# Patient Record
Sex: FEMALE | Race: WHITE | Employment: FULL TIME | URBAN - METROPOLITAN AREA
[De-identification: names, ages, dates, MRNs, and addresses within clinical notes are randomized per-mention and may not be internally consistent; named-entity substitution may affect disease eponyms.]

---

## 2017-01-02 ENCOUNTER — APPOINTMENT (OUTPATIENT)
Dept: CARDIAC REHAB | Age: 52
End: 2017-01-02
Attending: INTERNAL MEDICINE

## 2017-01-04 ENCOUNTER — OFFICE VISIT (OUTPATIENT)
Dept: CARDIOLOGY CLINIC | Age: 52
End: 2017-01-04

## 2017-01-04 VITALS
RESPIRATION RATE: 18 BRPM | OXYGEN SATURATION: 99 % | SYSTOLIC BLOOD PRESSURE: 106 MMHG | HEART RATE: 116 BPM | BODY MASS INDEX: 35.48 KG/M2 | DIASTOLIC BLOOD PRESSURE: 70 MMHG | HEIGHT: 61 IN | WEIGHT: 187.9 LBS

## 2017-01-04 DIAGNOSIS — I87.1 SVC OBSTRUCTION: ICD-10-CM

## 2017-01-04 DIAGNOSIS — R00.0 SINUS TACHYCARDIA: ICD-10-CM

## 2017-01-04 DIAGNOSIS — I26.99 OTHER PULMONARY EMBOLISM WITHOUT ACUTE COR PULMONALE, UNSPECIFIED CHRONICITY (HCC): ICD-10-CM

## 2017-01-04 DIAGNOSIS — R06.02 SHORTNESS OF BREATH: Primary | ICD-10-CM

## 2017-01-04 DIAGNOSIS — I67.9 CEREBROVASCULAR DISEASE, UNSPECIFIED: ICD-10-CM

## 2017-01-04 RX ORDER — METOPROLOL TARTRATE 50 MG/1
75 TABLET ORAL 2 TIMES DAILY
Qty: 90 TAB | Refills: 1 | Status: SHIPPED | OUTPATIENT
Start: 2017-01-04 | End: 2017-03-05

## 2017-01-04 NOTE — PROGRESS NOTES
NAME:  Andrea Dahl   :   1965   MRN:   [id number]   PCP:  Dannie Goodman MD           Subjective: The patient is a 46y.o. year old female  who returns for a routine follow-up. She is S/p exploration of sternotomy wound for intolerable wound pain with findings of three protruding sternal wire twists. All wire was removed in Dec.   Since the last visit, patient reports not feeling much improved since sternal wire removal . She is short of breath most of the time and tachycardic unless she is resting in a recliner. Her resting HR is in the 110s. pulonary felt she has scaring in right lung from PE which may be contributing to her dyspnea. 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 Ms Sarmad Sosa notes she was short of breath prior to her surgery but this is worse. C/O edema in her left leg. Wears compression hose on weekends and tries to keep her legs elevated throughout the work day. Is back to work as a  lifting boxes/binders frequently and does not feel she can do this 3 weeks after her surgery. Past Medical History   Diagnosis Date    Arrhythmia      Afib    Chronic pain      Lt and Rt back:  Dr Mir Ny;  Pain mgmt Karen JAEGER    Cough      evaluated 14 by Dr Nancy Rosario ([phone]) \". . possible drug related lung is due to Methitrexate or atypical or fungal infection\" per office note dated 14    Crohn's disease [facility])      Dr Ame Muller Ill-defined condition      migraines    Lupus [facility])      Dr Keren Rangel [phone]    Pain from implanted hardware 2016     Exploration of sternal incision with removal of protruding sternal wires    Stroke ([address] 2016     TIA's     SVC obstruction        Social History   Substance Use Topics    Smoking status: Former Smoker     Packs/day: 0.50     Years: 2.50     Quit date: 3/29/2005    Smokeless tobacco: Never Used      Comment: social    Alcohol use No      Family History   Problem Relation Age of Onset   [facility] Father      stomach    Other Mother      Auto accident        Review of Systems  Constitutional: Negative for fever, chills, and diaphoresis. Respiratory: Negative for cough, hemoptysis, sputum production, reports shortness of breath  Cardiovascular: Negative for chest pain, palpitations, orthopnea, claudication, reports leg swelling   Gastrointestinal: Negative for heartburn, nausea, vomiting, blood in stool and melena. Genitourinary: Negative for dysuria and flank pain. Musculoskeletal: Negative for joint pain and back pain. Skin: Negative for rash. Neurological: Negative for focal weakness, seizures, loss of consciousness, weakness and headaches. Endo/Heme/Allergies: Does not bruise/bleed easily. Psychiatric/Behavioral: Negative for memory loss. The patient does not have insomnia. Objective:       Vitals:    01/04/17 1017 01/04/17 1026   BP: 104/70 106/70   Pulse: (!) 116    Resp: 18    SpO2: 99%    Weight: 187 lb 14.4 oz (85.2 kg)    Height: 5' 0.5\" (1.537 m)     Body mass index is 36.09 kg/(m^2). General PE    Gen: NAD     Mental Status - Alert. General Appearance - Not in acute distress. Neck - no JVD     Chest and Lung Exam     Inspection: Accessory muscles - No use of accessory muscles in breathing. Auscultation:   Breath sounds: - Normal.     Cardiovascular   Inspection: Jugular vein - Bilateral - Inspection Normal.   Palpation/Percussion:   Apical Impulse: - Normal.   Auscultation: Rhythm - tachycardic. Heart Sounds - S1 WNL and S2 WNL. No S3 or S4. Murmurs & Other Heart Sounds: Auscultation of the heart reveals - No Murmurs. Peripheral Vascular   Upper Extremity: Inspection - Bilateral - No Cyanotic nailbeds or Digital clubbing. Lower Extremity:   Palpation: Edema - Bilateral - + edema, L>R.        Neuro: A&O times 3, CN and motor grossly WNL      Data Review:     EKG -  Sinus tachycardia      Allergies reviewed  Allergies   Allergen Reactions    Codeine Hives and Nausea and Vomiting     Severe nausea & vomiting    Hydrocodone Hives and Nausea and Vomiting     Severe nausea & vomiting    Oxycontin [Oxycodone] Hives and Nausea and Vomiting     Severe nausea & vomiting, also has the same reaction from Percocet    Percocet [Oxycodone-Acetaminophen] Hives and Nausea and Vomiting    Dilaudid [Hydromorphone (Bulk)] Swelling    Prednisone Other (comments)     HX OF CROHN'S; not allergic, prefer not to use        Medications reviewed  Current Outpatient Prescriptions   Medication Sig    metoprolol tartrate (LOPRESSOR) 50 mg tablet Take 1.5 Tabs by mouth two (2) times a day for 60 days.  diazepam (VALIUM) 5 mg tablet Take 1 Tab by mouth every twelve (12) hours as needed for Anxiety. Max Daily Amount: 10 mg. Refill at 1 month intervals    aspirin (ASPIRIN) 325 mg tablet Take 325 mg by mouth daily.  apixaban (ELIQUIS) 5 mg tablet Take 10mg a day for the first 7 days, then 5mg twice a day    gabapentin (NEURONTIN) 300 mg capsule Take 1 Cap by mouth three (3) times daily.  butalbital-acetaminophen-caffeine (FIORICET, ESGIC) -40 mg per tablet Take 2 Tabs by mouth every eight (8) hours as needed for Pain or Headache. Max Daily Amount: 6 Tabs. Indications: MIGRAINE, TENSION-TYPE HEADACHE    hydroxychloroquine (PLAQUENIL) 200 mg tablet Take 400 mg by mouth daily (after dinner). For lupus    tiZANidine (ZANAFLEX) 2 mg tablet Take 6 mg by mouth nightly.  ondansetron hcl (ZOFRAN) 4 mg tablet Take 4 mg by mouth every eight (8) hours as needed for Nausea.  meperidine (DEMEROL) 50 mg tablet Take 50 mg by mouth three (3) times daily as needed for Pain.  INFLIXIMAB (REMICADE IV) 800 mg by IntraVENous route. Every 8 weeks;last infusion 10/07/2016    drospirenone-ethinyl estradiol (MARIELOS 28) 3-20 mg-mcg per tablet Take 1 Tab by mouth nightly.      No current facility-administered medications for this visit. Assessment:       ICD-10-CM ICD-9-CM    1. Shortness of breath R06.02 786.05 AMB POC EKG ROUTINE W/ 12 LEADS, INTER & REP   2. Other pulmonary embolism without acute cor pulmonale, unspecified chronicity ([facility]) I26.99 415.19    3. SVC obstruction I87.1 459.2    4. Cerebrovascular disease, unspecified I67.9 437.9    5. Sinus tachycardia R00.0 427.89 metoprolol tartrate (LOPRESSOR) 50 mg tablet        Orders Placed This Encounter    AMB POC EKG ROUTINE W/ 12 LEADS, INTER & REP     Order Specific Question:   Reason for Exam:     Answer:   routine    metoprolol tartrate (LOPRESSOR) 50 mg tablet     Sig: Take 1.5 Tabs by mouth two (2) times a day for 60 days. Dispense:  90 Tab     Refill:  1       Patient Active Problem List   Diagnosis Code    Crohn's disease of colon ([facility] [address] K50.10    Crohn's disease of ileum ([facility] [address] K50.00    Acute GI bleeding K92.2    Sinus tachycardia R00.0    Bronchitis, acute J20.9    Abdominal pain, acute, right upper quadrant R10.11    SLE (systemic lupus erythematosus) ([facility]) M32.9    Headache(784.0)     Migraine with aura and without status migrainosus, not intractable G43. 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.21    Cervical radiculopathy M54.12    Cervico-occipital neuralgia of right side M54.81    Stenosis of both carotid arteries without infarction I65.23    Acute ischemic stroke ([facility]) I63.9    Lupus ([facility]) M32.9    Pulmonary embolism ([facility]) I26.99    Transient ischemic attack involving right internal carotid artery G45.1    Stenosis of both carotid arteries without cerebral infarction I65.23    SVC obstruction I87.1    Shortness of breath R06.02    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 Sternal incision approximated, no concerning separation. Lungs with air movement in all fields, right posterior mildly diminished. ECG ST, unchanged from previous. BP is normotensive. Will increase her beta blocker to 75mg BID. Note to limit her lifting at work. Follow up in 4 weeks. Jennie Hoover, [facility] [address] [facility]    1/4/2017         Agree with note as outlined by  NP. I confirm findings in history and physical exam. No additional findings noted. Agree with plan as outlined above.      Jeremi Cuevas MD

## 2017-01-04 NOTE — LETTER
NOTIFICATION RETURN TO WORK / SCHOOL 
 
1/4/2017 11:17 AM 
 
Ms. Carlos Rivera [address] [phone] To Whom It May Concern: 
 
Carlos Rivera is currently under the care of 90Melchor Shah. She is limited to 5 lbs weight restriction. No lifting or pulling for 4 weeks. If there are questions or concerns please have the patient contact our office. Sincerely, Madison Cannon NP

## 2017-01-04 NOTE — LETTER
NOTIFICATION RETURN TO WORK / SCHOOL 
 
1/4/2017 11:13 AM 
 
Ms. Tomi Monae [address] [phone] To Whom It May Concern: 
 
Toim Monae is currently under the care of Jordan Shah. She is limited to 5 lbs weight restriction. No lifting above shoulder level for 4 weeks. If there are questions or concerns please have the patient contact our office. Sincerely, Dawson Klein MD

## 2017-01-04 NOTE — PROGRESS NOTES
Chief Complaint   Patient presents with    Shortness of Breath     4 mo appt. C/O SOB, fast HR constantly.

## 2017-01-16 ENCOUNTER — TELEPHONE (OUTPATIENT)
Dept: CARDIOLOGY CLINIC | Age: 52
End: 2017-01-16

## 2017-01-16 NOTE — TELEPHONE ENCOUNTER
Verified patient with two identifiers. Spoke with pt, c/o SOB increasing for a week, when she takes a deep breath she gets a sharp pain in chest with tightness. Advised pt to go to ER to be evaluated. Pt verbalized understanding and said she would.

## 2017-02-01 ENCOUNTER — HOSPITAL ENCOUNTER (OUTPATIENT)
Dept: MAMMOGRAPHY | Age: 52
Discharge: HOME | End: 2017-02-01
Attending: OBSTETRICS & GYNECOLOGY
Payer: COMMERCIAL

## 2017-02-01 DIAGNOSIS — Z12.31 VISIT FOR SCREENING MAMMOGRAM: ICD-10-CM

## 2017-02-01 PROCEDURE — 77067 SCR MAMMO BI INCL CAD: CPT

## 2017-02-03 ENCOUNTER — APPOINTMENT (OUTPATIENT)
Dept: INFUSION THERAPY | Age: 52
End: 2017-02-03
Payer: COMMERCIAL

## 2017-02-08 ENCOUNTER — OFFICE VISIT (OUTPATIENT)
Dept: CARDIOLOGY CLINIC | Age: 52
End: 2017-02-08

## 2017-02-08 VITALS
SYSTOLIC BLOOD PRESSURE: 102 MMHG | BODY MASS INDEX: 35.55 KG/M2 | DIASTOLIC BLOOD PRESSURE: 72 MMHG | HEART RATE: 103 BPM | RESPIRATION RATE: 18 BRPM | OXYGEN SATURATION: 99 % | WEIGHT: 188.3 LBS | HEIGHT: 61 IN

## 2017-02-08 DIAGNOSIS — R06.02 SOB (SHORTNESS OF BREATH): Primary | ICD-10-CM

## 2017-02-08 DIAGNOSIS — R07.89 CHEST WALL DISCOMFORT: ICD-10-CM

## 2017-02-08 DIAGNOSIS — R60.0 LOCALIZED EDEMA: ICD-10-CM

## 2017-02-08 DIAGNOSIS — R00.0 SINUS TACHYCARDIA: ICD-10-CM

## 2017-02-08 RX ORDER — TIZANIDINE HYDROCHLORIDE 2 MG/1
2 CAPSULE, GELATIN COATED ORAL 2 TIMES DAILY
COMMUNITY
Start: 2016-11-29 | End: 2017-03-23 | Stop reason: DRUGHIGH

## 2017-02-08 RX ORDER — ONDANSETRON 4 MG/1
TABLET, ORALLY DISINTEGRATING ORAL
COMMUNITY
Start: 2016-11-21 | End: 2017-02-08 | Stop reason: SDUPTHER

## 2017-02-08 RX ORDER — FUROSEMIDE 20 MG/1
TABLET ORAL
Qty: 30 TAB | Refills: 1 | Status: SHIPPED | OUTPATIENT
Start: 2017-02-08 | End: 2018-02-02

## 2017-02-08 RX ORDER — FLUCONAZOLE 100 MG/1
TABLET ORAL
COMMUNITY
Start: 2016-12-16 | End: 2017-09-27 | Stop reason: ALTCHOICE

## 2017-02-08 NOTE — PROGRESS NOTES
Subjective/HPI:     Radha Evans is a 46 y.o. female is here for f/u appt after increasing Metoprolol for tachycardia. She continues to have an elevated HR. She has been trending around 100. She is still sob, feels like she cant take a deep breath. She has had chest tightness, off and on worsening in the past 2 weeks. She feels like her sternum bone is  more then before. She hasnt been able to follow lifting precautions at work. She is still having swelling particularly in her left leg more then the right. She is wearing her compression stockings, but when she takes them off it will blow up more. The patient denies orthopnea, PND, palpitations, syncope, presyncope or fatigue. PCP Provider  Bryanna Nguyen MD  Past Medical History   Diagnosis Date    Arrhythmia      Afib    Chronic pain      Lt and Rt back:  Dr Claudy Deluca;  Pain mgmt Karen Mallory PA    Cough      evaluated 14 by Dr Peggy Gant ([phone]) \". . possible drug related lung is due to Methitrexate or atypical or fungal infection\" per office note dated 14    Crohn's disease [facility])      Dr Rubi Stanford Ill-defined condition      migraines    Lupus [facility])      Dr Nicol Vitalveland [phone]    Pain from implanted hardware 2016     Exploration of sternal incision with removal of protruding sternal wires    Stroke ([facility] [address] 2016     TIA's     SVC obstruction       Past Surgical History   Procedure Laterality Date    Pr colonoscopy w/biopsy single/multiple  2013          Hx appendectomy      Hx left salpingo-oophorectomy  2005     ovary and fallopian tube removed    Hx mohs procedure Right approx      with bone spur repair    Hx hysterectomy       partial    Hx  section       x1    Hx heart catheterization       no stents, open heart surgery    Hx vascular access  6/11/10     portacath insertion and removal; Gets Remicaid q5 weeks    Hx gi  2005     bowel adhesions removed    Hx colectomy 7/24/10     colon resection-18\" removed; Dr Shayy Waters resection    Hx other surgical       femerol vein removed    Hx tonsillectomy      Hx heent       sinus surgery for deviated septum     Allergies   Allergen Reactions    Codeine Hives and Nausea and Vomiting     Severe nausea & vomiting    Hydrocodone Hives and Nausea and Vomiting     Severe nausea & vomiting    Oxycontin [Oxycodone] Hives and Nausea and Vomiting     Severe nausea & vomiting, also has the same reaction from Percocet    Percocet [Oxycodone-Acetaminophen] Hives and Nausea and Vomiting    Dilaudid [Hydromorphone (Bulk)] Swelling    Prednisone Other (comments)     HX OF CROHN'S; not allergic, prefer not to use       Family History   Problem Relation Age of Onset    Cancer Father      stomach    Other Mother      Auto accident      Current Outpatient Prescriptions   Medication Sig    FLUVIRIN [id number], PF, syrg injection     fluconazole (DIFLUCAN) 100 mg tablet every thirty (30) days.  tiZANidine (ZANAFLEX) 2 mg capsule Take 2 mg by mouth two (2) times a day.  NORETHINDRONE ACETATE PO Take  by mouth daily. norethindron 0.35 mg tabs    furosemide (LASIX) 20 mg tablet Take one tab daily as needed for worsening swelling    metoprolol tartrate (LOPRESSOR) 50 mg tablet Take 1.5 Tabs by mouth two (2) times a day for 60 days.  diazepam (VALIUM) 5 mg tablet Take 1 Tab by mouth every twelve (12) hours as needed for Anxiety. Max Daily Amount: 10 mg. Refill at 1 month intervals    aspirin (ASPIRIN) 325 mg tablet Take 325 mg by mouth daily.  apixaban (ELIQUIS) 5 mg tablet Take 10mg a day for the first 7 days, then 5mg twice a day (Patient taking differently: Take 5 mg by mouth two (2) times a day. Take 10mg a day for the first 7 days, then 5mg twice a day)    gabapentin (NEURONTIN) 300 mg capsule Take 1 Cap by mouth three (3) times daily.     butalbital-acetaminophen-caffeine (FIORICET, ESGIC) -40 mg per tablet Take 2 Tabs by mouth every eight (8) hours as needed for Pain or Headache. Max Daily Amount: 6 Tabs. Indications: MIGRAINE, TENSION-TYPE HEADACHE    hydroxychloroquine (PLAQUENIL) 200 mg tablet Take 400 mg by mouth daily (after dinner). For lupus    ondansetron hcl (ZOFRAN) 4 mg tablet Take 4 mg by mouth every eight (8) hours as needed for Nausea.  meperidine (DEMEROL) 50 mg tablet Take 50 mg by mouth three (3) times daily as needed for Pain.  INFLIXIMAB (REMICADE IV) 800 mg by IntraVENous route. Every 8 weeks;last infusion 10/07/2016    tiZANidine (ZANAFLEX) 2 mg tablet Take 6 mg by mouth nightly.  drospirenone-ethinyl estradiol (MARIELOS 28) 3-20 mg-mcg per tablet Take 1 Tab by mouth nightly. No current facility-administered medications for this visit. Vitals:    02/08/17 0840 02/08/17 0853   BP: 104/80 102/72   Pulse: (!) 103    Resp: 18    SpO2: 99%    Weight: 188 lb 4.8 oz (85.4 kg)    Height: 5' 0.5\" (1.537 m)      Social History     Social History    Marital status:      Spouse name: N/A    Number of children: N/A    Years of education: N/A     Occupational History    Not on file. Social History Main Topics    Smoking status: Former Smoker     Packs/day: 0.50     Years: 2.50     Quit date: 3/29/2005    Smokeless tobacco: Never Used      Comment: social    Alcohol use No    Drug use: No    Sexual activity: Not on file     Other Topics Concern    Not on file     Social History Narrative       I have reviewed the nurses notes, vitals, problem list, allergy list, medical history, family, social history and medications. Review of Symptoms:    General: Pt denies excessive weight gain or loss. Pt is able to conduct ADL's  HEENT: Denies blurred vision, headaches, epistaxis and difficulty swallowing. Respiratory: + shortness of breath, denies HENDRIX, wheezing or stridor.   Cardiovascular: + precordial pain, denies palpitations, + edema, denies PND  Gastrointestinal: Denies poor appetite, indigestion, abdominal pain or blood in stool  Urinary: Denies dysuria, pyuria  Musculoskeletal: +sternal bone changes, Denies pain or swelling from muscles or joints  Neurologic: Denies tremor, paresthesias, or sensory motor disturbance  Skin: Denies rash, itching or texture change. Psych: Denies depression        Physical Exam:      General: Well developed, in no acute distress, cooperative and alert  HEENT: No carotid bruits, no JVD, trach is midline. Neck Supple, PEERL, EOM intact. Heart:  Normal S1/S2 negative S3 or S4. Regular, no murmur, gallop or rub.   Respiratory: Clear bilaterally x 4, no wheezing or rales  Abdomen:   Soft, non-tender, no masses, bowel sounds are active.   Extremities:  No edema, normal cap refill, no cyanosis, atraumatic. Neuro: A&Ox3, speech clear, gait stable. Skin: Skin color is normal. No rashes or lesions.  Non diaphoretic  Vascular: 2+ pulses symmetric in all extremities  Sternum: incision intact, no significant changes in sternum    Cardiographics    ECG: SR, rate 100    Results for orders placed or performed during the hospital encounter of 12/07/16   EKG, 12 LEAD, INITIAL   Result Value Ref Range    Ventricular Rate 110 BPM    Atrial Rate 110 BPM    P-R Interval 134 ms    QRS Duration 70 ms    Q-T Interval 334 ms    QTC Calculation (Bezet) 452 ms    Calculated P Axis 5 degrees    Calculated R Axis 15 degrees    Calculated T Axis 37 degrees    Diagnosis       Sinus tachycardia  Low voltage QRS  When compared with ECG of 05-JUL-2016 14:05,  High HR  QT has shortened  Confirmed by Jenna Arroyo MD, Alexander Titus ([id number]) on 12/8/2016 11:00:49 AM           Cardiology Labs:  Lab Results   Component Value Date/Time    Cholesterol, total 168 04/23/2016 03:57 AM    HDL Cholesterol 81 04/23/2016 03:57 AM    LDL, calculated 43.2 04/23/2016 03:57 AM    Triglyceride 219 04/23/2016 03:57 AM    CHOL/HDL Ratio 2.1 04/23/2016 03:57 AM       Lab Results   Component Value Date/Time Sodium 144 12/15/2016 04:24 AM    Potassium 4.0 12/15/2016 04:24 AM    Chloride 110 12/15/2016 04:24 AM    CO2 25 12/15/2016 04:24 AM    Anion gap 9 12/15/2016 04:24 AM    Glucose 109 12/15/2016 04:24 AM    BUN 10 12/15/2016 04:24 AM    Creatinine 0.93 12/15/2016 04:24 AM    BUN/Creatinine ratio 11 12/15/2016 04:24 AM    GFR est AA >60 12/15/2016 04:24 AM    GFR est non-AA >60 12/15/2016 04:24 AM    Calcium 7.5 12/15/2016 04:24 AM    AST (SGOT) 13 12/15/2016 04:24 AM    Alk. phosphatase 63 12/15/2016 04:24 AM    Protein, total 5.6 12/15/2016 04:24 AM    Albumin 2.2 12/15/2016 04:24 AM    Globulin 3.4 12/15/2016 04:24 AM    A-G Ratio 0.6 12/15/2016 04:24 AM    ALT (SGPT) 14 12/15/2016 04:24 AM           Assessment:     Assessment:     Marge Ji was seen today for shortness of breath and other. Diagnoses and all orders for this visit:    SOB (shortness of breath)  -     AMB POC EKG ROUTINE W/ 12 LEADS, INTER & REP    Sinus tachycardia    Chest wall discomfort    Localized edema  -     furosemide (LASIX) 20 mg tablet; Take one tab daily as needed for worsening swelling        ICD-10-CM ICD-9-CM    1. SOB (shortness of breath) R06.02 786.05 AMB POC EKG ROUTINE W/ 12 LEADS, INTER & REP   2. Sinus tachycardia R00.0 427.89    3. Chest wall discomfort R07.89 786.52    4. Localized edema R60.0 782.3 furosemide (LASIX) 20 mg tablet     Orders Placed This Encounter    AMB POC EKG ROUTINE W/ 12 LEADS, INTER & REP     Order Specific Question:   Reason for Exam:     Answer:   Routine    FLUVIRIN [id number], PF, syrg injection    fluconazole (DIFLUCAN) 100 mg tablet     Sig: every thirty (30) days.  DISCONTD: ondansetron (ZOFRAN ODT) 4 mg disintegrating tablet    tiZANidine (ZANAFLEX) 2 mg capsule     Sig: Take 2 mg by mouth two (2) times a day.  NORETHINDRONE ACETATE PO     Sig: Take  by mouth daily.  norethindron 0.35 mg tabs    furosemide (LASIX) 20 mg tablet     Sig: Take one tab daily as needed for worsening swelling     Dispense:  30 Tab     Refill:  1        Plan:     Patient presents today for f/u after increasing Metoprolol. Pt reports that this has not improved her symptoms, still trending HR around 100. She is having sob and chest wall tightness, feels like there is a change in her sternum bone, no obvious changes. She is having swelling in her legs, particularly the left. I will evaluate with an echo. I will send a script for Lasix 20mg po every day prn swelling and recommend she f/u with CT surgery to discuss next steps for her symptoms. f/u in 3 months. Lavelle Ruiz NP      [facility]    2/8/2017         Agree with note as outlined by  NP. I confirm findings in history and physical exam. No additional findings noted. Agree with plan as outlined above. Echo to r/o effusion.     [address] Laguerre MD

## 2017-02-08 NOTE — MR AVS SNAPSHOT
Visit Information Date & Time Provider Department Dept. Phone Encounter #  
 2/8/2017  9:00 AM Jeremi Cuevas MD [facility] [phone] [id number] Your Appointments 2/15/2017  3:30 PM  
ECHO CARDIOGRAMS 2D with [address] [facility]) Appt Note: Per Dr MALDONADO $0CP REM 2D ECHO COMPLETE ADULT (TTE) W OR WO CONTR [30093 CPT(R)]  
 [address] [facility] [address]  
[phone] [address] [facility] [address] Upcoming Health Maintenance Date Due Hepatitis C Screening 1965 DTaP/Tdap/Td series (1 - Tdap) 7/11/1986 PAP AKA CERVICAL CYTOLOGY 7/11/1986 FOBT Q 1 YEAR AGE 50-75 7/11/2015 BREAST CANCER SCRN MAMMOGRAM 2/1/2019 Allergies as of 2/8/2017  Review Complete On: 2/8/2017 By: Kilo Dunne NP Severity Noted Reaction Type Reactions Codeine High 04/22/2010   Systemic Hives, Nausea and Vomiting Severe nausea & vomiting Hydrocodone High 04/22/2010   Systemic Hives, Nausea and Vomiting Severe nausea & vomiting Oxycontin [Oxycodone] High 04/22/2010   Systemic Hives, Nausea and Vomiting Severe nausea & vomiting, also has the same reaction from Percocet Percocet [Oxycodone-acetaminophen] High 07/19/2010   Side Effect Hives, Nausea and Vomiting Dilaudid [Hydromorphone (Bulk)]  08/24/2012    Swelling Prednisone  01/20/2014    Other (comments) HX OF CROHN'S; not allergic, prefer not to use Current Immunizations  Reviewed on 10/14/2016 Name Date H1N1 FLU VACCINE 1/1/2010 Influenza Vaccine 11/27/2016, 10/6/2014 Influenza Vaccine Whole 10/15/2011 PPD 1/1/2010 Not reviewed this visit You Were Diagnosed With   
  
 Codes Comments SOB (shortness of breath)    -  Primary ICD-10-CM: R06.02 
ICD-9-CM: 786.05 Sinus tachycardia     ICD-10-CM: R00.0 ICD-9-CM: 427.89   
 Chest wall discomfort     ICD-10-CM: R07.89 ICD-9-CM: 786.52 Localized edema     ICD-10-CM: R60.0 ICD-9-CM: 711. 3 Vitals BP Pulse Resp Height(growth percentile) Weight(growth percentile) SpO2  
 102/72 (BP 1 Location: Right arm, BP Patient Position: Sitting) (!) 103 18 5' 0.5\" (1.537 m) 188 lb 4.8 oz (85.4 kg) 99% BMI OB Status Smoking Status 36.17 kg/m2 Postmenopausal Former Smoker Vitals History BMI and BSA Data Body Mass Index Body Surface Area  
 36.17 kg/m 2 1.91 m 2 Preferred Pharmacy Pharmacy Name Phone [facility] [address], [address] [place] [address] Sonu Brown [phone] Your Updated Medication List  
  
   
This list is accurate as of: 2/8/17 10:08 AM.  Always use your most recent med list.  
  
  
  
  
 apixaban 5 mg tablet Commonly known as:  Princella Scott Take 10mg a day for the first 7 days, then 5mg twice a day  
  
 aspirin 325 mg tablet Commonly known as:  ASPIRIN Take 325 mg by mouth daily. butalbital-acetaminophen-caffeine -40 mg per tablet Commonly known as:  Gennett Madura Take 2 Tabs by mouth every eight (8) hours as needed for Pain or Headache. Max Daily Amount: 6 Tabs. Indications: MIGRAINE, TENSION-TYPE HEADACHE  
  
 DEMEROL 50 mg tablet Generic drug:  meperidine Take 50 mg by mouth three (3) times daily as needed for Pain.  
  
 diazePAM 5 mg tablet Commonly known as:  VALIUM Take 1 Tab by mouth every twelve (12) hours as needed for Anxiety. Max Daily Amount: 10 mg. Refill at 1 month intervals  
  
 fluconazole 100 mg tablet Commonly known as:  DIFLUCAN  
every thirty (30) days. FLUVIRIN [id number] (PF) Syrg injection Generic drug:  influenza vaccine 2016-17 (4 yr+)(PF)  
  
 furosemide 20 mg tablet Commonly known as:  LASIX Take one tab daily as needed for worsening swelling  
  
 gabapentin 300 mg capsule Commonly known as:  NEURONTIN  
 Take 1 Cap by mouth three (3) times daily. hydroxychloroquine 200 mg tablet Commonly known as:  PLAQUENIL Take 400 mg by mouth daily (after dinner). For lupus  
  
 metoprolol tartrate 50 mg tablet Commonly known as:  LOPRESSOR Take 1.5 Tabs by mouth two (2) times a day for 60 days. NORETHINDRONE ACETATE PO Take  by mouth daily. norethindron 0.35 mg tabs REMICADE IV  
800 mg by IntraVENous route. Every 8 weeks;last infusion 10/07/2016 MARIELOS (28) 3-0.02 mg Tab Generic drug:  drospirenone-ethinyl estradiol Take 1 Tab by mouth nightly. * ZANAFLEX 2 mg tablet Generic drug:  tiZANidine Take 6 mg by mouth nightly. * tiZANidine 2 mg capsule Commonly known as:  Demetra Perfect Take 2 mg by mouth two (2) times a day. ZOFRAN (AS HYDROCHLORIDE) 4 mg tablet Generic drug:  ondansetron hcl Take 4 mg by mouth every eight (8) hours as needed for Nausea. * Notice: This list has 2 medication(s) that are the same as other medications prescribed for you. Read the directions carefully, and ask your doctor or other care provider to review them with you. Prescriptions Sent to Pharmacy Refills  
 furosemide (LASIX) 20 mg tablet 1 Sig: Take one tab daily as needed for worsening swelling Class: Normal  
 Pharmacy: [facility] [address] [place] [address]  [address]  #: [phone] We Performed the Following AMB POC EKG ROUTINE W/ 12 LEADS, INTER & REP [15178 CPT(R)] To-Do List   
 02/08/2017 ECHO:  2D ECHO COMPLETE ADULT (TTE) W OR WO CONTR   
  
 02/24/2017 1:00 PM  
  Appointment with [address] [place] [address] ([phone]) Patient Instructions [address] February 8, 2017 RE: Juan Miguel Martin To Whom It May Concern: 
 
Juan Miguel Martin is currently under the care of Anh Aaron Shah. She continues to be on a limited weight restriction of 5lbs. No lifting or pulling for additional 4 weeks. If there are questions or concerns please have the patient contact our office. Sincerely, Janie Hoffmann NP Introducing [facility] & [facility] SERVICES! Dear Chito Mauricio: 
Thank you for requesting a [facility] account. Our records indicate that you already have an active [facility] [employer] [facility] account. You can access your account anytime at [email or web address] Did you know that you can access your hospital and ER discharge instructions at any time in [employer] [facility]? You can also review all of your test results from your hospital stay or ER visit. Additional Information If you have questions, please visit the Frequently Asked Questions section of the [employer] [facility] website at [email or web address] Remember, [facility] [employer] [facility] is NOT to be used for urgent needs. For medical emergencies, dial 911. Now available from your iPhone and Android! Please provide this summary of care documentation to your next provider. Your primary care clinician is listed as Katie Dooley. If you have any questions after today's visit, please call [phone].

## 2017-02-08 NOTE — PROGRESS NOTES
Chief Complaint   Patient presents with    Shortness of Breath     1 mo f/u    Other     pt c/o constant chest tightness x 2 wks

## 2017-02-15 ENCOUNTER — CLINICAL SUPPORT (OUTPATIENT)
Dept: CARDIOLOGY CLINIC | Age: 52
End: 2017-02-15

## 2017-02-15 DIAGNOSIS — R06.02 SOB (SHORTNESS OF BREATH): ICD-10-CM

## 2017-02-15 DIAGNOSIS — R60.0 LOCALIZED EDEMA: ICD-10-CM

## 2017-02-15 DIAGNOSIS — R07.89 CHEST WALL DISCOMFORT: ICD-10-CM

## 2017-02-15 DIAGNOSIS — R00.0 SINUS TACHYCARDIA: ICD-10-CM

## 2017-02-16 ENCOUNTER — TELEPHONE (OUTPATIENT)
Dept: CARDIOLOGY CLINIC | Age: 52
End: 2017-02-16

## 2017-02-16 NOTE — TELEPHONE ENCOUNTER
Left message for patient to return my call. Spoke with patient regarding ECHO test results. Erin Wolff, pt needs a follow up appt with Dr. Silvia Reeves in 3 months. Thanks,. German Ríos, pt is still experiencing SOB and rapid heart beat, please advise.

## 2017-02-16 NOTE — TELEPHONE ENCOUNTER
----- Message from Silver Delgado NP sent at 2/16/2017  8:40 AM EST -----  Please let Ms Pedro Lyle know her echo is normal, heart strength, valves, chambers are all normal. Continue with plans for f/u with cardiac surgery

## 2017-02-16 NOTE — TELEPHONE ENCOUNTER
Verified patient with two identifiers. Spoke with pt advising her to call her surgeon if she is still having rapid heart beat. Pt verbalized understanding. ANALY Mccoy LPN        Caller: Unspecified (Today, 10:57 AM)                     She was supposed to see cardiac surgery to discuss her issues.

## 2017-02-21 RX ORDER — ACETAMINOPHEN 325 MG/1
975 TABLET ORAL ONCE
Status: COMPLETED | OUTPATIENT
Start: 2017-02-24 | End: 2017-02-24

## 2017-02-21 RX ORDER — DIPHENHYDRAMINE HYDROCHLORIDE 50 MG/ML
25 INJECTION, SOLUTION INTRAMUSCULAR; INTRAVENOUS ONCE
Status: ACTIVE | OUTPATIENT
Start: 2017-02-21 | End: 2017-02-22

## 2017-02-24 ENCOUNTER — HOSPITAL ENCOUNTER (OUTPATIENT)
Dept: INFUSION THERAPY | Age: 52
Discharge: HOME | End: 2017-02-24
Payer: COMMERCIAL

## 2017-02-24 VITALS
TEMPERATURE: 98 F | SYSTOLIC BLOOD PRESSURE: 122 MMHG | WEIGHT: 193.5 LBS | RESPIRATION RATE: 18 BRPM | DIASTOLIC BLOOD PRESSURE: 84 MMHG | BODY MASS INDEX: 37.17 KG/M2 | HEART RATE: 100 BPM

## 2017-02-24 PROCEDURE — 74011250636 HC RX REV CODE- 250/636: Performed by: INTERNAL MEDICINE

## 2017-02-24 PROCEDURE — 96413 CHEMO IV INFUSION 1 HR: CPT

## 2017-02-24 PROCEDURE — 74011250637 HC RX REV CODE- 250/637: Performed by: INTERNAL MEDICINE

## 2017-02-24 PROCEDURE — 96375 TX/PRO/DX INJ NEW DRUG ADDON: CPT

## 2017-02-24 PROCEDURE — 96415 CHEMO IV INFUSION ADDL HR: CPT

## 2017-02-24 RX ORDER — DIPHENHYDRAMINE HYDROCHLORIDE 50 MG/ML
50 INJECTION, SOLUTION INTRAMUSCULAR; INTRAVENOUS ONCE
Status: COMPLETED | OUTPATIENT
Start: 2017-02-24 | End: 2017-02-24

## 2017-02-24 RX ORDER — SODIUM CHLORIDE 0.9 % (FLUSH) 0.9 %
10-40 SYRINGE (ML) INJECTION AS NEEDED
Status: ACTIVE | OUTPATIENT
Start: 2017-02-24 | End: 2017-02-25

## 2017-02-24 RX ORDER — SODIUM CHLORIDE 9 MG/ML
25 INJECTION, SOLUTION INTRAVENOUS CONTINUOUS
Status: DISPENSED | OUTPATIENT
Start: 2017-02-24 | End: 2017-02-25

## 2017-02-24 RX ADMIN — ACETAMINOPHEN 975 MG: 325 TABLET ORAL at 13:30

## 2017-02-24 RX ADMIN — INFLIXIMAB 500 MG: 100 INJECTION, POWDER, LYOPHILIZED, FOR SOLUTION INTRAVENOUS at 14:14

## 2017-02-24 RX ADMIN — Medication 10 ML: at 13:25

## 2017-02-24 RX ADMIN — SODIUM CHLORIDE 25 ML/HR: 900 INJECTION, SOLUTION INTRAVENOUS at 13:45

## 2017-02-24 RX ADMIN — DIPHENHYDRAMINE HYDROCHLORIDE 50 MG: 50 INJECTION INTRAMUSCULAR; INTRAVENOUS at 13:45

## 2017-02-24 NOTE — PROGRESS NOTES
Pt arrived to [facility] ambulatory for Remicade in no acute distress at 1310.  Assessment completed. #24G PIV established in left forearm site without issue and positive blood return noted. Visit Vitals    /89 (BP 1 Location: Left arm, BP Patient Position: Sitting)    Pulse (!) 106    Temp 98 °F (36.7 °C)    Resp 18    Wt 87.8 kg (193 lb 8 oz)    BMI 37.17 kg/m2       The following medications administered:  Tylenol 975mg PO  NS @ KVO  Benadryl 50mg IVP  Remicade 500mg IV over 2 hours    Visit Vitals    /84 (BP 1 Location: Right arm, BP Patient Position: Supine)    Pulse 100    Temp 98 °F (36.7 °C)    Resp 18    Wt 87.8 kg (193 lb 8 oz)    Breastfeeding No    BMI 37.17 kg/m2        Pt tolerated treatment well.  No adverse reaction noted. IV flushed per policy and removed, 2x2 and coban placed.  Pt discharged ambulatory in no acute distress at 1700, accompanied by spouse. Next appointment 4/21/17 @ 1300.

## 2017-03-16 DIAGNOSIS — I67.9 CEREBROVASCULAR DISEASE, UNSPECIFIED: ICD-10-CM

## 2017-03-16 DIAGNOSIS — G56.21 ULNAR NEUROPATHY AT ELBOW OF RIGHT UPPER EXTREMITY: ICD-10-CM

## 2017-03-16 DIAGNOSIS — M54.12 CERVICAL RADICULOPATHY: ICD-10-CM

## 2017-03-16 DIAGNOSIS — M54.81 CERVICO-OCCIPITAL NEURALGIA OF RIGHT SIDE: ICD-10-CM

## 2017-03-16 DIAGNOSIS — I65.23 STENOSIS OF BOTH CAROTID ARTERIES WITHOUT INFARCTION: ICD-10-CM

## 2017-03-16 RX ORDER — GABAPENTIN 300 MG/1
CAPSULE ORAL
Qty: 100 CAP | Refills: 10 | Status: SHIPPED | OUTPATIENT
Start: 2017-03-16 | End: 2017-03-17 | Stop reason: SDUPTHER

## 2017-03-17 DIAGNOSIS — G56.21 ULNAR NEUROPATHY AT ELBOW OF RIGHT UPPER EXTREMITY: ICD-10-CM

## 2017-03-17 DIAGNOSIS — I65.23 STENOSIS OF BOTH CAROTID ARTERIES WITHOUT INFARCTION: ICD-10-CM

## 2017-03-17 DIAGNOSIS — I67.9 CEREBROVASCULAR DISEASE, UNSPECIFIED: ICD-10-CM

## 2017-03-17 DIAGNOSIS — M54.12 CERVICAL RADICULOPATHY: ICD-10-CM

## 2017-03-17 DIAGNOSIS — M54.81 CERVICO-OCCIPITAL NEURALGIA OF RIGHT SIDE: ICD-10-CM

## 2017-03-17 RX ORDER — GABAPENTIN 300 MG/1
CAPSULE ORAL
Qty: 100 CAP | Refills: 0 | Status: SHIPPED | OUTPATIENT
Start: 2017-03-17 | End: 2018-04-04 | Stop reason: SDUPTHER

## 2017-03-17 NOTE — TELEPHONE ENCOUNTER
Future Appointments  Date Time Provider Mirtha Fowler   4/21/2017 1:00 PM CHAIR 3 [address] REG   6/13/2017 9:30 AM MD PEYTON HustonMB LUCY HEREDIA   8/17/2017 4:00 PM [address], MD [address] [facility]                         Last Appointment My Department:  2/15/2016    Please advise of refill below. Requested Prescriptions     Pending Prescriptions Disp Refills    gabapentin (NEURONTIN) 300 mg capsule 100 Cap 0     Sig: TAKE ONE CAPSULE BY MOUTH THREE TIMES A DAY.  PATIENT MUST MAKE APPOINTMENT FOR FURTHER REFILLS

## 2017-03-17 NOTE — TELEPHONE ENCOUNTER
----- Message from Nba Patton sent at 3/17/2017 12:21 PM EDT -----  Regarding: Dr. Daniela Rodriguez   Notes: Pt requesting refill  for \"Gabapentin\" Rx. Best call back [phone].

## 2017-03-23 ENCOUNTER — TELEPHONE (OUTPATIENT)
Dept: CARDIOLOGY CLINIC | Age: 52
End: 2017-03-23

## 2017-03-23 ENCOUNTER — HOSPITAL ENCOUNTER (EMERGENCY)
Age: 52
Discharge: HOME | End: 2017-03-23
Attending: EMERGENCY MEDICINE | Admitting: EMERGENCY MEDICINE
Payer: COMMERCIAL

## 2017-03-23 ENCOUNTER — APPOINTMENT (OUTPATIENT)
Dept: GENERAL RADIOLOGY | Age: 52
End: 2017-03-23
Attending: EMERGENCY MEDICINE
Payer: COMMERCIAL

## 2017-03-23 ENCOUNTER — APPOINTMENT (OUTPATIENT)
Dept: CT IMAGING | Age: 52
End: 2017-03-23
Attending: EMERGENCY MEDICINE
Payer: COMMERCIAL

## 2017-03-23 ENCOUNTER — APPOINTMENT (OUTPATIENT)
Dept: ULTRASOUND IMAGING | Age: 52
End: 2017-03-23
Attending: EMERGENCY MEDICINE
Payer: COMMERCIAL

## 2017-03-23 VITALS
HEART RATE: 104 BPM | DIASTOLIC BLOOD PRESSURE: 64 MMHG | HEIGHT: 61 IN | BODY MASS INDEX: 36.42 KG/M2 | OXYGEN SATURATION: 99 % | TEMPERATURE: 97.9 F | WEIGHT: 192.9 LBS | SYSTOLIC BLOOD PRESSURE: 115 MMHG | RESPIRATION RATE: 17 BRPM

## 2017-03-23 DIAGNOSIS — R07.89 ATYPICAL CHEST PAIN: Primary | ICD-10-CM

## 2017-03-23 DIAGNOSIS — R06.02 SHORTNESS OF BREATH: ICD-10-CM

## 2017-03-23 LAB
ALBUMIN SERPL BCP-MCNC: 3.6 G/DL (ref 3.5–5)
ALBUMIN/GLOB SERPL: 0.8 {RATIO} (ref 1.1–2.2)
ALP SERPL-CCNC: 84 U/L (ref 45–117)
ALT SERPL-CCNC: 20 U/L (ref 12–78)
ANION GAP BLD CALC-SCNC: 14 MMOL/L (ref 5–15)
AST SERPL W P-5'-P-CCNC: 20 U/L (ref 15–37)
BASOPHILS # BLD AUTO: 0 K/UL (ref 0–0.1)
BASOPHILS # BLD: 0 % (ref 0–1)
BILIRUB SERPL-MCNC: 0.4 MG/DL (ref 0.2–1)
BUN SERPL-MCNC: 17 MG/DL (ref 6–20)
BUN/CREAT SERPL: 17 (ref 12–20)
CALCIUM SERPL-MCNC: 9 MG/DL (ref 8.5–10.1)
CHLORIDE SERPL-SCNC: 103 MMOL/L (ref 97–108)
CK MB CFR SERPL CALC: NORMAL % (ref 0–2.5)
CK MB SERPL-MCNC: <1 NG/ML (ref 5–25)
CK SERPL-CCNC: 74 U/L (ref 26–192)
CO2 SERPL-SCNC: 23 MMOL/L (ref 21–32)
CREAT SERPL-MCNC: 1.03 MG/DL (ref 0.55–1.02)
EOSINOPHIL # BLD: 0.2 K/UL (ref 0–0.4)
EOSINOPHIL NFR BLD: 2 % (ref 0–7)
ERYTHROCYTE [DISTWIDTH] IN BLOOD BY AUTOMATED COUNT: 12.7 % (ref 11.5–14.5)
GLOBULIN SER CALC-MCNC: 4.3 G/DL (ref 2–4)
GLUCOSE SERPL-MCNC: 73 MG/DL (ref 65–100)
HCT VFR BLD AUTO: 36.7 % (ref 35–47)
HGB BLD-MCNC: 12.7 G/DL (ref 11.5–16)
LYMPHOCYTES # BLD AUTO: 35 % (ref 12–49)
LYMPHOCYTES # BLD: 3.6 K/UL (ref 0.8–3.5)
MCH RBC QN AUTO: 30.3 PG (ref 26–34)
MCHC RBC AUTO-ENTMCNC: 34.6 G/DL (ref 30–36.5)
MCV RBC AUTO: 87.6 FL (ref 80–99)
MONOCYTES # BLD: 0.8 K/UL (ref 0–1)
MONOCYTES NFR BLD AUTO: 8 % (ref 5–13)
NEUTS SEG # BLD: 5.7 K/UL (ref 1.8–8)
NEUTS SEG NFR BLD AUTO: 55 % (ref 32–75)
PLATELET # BLD AUTO: 224 K/UL (ref 150–400)
POTASSIUM SERPL-SCNC: 3.9 MMOL/L (ref 3.5–5.1)
PROT SERPL-MCNC: 7.9 G/DL (ref 6.4–8.2)
RBC # BLD AUTO: 4.19 M/UL (ref 3.8–5.2)
SODIUM SERPL-SCNC: 140 MMOL/L (ref 136–145)
TROPONIN I SERPL-MCNC: <0.04 NG/ML
WBC # BLD AUTO: 10.3 K/UL (ref 3.6–11)

## 2017-03-23 PROCEDURE — 99285 EMERGENCY DEPT VISIT HI MDM: CPT

## 2017-03-23 PROCEDURE — 85025 COMPLETE CBC W/AUTO DIFF WBC: CPT | Performed by: EMERGENCY MEDICINE

## 2017-03-23 PROCEDURE — 36415 COLL VENOUS BLD VENIPUNCTURE: CPT | Performed by: EMERGENCY MEDICINE

## 2017-03-23 PROCEDURE — 71275 CT ANGIOGRAPHY CHEST: CPT

## 2017-03-23 PROCEDURE — 74011250636 HC RX REV CODE- 250/636: Performed by: EMERGENCY MEDICINE

## 2017-03-23 PROCEDURE — 93005 ELECTROCARDIOGRAM TRACING: CPT

## 2017-03-23 PROCEDURE — 82550 ASSAY OF CK (CPK): CPT | Performed by: EMERGENCY MEDICINE

## 2017-03-23 PROCEDURE — 96361 HYDRATE IV INFUSION ADD-ON: CPT

## 2017-03-23 PROCEDURE — 84484 ASSAY OF TROPONIN QUANT: CPT | Performed by: EMERGENCY MEDICINE

## 2017-03-23 PROCEDURE — 96360 HYDRATION IV INFUSION INIT: CPT

## 2017-03-23 PROCEDURE — 74011636320 HC RX REV CODE- 636/320: Performed by: EMERGENCY MEDICINE

## 2017-03-23 PROCEDURE — 71020 XR CHEST PA LAT: CPT

## 2017-03-23 PROCEDURE — 80053 COMPREHEN METABOLIC PANEL: CPT | Performed by: EMERGENCY MEDICINE

## 2017-03-23 PROCEDURE — 93971 EXTREMITY STUDY: CPT

## 2017-03-23 RX ORDER — SODIUM CHLORIDE 0.9 % (FLUSH) 0.9 %
10 SYRINGE (ML) INJECTION
Status: COMPLETED | OUTPATIENT
Start: 2017-03-23 | End: 2017-03-23

## 2017-03-23 RX ORDER — METOPROLOL TARTRATE 100 MG/1
150 TABLET ORAL 2 TIMES DAILY
COMMUNITY
End: 2017-06-22 | Stop reason: SDUPTHER

## 2017-03-23 RX ORDER — SODIUM CHLORIDE 9 MG/ML
50 INJECTION, SOLUTION INTRAVENOUS
Status: COMPLETED | OUTPATIENT
Start: 2017-03-23 | End: 2017-03-23

## 2017-03-23 RX ADMIN — Medication 10 ML: at 21:44

## 2017-03-23 RX ADMIN — SODIUM CHLORIDE 500 ML: 900 INJECTION, SOLUTION INTRAVENOUS at 21:12

## 2017-03-23 RX ADMIN — IOPAMIDOL 100 ML: 755 INJECTION, SOLUTION INTRAVENOUS at 21:44

## 2017-03-23 RX ADMIN — SODIUM CHLORIDE 50 ML/HR: 900 INJECTION, SOLUTION INTRAVENOUS at 21:44

## 2017-03-23 NOTE — LETTER
[address] 
[facility] EMERGENCY DEPT 
[address] [place] [address] [address] [place], [place] [id number] [phone] Work/School Note Date: 3/23/2017 To Whom It May concern: 
 
Mahamed Jara was seen and treated today in the emergency room by the following provider(s): 
Attending Provider: Enrique Mckeon MD. Mahamed Jara may return to work on 3/27/17. Sincerely, Yuko Osuna RN

## 2017-03-23 NOTE — TELEPHONE ENCOUNTER
Verified patient with two identifiers. Spoke with pt, c/o SOB last night so severe she was having a difficult time walking up 2 steps. She has no pain in chest, but an extreme headache. This morning, she is still having some SOB, not as severe, headache is still there, not as severe,  has a huge bruise on left arm from wrist to elbow, did not hit arm to her knowledge. Is at work now, Pulse now is 110, has no was to take BP. Please advise.

## 2017-03-23 NOTE — ED NOTES
IV inserted by DR Farhad Chávez on second attempt. Pt tolerated well. Labs drawn and sent. Pt to xray via wheelchair per Dr Farhad Chávez.

## 2017-03-23 NOTE — TELEPHONE ENCOUNTER
She had a normal echo in February and at her last appt her exam was normal. She should f/u with PCP should this continue

## 2017-03-23 NOTE — TELEPHONE ENCOUNTER
Verified patient with two identifiers. Advised pt, her appt showed normal heart, echo was normal, if symptoms continue, see PCP. Pt stated she has not been to PCP in quite a few years, will just wait it out to see if symptoms improve. Advised it symptoms worsen, with any pain she will need to go to ER. Pt verbalized understanding.

## 2017-03-23 NOTE — ED PROVIDER NOTES
HPI Comments: Twila Farfan is a 46 y.o. female with a history of Crohn's disease, SVC thrombus, lupus, afib, TIA, appendectomy, left salpingo-oophorectomy, hysterectomy, Mohs procedure, cardiac cath, and colectomy who presents ambulatory to [facility] ED with a chief complaint of progressively worsening shortness of breath with associated constant chest tightness and palpitations since last night. Patient also complains of a headache for the past three days. Patient reports she had surgery in July 2016 to repair an SVC occlusion secondary to a power port. Patient reports increased calf swelling and tenderness secondary to the procedure, but states her left leg is more swollen than usual today. Patient notes a history of similar symptoms, reporting her currently symptoms are the same but more severe than those she experienced at the time of her PE. Patient reports she is on Eliquis following a PE in April 2016, and notes she has been taking it as prescribed. Patient denies any history of MI, stents, or CHF. Patient denies any recent trips or travel. Patient denies any vomiting, abdominal pain, changes in her bowel movements, fevers, chills, new calf pain, or any other symptoms at this time. PCP: Raquel Velez MD  Cardiology: Dr. Christian Callaway    There are no other complaints, changes, or physical findings at this time. The history is provided by the patient. Past Medical History:   Diagnosis Date    Arrhythmia     Afib    Chronic pain     Lt and Rt back:  Dr Corrina Brunner;  Pain mgmt Karen Mallory PA    Cough     evaluated 8/25/14 by Dr Ismael Valencia ([phone]) \". . possible drug related lung is due to Methitrexate or atypical or fungal infection\" per office note dated 8/25/14    Crohn's disease [facility])     Dr Hamm Flow    Ill-defined condition     migraines    Lupus [facility])     Dr Patricia Bowers [phone]    Pain from implanted hardware 12/14/2016    Exploration of sternal incision with removal of protruding sternal wires    Stroke ([facility] [address] 2016    TIA's     SVC obstruction        Past Surgical History:   Procedure Laterality Date    HX APPENDECTOMY      HX  SECTION      x1    HX COLECTOMY  7/24/10    colon resection-18\" removed; Dr Julee Rivera      bowel adhesions removed    HX GI      Bowel resection    HX HEART CATHETERIZATION      no stents, open heart surgery    HX HEENT      sinus surgery for deviated septum    HX HYSTERECTOMY      partial    HX LEFT SALPINGO-OOPHORECTOMY      ovary and fallopian tube removed    HX MOHS PROCEDURES Right approx     with bone spur repair    HX OTHER SURGICAL      femerol vein removed    HX TONSILLECTOMY      HX VASCULAR ACCESS  6/11/10    portacath insertion and removal; Gets Remicaid q5 weeks    UT COLONOSCOPY W/BIOPSY SINGLE/MULTIPLE  2013              Family History:   Problem Relation Age of Onset    Cancer Father      stomach    Other Mother      Auto accident       Social History     Social History    Marital status:      Spouse name: N/A    Number of children: N/A    Years of education: N/A     Occupational History    Not on file. Social History Main Topics    Smoking status: Former Smoker     Packs/day: 0.50     Years: 2.50     Quit date: 3/29/2005    Smokeless tobacco: Never Used      Comment: social    Alcohol use No    Drug use: No    Sexual activity: Not on file     Other Topics Concern    Not on file     Social History Narrative     ALLERGIES: Codeine; Hydrocodone; Oxycontin [oxycodone]; Percocet [oxycodone-acetaminophen]; Dilaudid [hydromorphone (bulk)]; and Prednisone    Review of Systems   Constitutional: Negative. Negative for chills, fatigue and fever. HENT: Negative for congestion, rhinorrhea and sore throat. Eyes: Negative. Negative for pain, discharge and visual disturbance. Respiratory: Positive for chest tightness and shortness of breath. Negative for cough and wheezing.     Cardiovascular: Positive for palpitations and leg swelling (see HPI). Negative for chest pain. Gastrointestinal: Negative. Negative for abdominal pain, constipation, diarrhea, nausea and vomiting. Endocrine: Negative. Genitourinary: Negative. Negative for dysuria, frequency and hematuria. Musculoskeletal: Positive for myalgias (bilateral calves). Negative for arthralgias and back pain. Skin: Negative. Negative for rash. Allergic/Immunologic: Negative. Neurological: Positive for headaches. Negative for dizziness, weakness and light-headedness. Hematological: Negative. Psychiatric/Behavioral: Negative. Patient Vitals for the past 12 hrs:   Temp Pulse Resp BP SpO2   03/23/17 2245 - (!) 103 21 119/63 98 %   03/23/17 2200 - (!) 103 20 - 100 %   03/23/17 2119 - (!) 108 20 (!) 110/95 100 %   03/23/17 2100 - (!) 106 21 - 99 %   03/23/17 2000 - (!) 112 20 118/67 99 %   03/23/17 1951 - (!) 116 23 - 96 %   03/23/17 1949 - - - 131/88 -   03/23/17 1915 - (!) 113 22 157/53 99 %   03/23/17 1900 - (!) 113 19 148/74 99 %   03/23/17 1845 - (!) 113 18 147/85 100 %   03/23/17 1830 - (!) 111 26 (!) 137/94 99 %   03/23/17 1815 - (!) 110 21 125/73 100 %   03/23/17 1747 97.9 °F (36.6 °C) (!) 110 16 159/88 100 %      Physical Exam   Constitutional: She is oriented to person, place, and time. She appears well-developed and well-nourished. No distress. HENT:   Head: Normocephalic and atraumatic. Eyes: EOM are normal. Right eye exhibits no discharge. Left eye exhibits no discharge. No scleral icterus. Neck: Normal range of motion. Neck supple. No tracheal deviation present. Cardiovascular: Regular rhythm, normal heart sounds and intact distal pulses. Tachycardia present. Exam reveals no gallop and no friction rub. No murmur heard. Pulmonary/Chest: Effort normal and breath sounds normal. No respiratory distress. She has no wheezes. She has no rales. Abdominal: Soft. She exhibits no distension.  There is no tenderness. Musculoskeletal: Normal range of motion. Left lower extremity slightly larger than right with left calf tenderness   Lymphadenopathy:     She has no cervical adenopathy. Neurological: She is alert and oriented to person, place, and time. No focal neuro deficits   Skin: Skin is warm and dry. No rash noted. Psychiatric: She has a normal mood and affect. Nursing note and vitals reviewed. MDM  Number of Diagnoses or Management Options  Atypical chest pain:   Shortness of breath:   Diagnosis management comments:     Patient presents to ED with dyspnea, chest pain and palpitations. She is tachycardic. She reports symptoms are the same as when she was diagnosed with PE in April 2016. She is on Eliquis. Given symptoms and tachycardia, feel patient is moderate to high risk for PE. Will obtain CTA to evaluate for PE and duplex to rule out LLE DVT. Differential also includes atypical chest pain, stable angina, unstable angina, MI, pleurisy, costochondritis, pneumonia, bronchitis, MSK pain. Do not suspect dissection. Headache likely primary - do not suspect acute intracranial process. - CBC, CMP, Suraj  - CTA  - Duplex LLE         Amount and/or Complexity of Data Reviewed  Clinical lab tests: reviewed and ordered  Tests in the radiology section of CPT®: ordered and reviewed  Tests in the medicine section of CPT®: ordered and reviewed  Review and summarize past medical records: yes  Independent visualization of images, tracings, or specimens: yes    Patient Progress  Patient progress: stable      Procedures    EKG interpretation: (Preliminary) 1759  Rhythm: sinus tachycardia; and regular. Rate (approx.): 114; Axis: normal; DE interval: normal; QRS interval: normal; ST/T wave: normal.    Procedure Note- Peripheral IV Access  7:21 PM  Performed by: MD Robin Flores MD gained IV access using  20 gauge needle because the patient had no vascular access.   After cleaning the site with alcohol prep, the Right AC vein was localized with ultrasound guidance in an anterior approach. Line confirmation was obtained by direct visualization and good blood return. No anaesthetic was used. The line was successfully flushed with normal saline and was secured with transparent tape. Estimated blood loss: < 5 mL  The procedure took 1-15 minutes, and patient tolerated well. PROGRESS NOTE:  11:20 PM  Labs and imaging unremarkable. Low suspicion for ACS as pain has been constant since yesterday. Will discharge with close cardiology follow up. Discussed diagnosis, follow up, return instructions, test results, x-rays and medications with the patient and/or family. The patient and/or family have been given the opportunity to ask questions. The patient and/or family express understanding of the plan of care, follow-up appointments and return instructions. The patient and/or family agree to follow up with cardiology as directed and to return immediately if the chest pain worsens. The patient and family express understanding that although cardiac testing at this time is negative, a cardiac problem could still be present and that a follow-up appointment with a cardiologist for further evaluation and risk factor modification is necessary to complete the evaluation of this complaint. Provided customary return to ED instructions.   Fredo Juan MD    LABORATORY TESTS:  Recent Results (from the past 12 hour(s))   EKG, 12 LEAD, INITIAL    Collection Time: 03/23/17  5:59 PM   Result Value Ref Range    Ventricular Rate 114 BPM    Atrial Rate 114 BPM    P-R Interval 128 ms    QRS Duration 80 ms    Q-T Interval 332 ms    QTC Calculation (Bezet) 457 ms    Calculated P Axis 31 degrees    Calculated R Axis 2 degrees    Calculated T Axis 66 degrees    Diagnosis       Sinus tachycardia  Otherwise normal ECG  When compared with ECG of 07-DEC-2016 16:18,  No significant change was found     CBC WITH AUTOMATED DIFF    Collection Time: 03/23/17  7:36 PM   Result Value Ref Range    WBC 10.3 3.6 - 11.0 K/uL    RBC 4.19 3.80 - 5.20 M/uL    HGB 12.7 11.5 - 16.0 g/dL    HCT 36.7 35.0 - 47.0 %    MCV 87.6 80.0 - 99.0 FL    MCH 30.3 26.0 - 34.0 PG    MCHC 34.6 30.0 - 36.5 g/dL    RDW 12.7 11.5 - 14.5 %    PLATELET 467 604 - 760 K/uL    NEUTROPHILS 55 32 - 75 %    LYMPHOCYTES 35 12 - 49 %    MONOCYTES 8 5 - 13 %    EOSINOPHILS 2 0 - 7 %    BASOPHILS 0 0 - 1 %    ABS. NEUTROPHILS 5.7 1.8 - 8.0 K/UL    ABS. LYMPHOCYTES 3.6 (H) 0.8 - 3.5 K/UL    ABS. MONOCYTES 0.8 0.0 - 1.0 K/UL    ABS. EOSINOPHILS 0.2 0.0 - 0.4 K/UL    ABS. BASOPHILS 0.0 0.0 - 0.1 K/UL   METABOLIC PANEL, COMPREHENSIVE    Collection Time: 03/23/17  7:36 PM   Result Value Ref Range    Sodium 140 136 - 145 mmol/L    Potassium 3.9 3.5 - 5.1 mmol/L    Chloride 103 97 - 108 mmol/L    CO2 23 21 - 32 mmol/L    Anion gap 14 5 - 15 mmol/L    Glucose 73 65 - 100 mg/dL    BUN 17 6 - 20 MG/DL    Creatinine 1.03 (H) 0.55 - 1.02 MG/DL    BUN/Creatinine ratio 17 12 - 20      GFR est AA >60 >60 ml/min/1.73m2    GFR est non-AA 56 (L) >60 ml/min/1.73m2    Calcium 9.0 8.5 - 10.1 MG/DL    Bilirubin, total 0.4 0.2 - 1.0 MG/DL    ALT (SGPT) 20 12 - 78 U/L    AST (SGOT) 20 15 - 37 U/L    Alk.  phosphatase 84 45 - 117 U/L    Protein, total 7.9 6.4 - 8.2 g/dL    Albumin 3.6 3.5 - 5.0 g/dL    Globulin 4.3 (H) 2.0 - 4.0 g/dL    A-G Ratio 0.8 (L) 1.1 - 2.2     CK W/ CKMB & INDEX    Collection Time: 03/23/17  7:36 PM   Result Value Ref Range    CK 74 26 - 192 U/L    CK - MB <1.0 <3.6 NG/ML    CK-MB Index Cannot be calulated 0 - 2.5     TROPONIN I    Collection Time: 03/23/17  7:36 PM   Result Value Ref Range    Troponin-I, Qt. <0.04 <0.05 ng/mL       IMAGING RESULTS:  CTA CHEST W OR W WO CONT   Final Result     INDICATION: chest tightness, dyspnea, hx of PE      EXAM: CT Angio Chest:     TECHNIQUE: Unenhanced localizing CT imaging of the pulmonary arteries is  followed by bolus injection of 100 mL Isovue 370 contrast, with thin section  axial Chest CT obtained and 3D image post processing performed including coronal  MIPS. CT dose reduction was achieved through use of a standardized protocol  tailored for this examination and automatic exposure control for dose  modulation.     FINDINGS: There is no pulmonary embolism. There is no apparent aortic dissection  or aneurysm. Lungs are show chronic elevation of the right hemidiaphragm with  adjacent chronic right lower lobe atelectasis. There is no pneumothorax. There  is no pleural or significant pericardial effusion. There is no significant  adenopathy. No change since 12/15/2016.     IMPRESSION  IMPRESSION:   1. No Pulmonary Embolus. 2. No Acute Findings. DUPLEX LOWER EXT VENOUS LEFT   Final Result     Indication: left leg swelling      Duplex Doppler sonography of the left lower extremity was performed from the  groin to the calf. The femoral, femoral and popliteal veins are compressible  with normal color-flow and wave forms and response to physiologic maneuvers  including Valsalva and augmentation.     IMPRESSION  IMPRESSION: No evidence of DVT. XR CHEST PA LAT   Final Result     Exam: 2 view chest     Indication: Shortness of breath     Comparison to 12/14/2016.     PA and lateral views demonstrate normal heart size. There is no acute process in  the lung fields. The osseous structures are unremarkable.     IMPRESSION  Impression: No acute abnormality. MEDICATIONS GIVEN:  Medications   sodium chloride 0.9 % bolus infusion 500 mL (500 mL IntraVENous New Bag 3/23/17 2112)   sodium chloride (NS) flush 10 mL (10 mL IntraVENous Given 3/23/17 2144)   iopamidol (ISOVUE-370) 76 % injection 100 mL (100 mL IntraVENous Given 3/23/17 2144)   0.9% sodium chloride infusion (50 mL/hr IntraVENous New Bag 3/23/17 2144)       IMPRESSION:  1. Atypical chest pain    2. Shortness of breath        PLAN:  1.    No current facility-administered medications for this encounter. Current Outpatient Prescriptions   Medication Sig    metoprolol tartrate (LOPRESSOR) 100 mg IR tablet Take 100 mg by mouth two (2) times a day.  gabapentin (NEURONTIN) 300 mg capsule TAKE ONE CAPSULE BY MOUTH THREE TIMES A DAY. PATIENT MUST MAKE APPOINTMENT FOR FURTHER REFILLS    fluconazole (DIFLUCAN) 100 mg tablet every thirty (30) days.  furosemide (LASIX) 20 mg tablet Take one tab daily as needed for worsening swelling    diazepam (VALIUM) 5 mg tablet Take 1 Tab by mouth every twelve (12) hours as needed for Anxiety. Max Daily Amount: 10 mg. Refill at 1 month intervals    aspirin (ASPIRIN) 325 mg tablet Take 325 mg by mouth daily.  apixaban (ELIQUIS) 5 mg tablet Take 10mg a day for the first 7 days, then 5mg twice a day (Patient taking differently: Take 5 mg by mouth two (2) times a day. Take 10mg a day for the first 7 days, then 5mg twice a day)    butalbital-acetaminophen-caffeine (FIORICET, ESGIC) -40 mg per tablet Take 2 Tabs by mouth every eight (8) hours as needed for Pain or Headache. Max Daily Amount: 6 Tabs. Indications: MIGRAINE, TENSION-TYPE HEADACHE    hydroxychloroquine (PLAQUENIL) 200 mg tablet Take 400 mg by mouth daily (after dinner). For lupus    tiZANidine (ZANAFLEX) 2 mg tablet Take 6 mg by mouth nightly.  ondansetron hcl (ZOFRAN) 4 mg tablet Take 4 mg by mouth every eight (8) hours as needed for Nausea.  meperidine (DEMEROL) 50 mg tablet Take 50 mg by mouth three (3) times daily as needed for Pain.  INFLIXIMAB (REMICADE IV) 800 mg by IntraVENous route. Every 8 weeks;last infusion 10/07/2016    drospirenone-ethinyl estradiol (MARIELOS 28) 3-20 mg-mcg per tablet Take 1 Tab by mouth nightly.      2.  Follow-up Information     Follow up With Details Comments Contact Info    Leanne Orourke MD In 2 days  [address] [address] [facility]  [phone]          Return to ED if worse     Discharge Note:  11:29 PM  The patient is ready for discharge. The patient's signs, symptoms, diagnosis, and discharge instructions have been discussed and the patient has conveyed their understanding. The patient is to follow up as recommended or return to the ER should their symptoms worsen. Plan has been discussed and the patient is in agreement. This note is prepared by Dasia Ag, acting as Scribe for MD John Serna MD.: The scribe's documentation has been prepared under my direction and personally reviewed by me in its entirety. I confirm that the note above accurately reflects all work, treatment, procedures, and medical decision making performed by me.

## 2017-03-23 NOTE — ED NOTES
Bedside shift change report given to [address] [facility],Zane 100 (oncoming nurse) by Mich Shahid (offgoing nurse). Report included the following information ED Summary.

## 2017-03-23 NOTE — LETTER
[address] 
[facility] EMERGENCY DEPT 
[address] [place] [address] [address] [id number] [phone] Work/School Note Date: 3/23/2017 To Whom It May concern: 
 
Arlis Olszewski was seen and treated today in the emergency room by the following provider(s): 
Attending Provider: Rocael Yee MD. Arlis Olszewski may return to work on 3/25/17. Sincerely, Rylee Fischer RN

## 2017-03-23 NOTE — ED NOTES
IV attempt unsuccessful x2 at Left [facility] [place] and Left forearm.   Will notify DR Yelitza Strong

## 2017-03-24 ENCOUNTER — CLINICAL SUPPORT (OUTPATIENT)
Dept: CARDIOLOGY CLINIC | Age: 52
End: 2017-03-24

## 2017-03-24 ENCOUNTER — OFFICE VISIT (OUTPATIENT)
Dept: CARDIOLOGY CLINIC | Age: 52
End: 2017-03-24

## 2017-03-24 VITALS
OXYGEN SATURATION: 98 % | SYSTOLIC BLOOD PRESSURE: 110 MMHG | DIASTOLIC BLOOD PRESSURE: 70 MMHG | HEART RATE: 108 BPM | BODY MASS INDEX: 36.15 KG/M2 | RESPIRATION RATE: 18 BRPM | HEIGHT: 61 IN | WEIGHT: 191.5 LBS

## 2017-03-24 DIAGNOSIS — R00.0 SINUS TACHYCARDIA: ICD-10-CM

## 2017-03-24 DIAGNOSIS — R07.89 OTHER CHEST PAIN: ICD-10-CM

## 2017-03-24 DIAGNOSIS — I65.23 STENOSIS OF BOTH CAROTID ARTERIES WITHOUT CEREBRAL INFARCTION: ICD-10-CM

## 2017-03-24 DIAGNOSIS — G56.80 PHRENIC NERVE PALSY: Chronic | ICD-10-CM

## 2017-03-24 DIAGNOSIS — R00.0 SINUS TACHYCARDIA: Primary | ICD-10-CM

## 2017-03-24 LAB
ATRIAL RATE: 114 BPM
CALCULATED P AXIS, ECG09: 31 DEGREES
CALCULATED R AXIS, ECG10: 2 DEGREES
CALCULATED T AXIS, ECG11: 66 DEGREES
DIAGNOSIS, 93000: NORMAL
P-R INTERVAL, ECG05: 128 MS
Q-T INTERVAL, ECG07: 332 MS
QRS DURATION, ECG06: 80 MS
QTC CALCULATION (BEZET), ECG08: 457 MS
VENTRICULAR RATE, ECG03: 114 BPM

## 2017-03-24 NOTE — ED NOTES
Assumed care of pt from VIKY Christianson at this time. Pt updated on plan of care.  Call light in reach

## 2017-03-24 NOTE — PROGRESS NOTES
NAME:  Autumn Burns   :   1965   MRN:   [id number]   PCP:  Tiffani Helton MD           Subjective: The patient is a 46y.o. year old female  who returns for a routine follow-up. Since the last visit, patient reports no change in exercise tolerance, edema, medication intolerance, shortness of breath, PND/orthopnea wheezing, sputum, syncope, dizziness or light headedness. C/o palpitations with HR to low 100 at rest and mid 100 with minimal activity. Intermittent chest pain. Was seen in ER yesterday. Past Medical History:   Diagnosis Date    Arrhythmia     Afib    Chronic pain     Lt and Rt back:  Dr Fito Ring;  Pain mgmt Karen Shawnee PA    Cough     evaluated 14 by Dr Radu Simpson ([phone]) \". . possible drug related lung is due to Methitrexate or atypical or fungal infection\" per office note dated 14    Crohn's disease [facility])     Dr Stephan Barney    Ill-defined condition     migraines    Lupus [facility])     Dr Jonathan Metcalf [phone]    Pain from implanted hardware 2016    Exploration of sternal incision with removal of protruding sternal wires    Stroke ([facility] [address] 2016    TIA's     SVC obstruction         ICD-10-CM ICD-9-CM    1. Sinus tachycardia R00.0 427.89 AMB POC EKG ROUTINE W/ 12 LEADS, INTER & REP      CARDIAC HOLTER MONITOR, 24 HOURS   2. Other chest pain R07.89 786.59 STRESS TEST LEXISCAN/CARDIOLITE   3. Stenosis of both carotid arteries without cerebral infarction I65.23 433.10      433.30    4. 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  Pt is able to conduct ADL's  HEENT: Denies blurred vision, headaches, epistaxis and difficulty swallowing. Respiratory: Denies shortness of breath, HENDRIX, wheezing or stridor. Cardiovascular: Denies precordial pain, palpitations, edema or PND  Gastrointestinal: Denies poor appetite, indigestion, abdominal pain or blood in stool  Musculoskeletal: Denies pain or swelling from muscles or joints  Neurologic: Denies tremor, paresthesias, or sensory motor disturbance  Skin: Denies rash, itching or texture change. Objective:       Vitals:    03/24/17 0930 03/24/17 0943   BP: 102/74 110/70   Pulse: (!) 108    Resp: 18    SpO2: 98%    Weight: 191 lb 8 oz (86.9 kg)    Height: 5' 1\" (1.549 m)     Body mass index is 36.18 kg/(m^2). General PE  Mental Status - Alert. General Appearance - Not in acute distress. Chest and Lung Exam   Inspection: Accessory muscles - No use of accessory muscles in breathing. Auscultation:   Breath sounds: - Normal.    Cardiovascular   Inspection: Jugular vein - Bilateral - Inspection Normal.  Palpation/Percussion:   Apical Impulse: - Normal.  Auscultation: Rhythm - Regular. Heart Sounds - S1 WNL and S2 WNL. No S3 or S4. Murmurs & Other Heart Sounds: Auscultation of the heart reveals - No Murmurs. Peripheral Vascular   Upper Extremity: Inspection - Bilateral - No Cyanotic nailbeds or Digital clubbing. Lower Extremity:   Palpation: Edema - Bilateral - No edema. Data Review:     EKG -EKG: normal EKG, normal sinus rhythm, unchanged from previous tracings, sinus tachycardia. Medications reviewed  Current Outpatient Prescriptions   Medication Sig    metoprolol tartrate (LOPRESSOR) 100 mg IR tablet Take 100 mg by mouth two (2) times a day.  gabapentin (NEURONTIN) 300 mg capsule TAKE ONE CAPSULE BY MOUTH THREE TIMES A DAY. PATIENT MUST MAKE APPOINTMENT FOR FURTHER REFILLS    fluconazole (DIFLUCAN) 100 mg tablet every thirty (30) days.     furosemide (LASIX) 20 mg tablet Take one tab daily as needed for worsening swelling    diazepam (VALIUM) 5 mg tablet Take 1 Tab by mouth every twelve (12) hours as needed for Anxiety. Max Daily Amount: 10 mg. Refill at 1 month intervals    aspirin (ASPIRIN) 325 mg tablet Take 325 mg by mouth daily.  apixaban (ELIQUIS) 5 mg tablet Take 10mg a day for the first 7 days, then 5mg twice a day (Patient taking differently: Take 5 mg by mouth two (2) times a day. Take 10mg a day for the first 7 days, then 5mg twice a day)    butalbital-acetaminophen-caffeine (FIORICET, ESGIC) -40 mg per tablet Take 2 Tabs by mouth every eight (8) hours as needed for Pain or Headache. Max Daily Amount: 6 Tabs. Indications: MIGRAINE, TENSION-TYPE HEADACHE    hydroxychloroquine (PLAQUENIL) 200 mg tablet Take 400 mg by mouth daily (after dinner). For lupus    tiZANidine (ZANAFLEX) 2 mg tablet Take 6 mg by mouth nightly.  ondansetron hcl (ZOFRAN) 4 mg tablet Take 4 mg by mouth every eight (8) hours as needed for Nausea.  meperidine (DEMEROL) 50 mg tablet Take 50 mg by mouth three (3) times daily as needed for Pain.  INFLIXIMAB (REMICADE IV) 800 mg by IntraVENous route. Every 8 weeks;last infusion 10/07/2016    drospirenone-ethinyl estradiol (MARIELOS 28) 3-20 mg-mcg per tablet Take 1 Tab by mouth nightly. No current facility-administered medications for this visit. Assessment:       ICD-10-CM ICD-9-CM    1. Sinus tachycardia R00.0 427.89 AMB POC EKG ROUTINE W/ 12 LEADS, INTER & REP      CARDIAC HOLTER MONITOR, 24 HOURS   2. Other chest pain R07.89 786.59 STRESS TEST LEXISCAN/CARDIOLITE   3. Stenosis of both carotid arteries without cerebral infarction I65.23 433.10      433.30    4. 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 Seen in ER yesterday. Recent echo normal. Schedule stress test to evaluate for ischemia. Will order holter monitor, probably has inappropriate sinus tachycardia.  May need to increase betablocker further. Continue current care and follow up after all tests. Consider referral to EP.     Maddie Godoy MD

## 2017-03-24 NOTE — DISCHARGE INSTRUCTIONS
Chest Pain: Care Instructions  Your Care Instructions  There are many things that can cause chest pain. Some are not serious and will get better on their own in a few days. But some kinds of chest pain need more testing and treatment. Your doctor may have recommended a follow-up visit in the next 8 to 12 hours. If you are not getting better, you may need more tests or treatment. Even though your doctor has released you, you still need to watch for any problems. The doctor carefully checked you, but sometimes problems can develop later. If you have new symptoms or if your symptoms do not get better, get medical care right away. If you have worse or different chest pain or pressure that lasts more than 5 minutes or you passed out (lost consciousness), call 911 or seek other emergency help right away. A medical visit is only one step in your treatment. Even if you feel better, you still need to do what your doctor recommends, such as going to all suggested follow-up appointments and taking medicines exactly as directed. This will help you recover and help prevent future problems. How can you care for yourself at home? · Rest until you feel better. · Take your medicine exactly as prescribed. Call your doctor if you think you are having a problem with your medicine. · Do not drive after taking a prescription pain medicine. When should you call for help? Call 911 if:  · You passed out (lost consciousness). · You have severe difficulty breathing. · You have symptoms of a heart attack. These may include:  ¨ Chest pain or pressure, or a strange feeling in your chest.  ¨ Sweating. ¨ Shortness of breath. ¨ Nausea or vomiting. ¨ Pain, pressure, or a strange feeling in your back, neck, jaw, or upper belly or in one or both shoulders or arms. ¨ Lightheadedness or sudden weakness. ¨ A fast or irregular heartbeat.   After you call 911, the  may tell you to chew 1 adult-strength or 2 to 4 low-dose aspirin. Wait for an ambulance. Do not try to drive yourself. Call your doctor today if:  · You have any trouble breathing. · Your chest pain gets worse. · You are dizzy or lightheaded, or you feel like you may faint. · You are not getting better as expected. · You are having new or different chest pain. Where can you learn more? Go to http://maria esther-sophia.info/. Enter A120 in the search box to learn more about \"Chest Pain: Care Instructions. \"  Current as of: May 27, 2016  Content Version: 11.1  © 5763-3395 [employer]. Care instructions adapted under license by [employer] (which disclaims liability or warranty for this information). If you have questions about a medical condition or this instruction, always ask your healthcare professional. [address] any warranty or liability for your use of this information. Shortness of Breath: Care Instructions  Your Care Instructions  Shortness of breath has many causes. Sometimes conditions such as anxiety can lead to shortness of breath. Some people get mild shortness of breath when they exercise. Trouble breathing also can be a symptom of a serious problem, such as asthma, lung disease, emphysema, heart problems, and pneumonia. If your shortness of breath continues, you may need tests and treatment. Watch for any changes in your breathing and other symptoms. Follow-up care is a key part of your treatment and safety. Be sure to make and go to all appointments, and call your doctor if you are having problems. Its also a good idea to know your test results and keep a list of the medicines you take. How can you care for yourself at home? · Do not smoke or allow others to smoke around you. If you need help quitting, talk to your doctor about stop-smoking programs and medicines. These can increase your chances of quitting for good. · Get plenty of rest and sleep.   · Take your medicines exactly as prescribed. Call your doctor if you think you are having a problem with your medicine. · Find healthy ways to deal with stress. ¨ Exercise daily. ¨ Get plenty of sleep. ¨ Eat regularly and well. When should you call for help? Call 911 anytime you think you may need emergency care. For example, call if:  · You have severe shortness of breath. · You have symptoms of a heart attack. These may include:  ¨ Chest pain or pressure, or a strange feeling in the chest.  ¨ Sweating. ¨ Shortness of breath. ¨ Nausea or vomiting. ¨ Pain, pressure, or a strange feeling in the back, neck, jaw, or upper belly or in one or both shoulders or arms. ¨ Lightheadedness or sudden weakness. ¨ A fast or irregular heartbeat. After you call 911, the  may tell you to chew 1 adult-strength or 2 to 4 low-dose aspirin. Wait for an ambulance. Do not try to drive yourself. Call your doctor now or seek immediate medical care if:  · Your shortness of breath gets worse or you start to wheeze. Wheezing is a high-pitched sound when you breathe. · You wake up at night out of breath or have to prop your head up on several pillows to breathe. · You are short of breath after only light activity or while at rest.  Watch closely for changes in your health, and be sure to contact your doctor if:  · You do not get better over the next 1 to 2 days. Where can you learn more? Go to http://maria esther-sophia.info/. Enter S780 in the search box to learn more about \"Shortness of Breath: Care Instructions. \"  Current as of: May 23, 2016  Content Version: 11.1  © 5827-6199 [employer]. Care instructions adapted under license by [employer] (which disclaims liability or warranty for this information).  If you have questions about a medical condition or this instruction, always ask your healthcare professional. [address] any warranty or liability for your use of this information.

## 2017-03-24 NOTE — ED NOTES
Provider at bedside for dispo and follow up. Discharge plan reviewed and paperwork signed, work note given, pain level within manageable comfortable limits, IV removed, ambulatory to exit, gait steady, safety maintained.

## 2017-03-24 NOTE — MR AVS SNAPSHOT
Visit Information Date & Time Provider Department Dept. Phone Encounter #  
 3/24/2017  9:45 AM Timbo Rodgers MD [facility] [phone] [id number] Your Appointments 6/13/2017  9:30 AM  
3 MONTH with MD [facility] [address] [place] [address]) Appt Note: 3 month per Dr. Koffi London,  
 [address] [facility] [address]  
[phone] [address] [facility] [address] [address]  
  
    
 8/17/2017  4:00 PM  
ESTABLISHED PATIENT with MD [facility]) Appt Note: Per Dr Koffi London $0CP REM  
 [address]  
[phone] [address] [facility] [address] Upcoming Health Maintenance Date Due Hepatitis C Screening 1965 DTaP/Tdap/Td series (1 - Tdap) 7/11/1986 PAP AKA CERVICAL CYTOLOGY 7/11/1986 FOBT Q 1 YEAR AGE 50-75 7/11/2015 BREAST CANCER SCRN MAMMOGRAM 2/1/2019 Allergies as of 3/24/2017  Review Complete On: 3/24/2017 By: Leidy Lai Severity Noted Reaction Type Reactions Codeine High 04/22/2010   Systemic Hives, Nausea and Vomiting Severe nausea & vomiting Hydrocodone High 04/22/2010   Systemic Hives, Nausea and Vomiting Severe nausea & vomiting Oxycontin [Oxycodone] High 04/22/2010   Systemic Hives, Nausea and Vomiting Severe nausea & vomiting, also has the same reaction from Percocet Percocet [Oxycodone-acetaminophen] High 07/19/2010   Side Effect Hives, Nausea and Vomiting Dilaudid [Hydromorphone (Bulk)]  08/24/2012    Swelling Prednisone  01/20/2014    Other (comments) HX OF CROHN'S; not allergic, prefer not to use Current Immunizations  Reviewed on 2/24/2017 Name Date H1N1 FLU VACCINE 1/1/2010 Influenza Vaccine 11/27/2016, 10/6/2014 Influenza Vaccine Whole 10/15/2011 PPD 1/1/2010 Not reviewed this visit You Were Diagnosed With   
  
 Codes Comments Sinus tachycardia    -  Primary ICD-10-CM: R00.0 ICD-9-CM: 427.89 Other chest pain     ICD-10-CM: R07.89 ICD-9-CM: 786.59 Vitals BP Pulse Resp Height(growth percentile) Weight(growth percentile) SpO2  
 110/70 (BP 1 Location: Right arm, BP Patient Position: Sitting) (!) 108 18 5' 1\" (1.549 m) 191 lb 8 oz (86.9 kg) 98% BMI OB Status Smoking Status 36.18 kg/m2 Postmenopausal Former Smoker Vitals History BMI and BSA Data Body Mass Index Body Surface Area  
 36.18 kg/m 2 1.93 m 2 Preferred Pharmacy Pharmacy Name Phone [facility] [address], [address] [place] [address] [phone] Your Updated Medication List  
  
   
This list is accurate as of: 3/24/17 10:46 AM.  Always use your most recent med list.  
  
  
  
  
 apixaban 5 mg tablet Commonly known as:  Clint Croparul Take 10mg a day for the first 7 days, then 5mg twice a day  
  
 aspirin 325 mg tablet Commonly known as:  ASPIRIN Take 325 mg by mouth daily. butalbital-acetaminophen-caffeine -40 mg per tablet Commonly known as:  Ollie [place] Take 2 Tabs by mouth every eight (8) hours as needed for Pain or Headache. Max Daily Amount: 6 Tabs. Indications: MIGRAINE, TENSION-TYPE HEADACHE  
  
 DEMEROL 50 mg tablet Generic drug:  meperidine Take 50 mg by mouth three (3) times daily as needed for Pain.  
  
 diazePAM 5 mg tablet Commonly known as:  VALIUM Take 1 Tab by mouth every twelve (12) hours as needed for Anxiety. Max Daily Amount: 10 mg. Refill at 1 month intervals  
  
 fluconazole 100 mg tablet Commonly known as:  DIFLUCAN  
every thirty (30) days. furosemide 20 mg tablet Commonly known as:  LASIX Take one tab daily as needed for worsening swelling  
  
 gabapentin 300 mg capsule Commonly known as:  NEURONTIN  
 TAKE ONE CAPSULE BY MOUTH THREE TIMES A DAY. PATIENT MUST MAKE APPOINTMENT FOR FURTHER REFILLS  
  
 hydroxychloroquine 200 mg tablet Commonly known as:  PLAQUENIL Take 400 mg by mouth daily (after dinner). For lupus  
  
 metoprolol tartrate 100 mg IR tablet Commonly known as:  LOPRESSOR Take 100 mg by mouth two (2) times a day. REMICADE IV  
800 mg by IntraVENous route. Every 8 weeks;last infusion 10/07/2016 MARIELOS (28) 3-0.02 mg Tab Generic drug:  drospirenone-ethinyl estradiol Take 1 Tab by mouth nightly. ZANAFLEX 2 mg tablet Generic drug:  tiZANidine Take 6 mg by mouth nightly. ZOFRAN (AS HYDROCHLORIDE) 4 mg tablet Generic drug:  ondansetron hcl Take 4 mg by mouth every eight (8) hours as needed for Nausea. We Performed the Following AMB POC EKG ROUTINE W/ 12 LEADS, INTER & REP [04335 CPT(R)] To-Do List   
 03/24/2017 ECG:  CARDIAC HOLTER MONITOR, 24 HOURS Around 03/27/2017 ECG:  STRESS TEST LEXISCAN/CARDIOLITE   
  
 04/21/2017 1:00 PM  
  Appointment with [address] [place] [address] ([phone]) [facility] [place] [facility]! Dear Giana Marie: 
Thank you for requesting a [facility] account. Our records indicate that you already have an active [facility] account. You can access your account anytime at [email or web address] Did you know that you can access your hospital and ER discharge instructions at any time in [facility]? You can also review all of your test results from your hospital stay or ER visit. Additional Information If you have questions, please visit the Frequently Asked Questions section of the [facility] website at [email or web address] Remember, [facility] is NOT to be used for urgent needs. For medical emergencies, dial 911. Now available from your iPhone and Android! Please provide this summary of care documentation to your next provider. Your primary care clinician is listed as Rj Duarte. If you have any questions after today's visit, please call [phone].

## 2017-03-24 NOTE — PROGRESS NOTES
Chief Complaint   Patient presents with    Shortness of Breath     ED f/u    Rapid Heart Rate     \"    Chest Pain     chest tightness

## 2017-03-27 ENCOUNTER — CLINICAL SUPPORT (OUTPATIENT)
Dept: CARDIOLOGY CLINIC | Age: 52
End: 2017-03-27

## 2017-03-27 DIAGNOSIS — R07.89 OTHER CHEST PAIN: ICD-10-CM

## 2017-03-27 NOTE — PROGRESS NOTES
The patient was identified by name and date of birth. The risks and side effects were explained to the patient and questions were answered prior to testing.  Myoview stress test was completed.  '

## 2017-03-28 ENCOUNTER — TELEPHONE (OUTPATIENT)
Dept: CARDIOLOGY CLINIC | Age: 52
End: 2017-03-28

## 2017-03-28 NOTE — TELEPHONE ENCOUNTER
----- Message from Jennifer Marroquin MD sent at 3/27/2017  7:44 PM EDT -----  Stress test normal, thx.

## 2017-03-29 ENCOUNTER — TELEPHONE (OUTPATIENT)
Dept: CARDIOLOGY CLINIC | Age: 52
End: 2017-03-29

## 2017-03-29 NOTE — TELEPHONE ENCOUNTER
Verified patient with two identifiers. Spoke with pt advising her to take 150 mg of Metoprolol in the am and 100 in the pm.  Monitor BP daily, let us know if BP drops too much. Pt verbalized understanding.

## 2017-03-30 ENCOUNTER — TELEPHONE (OUTPATIENT)
Dept: CARDIOLOGY CLINIC | Age: 52
End: 2017-03-30

## 2017-03-30 NOTE — TELEPHONE ENCOUNTER
----- Message from Patel Crespo MD sent at 3/30/2017  1:09 PM EDT -----  holter shows normal heart racing, PVC's. Continue current treatment.

## 2017-04-12 ENCOUNTER — HOSPITAL ENCOUNTER (OUTPATIENT)
Dept: GENERAL RADIOLOGY | Age: 52
Discharge: HOME | End: 2017-04-12
Attending: INTERNAL MEDICINE
Payer: COMMERCIAL

## 2017-04-12 DIAGNOSIS — J98.6 ELEVATED DIAPHRAGM: ICD-10-CM

## 2017-04-12 PROCEDURE — 76000 FLUOROSCOPY <1 HR PHYS/QHP: CPT

## 2017-04-17 RX ORDER — ACETAMINOPHEN 325 MG/1
975 TABLET ORAL ONCE
Status: COMPLETED | OUTPATIENT
Start: 2017-04-21 | End: 2017-04-21

## 2017-04-17 RX ORDER — DIPHENHYDRAMINE HYDROCHLORIDE 50 MG/ML
25 INJECTION, SOLUTION INTRAMUSCULAR; INTRAVENOUS ONCE
Status: COMPLETED | OUTPATIENT
Start: 2017-04-21 | End: 2017-04-21

## 2017-04-21 ENCOUNTER — HOSPITAL ENCOUNTER (OUTPATIENT)
Dept: INFUSION THERAPY | Age: 52
Discharge: HOME | End: 2017-04-21
Payer: COMMERCIAL

## 2017-04-21 VITALS
SYSTOLIC BLOOD PRESSURE: 125 MMHG | DIASTOLIC BLOOD PRESSURE: 85 MMHG | TEMPERATURE: 98.1 F | RESPIRATION RATE: 18 BRPM | HEART RATE: 98 BPM

## 2017-04-21 PROCEDURE — 96415 CHEMO IV INFUSION ADDL HR: CPT

## 2017-04-21 PROCEDURE — 96375 TX/PRO/DX INJ NEW DRUG ADDON: CPT

## 2017-04-21 PROCEDURE — 96413 CHEMO IV INFUSION 1 HR: CPT

## 2017-04-21 PROCEDURE — 74011250637 HC RX REV CODE- 250/637: Performed by: INTERNAL MEDICINE

## 2017-04-21 PROCEDURE — 74011250636 HC RX REV CODE- 250/636: Performed by: INTERNAL MEDICINE

## 2017-04-21 RX ORDER — SODIUM CHLORIDE 9 MG/ML
25 INJECTION, SOLUTION INTRAVENOUS AS NEEDED
Status: DISPENSED | OUTPATIENT
Start: 2017-04-21 | End: 2017-04-22

## 2017-04-21 RX ADMIN — DIPHENHYDRAMINE HYDROCHLORIDE 50 MG: 50 INJECTION INTRAMUSCULAR; INTRAVENOUS at 13:14

## 2017-04-21 RX ADMIN — ACETAMINOPHEN 975 MG: 325 TABLET ORAL at 13:14

## 2017-04-21 RX ADMIN — INFLIXIMAB 500 MG: 100 INJECTION, POWDER, LYOPHILIZED, FOR SOLUTION INTRAVENOUS at 13:30

## 2017-04-21 RX ADMIN — SODIUM CHLORIDE 25 ML/HR: 900 INJECTION, SOLUTION INTRAVENOUS at 13:20

## 2017-04-21 NOTE — PROGRESS NOTES
Problem: Chemotherapy Treatment  Goal: *Tolerates nutrition therapy  Outcome: Progressing Towards Goal  Pre meds given and chemotherapy iniatied

## 2017-04-21 NOTE — PROGRESS NOTES
1300 Pt arrived to [facility] ambulatory. Assessment completed. Pt denies any distress or discomfort at this time. PIV started in left hand per pt request. Blood return noted. Flushed without difficulty. 1314 tylenol 975 mg po given  1315 benadryl 50 mg IV given   1330 Remicade 500 mg Iv started to run over 2 hours. 1530 Remicade completed. Pt tolerated well. No reaction noted. Visit Vitals    /85    Pulse 98    Temp 98.1 °F (36.7 °C)    Resp 18       1630 PIV flushed with normal Saline and d/c'd. 2x2 placed and wrapped. Pt denies any distress or discomfort at this time.  Pt discharged home ambulatory with next apt.

## 2017-05-02 ENCOUNTER — HOSPITAL ENCOUNTER (EMERGENCY)
Age: 52
Discharge: HOME | End: 2017-05-02
Attending: EMERGENCY MEDICINE
Payer: COMMERCIAL

## 2017-05-02 ENCOUNTER — APPOINTMENT (OUTPATIENT)
Dept: CT IMAGING | Age: 52
End: 2017-05-02
Attending: EMERGENCY MEDICINE
Payer: COMMERCIAL

## 2017-05-02 VITALS
RESPIRATION RATE: 18 BRPM | HEART RATE: 102 BPM | WEIGHT: 191.8 LBS | TEMPERATURE: 98 F | OXYGEN SATURATION: 100 % | BODY MASS INDEX: 36.21 KG/M2 | DIASTOLIC BLOOD PRESSURE: 65 MMHG | SYSTOLIC BLOOD PRESSURE: 142 MMHG | HEIGHT: 61 IN

## 2017-05-02 DIAGNOSIS — R22.0 SUPERFICIAL SWELLING OF SCALP: Primary | ICD-10-CM

## 2017-05-02 DIAGNOSIS — R59.9 REACTIVE LYMPHADENOPATHY: ICD-10-CM

## 2017-05-02 LAB
ALBUMIN SERPL BCP-MCNC: 3.4 G/DL (ref 3.5–5)
ALBUMIN/GLOB SERPL: 0.7 {RATIO} (ref 1.1–2.2)
ALP SERPL-CCNC: 80 U/L (ref 45–117)
ALT SERPL-CCNC: 17 U/L (ref 12–78)
ANION GAP BLD CALC-SCNC: 5 MMOL/L (ref 5–15)
AST SERPL W P-5'-P-CCNC: 20 U/L (ref 15–37)
BASOPHILS # BLD AUTO: 0 K/UL (ref 0–0.1)
BASOPHILS # BLD: 0 % (ref 0–1)
BILIRUB SERPL-MCNC: 0.4 MG/DL (ref 0.2–1)
BUN SERPL-MCNC: 11 MG/DL (ref 6–20)
BUN/CREAT SERPL: 13 (ref 12–20)
CALCIUM SERPL-MCNC: 9 MG/DL (ref 8.5–10.1)
CHLORIDE SERPL-SCNC: 103 MMOL/L (ref 97–108)
CO2 SERPL-SCNC: 29 MMOL/L (ref 21–32)
CREAT SERPL-MCNC: 0.82 MG/DL (ref 0.55–1.02)
EOSINOPHIL # BLD: 0.4 K/UL (ref 0–0.4)
EOSINOPHIL NFR BLD: 4 % (ref 0–7)
ERYTHROCYTE [DISTWIDTH] IN BLOOD BY AUTOMATED COUNT: 12.3 % (ref 11.5–14.5)
GLOBULIN SER CALC-MCNC: 4.7 G/DL (ref 2–4)
GLUCOSE SERPL-MCNC: 75 MG/DL (ref 65–100)
HCT VFR BLD AUTO: 37.9 % (ref 35–47)
HGB BLD-MCNC: 13.2 G/DL (ref 11.5–16)
LYMPHOCYTES # BLD AUTO: 27 % (ref 12–49)
LYMPHOCYTES # BLD: 2.5 K/UL (ref 0.8–3.5)
MCH RBC QN AUTO: 30.3 PG (ref 26–34)
MCHC RBC AUTO-ENTMCNC: 34.8 G/DL (ref 30–36.5)
MCV RBC AUTO: 86.9 FL (ref 80–99)
MONOCYTES # BLD: 0.7 K/UL (ref 0–1)
MONOCYTES NFR BLD AUTO: 8 % (ref 5–13)
NEUTS SEG # BLD: 5.8 K/UL (ref 1.8–8)
NEUTS SEG NFR BLD AUTO: 61 % (ref 32–75)
PLATELET # BLD AUTO: 246 K/UL (ref 150–400)
POTASSIUM SERPL-SCNC: 4 MMOL/L (ref 3.5–5.1)
PROT SERPL-MCNC: 8.1 G/DL (ref 6.4–8.2)
RBC # BLD AUTO: 4.36 M/UL (ref 3.8–5.2)
SODIUM SERPL-SCNC: 137 MMOL/L (ref 136–145)
WBC # BLD AUTO: 9.4 K/UL (ref 3.6–11)

## 2017-05-02 PROCEDURE — 99284 EMERGENCY DEPT VISIT MOD MDM: CPT

## 2017-05-02 PROCEDURE — 80053 COMPREHEN METABOLIC PANEL: CPT | Performed by: EMERGENCY MEDICINE

## 2017-05-02 PROCEDURE — 96374 THER/PROPH/DIAG INJ IV PUSH: CPT

## 2017-05-02 PROCEDURE — 96375 TX/PRO/DX INJ NEW DRUG ADDON: CPT

## 2017-05-02 PROCEDURE — 70491 CT SOFT TISSUE NECK W/DYE: CPT

## 2017-05-02 PROCEDURE — 85025 COMPLETE CBC W/AUTO DIFF WBC: CPT | Performed by: EMERGENCY MEDICINE

## 2017-05-02 PROCEDURE — 74011636320 HC RX REV CODE- 636/320: Performed by: EMERGENCY MEDICINE

## 2017-05-02 PROCEDURE — 36415 COLL VENOUS BLD VENIPUNCTURE: CPT | Performed by: EMERGENCY MEDICINE

## 2017-05-02 PROCEDURE — 96361 HYDRATE IV INFUSION ADD-ON: CPT

## 2017-05-02 PROCEDURE — 74011250636 HC RX REV CODE- 250/636: Performed by: EMERGENCY MEDICINE

## 2017-05-02 RX ORDER — SODIUM CHLORIDE 9 MG/ML
50 INJECTION, SOLUTION INTRAVENOUS
Status: COMPLETED | OUTPATIENT
Start: 2017-05-02 | End: 2017-05-02

## 2017-05-02 RX ORDER — ONDANSETRON 2 MG/ML
4 INJECTION INTRAMUSCULAR; INTRAVENOUS
Status: COMPLETED | OUTPATIENT
Start: 2017-05-02 | End: 2017-05-02

## 2017-05-02 RX ORDER — SODIUM CHLORIDE 0.9 % (FLUSH) 0.9 %
10 SYRINGE (ML) INJECTION
Status: COMPLETED | OUTPATIENT
Start: 2017-05-02 | End: 2017-05-02

## 2017-05-02 RX ADMIN — IOPAMIDOL 100 ML: 612 INJECTION, SOLUTION INTRAVENOUS at 12:16

## 2017-05-02 RX ADMIN — SODIUM CHLORIDE 1000 ML: 900 INJECTION, SOLUTION INTRAVENOUS at 11:45

## 2017-05-02 RX ADMIN — MEPERIDINE HYDROCHLORIDE 50 MG: 25 INJECTION, SOLUTION INTRAMUSCULAR; INTRAVENOUS; SUBCUTANEOUS at 11:49

## 2017-05-02 RX ADMIN — SODIUM CHLORIDE 50 ML/HR: 900 INJECTION, SOLUTION INTRAVENOUS at 12:16

## 2017-05-02 RX ADMIN — Medication 10 ML: at 12:16

## 2017-05-02 RX ADMIN — ONDANSETRON HYDROCHLORIDE 4 MG: 2 INJECTION, SOLUTION INTRAMUSCULAR; INTRAVENOUS at 11:49

## 2017-05-02 NOTE — LETTER
[address] 
[facility] EMERGENCY DEPT 
[address] [place] [address] [address] [phone] 
[phone] Work/School Note Date: 5/2/2017 To Whom It May concern: 
 
Guero Vincent was seen and treated today in the emergency room by the following provider(s): 
Attending Provider: Armen Howell MD. Guero Vincent may return to work on Friday, May 5, 2017. Sincerely, 
 
 
 
 
Ace Potts

## 2017-05-02 NOTE — ED PROVIDER NOTES
HPI Comments: Isidro Mcintyre is a 46 y.o. Female with hx of Lupus, Crohn's disease, migraines, and TIA who presents ambulatory to [facility] ED with CC of pain and swelling over an area of her posterior scalp, located just behind her right ear. Pt also c/o HA, photophobia, and fatigue since onset. She denies trauma, injury, or fall to which her symptoms might be attributed. Pt states she is currently taking Eliquis. She denies hx of ear infection. Pt specifically denies fever and N/V/D.    PCP: Clifford Basurto MD  Cardiology: BARBARA Soria MD  Neurology: Ivelisse Moore MD  GI: Dee Dee Barrientos MD  Pulmonology: Braeden Carroll MD    There are no other complaints, changes, or physical findings at this time. The history is provided by the patient. Past Medical History:   Diagnosis Date    Arrhythmia     Afib    Chronic pain     Lt and Rt back:  Dr Love Rivera;  Pain mgmt Karen Mallory PA    Cough     evaluated 14 by Dr Braeden Carroll ([phone]) \". . possible drug related lung is due to Methitrexate or atypical or fungal infection\" per office note dated 14    Crohn's disease [facility])     Dr Boom Wise Ill-defined condition     migraines    Lupus [facility])     Dr Aron Jennings [phone]    Pain from implanted hardware 2016    Exploration of sternal incision with removal of protruding sternal wires    Stroke ([facility] [address] 2016    TIA's     SVC obstruction        Past Surgical History:   Procedure Laterality Date    HX APPENDECTOMY      HX  SECTION      x1    HX COLECTOMY  7/24/10    colon resection-18\" removed; Dr Shefali Macdonald GI      bowel adhesions removed    HX GI      Bowel resection    HX HEART CATHETERIZATION      no stents, open heart surgery    HX HEENT      sinus surgery for deviated septum    HX HYSTERECTOMY      partial    HX LEFT SALPINGO-OOPHORECTOMY  2005    ovary and fallopian tube removed    HX MOHS PROCEDURES Right approx     with bone spur repair    HX OTHER SURGICAL femerol vein removed    HX TONSILLECTOMY      HX VASCULAR ACCESS  6/11/10    portacath insertion and removal; Gets Remicaid q5 weeks    [place] COLONOSCOPY W/BIOPSY SINGLE/MULTIPLE  8/19/2013              Family History:   Problem Relation Age of Onset    Cancer Father      stomach    Other Mother      Auto accident       Social History     Social History    Marital status:      Spouse name: N/A    Number of children: N/A    Years of education: N/A     Occupational History    Not on file. Social History Main Topics    Smoking status: Former Smoker     Packs/day: 0.50     Years: 2.50     Quit date: 3/29/2005    Smokeless tobacco: Never Used      Comment: social    Alcohol use No    Drug use: No    Sexual activity: Not on file     Other Topics Concern    Not on file     Social History Narrative         ALLERGIES: Codeine; Hydrocodone; Oxycontin [oxycodone]; Percocet [oxycodone-acetaminophen]; Dilaudid [hydromorphone (bulk)]; and Prednisone    Review of Systems   Constitutional: Negative for fever. Eyes: Positive for photophobia. Gastrointestinal: Negative for diarrhea, nausea and vomiting. Skin:        +pain right posterior scalp; +swelling right posterior scalp   Neurological: Positive for headaches. All other systems reviewed and are negative. Patient Vitals for the past 12 hrs:   Temp Pulse Resp BP SpO2   05/02/17 1057 - (!) 102 18 148/89 99 %   05/02/17 0943 98 °F (36.7 °C) (!) 108 20 (!) 172/94 100 %            Physical Exam     Vital signs and nursing notes reviewed    CONSTITUTIONAL: Alert, in no apparent distress; well-developed; well-nourished. HEAD:  Normocephalic, atraumatic; swelling to scalp, extends behind right ear and over right mastoid, and posteriorly over right inferior mastoid; tender, non-fluctuant  EYES: PERRL; EOM's intact. ENTM: Nose: no rhinorrhea; Throat: no erythema or exudate, mucous membranes moist  Neck:  Supple.  trachea is midline; TM's clear B/L; palpable tender lymph nodes to right posterior cervical chain  RESP: Chest clear, equal breath sounds. - W/R/R  CV: S1 and S2 WNL; No murmurs, gallops or rubs. 2+ radial and DP pulses bilaterally. GI: non-distended, normal bowel sounds, abdomen soft and non-tender. No masses or organomegaly. : No costo-vertebral angle tenderness. BACK:  Non-tender, normal appearance  UPPER EXT:  Normal inspection. no joint or soft tissue swelling  LOWER EXT: No edema, no calf tenderness. NEURO: Alert and oriented x3, 5/5 strength and light touch sensation intact in bilateral upper and lower extremities. SKIN: No rashes; Warm and dry  PSYCH: Normal mood, normal affect    MDM  Number of Diagnoses or Management Options  Reactive lymphadenopathy:   Superficial swelling of scalp:   Diagnosis management comments: 46 y.o. Female with multiple chronic medical issues, including Crohn's and Lupus, on immunosuppressant therapy, with soft tissue swelling along posterior right side of her skull, without overlying warmth, induration, or redness to suggest cellulitis. May be referred edema from reactive lymphadenopathy, or lymph nodes may be reacting to swelling. Reassuring CT showing no fluid collection or deep muscle or other soft tissue involvement. Supportive care, discussed with PMD, follow up in 2 days. Amount and/or Complexity of Data Reviewed  Clinical lab tests: ordered and reviewed  Tests in the radiology section of CPT®: ordered and reviewed  Review and summarize past medical records: yes  Discuss the patient with other providers: yes (PCP)  Independent visualization of images, tracings, or specimens: yes      ED Course       Procedures    Consult Note:  1:21 PM  Crys Zazueta MD spoke with Corina Becerril MD  Specialty: PCP  Discussed pts hx, disposition, and available diagnostic and imaging results. Reviewed care plans. Consultant agrees with plans as outlined.   Pt can be discharged for follow up in office. LABORATORY TESTS:  Recent Results (from the past 12 hour(s))   CBC WITH AUTOMATED DIFF    Collection Time: 05/02/17 11:25 AM   Result Value Ref Range    WBC 9.4 3.6 - 11.0 K/uL    RBC 4.36 3.80 - 5.20 M/uL    HGB 13.2 11.5 - 16.0 g/dL    HCT 37.9 35.0 - 47.0 %    MCV 86.9 80.0 - 99.0 FL    MCH 30.3 26.0 - 34.0 PG    MCHC 34.8 30.0 - 36.5 g/dL    RDW 12.3 11.5 - 14.5 %    PLATELET 431 025 - 189 K/uL    NEUTROPHILS 61 32 - 75 %    LYMPHOCYTES 27 12 - 49 %    MONOCYTES 8 5 - 13 %    EOSINOPHILS 4 0 - 7 %    BASOPHILS 0 0 - 1 %    ABS. NEUTROPHILS 5.8 1.8 - 8.0 K/UL    ABS. LYMPHOCYTES 2.5 0.8 - 3.5 K/UL    ABS. MONOCYTES 0.7 0.0 - 1.0 K/UL    ABS. EOSINOPHILS 0.4 0.0 - 0.4 K/UL    ABS. BASOPHILS 0.0 0.0 - 0.1 K/UL   METABOLIC PANEL, COMPREHENSIVE    Collection Time: 05/02/17 11:25 AM   Result Value Ref Range    Sodium 137 136 - 145 mmol/L    Potassium 4.0 3.5 - 5.1 mmol/L    Chloride 103 97 - 108 mmol/L    CO2 29 21 - 32 mmol/L    Anion gap 5 5 - 15 mmol/L    Glucose 75 65 - 100 mg/dL    BUN 11 6 - 20 MG/DL    Creatinine 0.82 0.55 - 1.02 MG/DL    BUN/Creatinine ratio 13 12 - 20      GFR est AA >60 >60 ml/min/1.73m2    GFR est non-AA >60 >60 ml/min/1.73m2    Calcium 9.0 8.5 - 10.1 MG/DL    Bilirubin, total 0.4 0.2 - 1.0 MG/DL    ALT (SGPT) 17 12 - 78 U/L    AST (SGOT) 20 15 - 37 U/L    Alk. phosphatase 80 45 - 117 U/L    Protein, total 8.1 6.4 - 8.2 g/dL    Albumin 3.4 (L) 3.5 - 5.0 g/dL    Globulin 4.7 (H) 2.0 - 4.0 g/dL    A-G Ratio 0.7 (L) 1.1 - 2.2         IMAGING RESULTS:  CT Results  (Last 48 hours)               05/02/17 1216  CT NECK SOFT TISSUE W CONT Final result    Impression:  IMPRESSION:    Soft tissue swelling in the right posterior neck. No fluid collection, mass, or   involvement of the deep soft tissues.            Narrative:  EXAM:  CT NECK SOFT TISSUE W CONT       INDICATION:  acute swelling behind right ear and scalp and extending down right   side of neck; eval for myositis vs hematoma COMPARISON:  None       TECHNIQUE:    Axial neck CT was performed after the uneventful administration of 100 CC Isovue   300. Coronal and sagittal reformatted images were generated. CT dose reduction   was achieved through the use of a standardized protocol tailored for this   examination and automatic exposure control for dose modulation. FINDINGS:       A small metallic skin marker was utilized to indicate the region of swelling in   the right posterior neck. In this location, there is haziness of the fat and   there are a few subcentimeter lymph nodes which are likely reactive. No fluid   collections are demonstrated. There is no swelling or abnormal density involving   the regional musculature. The deep soft tissues in this region appear   unremarkable. The mastoid air cells are clear. The nasopharynx, oropharynx, hypopharynx and larynx demonstrate no significant   abnormalities. There is limitation in evaluating the oral cavity, although   grossly the oral tongue, buccal spaces and floor of mouth appear unremarkable. The parotid and submandibular glands appear normal.  There are scattered   nonenlarged lymph nodes in the cervical soft tissues. MEDICATIONS GIVEN:  Medications   ondansetron (ZOFRAN) injection 4 mg (4 mg IntraVENous Given 5/2/17 1149)   sodium chloride 0.9 % bolus infusion 1,000 mL (1,000 mL IntraVENous New Bag 5/2/17 1145)   meperidine (DEMEROL) injection 50 mg (50 mg IntraVENous Given 5/2/17 1149)   0.9% sodium chloride infusion (50 mL/hr IntraVENous New Bag 5/2/17 1216)   sodium chloride (NS) flush 10 mL (10 mL IntraVENous Given 5/2/17 1216)   iopamidol (ISOVUE 300) 61 % contrast injection 100 mL (100 mL IntraVENous Given 5/2/17 1216)       IMPRESSION:  1. Superficial swelling of scalp    2. Reactive lymphadenopathy        PLAN:  1.  Discharge for follow up with PCP    Follow-up Information     Follow up With Details Comments Saurabh MD In 2 days Dr. Ginny Arevalo is aware of today's visit and will see you in the office no Thursday. Please call the office for an appointment. [address] [facility]  [phone] [phone]          Return to ED if worse     DISCHARGE NOTE:  1:28 PM  The patient is ready for discharge. The patients signs, symptoms, diagnosis, and instructions for discharge have been discussed and the pt has conveyed their understanding. The patient is to follow up as recommended with PCP or return to the ER should their symptoms worsen. Plan has been discussed and patient has conveyed their agreement. This note is prepared by Cathy Claire, acting as Scribe for Ba Beverly MD.    Ba Beverly MD: The scribe's documentation has been prepared under my direction and personally reviewed by me in its entirety. I confirm that the note above accurately reflects all work, treatment, procedures, and medical decision making performed by me.

## 2017-05-02 NOTE — ED TRIAGE NOTES
Pt ambulatory to triage at this time. No acute distress noted at this time. Pt states that she has a lump behind her R ear that started yesterday. Pt states that she also has a lump below that one in her neck. Pt states that she does also have a rash to that area as well that she has had \"for a while\". No other concerns noted by pt at this time.

## 2017-05-02 NOTE — DISCHARGE INSTRUCTIONS
You were seen here in the emergency department for swelling of the right scalp. Please apply ice to the tender area to help reduce swelling. Tylenol can also provide some anti-inflammation relief. Please see Dr. Meche Hooper in follow-up for re-assessment and return to the emergency department for uncontrolled pain, spreading swelling or other new concerning symptoms.

## 2017-05-04 ENCOUNTER — OFFICE VISIT (OUTPATIENT)
Dept: NEUROLOGY | Age: 52
End: 2017-05-04

## 2017-05-04 VITALS
SYSTOLIC BLOOD PRESSURE: 140 MMHG | BODY MASS INDEX: 37.5 KG/M2 | HEIGHT: 60 IN | WEIGHT: 191 LBS | RESPIRATION RATE: 16 BRPM | HEART RATE: 108 BPM | OXYGEN SATURATION: 96 % | DIASTOLIC BLOOD PRESSURE: 86 MMHG

## 2017-05-04 DIAGNOSIS — M54.12 CERVICAL RADICULOPATHY: ICD-10-CM

## 2017-05-04 DIAGNOSIS — I65.23 STENOSIS OF BOTH CAROTID ARTERIES WITHOUT CEREBRAL INFARCTION: ICD-10-CM

## 2017-05-04 DIAGNOSIS — G56.21 ULNAR NEUROPATHY AT ELBOW OF RIGHT UPPER EXTREMITY: ICD-10-CM

## 2017-05-04 DIAGNOSIS — G45.1 TRANSIENT ISCHEMIC ATTACK INVOLVING RIGHT INTERNAL CAROTID ARTERY: Primary | ICD-10-CM

## 2017-05-04 DIAGNOSIS — G43.109 MIGRAINE WITH AURA AND WITHOUT STATUS MIGRAINOSUS, NOT INTRACTABLE: ICD-10-CM

## 2017-05-04 DIAGNOSIS — I65.23 STENOSIS OF BOTH CAROTID ARTERIES WITHOUT INFARCTION: ICD-10-CM

## 2017-05-04 DIAGNOSIS — M54.16 LUMBAR RADICULOPATHY: ICD-10-CM

## 2017-05-04 RX ORDER — DOXYCYCLINE 100 MG/1
100 TABLET ORAL 2 TIMES DAILY
COMMUNITY
End: 2017-09-27 | Stop reason: ALTCHOICE

## 2017-05-04 NOTE — LETTER
5/4/2017 9:27 PM 
 
Patient:  Guero Vincent YOB: 1965 Date of Visit: 5/4/2017 Dear No Recipients: Thank you for referring Ms. Guero Vincent to me for evaluation/treatment. Below are the relevant portions of my assessment and plan of care. Consult Subjective:  
 
Guero Vincent is a 46 y.o. right-handed  female seen today in urgent work in basis because of severe pain in her neck, and swelling and sudden rash all over her body and is seen at the request of Dr. Caro Robledo. She went to the emergency room CT of the head, and a CT of the neck was normal except for slight soft tissue swelling in the posterior neck region behind mastoid. She saw Dr. Caro Robledo and he thinks she has MRSA because of the rash all over her body and in that region. She has had no real fever, no white count elevation, no meningismus or meningitis, but is concerned because of the swelling in that area. I recommended she continue taking the doxycycline that she is taking, and consider seeing her dermatologist possibly for skin biopsy in case the antibiotics do not get her better. We need a skin biopsy. Neurologically she is doing well and had no real change in her condition. We looked at her scans on the computer today, and reviewed them personally on the PACS system with the patient, and I assured her that the and spine and all the paraspinal muscles look normal there is no involvement deeper than to just a skin and nothing is enhancing. She has had no increase in headaches. 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 Carotid Dopplers were normal. Patient seen last year for progressive headaches for the last 6 months, becoming more frequent and more severe and more incapacitating.  She does have lupus, and she also has Crohn's disease and this a chronic problem. She has a family history of migraines. She has a lot of muscle pain mainly in her legs, and significant back pain and is under pain management at [facility] with Dr. Kyle Martin. Her recent MRI scan of the lumbar spine was reviewed on the computer personally, and does show spondylolisthesis at L4-5 and mild degenerative disease without spinal stenosis or disc herniation. She feels as though her legs will give way at times, but her bowel and bladder function remain stable. She works daily as a . She's never had any testing done for her headaches. She has tried Neurontin,, which does not work as a prophylactic at 300 mg at night. She's had no unusual fever, meningismus, denies bruxism, denies any increased stress or tension, or other causes for her headaches. Past Medical History:  
Diagnosis Date  Arrhythmia Afib  Chronic pain Lt and Rt back:  Dr Evelina Fitch;  Pain mgmt Karen Mallory PA  
 Cough   
 evaluated 14 by Dr Felix Brewer ([phone]) \". . possible drug related lung is due to Methitrexate or atypical or fungal infection\" per office note dated 14  Crohn's disease ([facility] [address] Dr Roya Leonard  Ill-defined condition   
 migraines  Lupus ([facility] [address] Dr Mehta Moder [phone]  Pain from implanted hardware 2016 Exploration of sternal incision with removal of protruding sternal wires  Stroke [facility]) 2016 TIA's  SVC obstruction Past Surgical History:  
Procedure Laterality Date [address]  HX  SECTION    
 x1  
 HX COLECTOMY  7/24/10  
 colon resection-18\" removed; Dr Roz Farooq  HX GI    
 bowel adhesions removed [facility] Bowel resection  HX HEART CATHETERIZATION    
 no stents, open heart surgery  HX HEENT    
 sinus surgery for deviated septum  HX HYSTERECTOMY partial  
 HX LEFT SALPINGO-OOPHORECTOMY    
 ovary and fallopian tube removed  HX MOHS PROCEDURES Right approx 2013  
 with bone spur repair  HX OTHER SURGICAL    
 femerol vein removed  HX TONSILLECTOMY  HX VASCULAR ACCESS  6/11/10  
 portacath insertion and removal; Gets Remicaid q5 weeks  [place] COLONOSCOPY W/BIOPSY SINGLE/MULTIPLE  8/19/2013 Family History Problem Relation Age of Onset  Cancer Father   
  [facility] Other Mother Auto accident Social History Substance Use Topics  Smoking status: Former Smoker Packs/day: 0.50 Years: 2.50 Quit date: 3/29/2005  Smokeless tobacco: Never Used Comment: social  
 Alcohol use No  
   
 
Current Outpatient Prescriptions:  
  doxycycline (ADOXA) 100 mg tablet, Take 100 mg by mouth two (2) times a day., Disp: , Rfl:  
  metoprolol tartrate (LOPRESSOR) 100 mg IR tablet, Take 150 mg by mouth two (2) times a day., Disp: , Rfl:  
  gabapentin (NEURONTIN) 300 mg capsule, TAKE ONE CAPSULE BY MOUTH THREE TIMES A DAY. PATIENT MUST MAKE APPOINTMENT FOR FURTHER REFILLS, Disp: 100 Cap, Rfl: 0 
  fluconazole (DIFLUCAN) 100 mg tablet, every thirty (30) days. , Disp: , Rfl:  
  furosemide (LASIX) 20 mg tablet, Take one tab daily as needed for worsening swelling, Disp: 30 Tab, Rfl: 1 
  diazepam (VALIUM) 5 mg tablet, Take 1 Tab by mouth every twelve (12) hours as needed for Anxiety. Max Daily Amount: 10 mg. Refill at 1 month intervals, Disp: 30 Tab, Rfl: 2 
  aspirin (ASPIRIN) 325 mg tablet, Take 325 mg by mouth daily. , Disp: , Rfl:  
  apixaban (ELIQUIS) 5 mg tablet, Take 10mg a day for the first 7 days, then 5mg twice a day (Patient taking differently: Take 5 mg by mouth two (2) times a day. Take 10mg a day for the first 7 days, then 5mg twice a day), Disp: 60 Tab, Rfl: 1 
  butalbital-acetaminophen-caffeine (FIORICET, ESGIC) -40 mg per tablet, Take 2 Tabs by mouth every eight (8) hours as needed for Pain or Headache. Max Daily Amount: 6 Tabs.  Indications: MIGRAINE, TENSION-TYPE HEADACHE, Disp: 50 Tab, Rfl: 11 
  hydroxychloroquine (PLAQUENIL) 200 mg tablet, Take 400 mg by mouth daily (after dinner). For lupus, Disp: , Rfl:  
  tiZANidine (ZANAFLEX) 2 mg tablet, Take 6 mg by mouth nightly., Disp: , Rfl:  
  ondansetron hcl (ZOFRAN) 4 mg tablet, Take 4 mg by mouth every eight (8) hours as needed for Nausea., Disp: , Rfl:  
  meperidine (DEMEROL) 50 mg tablet, Take 50 mg by mouth three (3) times daily as needed for Pain., Disp: , Rfl:  
  INFLIXIMAB (REMICADE IV), 800 mg by IntraVENous route. Every 8 weeks;last infusion 10/07/2016, Disp: , Rfl:  
  drospirenone-ethinyl estradiol (MARIELOS 28) 3-20 mg-mcg per tablet, Take 1 Tab by mouth nightly., Disp: , Rfl:  
 
 
 
Allergies Allergen Reactions  Codeine Hives and Nausea and Vomiting Severe nausea & vomiting  Hydrocodone Hives and Nausea and Vomiting Severe nausea & vomiting  Oxycontin [Oxycodone] Hives and Nausea and Vomiting Severe nausea & vomiting, also has the same reaction from Percocet  Percocet [Oxycodone-Acetaminophen] Hives and Nausea and Vomiting  Dilaudid [Hydromorphone (Bulk)] Swelling  Prednisone Other (comments) HX OF CROHN'S; not allergic, prefer not to use Review of Systems: A comprehensive review of systems was negative except for: Constitutional: positive for fatigue and malaise Eyes: positive for visual disturbance Gastrointestinal: positive for dyspepsia, reflux symptoms, change in bowel habits, diarrhea and abdominal pain Musculoskeletal: positive for myalgias, arthralgias, stiff joints, back pain and muscle weakness Neurological: positive for headaches, gait problems and weakness Vitals:  
 05/04/17 1113 BP: 140/86 Pulse: (!) 108 Resp: 16 SpO2: 96% Weight: 191 lb (86.6 kg) Height: 5' (1.524 m) Objective: I 
 
 
NEUROLOGICAL EXAM: 
 
Appearance:   The patient is well developed, well nourished, provides a coherent history and is in acute distress because of headache. Mental Status: Oriented to time, place and person, and the president, cognitive function is normal, speech is fluent. Mood and affect appropriate. Cranial Nerves:   Intact visual fields. Fundi are benign. PATTI, EOM's full, no nystagmus, no ptosis. Facial sensation is normal. Corneal reflexes are not tested. Facial movement is symmetric. Hearing is normal bilaterally. Palate is midline with normal sternocleidomastoid and trapezius muscles are normal. Tongue is midline. Neck without meningismus or bruits There is 1 patch of swelling and loss of hair near the mastoid in the neck and a red rash that has been there for some time, and rashes across her abdomen and chest for which she will see the dermatologist 
Temporal arteries not tender or enlarged TMJ areas are not tender Patient very tender on palpation over the right craniocervical junction Motor:  4/5 strength in upper and lower proximal and distal muscles. Normal bulk and tone. No fasciculations. Patient has prominent tenderness in her lumbar spine Straight leg raising test negative in the sitting position bilateral  
Reflexes:   Deep tendon reflexes 1+/4 and symmetrical. 
No babinski or clonus present Sensory:   Normal to touch, pinprick and vibration and DSS. Gait:  Abnormal gait as she walks slowly because of her back pain. Tremor:   No tremor noted. Cerebellar:  No cerebellar signs present. Neurovascular:  Normal heart sounds and regular rhythm, peripheral pulses decreased, and no carotid bruits. Assessment: ICD-10-CM ICD-9-CM 1. Transient ischemic attack involving right internal carotid artery G45.1 435.8 2. Stenosis of both carotid arteries without cerebral infarction I65.23 433.10   
  433.30   
3. Stenosis of both carotid arteries without infarction I65.23 433.10   
  433.30   
4. Ulnar neuropathy at elbow of right upper extremity G56.21 354.2 5. Cervical radiculopathy M54.12 723.4 6. Migraine with aura and without status migrainosus, not intractable G43.109 346.00   
7. Lumbar radiculopathy M54.16 724.4 Plan:  
 
Neck swelling and rash, agree MRSA is a possibility and she should continue her antibiotics and see her dermatologist and follow-up with Dr. Aracely Church I reviewed her films personally and reassured her that there is no paraspinal muscle involvement or spinal involvement or brain involvement on the basis of her CAT scans Progressive headaches She will continue her Neurontin to 3 times a day MRI scan and MRA showed no clear structural lesions in the past 
Cervical spine x-rays and CT scans of head and neck reviewed personally on the PACS system Patient to continue pain management for occipital nerve block, and may need physical therapy We will continue her on Fioricet for the headaches She will call for results of her tests,, and further treatment and evaluation will depend on the results of her tests and her clinical course Difficult case Signed By: Nicholas Leary MD   
 May 4, 2017 This note will not be viewable in [address] If you have questions, please do not hesitate to call me. I look forward to following Ms. Mays along with you. Sincerely, Nicholas Leary MD

## 2017-05-04 NOTE — MR AVS SNAPSHOT
Visit Information Date & Time Provider Department Dept. Phone Encounter #  
 5/4/2017 11:00 AM Zenaida Sharp MD [facility] [phone] [id number] Follow-up Instructions Return in about 6 months (around 11/4/2017). Your Appointments 6/13/2017  9:30 AM  
3 MONTH with Alejandro Casey MD  
[address]) Appt Note: 3 month per Dr. Anatoly Ernst,  
 [id number] [facility]  
[phone] [id number] [facility]  
  
    
 8/17/2017  4:00 PM  
ESTABLISHED PATIENT with Alejandro Casey MD  
[address]) Appt Note: Per Dr Anatoly Ernst $0CP REM  
 [id number] [facility]  
[phone] [id number] [facility] Upcoming Health Maintenance Date Due Hepatitis C Screening 1965 DTaP/Tdap/Td series (1 - Tdap) 7/11/1986 PAP AKA CERVICAL CYTOLOGY 7/11/1986 FOBT Q 1 YEAR AGE 50-75 7/11/2015 INFLUENZA AGE 9 TO ADULT 8/1/2017 BREAST CANCER SCRN MAMMOGRAM 2/1/2019 Allergies as of 5/4/2017  Review Complete On: 5/4/2017 By: Zenaida Sharp MD  
  
 Severity Noted Reaction Type Reactions Codeine High 04/22/2010   Systemic Hives, Nausea and Vomiting Severe nausea & vomiting Hydrocodone High 04/22/2010   Systemic Hives, Nausea and Vomiting Severe nausea & vomiting Oxycontin [Oxycodone] High 04/22/2010   Systemic Hives, Nausea and Vomiting Severe nausea & vomiting, also has the same reaction from Percocet Percocet [Oxycodone-acetaminophen] High 07/19/2010   Side Effect Hives, Nausea and Vomiting Dilaudid [Hydromorphone (Bulk)]  08/24/2012    Swelling Prednisone  01/20/2014    Other (comments) HX OF CROHN'S; not allergic, prefer not to use Current Immunizations  Reviewed on 2/24/2017 Name Date H1N1 FLU VACCINE 1/1/2010 Influenza Vaccine 11/27/2016, 10/6/2014 Influenza Vaccine Whole 10/15/2011 PPD 1/1/2010 Not reviewed this visit You Were Diagnosed With   
  
 Codes Comments Transient ischemic attack involving right internal carotid artery    -  Primary ICD-10-CM: G45.1 ICD-9-CM: 435.8 Stenosis of both carotid arteries without cerebral infarction     ICD-10-CM: I65.23 ICD-9-CM: 433.10, 433.30 Stenosis of both carotid arteries without infarction     ICD-10-CM: I65.23 ICD-9-CM: 433.10, 433.30 Ulnar neuropathy at elbow of right upper extremity     ICD-10-CM: G56.21 ICD-9-CM: 354.2 Cervical radiculopathy     ICD-10-CM: M54.12 
ICD-9-CM: 723.4 Migraine with aura and without status migrainosus, not intractable     ICD-10-CM: G43.109 ICD-9-CM: 346.00 Lumbar radiculopathy     ICD-10-CM: M54.16 
ICD-9-CM: 724.4 Vitals BP Pulse Resp Height(growth percentile) Weight(growth percentile) SpO2  
 140/86 (!) 108 16 5' (1.524 m) 191 lb (86.6 kg) 96% BMI OB Status Smoking Status 37.3 kg/m2 Postmenopausal Former Smoker Vitals History BMI and BSA Data Body Mass Index Body Surface Area  
 37.3 kg/m 2 1.91 m 2 Preferred Pharmacy Pharmacy Name Phone [facility] [address] [place] [phone] Your Updated Medication List  
  
   
This list is accurate as of: 5/4/17 11:37 AM.  Always use your most recent med list.  
  
  
  
  
 apixaban 5 mg tablet Commonly known as:  Ruth Lint Take 10mg a day for the first 7 days, then 5mg twice a day  
  
 aspirin 325 mg tablet Commonly known as:  ASPIRIN Take 325 mg by mouth daily. butalbital-acetaminophen-caffeine -40 mg per tablet Commonly known as:  Kb Marts Take 2 Tabs by mouth every eight (8) hours as needed for Pain or Headache. Max Daily Amount: 6 Tabs. Indications: MIGRAINE, TENSION-TYPE HEADACHE DEMEROL 50 mg tablet Generic drug:  meperidine Take 50 mg by mouth three (3) times daily as needed for Pain.  
  
 diazePAM 5 mg tablet Commonly known as:  VALIUM Take 1 Tab by mouth every twelve (12) hours as needed for Anxiety. Max Daily Amount: 10 mg. Refill at 1 month intervals  
  
 doxycycline 100 mg tablet Commonly known as:  ADOXA Take 100 mg by mouth two (2) times a day. fluconazole 100 mg tablet Commonly known as:  DIFLUCAN  
every thirty (30) days. furosemide 20 mg tablet Commonly known as:  LASIX Take one tab daily as needed for worsening swelling  
  
 gabapentin 300 mg capsule Commonly known as:  NEURONTIN  
TAKE ONE CAPSULE BY MOUTH THREE TIMES A DAY. PATIENT MUST MAKE APPOINTMENT FOR FURTHER REFILLS  
  
 hydroxychloroquine 200 mg tablet Commonly known as:  PLAQUENIL Take 400 mg by mouth daily (after dinner). For lupus  
  
 metoprolol tartrate 100 mg IR tablet Commonly known as:  LOPRESSOR Take 150 mg by mouth two (2) times a day. REMICADE IV  
800 mg by IntraVENous route. Every 8 weeks;last infusion 10/07/2016 MARIELOS (28) 3-0.02 mg Tab Generic drug:  drospirenone-ethinyl estradiol Take 1 Tab by mouth nightly. ZANAFLEX 2 mg tablet Generic drug:  tiZANidine Take 6 mg by mouth nightly. ZOFRAN (AS HYDROCHLORIDE) 4 mg tablet Generic drug:  ondansetron hcl Take 4 mg by mouth every eight (8) hours as needed for Nausea. Follow-up Instructions Return in about 6 months (around 11/4/2017). To-Do List   
 06/16/2017 1:00 PM  
  Appointment with CHAIR 3 HEIDI [facility] ([phone])  
  
 08/11/2017 1:00 PM  
  Appointment with Charlee Cummins [facility] ([phone]) Patient Instructions A Healthy Lifestyle: Care Instructions Your Care Instructions A healthy lifestyle can help you feel good, stay at a healthy weight, and have plenty of energy for both work and play. A healthy lifestyle is something you can share with your whole family. A healthy lifestyle also can lower your risk for serious health problems, such as high blood pressure, heart disease, and diabetes. You can follow a few steps listed below to improve your health and the health of your family. Follow-up care is a key part of your treatment and safety. Be sure to make and go to all appointments, and call your doctor if you are having problems. Its also a good idea to know your test results and keep a list of the medicines you take. How can you care for yourself at home? · Do not eat too much sugar, fat, or fast foods. You can still have dessert and treats now and then. The goal is moderation. · Start small to improve your eating habits. Pay attention to portion sizes, drink less juice and soda pop, and eat more fruits and vegetables. ¨ Eat a healthy amount of food. A 3-ounce serving of meat, for example, is about the size of a deck of cards. Fill the rest of your plate with vegetables and whole grains. ¨ Limit the amount of soda and sports drinks you have every day. Drink more water when you are thirsty. ¨ Eat at least 5 servings of fruits and vegetables every day. It may seem like a lot, but it is not hard to reach this goal. A serving or helping is 1 piece of fruit, 1 cup of vegetables, or 2 cups of leafy, raw vegetables. Have an apple or some carrot sticks as an afternoon snack instead of a candy bar. Try to have fruits and/or vegetables at every meal. 
· Make exercise part of your daily routine. You may want to start with simple activities, such as walking, bicycling, or slow swimming. Try to be active 30 to 60 minutes every day. You do not need to do all 30 to 60 minutes all at once. For example, you can exercise 3 times a day for 10 or 20 minutes.  Moderate exercise is safe for most people, but it is always a good idea to talk to your doctor before starting an exercise program. 
· Keep moving. [place] the lawn, work in the garden, or aihuishou. Take the stairs instead of the elevator at work. · If you smoke, quit. People who smoke have an increased risk for heart attack, stroke, cancer, and other lung illnesses. Quitting is hard, but there are ways to boost your chance of quitting tobacco for good. ¨ Use nicotine gum, patches, or lozenges. ¨ Ask your doctor about stop-smoking programs and medicines. ¨ Keep trying. In addition to reducing your risk of diseases in the future, you will notice some benefits soon after you stop using tobacco. If you have shortness of breath or asthma symptoms, they will likely get better within a few weeks after you quit. · Limit how much alcohol you drink. Moderate amounts of alcohol (up to 2 drinks a day for men, 1 drink a day for women) are okay. But drinking too much can lead to liver problems, high blood pressure, and other health problems. Family health If you have a family, there are many things you can do together to improve your health. · Eat meals together as a family as often as possible. · Eat healthy foods. This includes fruits, vegetables, lean meats and dairy, and whole grains. · Include your family in your fitness plan. Most people think of activities such as jogging or tennis as the way to fitness, but there are many ways you and your family can be more active. Anything that makes you breathe hard and gets your heart pumping is exercise. Here are some tips: 
¨ Walk to do errands or to take your child to school or the bus. ¨ Go for a family bike ride after dinner instead of watching TV. Where can you learn more? Go to http://maria esther-sophia.info/. Enter O280 in the search box to learn more about \"A Healthy Lifestyle: Care Instructions. \" Current as of: July 26, 2016 Content Version: 11.2 © 5388-2896 Healthwise, Incorporated. Care instructions adapted under license by [employer] (which disclaims liability or warranty for this information). If you have questions about a medical condition or this instruction, always ask your healthcare professional. [address] any warranty or liability for your use of this information. Introducing [facility]! Dear Caterina Reyez: 
Thank you for requesting a Riiid account. Our records indicate that you already have an active Riiid account. You can access your account anytime at [email or web address] Did you know that you can access your hospital and ER discharge instructions at any time in Riiid? You can also review all of your test results from your hospital stay or ER visit. Additional Information If you have questions, please visit the Frequently Asked Questions section of the Riiid website at [email or web address] Remember, Riiid is NOT to be used for urgent needs. For medical emergencies, dial 911. Now available from your iPhone and Android! Please provide this summary of care documentation to your next provider. Your primary care clinician is listed as Rubén Morton. If you have any questions after today's visit, please call [phone].

## 2017-05-04 NOTE — PATIENT INSTRUCTIONS

## 2017-05-05 NOTE — PROGRESS NOTES
Consult    Subjective:     Adryan Estrada is a 46 y.o. right-handed  female seen today in urgent work in basis because of severe pain in her neck, and swelling and sudden rash all over her body and is seen at the request of Dr. Alicia Lerma. She went to the emergency room CT of the head, and a CT of the neck was normal except for slight soft tissue swelling in the posterior neck region behind mastoid. She saw Dr. Alicia Lerma and he thinks she has MRSA because of the rash all over her body and in that region. She has had no real fever, no white count elevation, no meningismus or meningitis, but is concerned because of the swelling in that area. I recommended she continue taking the doxycycline that she is taking, and consider seeing her dermatologist possibly for skin biopsy in case the antibiotics do not get her better. We need a skin biopsy. Neurologically she is doing well and had no real change in her condition. We looked at her scans on the computer today, and reviewed them personally on the PACS system with the patient, and I assured her that the and spine and all the paraspinal muscles look normal there is no involvement deeper than to just a skin and nothing is enhancing. She has had no increase in headaches. 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 Carotid Dopplers were normal. Patient seen last year for progressive headaches for the last 6 months, becoming more frequent and more severe and more incapacitating. She does have lupus, and she also has Crohn's disease and this a chronic problem. She has a family history of migraines. She has a lot of muscle pain mainly in her legs, and significant back pain and is under pain management at [facility] with Dr. Emperatriz Herndon.  Her recent MRI scan of the lumbar spine was reviewed on the computer personally, and does show spondylolisthesis at L4-5 and mild degenerative disease without spinal stenosis or disc herniation. She feels as though her legs will give way at times, but her bowel and bladder function remain stable. She works daily as a . She's never had any testing done for her headaches. She has tried Neurontin,, which does not work as a prophylactic at 300 mg at night. She's had no unusual fever, meningismus, denies bruxism, denies any increased stress or tension, or other causes for her headaches. Past Medical History:   Diagnosis Date    Arrhythmia     Afib    Chronic pain     Lt and Rt back:  Dr Burgess Cummins;  Pain mgmt Karen JAEGER    Cough     evaluated 14 by Dr Jesus Farfan ([phone]) \". . possible drug related lung is due to Methitrexate or atypical or fungal infection\" per office note dated 14    Crohn's disease [facility])     Dr Herrera Sample    Ill-defined condition     migraines    Lupus [facility])     Dr Dunlap Beverage [phone]    Pain from implanted hardware 2016    Exploration of sternal incision with removal of protruding sternal wires    Stroke ([facility] [address] 2016    TIA's     SVC obstruction       Past Surgical History:   Procedure Laterality Date    HX APPENDECTOMY      HX  SECTION      x1    HX COLECTOMY  7/24/10    colon resection-18\" removed; Dr Michael Calvin GI      bowel adhesions removed    HX GI      Bowel resection    HX HEART CATHETERIZATION      no stents, open heart surgery    HX HEENT      sinus surgery for deviated septum    HX HYSTERECTOMY      partial    HX LEFT SALPINGO-OOPHORECTOMY      ovary and fallopian tube removed    HX MOHS PROCEDURES Right approx     with bone spur repair    HX OTHER SURGICAL      femerol vein removed    HX TONSILLECTOMY      HX VASCULAR ACCESS  6/11/10    portacath insertion and removal; Gets Remicaid q5 weeks    [place] COLONOSCOPY W/BIOPSY SINGLE/MULTIPLE  2013          Family History   Problem Relation Age of Onset    Cancer Father stomach    Other Mother      Auto accident      Social History   Substance Use Topics    Smoking status: Former Smoker     Packs/day: 0.50     Years: 2.50     Quit date: 3/29/2005    Smokeless tobacco: Never Used      Comment: social    Alcohol use No         Current Outpatient Prescriptions:     doxycycline (ADOXA) 100 mg tablet, Take 100 mg by mouth two (2) times a day., Disp: , Rfl:     metoprolol tartrate (LOPRESSOR) 100 mg IR tablet, Take 150 mg by mouth two (2) times a day., Disp: , Rfl:     gabapentin (NEURONTIN) 300 mg capsule, TAKE ONE CAPSULE BY MOUTH THREE TIMES A DAY. PATIENT MUST MAKE APPOINTMENT FOR FURTHER REFILLS, Disp: 100 Cap, Rfl: 0    fluconazole (DIFLUCAN) 100 mg tablet, every thirty (30) days. , Disp: , Rfl:     furosemide (LASIX) 20 mg tablet, Take one tab daily as needed for worsening swelling, Disp: 30 Tab, Rfl: 1    diazepam (VALIUM) 5 mg tablet, Take 1 Tab by mouth every twelve (12) hours as needed for Anxiety. Max Daily Amount: 10 mg. Refill at 1 month intervals, Disp: 30 Tab, Rfl: 2    aspirin (ASPIRIN) 325 mg tablet, Take 325 mg by mouth daily. , Disp: , Rfl:     apixaban (ELIQUIS) 5 mg tablet, Take 10mg a day for the first 7 days, then 5mg twice a day (Patient taking differently: Take 5 mg by mouth two (2) times a day. Take 10mg a day for the first 7 days, then 5mg twice a day), Disp: 60 Tab, Rfl: 1    butalbital-acetaminophen-caffeine (FIORICET, ESGIC) -40 mg per tablet, Take 2 Tabs by mouth every eight (8) hours as needed for Pain or Headache. Max Daily Amount: 6 Tabs. Indications: MIGRAINE, TENSION-TYPE HEADACHE, Disp: 50 Tab, Rfl: 11    hydroxychloroquine (PLAQUENIL) 200 mg tablet, Take 400 mg by mouth daily (after dinner).  For lupus, Disp: , Rfl:     tiZANidine (ZANAFLEX) 2 mg tablet, Take 6 mg by mouth nightly., Disp: , Rfl:     ondansetron hcl (ZOFRAN) 4 mg tablet, Take 4 mg by mouth every eight (8) hours as needed for Nausea., Disp: , Rfl:    meperidine (DEMEROL) 50 mg tablet, Take 50 mg by mouth three (3) times daily as needed for Pain., Disp: , Rfl:     INFLIXIMAB (REMICADE IV), 800 mg by IntraVENous route. Every 8 weeks;last infusion 10/07/2016, Disp: , Rfl:     drospirenone-ethinyl estradiol (MARIELOS 28) 3-20 mg-mcg per tablet, Take 1 Tab by mouth nightly., Disp: , Rfl:         Allergies   Allergen Reactions    Codeine Hives and Nausea and Vomiting     Severe nausea & vomiting    Hydrocodone Hives and Nausea and Vomiting     Severe nausea & vomiting    Oxycontin [Oxycodone] Hives and Nausea and Vomiting     Severe nausea & vomiting, also has the same reaction from Percocet    Percocet [Oxycodone-Acetaminophen] Hives and Nausea and Vomiting    Dilaudid [Hydromorphone (Bulk)] Swelling    Prednisone Other (comments)     HX OF CROHN'S; not allergic, prefer not to use         Review of Systems:  A comprehensive review of systems was negative except for: Constitutional: positive for fatigue and malaise  Eyes: positive for visual disturbance  Gastrointestinal: positive for dyspepsia, reflux symptoms, change in bowel habits, diarrhea and abdominal pain  Musculoskeletal: positive for myalgias, arthralgias, stiff joints, back pain and muscle weakness  Neurological: positive for headaches, gait problems and weakness   Vitals:    05/04/17 1113   BP: 140/86   Pulse: (!) 108   Resp: 16   SpO2: 96%   Weight: 191 lb (86.6 kg)   Height: 5' (1.524 m)     Objective:     I      NEUROLOGICAL EXAM:    Appearance: The patient is well developed, well nourished, provides a coherent history and is in acute distress because of headache. Mental Status: Oriented to time, place and person, and the president, cognitive function is normal, speech is fluent. Mood and affect appropriate. Cranial Nerves:   Intact visual fields. Fundi are benign. PATTI, EOM's full, no nystagmus, no ptosis. Facial sensation is normal. Corneal reflexes are not tested.  Facial movement is symmetric. Hearing is normal bilaterally. Palate is midline with normal sternocleidomastoid and trapezius muscles are normal. Tongue is midline. Neck without meningismus or bruits  There is 1 patch of swelling and loss of hair near the mastoid in the neck and a red rash that has been there for some time, and rashes across her abdomen and chest for which she will see the dermatologist  Temporal arteries not tender or enlarged  TMJ areas are not tender  Patient very tender on palpation over the right craniocervical junction   Motor:  4/5 strength in upper and lower proximal and distal muscles. Normal bulk and tone. No fasciculations. Patient has prominent tenderness in her lumbar spine  Straight leg raising test negative in the sitting position bilateral   Reflexes:   Deep tendon reflexes 1+/4 and symmetrical.  No babinski or clonus present   Sensory:   Normal to touch, pinprick and vibration and DSS. Gait:  Abnormal gait as she walks slowly because of her back pain. Tremor:   No tremor noted. Cerebellar:  No cerebellar signs present. Neurovascular:  Normal heart sounds and regular rhythm, peripheral pulses decreased, and no carotid bruits. Assessment:       ICD-10-CM ICD-9-CM    1. Transient ischemic attack involving right internal carotid artery G45.1 435.8    2. Stenosis of both carotid arteries without cerebral infarction I65.23 433.10      433.30    3. Stenosis of both carotid arteries without infarction I65.23 433.10      433.30    4. Ulnar neuropathy at elbow of right upper extremity G56.21 354.2    5. Cervical radiculopathy M54.12 723.4    6. Migraine with aura and without status migrainosus, not intractable G43.109 346.00    7.  Lumbar radiculopathy M54.16 724.4          Plan:     Neck swelling and rash, agree MRSA is a possibility and she should continue her antibiotics and see her dermatologist and follow-up with Dr. Mira Downey  I reviewed her films personally and reassured her that there is no paraspinal muscle involvement or spinal involvement or brain involvement on the basis of her CAT scans  Progressive headaches   She will continue her Neurontin to 3 times a day  MRI scan and MRA showed no clear structural lesions in the past  Cervical spine x-rays and CT scans of head and neck reviewed personally on the PACS system  Patient to continue pain management for occipital nerve block, and may need physical therapy  We will continue her on Fioricet for the headaches  She will call for results of her tests,, and further treatment and evaluation will depend on the results of her tests and her clinical course  Difficult case    Signed By: Dalton Reese MD     May 4, 2017         This note will not be viewable in [address]

## 2017-06-13 RX ORDER — DIPHENHYDRAMINE HYDROCHLORIDE 50 MG/ML
25 INJECTION, SOLUTION INTRAMUSCULAR; INTRAVENOUS ONCE
Status: COMPLETED | OUTPATIENT
Start: 2017-06-16 | End: 2017-06-16

## 2017-06-13 RX ORDER — ACETAMINOPHEN 325 MG/1
975 TABLET ORAL ONCE
Status: COMPLETED | OUTPATIENT
Start: 2017-06-16 | End: 2017-06-16

## 2017-06-16 ENCOUNTER — HOSPITAL ENCOUNTER (OUTPATIENT)
Dept: INFUSION THERAPY | Age: 52
Discharge: HOME | End: 2017-06-16
Payer: COMMERCIAL

## 2017-06-16 VITALS
SYSTOLIC BLOOD PRESSURE: 123 MMHG | RESPIRATION RATE: 18 BRPM | HEART RATE: 112 BPM | DIASTOLIC BLOOD PRESSURE: 86 MMHG | TEMPERATURE: 97.3 F

## 2017-06-16 PROCEDURE — 74011250636 HC RX REV CODE- 250/636: Performed by: INTERNAL MEDICINE

## 2017-06-16 PROCEDURE — 74011250637 HC RX REV CODE- 250/637: Performed by: INTERNAL MEDICINE

## 2017-06-16 PROCEDURE — 96375 TX/PRO/DX INJ NEW DRUG ADDON: CPT

## 2017-06-16 PROCEDURE — 96413 CHEMO IV INFUSION 1 HR: CPT

## 2017-06-16 PROCEDURE — 96415 CHEMO IV INFUSION ADDL HR: CPT

## 2017-06-16 RX ADMIN — INFLIXIMAB 500 MG: 100 INJECTION, POWDER, LYOPHILIZED, FOR SOLUTION INTRAVENOUS at 14:18

## 2017-06-16 RX ADMIN — ACETAMINOPHEN 975 MG: 325 TABLET ORAL at 13:43

## 2017-06-16 RX ADMIN — DIPHENHYDRAMINE HYDROCHLORIDE 50 MG: 50 INJECTION INTRAMUSCULAR; INTRAVENOUS at 13:45

## 2017-06-16 NOTE — PROGRESS NOTES
Outpatient Infusion [facility] - Chemotherapy Progress Note    9103 Pt admit to [facility] for Remicade ambulatory in stable condition. Assessment completed. No new concerns voiced. #24G PIV established in left wrist, with positive blood return. Medications:  Normal Saline KVO  Tylenol 975 MG PO  Benadryl 50 MG IVP  Remicade 500 MG - infused over 2 hours    1630 Pt tolerated treatment well. PIV maintained positive blood return throughout treatment, flushed with positive blood return at conclusion and removed, site covered with 2x2 guaze and coban. D/c home ambulatory in no distress. Pt aware of next [facility] appointment scheduled for 08/11/17.   Patient Vitals for the past 12 hrs:   Temp Pulse Resp BP   06/16/17 1627 - (!) 112 18 123/86   06/16/17 1324 97.3 °F (36.3 °C) 91 18 124/81

## 2017-07-06 ENCOUNTER — OFFICE VISIT (OUTPATIENT)
Dept: CARDIOLOGY CLINIC | Age: 52
End: 2017-07-06

## 2017-07-06 DIAGNOSIS — R07.89 OTHER CHEST PAIN: ICD-10-CM

## 2017-07-06 DIAGNOSIS — K50.119 CROHN'S DISEASE OF COLON, UNSPECIFIED COMPLICATION (HCC): ICD-10-CM

## 2017-07-06 DIAGNOSIS — I26.99 OTHER PULMONARY EMBOLISM WITHOUT ACUTE COR PULMONALE, UNSPECIFIED CHRONICITY (HCC): ICD-10-CM

## 2017-07-06 DIAGNOSIS — R00.0 SINUS TACHYCARDIA: Primary | Chronic | ICD-10-CM

## 2017-07-06 RX ORDER — DILTIAZEM HYDROCHLORIDE 120 MG/1
120 CAPSULE, COATED, EXTENDED RELEASE ORAL DAILY
Qty: 30 CAP | Refills: 1 | Status: SHIPPED | OUTPATIENT
Start: 2017-07-06 | End: 2017-08-10 | Stop reason: ALTCHOICE

## 2017-07-06 NOTE — MR AVS SNAPSHOT
Visit Information Date & Time Provider Department Dept. Phone Encounter #  
 7/6/2017  3:45 PM Hernan Migdalia Laguerre, [facility] [phone] [id number] Your Appointments 8/10/2017  3:45 PM  
1 MONTH with [facility]) Appt Note: Per Dr. Mee Mccabe [facility]  
[phone] [address] [facility]  
  
    
 8/17/2017  4:00 PM  
ESTABLISHED PATIENT with MD [facility]) Appt Note: Per Dr Paulino Alvarado $0CP REM  
 [address] [facility]  
[phone] [address] [facility]  
  
    
 11/17/2017  2:20 PM  
Follow Up with Eloy Martinez MD  
Neurology [facility] at [facility]) Appt Note: follow up TIA $ 0 CP jll 5/4/17  
 [facility], 
[address] [place] [address], [address] [address]  
[address] [facility], [address] [place] [address], [address] [place] [address] [address] [phone] Upcoming Health Maintenance Date Due Hepatitis C Screening 1965 DTaP/Tdap/Td series (1 - Tdap) 7/11/1986 PAP AKA CERVICAL CYTOLOGY 7/11/1986 FOBT Q 1 YEAR AGE 50-75 7/11/2015 INFLUENZA AGE 9 TO ADULT 8/1/2017 BREAST CANCER SCRN MAMMOGRAM 2/1/2019 Allergies as of 7/6/2017  Review Complete On: 7/6/2017 By: Sherine Levin NP Severity Noted Reaction Type Reactions Codeine High 04/22/2010   Systemic Hives, Nausea and Vomiting Severe nausea & vomiting Hydrocodone High 04/22/2010   Systemic Hives, Nausea and Vomiting Severe nausea & vomiting Oxycontin [Oxycodone] High 04/22/2010   Systemic Hives, Nausea and Vomiting Severe nausea & vomiting, also has the same reaction from Percocet  Percocet [Oxycodone-acetaminophen] High 07/19/2010   Side Effect Hives, Nausea and Vomiting Dilaudid [Hydromorphone (Bulk)]  08/24/2012    Swelling Prednisone  01/20/2014    Other (comments) HX OF CROHN'S; not allergic, prefer not to use Current Immunizations  Reviewed on 6/16/2017 Name Date H1N1 FLU VACCINE 1/1/2010 Influenza Vaccine 11/27/2016, 10/6/2014 Influenza Vaccine Whole 10/15/2011 PPD 1/1/2010 Not reviewed this visit You Were Diagnosed With   
  
 Codes Comments Sinus tachycardia    -  Primary ICD-10-CM: R00.0 ICD-9-CM: 427.89 Other chest pain     ICD-10-CM: R07.89 ICD-9-CM: 786.59 Other pulmonary embolism without acute cor pulmonale, unspecified chronicity (HCC)     ICD-10-CM: I26.99 
ICD-9-CM: 415.19 Crohn's disease of colon, unspecified complication     BNV-88-VY: K50.119 ICD-9-CM: 555.1 Vitals BP Pulse Resp Height(growth percentile) Weight(growth percentile) SpO2  
 (!) 130/0 (BP 1 Location: Right arm, BP Patient Position: Sitting) (!) 106 16 5' (1.524 m) 194 lb 3.2 oz (88.1 kg) 99% BMI OB Status Smoking Status 37.93 kg/m2 Postmenopausal Former Smoker Vitals History BMI and BSA Data Body Mass Index Body Surface Area  
 37.93 kg/m 2 1.93 m 2 Preferred Pharmacy Pharmacy Name Phone Katalina Jama [address] Kristin Sharp [phone] Your Updated Medication List  
  
   
This list is accurate as of: 7/6/17  5:11 PM.  Always use your most recent med list.  
  
  
  
  
 apixaban 5 mg tablet Commonly known as:  Verguerrero Chapman Take 10mg a day for the first 7 days, then 5mg twice a day  
  
 aspirin 325 mg tablet Commonly known as:  ASPIRIN Take 325 mg by mouth daily. butalbital-acetaminophen-caffeine -40 mg per tablet Commonly known as:  Karma Pisano Take 2 Tabs by mouth every eight (8) hours as needed for Pain or Headache. Max Daily Amount: 6 Tabs. Indications: MIGRAINE, TENSION-TYPE HEADACHE DEMEROL 50 mg tablet Generic drug:  meperidine Take 50 mg by mouth three (3) times daily as needed for Pain.  
  
 diazePAM 5 mg tablet Commonly known as:  VALIUM Take 1 Tab by mouth every twelve (12) hours as needed for Anxiety. Max Daily Amount: 10 mg. Refill at 1 month intervals  
  
 dilTIAZem  mg ER capsule Commonly known as:  CARDIZEM CD Take 1 Cap by mouth daily. doxycycline 100 mg tablet Commonly known as:  ADOXA Take 100 mg by mouth two (2) times a day. fluconazole 100 mg tablet Commonly known as:  DIFLUCAN  
every thirty (30) days. furosemide 20 mg tablet Commonly known as:  LASIX Take one tab daily as needed for worsening swelling  
  
 gabapentin 300 mg capsule Commonly known as:  NEURONTIN  
TAKE ONE CAPSULE BY MOUTH THREE TIMES A DAY. PATIENT MUST MAKE APPOINTMENT FOR FURTHER REFILLS  
  
 hydroxychloroquine 200 mg tablet Commonly known as:  PLAQUENIL Take 400 mg by mouth daily (after dinner). For lupus  
  
 metoprolol tartrate 100 mg IR tablet Commonly known as:  LOPRESSOR Take 1.5 Tabs by mouth two (2) times a day. REMICADE IV  
800 mg by IntraVENous route. Every 8 weeks;last infusion 10/07/2016 MARIELOS (28) 3-0.02 mg Tab Generic drug:  drospirenone-ethinyl estradiol Take 1 Tab by mouth nightly. ZANAFLEX 2 mg tablet Generic drug:  tiZANidine Take 6 mg by mouth nightly. ZOFRAN (AS HYDROCHLORIDE) 4 mg tablet Generic drug:  ondansetron hcl Take 4 mg by mouth every eight (8) hours as needed for Nausea. Prescriptions Sent to Pharmacy Refills  
 dilTIAZem CD (CARDIZEM CD) 120 mg ER capsule 1 Sig: Take 1 Cap by mouth daily. Class: Normal  
 Pharmacy: [address] [facility] Ph #: [phone] Route: Oral  
  
We Performed the Following AMB POC EKG ROUTINE W/ 12 LEADS, INTER & REP [47319 CPT(R)] To-Do List   
 08/11/2017 1:00 PM  
 Appointment with CHAIR 3 [facility] CYNTHIA at [facility] ([phone]) Introducing [facility] SERVICES! Dear Mindy Winston: 
Thank you for requesting a PeptiVir account. Our records indicate that you already have an active PeptiVir account. You can access your account anytime at [email or web address] Did you know that you can access your hospital and ER discharge instructions at any time in PeptiVir? You can also review all of your test results from your hospital stay or ER visit. Additional Information If you have questions, please visit the Frequently Asked Questions section of the PeptiVir website at [email or web address] Remember, PeptiVir is NOT to be used for urgent needs. For medical emergencies, dial 911. Now available from your iPhone and Android! Please provide this summary of care documentation to your next provider. Your primary care clinician is listed as Monserrat Mosley. If you have any questions after today's visit, please call [phone].

## 2017-07-06 NOTE — PROGRESS NOTES
Marquise Cira DNP, ANP-BC  Subjective/HPI:     Sebastian Magana is a 46 y.o. female is here for routine f/u. Continues to experience sinus tachycardia resting heart rate  and with minimal activities 130 bpm +. 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 Is compliant in taking 150 mg of metoprolol twice a day, does experience fatigue. PCP Provider  Malka Nunez MD  Past Medical History:   Diagnosis Date    Arrhythmia     Afib    Chronic pain     Lt and Rt back:  Dr Margie Ausitn;  Pain mgmt Karen Mallory PA    Cough     evaluated 14 by Dr Bibi Davis ([phone]) \". . possible drug related lung is due to Methitrexate or atypical or fungal infection\" per office note dated 14    Crohn's disease [facility])     Dr Tiera Bui Ill-defined condition     migraines    Lupus [facility])     Dr Yuko Arevalo [phone]    Pain from implanted hardware 2016    Exploration of sternal incision with removal of protruding sternal wires    Stroke ([facility] [address] 2016    TIA's     SVC obstruction       Past Surgical History:   Procedure Laterality Date    HX APPENDECTOMY      HX  SECTION      x1    HX COLECTOMY  7/24/10    colon resection-18\" removed; Dr Swetha Nielsen GI      bowel adhesions removed    HX GI      Bowel resection    HX HEART CATHETERIZATION      no stents, open heart surgery    HX HEENT      sinus surgery for deviated septum    HX HYSTERECTOMY      partial    HX LEFT SALPINGO-OOPHORECTOMY      ovary and fallopian tube removed    HX MOHS PROCEDURES Right approx     with bone spur repair    HX OTHER SURGICAL      femerol vein removed    HX TONSILLECTOMY      HX VASCULAR ACCESS  6/11/10    portacath insertion and removal; Gets Remicaid q5 weeks    TX COLONOSCOPY W/BIOPSY SINGLE/MULTIPLE  2013          Allergies   Allergen Reactions    Codeine Hives and Nausea and Vomiting     Severe nausea & vomiting    Hydrocodone Hives and Nausea and Vomiting     Severe nausea & vomiting    Oxycontin [Oxycodone] Hives and Nausea and Vomiting     Severe nausea & vomiting, also has the same reaction from Percocet    Percocet [Oxycodone-Acetaminophen] Hives and Nausea and Vomiting    Dilaudid [Hydromorphone (Bulk)] Swelling    Prednisone Other (comments)     HX OF CROHN'S; not allergic, prefer not to use       Family History   Problem Relation Age of Onset    Cancer Father      stomach    Other Mother      Auto accident      Current Outpatient Prescriptions   Medication Sig    dilTIAZem CD (CARDIZEM CD) 120 mg ER capsule Take 1 Cap by mouth daily.  metoprolol tartrate (LOPRESSOR) 100 mg IR tablet Take 1.5 Tabs by mouth two (2) times a day.  gabapentin (NEURONTIN) 300 mg capsule TAKE ONE CAPSULE BY MOUTH THREE TIMES A DAY. PATIENT MUST MAKE APPOINTMENT FOR FURTHER REFILLS    fluconazole (DIFLUCAN) 100 mg tablet every thirty (30) days.  furosemide (LASIX) 20 mg tablet Take one tab daily as needed for worsening swelling    diazepam (VALIUM) 5 mg tablet Take 1 Tab by mouth every twelve (12) hours as needed for Anxiety. Max Daily Amount: 10 mg. Refill at 1 month intervals    aspirin (ASPIRIN) 325 mg tablet Take 325 mg by mouth daily.  apixaban (ELIQUIS) 5 mg tablet Take 10mg a day for the first 7 days, then 5mg twice a day (Patient taking differently: Take 5 mg by mouth two (2) times a day. Take 10mg a day for the first 7 days, then 5mg twice a day)    butalbital-acetaminophen-caffeine (FIORICET, ESGIC) -40 mg per tablet Take 2 Tabs by mouth every eight (8) hours as needed for Pain or Headache. Max Daily Amount: 6 Tabs. Indications: MIGRAINE, TENSION-TYPE HEADACHE    hydroxychloroquine (PLAQUENIL) 200 mg tablet Take 400 mg by mouth daily (after dinner). For lupus    tiZANidine (ZANAFLEX) 2 mg tablet Take 6 mg by mouth nightly.     ondansetron hcl (ZOFRAN) 4 mg tablet Take 4 mg by mouth every eight (8) hours as needed for Nausea.  meperidine (DEMEROL) 50 mg tablet Take 50 mg by mouth three (3) times daily as needed for Pain.  INFLIXIMAB (REMICADE IV) 800 mg by IntraVENous route. Every 8 weeks;last infusion 10/07/2016    drospirenone-ethinyl estradiol (MARIELOS 28) 3-20 mg-mcg per tablet Take 1 Tab by mouth nightly.  doxycycline (ADOXA) 100 mg tablet Take 100 mg by mouth two (2) times a day. No current facility-administered medications for this visit. Vitals:    07/06/17 1605   BP: (!) 130/0   Pulse: (!) 106   Resp: 16   SpO2: 99%   Weight: 194 lb 3.2 oz (88.1 kg)   Height: 5' (1.524 m)     Social History     Social History    Marital status:      Spouse name: N/A    Number of children: N/A    Years of education: N/A     Occupational History    Not on file. Social History Main Topics    Smoking status: Former Smoker     Packs/day: 0.50     Years: 2.50     Quit date: 3/29/2005    Smokeless tobacco: Never Used      Comment: social    Alcohol use No    Drug use: No    Sexual activity: Not on file     Other Topics Concern    Not on file     Social History Narrative       I have reviewed the nurses notes, vitals, problem list, allergy list, medical history, family, social history and medications. Review of Symptoms:    General: Pt denies excessive weight gain or loss. Pt is able to conduct ADL's  HEENT: Denies blurred vision, headaches, epistaxis and difficulty swallowing. Respiratory: Denies shortness of breath, + HENDRIX, wheezing or stridor. Cardiovascular: Denies precordial pain, + palpitations, + edema or PND  Gastrointestinal: Denies poor appetite, indigestion, abdominal pain or blood in stool  Musculoskeletal: Denies pain or swelling from muscles or joints  Neurologic: Denies tremor, paresthesias, or sensory motor disturbance  Skin: Denies rash, itching or texture change.       Physical Exam:      General: Well developed, in no acute distress, cooperative and alert  HEENT: No carotid bruits, no JVD, trach is midline. Neck Supple, PEERL, EOM intact. Heart:  Normal S1/S2 negative S3 or S4. Regular, no murmur, gallop or rub.   Respiratory: Clear bilaterally x 4, no wheezing or rales  Abdomen:   Soft, non-tender, no masses, bowel sounds are active.   Extremities: Trace left ankle dependent edema, normal cap refill, no cyanosis, atraumatic. Neuro: A&Ox3, speech clear, gait stable. Skin: Skin color is normal. No rashes or lesions. Non diaphoretic  Vascular: 2+ pulses symmetric in all extremities. Left lower extremity is noted to have an area of swelling near the distal incision of the harvest of the femoral vein. Cardiographics    ECG: Sinus tachycardia  Results for orders placed or performed in visit on 03/24/17   CARDIAC HOLTER MONITOR, 24 HOURS    Narrative    ECG Monitor/24 hours, Complete    Reason for Holter Monitor   TACHYCARDIA    Heartbeat    Slowest 84  Average 103  Fastest  147      Results:   Underlying Rhythm: Sinus tachycardia      Atrial Arrhythmias: none            AV Conduction: normal    Ventricular Arrhythmias: premature ventricular contractions; occasional     ST Segment Analysis:normal     Symptom Correlation:  yes    Comment:   Sinus tachycardia, PVC's that appear to correlate with patient symptoms.      Alden Liriano MD           Results for orders placed or performed during the hospital encounter of 03/23/17   EKG, 12 LEAD, INITIAL   Result Value Ref Range    Ventricular Rate 114 BPM    Atrial Rate 114 BPM    P-R Interval 128 ms    QRS Duration 80 ms    Q-T Interval 332 ms    QTC Calculation (Bezet) 457 ms    Calculated P Axis 31 degrees    Calculated R Axis 2 degrees    Calculated T Axis 66 degrees    Diagnosis       Sinus tachycardia  Otherwise normal ECG  When compared with ECG of 07-DEC-2016 16:18,  No significant change was found  Confirmed by Gaby Cruz ([id number]) on 3/24/2017 9:12:12 AM           Cardiology Labs:  Lab Results   Component Value Date/Time    Cholesterol, total 168 04/23/2016 03:57 AM    HDL Cholesterol 81 04/23/2016 03:57 AM    LDL, calculated 43.2 04/23/2016 03:57 AM    Triglyceride 219 04/23/2016 03:57 AM    CHOL/HDL Ratio 2.1 04/23/2016 03:57 AM       Lab Results   Component Value Date/Time    Sodium 137 05/02/2017 11:25 AM    Potassium 4.0 05/02/2017 11:25 AM    Chloride 103 05/02/2017 11:25 AM    CO2 29 05/02/2017 11:25 AM    Anion gap 5 05/02/2017 11:25 AM    Glucose 75 05/02/2017 11:25 AM    BUN 11 05/02/2017 11:25 AM    Creatinine 0.82 05/02/2017 11:25 AM    BUN/Creatinine ratio 13 05/02/2017 11:25 AM    GFR est AA >60 05/02/2017 11:25 AM    GFR est non-AA >60 05/02/2017 11:25 AM    Calcium 9.0 05/02/2017 11:25 AM    Bilirubin, total 0.4 05/02/2017 11:25 AM    AST (SGOT) 20 05/02/2017 11:25 AM    Alk. phosphatase 80 05/02/2017 11:25 AM    Protein, total 8.1 05/02/2017 11:25 AM    Albumin 3.4 05/02/2017 11:25 AM    Globulin 4.7 05/02/2017 11:25 AM    A-G Ratio 0.7 05/02/2017 11:25 AM    ALT (SGPT) 17 05/02/2017 11:25 AM           Assessment:     Assessment:     Wilner Rico was seen today for chest pain, shortness of breath, rapid heart rate and leg swelling. Diagnoses and all orders for this visit:    Sinus tachycardia    Other chest pain  -     AMB POC EKG ROUTINE W/ 12 LEADS, INTER & REP    Other pulmonary embolism without acute cor pulmonale, unspecified chronicity (HCC)    Crohn's disease of colon, unspecified complication    Other orders  -     dilTIAZem CD (CARDIZEM CD) 120 mg ER capsule; Take 1 Cap by mouth daily. ICD-10-CM ICD-9-CM    1. Sinus tachycardia R00.0 427.89    2. Other chest pain R07.89 786.59 AMB POC EKG ROUTINE W/ 12 LEADS, INTER & REP   3. Other pulmonary embolism without acute cor pulmonale, unspecified chronicity (HCC) I26.99 415.19    4.  Crohn's disease of colon, unspecified complication C37.073 358.0      Orders Placed This Encounter    AMB POC EKG ROUTINE W/ 12 LEADS, INTER & REP     Order Specific Question:   Reason for Exam:     Answer:   Routine    dilTIAZem CD (CARDIZEM CD) 120 mg ER capsule     Sig: Take 1 Cap by mouth daily. Dispense:  30 Cap     Refill:  1        Plan:     1. Inappropriate sinus tachycardia: Currently taking 150 mg Lopressor twice daily, recent titration did not seem to have an effect on her underlying heart rate. Will trial calcium channel blocker diltiazem 120 mg daily follow-up in 1 month. Ultimately we are trying to avoid the need for AV node ablation and pacemaker. 2.  Dyspnea on exertion: Echocardiogram normal, felt this may be secondary to sinus tachycardia. 3.  Leg edema: Patient has venous swelling from the harvest site of the distal femoral vein with numbness and tingling around the surgical incision. Discussed with patient this is most likely will be a permanent condition secondary to her surgery, recommend wearing the compression stockings. Follow-up in 1 month. Silver Rice NP      [facility]    7/7/2017         Agree with note as outlined by  NP. I confirm findings in history and physical exam. No additional findings noted. Agree with plan as outlined above.      Khushbu Duffy MD

## 2017-07-06 NOTE — PROGRESS NOTES
Chief Complaint   Patient presents with    Chest Pain     3 mo f/u    Shortness of Breath     on exertion    Rapid Heart Rate     pt reports rapid heart rate, highest reading in the 140s, but consistently in 100-110s    Leg Swelling     L leg

## 2017-07-07 VITALS
HEART RATE: 106 BPM | HEIGHT: 60 IN | SYSTOLIC BLOOD PRESSURE: 130 MMHG | WEIGHT: 194.2 LBS | BODY MASS INDEX: 38.13 KG/M2 | DIASTOLIC BLOOD PRESSURE: 78 MMHG | RESPIRATION RATE: 16 BRPM | OXYGEN SATURATION: 99 %

## 2017-08-10 ENCOUNTER — OFFICE VISIT (OUTPATIENT)
Dept: CARDIOLOGY CLINIC | Age: 52
End: 2017-08-10

## 2017-08-10 VITALS
OXYGEN SATURATION: 100 % | SYSTOLIC BLOOD PRESSURE: 110 MMHG | DIASTOLIC BLOOD PRESSURE: 82 MMHG | WEIGHT: 196.1 LBS | BODY MASS INDEX: 38.5 KG/M2 | HEIGHT: 60 IN | HEART RATE: 110 BPM

## 2017-08-10 DIAGNOSIS — R00.0 SINUS TACHYCARDIA: Primary | ICD-10-CM

## 2017-08-10 DIAGNOSIS — I65.23 STENOSIS OF BOTH CAROTID ARTERIES WITHOUT INFARCTION: ICD-10-CM

## 2017-08-10 RX ORDER — ACETAMINOPHEN 325 MG/1
975 TABLET ORAL ONCE
Status: COMPLETED | OUTPATIENT
Start: 2017-08-11 | End: 2017-08-11

## 2017-08-10 RX ORDER — PROMETHAZINE HYDROCHLORIDE 25 MG/1
25 TABLET ORAL
COMMUNITY
End: 2021-10-20 | Stop reason: ALTCHOICE

## 2017-08-10 RX ORDER — DIPHENHYDRAMINE HYDROCHLORIDE 50 MG/ML
25 INJECTION, SOLUTION INTRAMUSCULAR; INTRAVENOUS ONCE
Status: DISCONTINUED | OUTPATIENT
Start: 2017-08-11 | End: 2017-08-11

## 2017-08-10 NOTE — MR AVS SNAPSHOT
Visit Information Date & Time Provider Department Dept. Phone Encounter #  
 8/10/2017  3:45 PM Sylvia Peng, 1024 [facility] [phone] [id number] Your Appointments 9/5/2017 10:00 AM  
3 WEEK with Sylvia Peng MD  
[facility]) Appt Note: $50CP 8/10/17ksr [address] [facility]  
[phone] [address] [facility]  
  
    
 11/17/2017  2:20 PM  
Follow Up with Christ Macdonald MD  
Neurology Clinic at [facility]) Appt Note: follow up TIA $ 0 CP jll 5/4/17  
 [address] [place] [address], 
[address] [place] [address], [address] [phone]  
[address] [facility], [address] [place] [address], [address] [phone] Upcoming Health Maintenance Date Due Hepatitis C Screening 1965 DTaP/Tdap/Td series (1 - Tdap) 7/11/1986 PAP AKA CERVICAL CYTOLOGY 7/11/1986 FOBT Q 1 YEAR AGE 50-75 7/11/2015 INFLUENZA AGE 9 TO ADULT 8/1/2017 BREAST CANCER SCRN MAMMOGRAM 2/1/2019 Allergies as of 8/10/2017  Review Complete On: 8/10/2017 By: Terrance Emery NP Severity Noted Reaction Type Reactions Codeine High 04/22/2010   Systemic Hives, Nausea and Vomiting Severe nausea & vomiting Hydrocodone High 04/22/2010   Systemic Hives, Nausea and Vomiting Severe nausea & vomiting Oxycontin [Oxycodone] High 04/22/2010   Systemic Hives, Nausea and Vomiting Severe nausea & vomiting, also has the same reaction from Percocet Percocet [Oxycodone-acetaminophen] High 07/19/2010   Side Effect Hives, Nausea and Vomiting Dilaudid [Hydromorphone (Bulk)]  08/24/2012    Swelling Prednisone  01/20/2014    Other (comments) HX OF CROHN'S; not allergic, prefer not to use Current Immunizations  Reviewed on 6/16/2017 Name Date H1N1 FLU VACCINE 1/1/2010 Influenza Vaccine 11/27/2016, 10/6/2014 Influenza Vaccine Whole 10/15/2011 PPD 1/1/2010 Not reviewed this visit You Were Diagnosed With   
  
 Codes Comments Sinus tachycardia    -  Primary ICD-10-CM: R00.0 ICD-9-CM: 427.89 Stenosis of both carotid arteries without infarction     ICD-10-CM: I65.23 ICD-9-CM: 433.10, 433.30 Vitals BP Pulse Height(growth percentile) Weight(growth percentile) SpO2 BMI  
 110/82 (BP 1 Location: Left arm, BP Patient Position: Sitting) (!) 110 5' (1.524 m) 196 lb 1.6 oz (89 kg) 100% 38.3 kg/m2 OB Status Smoking Status Postmenopausal Former Smoker Vitals History BMI and BSA Data Body Mass Index Body Surface Area  
 38.3 kg/m 2 1.94 m 2 Preferred Pharmacy Pharmacy Name Phone Mer Esp[facility] [address] [phone] Your Updated Medication List  
  
   
This list is accurate as of: 8/10/17  4:17 PM.  Always use your most recent med list.  
  
  
  
  
 apixaban 5 mg tablet Commonly known as:  Sridevi Pillow Take 10mg a day for the first 7 days, then 5mg twice a day  
  
 aspirin 325 mg tablet Commonly known as:  ASPIRIN Take 325 mg by mouth daily. butalbital-acetaminophen-caffeine -40 mg per tablet Commonly known as:  Vernette Agent Take 2 Tabs by mouth every eight (8) hours as needed for Pain or Headache. Max Daily Amount: 6 Tabs. Indications: MIGRAINE, TENSION-TYPE HEADACHE  
  
 DEMEROL 50 mg tablet Generic drug:  meperidine Take 50 mg by mouth three (3) times daily as needed for Pain.  
  
 diazePAM 5 mg tablet Commonly known as:  VALIUM Take 1 Tab by mouth every twelve (12) hours as needed for Anxiety. Max Daily Amount: 10 mg. Refill at 1 month intervals  
  
 doxycycline 100 mg tablet Commonly known as:  ADOXA Take 100 mg by mouth two (2) times a day. fluconazole 100 mg tablet Commonly known as:  DIFLUCAN  
 every thirty (30) days. furosemide 20 mg tablet Commonly known as:  LASIX Take one tab daily as needed for worsening swelling  
  
 gabapentin 300 mg capsule Commonly known as:  NEURONTIN  
TAKE ONE CAPSULE BY MOUTH THREE TIMES A DAY. PATIENT MUST MAKE APPOINTMENT FOR FURTHER REFILLS  
  
 hydroxychloroquine 200 mg tablet Commonly known as:  PLAQUENIL Take 400 mg by mouth daily (after dinner). For lupus  
  
 ivabradine 5 mg tablet Commonly known as:  [employer] Take 1 Tab by mouth two (2) times daily (with meals). metoprolol tartrate 100 mg IR tablet Commonly known as:  LOPRESSOR Take 1.5 Tabs by mouth two (2) times a day. promethazine 25 mg tablet Commonly known as:  PHENERGAN Take 25 mg by mouth every six (6) hours as needed for Nausea. REMICADE IV  
800 mg by IntraVENous route. Every 8 weeks;last infusion 10/07/2016 MARIELSO (28) 3-0.02 mg Tab Generic drug:  drospirenone-ethinyl estradiol Take 1 Tab by mouth nightly. ZANAFLEX 2 mg tablet Generic drug:  tiZANidine Take 6 mg by mouth nightly. ZOFRAN (AS HYDROCHLORIDE) 4 mg tablet Generic drug:  ondansetron hcl Take 4 mg by mouth every eight (8) hours as needed for Nausea. We Performed the Following AMB POC EKG ROUTINE W/ 12 LEADS, INTER & REP [63565 CPT(R)] To-Do List   
 08/11/2017  1:00 PM  
  Appointment with CHAIR 3 [facility] ([phone]) 10/06/2017 1:00 PM  
  Appointment with [address] [place] [address] ([phone]) [facility] [place] [facility]! Dear Jacinto Jarvis: 
Thank you for requesting a Golimi account. Our records indicate that you already have an active Golimi account. You can access your account anytime at [email or web address] Did you know that you can access your hospital and ER discharge instructions at any time in Golimi?   You can also review all of your test results from your hospital stay or ER visit. Additional Information If you have questions, please visit the Frequently Asked Questions section of the AdInnovation website at [email or web address] [email or web address] Remember, AdInnovation is NOT to be used for urgent needs. For medical emergencies, dial 911. Now available from your iPhone and Android! Please provide this summary of care documentation to your next provider. Your primary care clinician is listed as Monserrat Mosley. If you have any questions after today's visit, please call [phone].

## 2017-08-10 NOTE — PROGRESS NOTES
Merlyn Goltz DNP, ANP-BC  Subjective/HPI:     Travis Avila is a 46 y.o. female is here for 1 month follow-up for treatment of inappropriate sinus tachycardia. At last visit had a calcium channel blocker diltiazem 120 mg daily with 100 mg of metoprolol twice a day. Continues to feel symptomatic tachycardia. PCP Provider  Kia Torres MD  Past Medical History:   Diagnosis Date    Arrhythmia     Afib    Chronic pain     Lt and Rt back:  Dr Juan Herrera;  Pain mgmt Karen Mallory PA    Cough     evaluated 14 by Dr Stephany Valdovinos ([phone]) \". . possible drug related lung is due to Methitrexate or atypical or fungal infection\" per office note dated 14    Crohn's disease [facility])     Dr Caroline Anguiano Ill-defined condition     migraines    Lupus [facility])     Dr Bonita Prieto [phone]    Pain from implanted hardware 2016    Exploration of sternal incision with removal of protruding sternal wires    Stroke ([address] 2016    TIA's     SVC obstruction       Past Surgical History:   Procedure Laterality Date    HX APPENDECTOMY      HX  SECTION      x1    HX COLECTOMY  7/24/10    colon resection-18\" removed; Dr Yomi Rose GI      bowel adhesions removed    HX GI      Bowel resection    HX HEART CATHETERIZATION      no stents, open heart surgery    HX HEENT      sinus surgery for deviated septum    HX HYSTERECTOMY      partial    HX LEFT SALPINGO-OOPHORECTOMY      ovary and fallopian tube removed    HX MOHS PROCEDURES Right approx     with bone spur repair    HX OTHER SURGICAL      femerol vein removed    HX TONSILLECTOMY      HX VASCULAR ACCESS  6/11/10    portacath insertion and removal; Gets Remicaid q5 weeks    AL COLONOSCOPY W/BIOPSY SINGLE/MULTIPLE  2013          Allergies   Allergen Reactions    Codeine Hives and Nausea and Vomiting     Severe nausea & vomiting    Hydrocodone Hives and Nausea and Vomiting     Severe nausea & vomiting    Oxycontin [Oxycodone] Hives and Nausea and Vomiting     Severe nausea & vomiting, also has the same reaction from Percocet    Percocet [Oxycodone-Acetaminophen] Hives and Nausea and Vomiting    Dilaudid [Hydromorphone (Bulk)] Swelling    Prednisone Other (comments)     HX OF CROHN'S; not allergic, prefer not to use       Family History   Problem Relation Age of Onset    Cancer Father      stomach    Other Mother      Auto accident      Current Outpatient Prescriptions   Medication Sig    promethazine (PHENERGAN) 25 mg tablet Take 25 mg by mouth every six (6) hours as needed for Nausea.  ivabradine (CORLANOR) 5 mg tablet Take 1 Tab by mouth two (2) times daily (with meals).  metoprolol tartrate (LOPRESSOR) 100 mg IR tablet Take 1.5 Tabs by mouth two (2) times a day.  gabapentin (NEURONTIN) 300 mg capsule TAKE ONE CAPSULE BY MOUTH THREE TIMES A DAY. PATIENT MUST MAKE APPOINTMENT FOR FURTHER REFILLS    fluconazole (DIFLUCAN) 100 mg tablet every thirty (30) days.  furosemide (LASIX) 20 mg tablet Take one tab daily as needed for worsening swelling    diazepam (VALIUM) 5 mg tablet Take 1 Tab by mouth every twelve (12) hours as needed for Anxiety. Max Daily Amount: 10 mg. Refill at 1 month intervals    aspirin (ASPIRIN) 325 mg tablet Take 325 mg by mouth daily.  apixaban (ELIQUIS) 5 mg tablet Take 10mg a day for the first 7 days, then 5mg twice a day (Patient taking differently: Take 5 mg by mouth two (2) times a day. Take 10mg a day for the first 7 days, then 5mg twice a day)    butalbital-acetaminophen-caffeine (FIORICET, ESGIC) -40 mg per tablet Take 2 Tabs by mouth every eight (8) hours as needed for Pain or Headache. Max Daily Amount: 6 Tabs. Indications: MIGRAINE, TENSION-TYPE HEADACHE    hydroxychloroquine (PLAQUENIL) 200 mg tablet Take 400 mg by mouth daily (after dinner). For lupus    tiZANidine (ZANAFLEX) 2 mg tablet Take 6 mg by mouth nightly.     ondansetron hcl (ZOFRAN) 4 mg tablet Take 4 mg by mouth every eight (8) hours as needed for Nausea.  meperidine (DEMEROL) 50 mg tablet Take 50 mg by mouth three (3) times daily as needed for Pain.  INFLIXIMAB (REMICADE IV) 800 mg by IntraVENous route. Every 8 weeks;last infusion 10/07/2016    drospirenone-ethinyl estradiol (MARIELOS 28) 3-20 mg-mcg per tablet Take 1 Tab by mouth nightly.  doxycycline (ADOXA) 100 mg tablet Take 100 mg by mouth two (2) times a day. No current facility-administered medications for this visit. Facility-Administered Medications Ordered in Other Visits   Medication Dose Route Frequency    [START ON 8/11/2017] inFLIXimab (REMICADE) 500 mg in 0.9% sodium chloride 250 mL infusion  500 mg IntraVENous ONCE    [START ON 8/11/2017] acetaminophen (TYLENOL) tablet 975 mg  975 mg Oral ONCE    [START ON 8/11/2017] diphenhydrAMINE (BENADRYL) injection 25 mg  25 mg IntraVENous ONCE      Vitals:    08/10/17 1541   BP: 110/82   Pulse: (!) 110   SpO2: 100%   Weight: 196 lb 1.6 oz (89 kg)   Height: 5' (1.524 m)     Social History     Social History    Marital status:      Spouse name: N/A    Number of children: N/A    Years of education: N/A     Occupational History    Not on file. Social History Main Topics    Smoking status: Former Smoker     Packs/day: 0.50     Years: 2.50     Quit date: 3/29/2005    Smokeless tobacco: Never Used      Comment: social    Alcohol use No    Drug use: No    Sexual activity: Not on file     Other Topics Concern    Not on file     Social History Narrative       I have reviewed the nurses notes, vitals, problem list, allergy list, medical history, family, social history and medications. Review of Symptoms:    General: Pt denies excessive weight gain or loss. Pt is able to conduct ADL's  HEENT: Denies blurred vision, headaches, epistaxis and difficulty swallowing. Respiratory: Denies shortness of breath, HENDRIX, wheezing or stridor.   Cardiovascular: Denies precordial pain, palpitations, edema or PND  Gastrointestinal: Denies poor appetite, indigestion, abdominal pain or blood in stool  Musculoskeletal: Denies pain or swelling from muscles or joints  Neurologic: Denies tremor, paresthesias, or sensory motor disturbance  Skin: Denies rash, itching or texture change. Physical Exam:      General: Well developed, in no acute distress, cooperative and alert  HEENT: No carotid bruits, no JVD, trach is midline. Neck Supple, PEERL, EOM intact. Heart:  Normal S1/S2 negative S3 or S4. Regular, no murmur, gallop or rub.   Respiratory: Clear bilaterally x 4, no wheezing or rales  Abdomen:   Soft, non-tender, no masses, bowel sounds are active.   Extremities:  No edema, normal cap refill, no cyanosis, atraumatic. Neuro: A&Ox3, speech clear, gait stable. Skin: Skin color is normal. No rashes or lesions. Non diaphoretic  Vascular: 2+ pulses symmetric in all extremities    Cardiographics    ECG: sinus tachycardia   Results for orders placed or performed in visit on 03/24/17   CARDIAC HOLTER MONITOR, 24 HOURS    Narrative    ECG Monitor/24 hours, Complete    Reason for Holter Monitor   TACHYCARDIA    Heartbeat    Slowest 84  Average 103  Fastest  147      Results:   Underlying Rhythm: Sinus tachycardia      Atrial Arrhythmias: none            AV Conduction: normal    Ventricular Arrhythmias: premature ventricular contractions; occasional     ST Segment Analysis:normal     Symptom Correlation:  yes    Comment:   Sinus tachycardia, PVC's that appear to correlate with patient symptoms.      Gaurang Galeas MD           Results for orders placed or performed during the hospital encounter of 03/23/17   EKG, 12 LEAD, INITIAL   Result Value Ref Range    Ventricular Rate 114 BPM    Atrial Rate 114 BPM    P-R Interval 128 ms    QRS Duration 80 ms    Q-T Interval 332 ms    QTC Calculation (Bezet) 457 ms    Calculated P Axis 31 degrees    Calculated R Axis 2 degrees    Calculated T Axis 66 degrees    Diagnosis       Sinus tachycardia  Otherwise normal ECG  When compared with ECG of 07-DEC-2016 16:18,  No significant change was found  Confirmed by Erica Wu ([id number]) on 3/24/2017 9:12:12 AM           Cardiology Labs:  Lab Results   Component Value Date/Time    Cholesterol, total 168 04/23/2016 03:57 AM    HDL Cholesterol 81 04/23/2016 03:57 AM    LDL, calculated 43.2 04/23/2016 03:57 AM    Triglyceride 219 04/23/2016 03:57 AM    CHOL/HDL Ratio 2.1 04/23/2016 03:57 AM       Lab Results   Component Value Date/Time    Sodium 137 05/02/2017 11:25 AM    Potassium 4.0 05/02/2017 11:25 AM    Chloride 103 05/02/2017 11:25 AM    CO2 29 05/02/2017 11:25 AM    Anion gap 5 05/02/2017 11:25 AM    Glucose 75 05/02/2017 11:25 AM    BUN 11 05/02/2017 11:25 AM    Creatinine 0.82 05/02/2017 11:25 AM    BUN/Creatinine ratio 13 05/02/2017 11:25 AM    GFR est AA >60 05/02/2017 11:25 AM    GFR est non-AA >60 05/02/2017 11:25 AM    Calcium 9.0 05/02/2017 11:25 AM    Bilirubin, total 0.4 05/02/2017 11:25 AM    AST (SGOT) 20 05/02/2017 11:25 AM    Alk. phosphatase 80 05/02/2017 11:25 AM    Protein, total 8.1 05/02/2017 11:25 AM    Albumin 3.4 05/02/2017 11:25 AM    Globulin 4.7 05/02/2017 11:25 AM    A-G Ratio 0.7 05/02/2017 11:25 AM    ALT (SGPT) 17 05/02/2017 11:25 AM           Assessment:     Assessment:     Diagnoses and all orders for this visit:    1. Sinus tachycardia  -     AMB POC EKG ROUTINE W/ 12 LEADS, INTER & REP    2. Stenosis of both carotid arteries without infarction    Other orders  -     ivabradine (CORLANOR) 5 mg tablet; Take 1 Tab by mouth two (2) times daily (with meals). ICD-10-CM ICD-9-CM    1.  Sinus tachycardia R00.0 427.89 AMB POC EKG ROUTINE W/ 12 LEADS, INTER & REP   2. Stenosis of both carotid arteries without infarction I65.23 433.10      433.30      Orders Placed This Encounter    AMB POC EKG ROUTINE W/ 12 LEADS, INTER & REP     Order Specific Question:   Reason for Exam:     Answer: routine    promethazine (PHENERGAN) 25 mg tablet     Sig: Take 25 mg by mouth every six (6) hours as needed for Nausea.  ivabradine (CORLANOR) 5 mg tablet     Sig: Take 1 Tab by mouth two (2) times daily (with meals). Dispense:  60 Tab     Refill:  0        Plan:     Patient presents with continued inappropriate sinus tachycardia despite the addition of Cardizem 120 mg daily with standing dose of 100 mg of Lopressor twice a day. Will trial Corlanor 5mg BID, D/C cardizem, continue lopressor. Follow up 2 weeks. Heena Motta NP      [facility]    8/12/2017         Agree with note as outlined by  NP. I confirm findings in history and physical exam. No additional findings noted. Agree with plan as outlined above. Will try ivabridine for her inappropriate sinus tachycardia. Continue lopressor. May need to reduce the dose.     Marylee Franklin, MD

## 2017-08-11 ENCOUNTER — HOSPITAL ENCOUNTER (OUTPATIENT)
Dept: INFUSION THERAPY | Age: 52
Discharge: HOME | End: 2017-08-11
Payer: COMMERCIAL

## 2017-08-11 VITALS
BODY MASS INDEX: 38.51 KG/M2 | TEMPERATURE: 97 F | OXYGEN SATURATION: 100 % | HEART RATE: 97 BPM | DIASTOLIC BLOOD PRESSURE: 77 MMHG | RESPIRATION RATE: 18 BRPM | SYSTOLIC BLOOD PRESSURE: 124 MMHG | WEIGHT: 197.2 LBS

## 2017-08-11 PROCEDURE — 74011250636 HC RX REV CODE- 250/636: Performed by: INTERNAL MEDICINE

## 2017-08-11 PROCEDURE — 96415 CHEMO IV INFUSION ADDL HR: CPT

## 2017-08-11 PROCEDURE — 96413 CHEMO IV INFUSION 1 HR: CPT

## 2017-08-11 PROCEDURE — 74011250637 HC RX REV CODE- 250/637: Performed by: INTERNAL MEDICINE

## 2017-08-11 PROCEDURE — 96375 TX/PRO/DX INJ NEW DRUG ADDON: CPT

## 2017-08-11 RX ORDER — SODIUM CHLORIDE 9 MG/ML
50 INJECTION, SOLUTION INTRAVENOUS ONCE
Status: COMPLETED | OUTPATIENT
Start: 2017-08-11 | End: 2017-08-11

## 2017-08-11 RX ORDER — DIPHENHYDRAMINE HYDROCHLORIDE 50 MG/ML
50 INJECTION, SOLUTION INTRAMUSCULAR; INTRAVENOUS ONCE
Status: COMPLETED | OUTPATIENT
Start: 2017-08-11 | End: 2017-08-11

## 2017-08-11 RX ORDER — HEPARIN 100 UNIT/ML
500 SYRINGE INTRAVENOUS AS NEEDED
Status: ACTIVE | OUTPATIENT
Start: 2017-08-11 | End: 2017-08-12

## 2017-08-11 RX ORDER — SODIUM CHLORIDE 0.9 % (FLUSH) 0.9 %
10-40 SYRINGE (ML) INJECTION AS NEEDED
Status: DISCONTINUED | OUTPATIENT
Start: 2017-08-11 | End: 2017-08-15 | Stop reason: HOSPADM

## 2017-08-11 RX ORDER — SODIUM CHLORIDE 9 MG/ML
10 INJECTION INTRAMUSCULAR; INTRAVENOUS; SUBCUTANEOUS AS NEEDED
Status: ACTIVE | OUTPATIENT
Start: 2017-08-11 | End: 2017-08-12

## 2017-08-11 RX ADMIN — ACETAMINOPHEN 975 MG: 325 TABLET ORAL at 13:37

## 2017-08-11 RX ADMIN — DIPHENHYDRAMINE HYDROCHLORIDE 50 MG: 50 INJECTION INTRAMUSCULAR; INTRAVENOUS at 13:44

## 2017-08-11 RX ADMIN — SODIUM CHLORIDE 50 ML/HR: 900 INJECTION, SOLUTION INTRAVENOUS at 13:36

## 2017-08-11 RX ADMIN — Medication 10 ML: at 13:36

## 2017-08-11 RX ADMIN — INFLIXIMAB 500 MG: 100 INJECTION, POWDER, LYOPHILIZED, FOR SOLUTION INTRAVENOUS at 13:43

## 2017-08-11 NOTE — PROGRESS NOTES
1315 Pt arrived at [facility] ambulatory and in no distress for remicade. Assessment completed, no new complaints voiced. Pt states she is seeing cardiologist for rapid heart rate, will be having pacemaker placed in the fall. IV started right forearm #24 gauge. Pt states she is supposed to get benadryl 50 mg IV pre-remicade. Office called by Lucia Pina Pharmacist to get order corrected    Medications received:  NS @ KVO  Benadryl 50 mg IV  Tylenol 975 mg PO  Remicade 500 mg IV over 2 hours- prison through pt states IV is \"burning\", brisk blood return, no swelling. IV re-taped and secured, pt states it feels better    Right IV has brisk blood return, no swelling, flushed and removed. Patient Vitals for the past 8 hrs:   Temp Pulse Resp BP SpO2   08/11/17 1615 - 97 18 124/77 -   08/11/17 1322 97 °F (36.1 °C) 97 20 134/89 100 %       1615 Tolerated treatment well, no adverse reaction noted. D/Cd from [facility] ambulatory and in no distress accompanied by .   Next appt 10/13/17

## 2017-09-11 ENCOUNTER — OFFICE VISIT (OUTPATIENT)
Dept: CARDIOLOGY CLINIC | Age: 52
End: 2017-09-11

## 2017-09-11 VITALS
HEART RATE: 100 BPM | HEIGHT: 60 IN | RESPIRATION RATE: 18 BRPM | BODY MASS INDEX: 38.87 KG/M2 | SYSTOLIC BLOOD PRESSURE: 136 MMHG | OXYGEN SATURATION: 99 % | WEIGHT: 198 LBS | DIASTOLIC BLOOD PRESSURE: 100 MMHG

## 2017-09-11 DIAGNOSIS — R00.0 INAPPROPRIATE SINUS TACHYCARDIA: Primary | ICD-10-CM

## 2017-09-11 DIAGNOSIS — G56.80 PHRENIC NERVE PALSY: Chronic | ICD-10-CM

## 2017-09-11 NOTE — MR AVS SNAPSHOT
Visit Information Date & Time Provider Department Dept. Phone Encounter #  
 9/11/2017  2:15 PM Simon Stanford, [facility] [phone] [id number] Your Appointments 11/17/2017  2:20 PM  
Follow Up with Jerry Dominique MD  
[facility] at [facility] [address]) Appt Note: follow up TIA $ 0 CP jll 5/4/17  
 [facility], 
[address] [place] [address], [address] [phone]  
[address], [address] [place] [address], [address] [phone] Upcoming Health Maintenance Date Due Hepatitis C Screening 1965 DTaP/Tdap/Td series (1 - Tdap) 7/11/1986 PAP AKA CERVICAL CYTOLOGY 7/11/1986 FOBT Q 1 YEAR AGE 50-75 7/11/2015 INFLUENZA AGE 9 TO ADULT 8/1/2017 BREAST CANCER SCRN MAMMOGRAM 2/1/2019 Allergies as of 9/11/2017  Review Complete On: 9/11/2017 By: Quique Schmitt LPN Severity Noted Reaction Type Reactions Codeine High 04/22/2010   Systemic Hives, Nausea and Vomiting Severe nausea & vomiting Hydrocodone High 04/22/2010   Systemic Hives, Nausea and Vomiting Severe nausea & vomiting Oxycontin [Oxycodone] High 04/22/2010   Systemic Hives, Nausea and Vomiting Severe nausea & vomiting, also has the same reaction from Percocet Percocet [Oxycodone-acetaminophen] High 07/19/2010   Side Effect Hives, Nausea and Vomiting Dilaudid [Hydromorphone (Bulk)]  08/24/2012    Swelling Prednisone  01/20/2014    Other (comments) HX OF CROHN'S; not allergic, prefer not to use Current Immunizations  Reviewed on 8/11/2017 Name Date H1N1 FLU VACCINE 1/1/2010 Influenza Vaccine 11/27/2016, 10/6/2014 Influenza Vaccine Whole 10/15/2011 PPD 1/1/2010 Not reviewed this visit You Were Diagnosed With   
  
 Codes Comments Sinus tachycardia    -  Primary ICD-10-CM: R00.0 ICD-9-CM: 427.89 Inappropriate sinus tachycardia     ICD-10-CM: R00.0 ICD-9-CM: 427.89 Vitals BP Pulse Resp Height(growth percentile) Weight(growth percentile) SpO2  
 (!) 136/100 (BP 1 Location: Left arm, BP Patient Position: Sitting) 100 18 5' (1.524 m) 198 lb (89.8 kg) 99% BMI OB Status Smoking Status 38.67 kg/m2 Postmenopausal Former Smoker Vitals History BMI and BSA Data Body Mass Index Body Surface Area  
 38.67 kg/m 2 1.95 m 2 Preferred Pharmacy Pharmacy Name Phone [facility] [address] [phone] Your Updated Medication List  
  
   
This list is accurate as of: 9/11/17  2:48 PM.  Always use your most recent med list.  
  
  
  
  
 aspirin 325 mg tablet Commonly known as:  ASPIRIN Take 325 mg by mouth daily. butalbital-acetaminophen-caffeine -40 mg per tablet Commonly known as:  Vernette Agent Take 2 Tabs by mouth every eight (8) hours as needed for Pain or Headache. Max Daily Amount: 6 Tabs. Indications: MIGRAINE, TENSION-TYPE HEADACHE  
  
 DEMEROL 50 mg tablet Generic drug:  meperidine Take 50 mg by mouth three (3) times daily as needed for Pain.  
  
 diazePAM 5 mg tablet Commonly known as:  VALIUM Take 1 Tab by mouth every twelve (12) hours as needed for Anxiety. Max Daily Amount: 10 mg. Refill at 1 month intervals  
  
 doxycycline 100 mg tablet Commonly known as:  ADOXA Take 100 mg by mouth two (2) times a day. fluconazole 100 mg tablet Commonly known as:  DIFLUCAN  
every thirty (30) days. furosemide 20 mg tablet Commonly known as:  LASIX Take one tab daily as needed for worsening swelling  
  
 gabapentin 300 mg capsule Commonly known as:  NEURONTIN  
TAKE ONE CAPSULE BY MOUTH THREE TIMES A DAY. PATIENT MUST MAKE APPOINTMENT FOR FURTHER REFILLS  
  
 hydroxychloroquine 200 mg tablet Commonly known as:  PLAQUENIL Take 400 mg by mouth daily (after dinner). For lupus ivabradine 5 mg tablet Commonly known as:  [employer] Take 1 Tab by mouth two (2) times daily (with meals). metoprolol tartrate 100 mg IR tablet Commonly known as:  LOPRESSOR Take 1.5 Tabs by mouth two (2) times a day. promethazine 25 mg tablet Commonly known as:  PHENERGAN Take 25 mg by mouth every six (6) hours as needed for Nausea. REMICADE IV  
800 mg by IntraVENous route. Every 8 weeks;last infusion 10/07/2016 MARIELOS (28) 3-0.02 mg Tab Generic drug:  drospirenone-ethinyl estradiol Take 1 Tab by mouth nightly. ZANAFLEX 2 mg tablet Generic drug:  tiZANidine Take 6 mg by mouth nightly. ZOFRAN (AS HYDROCHLORIDE) 4 mg tablet Generic drug:  ondansetron hcl Take 4 mg by mouth every eight (8) hours as needed for Nausea. We Performed the Following AMB POC EKG ROUTINE W/ 12 LEADS, INTER & REP [94172 CPT(R)] To-Do List   
 10/13/2017 1:00 PM  
  Appointment with CHAIR 3 [facility] ([phone]) Introducing [facility] SERVICES! Dear Mindy Winston: 
Thank you for requesting a [facility] account. Our records indicate that you already have an active [facility] account. You can access your account anytime at [email or web address] Did you know that you can access your hospital and ER discharge instructions at any time in [facility]? You can also review all of your test results from your hospital stay or ER visit. Additional Information If you have questions, please visit the Frequently Asked Questions section of the [facility] website at [email or web address] Remember, [facility] is NOT to be used for urgent needs. For medical emergencies, dial 911. Now available from your iPhone and Android! Please provide this summary of care documentation to your next provider. Your primary care clinician is listed as Monserrat Mosley.  If you have any questions after today's visit, please call [phone].

## 2017-09-11 NOTE — PROGRESS NOTES
NAME:  Citlali Rinaldi   :   1965   MRN:   [id number]   PCP:  Tanvi Carcamo MD           Subjective: The patient is a 46y.o. year old female  who returns for a routine follow-up. Since the last visit, patient reports no change in exercise tolerance, chest pain, edema, medication intolerance, palpitations, shortness of breath, PND/orthopnea wheezing, sputum, syncope, dizziness or light headedness. Doing well. Past Medical History:   Diagnosis Date    Arrhythmia     Afib    Chronic pain     Lt and Rt back:  Dr Kevon Diallo;  Pain mgmt Karen Mallory PA    Cough     evaluated 14 by Dr Noemy Andrea ([phone]) \". . possible drug related lung is due to Methitrexate or atypical or fungal infection\" per office note dated 14    Crohn's disease [facility])     Dr Hang Handley    Ill-defined condition     migraines    Lupus [facility])     Dr Keily Chambers [phone]    Pain from implanted hardware 2016    Exploration of sternal incision with removal of protruding sternal wires    Stenosis of both carotid arteries without cerebral infarction     Stroke [facility]) 2016    TIA's     SVC obstruction         ICD-10-CM ICD-9-CM    1. Inappropriate sinus tachycardia R00.0 427.89 AMB POC EKG ROUTINE W/ 12 LEADS, INTER & REP   2. 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 Pt is able to conduct ADL's  HEENT: Denies blurred vision, headaches, epistaxis and difficulty swallowing. Respiratory: Denies shortness of breath, HENDRIX, wheezing or stridor.   Cardiovascular: Denies precordial pain, palpitations, edema or PND  Gastrointestinal: Denies poor appetite, indigestion, abdominal pain or blood in stool  Musculoskeletal: Denies pain or swelling from muscles or joints  Neurologic: Denies tremor, paresthesias, or sensory motor disturbance  Skin: Denies rash, itching or texture change. Objective:       Vitals:    09/11/17 1406   BP: (!) 136/100   Pulse: 100   Resp: 18   SpO2: 99%   Weight: 198 lb (89.8 kg)   Height: 5' (1.524 m)    Body mass index is 38.67 kg/(m^2). General PE  Mental Status - Alert. General Appearance - Not in acute distress. Chest and Lung Exam   Inspection: Accessory muscles - No use of accessory muscles in breathing. Auscultation:   Breath sounds: - Normal.    Cardiovascular   Inspection: Jugular vein - Bilateral - Inspection Normal.  Palpation/Percussion:   Apical Impulse: - Normal.  Auscultation: Rhythm - Regular. Heart Sounds - S1 WNL and S2 WNL. No S3 or S4. Murmurs & Other Heart Sounds: Auscultation of the heart reveals - No Murmurs. Peripheral Vascular   Upper Extremity: Inspection - Bilateral - No Cyanotic nailbeds or Digital clubbing. Lower Extremity:   Palpation: Edema - Bilateral - No edema. Data Review:     EKG -EKG, unchanged from previous tracings, sinus tachycardia. Medications reviewed  Current Outpatient Prescriptions   Medication Sig    ivabradine (CORLANOR) 7.5 mg tablet Take 1 Tab by mouth two (2) times daily (with meals).  promethazine (PHENERGAN) 25 mg tablet Take 25 mg by mouth every six (6) hours as needed for Nausea.  gabapentin (NEURONTIN) 300 mg capsule TAKE ONE CAPSULE BY MOUTH THREE TIMES A DAY. PATIENT MUST MAKE APPOINTMENT FOR FURTHER REFILLS    fluconazole (DIFLUCAN) 100 mg tablet every thirty (30) days.  furosemide (LASIX) 20 mg tablet Take one tab daily as needed for worsening swelling    aspirin (ASPIRIN) 325 mg tablet Take 325 mg by mouth daily.     butalbital-acetaminophen-caffeine (Vicci Derwent) -40 mg per tablet Take 2 Tabs by mouth every eight (8) hours as needed for Pain or Headache. Max Daily Amount: 6 Tabs. Indications: MIGRAINE, TENSION-TYPE HEADACHE    hydroxychloroquine (PLAQUENIL) 200 mg tablet Take 400 mg by mouth daily (after dinner). For lupus    tiZANidine (ZANAFLEX) 2 mg tablet Take 6 mg by mouth nightly.  ondansetron hcl (ZOFRAN) 4 mg tablet Take 4 mg by mouth every eight (8) hours as needed for Nausea.  meperidine (DEMEROL) 50 mg tablet Take 50 mg by mouth three (3) times daily as needed for Pain.  INFLIXIMAB (REMICADE IV) 800 mg by IntraVENous route. Every 8 weeks;last infusion 10/07/2016    drospirenone-ethinyl estradiol (MARIELOS 28) 3-20 mg-mcg per tablet Take 1 Tab by mouth nightly.  metoprolol tartrate (LOPRESSOR) 100 mg IR tablet Take 1.5 Tabs by mouth two (2) times a day.  doxycycline (ADOXA) 100 mg tablet Take 100 mg by mouth two (2) times a day.  diazepam (VALIUM) 5 mg tablet Take 1 Tab by mouth every twelve (12) hours as needed for Anxiety. Max Daily Amount: 10 mg. Refill at 1 month intervals     No current facility-administered medications for this visit. Assessment:       ICD-10-CM ICD-9-CM    1. Inappropriate sinus tachycardia R00.0 427.89 AMB POC EKG ROUTINE W/ 12 LEADS, INTER & REP   2. 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 Continues to be tachycardic and symptomatic. Addition of ivabridine has not helped. Doubt will improve. Dyspnea  probably related to Phrenic nerve palsy. Will increase corlanor dose. Have her see EP for possible AVN ablation/pacemaker.     Mendel Grigsby MD

## 2017-09-12 ENCOUNTER — DOCUMENTATION ONLY (OUTPATIENT)
Dept: CARDIOLOGY CLINIC | Age: 52
End: 2017-09-12

## 2017-09-13 DIAGNOSIS — I65.23 STENOSIS OF BOTH CAROTID ARTERIES WITHOUT INFARCTION: ICD-10-CM

## 2017-09-13 DIAGNOSIS — M54.81 CERVICO-OCCIPITAL NEURALGIA OF RIGHT SIDE: ICD-10-CM

## 2017-09-13 DIAGNOSIS — I67.9 CEREBROVASCULAR DISEASE, UNSPECIFIED: ICD-10-CM

## 2017-09-13 DIAGNOSIS — M54.12 CERVICAL RADICULOPATHY: ICD-10-CM

## 2017-09-13 DIAGNOSIS — G56.21 ULNAR NEUROPATHY AT ELBOW OF RIGHT UPPER EXTREMITY: ICD-10-CM

## 2017-09-13 RX ORDER — BUTALBITAL, ACETAMINOPHEN AND CAFFEINE 50; 325; 40 MG/1; MG/1; MG/1
2 TABLET ORAL
Qty: 50 TAB | Refills: 2 | Status: SHIPPED | OUTPATIENT
Start: 2017-09-13 | End: 2020-12-11 | Stop reason: SDUPTHER

## 2017-09-13 NOTE — TELEPHONE ENCOUNTER
----- Message from Annamaria Gil sent at 9/13/2017 10:35 AM EDT -----  Regarding: Dr. Hunter Seat  Pt requested a refill on Rx(Fioricet) called to [address] [place] [address] [phone]. Best contact number  [phone].

## 2017-09-13 NOTE — TELEPHONE ENCOUNTER
Future Appointments  Date Time Provider Mirtha Domingoi   10/13/2017 1:00 PM CHAIR 3 [address] REG   10/17/2017 3:30 PM 91 Marysol Whitaker MD [address]   11/17/2017 2:20 PM Solomon Chin MD [address]                         Last Appointment My Department:  5/4/2017    Please advise of refill below. Requested Prescriptions     Pending Prescriptions Disp Refills    butalbital-acetaminophen-caffeine (FIORICET, ESGIC) -40 mg per tablet 50 Tab 2     Sig: Take 2 Tabs by mouth every eight (8) hours as needed for Pain or Headache. Max Daily Amount: 6 Tabs. MUST LAST 30 DAYS.   Indications: MIGRAINE, TENSION-TYPE HEADACHE

## 2017-09-21 ENCOUNTER — DOCUMENTATION ONLY (OUTPATIENT)
Dept: CARDIOLOGY CLINIC | Age: 52
End: 2017-09-21

## 2017-09-21 NOTE — PROGRESS NOTES
Faxed appeal note to [address] @ [phone] for increase in Corlanor from 5 mg BID to 7.5 mg BID.

## 2017-09-27 ENCOUNTER — OFFICE VISIT (OUTPATIENT)
Dept: ONCOLOGY | Age: 52
End: 2017-09-27

## 2017-09-27 VITALS
HEART RATE: 103 BPM | WEIGHT: 197.2 LBS | BODY MASS INDEX: 38.72 KG/M2 | DIASTOLIC BLOOD PRESSURE: 88 MMHG | RESPIRATION RATE: 16 BRPM | OXYGEN SATURATION: 98 % | HEIGHT: 60 IN | SYSTOLIC BLOOD PRESSURE: 125 MMHG | TEMPERATURE: 98.2 F

## 2017-09-27 DIAGNOSIS — I26.99 OTHER PULMONARY EMBOLISM WITHOUT ACUTE COR PULMONALE, UNSPECIFIED CHRONICITY (HCC): Primary | ICD-10-CM

## 2017-09-27 RX ORDER — PREDNISOLONE ACETATE 10 MG/ML
1 SUSPENSION/ DROPS OPHTHALMIC 4 TIMES DAILY
COMMUNITY
End: 2017-10-03 | Stop reason: ALTCHOICE

## 2017-09-27 RX ORDER — TRIAMCINOLONE ACETONIDE 1 MG/G
OINTMENT TOPICAL 2 TIMES DAILY
Status: ON HOLD | COMMUNITY
End: 2018-02-01

## 2017-09-27 RX ORDER — VALACYCLOVIR HYDROCHLORIDE 1 G/1
TABLET, FILM COATED ORAL 3 TIMES DAILY
Status: ON HOLD | COMMUNITY
End: 2018-02-01

## 2017-09-27 NOTE — PROGRESS NOTES
Ashutosh Craven is a 46 y.o. female new patient her today to see provider for unexplained bruising. Patient has a hx of PE right lower lung; currently taking Eliquis, hx of Crohn's diesease, lupus, TIA. Patient pulse elevated (103); patient stated she had open heat surgery in 2016 and her pulse has been elevated; pt also states she needs to see the doctor for her elevated pulse. Patient has chronic abdominal pain which radiated to her back d/t Crohn's disease.

## 2017-09-27 NOTE — PROGRESS NOTES
Hematology Consultation        Patient: Leo Roberson MRN: [id number]  SSN: [id number]    YOB: 1965  Age: 46 y.o. Sex: female      Subjective:      Leo Roberson is a 46 y.o. female who I am seeing in consultation for a recommendation regarding systemic anticoagulation. She suffered a provoked PE from SVC occlusion/thrombus which was itself related to a chronic indwelling port-a-cath. She was diagnosed with PE in 04/2016. She has been on Apixaban since. She has not had any further episodes of VTE. She has noticed slightly increased bruising in the last few months. No bleeding from nose, gums, vagina or rectum. 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 She underwent a resection and bypass graft of the SVC. The SVC stenosis was felt to be related to a chronic indwelling port. Review of Systems:    Constitutional: negative  Eyes: negative  Ears, Nose, Mouth, Throat, and Face: negative  Respiratory: negative  Cardiovascular: negative  Gastrointestinal: negative  Genitourinary:negative  Integument/Breast: negative  Hematologic/Lymphatic: negative  Musculoskeletal:negative  Neurological: negative          Past Medical History:   Diagnosis Date    Arrhythmia     Afib    Chronic pain     Lt and Rt back:  Dr Grigsby Ou;  Pain mgmt Karen Mallory PA    Cough     evaluated 8/25/14 by Dr Jermaine Green ([phone]) \". . possible drug related lung is due to Methitrexate or atypical or fungal infection\" per office note dated 8/25/14    Crohn's disease [facility])     Dr Citlali Ayala Ill-defined condition     migraines    Lupus [facility])     Dr Enoch Nissen [phone]    Pain from implanted hardware 12/14/2016    Exploration of sternal incision with removal of protruding sternal wires    Stenosis of both carotid arteries without cerebral infarction     Stroke [facility]) 06/2016    TIA's     SVC obstruction      Past Surgical History: Procedure Laterality Date    HX APPENDECTOMY      HX  SECTION      x1    HX COLECTOMY  7/24/10    colon resection-18\" removed; Dr Julianna Lew      bowel adhesions removed    HX GI      Bowel resection    HX HEART CATHETERIZATION      no stents, open heart surgery    HX HEENT      sinus surgery for deviated septum    HX HYSTERECTOMY      partial    HX LEFT SALPINGO-OOPHORECTOMY      ovary and fallopian tube removed    HX MOHS PROCEDURES Right approx     with bone spur repair    HX OTHER SURGICAL      femerol vein removed    HX TONSILLECTOMY      HX VASCULAR ACCESS  6/11/10    portacath insertion and removal; Gets Remicaid q5 weeks    ID COLONOSCOPY W/BIOPSY SINGLE/MULTIPLE  2013           Family History   Problem Relation Age of Onset    Cancer Father      stomach    Other Mother      Auto accident     Social History   Substance Use Topics    Smoking status: Former Smoker     Packs/day: 0.50     Years: 2.50     Quit date: 3/29/2005    Smokeless tobacco: Never Used      Comment: social    Alcohol use Yes      Comment: Rare wine      Prior to Admission medications    Medication Sig Start Date End Date Taking? Authorizing Provider   triamcinolone acetonide (KENALOG) 0.1 % ointment Apply  to affected area two (2) times a day. use thin layer   Yes Historical Provider   prednisoLONE acetate (PRED FORTE) 1 % ophthalmic suspension Administer 1 Drop to left eye four (4) times daily. Yes Historical Provider   valACYclovir (VALTREX) 1 gram tablet Take  by mouth three (3) times daily. Yes Historical Provider   apixaban (ELIQUIS) 2.5 mg tablet Take 2.5 mg by mouth two (2) times a day. Indications: PE   Yes Historical Provider   butalbital-acetaminophen-caffeine (FIORICET, ESGIC) -40 mg per tablet Take 2 Tabs by mouth every eight (8) hours as needed for Pain or Headache. Max Daily Amount: 6 Tabs. MUST LAST 30 DAYS.   Indications: MIGRAINE, TENSION-TYPE HEADACHE 9/13/17  Yes Solomon Chin MD   promethazine (PHENERGAN) 25 mg tablet Take 25 mg by mouth every six (6) hours as needed for Nausea. Yes Historical Provider   gabapentin (NEURONTIN) 300 mg capsule TAKE ONE CAPSULE BY MOUTH THREE TIMES A DAY. PATIENT MUST MAKE APPOINTMENT FOR FURTHER REFILLS 3/17/17  Yes Solomon Chin MD   aspirin (ASPIRIN) 325 mg tablet Take 325 mg by mouth daily. Yes Historical Provider   hydroxychloroquine (PLAQUENIL) 200 mg tablet Take 400 mg by mouth daily (after dinner). For lupus   Yes Historical Provider   tiZANidine (ZANAFLEX) 2 mg tablet Take 6 mg by mouth nightly. Yes Historical Provider   ondansetron hcl (ZOFRAN) 4 mg tablet Take 4 mg by mouth every eight (8) hours as needed for Nausea. Yes Melisa Atwood MD   meperidine (DEMEROL) 50 mg tablet Take 50 mg by mouth three (3) times daily as needed for Pain. Yes Historical Provider   INFLIXIMAB (REMICADE IV) 800 mg by IntraVENous route. Every 8 weeks;last infusion 10/07/2016 7/19/10  Yes Historical Provider   drospirenone-ethinyl estradiol (MARIELOS 28) 3-20 mg-mcg per tablet Take 1 Tab by mouth nightly. Yes Historical Provider   ivabradine (CORLANOR) 7.5 mg tablet Take 1 Tab by mouth two (2) times daily (with meals). 9/11/17   Marbin Morocho MD   metoprolol tartrate (LOPRESSOR) 100 mg IR tablet Take 1.5 Tabs by mouth two (2) times a day. 6/22/17   JEANINE Conn   furosemide (LASIX) 20 mg tablet Take one tab daily as needed for worsening swelling 2/8/17   Allison Craven NP   diazepam (VALIUM) 5 mg tablet Take 1 Tab by mouth every twelve (12) hours as needed for Anxiety. Max Daily Amount: 10 mg.  Refill at 1 month intervals 8/31/16   Solomon Chin MD              Allergies   Allergen Reactions    Codeine Hives and Nausea and Vomiting     Severe nausea & vomiting    Hydrocodone Hives and Nausea and Vomiting     Severe nausea & vomiting    Oxycontin [Oxycodone] Hives and Nausea and Vomiting     Severe nausea & vomiting, also has the same reaction from Percocet    Percocet [Oxycodone-Acetaminophen] Hives and Nausea and Vomiting    Dilaudid [Hydromorphone (Bulk)] Swelling    Prednisone Other (comments)     HX OF CROHN'S; not allergic, prefer not to use            Objective:     Vitals:    09/27/17 0829   BP: 125/88   Pulse: (!) 103   Resp: 16   Temp: 98.2 °F (36.8 °C)   SpO2: 98%   Weight: 197 lb 3.2 oz (89.4 kg)   Height: 5' (1.524 m)            Physical Exam:    GENERAL: alert, cooperative, no distress, appears stated age  EYE: negative  LYMPHATIC: Cervical, supraclavicular, and axillary nodes normal.   THROAT & NECK: normal and no erythema or exudates noted. LUNG: clear to auscultation bilaterally  HEART: regular rate and rhythm  ABDOMEN: soft, non-tender  EXTREMITIES: no cyanosis or edema  SKIN: Normal.  NEUROLOGIC: negative        Lab Results   Component Value Date/Time    WBC 9.4 05/02/2017 11:25 AM    HGB 13.2 05/02/2017 11:25 AM    HCT 37.9 05/02/2017 11:25 AM    PLATELET 677 57/70/7969 11:25 AM    MCV 86.9 05/02/2017 11:25 AM                Assessment:     1. Pulmonary embolism:    > 1st episode in 04/2016- provoked by indwelling catheter [facility]) and SVC stenosis and thrombus    Completed 1.5 yrs of systemic anticoagulation. TIA was also related to SVC obstruction  No other reason to continue systemic anticoagulation at this time. The decision about the duration of anticoagulation is dependent on provoked/unprovoked nature of the VTE. The risk of second event in the next 1 years is around 10% and in the next 10 years is 20%. ACCP guidelines for management of patients with VTE allows individualization of therapy choices. I had an extended risk benefit discussions with the patient. He vocalized understanding. After weighing the pros and cons, she prefers to be off anticoagulation. She is comfortable with this decision. Plan:       1. Ok to stop Apixaban  2. Continue ASA 81 daily  3.  Follow up in 1 yr        Signed by: Danish Patel MD                     September 27, 2017         CC.  Saeid Fregoso MD

## 2017-09-29 ENCOUNTER — TELEPHONE (OUTPATIENT)
Dept: CARDIOLOGY CLINIC | Age: 52
End: 2017-09-29

## 2017-10-02 ENCOUNTER — HOSPITAL ENCOUNTER (OUTPATIENT)
Dept: GENERAL RADIOLOGY | Age: 52
Discharge: HOME | End: 2017-10-02
Payer: COMMERCIAL

## 2017-10-02 DIAGNOSIS — S29.9XXA CHEST INJURY, INITIAL ENCOUNTER: Primary | ICD-10-CM

## 2017-10-02 DIAGNOSIS — S29.9XXA CHEST INJURY, INITIAL ENCOUNTER: ICD-10-CM

## 2017-10-02 PROCEDURE — 71120 X-RAY EXAM BREASTBONE 2/>VWS: CPT

## 2017-10-03 ENCOUNTER — OFFICE VISIT (OUTPATIENT)
Dept: CARDIOLOGY CLINIC | Age: 52
End: 2017-10-03

## 2017-10-03 VITALS
HEIGHT: 60 IN | SYSTOLIC BLOOD PRESSURE: 150 MMHG | DIASTOLIC BLOOD PRESSURE: 100 MMHG | BODY MASS INDEX: 38.97 KG/M2 | RESPIRATION RATE: 16 BRPM | OXYGEN SATURATION: 99 % | HEART RATE: 104 BPM | WEIGHT: 198.5 LBS

## 2017-10-03 DIAGNOSIS — R00.0 INAPPROPRIATE SINUS TACHYCARDIA: Primary | ICD-10-CM

## 2017-10-03 DIAGNOSIS — R00.0 SINUS TACHYCARDIA: ICD-10-CM

## 2017-10-03 DIAGNOSIS — S27.9XXA: ICD-10-CM

## 2017-10-03 DIAGNOSIS — G56.80 PHRENIC NERVE PALSY: Chronic | ICD-10-CM

## 2017-10-03 NOTE — PROGRESS NOTES
Colette Hernandez DNP, ANP-BC  Subjective/HPI:     Mirna Montaño is a 46 y.o. female is here for urgent visit secondary to chest discomfort from a trip and fall and is concerned with injury to her sternotomy region. She denies clicking or moving sensation of the sternal wall. In medication and chart review patient has not been taking corlanor and metoprolol since last visit as she took it to understand she was stopping these medications since they were not working. Reviewed the last note which did not indicate this. Continues to feel elevated heart rate      PCP Provider  Rodney Hart MD  Past Medical History:   Diagnosis Date    Arrhythmia     Afib    Chronic pain     Lt and Rt back:  Dr Michael Moody;  Pain mgmt Karen Mallory PA    Cough     evaluated 14 by Dr Stacie Galeana ([phone]) \". . possible drug related lung is due to Methitrexate or atypical or fungal infection\" per office note dated 14    Crohn's disease [facility])     Dr Ernst Rodrigues    Ill-defined condition     migraines    Lupus     Dr Lakeisha Baum [phone]    Pain from implanted hardware 2016    Exploration of sternal incision with removal of protruding sternal wires    Stenosis of both carotid arteries without cerebral infarction     Stroke ([facility] [address] 2016    TIA's     SVC obstruction       Past Surgical History:   Procedure Laterality Date    HX APPENDECTOMY      HX  SECTION      x1    HX COLECTOMY  7/24/10    colon resection-18\" removed; Dr [facility]      bowel adhesions removed    HX GI      Bowel resection    HX HEART CATHETERIZATION      no stents, open heart surgery    HX HEENT      sinus surgery for deviated septum    HX HYSTERECTOMY      partial    HX LEFT SALPINGO-OOPHORECTOMY      ovary and fallopian tube removed    HX MOHS PROCEDURES Right approx     with bone spur repair    HX OTHER SURGICAL      femerol vein removed    HX TONSILLECTOMY      HX VASCULAR ACCESS  6/11/10 portacath insertion and removal; Gets Remicaid q5 weeks    OK COLONOSCOPY W/BIOPSY SINGLE/MULTIPLE  8/19/2013          Allergies   Allergen Reactions    Codeine Hives and Nausea and Vomiting     Severe nausea & vomiting    Hydrocodone Hives and Nausea and Vomiting     Severe nausea & vomiting    Oxycontin [Oxycodone] Hives and Nausea and Vomiting     Severe nausea & vomiting, also has the same reaction from Percocet    Percocet [Oxycodone-Acetaminophen] Hives and Nausea and Vomiting    Dilaudid [Hydromorphone (Bulk)] Swelling    Prednisone Other (comments)     HX OF CROHN'S; not allergic, prefer not to use       Family History   Problem Relation Age of Onset    Cancer Father      stomach    Other Mother      Auto accident      Current Outpatient Prescriptions   Medication Sig    triamcinolone acetonide (KENALOG) 0.1 % ointment Apply  to affected area two (2) times a day. use thin layer    valACYclovir (VALTREX) 1 gram tablet Take  by mouth three (3) times daily.  apixaban (ELIQUIS) 2.5 mg tablet Take 2.5 mg by mouth two (2) times a day. Indications: PE    butalbital-acetaminophen-caffeine (FIORICET, ESGIC) -40 mg per tablet Take 2 Tabs by mouth every eight (8) hours as needed for Pain or Headache. Max Daily Amount: 6 Tabs. MUST LAST 30 DAYS. Indications: MIGRAINE, TENSION-TYPE HEADACHE    promethazine (PHENERGAN) 25 mg tablet Take 25 mg by mouth every six (6) hours as needed for Nausea.  gabapentin (NEURONTIN) 300 mg capsule TAKE ONE CAPSULE BY MOUTH THREE TIMES A DAY. PATIENT MUST MAKE APPOINTMENT FOR FURTHER REFILLS    furosemide (LASIX) 20 mg tablet Take one tab daily as needed for worsening swelling    aspirin (ASPIRIN) 325 mg tablet Take 325 mg by mouth daily.  hydroxychloroquine (PLAQUENIL) 200 mg tablet Take 400 mg by mouth daily (after dinner). For lupus    tiZANidine (ZANAFLEX) 2 mg tablet Take 6 mg by mouth nightly.     ondansetron hcl (ZOFRAN) 4 mg tablet Take 4 mg by mouth every eight (8) hours as needed for Nausea.  meperidine (DEMEROL) 50 mg tablet Take 50 mg by mouth three (3) times daily as needed for Pain.  INFLIXIMAB (REMICADE IV) 800 mg by IntraVENous route. Every 8 weeks;last infusion 10/07/2016    drospirenone-ethinyl estradiol (MARIELOS 28) 3-20 mg-mcg per tablet Take 1 Tab by mouth nightly.  ivabradine (CORLANOR) 7.5 mg tablet Take 1 Tab by mouth two (2) times daily (with meals).  metoprolol tartrate (LOPRESSOR) 100 mg IR tablet Take 1.5 Tabs by mouth two (2) times a day.  diazepam (VALIUM) 5 mg tablet Take 1 Tab by mouth every twelve (12) hours as needed for Anxiety. Max Daily Amount: 10 mg. Refill at 1 month intervals     No current facility-administered medications for this visit. Vitals:    10/03/17 1411 10/03/17 1412   BP: (!) 140/100 (!) 150/100   Pulse: (!) 104    Resp: 16    SpO2: 99%    Weight: 198 lb 8 oz (90 kg)    Height: 5' (1.524 m)      Social History     Social History    Marital status:      Spouse name: N/A    Number of children: N/A    Years of education: N/A     Occupational History    Not on file. Social History Main Topics    Smoking status: Former Smoker     Packs/day: 0.50     Years: 2.50     Quit date: 3/29/2005    Smokeless tobacco: Never Used      Comment: social    Alcohol use Yes      Comment: Rare wine    Drug use: No    Sexual activity: Not on file     Other Topics Concern    Not on file     Social History Narrative       I have reviewed the nurses notes, vitals, problem list, allergy list, medical history, family, social history and medications. Review of Symptoms:    General: Pt denies excessive weight gain or loss. Pt is able to conduct ADL's  HEENT: Denies blurred vision, headaches, epistaxis and difficulty swallowing. Respiratory: Denies shortness of breath, HENDRIX, wheezing or stridor. Cardiovascular: Denies precordial pain, palpitations, edema or PND.   Continues to feel elevated heart rate  Gastrointestinal: Denies poor appetite, indigestion, abdominal pain or blood in stool  Musculoskeletal: Denies pain or swelling from muscles or joints  Neurologic: Denies tremor, paresthesias, or sensory motor disturbance  Skin: Denies rash, itching or texture change. Physical Exam:      General: Well developed, in no acute distress, cooperative and alert  HEENT: No carotid bruits, no JVD, trach is midline. Neck Supple, PEERL, EOM intact. Heart:  Normal S1/S2 negative S3 or S4. Regular, no murmur, gallop or rub. Tachycardic  Respiratory: Clear bilaterally x 4, no wheezing or rales  Abdomen:   Soft, non-tender, no masses, bowel sounds are active.   Extremities:  No edema, normal cap refill, no cyanosis, atraumatic. Neuro: A&Ox3, speech clear, gait stable. Skin: Skin color is normal. No rashes or lesions. Non diaphoretic  Vascular: 2+ pulses symmetric in all extremities    Cardiographics    ECG: Sinus tachycardia  Results for orders placed or performed in visit on 03/24/17   CARDIAC HOLTER MONITOR, 24 HOURS    Narrative    ECG Monitor/24 hours, Complete    Reason for Holter Monitor   TACHYCARDIA    Heartbeat    Slowest 84  Average 103  Fastest  147      Results:   Underlying Rhythm: Sinus tachycardia      Atrial Arrhythmias: none            AV Conduction: normal    Ventricular Arrhythmias: premature ventricular contractions; occasional     ST Segment Analysis:normal     Symptom Correlation:  yes    Comment:   Sinus tachycardia, PVC's that appear to correlate with patient symptoms.      Dulce Aj MD           Results for orders placed or performed during the hospital encounter of 03/23/17   EKG, 12 LEAD, INITIAL   Result Value Ref Range    Ventricular Rate 114 BPM    Atrial Rate 114 BPM    P-R Interval 128 ms    QRS Duration 80 ms    Q-T Interval 332 ms    QTC Calculation (Bezet) 457 ms    Calculated P Axis 31 degrees    Calculated R Axis 2 degrees    Calculated T Axis 66 degrees Diagnosis       Sinus tachycardia  Otherwise normal ECG  When compared with ECG of 07-DEC-2016 16:18,  No significant change was found  Confirmed by Tasha Mcfarlane ([id number]) on 3/24/2017 9:12:12 AM           Cardiology Labs:  Lab Results   Component Value Date/Time    Cholesterol, total 168 04/23/2016 03:57 AM    HDL Cholesterol 81 04/23/2016 03:57 AM    LDL, calculated 43.2 04/23/2016 03:57 AM    Triglyceride 219 04/23/2016 03:57 AM    CHOL/HDL Ratio 2.1 04/23/2016 03:57 AM       Lab Results   Component Value Date/Time    Sodium 137 05/02/2017 11:25 AM    Potassium 4.0 05/02/2017 11:25 AM    Chloride 103 05/02/2017 11:25 AM    CO2 29 05/02/2017 11:25 AM    Anion gap 5 05/02/2017 11:25 AM    Glucose 75 05/02/2017 11:25 AM    BUN 11 05/02/2017 11:25 AM    Creatinine 0.82 05/02/2017 11:25 AM    BUN/Creatinine ratio 13 05/02/2017 11:25 AM    GFR est AA >60 05/02/2017 11:25 AM    GFR est non-AA >60 05/02/2017 11:25 AM    Calcium 9.0 05/02/2017 11:25 AM    Bilirubin, total 0.4 05/02/2017 11:25 AM    AST (SGOT) 20 05/02/2017 11:25 AM    Alk. phosphatase 80 05/02/2017 11:25 AM    Protein, total 8.1 05/02/2017 11:25 AM    Albumin 3.4 05/02/2017 11:25 AM    Globulin 4.7 05/02/2017 11:25 AM    A-G Ratio 0.7 05/02/2017 11:25 AM    ALT (SGPT) 17 05/02/2017 11:25 AM           Assessment:     Assessment:     Diagnoses and all orders for this visit:    1. Inappropriate sinus tachycardia    2. Sinus tachycardia  -     AMB POC EKG ROUTINE W/ 12 LEADS, INTER & REP    3. Phrenic nerve palsy    4. Closed intrathoracic injury, initial encounter        ICD-10-CM ICD-9-CM    1. Inappropriate sinus tachycardia R00.0 427.89    2. Sinus tachycardia R00.0 427.89 AMB POC EKG ROUTINE W/ 12 LEADS, INTER & REP   3. Phrenic nerve palsy G58.8 354.8    4. Closed intrathoracic injury, initial encounter S27. 9XXA 862.8      Orders Placed This Encounter    AMB POC EKG ROUTINE W/ 12 LEADS, INTER & REP     Order Specific Question:   Reason for Exam: Answer:   routine        Plan:     Patient is a 68-year-old female who had a ground-level fall with concern of chest injury. Sternal films negative for fracture or not approximation. Regarding indications for inappropriate  sinus tachycardia discussed with Dr. Rachel House, no medications were held in fact the corlanor was supposed to be increased to 7.5 mg twice a day. It is noted the patient's heart rate has not changed despite being off all rate controlling medications. She has an appointment pending with Dr. Yamileth Noyola for treatment options of inappropriate sinus tachycardia, her blood pressure is elevated and will resume metoprolol, will stay off corlanor until EP consult.      Larence Heimlich, NP

## 2017-10-06 ENCOUNTER — APPOINTMENT (OUTPATIENT)
Dept: INFUSION THERAPY | Age: 52
End: 2017-10-06
Payer: COMMERCIAL

## 2017-10-13 ENCOUNTER — HOSPITAL ENCOUNTER (OUTPATIENT)
Dept: INFUSION THERAPY | Age: 52
Discharge: HOME | End: 2017-10-13
Payer: COMMERCIAL

## 2017-10-13 ENCOUNTER — HOSPITAL ENCOUNTER (OUTPATIENT)
Dept: INFUSION THERAPY | Age: 52
End: 2017-10-13
Payer: COMMERCIAL

## 2017-10-13 VITALS
WEIGHT: 198.4 LBS | HEART RATE: 97 BPM | TEMPERATURE: 98.1 F | BODY MASS INDEX: 38.95 KG/M2 | OXYGEN SATURATION: 100 % | DIASTOLIC BLOOD PRESSURE: 82 MMHG | RESPIRATION RATE: 18 BRPM | SYSTOLIC BLOOD PRESSURE: 130 MMHG | HEIGHT: 60 IN

## 2017-10-13 PROCEDURE — 96415 CHEMO IV INFUSION ADDL HR: CPT

## 2017-10-13 PROCEDURE — 74011250637 HC RX REV CODE- 250/637: Performed by: INTERNAL MEDICINE

## 2017-10-13 PROCEDURE — 74011250636 HC RX REV CODE- 250/636: Performed by: INTERNAL MEDICINE

## 2017-10-13 PROCEDURE — 96375 TX/PRO/DX INJ NEW DRUG ADDON: CPT

## 2017-10-13 PROCEDURE — 96413 CHEMO IV INFUSION 1 HR: CPT

## 2017-10-13 RX ORDER — ACETAMINOPHEN 325 MG/1
975 TABLET ORAL ONCE
Status: COMPLETED | OUTPATIENT
Start: 2017-10-13 | End: 2017-10-13

## 2017-10-13 RX ORDER — SODIUM CHLORIDE 9 MG/ML
25 INJECTION, SOLUTION INTRAVENOUS CONTINUOUS
Status: DISPENSED | OUTPATIENT
Start: 2017-10-13 | End: 2017-10-14

## 2017-10-13 RX ORDER — DIPHENHYDRAMINE HYDROCHLORIDE 50 MG/ML
50 INJECTION, SOLUTION INTRAMUSCULAR; INTRAVENOUS ONCE
Status: COMPLETED | OUTPATIENT
Start: 2017-10-13 | End: 2017-10-13

## 2017-10-13 RX ORDER — SODIUM CHLORIDE 0.9 % (FLUSH) 0.9 %
10-40 SYRINGE (ML) INJECTION AS NEEDED
Status: ACTIVE | OUTPATIENT
Start: 2017-10-13 | End: 2017-10-14

## 2017-10-13 RX ADMIN — SODIUM CHLORIDE 25 ML/HR: 900 INJECTION, SOLUTION INTRAVENOUS at 13:54

## 2017-10-13 RX ADMIN — ACETAMINOPHEN 975 MG: 325 TABLET ORAL at 13:27

## 2017-10-13 RX ADMIN — DIPHENHYDRAMINE HYDROCHLORIDE 50 MG: 50 INJECTION INTRAMUSCULAR; INTRAVENOUS at 13:34

## 2017-10-13 RX ADMIN — Medication 10 ML: at 13:20

## 2017-10-13 RX ADMIN — INFLIXIMAB 500 MG: 100 INJECTION, POWDER, LYOPHILIZED, FOR SOLUTION INTRAVENOUS at 13:55

## 2017-10-13 NOTE — PROGRESS NOTES
Pt arrived to [facility] ambulatory for Remicade in no acute distress at 1310.  Assessment completed- no new concerns voiced. #24G PIV established in left forearm site without issue and positive blood return noted. Visit Vitals    /84 (BP 1 Location: Left arm, BP Patient Position: Sitting)    Pulse 97    Temp 98.1 °F (36.7 °C)    Resp 18    Ht 5' (1.524 m)    Wt 90 kg (198 lb 6.4 oz)    SpO2 100%    Breastfeeding No    BMI 38.75 kg/m2       The following medications administered:  Tylenol 975mg PO  NS @ KVO  Benadryl 50mg IVP  Remicade 500mg IV over 2 hours    Visit Vitals    /82 (BP 1 Location: Right arm, BP Patient Position: Supine)    Pulse 97    Temp 98.1 °F (36.7 °C)    Resp 18    Ht 5' (1.524 m)    Wt 90 kg (198 lb 6.4 oz)    SpO2 100%    Breastfeeding No    BMI 38.75 kg/m2       Pt tolerated treatment well.  No adverse reaction noted. IV flushed per policy and removed, 2x2 and coban placed.  Pt discharged ambulatory in no acute distress at 1555, accompanied by spouse. Next appointment 12/8/17 @ 1300.

## 2017-10-17 ENCOUNTER — OFFICE VISIT (OUTPATIENT)
Dept: CARDIOLOGY CLINIC | Age: 52
End: 2017-10-17

## 2017-10-17 VITALS
RESPIRATION RATE: 16 BRPM | WEIGHT: 196.6 LBS | DIASTOLIC BLOOD PRESSURE: 86 MMHG | HEIGHT: 60 IN | SYSTOLIC BLOOD PRESSURE: 136 MMHG | BODY MASS INDEX: 38.6 KG/M2 | OXYGEN SATURATION: 99 % | HEART RATE: 113 BPM

## 2017-10-17 DIAGNOSIS — R06.02 SHORTNESS OF BREATH: Primary | ICD-10-CM

## 2017-10-17 DIAGNOSIS — R00.0 INAPPROPRIATE SINUS TACHYCARDIA: ICD-10-CM

## 2017-10-17 DIAGNOSIS — G56.80 PHRENIC NERVE PALSY: Chronic | ICD-10-CM

## 2017-10-17 DIAGNOSIS — I63.9 ACUTE ISCHEMIC STROKE (HCC): ICD-10-CM

## 2017-10-17 DIAGNOSIS — L93.0 LUPUS ERYTHEMATOSUS, UNSPECIFIED FORM: ICD-10-CM

## 2017-10-17 RX ORDER — ACEBUTOLOL HYDROCHLORIDE 200 MG/1
200 CAPSULE ORAL 2 TIMES DAILY
Qty: 60 CAP | Refills: 6 | Status: ON HOLD | OUTPATIENT
Start: 2017-10-17 | End: 2018-02-01

## 2017-10-17 NOTE — MR AVS SNAPSHOT
Visit Information Date & Time Provider Department Dept. Phone Encounter #  
 10/17/2017  3:30 PM Ralf Leon, [facility] [phone] [id number] Follow-up Instructions Return in about 4 weeks (around 11/14/2017). Follow-up and Disposition History Your Appointments 11/17/2017  2:20 PM  
Follow Up with Slim Marvin MD  
[facility] at [facility]) Appt Note: follow up TIA $ 0 CP jll 5/4/17  
 200 [facility], 
300 [facility], [address] [address]  
[address] , [address] [place] [address], [address] [address]  
  
    
 11/28/2017  2:15 PM  
1 MONTH with Ralf Leon MD  
[facility]) Appt Note: Dr. Tony Trujillo, patient stated she needed to see him not to see [facility] [address].  
[phone] [address] [facility] [address]  
  
    
 9/28/2018  1:00 PM  
ESTABLISHED PATIENT with Samuel Lorenzo MD  
[address] [facility]) Appt Note: 1 yr heme f/u  
 200 [facility] [address] [address]  
327 [facility] [address] [facility] [address] Upcoming Health Maintenance Date Due Hepatitis C Screening 1965 DTaP/Tdap/Td series (1 - Tdap) 7/11/1986 PAP AKA CERVICAL CYTOLOGY 7/11/1986 FOBT Q 1 YEAR AGE 50-75 7/11/2015 INFLUENZA AGE 9 TO ADULT 8/1/2017 BREAST CANCER SCRN MAMMOGRAM 2/1/2019 Allergies as of 10/17/2017  Review Complete On: 10/17/2017 By: Ralf Leon MD  
  
 Severity Noted Reaction Type Reactions Codeine High 04/22/2010   Systemic Hives, Nausea and Vomiting Severe nausea & vomiting Hydrocodone High 04/22/2010   Systemic Hives, Nausea and Vomiting Severe nausea & vomiting Oxycontin [Oxycodone] High 04/22/2010   Systemic Hives, Nausea and Vomiting Severe nausea & vomiting, also has the same reaction from Percocet Percocet [Oxycodone-acetaminophen] High 07/19/2010   Side Effect Hives, Nausea and Vomiting Dilaudid [Hydromorphone (Bulk)]  08/24/2012    Swelling Prednisone  01/20/2014    Other (comments) HX OF CROHN'S; not allergic, prefer not to use Current Immunizations  Reviewed on 10/13/2017 Name Date H1N1 FLU VACCINE 1/1/2010 Influenza Vaccine 11/27/2016, 10/6/2014 Influenza Vaccine Whole 10/15/2011 PPD 1/1/2010 Not reviewed this visit You Were Diagnosed With   
  
 Codes Comments Shortness of breath    -  Primary ICD-10-CM: R06.02 
ICD-9-CM: 786.05 Inappropriate sinus tachycardia     ICD-10-CM: R00.0 ICD-9-CM: 427.89 Acute ischemic stroke [facility])     ICD-10-CM: I63.9 ICD-9-CM: 434.91 Phrenic nerve palsy     ICD-10-CM: G58.8 ICD-9-CM: 354.8 Lupus erythematosus, unspecified form     ICD-10-CM: L93.0 ICD-9-CM: 695.4 Vitals BP Pulse Resp Height(growth percentile) Weight(growth percentile) SpO2  
 136/86 (BP 1 Location: Right arm, BP Patient Position: Sitting) (!) 113 16 5' (1.524 m) 196 lb 9.6 oz (89.2 kg) 99% BMI OB Status Smoking Status 38.4 kg/m2 Postmenopausal Former Smoker Vitals History BMI and BSA Data Body Mass Index Body Surface Area  
 38.4 kg/m 2 1.94 m 2 Preferred Pharmacy Pharmacy Name Phone [facility], [address] [facility] Clari Duque [phone] Your Updated Medication List  
  
   
This list is accurate as of: 10/17/17  4:22 PM.  Always use your most recent med list.  
  
  
  
  
 acebutolol 200 mg capsule Commonly known as:  SECTRAL Take 1 Cap by mouth two (2) times a day. aspirin 325 mg tablet Commonly known as:  ASPIRIN Take 325 mg by mouth daily. butalbital-acetaminophen-caffeine -40 mg per tablet Commonly known as:  Sheria Loser Take 2 Tabs by mouth every eight (8) hours as needed for Pain or Headache. Max Daily Amount: 6 Tabs. MUST LAST 30 DAYS. Indications: MIGRAINE, TENSION-TYPE HEADACHE  
  
 DEMEROL 50 mg tablet Generic drug:  meperidine Take 50 mg by mouth three (3) times daily as needed for Pain.  
  
 diazePAM 5 mg tablet Commonly known as:  VALIUM Take 1 Tab by mouth every twelve (12) hours as needed for Anxiety. Max Daily Amount: 10 mg. Refill at 1 month intervals ELIQUIS 2.5 mg tablet Generic drug:  apixaban Take 2.5 mg by mouth two (2) times a day. Indications: PE  
  
 furosemide 20 mg tablet Commonly known as:  LASIX Take one tab daily as needed for worsening swelling  
  
 gabapentin 300 mg capsule Commonly known as:  NEURONTIN  
TAKE ONE CAPSULE BY MOUTH THREE TIMES A DAY. PATIENT MUST MAKE APPOINTMENT FOR FURTHER REFILLS  
  
 hydroxychloroquine 200 mg tablet Commonly known as:  PLAQUENIL Take 400 mg by mouth daily (after dinner). For lupus  
  
 ivabradine 7.5 mg tablet Commonly known as:  [employer] Take 1 Tab by mouth two (2) times daily (with meals). promethazine 25 mg tablet Commonly known as:  PHENERGAN Take 25 mg by mouth every six (6) hours as needed for Nausea. REMICADE IV  
800 mg by IntraVENous route. Every 8 weeks;last infusion 10/07/2016  
  
 triamcinolone acetonide 0.1 % ointment Commonly known as:  KENALOG Apply  to affected area two (2) times a day. use thin layer  
  
 valACYclovir 1 gram tablet Commonly known as:  VALTREX Take  by mouth three (3) times daily. MARIELOS (28) 3-0.02 mg Tab Generic drug:  drospirenone-ethinyl estradiol Take 1 Tab by mouth nightly. ZANAFLEX 2 mg tablet Generic drug:  tiZANidine Take 6 mg by mouth nightly. ZOFRAN (AS HYDROCHLORIDE) 4 mg tablet Generic drug:  ondansetron hcl Take 4 mg by mouth every eight (8) hours as needed for Nausea. Prescriptions Sent to Pharmacy Refills  
 acebutolol (SECTRAL) 200 mg capsule 6 Sig: Take 1 Cap by mouth two (2) times a day. Class: Normal  
 Pharmacy: [facility] [address] [place], [address] [place] [address]  [address] Ph #: [phone] Route: Oral  
  
We Performed the Following AMB POC EKG ROUTINE W/ 12 LEADS, INTER & REP [67009 CPT(R)] Follow-up Instructions Return in about 4 weeks (around 11/14/2017). To-Do List   
 12/08/2017 1:00 PM  
  Appointment with CHAIR [address] at [facility] ([phone]) Introducing [facility] SERVICES! Dear Darwin Ochoa: 
Thank you for requesting a [email or web address] account. Our records indicate that you already have an active [email or web address] account. You can access your account anytime at [email or web address] Did you know that you can access your hospital and ER discharge instructions at any time in Germin[employer]? You can also review all of your test results from your hospital stay or ER visit. Additional Information If you have questions, please visit the Frequently Asked Questions section of the [email or web address] website at [email or web address] Remember, [email or web address] is NOT to be used for urgent needs. For medical emergencies, dial 911. Now available from your iPhone and Android! Please provide this summary of care documentation to your next provider. Your primary care clinician is listed as Mary Jane Rodrigues. If you have any questions after today's visit, please call [phone].

## 2017-10-17 NOTE — PROGRESS NOTES
Identified pt with two pt identifiers(name and ). Reviewed record in preparation for visit and have obtained necessary documentation. Chief Complaint   Patient presents with    Shortness of Breath     ongoing problem     Chest Pain     discomfort; heavy feeling that's been there within the last 6 months            Coordination of Care Questionnaire:  :   1) Have you been to an emergency room, urgent care clinic since your last visit? no   Hospitalized since your last visit? no             2. Have seen or consulted any other health care provider since your last visit? NO  If yes, where when, and reason for visit? Patient is accompanied by self I have received verbal consent from Eusebio Mohamud to discuss any/all medical information while they are present in the room.

## 2017-10-17 NOTE — PROGRESS NOTES
Subjective:      Juan Miguel Martin is a 46 y.o. female is here for EP consult for IST. She reports feeling her heart racing all of the time, despite activity level. The patient denies chest pain/ shortness of breath, orthopnea, PND, LE edema,, syncope, presyncope or fatigue. Patient Active Problem List    Diagnosis Date Noted    Inappropriate sinus tachycardia 09/11/2017    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    Shortness of breath 07/19/2016    SVC obstruction 07/05/2016    Transient ischemic attack involving right internal carotid artery 04/25/2016    Stenosis of both carotid arteries without cerebral infarction 04/25/2016    Acute ischemic stroke ([facility] [address] 04/22/2016    Lupus 04/22/2016    Pulmonary embolism (HCC) 04/22/2016    Cervico-occipital neuralgia of right side 02/05/2016    Stenosis of both carotid arteries without infarction 02/05/2016    Ulnar neuropathy at elbow of right upper extremity 05/21/2015    Cervical radiculopathy 05/21/2015    Cerebrovascular disease, unspecified 04/07/2015    SLE (systemic lupus erythematosus) ([facility] [address] 03/26/2015    Headache(784.0) 03/26/2015    Migraine with aura and without status migrainosus, not intractable 03/26/2015    Myopathy 03/26/2015    Vitamin D deficiency 03/26/2015    Back pain 03/26/2015    Lumbar radiculopathy 03/26/2015    Spondylolisthesis, grade 1, L4-L5 03/26/2015    Weakness 03/26/2015    Sinus tachycardia 08/01/2014    Bronchitis, acute 08/01/2014    Abdominal pain, acute, right upper quadrant 08/01/2014    Acute GI bleeding 08/15/2013    Crohn's disease of colon ([facility] [address] 07/26/2010    Crohn's disease of ileum ([facility] [address] [facility] 07/26/2010      Miguel Murray MD  Past Medical History:   Diagnosis Date    Arrhythmia     Afib    Chronic pain     Lt and Rt back:  Dr Sera Forman;  Pain mgmt Karen JAEGER    Cough     evaluated 8/25/14 by Dr Geovanna Hernandez ([phone]) \". . possible drug related lung is due to Methitrexate or atypical or fungal infection\" per office note dated 14    Crohn's disease [facility])     Dr Ellen Arora    Ill-defined condition     migraines    Lupus     Dr Nohemy Owusu [phone]    Pain from implanted hardware 2016    Exploration of sternal incision with removal of protruding sternal wires    Stenosis of both carotid arteries without cerebral infarction     Stroke ([facility] [address] 2016    TIA's     SVC obstruction       Past Surgical History:   Procedure Laterality Date    HX APPENDECTOMY      HX  SECTION      x1    HX COLECTOMY  7/24/10    colon resection-18\" removed; Dr Rupert Chen GI      bowel adhesions removed    HX GI      Bowel resection    HX HEART CATHETERIZATION      no stents, open heart surgery    HX HEENT      sinus surgery for deviated septum    HX HYSTERECTOMY      partial    HX LEFT SALPINGO-OOPHORECTOMY      ovary and fallopian tube removed    HX MOHS PROCEDURES Right approx     with bone spur repair    HX OTHER SURGICAL      femerol vein removed    HX TONSILLECTOMY      HX VASCULAR ACCESS  6/11/10    portacath insertion and removal; Gets Remicaid q5 weeks    MS COLONOSCOPY W/BIOPSY SINGLE/MULTIPLE  2013          Allergies   Allergen Reactions    Codeine Hives and Nausea and Vomiting     Severe nausea & vomiting    Hydrocodone Hives and Nausea and Vomiting     Severe nausea & vomiting    Oxycontin [Oxycodone] Hives and Nausea and Vomiting     Severe nausea & vomiting, also has the same reaction from Percocet    Percocet [Oxycodone-Acetaminophen] Hives and Nausea and Vomiting    Dilaudid [Hydromorphone (Bulk)] Swelling    Prednisone Other (comments)     HX OF CROHN'S; not allergic, prefer not to use       Family History   Problem Relation Age of Onset    Cancer Father      stomach    Other Mother      Auto accident    negative for cardiac disease  Social History     Social History    Marital status:      Spouse name: N/A    Number of children: N/A  Years of education: N/A     Social History Main Topics    Smoking status: Former Smoker     Packs/day: 0.50     Years: 2.50     Quit date: 3/29/2005    Smokeless tobacco: Never Used      Comment: social    Alcohol use Yes      Comment: Rare wine    Drug use: No    Sexual activity: Not Asked     Other Topics Concern    None     Social History Narrative     Current Outpatient Prescriptions   Medication Sig    valACYclovir (VALTREX) 1 gram tablet Take  by mouth three (3) times daily.  apixaban (ELIQUIS) 2.5 mg tablet Take 2.5 mg by mouth two (2) times a day. Indications: PE    butalbital-acetaminophen-caffeine (FIORICET, ESGIC) -40 mg per tablet Take 2 Tabs by mouth every eight (8) hours as needed for Pain or Headache. Max Daily Amount: 6 Tabs. MUST LAST 30 DAYS. Indications: MIGRAINE, TENSION-TYPE HEADACHE    ivabradine (CORLANOR) 7.5 mg tablet Take 1 Tab by mouth two (2) times daily (with meals).  promethazine (PHENERGAN) 25 mg tablet Take 25 mg by mouth every six (6) hours as needed for Nausea.  metoprolol tartrate (LOPRESSOR) 100 mg IR tablet Take 1.5 Tabs by mouth two (2) times a day.  gabapentin (NEURONTIN) 300 mg capsule TAKE ONE CAPSULE BY MOUTH THREE TIMES A DAY. PATIENT MUST MAKE APPOINTMENT FOR FURTHER REFILLS    furosemide (LASIX) 20 mg tablet Take one tab daily as needed for worsening swelling    aspirin (ASPIRIN) 325 mg tablet Take 325 mg by mouth daily.  hydroxychloroquine (PLAQUENIL) 200 mg tablet Take 400 mg by mouth daily (after dinner). For lupus    tiZANidine (ZANAFLEX) 2 mg tablet Take 6 mg by mouth nightly.  ondansetron hcl (ZOFRAN) 4 mg tablet Take 4 mg by mouth every eight (8) hours as needed for Nausea.  meperidine (DEMEROL) 50 mg tablet Take 50 mg by mouth three (3) times daily as needed for Pain.  INFLIXIMAB (REMICADE IV) 800 mg by IntraVENous route.  Every 8 weeks;last infusion 10/07/2016    drospirenone-ethinyl estradiol (MARIELOS 28) 3-20 mg-mcg per tablet Take 1 Tab by mouth nightly.  triamcinolone acetonide (KENALOG) 0.1 % ointment Apply  to affected area two (2) times a day. use thin layer    diazepam (VALIUM) 5 mg tablet Take 1 Tab by mouth every twelve (12) hours as needed for Anxiety. Max Daily Amount: 10 mg. Refill at 1 month intervals     No current facility-administered medications for this visit. Vitals:    10/17/17 1520   BP: 136/86   Pulse: (!) 113   Resp: 16   SpO2: 99%   Weight: 196 lb 9.6 oz (89.2 kg)   Height: 5' (1.524 m)       I have reviewed the nurses notes, vitals, problem list, allergy list, medical history, family, social history and medications. Review of Symptoms:    General: Pt denies excessive weight gain or loss. Pt is able to conduct ADL's  HEENT: Denies blurred vision, headaches, epistaxis and difficulty swallowing. Respiratory: Denies shortness of breath, HENDRIX, wheezing or stridor. Cardiovascular: +palpitations, Denies precordial pain,  edema or PND  Gastrointestinal: Denies poor appetite, indigestion, abdominal pain or blood in stool  Urinary: Denies dysuria, pyuria  Musculoskeletal: Denies pain or swelling from muscles or joints  Neurologic: Denies tremor, paresthesias, or sensory motor disturbance  Skin: Denies rash, itching or texture change. Psych: Denies depression      Physical Exam:      General: Well developed, in no acute distress. HEENT: Eyes - PERRL, no jvd  Heart:  Normal S1/S2 negative S3 or S4. Regular, no murmur, gallop or rub.   Respiratory: Clear bilaterally x 4, no wheezing or rales  Abdomen:   Soft, non-tender, bowel sounds are active.   Extremities:  No edema, normal cap refill, no cyanosis. Musculoskeletal: No clubbing  Neuro: A&Ox3, speech clear, gait stable. Skin: Skin color is normal. No rashes or lesions.  Non diaphoretic  Vascular: 2+ pulses symmetric in all extremities    Cardiographics    Ekg: sinus tachcyardia      Results for orders placed or performed in visit on 03/24/17 CARDIAC HOLTER MONITOR, 24 HOURS    Narrative    ECG Monitor/24 hours, Complete    Reason for Holter Monitor   TACHYCARDIA    Heartbeat    Slowest 84  Average 103  Fastest  147      Results:   Underlying Rhythm: Sinus tachycardia      Atrial Arrhythmias: none            AV Conduction: normal    Ventricular Arrhythmias: premature ventricular contractions; occasional     ST Segment Analysis:normal     Symptom Correlation:  yes    Comment:   Sinus tachycardia, PVC's that appear to correlate with patient symptoms. Maddie Godoy MD           Results for orders placed or performed during the hospital encounter of 03/23/17   EKG, 12 LEAD, INITIAL   Result Value Ref Range    Ventricular Rate 114 BPM    Atrial Rate 114 BPM    P-R Interval 128 ms    QRS Duration 80 ms    Q-T Interval 332 ms    QTC Calculation (Bezet) 457 ms    Calculated P Axis 31 degrees    Calculated R Axis 2 degrees    Calculated T Axis 66 degrees    Diagnosis       Sinus tachycardia  Otherwise normal ECG  When compared with ECG of 07-DEC-2016 16:18,  No significant change was found  Confirmed by Nahomy Hernandez ([id number]) on 3/24/2017 9:12:12 AM           Lab Results   Component Value Date/Time    WBC 9.4 05/02/2017 11:25 AM    HGB 13.2 05/02/2017 11:25 AM    HCT 37.9 05/02/2017 11:25 AM    PLATELET 691 89/20/3138 11:25 AM    MCV 86.9 05/02/2017 11:25 AM      Lab Results   Component Value Date/Time    Sodium 137 05/02/2017 11:25 AM    Potassium 4.0 05/02/2017 11:25 AM    Chloride 103 05/02/2017 11:25 AM    CO2 29 05/02/2017 11:25 AM    Anion gap 5 05/02/2017 11:25 AM    Glucose 75 05/02/2017 11:25 AM    BUN 11 05/02/2017 11:25 AM    Creatinine 0.82 05/02/2017 11:25 AM    BUN/Creatinine ratio 13 05/02/2017 11:25 AM    GFR est AA >60 05/02/2017 11:25 AM    GFR est non-AA >60 05/02/2017 11:25 AM    Calcium 9.0 05/02/2017 11:25 AM    Bilirubin, total 0.4 05/02/2017 11:25 AM    AST (SGOT) 20 05/02/2017 11:25 AM    Alk.  phosphatase 80 05/02/2017 11:25 AM    Protein, total 8.1 05/02/2017 11:25 AM    Albumin 3.4 05/02/2017 11:25 AM    Globulin 4.7 05/02/2017 11:25 AM    A-G Ratio 0.7 05/02/2017 11:25 AM    ALT (SGPT) 17 05/02/2017 11:25 AM         Assessment:     Assessment:        ICD-10-CM ICD-9-CM    1. Shortness of breath R06.02 786.05 AMB POC EKG ROUTINE W/ 12 LEADS, INTER & REP   2. Inappropriate sinus tachycardia R00.0 427.89    3. Acute ischemic stroke (HCC) I63.9 434.91    4. Phrenic nerve palsy G58.8 354.8    5. Lupus erythematosus, unspecified form L93.0 695.4      Orders Placed This Encounter    AMB POC EKG ROUTINE W/ 12 LEADS, INTER & REP     Order Specific Question:   Reason for Exam:     Answer:   routine        Plan:   Ms. Kervin Cotton is here for EP consult for inappropriate sinus tachycardia. EKG is sinus tachycardia. She has been on Corlanor and metoprolol in the past without improvement in symptoms. She reports palpitations all of the time despite activity level. The tachycardia is secondary to her phrenic nerve palsy and is compensatory. A pacemaker/av node ablation would not be helpful. We will do a trial of acebutalol 200 mg bid and she will return in 4 weeks to reassess her symptoms. Continue medical management for lupus, phrenic nerve palsy. Thank you for allowing me to participate in Danny Ortega 's care.      ANALY Castillo MD, Иван Foster

## 2017-10-19 ENCOUNTER — APPOINTMENT (OUTPATIENT)
Dept: INFUSION THERAPY | Age: 52
End: 2017-10-19
Payer: COMMERCIAL

## 2017-10-20 ENCOUNTER — HOSPITAL ENCOUNTER (OUTPATIENT)
Dept: INFUSION THERAPY | Age: 52
End: 2017-10-20
Payer: COMMERCIAL

## 2017-12-05 RX ORDER — ACETAMINOPHEN 325 MG/1
975 TABLET ORAL ONCE
Status: COMPLETED | OUTPATIENT
Start: 2017-12-08 | End: 2017-12-08

## 2017-12-05 RX ORDER — DIPHENHYDRAMINE HYDROCHLORIDE 50 MG/ML
50 INJECTION, SOLUTION INTRAMUSCULAR; INTRAVENOUS ONCE
Status: COMPLETED | OUTPATIENT
Start: 2017-12-08 | End: 2017-12-08

## 2017-12-08 ENCOUNTER — HOSPITAL ENCOUNTER (OUTPATIENT)
Dept: INFUSION THERAPY | Age: 52
Discharge: HOME | End: 2017-12-08
Payer: COMMERCIAL

## 2017-12-08 VITALS
RESPIRATION RATE: 18 BRPM | OXYGEN SATURATION: 100 % | TEMPERATURE: 97.9 F | BODY MASS INDEX: 39.39 KG/M2 | SYSTOLIC BLOOD PRESSURE: 147 MMHG | WEIGHT: 201.7 LBS | DIASTOLIC BLOOD PRESSURE: 91 MMHG | HEART RATE: 106 BPM

## 2017-12-08 PROCEDURE — 74011250636 HC RX REV CODE- 250/636: Performed by: INTERNAL MEDICINE

## 2017-12-08 PROCEDURE — 96415 CHEMO IV INFUSION ADDL HR: CPT

## 2017-12-08 PROCEDURE — 96413 CHEMO IV INFUSION 1 HR: CPT

## 2017-12-08 PROCEDURE — 74011250637 HC RX REV CODE- 250/637: Performed by: INTERNAL MEDICINE

## 2017-12-08 PROCEDURE — 96375 TX/PRO/DX INJ NEW DRUG ADDON: CPT

## 2017-12-08 RX ORDER — SODIUM CHLORIDE 9 MG/ML
50 INJECTION, SOLUTION INTRAVENOUS ONCE
Status: COMPLETED | OUTPATIENT
Start: 2017-12-08 | End: 2017-12-08

## 2017-12-08 RX ORDER — SODIUM CHLORIDE 0.9 % (FLUSH) 0.9 %
10-40 SYRINGE (ML) INJECTION AS NEEDED
Status: DISCONTINUED | OUTPATIENT
Start: 2017-12-08 | End: 2017-12-12 | Stop reason: HOSPADM

## 2017-12-08 RX ADMIN — INFLIXIMAB 500 MG: 100 INJECTION, POWDER, LYOPHILIZED, FOR SOLUTION INTRAVENOUS at 13:36

## 2017-12-08 RX ADMIN — Medication 10 ML: at 13:06

## 2017-12-08 RX ADMIN — ACETAMINOPHEN 975 MG: 325 TABLET ORAL at 13:08

## 2017-12-08 RX ADMIN — SODIUM CHLORIDE 50 ML/HR: 900 INJECTION, SOLUTION INTRAVENOUS at 13:06

## 2017-12-08 RX ADMIN — DIPHENHYDRAMINE HYDROCHLORIDE 50 MG: 50 INJECTION INTRAMUSCULAR; INTRAVENOUS at 13:11

## 2017-12-08 NOTE — PROGRESS NOTES
1300 Pt arrived at [facility] ambulatory and in no distress for remicade. Assessment completed, no new complaints voiced. IV started left forearm with 24 gauge. Visit Vitals    BP (!) 159/92 (BP 1 Location: Left arm, BP Patient Position: Sitting)    Pulse (!) 105    Temp 98 °F (36.7 °C)    Resp 18    Wt 91.5 kg (201 lb 11.2 oz)    SpO2 100%    BMI 39.39 kg/m2       Medications received:  NS @ KVO  Benadryl 50 mg IV  Tylenol 975 mg PO  Remicade 500 mg IV over 2 hours    Left IV flushed and removed. Visit Vitals    BP (!) 147/91    Pulse (!) 106    Temp 97.9 °F (36.6 °C)    Resp 18    Wt 91.5 kg (201 lb 11.2 oz)    SpO2 100%    BMI 39.39 kg/m2       1600 Tolerated treatment well, no adverse reaction noted. D/Cd from [facility] ambulatory and in no distress.   Next appt 2/2/18

## 2018-02-01 ENCOUNTER — HOSPITAL ENCOUNTER (OUTPATIENT)
Dept: NON INVASIVE DIAGNOSTICS | Age: 53
Discharge: HOME | End: 2018-02-02
Attending: INTERNAL MEDICINE | Admitting: INTERNAL MEDICINE
Payer: COMMERCIAL

## 2018-02-01 ENCOUNTER — ANESTHESIA EVENT (OUTPATIENT)
Dept: NON INVASIVE DIAGNOSTICS | Age: 53
End: 2018-02-01
Payer: COMMERCIAL

## 2018-02-01 ENCOUNTER — ANESTHESIA (OUTPATIENT)
Dept: NON INVASIVE DIAGNOSTICS | Age: 53
End: 2018-02-01
Payer: COMMERCIAL

## 2018-02-01 PROBLEM — I47.1 PAROXYSMAL SVT (SUPRAVENTRICULAR TACHYCARDIA) (HCC): Status: ACTIVE | Noted: 2018-02-01

## 2018-02-01 PROCEDURE — 74011250637 HC RX REV CODE- 250/637: Performed by: INTERNAL MEDICINE

## 2018-02-01 PROCEDURE — 76060000036 HC ANESTHESIA 2.5 TO 3 HR

## 2018-02-01 PROCEDURE — 77030011640 HC PAD GRND REM COVD -A

## 2018-02-01 PROCEDURE — C1731 CATH, EP, 20 OR MORE ELEC: HCPCS

## 2018-02-01 PROCEDURE — 77030030806 HC PTCH ENSIT NAVX STJU -G

## 2018-02-01 PROCEDURE — 74011250636 HC RX REV CODE- 250/636

## 2018-02-01 PROCEDURE — 77030010880 HC CBL EP SUPRME STJU -C

## 2018-02-01 PROCEDURE — 77030016894 HC CBL EP DX CATH3 STJU -B

## 2018-02-01 PROCEDURE — 77030018729 HC ELECTRD DEFIB PAD CARD -B

## 2018-02-01 PROCEDURE — 74011000250 HC RX REV CODE- 250: Performed by: INTERNAL MEDICINE

## 2018-02-01 PROCEDURE — 74011250636 HC RX REV CODE- 250/636: Performed by: INTERNAL MEDICINE

## 2018-02-01 PROCEDURE — 74011000250 HC RX REV CODE- 250

## 2018-02-01 PROCEDURE — C1894 INTRO/SHEATH, NON-LASER: HCPCS

## 2018-02-01 PROCEDURE — C1733 CATH, EP, OTHR THAN COOL-TIP: HCPCS

## 2018-02-01 PROCEDURE — C1730 CATH, EP, 19 OR FEW ELECT: HCPCS

## 2018-02-01 PROCEDURE — 77030015398 HC CBL EP EXT STJU -C

## 2018-02-01 PROCEDURE — 93653 COMPRE EP EVAL TX SVT: CPT

## 2018-02-01 PROCEDURE — 77030029065 HC DRSG HEMO QCLOT ZMED -B

## 2018-02-01 RX ORDER — GABAPENTIN 300 MG/1
300 CAPSULE ORAL 3 TIMES DAILY
Status: CANCELLED | OUTPATIENT
Start: 2018-02-01

## 2018-02-01 RX ORDER — SODIUM CHLORIDE 9 MG/ML
INJECTION, SOLUTION INTRAVENOUS
Status: DISCONTINUED | OUTPATIENT
Start: 2018-02-01 | End: 2018-02-01 | Stop reason: HOSPADM

## 2018-02-01 RX ORDER — ONDANSETRON 2 MG/ML
4 INJECTION INTRAMUSCULAR; INTRAVENOUS
Status: DISCONTINUED | OUTPATIENT
Start: 2018-02-01 | End: 2018-02-02 | Stop reason: HOSPADM

## 2018-02-01 RX ORDER — PROPOFOL 10 MG/ML
INJECTION, EMULSION INTRAVENOUS
Status: DISCONTINUED | OUTPATIENT
Start: 2018-02-01 | End: 2018-02-01 | Stop reason: HOSPADM

## 2018-02-01 RX ORDER — DIAZEPAM 5 MG/1
5 TABLET ORAL
Status: DISCONTINUED | OUTPATIENT
Start: 2018-02-01 | End: 2018-02-02 | Stop reason: HOSPADM

## 2018-02-01 RX ORDER — GABAPENTIN 300 MG/1
300 CAPSULE ORAL 3 TIMES DAILY
Status: DISCONTINUED | OUTPATIENT
Start: 2018-02-01 | End: 2018-02-02 | Stop reason: HOSPADM

## 2018-02-01 RX ORDER — FUROSEMIDE 20 MG/1
20 TABLET ORAL DAILY
Status: DISCONTINUED | OUTPATIENT
Start: 2018-02-02 | End: 2018-02-02

## 2018-02-01 RX ORDER — ASPIRIN 325 MG
325 TABLET ORAL DAILY
Status: DISCONTINUED | OUTPATIENT
Start: 2018-02-02 | End: 2018-02-02 | Stop reason: HOSPADM

## 2018-02-01 RX ORDER — HYDROXYCHLOROQUINE SULFATE 200 MG/1
400 TABLET, FILM COATED ORAL
Status: CANCELLED | OUTPATIENT
Start: 2018-02-01

## 2018-02-01 RX ORDER — FENTANYL CITRATE 50 UG/ML
12.5-5 INJECTION, SOLUTION INTRAMUSCULAR; INTRAVENOUS
Status: DISCONTINUED | OUTPATIENT
Start: 2018-02-01 | End: 2018-02-01

## 2018-02-01 RX ORDER — METOPROLOL SUCCINATE 50 MG/1
50 TABLET, EXTENDED RELEASE ORAL ONCE
Status: COMPLETED | OUTPATIENT
Start: 2018-02-01 | End: 2018-02-01

## 2018-02-01 RX ORDER — ONDANSETRON 2 MG/ML
INJECTION INTRAMUSCULAR; INTRAVENOUS AS NEEDED
Status: DISCONTINUED | OUTPATIENT
Start: 2018-02-01 | End: 2018-02-01 | Stop reason: HOSPADM

## 2018-02-01 RX ORDER — LIDOCAINE HYDROCHLORIDE 20 MG/ML
INJECTION, SOLUTION EPIDURAL; INFILTRATION; INTRACAUDAL; PERINEURAL AS NEEDED
Status: DISCONTINUED | OUTPATIENT
Start: 2018-02-01 | End: 2018-02-01 | Stop reason: HOSPADM

## 2018-02-01 RX ORDER — FENTANYL CITRATE 50 UG/ML
INJECTION, SOLUTION INTRAMUSCULAR; INTRAVENOUS AS NEEDED
Status: DISCONTINUED | OUTPATIENT
Start: 2018-02-01 | End: 2018-02-01 | Stop reason: HOSPADM

## 2018-02-01 RX ORDER — MIDAZOLAM HYDROCHLORIDE 1 MG/ML
1-5 INJECTION, SOLUTION INTRAMUSCULAR; INTRAVENOUS
Status: DISCONTINUED | OUTPATIENT
Start: 2018-02-01 | End: 2018-02-01

## 2018-02-01 RX ORDER — BUTALBITAL, ACETAMINOPHEN AND CAFFEINE 50; 325; 40 MG/1; MG/1; MG/1
2 TABLET ORAL
Status: DISCONTINUED | OUTPATIENT
Start: 2018-02-01 | End: 2018-02-02 | Stop reason: HOSPADM

## 2018-02-01 RX ORDER — ASPIRIN 325 MG
325 TABLET ORAL DAILY
Status: CANCELLED | OUTPATIENT
Start: 2018-02-02

## 2018-02-01 RX ORDER — HYDROXYCHLOROQUINE SULFATE 200 MG/1
400 TABLET, FILM COATED ORAL
Status: DISCONTINUED | OUTPATIENT
Start: 2018-02-01 | End: 2018-02-02 | Stop reason: HOSPADM

## 2018-02-01 RX ORDER — ACETAMINOPHEN 325 MG/1
650 TABLET ORAL
Status: DISCONTINUED | OUTPATIENT
Start: 2018-02-01 | End: 2018-02-02 | Stop reason: HOSPADM

## 2018-02-01 RX ORDER — METOPROLOL SUCCINATE 100 MG/1
300 TABLET, EXTENDED RELEASE ORAL DAILY
Status: ON HOLD | COMMUNITY
End: 2018-02-02

## 2018-02-01 RX ORDER — HEPARIN SODIUM 200 [USP'U]/100ML
500 INJECTION, SOLUTION INTRAVENOUS ONCE
Status: COMPLETED | OUTPATIENT
Start: 2018-02-01 | End: 2018-02-01

## 2018-02-01 RX ORDER — SODIUM CHLORIDE 0.9 % (FLUSH) 0.9 %
5-10 SYRINGE (ML) INJECTION AS NEEDED
Status: DISCONTINUED | OUTPATIENT
Start: 2018-02-01 | End: 2018-02-02 | Stop reason: HOSPADM

## 2018-02-01 RX ORDER — DIAZEPAM 5 MG/1
5 TABLET ORAL
Status: CANCELLED | OUTPATIENT
Start: 2018-02-01

## 2018-02-01 RX ORDER — FENTANYL CITRATE 50 UG/ML
50 INJECTION, SOLUTION INTRAMUSCULAR; INTRAVENOUS ONCE
Status: COMPLETED | OUTPATIENT
Start: 2018-02-01 | End: 2018-02-01

## 2018-02-01 RX ORDER — FUROSEMIDE 20 MG/1
20 TABLET ORAL DAILY
Status: CANCELLED | OUTPATIENT
Start: 2018-02-02

## 2018-02-01 RX ORDER — SODIUM CHLORIDE 0.9 % (FLUSH) 0.9 %
5-10 SYRINGE (ML) INJECTION EVERY 8 HOURS
Status: DISCONTINUED | OUTPATIENT
Start: 2018-02-01 | End: 2018-02-02 | Stop reason: HOSPADM

## 2018-02-01 RX ORDER — FENTANYL CITRATE 50 UG/ML
INJECTION, SOLUTION INTRAMUSCULAR; INTRAVENOUS
Status: COMPLETED
Start: 2018-02-01 | End: 2018-02-01

## 2018-02-01 RX ORDER — MEPERIDINE HYDROCHLORIDE 50 MG/1
50 TABLET ORAL
Status: DISCONTINUED | OUTPATIENT
Start: 2018-02-01 | End: 2018-02-02 | Stop reason: HOSPADM

## 2018-02-01 RX ORDER — LIDOCAINE HYDROCHLORIDE 10 MG/ML
1-40 INJECTION INFILTRATION; PERINEURAL
Status: DISCONTINUED | OUTPATIENT
Start: 2018-02-01 | End: 2018-02-01

## 2018-02-01 RX ORDER — METOPROLOL SUCCINATE 50 MG/1
150 TABLET, EXTENDED RELEASE ORAL DAILY
Status: DISCONTINUED | OUTPATIENT
Start: 2018-02-02 | End: 2018-02-02 | Stop reason: HOSPADM

## 2018-02-01 RX ORDER — PROPOFOL 10 MG/ML
INJECTION, EMULSION INTRAVENOUS AS NEEDED
Status: DISCONTINUED | OUTPATIENT
Start: 2018-02-01 | End: 2018-02-01 | Stop reason: HOSPADM

## 2018-02-01 RX ORDER — MIDAZOLAM HYDROCHLORIDE 1 MG/ML
INJECTION, SOLUTION INTRAMUSCULAR; INTRAVENOUS AS NEEDED
Status: DISCONTINUED | OUTPATIENT
Start: 2018-02-01 | End: 2018-02-01 | Stop reason: HOSPADM

## 2018-02-01 RX ORDER — DOBUTAMINE HYDROCHLORIDE 200 MG/100ML
INJECTION INTRAVENOUS
Status: DISCONTINUED
Start: 2018-02-01 | End: 2018-02-01

## 2018-02-01 RX ORDER — DOBUTAMINE HYDROCHLORIDE 200 MG/100ML
0-10 INJECTION INTRAVENOUS
Status: DISCONTINUED | OUTPATIENT
Start: 2018-02-01 | End: 2018-02-01

## 2018-02-01 RX ORDER — DOBUTAMINE HYDROCHLORIDE 200 MG/100ML
INJECTION INTRAVENOUS
Status: DISCONTINUED | OUTPATIENT
Start: 2018-02-01 | End: 2018-02-01 | Stop reason: HOSPADM

## 2018-02-01 RX ADMIN — Medication 10 ML: at 15:25

## 2018-02-01 RX ADMIN — FENTANYL CITRATE 50 MCG: 50 INJECTION, SOLUTION INTRAMUSCULAR; INTRAVENOUS at 12:48

## 2018-02-01 RX ADMIN — FENTANYL CITRATE 25 MCG: 50 INJECTION, SOLUTION INTRAMUSCULAR; INTRAVENOUS at 10:01

## 2018-02-01 RX ADMIN — GABAPENTIN 300 MG: 300 CAPSULE ORAL at 15:24

## 2018-02-01 RX ADMIN — FENTANYL CITRATE 25 MCG: 50 INJECTION, SOLUTION INTRAMUSCULAR; INTRAVENOUS at 09:59

## 2018-02-01 RX ADMIN — SODIUM CHLORIDE: 9 INJECTION, SOLUTION INTRAVENOUS at 09:34

## 2018-02-01 RX ADMIN — PROPOFOL 10 MG: 10 INJECTION, EMULSION INTRAVENOUS at 11:14

## 2018-02-01 RX ADMIN — MIDAZOLAM HYDROCHLORIDE 1 MG: 1 INJECTION, SOLUTION INTRAMUSCULAR; INTRAVENOUS at 10:53

## 2018-02-01 RX ADMIN — PROPOFOL 20 MG: 10 INJECTION, EMULSION INTRAVENOUS at 10:57

## 2018-02-01 RX ADMIN — FENTANYL CITRATE 25 MCG: 50 INJECTION, SOLUTION INTRAMUSCULAR; INTRAVENOUS at 11:34

## 2018-02-01 RX ADMIN — HEPARIN SODIUM IN SODIUM CHLORIDE 1000 UNITS: 200 INJECTION INTRAVENOUS at 09:48

## 2018-02-01 RX ADMIN — LIDOCAINE HYDROCHLORIDE 12 ML: 10 INJECTION, SOLUTION INFILTRATION; PERINEURAL at 09:57

## 2018-02-01 RX ADMIN — TIZANIDINE 6 MG: 4 TABLET ORAL at 21:05

## 2018-02-01 RX ADMIN — HYDROXYCHLOROQUINE SULFATE 400 MG: 200 TABLET, FILM COATED ORAL at 18:32

## 2018-02-01 RX ADMIN — MIDAZOLAM HYDROCHLORIDE 1 MG: 1 INJECTION, SOLUTION INTRAMUSCULAR; INTRAVENOUS at 10:26

## 2018-02-01 RX ADMIN — MEPERIDINE HYDROCHLORIDE 50 MG: 50 TABLET ORAL at 15:24

## 2018-02-01 RX ADMIN — PROPOFOL 20 MG: 10 INJECTION, EMULSION INTRAVENOUS at 09:54

## 2018-02-01 RX ADMIN — DOBUTAMINE HYDROCHLORIDE 5 MCG/KG/MIN: 200 INJECTION INTRAVENOUS at 10:12

## 2018-02-01 RX ADMIN — PROPOFOL 20 MG: 10 INJECTION, EMULSION INTRAVENOUS at 11:00

## 2018-02-01 RX ADMIN — METOPROLOL SUCCINATE 50 MG: 50 TABLET, EXTENDED RELEASE ORAL at 15:24

## 2018-02-01 RX ADMIN — ONDANSETRON HYDROCHLORIDE 4 MG: 2 INJECTION, SOLUTION INTRAMUSCULAR; INTRAVENOUS at 17:44

## 2018-02-01 RX ADMIN — PROPOFOL 20 MG: 10 INJECTION, EMULSION INTRAVENOUS at 11:04

## 2018-02-01 RX ADMIN — Medication 10 ML: at 21:06

## 2018-02-01 RX ADMIN — ACETAMINOPHEN 650 MG: 325 TABLET ORAL at 21:05

## 2018-02-01 RX ADMIN — GABAPENTIN 300 MG: 300 CAPSULE ORAL at 21:05

## 2018-02-01 RX ADMIN — DIAZEPAM 5 MG: 5 TABLET ORAL at 16:35

## 2018-02-01 RX ADMIN — PROPOFOL 20 MG: 10 INJECTION, EMULSION INTRAVENOUS at 09:46

## 2018-02-01 RX ADMIN — FENTANYL CITRATE 50 MCG: 50 INJECTION, SOLUTION INTRAMUSCULAR; INTRAVENOUS at 09:44

## 2018-02-01 RX ADMIN — ONDANSETRON 4 MG: 2 INJECTION INTRAMUSCULAR; INTRAVENOUS at 09:52

## 2018-02-01 RX ADMIN — PROPOFOL 30 MG: 10 INJECTION, EMULSION INTRAVENOUS at 11:51

## 2018-02-01 RX ADMIN — FENTANYL CITRATE 25 MCG: 50 INJECTION, SOLUTION INTRAMUSCULAR; INTRAVENOUS at 11:50

## 2018-02-01 RX ADMIN — FENTANYL CITRATE 50 MCG: 50 INJECTION, SOLUTION INTRAMUSCULAR; INTRAVENOUS at 11:04

## 2018-02-01 RX ADMIN — MIDAZOLAM HYDROCHLORIDE 1 MG: 1 INJECTION, SOLUTION INTRAMUSCULAR; INTRAVENOUS at 09:47

## 2018-02-01 RX ADMIN — LIDOCAINE HYDROCHLORIDE 20 MG: 20 INJECTION, SOLUTION EPIDURAL; INFILTRATION; INTRACAUDAL; PERINEURAL at 09:44

## 2018-02-01 RX ADMIN — PROPOFOL 50 MCG/KG/MIN: 10 INJECTION, EMULSION INTRAVENOUS at 09:44

## 2018-02-01 RX ADMIN — APIXABAN 2.5 MG: 2.5 TABLET, FILM COATED ORAL at 21:05

## 2018-02-01 RX ADMIN — PROPOFOL 30 MG: 10 INJECTION, EMULSION INTRAVENOUS at 09:44

## 2018-02-01 RX ADMIN — PROPOFOL 30 MG: 10 INJECTION, EMULSION INTRAVENOUS at 10:52

## 2018-02-01 RX ADMIN — PROPOFOL 30 MG: 10 INJECTION, EMULSION INTRAVENOUS at 09:59

## 2018-02-01 RX ADMIN — MIDAZOLAM HYDROCHLORIDE 2 MG: 1 INJECTION, SOLUTION INTRAMUSCULAR; INTRAVENOUS at 09:43

## 2018-02-01 NOTE — ANESTHESIA PREPROCEDURE EVALUATION
Anesthetic History   No history of anesthetic complications            Review of Systems / Medical History  Patient summary reviewed, nursing notes reviewed and pertinent labs reviewed    Pulmonary          Shortness of breath      Comments: Former smoker - Quit 2005  H/O PE   Neuro/Psych       CVA  TIA and headaches    Comments: Migraines  Lumbar radiculopathy  Cervical radiculopathy  Right-sided Cervico-Occipital Neuralgia  Phrenic nerve palsy  Right ulnar neuropathy Cardiovascular            Dysrhythmias : SVT      Exercise tolerance: <4 METS  Comments: Inappropriate Sinus Tachycardia    Bilateral Carotid Stenosis   GI/Hepatic/Renal               Comments: Crohn's Disease  S/P Partial colectomy Endo/Other        Obesity     Other Findings   Comments: Systemic Lupus Erythematosus  Vitamin D Deficiency         Physical Exam    Airway  Mallampati: I  TM Distance: > 6 cm  Neck ROM: normal range of motion   Mouth opening: Normal     Cardiovascular  Regular rate and rhythm,  S1 and S2 normal,  no murmur, click, rub, or gallop             Dental         Pulmonary  Breath sounds clear to auscultation               Abdominal  GI exam deferred       Other Findings            Anesthetic Plan    ASA: 3  Anesthesia type: MAC          Induction: Intravenous  Anesthetic plan and risks discussed with: Patient

## 2018-02-01 NOTE — PROGRESS NOTES
1335-pt arrived to room, VSS, right groin site benign, complaints of 6/10 pain in right groin  1430-pt complaining of left arm heaviness and chest pressure, waiting for pharm to verify meds, will notify MD   1500-Dr. Jonathan Lucio is aware of complaints, new orders placed  1524-pain medication given, chest hurting 4/10, right groin xjjioag30/10  1600-chest pain better, 3/10, right groin is still hurting 9/10, VSS  1715-pt up to bathroom, complaints of nausea and dizziness, VSS post walk  1755-PRN zofran given, pain in groin 7/10, chest feels better.

## 2018-02-01 NOTE — PROGRESS NOTES
EP study and ablation, full note to follow. Dobutamine and rapid atrial pacing and extrastimuli used to facilitate before ablation. Focal RF delivered to an SVC/high RA tachycardia ( msec), faster than sinus rate and dissociated from the RA. When a high RA tachycardia exceeded this tachycardia and still dissociated, decided it was not a  for the more rapid bursts. A focal high RA tachycardia in the anterolateral region was treated with focal RFA (after mapping for phrenic nerve) and accelerated atrial rhythm noted. After this, no high RA tachycardia was noted. Typical RA flutter was induced with pacing, and because it was sustained, a CTI line was done with RF terminating the flutter and bidirectional block along the line was confirmed with pacing. A nonsustained tachycardia suspicious for atypical AVNRT but always terminating after 1-3 beats even with dobutamine infusion was noted, not yet thought to be the clinical tachycardia so treatment for this was not given. In summary    Treated:  1. SVC/RA tachycardia, dissociated from the RA  2. HRA tachycardia, separate mechanism, probably the clinical tachycardia  3.   Induced typical RA flutter    Untreated:  Nonsustained atypical AVNRT, not thought to be clinical tachycardia

## 2018-02-01 NOTE — PROGRESS NOTES
TRANSFER - IN REPORT:    Verbal report received from [address] [place] [address] on Titox  being received from EP for routine progression of care. Report consisted of patients Situation, Background, Assessment and Recommendations(SBAR). Information from the following report(s) SBAR, Kardex, Procedure Summary, Intake/Output and MAR was reviewed with the receiving clinician. Opportunity for questions and clarification was provided. Assessment completed upon patients arrival to [address] [place] [address] and care assumed. Cardiac Cath Lab Recovery Arrival Note:    Titox arrived to [facility] recovery area. Patient procedure= Ablation. Patient on cardiac monitor, non-invasive blood pressure, SPO2 monitor. O2 @ 2 lpm via NC.  IV  of NS on pump at 50 ml/hr. Patient status doing well without problems. Patient is A&Ox 2. Patient reports no complaints. PROCEDURE SITE CHECK:    Procedure site:without any bleeding and no hematoma, no pain/discomfort reported at procedure site. No change in patient status. Continue to monitor patient and status.

## 2018-02-01 NOTE — IP AVS SNAPSHOT
[address] [facility] 
[phone] Patient: Adela Cooper MRN: [id number] :1965 About your hospitalization You were admitted on:  2018 You last received care in the:  MRM 2 INTRVNTNL CARDIO You were discharged on:  2018 Why you were hospitalized Your primary diagnosis was:  Not on File Your diagnoses also included:  Paroxysmal Svt (Supraventricular Tachycardia) (Hcc) Follow-up Information Follow up With Details Comments Contact Info Tonny Andrew MD   [address] [address] 
[phone] Your Scheduled Appointments 2018  1:00 PM EST INFUSION 240 [address] with CHAIR 3 HEIDI Chery ([address]) [address]  
[phone] Go to [facility], [facility] [address], [address] [place] 2018  4:00 PM EST  
ESTABLISHED PATIENT with Duglas Drake MD  
[facility] [address] [place] [address]  
[phone] 2018  1:00 PM EST Follow Up with Marguerite Lorenz MD  
Neurology Clinic at [facility]) [address] [place] [address], [address]  
[phone] 2018  1:00 PM EDT INFUSION 240 RI with CHAIR 3 HEIDI Chery ([address]) [address]  
[phone] Go to [facility], [facility] 3, [address] [facility], [place], [address] [place] Discharge Orders None A check belén indicates which time of day the medication should be taken. My Medications CHANGE how you take these medications Instructions Each Dose to Equal  
 Morning Noon Evening Bedtime  
 metoprolol succinate 100 mg tablet Commonly known as:  TOPROL-XL What changed:  how much to take Your last dose was: Your next dose is: Take 1.5 Tabs by mouth daily. 150 mg CONTINUE taking these medications Instructions Each Dose to Equal  
 Morning Noon Evening Bedtime  
 aspirin 325 mg tablet Commonly known as:  ASPIRIN Your last dose was: Your next dose is: Take 325 mg by mouth daily. 325 mg  
    
   
   
   
  
 butalbital-acetaminophen-caffeine -40 mg per tablet Commonly known as:  Consuello Sida Your last dose was: Your next dose is: Take 2 Tabs by mouth every eight (8) hours as needed for Pain or Headache. Max Daily Amount: 6 Tabs. MUST LAST 30 DAYS. Indications: MIGRAINE, TENSION-TYPE HEADACHE 2 Tab DEMEROL 50 mg tablet Generic drug:  meperidine Your last dose was: Your next dose is: Take 50 mg by mouth three (3) times daily as needed for Pain. 50 mg  
    
   
   
   
  
 diazePAM 5 mg tablet Commonly known as:  VALIUM Your last dose was: Your next dose is: Take 1 Tab by mouth every twelve (12) hours as needed for Anxiety. Max Daily Amount: 10 mg. Refill at 1 month intervals 5 mg ELIQUIS 2.5 mg tablet Generic drug:  apixaban Your last dose was: Your next dose is: Take 2.5 mg by mouth two (2) times a day. Indications: PE  
 2.5 mg  
    
   
   
   
  
 gabapentin 300 mg capsule Commonly known as:  NEURONTIN Your last dose was: Your next dose is: TAKE ONE CAPSULE BY MOUTH THREE TIMES A DAY. PATIENT MUST MAKE APPOINTMENT FOR FURTHER REFILLS  
     
   
   
   
  
 hydroxychloroquine 200 mg tablet Commonly known as:  PLAQUENIL  
   
 Your last dose was: Your next dose is: Take 400 mg by mouth daily (after dinner). For lupus 400 mg  
    
   
   
   
  
 promethazine 25 mg tablet Commonly known as:  PHENERGAN Your last dose was: Your next dose is: Take 25 mg by mouth every six (6) hours as needed for Nausea. 25 mg  
    
   
   
   
  
 MARIELOS (28) 3-0.02 mg Tab Generic drug:  drospirenone-ethinyl estradiol Your last dose was: Your next dose is: Take 1 Tab by mouth nightly. 1 Tab ZANAFLEX 2 mg tablet Generic drug:  tiZANidine Your last dose was: Your next dose is: Take 6 mg by mouth nightly. 6 mg ZOFRAN (AS HYDROCHLORIDE) 4 mg tablet Generic drug:  ondansetron hcl Your last dose was: Your next dose is: Take 4 mg by mouth every eight (8) hours as needed for Nausea. 4 mg STOP taking these medications   
 furosemide 20 mg tablet Commonly known as:  LASIX Where to Get Your Medications These medications were sent to [address] [place], [address]  [address]  [address] [place] [address], [address] [address] Phone:  [phone]  
  metoprolol succinate 100 mg tablet Discharge Instructions None Introducing [facility]! Dear [place]: 
Thank you for requesting a [employer] account. Our records indicate that you already have an active [employer] account. You can access your account anytime at [email or web address]. [employer] Did you know that you can access your hospital and ER discharge instructions at any time in [employer]? You can also review all of your test results from your hospital stay or ER visit. Additional Information If you have questions, please visit the Frequently Asked Questions section of the [employer] website at [email or web address]/. Remember, [employer] is NOT to be used for urgent needs. For medical emergencies, dial 911. Now available from your iPhone and Android! Providers Seen During Your Hospitalization Provider Specialty Primary office phone Richard Linares MD Cardiology [phone] Your Primary Care Physician (PCP) Primary Care Physician Office Phone Office Fax Ezekiel Pardo [phone] You are allergic to the following Allergen Reactions Codeine Hives Nausea and Vomiting Severe nausea & vomiting Hydrocodone Hives Nausea and Vomiting Severe nausea & vomiting Oxycontin (Oxycodone) Hives Nausea and Vomiting Severe nausea & vomiting, also has the same reaction from Percocet Percocet (Oxycodone-Acetaminophen) Hives Nausea and Vomiting Dilaudid (Hydromorphone (Bulk)) Swelling Prednisone Other (comments) HX OF CROHN'S; not allergic, prefer not to use Recent Documentation Height Weight Breastfeeding? BMI OB Status Smoking Status 1.549 m 86.2 kg No 35.9 kg/m2 Postmenopausal Former Smoker Emergency Contacts Name Discharge Info Relation Home Work Mobile Oval Grise CAREGIVER [3] Spouse [3] [phone] Patient Belongings The following personal items are in your possession at time of discharge: 
  Dental Appliances: None  Visual Aid: Glasses      Home Medications: None   Jewelry: None  Clothing: At bedside    Other Valuables: None  Personal Items Sent to Safe: none Please provide this summary of care documentation to your next provider. Signatures-by signing, you are acknowledging that this After Visit Summary has been reviewed with you and you have received a copy. Patient Signature:  ____________________________________________________________ Date:  ____________________________________________________________  
  
Eng Moritz Provider Signature:  ____________________________________________________________ Date:  ____________________________________________________________

## 2018-02-01 NOTE — PROGRESS NOTES
TRANSFER - OUT REPORT:    Verbal report given to Cara Coates RN(name) on Gray Vergara  being transferred to IVCU(unit) for routine progression of care       Report consisted of patients Situation, Background, Assessment and   Recommendations(SBAR). Information from the following report(s) Procedure Summary and MAR was reviewed with the receiving nurse. Lines:       Opportunity for questions and clarification was provided.       Patient transported with:   Registered Nurse

## 2018-02-01 NOTE — PROGRESS NOTES
Cardiac Cath Lab Recovery Arrival Note:      Glen Bravo arrived to Cardiac Cath Lab, Recovery Area. Staff introduced to patient. Patient identifiers verified with NAME and DATE OF BIRTH. Procedure verified with patient. Consent forms reviewed and signed by patient or authorized representative and verified. Allergies verified. Patient and family oriented to department. Patient and family informed of procedure and plan of care. Questions answered with review. Patient prepped for procedure, per orders from physician, prior to arrival.    Patient on cardiac monitor, non-invasive blood pressure, SPO2 monitor. On room air. Patient is A&Ox 3. Patient reports no c/o. Patient in stretcher, in low position, with side rails up, call bell within reach, patient instructed to call if assistance as needed. Patient prep in: [address], [place] [address]. Patient family has pager # none  Family in: [facility] waiting area.    Prep by: Troy Zuñiga, VIKY and Robina Mccall RN

## 2018-02-01 NOTE — PROCEDURES
[address]  [phone]    Patient ID:  Patient: Glen Bravo  MRN: [id number]  Age: 46 y.o.  : 1965  Gender: female  Study Date: 2018    History:  This is a with paroxysmal SVT with symptoms, here for elective EP study and cardiac ablation.     Procedures Performed:   1. Comprehensive EP study with SVT ablation (18685)   2. SVT ablation, additional mechanism #1 (82971)   3. SVT ablation, additional mechanism #2 (03036)   4. Left atrial pacing and recording from coronary sinus and left atrium (46102-35)   5. Intracardiac electrophysiologic 3-D mapping (06386)   6. Pacing and stimulation during IV drug infusion for arrhythmia induction (08583)     The patient was brought to EP lab in NPO state and after informed consent.  Continuous ECG and hemodynamic monitoring was performed.  Sedation was by the anesthesiologist who was in constant attendance throughout the procedure.  Right groin was anesthetized with 1% lidocaine and access obtained (3 safe sheaths in the right femoral vein).     An 8Fr sheath on the right was changed to SR-0 long support sheath.  A deflectable duodecapolar catheter placed was advanced into the coronary sinus and left atrial recordings and pacing were obtained.  Quadripolar catheters were positioned in the low septal RA and RV apex as needed.  An 8 Fr 5 mm tip deflectable mapping and ablation catheter was used initially, and then an 8 Fr 8 mm deflectable mapping and ablation catheter.       A comprehensive EP study performed including both atrial and ventricular burst and extrastimulus pacing.  3D mapping was done using the [employer] mapping system. Dobutamine infusion was used up to 10 mcg prior to any ablation was attempted along with pacing for arrhythmia induction. At the end of the case, sheaths were removed without issue and hemostasis obtained. She was sent to the recovery area.   No immediate complications noted.      Preoperative Diagnosis: As above. Postoperative Diagnosis: As above. Procedure:  As above. Surgeon(s) and Role: Radha Jones MD - Primary   Anesthesia: [facility] by the anesthesiologist.  Estimated Blood Loss:  <5 cc. Specimens: * No specimens in log *   Findings:  As below. Complications:  None.      Ablation therapy:  5 treatments to a total of 8.25 minutes  X-ray time:  10.6 minutes      Findings:  1.  At baseline, borderline sinus tachycardia was present.  (, , QRS 82, and  ms).   The AH and HV intervals were 124 and 52 ms, respectively. 2.  Grossly normal sinus node function without sinus pauses. 3.  Antegrade conduction was midline and decremental without preexcitation.  WBCL was 360 ms.  Dual AVN physiology was noted.  The AVNERP pacing at 600 ms was 210 ms and the AERP was <=210 ms. 4.  Retrograde conduction was midline and decremental.  The retrograde WBCL was 440 ms.  The VAERP pacing at 500 ms was 330 and the VERP was 220 ms.  5.  Tachycardia was not inducible initially but was reluctantly inducible with burst and extrastimulus atrial pacing with dobutamine infusion. 6.  SVT ablation #1:  After mapping in the high right atrium and into what appeared to be the very early SVC and reconstructed SVC graft due to earliest activation in that region, a low amplitude but sharp electrogram was noted with regular  ms, faster than the clearly dissociated sinus discharge in the right atrium. This was thought to be a possible  for tachycardia, so RF energy was delivered with limited power to this SVC tachycardia. After ablation at sites where the electrogram was present, there was still the existence of the signal but the SVC tachycardia was proven electrically isolated from the right atrium.   7.  SVT ablation #2:  While still on dobutamine infusion, a high RA tachycardia exceeded the SVC tachycardia which was still dissociated, and therefore was not the a  for the more rapid bursts originating inferiorly. A focal high RA tachycardia in the anterolateral region was treated with focal RF energy. This was done only after mapping for phrenic nerve/diaphragmatic capture along the SVC and right atrium and before any ablation application. An accelerated high right atrial rhythm was noted during ablation. After this, no high RA tachycardia was noted including with rapid atrial pacing and dobutamine infusion, a change from prior. 8.  SVT ablation #3:  Typical RA flutter was induced with pacing, and because it was sustained, a CTI line was done with RF energy after changing from a 5 mm to 8 mm tip ablation catheter. This terminated the flutter and bidirectional block along the line was confirmed with pacing. 9.  A nonsustained tachycardia suspicious for atypical AVNRT but always terminating after 1-3 beats even with dobutamine infusion was noted with atrial pacing. This was not thought to be the clinical tachycardia so treatment for this was not given.      Impression:  1. SVC tachycardia treated with RFA, dissociated/isolated from the right atrium. 2.  High RA tachycardia, separate mechanism, probably the clinical tachycardia. Treated with RFA successfully. 3.  Induced and sustained typical RA flutter, separate mechanism. Treated with RFA successfully with a CTI line. 4.  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 5.  Grossly normal sinus node function, borderline sinus tachycardia. 6.  Normal antegrade conduction without preexcitation or infra-His block. Dual AVN physiology was not identified during formal pacing, though there was apparent atypical AV brooks reentrant tachycardia.   7.  Retrograde conduction was normal.      RECS:  Follow-up routinely in clinic after SVT ablation as noted above.        Signed:  Evangelina Contreras

## 2018-02-01 NOTE — H&P
[address]  ACUTE CARE HISTORY AND PHYSICAL    Name:GRANT YOUNGER  MR#: [id number]  : 1965  ACCOUNT #: [de-identified]   DATE OF SERVICE: 2018    CHIEF COMPLAINT:  Palpitations, dyspnea on exertion. HISTORY OF PRESENT ILLNESS:  This is a 22-year-old female with multiple medical problems, who is here for further evaluation and treatment. In 2016, she had a superior vena cava replacement with Dr. Quin Costa and Dr. Garth Germain. She had a Port-A-Cath in the past, as well as a history of prior stroke. In addition, she has had pulmonary embolism and is on chronic Eliquis for anticoagulation. Other issues include lupus, as well as Crohn' disease. She sees a variety of specialists for these issues. I saw her in the office in 2017 to discuss her rapid heart rate. She says it is always fast.  She has some dyspnea on exertion, and actually has to stop while walking up a flight of stairs. She tries to walk her dog, and notices even at a low pace that her heart rate goes in the 120-130 beats per minute range. She feels palpitations with this, sometimes mild dizziness, and definitely shortness of breath. She denies chest, jaw, neck, shoulder or arm pain with exertion. No orthopnea, paroxysmal nocturnal dyspnea or worsening edema. She does have chronic left lower extremity swelling due to a saphenous vein harvest.    ALLERGIES:  1. OXYCODONE. 2.  CODEINE. 3.  PREDNISONE.  4.  HYDROMORPHONE.    (These appear to be intolerances, as nausea and vomiting are the side effects). PAST MEDICAL HISTORY:  As above. FAMILY HISTORY:  Noncontributory for this presentation. SOCIAL HISTORY:  . Nonsmoker (smoked in the past). She consumes social alcohol. One cup of caffeinated coffee daily. Tries to be active, but has limitations as noted above.     MEDICATIONS:  Please see the list.    PHYSICAL EXAMINATION:  VITAL SIGNS:  Patient Vitals for the past 12 hrs:   Temp Pulse Resp BP SpO2   02/01/18 0754 98.5 °F (36.9 °C) (!) 102 17 134/75 100 %     GENERAL:  Nondiaphoretic, not in acute distress. NECK:  Supple, no palpable thyromegaly. LUNGS:  Respirations unlabored. Clear to auscultation bilaterally anteriorly. CARDIAC:  Regular, no extra heart sounds. Palpable radial pulses bilaterally. ABDOMEN:  Obese, soft, nontender, nondistended. EXTREMITIES:  No cyanosis or clubbing. NEUROLOGIC:  Awake, appropriate, grossly nonfocal.    TELEMETRY:  Sinus rhythm, at a rate of approximately 98 beats per minute is noted. IMPRESSION:  1. Rapid palpitations, possibly sinus tachycardia, although a prior ambulatory monitor showed a possible atrial tachycardia. 2.  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  3.  Lupus, followed by Dr. Nettie Momin (Rheum). 4.  Crohn's disease, followed by Dr. Abisai Tam (GI). 5.  Prior transient ischemic attack/stroke, followed by Dr. Landis Landau (Neuro). 6.  Remote pulmonary embolism, followed by Dr. Terrance King (Pulm). 7.  Chronic oral anticoagulation. RECOMMENDATIONS:  Given the potential benefits, risks and alternatives, she agrees to proceed with invasive electrophysiology study. An ablation could follow depending on what is found. Her primary goal is to not have the rapid heart rate and shortness of breath when she exerts herself, so we will see what we can do with regards to this pending the results of the study today. MD JOSE Gaitan / KRISS  D: 02/01/2018 09:50     T: 02/01/2018 10:52  JOB #: [id number]

## 2018-02-01 NOTE — ANESTHESIA POSTPROCEDURE EVALUATION
Post-Anesthesia Evaluation and Assessment    Patient: Dilma Deluna MRN: [id number]  SSN: [id number]    YOB: 1965  Age: 46 y.o. Sex: female       Cardiovascular Function/Vital Signs  Visit Vitals    BP (P) 141/83 (BP 1 Location: Right arm, BP Patient Position: At rest)    Pulse (!) 105    Temp 36.9 °C (98.5 °F)    Resp 16    Ht 5' 1\" (1.549 m)    Wt 86.2 kg (190 lb)    SpO2 100%    Breastfeeding No    BMI 35.9 kg/m2       Patient is status post general anesthesia for * No procedures listed *. Nausea/Vomiting: None    Postoperative hydration reviewed and adequate. Pain:  Pain Scale 1: Visual (02/01/18 1215)  Pain Intensity 1: 0 (02/01/18 1215)   Managed    Neurological Status:   Neuro (WDL): Exceptions to WDL (02/01/18 1215)  Neuro  Neurologic State: Drowsy (02/01/18 1215)  LUE Motor Response: Spontaneous  (02/01/18 1215)  LLE Motor Response: Spontaneous  (02/01/18 1215)  RUE Motor Response: Spontaneous  (02/01/18 1215)  RLE Motor Response: Spontaneous  (02/01/18 1215)   At baseline    Mental Status and Level of Consciousness: Arousable    Pulmonary Status:   O2 Device: (P) Nasal cannula (02/01/18 1220)   Adequate oxygenation and airway patent    Complications related to anesthesia: None    Post-anesthesia assessment completed.  No concerns    Signed By: Merly Vega MD     February 1, 2018

## 2018-02-01 NOTE — PROGRESS NOTES
TRANSFER - IN REPORT:    Verbal report received from Teresita Rothman (name) on Kane Montiel  being received from [address] [place] [address] (unit) for routine post - op      Report consisted of patients Situation, Background, Assessment and   Recommendations(SBAR). Information from the following report(s) SBAR, Kardex, Procedure Summary, Intake/Output and Recent Results was reviewed with the receiving nurse. Opportunity for questions and clarification was provided. Assessment completed upon patients arrival to unit and care assumed.

## 2018-02-02 VITALS
HEART RATE: 86 BPM | OXYGEN SATURATION: 99 % | RESPIRATION RATE: 18 BRPM | WEIGHT: 190 LBS | DIASTOLIC BLOOD PRESSURE: 70 MMHG | SYSTOLIC BLOOD PRESSURE: 104 MMHG | BODY MASS INDEX: 35.87 KG/M2 | TEMPERATURE: 97.5 F | HEIGHT: 61 IN

## 2018-02-02 LAB
ANION GAP SERPL CALC-SCNC: 8 MMOL/L (ref 5–15)
BUN SERPL-MCNC: 11 MG/DL (ref 6–20)
BUN/CREAT SERPL: 15 (ref 12–20)
CALCIUM SERPL-MCNC: 8.4 MG/DL (ref 8.5–10.1)
CHLORIDE SERPL-SCNC: 105 MMOL/L (ref 97–108)
CO2 SERPL-SCNC: 27 MMOL/L (ref 21–32)
CREAT SERPL-MCNC: 0.75 MG/DL (ref 0.55–1.02)
ERYTHROCYTE [DISTWIDTH] IN BLOOD BY AUTOMATED COUNT: 12 % (ref 11.5–14.5)
GLUCOSE SERPL-MCNC: 88 MG/DL (ref 65–100)
HCT VFR BLD AUTO: 33.6 % (ref 35–47)
HGB BLD-MCNC: 11.2 G/DL (ref 11.5–16)
MCH RBC QN AUTO: 29.8 PG (ref 26–34)
MCHC RBC AUTO-ENTMCNC: 33.3 G/DL (ref 30–36.5)
MCV RBC AUTO: 89.4 FL (ref 80–99)
NRBC # BLD: 0 K/UL (ref 0–0.01)
NRBC BLD-RTO: 0 PER 100 WBC
PLATELET # BLD AUTO: 204 K/UL (ref 150–400)
PMV BLD AUTO: 11.1 FL (ref 8.9–12.9)
POTASSIUM SERPL-SCNC: 4.2 MMOL/L (ref 3.5–5.1)
RBC # BLD AUTO: 3.76 M/UL (ref 3.8–5.2)
SODIUM SERPL-SCNC: 140 MMOL/L (ref 136–145)
WBC # BLD AUTO: 7.8 K/UL (ref 3.6–11)

## 2018-02-02 PROCEDURE — 36415 COLL VENOUS BLD VENIPUNCTURE: CPT | Performed by: INTERNAL MEDICINE

## 2018-02-02 PROCEDURE — 74011250636 HC RX REV CODE- 250/636: Performed by: INTERNAL MEDICINE

## 2018-02-02 PROCEDURE — 74011250637 HC RX REV CODE- 250/637: Performed by: INTERNAL MEDICINE

## 2018-02-02 PROCEDURE — 85027 COMPLETE CBC AUTOMATED: CPT | Performed by: INTERNAL MEDICINE

## 2018-02-02 PROCEDURE — 80048 BASIC METABOLIC PNL TOTAL CA: CPT | Performed by: INTERNAL MEDICINE

## 2018-02-02 RX ORDER — METOPROLOL SUCCINATE 100 MG/1
150 TABLET, EXTENDED RELEASE ORAL DAILY
Qty: 30 TAB | Refills: 0 | Status: SHIPPED | OUTPATIENT
Start: 2018-02-02 | End: 2018-06-26

## 2018-02-02 RX ADMIN — FUROSEMIDE 20 MG: 20 TABLET ORAL at 08:23

## 2018-02-02 RX ADMIN — Medication 10 ML: at 03:04

## 2018-02-02 RX ADMIN — ONDANSETRON HYDROCHLORIDE 4 MG: 2 INJECTION, SOLUTION INTRAMUSCULAR; INTRAVENOUS at 03:05

## 2018-02-02 RX ADMIN — MEPERIDINE HYDROCHLORIDE 50 MG: 50 TABLET ORAL at 03:05

## 2018-02-02 RX ADMIN — GABAPENTIN 300 MG: 300 CAPSULE ORAL at 08:23

## 2018-02-02 RX ADMIN — METOPROLOL SUCCINATE 150 MG: 50 TABLET, EXTENDED RELEASE ORAL at 08:23

## 2018-02-02 RX ADMIN — APIXABAN 2.5 MG: 2.5 TABLET, FILM COATED ORAL at 08:24

## 2018-02-02 RX ADMIN — ASPIRIN 325 MG ORAL TABLET 325 MG: 325 PILL ORAL at 08:23

## 2018-02-02 NOTE — PROGRESS NOTES
S/p SVT ablation (SVC tach, HRA tach, Aflutter) yesterday, this AM has gotten up to BR. Some nausea, no vomiting. L arm is better as is her chest.      Visit Vitals    /70    Pulse 86    Temp 97.5 °F (36.4 °C)    Resp 18    Ht 5' 1\" (1.549 m)    Wt 86.2 kg (190 lb)    SpO2 99%    Breastfeeding No    BMI 35.9 kg/m2     /73 with  as I entered the room. ND, NAD.  RRR, hyperdynamic, no rub. Lungs CTAB, unlabored. R groin site OK. Awake, appropriate, neuro grossly nonfocal.    Tele:  SR, sinus tachycardia at rest to about 100 bpm, with ambulation 120-130's. PLAN:  Discharge to home with F/U in the office in 2 weeks with Flor Saavedra NP. I'd get a Holter at that time. Will try to decrease metoprolol ER to 150 daily in case there is a beta-blocker related element to her fatigu. She's no longer on Corlanor. If needed, will up to 300 daily again. All questions answered. Patient is aware of signs and sx warranting urgent med F/U or calling 911.     Signed:  Jo Ann Mohr MD

## 2018-02-02 NOTE — PROGRESS NOTES
Attempted to get BP for shift vital signs. Multiple automatic SBPs were in the 70's to 80's range on both arms. Manual blood pressure taken on right arm with a reading of 84/50. Pt asymptomatic with blood pressure in that range. Pt stated that she does not have blood pressures in that range. Discussed with pt common side effects of the medication that she received previously for spasms. Will continue to monitor pt and will recheck BPs before pt getting out of bed. Pt verbalized understanding.

## 2018-02-02 NOTE — PROGRESS NOTES
Bedside report received from Abe Luque RN                   Assessment, Background, Procedure summary, Intake/Output, MAR, and recent results discussed. Care assumed. Verbal report given to oncoming nurse Jules Og RN    Report consisted of patients Situation, Background, Assessment, and   Recommendations    Information was reviewed with the receiving nurse. Opportunity for questions and clarification was provided.       Mouna Valdez RN

## 2018-02-12 ENCOUNTER — HOSPITAL ENCOUNTER (OUTPATIENT)
Dept: ULTRASOUND IMAGING | Age: 53
Discharge: HOME | End: 2018-02-12
Attending: INTERNAL MEDICINE
Payer: COMMERCIAL

## 2018-02-12 DIAGNOSIS — T14.8XXA HEMATOMA: ICD-10-CM

## 2018-02-12 PROCEDURE — 76882 US LMTD JT/FCL EVL NVASC XTR: CPT

## 2018-02-14 NOTE — PROGRESS NOTES
I called the patient earlier today to discuss her recent R groin duplex. There is a 4.3 x 3.5 x 2.2 cm oval lesion adjacent to the  right femoral vessels. This has heterogeneous internal echogenicity. There is no internal flow. This may represent a hematoma or thrombosed pseudoaneurysm. In either case, I would be conservative with management as the hematoma may be at fullest expansion and if a pseudoaneurysm it has already thrombosed which is the treatment for this. She'll F/U later this week with my NP, Aretha, to check on the progress.

## 2018-02-19 ENCOUNTER — OFFICE VISIT (OUTPATIENT)
Dept: NEUROLOGY | Age: 53
End: 2018-02-19

## 2018-02-19 VITALS
DIASTOLIC BLOOD PRESSURE: 86 MMHG | HEIGHT: 61 IN | SYSTOLIC BLOOD PRESSURE: 124 MMHG | TEMPERATURE: 98 F | HEART RATE: 108 BPM | OXYGEN SATURATION: 99 % | BODY MASS INDEX: 37.57 KG/M2 | WEIGHT: 199 LBS | RESPIRATION RATE: 16 BRPM

## 2018-02-19 DIAGNOSIS — R42 DIZZINESS AND GIDDINESS: ICD-10-CM

## 2018-02-19 DIAGNOSIS — I65.23 STENOSIS OF BOTH CAROTID ARTERIES WITHOUT INFARCTION: ICD-10-CM

## 2018-02-19 DIAGNOSIS — I67.89 CEREBRAL MICROVASCULAR DISEASE: ICD-10-CM

## 2018-02-19 DIAGNOSIS — M54.81 CERVICO-OCCIPITAL NEURALGIA OF RIGHT SIDE: ICD-10-CM

## 2018-02-19 DIAGNOSIS — G43.109 MIGRAINE WITH AURA AND WITHOUT STATUS MIGRAINOSUS, NOT INTRACTABLE: Primary | ICD-10-CM

## 2018-02-19 DIAGNOSIS — G44.209 TENSION VASCULAR HEADACHE: ICD-10-CM

## 2018-02-19 DIAGNOSIS — R41.82 ALTERED MENTAL STATUS, UNSPECIFIED ALTERED MENTAL STATUS TYPE: ICD-10-CM

## 2018-02-19 RX ORDER — TIZANIDINE 2 MG/1
TABLET ORAL
Qty: 100 TAB | Refills: 5 | Status: SHIPPED | OUTPATIENT
Start: 2018-02-19 | End: 2018-11-12 | Stop reason: SDUPTHER

## 2018-02-19 NOTE — MR AVS SNAPSHOT
[address], 
[address], [address] [place] 
[phone] Patient: Glen Bravo MRN: [id number] :1965 Visit Information Date & Time Provider Department Dept. Phone Encounter #  
 2018  1:00 PM Marina Etienne MD Neurology [facility] at [facility] [phone] [id number] Follow-up Instructions Return in about 6 months (around 2018). Your Appointments 2018  1:00 PM  
ESTABLISHED PATIENT with Shivam Triana MD  
[address] Oncology at [facility]) Appt Note: 1 yr heme f/u  
 [address] [facility] [address] [facility] [address]  
[address] [place] [address] [address] Eduardo Abernathy Upcoming Health Maintenance Date Due Hepatitis C Screening 1965 DTaP/Tdap/Td series (1 - Tdap) 1986 PAP AKA CERVICAL CYTOLOGY 1986 FOBT Q 1 YEAR AGE 50-75 2015 Influenza Age 5 to Adult 2017 BREAST CANCER SCRN MAMMOGRAM 2019 Allergies as of 2018  Review Complete On: 2018 By: Marina Etienne MD  
  
 Severity Noted Reaction Type Reactions Codeine High 2010   Systemic Hives, Nausea and Vomiting Severe nausea & vomiting Hydrocodone High 2010   Systemic Hives, Nausea and Vomiting Severe nausea & vomiting Oxycontin [Oxycodone] High 2010   Systemic Hives, Nausea and Vomiting Severe nausea & vomiting, also has the same reaction from Percocet Percocet [Oxycodone-acetaminophen] High 2010   Side Effect Hives, Nausea and Vomiting Dilaudid [Hydromorphone (Bulk)]  2012    Swelling Prednisone  2014    Other (comments) HX OF CROHN'S; not allergic, prefer not to use Current Immunizations  Reviewed on 2017 Name Date H1N1 FLU VACCINE 2010 Influenza Vaccine 2016, 10/6/2014 Influenza Vaccine Whole 10/15/2011 PPD 1/1/2010 Not reviewed this visit You Were Diagnosed With   
  
 Codes Comments Migraine with aura and without status migrainosus, not intractable    -  Primary ICD-10-CM: G43.109 ICD-9-CM: 346.00 Stenosis of both carotid arteries without infarction     ICD-10-CM: I65.23 ICD-9-CM: 433.10, 433.30 Cerebral microvascular disease     ICD-10-CM: I67.9 ICD-9-CM: 437.9 Cervico-occipital neuralgia of right side     ICD-10-CM: M54.81 ICD-9-CM: 723.8 Tension vascular headache     ICD-10-CM: G44.209 ICD-9-CM: 307.81 Dizziness and giddiness     ICD-10-CM: B58 ICD-9-CM: 780.4 Altered mental status, unspecified altered mental status type     ICD-10-CM: R41.82 
ICD-9-CM: 780.97 Vitals BP Pulse Temp Resp Height(growth percentile) Weight(growth percentile) 124/86 (!) 108 98 °F (36.7 °C) 16 5' 1\" (1.549 m) 199 lb (90.3 kg) SpO2 BMI OB Status Smoking Status 99% 37.6 kg/m2 Postmenopausal Former Smoker Vitals History BMI and BSA Data Body Mass Index Body Surface Area  
 37.6 kg/m 2 1.97 m 2 Preferred Pharmacy Pharmacy Name [facility] [address] [facility] [phone] Your Updated Medication List  
  
   
This list is accurate as of: 2/19/18  1:34 PM.  Always use your most recent med list.  
  
  
  
  
 aspirin 325 mg tablet Commonly known as:  ASPIRIN Take 325 mg by mouth daily. butalbital-acetaminophen-caffeine -40 mg per tablet Commonly known as:  Flonnie Min Take 2 Tabs by mouth every eight (8) hours as needed for Pain or Headache. Max Daily Amount: 6 Tabs. MUST LAST 30 DAYS. Indications: MIGRAINE, TENSION-TYPE HEADACHE  
  
 DEMEROL 50 mg tablet Generic drug:  meperidine Take 50 mg by mouth three (3) times daily as needed for Pain.  
  
 diazePAM 5 mg tablet Commonly known as:  VALIUM  
 Take 1 Tab by mouth every twelve (12) hours as needed for Anxiety. Max Daily Amount: 10 mg. Refill at 1 month intervals ELIQUIS 2.5 mg tablet Generic drug:  apixaban Take 2.5 mg by mouth two (2) times a day. Indications: PE  
  
 gabapentin 300 mg capsule Commonly known as:  NEURONTIN  
TAKE ONE CAPSULE BY MOUTH THREE TIMES A DAY. PATIENT MUST MAKE APPOINTMENT FOR FURTHER REFILLS  
  
 hydroxychloroquine 200 mg tablet Commonly known as:  PLAQUENIL Take 400 mg by mouth daily (after dinner). For lupus  
  
 metoprolol succinate 100 mg tablet Commonly known as:  TOPROL-XL Take 1.5 Tabs by mouth daily. promethazine 25 mg tablet Commonly known as:  PHENERGAN Take 25 mg by mouth every six (6) hours as needed for Nausea. tiZANidine 2 mg tablet Commonly known as:  Quincy Flattery 1 po qam and 2 po qhs  
  
 MARIELOS (28) 3-0.02 mg Tab Generic drug:  drospirenone-ethinyl estradiol Take 1 Tab by mouth nightly. ZOFRAN (AS HYDROCHLORIDE) 4 mg tablet Generic drug:  ondansetron hcl Take 4 mg by mouth every eight (8) hours as needed for Nausea. Prescriptions Sent to Pharmacy Refills  
 tiZANidine (ZANAFLEX) 2 mg tablet 5 Si po qam and 2 po qhs  
 Class: Normal  
 Pharmacy: [address]  [address] Ph #: [phone] Follow-up Instructions Return in about 6 months (around 2018). To-Do List   
 2018 Imaging:  DUPLEX CAROTID BILATERAL AMB NEURO   
  
 2018 Neurology:  EEG   
  
 2018 1:00 PM  
  Appointment with CHAIR 3 HEIDI [address] [employer] [facility] ([phone])  
  
 2018 1:00 PM  
  Appointment with [address] [employer] [employer] [facility] ([phone])  
  
 2018 1:00 PM  
  Appointment with [address] [employer] [employer] [facility] ([phone]) Patient Instructions Please be advised there is a $25 fee for all paperwork to be completed from our  providers. This is to be paid by the patient prior to picking up the completed forms. A Healthy Lifestyle: Care Instructions Your Care Instructions A healthy lifestyle can help you feel good, stay at a healthy weight, and have plenty of energy for both work and play. A healthy lifestyle is something you can share with your whole family. A healthy lifestyle also can lower your risk for serious health problems, such as high blood pressure, heart disease, and diabetes. You can follow a few steps listed below to improve your health and the health of your family. Follow-up care is a key part of your treatment and safety. Be sure to make and go to all appointments, and call your doctor if you are having problems. It's also a good idea to know your test results and keep a list of the medicines you take. How can you care for yourself at home? · Do not eat too much sugar, fat, or fast foods. You can still have dessert and treats now and then. The goal is moderation. · Start small to improve your eating habits. Pay attention to portion sizes, drink less juice and soda pop, and eat more fruits and vegetables. ¨ Eat a healthy amount of food. A 3-ounce serving of meat, for example, is about the size of a deck of cards. Fill the rest of your plate with vegetables and whole grains. ¨ Limit the amount of soda and sports drinks you have every day. Drink more water when you are thirsty. ¨ Eat at least 5 servings of fruits and vegetables every day. It may seem like a lot, but it is not hard to reach this goal. A serving or helping is 1 piece of fruit, 1 cup of vegetables, or 2 cups of leafy, raw vegetables. Have an apple or some carrot sticks as an afternoon snack instead of a candy bar. Try to have fruits and/or vegetables at every meal. 
· Make exercise part of your daily routine. You may want to start with simple activities, such as walking, bicycling, or slow swimming.  Try to be active 30 to 60 minutes every day. You do not need to do all 30 to 60 minutes all at once. For example, you can exercise 3 times a day for 10 or 20 minutes. Moderate exercise is safe for most people, but it is always a good idea to talk to your doctor before starting an exercise program. 
· Keep moving. Mj Hogan the lawn, work in the garden, or [employer]. Take the stairs instead of the elevator at work. · If you smoke, quit. People who smoke have an increased risk for heart attack, stroke, cancer, and other lung illnesses. Quitting is hard, but there are ways to boost your chance of quitting tobacco for good. ¨ Use nicotine gum, patches, or lozenges. ¨ Ask your doctor about stop-smoking programs and medicines. ¨ Keep trying. In addition to reducing your risk of diseases in the future, you will notice some benefits soon after you stop using tobacco. If you have shortness of breath or asthma symptoms, they will likely get better within a few weeks after you quit. · Limit how much alcohol you drink. Moderate amounts of alcohol (up to 2 drinks a day for men, 1 drink a day for women) are okay. But drinking too much can lead to liver problems, high blood pressure, and other health problems. Family health If you have a family, there are many things you can do together to improve your health. · Eat meals together as a family as often as possible. · Eat healthy foods. This includes fruits, vegetables, lean meats and dairy, and whole grains. · Include your family in your fitness plan. Most people think of activities such as jogging or tennis as the way to fitness, but there are many ways you and your family can be more active. Anything that makes you breathe hard and gets your heart pumping is exercise. Here are some tips: 
¨ Walk to do errands or to take your child to school or the bus. ¨ Go for a family bike ride after dinner instead of watching TV. Where can you learn more? Go to http://maria estherMayomi.info/. Enter P184 in the search box to learn more about \"A Healthy Lifestyle: Care Instructions. \" Current as of: May 12, 2017 Content Version: 11.4 © 2006-2017 [employer]. Care instructions adapted under license by [employer] (which disclaims liability or warranty for this information). If you have questions about a medical condition or this instruction, always ask your healthcare professional. [address] any warranty or liability for your use of this information. Vertigo: Exercises Your Care Instructions Here are some examples of typical rehabilitation exercises for your condition. Start each exercise slowly. Ease off the exercise if you start to have pain. Your doctor or physical therapist will tell you when you can start these exercises and which ones will work best for you. How to do the exercises Exercise 1 1. Stand with a chair in front of you and a wall behind you. If you begin to fall, you may use them for support. 2. Stand with your feet together and your arms at your sides. 3. Move your head up and down 10 times. Exercise 2 1. Move your head side to side 10 times. Exercise 3 1. Move your head diagonally up and down 10 times. Exercise 4 1. Move your head diagonally up and down 10 times on the other side. Follow-up care is a key part of your treatment and safety. Be sure to make and go to all appointments, and call your doctor if you are having problems. It's also a good idea to know your test results and keep a list of the medicines you take. Where can you learn more? Go to http://maria estherMayomi.info/. Enter F349 in the search box to learn more about \"Vertigo: Exercises. \" Current as of: May 4, 2017 Content Version: 11.4 © 0159-4830 [employer].  Care instructions adapted under license by [address] (which disclaims liability or warranty for this information). If you have questions about a medical condition or this instruction, always ask your healthcare professional. [address] any warranty or liability for your use of this information. [place] Exercises for Vertigo: Care Instructions Your Care Instructions Simple exercises can help you regain your balance when you have vertigo. If you have Ménière's disease, benign paroxysmal positional vertigo (BPPV), or another inner ear problem, you may have vertigo off and on. Do these exercises first thing in the morning and before you go to bed. You might get dizzy when you first start them. If this happens, try to do them for at least 5 minutes. Do a group of exercises at a time, starting at the top of the list. It may take several weeks before you can do all the exercises without feeling dizzy. Follow-up care is a key part of your treatment and safety. Be sure to make and go to all appointments, and call your doctor if you are having problems. It's also a good idea to know your test results and keep a list of the medicines you take. How can you care for yourself at home? Exercise 1 While sitting on the side of the bed and holding your head still: 
· Look up as far as you can. · Look down as far as you can. · Look from side to side as far as you can. · Stretch your arm straight out in front of you. Focus on your index finger. Continue to focus on your finger while you bring it to your nose. Exercise 2 While sitting on the side of the bed: · Bring your head as far back as you can. · Bring your head forward to touch your chin to your chest. 
· Turn your head from side to side. · Do these exercises first with your eyes open. Then try with your eyes closed. Exercise 3 While sitting on the side of the bed: · Shrug your shoulders straight upward, then relax them. · Bend over and try to touch the ground with your fingers. Then go back to a sitting position. · Toss a small ball from one hand to the other. Throw the ball higher than your eyes so you have to look up. Exercise 4 While standing (with someone close by if you feel uncomfortable): 
· Repeat Exercise 1. 
· Repeat Exercise 2. 
· Pass a ball between your legs and above your head. · Sit down and then stand up. Repeat. Turn around in a [place] a different way each time you stand. · With someone close by to help you, try the above exercises with your eyes closed. Exercise 5 In a room that is cleared of obstacles: 
· Walk to a corner of the room, turn to your right, and walk back to the starting point. Now, repeat and turn left. · Walk up and down a slope. Now try stairs. · While holding on to someone's arm, try these exercises with your eyes closed. When should you call for help? Watch closely for changes in your health, and be sure to contact your doctor if: 
? · You do not get better as expected. Where can you learn more? Go to http://maria esther-sophia.info/. Enter C204 in the search box to learn more about \"[place] Exercises for Vertigo: Care Instructions. \" Current as of: May 12, 2017 Content Version: 11.4 © 6727-6219 [employer]. Care instructions adapted under license by [employer] (which disclaims liability or warranty for this information). If you have questions about a medical condition or this instruction, always ask your healthcare professional. [place] [address] any warranty or liability for your use of this information. Introducing [facility]! Dear Altagracia Harp: 
Thank you for requesting a SpotOnWay account. Our records indicate that you already have an active SpotOnWay account. You can access your account anytime at [email or web address] Did you know that you can access your hospital and ER discharge instructions at any time in PPTV? You can also review all of your test results from your hospital stay or ER visit. Additional Information If you have questions, please visit the Frequently Asked Questions section of the PPTV website at [email or web address] Remember, PPTV is NOT to be used for urgent needs. For medical emergencies, dial 911. Now available from your iPhone and Android! Please provide this summary of care documentation to your next provider. Your primary care clinician is listed as Iesha Medley. If you have any questions after today's visit, please call [phone].

## 2018-02-19 NOTE — LETTER
2/19/2018 9:20 PM 
 
Patient:  Triston Silva YOB: 1965 Date of Visit: 2/19/2018 Dear No Recipients: Thank you for referring Ms. Triston Silva to me for evaluation/treatment. Below are the relevant portions of my assessment and plan of care. Consult Subjective:  
 
Triston Silva is a 46 y.o. right-handed  female seen today in urgent basis at the request of Dr. Myles Willingham because of increasing headaches in the top and occipital region of her head, described as a pressure sensation, that occurs almost daily, with the patient having more stress and tension due to her cardiac problems. She has had 3 cardiac ablations, and has chronic A. fib, and persistent tachycardia, and is currently also on anticoagulation with Eliquis 2.5 mg twice a day, and wants to be seen for medication adjustment. She is still going under cardiac evaluation, and may need another procedure done on the cardiac standpoint with ablation type surgery also being considered again. The headaches really sound like muscle tension headaches, but she denies any neck pain, TMJ problems, but does admit to some stress. She hours he cannot take triptan's with her cardiac condition. She is already on Neurontin 300 mg 3 times a day for a lot of her musculoskeletal and other type pain, and that. She has Crohn's disease and carries a diagnosis of lupus. She went to the emergency room CT of the head, and a CT of the neck was normal except for slight soft tissue swelling in the posterior neck region behind mastoid last spring. Neurologically she is doing well except for the increased headaches. We looked at her scans on the computer today, and reviewed them personally on the PACS system with the patient, and I assured her that the and spine and all the paraspinal muscles look normal there is no involvement deeper than to just a skin and nothing is enhancing.   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 Carotid Dopplers were normal. Patient seen last year for progressive headaches for the last 6 months, becoming more frequent and more severe and more incapacitating. She does have lupus, and she also has Crohn's disease and this a chronic problem. She has a family history of migraines. She has a lot of muscle pain mainly in her legs, and significant back pain and is under pain management at [facility] with Dr. Andreina Alas. Her recent MRI scan of the lumbar spine was reviewed on the computer personally, and does show spondylolisthesis at L4-5 and mild degenerative disease without spinal stenosis or disc herniation. She feels as though her legs will give way at times, but her bowel and bladder function remain stable. She works daily as a . She's never had any testing done for her headaches. She has tried Neurontin,, which does not work as a prophylactic at 300 mg at night. She's had no unusual fever, meningismus, denies bruxism, denies any increased stress or tension, or other causes for her headaches. Past Medical History:  
Diagnosis Date  Arrhythmia Afib  Chronic pain Lt and Rt back:  Dr Rocco Adam;  Pain mgmt Karen Mallory PA  
 Cough   
 evaluated 14 by Dr Mathew Huston ([phone]) \". . possible drug related lung is due to Methitrexate or atypical or fungal infection\" per office note dated 14  [facility] Reddy Sick  Ill-defined condition   
 migraines  Lupus Dr Seo Silk [phone]  Pain from implanted hardware 2016 Exploration of sternal incision with removal of protruding sternal wires  Stenosis of both carotid arteries without cerebral infarction  Stroke [facility]) 2016 TIA's  SVC obstruction Past Surgical History:  
Procedure Laterality Date [address] [place]  HX  SECTION    
 x1  
  HX COLECTOMY  7/24/10  
 colon resection-18\" removed; Dr Saeid Stanton  HX GI  2005  
 bowel adhesions removed [facility] Bowel resection  HX HEART CATHETERIZATION    
 no stents, open heart surgery  HX HEENT    
 sinus surgery for deviated septum  HX HYSTERECTOMY partial  
 HX LEFT SALPINGO-OOPHORECTOMY  2005  
 ovary and fallopian tube removed  HX MOHS PROCEDURES Right approx 2013  
 with bone spur repair  HX OTHER SURGICAL    
 femerol vein removed  HX TONSILLECTOMY  HX VASCULAR ACCESS  6/11/10  
 portacath insertion and removal; Gets Remicaid q5 weeks  [place] COLONOSCOPY W/BIOPSY SINGLE/MULTIPLE  8/19/2013 Family History Problem Relation Age of Onset  Cancer Father   
  [facility] Other Mother Auto accident Social History Substance Use Topics  Smoking status: Former Smoker Packs/day: 0.50 Years: 2.50 Quit date: 3/29/2005  Smokeless tobacco: Never Used Comment: social  
 Alcohol use Yes Comment: Rare wine Current Outpatient Prescriptions:  
  tiZANidine (ZANAFLEX) 2 mg tablet, 1 po qam and 2 po qhs, Disp: 100 Tab, Rfl: 5 
  metoprolol succinate (TOPROL-XL) 100 mg tablet, Take 1.5 Tabs by mouth daily. (Patient taking differently: Take 100 mg by mouth two (2) times a day.), Disp: 30 Tab, Rfl: 0 
  apixaban (ELIQUIS) 2.5 mg tablet, Take 2.5 mg by mouth two (2) times a day. Indications: PE, Disp: , Rfl:  
  butalbital-acetaminophen-caffeine (FIORICET, ESGIC) -40 mg per tablet, Take 2 Tabs by mouth every eight (8) hours as needed for Pain or Headache. Max Daily Amount: 6 Tabs. MUST LAST 30 DAYS.   Indications: MIGRAINE, TENSION-TYPE HEADACHE, Disp: 50 Tab, Rfl: 2 
  promethazine (PHENERGAN) 25 mg tablet, Take 25 mg by mouth every six (6) hours as needed for Nausea., Disp: , Rfl:  
  gabapentin (NEURONTIN) 300 mg capsule, TAKE ONE CAPSULE BY MOUTH THREE TIMES A DAY. PATIENT MUST MAKE APPOINTMENT FOR FURTHER REFILLS, Disp: 100 Cap, Rfl: 0 
  diazepam (VALIUM) 5 mg tablet, Take 1 Tab by mouth every twelve (12) hours as needed for Anxiety. Max Daily Amount: 10 mg. Refill at 1 month intervals, Disp: 30 Tab, Rfl: 2 
  aspirin (ASPIRIN) 325 mg tablet, Take 325 mg by mouth daily. , Disp: , Rfl:  
  hydroxychloroquine (PLAQUENIL) 200 mg tablet, Take 400 mg by mouth daily (after dinner). For lupus, Disp: , Rfl:  
  ondansetron hcl (ZOFRAN) 4 mg tablet, Take 4 mg by mouth every eight (8) hours as needed for Nausea., Disp: , Rfl:  
  meperidine (DEMEROL) 50 mg tablet, Take 50 mg by mouth three (3) times daily as needed for Pain., Disp: , Rfl:  
  drospirenone-ethinyl estradiol (MARIELOS 28) 3-20 mg-mcg per tablet, Take 1 Tab by mouth nightly., Disp: , Rfl:  
 
 
 
Allergies Allergen Reactions  Codeine Hives and Nausea and Vomiting Severe nausea & vomiting  Hydrocodone Hives and Nausea and Vomiting Severe nausea & vomiting  Oxycontin [Oxycodone] Hives and Nausea and Vomiting Severe nausea & vomiting, also has the same reaction from Percocet  Percocet [Oxycodone-Acetaminophen] Hives and Nausea and Vomiting  Dilaudid [Hydromorphone (Bulk)] Swelling  Prednisone Other (comments) HX OF CROHN'S; not allergic, prefer not to use Review of Systems: A comprehensive review of systems was negative except for: Constitutional: positive for fatigue and malaise Eyes: positive for visual disturbance Gastrointestinal: positive for dyspepsia, reflux symptoms, change in bowel habits, diarrhea and abdominal pain Musculoskeletal: positive for myalgias, arthralgias, stiff joints, back pain and muscle weakness Neurological: positive for headaches, gait problems and weakness Vitals:  
 02/19/18 1259 BP: 124/86 Pulse: (!) 108 Resp: 16 Temp: 98 °F (36.7 °C) SpO2: 99% Weight: 199 lb (90.3 kg) Height: 5' 1\" (1.549 m) Objective: I 
 
 NEUROLOGICAL EXAM: 
 
Appearance: The patient is well developed, well nourished, provides a coherent history and is in acute distress because of headache. Mental Status: Oriented to time, place and person, and the president, cognitive function is normal, speech is fluent. Mood and affect appropriate, but a little depressed. Cranial Nerves:   Intact visual fields. Fundi are benign. PATTI, EOM's full, no nystagmus, no ptosis. Facial sensation is normal. Corneal reflexes are not tested. Facial movement is symmetric. Hearing is abnormal bilaterally. Palate is midline with normal sternocleidomastoid and trapezius muscles are normal. Tongue is midline. Neck without meningismus or bruits Temporal arteries not tender or enlarged TMJ areas are not tender Patient very tender on palpation over the right craniocervical junction Motor:  4/5 strength in upper and lower proximal and distal muscles. Normal bulk and tone. No fasciculations. Patient has prominent tenderness in her lumbar spine Straight leg raising test negative in the sitting position bilateral  
Reflexes:   Deep tendon reflexes 1+/4 and symmetrical. 
No babinski or clonus present Sensory:   Abnormal to touch, pinprick and vibration and DSS in all extremities. Gait:  Abnormal gait as she walks slowly because of her back pain. Tremor:   No tremor noted. Cerebellar:  No cerebellar signs present. Neurovascular:  Normal heart sounds and regular rhythm, peripheral pulses decreased, and no carotid bruits. Assessment: ICD-10-CM ICD-9-CM 1. Migraine with aura and without status migrainosus, not intractable G43.109 346.00 tiZANidine (ZANAFLEX) 2 mg tablet DUPLEX CAROTID BILATERAL AMB NEURO 2. Stenosis of both carotid arteries without infarction I65.23 433.10 tiZANidine (ZANAFLEX) 2 mg tablet 433.30 DUPLEX CAROTID BILATERAL AMB NEURO 3. Cerebral microvascular disease I67.9 437.9 tiZANidine (ZANAFLEX) 2 mg tablet DUPLEX CAROTID BILATERAL AMB NEURO 4. Cervico-occipital neuralgia of right side M54.81 723.8 tiZANidine (ZANAFLEX) 2 mg tablet DUPLEX CAROTID BILATERAL AMB NEURO 5. Tension vascular headache G44.209 307.81 tiZANidine (ZANAFLEX) 2 mg tablet DUPLEX CAROTID BILATERAL AMB NEURO 6. Dizziness and giddiness R42 780.4 tiZANidine (ZANAFLEX) 2 mg tablet DUPLEX CAROTID BILATERAL AMB NEURO  
   EEG 7. Altered mental status, unspecified altered mental status type R41.82 780.97 EEG Plan:  
 
 
Progressive headaches of the daily type described as pressure sensation on the top of her head, but not associated nausea vomiting, not associated with any new focal neurologic deficit as far as weakness or numbness, and no association with fever, meningismus, head trauma, or any other precipitating factors except for her increased stress. She does not clench her teeth. We will try her on 2 mg of Zanaflex in the morning and continue the 4 mg at night as a muscle relaxant, but hold on other medications like amitriptyline because of her cardiac problems and continuing evaluation by her electrophysiologist 
New prescription for Zanaflex sent in for patient today She will continue her Neurontin 3 times a day We will check an EEG to make sure there is no other brain problem happening MRI scan and MRA showed no clear structural lesions in the past 
Cervical spine x-rays and CT scans of head and neck reviewed personally on the PACS system Patient to continue pain management for occipital nerve block, and may need physical therapy We will continue her on Fioricet for the headaches She will call for results of her tests,, and further treatment and evaluation will depend on the results of her tests and her clinical course Difficult case Signed By: Alejandro Church MD   
 February 19, 2018 This note will not be viewable in [address] If you have questions, please do not hesitate to call me. I look forward to following Ms. Mays along with you. Sincerely, Clemencia Morris MD

## 2018-02-19 NOTE — PATIENT INSTRUCTIONS
Please be advised there is a $25 fee for all paperwork to be completed from our  providers. This is to be paid by the patient prior to picking up the completed forms. A Healthy Lifestyle: Care Instructions  Your Care Instructions    A healthy lifestyle can help you feel good, stay at a healthy weight, and have plenty of energy for both work and play. A healthy lifestyle is something you can share with your whole family. A healthy lifestyle also can lower your risk for serious health problems, such as high blood pressure, heart disease, and diabetes. You can follow a few steps listed below to improve your health and the health of your family. Follow-up care is a key part of your treatment and safety. Be sure to make and go to all appointments, and call your doctor if you are having problems. It's also a good idea to know your test results and keep a list of the medicines you take. How can you care for yourself at home? · Do not eat too much sugar, fat, or fast foods. You can still have dessert and treats now and then. The goal is moderation. · Start small to improve your eating habits. Pay attention to portion sizes, drink less juice and soda pop, and eat more fruits and vegetables. ¨ Eat a healthy amount of food. A 3-ounce serving of meat, for example, is about the size of a deck of cards. Fill the rest of your plate with vegetables and whole grains. ¨ Limit the amount of soda and sports drinks you have every day. Drink more water when you are thirsty. ¨ Eat at least 5 servings of fruits and vegetables every day. It may seem like a lot, but it is not hard to reach this goal. A serving or helping is 1 piece of fruit, 1 cup of vegetables, or 2 cups of leafy, raw vegetables. Have an apple or some carrot sticks as an afternoon snack instead of a candy bar. Try to have fruits and/or vegetables at every meal.  · Make exercise part of your daily routine.  You may want to start with simple activities, such as walking, bicycling, or slow swimming. Try to be active 30 to 60 minutes every day. You do not need to do all 30 to 60 minutes all at once. For example, you can exercise 3 times a day for 10 or 20 minutes. Moderate exercise is safe for most people, but it is always a good idea to talk to your doctor before starting an exercise program.  · Keep moving. Dnaita Galvan the lawn, work in the garden, or [email or web address]. Take the stairs instead of the elevator at work. · If you smoke, quit. People who smoke have an increased risk for heart attack, stroke, cancer, and other lung illnesses. Quitting is hard, but there are ways to boost your chance of quitting tobacco for good. ¨ Use nicotine gum, patches, or lozenges. ¨ Ask your doctor about stop-smoking programs and medicines. ¨ Keep trying. In addition to reducing your risk of diseases in the future, you will notice some benefits soon after you stop using tobacco. If you have shortness of breath or asthma symptoms, they will likely get better within a few weeks after you quit. · Limit how much alcohol you drink. Moderate amounts of alcohol (up to 2 drinks a day for men, 1 drink a day for women) are okay. But drinking too much can lead to liver problems, high blood pressure, and other health problems. Family health  If you have a family, there are many things you can do together to improve your health. · Eat meals together as a family as often as possible. · Eat healthy foods. This includes fruits, vegetables, lean meats and dairy, and whole grains. · Include your family in your fitness plan. Most people think of activities such as jogging or tennis as the way to fitness, but there are many ways you and your family can be more active. Anything that makes you breathe hard and gets your heart pumping is exercise. Here are some tips:  ¨ Walk to do errands or to take your child to school or the bus. ¨ Go for a family bike ride after dinner instead of watching TV.   Where can you learn more? Go to http://maria estherBuck's Beverage Barn.info/. Enter S761 in the search box to learn more about \"A Healthy Lifestyle: Care Instructions. \"  Current as of: May 12, 2017  Content Version: 11.4 © 2006-2017 [employer]. Care instructions adapted under license by [employer] (which disclaims liability or warranty for this information). If you have questions about a medical condition or this instruction, always ask your healthcare professional. [address] any warranty or liability for your use of this information. Vertigo: Exercises  Your Care Instructions  Here are some examples of typical rehabilitation exercises for your condition. Start each exercise slowly. Ease off the exercise if you start to have pain. Your doctor or physical therapist will tell you when you can start these exercises and which ones will work best for you. How to do the exercises  Exercise 1    1. Stand with a chair in front of you and a wall behind you. If you begin to fall, you may use them for support. 2. Stand with your feet together and your arms at your sides. 3. Move your head up and down 10 times. Exercise 2    1. Move your head side to side 10 times. Exercise 3    1. Move your head diagonally up and down 10 times. Exercise 4    1. Move your head diagonally up and down 10 times on the other side. Follow-up care is a key part of your treatment and safety. Be sure to make and go to all appointments, and call your doctor if you are having problems. It's also a good idea to know your test results and keep a list of the medicines you take. Where can you learn more? Go to http://maria estherBuck's Beverage Barn.info/. Enter F349 in the search box to learn more about \"Vertigo: Exercises. \"  Current as of: May 4, 2017  Content Version: 11.4  © 2006-2017 [employer].  Care instructions adapted under license by [employer] (which disclaims liability or warranty for this information). If you have questions about a medical condition or this instruction, always ask your healthcare professional. [address] any warranty or liability for your use of this information. [place] Exercises for Vertigo: Care Instructions  Your Care Instructions  Simple exercises can help you regain your balance when you have vertigo. If you have Ménière's disease, benign paroxysmal positional vertigo (BPPV), or another inner ear problem, you may have vertigo off and on. Do these exercises first thing in the morning and before you go to bed. You might get dizzy when you first start them. If this happens, try to do them for at least 5 minutes. Do a group of exercises at a time, starting at the top of the list. It may take several weeks before you can do all the exercises without feeling dizzy. Follow-up care is a key part of your treatment and safety. Be sure to make and go to all appointments, and call your doctor if you are having problems. It's also a good idea to know your test results and keep a list of the medicines you take. How can you care for yourself at home? Exercise 1  While sitting on the side of the bed and holding your head still:  · Look up as far as you can. · Look down as far as you can. · Look from side to side as far as you can. · Stretch your arm straight out in front of you. Focus on your index finger. Continue to focus on your finger while you bring it to your nose. Exercise 2  While sitting on the side of the bed:  · Bring your head as far back as you can. · Bring your head forward to touch your chin to your chest.  · Turn your head from side to side. · Do these exercises first with your eyes open. Then try with your eyes closed. Exercise 3  While sitting on the side of the bed:  · Shrug your shoulders straight upward, then relax them. · Bend over and try to touch the ground with your fingers.  Then go back to a sitting position. · Toss a small ball from one hand to the other. Throw the ball higher than your eyes so you have to look up. Exercise 4  While standing (with someone close by if you feel uncomfortable):  · Repeat Exercise 1.  · Repeat Exercise 2.  · Pass a ball between your legs and above your head. · Sit down and then stand up. Repeat. Turn around in a [place] a different way each time you stand. · With someone close by to help you, try the above exercises with your eyes closed. Exercise 5  In a room that is cleared of obstacles:  · Walk to a corner of the room, turn to your right, and walk back to the starting point. Now, repeat and turn left. · Walk up and down a slope. Now try stairs. · While holding on to someone's arm, try these exercises with your eyes closed. When should you call for help? Watch closely for changes in your health, and be sure to contact your doctor if:  ? · You do not get better as expected. Where can you learn more? Go to http://maria esther-sophia.info/. Enter N456 in the search box to learn more about \"Cawthorne Exercises for Vertigo: Care Instructions. \"  Current as of: May 12, 2017  Content Version: 11.4  © 1514-1099 [employer]. Care instructions adapted under license by [employer] (which disclaims liability or warranty for this information). If you have questions about a medical condition or this instruction, always ask your healthcare professional. [place] [address] any warranty or liability for your use of this information.

## 2018-02-20 ENCOUNTER — OFFICE VISIT (OUTPATIENT)
Dept: NEUROLOGY | Age: 53
End: 2018-02-20

## 2018-02-20 DIAGNOSIS — I67.89 CEREBRAL MICROVASCULAR DISEASE: ICD-10-CM

## 2018-02-20 DIAGNOSIS — M54.81 CERVICO-OCCIPITAL NEURALGIA OF RIGHT SIDE: ICD-10-CM

## 2018-02-20 DIAGNOSIS — G44.209 TENSION VASCULAR HEADACHE: ICD-10-CM

## 2018-02-20 DIAGNOSIS — R42 DIZZINESS AND GIDDINESS: ICD-10-CM

## 2018-02-20 DIAGNOSIS — I65.23 STENOSIS OF BOTH CAROTID ARTERIES WITHOUT INFARCTION: ICD-10-CM

## 2018-02-20 DIAGNOSIS — G43.109 MIGRAINE WITH AURA AND WITHOUT STATUS MIGRAINOSUS, NOT INTRACTABLE: ICD-10-CM

## 2018-02-20 NOTE — PROGRESS NOTES
Consult    Subjective:     Tye Kilgore is a 46 y.o. right-handed  female seen today in urgent basis at the request of Dr. Stacy Parra because of increasing headaches in the top and occipital region of her head, described as a pressure sensation, that occurs almost daily, with the patient having more stress and tension due to her cardiac problems. She has had 3 cardiac ablations, and has chronic A. fib, and persistent tachycardia, and is currently also on anticoagulation with Eliquis 2.5 mg twice a day, and wants to be seen for medication adjustment. She is still going under cardiac evaluation, and may need another procedure done on the cardiac standpoint with ablation type surgery also being considered again. The headaches really sound like muscle tension headaches, but she denies any neck pain, TMJ problems, but does admit to some stress. She hours he cannot take triptan's with her cardiac condition. She is already on Neurontin 300 mg 3 times a day for a lot of her musculoskeletal and other type pain, and that. She has Crohn's disease and carries a diagnosis of lupus. She went to the emergency room CT of the head, and a CT of the neck was normal except for slight soft tissue swelling in the posterior neck region behind mastoid last spring. Neurologically she is doing well except for the increased headaches. We looked at her scans on the computer today, and reviewed them personally on the PACS system with the patient, and I assured her that the and spine and all the paraspinal muscles look normal there is no involvement deeper than to just a skin and nothing is enhancing. 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  Carotid Dopplers were normal. Patient seen last year for progressive headaches for the last 6 months, becoming more frequent and more severe and more incapacitating. She does have lupus, and she also has Crohn's disease and this a chronic problem. She has a family history of migraines. She has a lot of muscle pain mainly in her legs, and significant back pain and is under pain management at [facility] with Dr. Zora De Souza. Her recent MRI scan of the lumbar spine was reviewed on the computer personally, and does show spondylolisthesis at L4-5 and mild degenerative disease without spinal stenosis or disc herniation. She feels as though her legs will give way at times, but her bowel and bladder function remain stable. She works daily as a . She's never had any testing done for her headaches. She has tried Neurontin,, which does not work as a prophylactic at 300 mg at night. She's had no unusual fever, meningismus, denies bruxism, denies any increased stress or tension, or other causes for her headaches. Past Medical History:   Diagnosis Date    Arrhythmia     Afib    Chronic pain     Lt and Rt back:  Dr Nicolas Tarango;  Pain mgmt Karen Mallory PA    Cough     evaluated 14 by Dr Nicole Harden ([phone]) \". . possible drug related lung is due to Methitrexate or atypical or fungal infection\" per office note dated 14    Crohn's disease [facility])     Dr Moe Pickett    Ill-defined condition     migraines    Lupus     Dr [place] [phone]    Pain from implanted hardware 2016    Exploration of sternal incision with removal of protruding sternal wires    Stenosis of both carotid arteries without cerebral infarction     Stroke ([address] 2016    TIA's     SVC obstruction       Past Surgical History:   Procedure Laterality Date    HX APPENDECTOMY      HX  SECTION      x1    HX COLECTOMY  7/24/10    colon resection-18\" removed; Dr Natali Sandhu GI      bowel adhesions removed    HX GI      Bowel resection    HX HEART CATHETERIZATION      no stents, open heart surgery    HX HEENT      sinus surgery for deviated septum    HX HYSTERECTOMY partial    HX LEFT SALPINGO-OOPHORECTOMY  2005    ovary and fallopian tube removed    HX MOHS PROCEDURES Right approx 2013    with bone spur repair    HX OTHER SURGICAL      femerol vein removed    HX TONSILLECTOMY      HX VASCULAR ACCESS  6/11/10    portacath insertion and removal; Gets Remicaid q5 weeks    MA COLONOSCOPY W/BIOPSY SINGLE/MULTIPLE  8/19/2013          Family History   Problem Relation Age of Onset    Cancer Father      stomach    Other Mother      Auto accident      Social History   Substance Use Topics    Smoking status: Former Smoker     Packs/day: 0.50     Years: 2.50     Quit date: 3/29/2005    Smokeless tobacco: Never Used      Comment: social    Alcohol use Yes      Comment: Rare wine         Current Outpatient Prescriptions:     tiZANidine (ZANAFLEX) 2 mg tablet, 1 po qam and 2 po qhs, Disp: 100 Tab, Rfl: 5    metoprolol succinate (TOPROL-XL) 100 mg tablet, Take 1.5 Tabs by mouth daily. (Patient taking differently: Take 100 mg by mouth two (2) times a day.), Disp: 30 Tab, Rfl: 0    apixaban (ELIQUIS) 2.5 mg tablet, Take 2.5 mg by mouth two (2) times a day. Indications: PE, Disp: , Rfl:     butalbital-acetaminophen-caffeine (FIORICET, ESGIC) -40 mg per tablet, Take 2 Tabs by mouth every eight (8) hours as needed for Pain or Headache. Max Daily Amount: 6 Tabs. MUST LAST 30 DAYS. Indications: MIGRAINE, TENSION-TYPE HEADACHE, Disp: 50 Tab, Rfl: 2    promethazine (PHENERGAN) 25 mg tablet, Take 25 mg by mouth every six (6) hours as needed for Nausea., Disp: , Rfl:     gabapentin (NEURONTIN) 300 mg capsule, TAKE ONE CAPSULE BY MOUTH THREE TIMES A DAY. PATIENT MUST MAKE APPOINTMENT FOR FURTHER REFILLS, Disp: 100 Cap, Rfl: 0    diazepam (VALIUM) 5 mg tablet, Take 1 Tab by mouth every twelve (12) hours as needed for Anxiety. Max Daily Amount: 10 mg. Refill at 1 month intervals, Disp: 30 Tab, Rfl: 2    aspirin (ASPIRIN) 325 mg tablet, Take 325 mg by mouth daily. , Disp: , Rfl:   hydroxychloroquine (PLAQUENIL) 200 mg tablet, Take 400 mg by mouth daily (after dinner). For lupus, Disp: , Rfl:     ondansetron hcl (ZOFRAN) 4 mg tablet, Take 4 mg by mouth every eight (8) hours as needed for Nausea., Disp: , Rfl:     meperidine (DEMEROL) 50 mg tablet, Take 50 mg by mouth three (3) times daily as needed for Pain., Disp: , Rfl:     drospirenone-ethinyl estradiol (MARIELOS 28) 3-20 mg-mcg per tablet, Take 1 Tab by mouth nightly., Disp: , Rfl:         Allergies   Allergen Reactions    Codeine Hives and Nausea and Vomiting     Severe nausea & vomiting    Hydrocodone Hives and Nausea and Vomiting     Severe nausea & vomiting    Oxycontin [Oxycodone] Hives and Nausea and Vomiting     Severe nausea & vomiting, also has the same reaction from Percocet    Percocet [Oxycodone-Acetaminophen] Hives and Nausea and Vomiting    Dilaudid [Hydromorphone (Bulk)] Swelling    Prednisone Other (comments)     HX OF CROHN'S; not allergic, prefer not to use         Review of Systems:  A comprehensive review of systems was negative except for: Constitutional: positive for fatigue and malaise  Eyes: positive for visual disturbance  Gastrointestinal: positive for dyspepsia, reflux symptoms, change in bowel habits, diarrhea and abdominal pain  Musculoskeletal: positive for myalgias, arthralgias, stiff joints, back pain and muscle weakness  Neurological: positive for headaches, gait problems and weakness   Vitals:    02/19/18 1259   BP: 124/86   Pulse: (!) 108   Resp: 16   Temp: 98 °F (36.7 °C)   SpO2: 99%   Weight: 199 lb (90.3 kg)   Height: 5' 1\" (1.549 m)     Objective:     I      NEUROLOGICAL EXAM:    Appearance: The patient is well developed, well nourished, provides a coherent history and is in acute distress because of headache. Mental Status: Oriented to time, place and person, and the president, cognitive function is normal, speech is fluent. Mood and affect appropriate, but a little depressed.    Cranial Nerves:   Intact visual fields. Fundi are benign. PATTI, EOM's full, no nystagmus, no ptosis. Facial sensation is normal. Corneal reflexes are not tested. Facial movement is symmetric. Hearing is abnormal bilaterally. Palate is midline with normal sternocleidomastoid and trapezius muscles are normal. Tongue is midline. Neck without meningismus or bruits  Temporal arteries not tender or enlarged  TMJ areas are not tender  Patient very tender on palpation over the right craniocervical junction   Motor:  4/5 strength in upper and lower proximal and distal muscles. Normal bulk and tone. No fasciculations. Patient has prominent tenderness in her lumbar spine  Straight leg raising test negative in the sitting position bilateral   Reflexes:   Deep tendon reflexes 1+/4 and symmetrical.  No babinski or clonus present   Sensory:   Abnormal to touch, pinprick and vibration and DSS in all extremities. Gait:  Abnormal gait as she walks slowly because of her back pain. Tremor:   No tremor noted. Cerebellar:  No cerebellar signs present. Neurovascular:  Normal heart sounds and regular rhythm, peripheral pulses decreased, and no carotid bruits. Assessment:       ICD-10-CM ICD-9-CM    1. Migraine with aura and without status migrainosus, not intractable G43.109 346.00 tiZANidine (ZANAFLEX) 2 mg tablet      DUPLEX CAROTID BILATERAL AMB NEURO   2. Stenosis of both carotid arteries without infarction I65.23 433.10 tiZANidine (ZANAFLEX) 2 mg tablet     433.30 DUPLEX CAROTID BILATERAL AMB NEURO   3. Cerebral microvascular disease I67.9 437.9 tiZANidine (ZANAFLEX) 2 mg tablet      DUPLEX CAROTID BILATERAL AMB NEURO   4. Cervico-occipital neuralgia of right side M54.81 723.8 tiZANidine (ZANAFLEX) 2 mg tablet      DUPLEX CAROTID BILATERAL AMB NEURO   5. Tension vascular headache G44.209 307.81 tiZANidine (ZANAFLEX) 2 mg tablet      DUPLEX CAROTID BILATERAL AMB NEURO   6.  Dizziness and giddiness R42 780.4 tiZANidine (ZANAFLEX) 2 mg tablet      DUPLEX CAROTID BILATERAL AMB NEURO      EEG   7. Altered mental status, unspecified altered mental status type R41.82 780.97 EEG         Plan:       Progressive headaches of the daily type described as pressure sensation on the top of her head, but not associated nausea vomiting, not associated with any new focal neurologic deficit as far as weakness or numbness, and no association with fever, meningismus, head trauma, or any other precipitating factors except for her increased stress. She does not clench her teeth. We will try her on 2 mg of Zanaflex in the morning and continue the 4 mg at night as a muscle relaxant, but hold on other medications like amitriptyline because of her cardiac problems and continuing evaluation by her electrophysiologist  New prescription for Zanaflex sent in for patient today  She will continue her Neurontin 3 times a day  We will check an EEG to make sure there is no other brain problem happening  MRI scan and MRA showed no clear structural lesions in the past  Cervical spine x-rays and CT scans of head and neck reviewed personally on the PACS system  Patient to continue pain management for occipital nerve block, and may need physical therapy  We will continue her on Fioricet for the headaches  She will call for results of her tests,, and further treatment and evaluation will depend on the results of her tests and her clinical course  Difficult case    Signed By: Slim Phelan MD     February 19, 2018         This note will not be viewable in [address]

## 2018-03-29 RX ORDER — ACETAMINOPHEN 325 MG/1
975 TABLET ORAL ONCE
Status: ACTIVE | OUTPATIENT
Start: 2018-03-30 | End: 2018-03-30

## 2018-03-29 RX ORDER — DIPHENHYDRAMINE HYDROCHLORIDE 50 MG/ML
50 INJECTION, SOLUTION INTRAMUSCULAR; INTRAVENOUS ONCE
Status: ACTIVE | OUTPATIENT
Start: 2018-03-30 | End: 2018-03-30

## 2018-03-30 ENCOUNTER — HOSPITAL ENCOUNTER (OUTPATIENT)
Dept: INFUSION THERAPY | Age: 53
Discharge: HOME | End: 2018-03-30

## 2018-04-03 ENCOUNTER — HOSPITAL ENCOUNTER (OUTPATIENT)
Dept: NEUROLOGY | Age: 53
Discharge: HOME | End: 2018-04-03
Attending: PSYCHIATRY & NEUROLOGY
Payer: COMMERCIAL

## 2018-04-03 DIAGNOSIS — R41.82 ALTERED MENTAL STATUS, UNSPECIFIED ALTERED MENTAL STATUS TYPE: ICD-10-CM

## 2018-04-03 DIAGNOSIS — R42 DIZZINESS AND GIDDINESS: ICD-10-CM

## 2018-04-03 PROCEDURE — 95816 EEG AWAKE AND DROWSY: CPT

## 2018-04-04 DIAGNOSIS — M54.12 CERVICAL RADICULOPATHY: ICD-10-CM

## 2018-04-04 DIAGNOSIS — I65.23 STENOSIS OF BOTH CAROTID ARTERIES WITHOUT INFARCTION: ICD-10-CM

## 2018-04-04 DIAGNOSIS — G56.21 ULNAR NEUROPATHY AT ELBOW OF RIGHT UPPER EXTREMITY: ICD-10-CM

## 2018-04-04 DIAGNOSIS — I67.9 CEREBROVASCULAR DISEASE, UNSPECIFIED: ICD-10-CM

## 2018-04-04 DIAGNOSIS — M54.81 CERVICO-OCCIPITAL NEURALGIA OF RIGHT SIDE: ICD-10-CM

## 2018-04-04 RX ORDER — GABAPENTIN 300 MG/1
CAPSULE ORAL
Qty: 100 CAP | Refills: 6 | Status: SHIPPED | OUTPATIENT
Start: 2018-04-04 | End: 2018-12-03 | Stop reason: SDUPTHER

## 2018-04-04 NOTE — TELEPHONE ENCOUNTER
Patient called to request a refill on her \"Neurontin\" She is completely out of medication.       [phone]

## 2018-04-04 NOTE — TELEPHONE ENCOUNTER
Future Appointments  Date Time Provider Mirtha Fowler   9/21/2018 3:00 PM Marcia Middleton MD [address]   9/28/2018 1:00 PM Zeeshan Chapman MD [facility]. 49                         Last Appointment My Department:  2/20/2018    Please advise of refill below. Requested Prescriptions     Pending Prescriptions Disp Refills    gabapentin (NEURONTIN) 300 mg capsule 100 Cap 6     Sig: TAKE ONE CAPSULE BY MOUTH THREE TIMES A DAY.

## 2018-04-05 NOTE — PROCEDURES
Patient Name: Akhil Chaves  : 1965  Age: 46 y.o. Ordering physician: Marcia Middleton  Date of EE18  EEG procedure number: [id number]  Diagnosis: Headache  Interpreting physician: Paulino Berkowitz MD      ELECTROENCEPHALOGRAM REPORT     PROCEDURE: EEG. CLINICAL INDICATION: The patient is a 46 y.o. female who is being evaluated for baseline electro cerebral activities and to rule out seizure focus. EEG CLASSIFICATION: Normal    DESCRIPTION OF THE RECORD:   The background of this recording contains a posteriorly-located occipital alpha rhythm of 10 Hz that attenuates with eye opening. Throughout the recording, there were no clear areas of focal slowing nor spike or spike-and-wave discharges seen. Hyperventilation was not performed. Photic stimulation produced no response in the posterior head regions. During the recording the patient did not achieve stage II sleep    INTERPRETATION: This is a normal electroencephalogram with the patient awake, showing no clear focal abnormalities or epileptiform activity. A normal EEG doesn't not rule out seizures. Clinical correlation recommended.       Paulino Berkowitz MD  Neurologist

## 2018-05-25 ENCOUNTER — APPOINTMENT (OUTPATIENT)
Dept: INFUSION THERAPY | Age: 53
End: 2018-05-25

## 2018-06-21 ENCOUNTER — ANESTHESIA (OUTPATIENT)
Dept: NON INVASIVE DIAGNOSTICS | Age: 53
DRG: 274 | End: 2018-06-21
Payer: COMMERCIAL

## 2018-06-21 ENCOUNTER — HOSPITAL ENCOUNTER (INPATIENT)
Dept: NON INVASIVE DIAGNOSTICS | Age: 53
LOS: 5 days | Discharge: HOME | DRG: 274 | End: 2018-06-26
Attending: INTERNAL MEDICINE | Admitting: INTERNAL MEDICINE
Payer: COMMERCIAL

## 2018-06-21 ENCOUNTER — ANESTHESIA EVENT (OUTPATIENT)
Dept: NON INVASIVE DIAGNOSTICS | Age: 53
DRG: 274 | End: 2018-06-21
Payer: COMMERCIAL

## 2018-06-21 PROBLEM — I47.1 SVT (SUPRAVENTRICULAR TACHYCARDIA) (HCC): Status: ACTIVE | Noted: 2018-06-21

## 2018-06-21 LAB
ACT BLD: 120 SECS (ref 79–138)
ACT BLD: 252 SECS (ref 79–138)
ACT BLD: 279 SECS (ref 79–138)
ACT BLD: 307 SECS (ref 79–138)

## 2018-06-21 PROCEDURE — 65270000029 HC RM PRIVATE

## 2018-06-21 PROCEDURE — 77030013079 HC BLNKT BAIR HGGR 3M -A: Performed by: ANESTHESIOLOGY

## 2018-06-21 PROCEDURE — 85347 COAGULATION TIME ACTIVATED: CPT

## 2018-06-21 PROCEDURE — 74011250636 HC RX REV CODE- 250/636

## 2018-06-21 PROCEDURE — 74011250636 HC RX REV CODE- 250/636: Performed by: INTERNAL MEDICINE

## 2018-06-21 PROCEDURE — 02K83ZZ MAP CONDUCTION MECHANISM, PERCUTANEOUS APPROACH: ICD-10-PCS | Performed by: INTERNAL MEDICINE

## 2018-06-21 PROCEDURE — C1733 CATH, EP, OTHR THAN COOL-TIP: HCPCS

## 2018-06-21 PROCEDURE — 3E053KZ INTRODUCTION OF OTHER DIAGNOSTIC SUBSTANCE INTO PERIPHERAL ARTERY, PERCUTANEOUS APPROACH: ICD-10-PCS | Performed by: INTERNAL MEDICINE

## 2018-06-21 PROCEDURE — C1759 CATH, INTRA ECHOCARDIOGRAPHY: HCPCS

## 2018-06-21 PROCEDURE — 77030018729 HC ELECTRD DEFIB PAD CARD -B

## 2018-06-21 PROCEDURE — 74011000250 HC RX REV CODE- 250

## 2018-06-21 PROCEDURE — C1894 INTRO/SHEATH, NON-LASER: HCPCS

## 2018-06-21 PROCEDURE — 74011250637 HC RX REV CODE- 250/637: Performed by: INTERNAL MEDICINE

## 2018-06-21 PROCEDURE — 76060000037 HC ANESTHESIA 3 TO 3.5 HR

## 2018-06-21 PROCEDURE — 93005 ELECTROCARDIOGRAM TRACING: CPT

## 2018-06-21 PROCEDURE — 77030015398 HC CBL EP EXT STJU -C

## 2018-06-21 PROCEDURE — C1769 GUIDE WIRE: HCPCS

## 2018-06-21 PROCEDURE — 02583ZZ DESTRUCTION OF CONDUCTION MECHANISM, PERCUTANEOUS APPROACH: ICD-10-PCS | Performed by: INTERNAL MEDICINE

## 2018-06-21 PROCEDURE — 4A023FZ MEASUREMENT OF CARDIAC RHYTHM, PERCUTANEOUS APPROACH: ICD-10-PCS | Performed by: INTERNAL MEDICINE

## 2018-06-21 PROCEDURE — 77030029065 HC DRSG HEMO QCLOT ZMED -B

## 2018-06-21 PROCEDURE — 77030004964 HC CBL EP ABLAT BSC -C

## 2018-06-21 PROCEDURE — 77030011640 HC PAD GRND REM COVD -A

## 2018-06-21 PROCEDURE — 77030030806 HC PTCH ENSIT NAVX STJU -G

## 2018-06-21 PROCEDURE — C1732 CATH, EP, DIAG/ABL, 3D/VECT: HCPCS

## 2018-06-21 PROCEDURE — B246ZZZ ULTRASONOGRAPHY OF RIGHT AND LEFT HEART: ICD-10-PCS | Performed by: INTERNAL MEDICINE

## 2018-06-21 PROCEDURE — 74011636320 HC RX REV CODE- 636/320

## 2018-06-21 PROCEDURE — 74011000250 HC RX REV CODE- 250: Performed by: INTERNAL MEDICINE

## 2018-06-21 PROCEDURE — 93662 INTRACARDIAC ECG (ICE): CPT

## 2018-06-21 RX ORDER — FENTANYL CITRATE 50 UG/ML
INJECTION, SOLUTION INTRAMUSCULAR; INTRAVENOUS AS NEEDED
Status: DISCONTINUED | OUTPATIENT
Start: 2018-06-21 | End: 2018-06-21 | Stop reason: HOSPADM

## 2018-06-21 RX ORDER — MIDAZOLAM HYDROCHLORIDE 1 MG/ML
INJECTION, SOLUTION INTRAMUSCULAR; INTRAVENOUS
Status: COMPLETED
Start: 2018-06-21 | End: 2018-06-21

## 2018-06-21 RX ORDER — LIDOCAINE HYDROCHLORIDE 10 MG/ML
1-20 INJECTION INFILTRATION; PERINEURAL
Status: DISCONTINUED | OUTPATIENT
Start: 2018-06-21 | End: 2018-06-21 | Stop reason: HOSPADM

## 2018-06-21 RX ORDER — LIDOCAINE HYDROCHLORIDE 10 MG/ML
INJECTION, SOLUTION EPIDURAL; INFILTRATION; INTRACAUDAL; PERINEURAL
Status: DISPENSED
Start: 2018-06-21 | End: 2018-06-21

## 2018-06-21 RX ORDER — ASPIRIN 325 MG
325 TABLET ORAL DAILY
Status: DISCONTINUED | OUTPATIENT
Start: 2018-06-21 | End: 2018-06-26 | Stop reason: HOSPADM

## 2018-06-21 RX ORDER — FENTANYL CITRATE 50 UG/ML
INJECTION, SOLUTION INTRAMUSCULAR; INTRAVENOUS
Status: COMPLETED
Start: 2018-06-21 | End: 2018-06-21

## 2018-06-21 RX ORDER — HEPARIN SODIUM 10000 [USP'U]/100ML
INJECTION, SOLUTION INTRAVENOUS
Status: DISCONTINUED
Start: 2018-06-21 | End: 2018-06-23

## 2018-06-21 RX ORDER — METOPROLOL SUCCINATE 50 MG/1
100 TABLET, EXTENDED RELEASE ORAL 2 TIMES DAILY
Status: DISCONTINUED | OUTPATIENT
Start: 2018-06-21 | End: 2018-06-22

## 2018-06-21 RX ORDER — HEPARIN SODIUM 200 [USP'U]/100ML
2000 INJECTION, SOLUTION INTRAVENOUS ONCE
Status: COMPLETED | OUTPATIENT
Start: 2018-06-21 | End: 2018-06-23

## 2018-06-21 RX ORDER — FENTANYL CITRATE 50 UG/ML
12.5-5 INJECTION, SOLUTION INTRAMUSCULAR; INTRAVENOUS
Status: DISCONTINUED | OUTPATIENT
Start: 2018-06-21 | End: 2018-06-21

## 2018-06-21 RX ORDER — DOBUTAMINE HYDROCHLORIDE 200 MG/100ML
0-10 INJECTION INTRAVENOUS
Status: DISCONTINUED | OUTPATIENT
Start: 2018-06-21 | End: 2018-06-21

## 2018-06-21 RX ORDER — HYDROXYCHLOROQUINE SULFATE 200 MG/1
400 TABLET, FILM COATED ORAL
Status: DISCONTINUED | OUTPATIENT
Start: 2018-06-21 | End: 2018-06-26 | Stop reason: HOSPADM

## 2018-06-21 RX ORDER — ONDANSETRON 4 MG/1
4 TABLET, ORALLY DISINTEGRATING ORAL
Status: DISCONTINUED | OUTPATIENT
Start: 2018-06-21 | End: 2018-06-26 | Stop reason: HOSPADM

## 2018-06-21 RX ORDER — DROSPIRENONE AND ETHINYL ESTRADIOL 0.02-3(28)
1 KIT ORAL
Status: DISCONTINUED | OUTPATIENT
Start: 2018-06-21 | End: 2018-06-26 | Stop reason: HOSPADM

## 2018-06-21 RX ORDER — MIDAZOLAM HYDROCHLORIDE 1 MG/ML
INJECTION, SOLUTION INTRAMUSCULAR; INTRAVENOUS AS NEEDED
Status: DISCONTINUED | OUTPATIENT
Start: 2018-06-21 | End: 2018-06-21 | Stop reason: HOSPADM

## 2018-06-21 RX ORDER — ACETAMINOPHEN 325 MG/1
650 TABLET ORAL
Status: DISCONTINUED | OUTPATIENT
Start: 2018-06-21 | End: 2018-06-23

## 2018-06-21 RX ORDER — GABAPENTIN 300 MG/1
300 CAPSULE ORAL 3 TIMES DAILY
Status: DISCONTINUED | OUTPATIENT
Start: 2018-06-21 | End: 2018-06-26 | Stop reason: HOSPADM

## 2018-06-21 RX ORDER — SODIUM CHLORIDE 0.9 % (FLUSH) 0.9 %
5-10 SYRINGE (ML) INJECTION AS NEEDED
Status: DISCONTINUED | OUTPATIENT
Start: 2018-06-21 | End: 2018-06-26 | Stop reason: HOSPADM

## 2018-06-21 RX ORDER — PROTAMINE SULFATE 10 MG/ML
25-100 INJECTION, SOLUTION INTRAVENOUS
Status: DISCONTINUED | OUTPATIENT
Start: 2018-06-21 | End: 2018-06-21

## 2018-06-21 RX ORDER — PROPOFOL 10 MG/ML
INJECTION, EMULSION INTRAVENOUS
Status: DISCONTINUED | OUTPATIENT
Start: 2018-06-21 | End: 2018-06-21 | Stop reason: HOSPADM

## 2018-06-21 RX ORDER — PROMETHAZINE HYDROCHLORIDE 25 MG/1
25 TABLET ORAL
Status: DISCONTINUED | OUTPATIENT
Start: 2018-06-21 | End: 2018-06-26 | Stop reason: HOSPADM

## 2018-06-21 RX ORDER — SODIUM CHLORIDE 0.9 % (FLUSH) 0.9 %
5-10 SYRINGE (ML) INJECTION EVERY 8 HOURS
Status: DISCONTINUED | OUTPATIENT
Start: 2018-06-21 | End: 2018-06-26 | Stop reason: HOSPADM

## 2018-06-21 RX ORDER — HEPARIN SODIUM 10000 [USP'U]/100ML
12-25 INJECTION, SOLUTION INTRAVENOUS CONTINUOUS
Status: DISCONTINUED | OUTPATIENT
Start: 2018-06-21 | End: 2018-06-21

## 2018-06-21 RX ORDER — HEPARIN SODIUM 1000 [USP'U]/ML
INJECTION, SOLUTION INTRAVENOUS; SUBCUTANEOUS AS NEEDED
Status: DISCONTINUED | OUTPATIENT
Start: 2018-06-21 | End: 2018-06-21 | Stop reason: HOSPADM

## 2018-06-21 RX ORDER — DIAZEPAM 5 MG/1
5 TABLET ORAL
Status: DISCONTINUED | OUTPATIENT
Start: 2018-06-21 | End: 2018-06-26 | Stop reason: HOSPADM

## 2018-06-21 RX ORDER — TIZANIDINE 4 MG/1
4 TABLET ORAL 2 TIMES DAILY
Status: DISCONTINUED | OUTPATIENT
Start: 2018-06-21 | End: 2018-06-26 | Stop reason: HOSPADM

## 2018-06-21 RX ORDER — MEPERIDINE HYDROCHLORIDE 50 MG/1
50 TABLET ORAL
Status: DISCONTINUED | OUTPATIENT
Start: 2018-06-21 | End: 2018-06-26 | Stop reason: HOSPADM

## 2018-06-21 RX ORDER — SODIUM CHLORIDE 9 MG/ML
INJECTION, SOLUTION INTRAVENOUS
Status: DISCONTINUED | OUTPATIENT
Start: 2018-06-21 | End: 2018-06-21 | Stop reason: HOSPADM

## 2018-06-21 RX ORDER — PHENYLEPHRINE HCL IN 0.9% NACL 0.4MG/10ML
SYRINGE (ML) INTRAVENOUS AS NEEDED
Status: DISCONTINUED | OUTPATIENT
Start: 2018-06-21 | End: 2018-06-21 | Stop reason: HOSPADM

## 2018-06-21 RX ORDER — PROTAMINE SULFATE 10 MG/ML
INJECTION, SOLUTION INTRAVENOUS AS NEEDED
Status: DISCONTINUED | OUTPATIENT
Start: 2018-06-21 | End: 2018-06-21 | Stop reason: HOSPADM

## 2018-06-21 RX ORDER — PROTAMINE SULFATE 10 MG/ML
INJECTION, SOLUTION INTRAVENOUS
Status: COMPLETED
Start: 2018-06-21 | End: 2018-06-21

## 2018-06-21 RX ORDER — DOBUTAMINE HYDROCHLORIDE 200 MG/100ML
INJECTION INTRAVENOUS
Status: DISPENSED
Start: 2018-06-21 | End: 2018-06-21

## 2018-06-21 RX ORDER — HEPARIN SODIUM 200 [USP'U]/100ML
INJECTION, SOLUTION INTRAVENOUS
Status: COMPLETED
Start: 2018-06-21 | End: 2018-06-21

## 2018-06-21 RX ORDER — LIDOCAINE HYDROCHLORIDE 20 MG/ML
INJECTION, SOLUTION EPIDURAL; INFILTRATION; INTRACAUDAL; PERINEURAL AS NEEDED
Status: DISCONTINUED | OUTPATIENT
Start: 2018-06-21 | End: 2018-06-21 | Stop reason: HOSPADM

## 2018-06-21 RX ORDER — PROPOFOL 10 MG/ML
INJECTION, EMULSION INTRAVENOUS AS NEEDED
Status: DISCONTINUED | OUTPATIENT
Start: 2018-06-21 | End: 2018-06-21 | Stop reason: HOSPADM

## 2018-06-21 RX ORDER — HEPARIN SODIUM 1000 [USP'U]/ML
30000 INJECTION, SOLUTION INTRAVENOUS; SUBCUTANEOUS ONCE
Status: DISCONTINUED | OUTPATIENT
Start: 2018-06-21 | End: 2018-06-21 | Stop reason: HOSPADM

## 2018-06-21 RX ORDER — MIDAZOLAM HYDROCHLORIDE 1 MG/ML
1-5 INJECTION, SOLUTION INTRAMUSCULAR; INTRAVENOUS
Status: DISCONTINUED | OUTPATIENT
Start: 2018-06-21 | End: 2018-06-21

## 2018-06-21 RX ADMIN — LIDOCAINE HYDROCHLORIDE 10 ML: 10 INJECTION, SOLUTION INFILTRATION; PERINEURAL at 10:20

## 2018-06-21 RX ADMIN — HYDROXYCHLOROQUINE SULFATE 400 MG: 200 TABLET, FILM COATED ENTERAL at 18:34

## 2018-06-21 RX ADMIN — HEPARIN SODIUM 5000 UNITS: 1000 INJECTION, SOLUTION INTRAVENOUS; SUBCUTANEOUS at 10:58

## 2018-06-21 RX ADMIN — DOBUTAMINE HYDROCHLORIDE 10 MCG/KG/MIN: 200 INJECTION INTRAVENOUS at 10:58

## 2018-06-21 RX ADMIN — APIXABAN 2.5 MG: 2.5 TABLET, FILM COATED ORAL at 18:28

## 2018-06-21 RX ADMIN — MIDAZOLAM HYDROCHLORIDE 2 MG: 1 INJECTION, SOLUTION INTRAMUSCULAR; INTRAVENOUS at 09:50

## 2018-06-21 RX ADMIN — MEPERIDINE HYDROCHLORIDE 50 MG: 50 TABLET ORAL at 21:27

## 2018-06-21 RX ADMIN — GABAPENTIN 300 MG: 300 CAPSULE ORAL at 18:28

## 2018-06-21 RX ADMIN — PROTAMINE SULFATE 20 MG: 10 INJECTION, SOLUTION INTRAVENOUS at 12:10

## 2018-06-21 RX ADMIN — TIZANIDINE 4 MG: 4 TABLET ORAL at 21:37

## 2018-06-21 RX ADMIN — HEPARIN SODIUM 1000 UNITS: 200 INJECTION, SOLUTION INTRAVENOUS at 10:10

## 2018-06-21 RX ADMIN — Medication 80 MCG: at 11:09

## 2018-06-21 RX ADMIN — PROPOFOL 50 MG: 10 INJECTION, EMULSION INTRAVENOUS at 10:02

## 2018-06-21 RX ADMIN — HEPARIN SODIUM 12000 UNITS: 1000 INJECTION, SOLUTION INTRAVENOUS; SUBCUTANEOUS at 10:33

## 2018-06-21 RX ADMIN — HEPARIN SODIUM 3000 UNITS: 1000 INJECTION, SOLUTION INTRAVENOUS; SUBCUTANEOUS at 11:55

## 2018-06-21 RX ADMIN — PROPOFOL 75 MCG/KG/MIN: 10 INJECTION, EMULSION INTRAVENOUS at 09:56

## 2018-06-21 RX ADMIN — Medication 80 MCG: at 11:11

## 2018-06-21 RX ADMIN — FENTANYL CITRATE 100 MCG: 50 INJECTION, SOLUTION INTRAMUSCULAR; INTRAVENOUS at 09:50

## 2018-06-21 RX ADMIN — DROSPIRENONE AND ETHINYL ESTRADIOL 1 TABLET: KIT at 21:37

## 2018-06-21 RX ADMIN — METOPROLOL SUCCINATE 100 MG: 50 TABLET, EXTENDED RELEASE ORAL at 18:28

## 2018-06-21 RX ADMIN — Medication 80 MCG: at 11:07

## 2018-06-21 RX ADMIN — PROTAMINE SULFATE 10 MG: 10 INJECTION, SOLUTION INTRAVENOUS at 12:11

## 2018-06-21 RX ADMIN — ASPIRIN 325 MG: 325 TABLET ORAL at 18:28

## 2018-06-21 RX ADMIN — PROPOFOL 50 MG: 10 INJECTION, EMULSION INTRAVENOUS at 09:56

## 2018-06-21 RX ADMIN — Medication 10 ML: at 18:29

## 2018-06-21 RX ADMIN — PROTAMINE SULFATE 10 MG: 10 INJECTION, SOLUTION INTRAVENOUS at 12:12

## 2018-06-21 RX ADMIN — IOPAMIDOL 10 ML: 755 INJECTION, SOLUTION INTRAVENOUS at 12:29

## 2018-06-21 RX ADMIN — GABAPENTIN 300 MG: 300 CAPSULE ORAL at 21:27

## 2018-06-21 RX ADMIN — ACETAMINOPHEN 650 MG: 325 TABLET ORAL at 20:23

## 2018-06-21 RX ADMIN — PROTAMINE SULFATE 10 MG: 10 INJECTION, SOLUTION INTRAVENOUS at 12:13

## 2018-06-21 RX ADMIN — SODIUM CHLORIDE: 9 INJECTION, SOLUTION INTRAVENOUS at 09:31

## 2018-06-21 RX ADMIN — PROTAMINE SULFATE 10 MG: 10 INJECTION, SOLUTION INTRAVENOUS at 12:09

## 2018-06-21 RX ADMIN — PROPOFOL 40 MG: 10 INJECTION, EMULSION INTRAVENOUS at 10:04

## 2018-06-21 RX ADMIN — Medication 10 ML: at 21:28

## 2018-06-21 RX ADMIN — LIDOCAINE HYDROCHLORIDE 60 MG: 20 INJECTION, SOLUTION EPIDURAL; INFILTRATION; INTRACAUDAL; PERINEURAL at 09:56

## 2018-06-21 RX ADMIN — ONDANSETRON 4 MG: 4 TABLET, ORALLY DISINTEGRATING ORAL at 21:27

## 2018-06-21 NOTE — IP AVS SNAPSHOT
[address] [facility] 
[phone] Patient: Abhilash Renee MRN: [id number] :1965 About your hospitalization You were admitted on:  2018 You last received care in the:  [facility] 2 [facility] CARDIO You were discharged on:  2018 Why you were hospitalized Your primary diagnosis was:  Not on File Your diagnoses also included:  Svt (Supraventricular Tachycardia) (Hcc), Hypotension Due To Drugs Follow-up Information Follow up With Details Comments Contact Info Tiff Mcrae NP On 7/10/2018 1;00pm for post-ablation follow-up    Please arrive 10min early [address] [facility] 
[phone] Dave Gutierrez MD On 2018 3:30pm  hospital follow-up [address] 
[phone] Discharge Orders None A check belén indicates which time of day the medication should be taken. My Medications CONTINUE taking these medications Instructions Each Dose to Equal  
 Morning Noon Evening Bedtime  
 aspirin 325 mg tablet Commonly known as:  ASPIRIN Your last dose was: Your next dose is: Take 325 mg by mouth daily. 325 mg  
    
   
   
   
  
 butalbital-acetaminophen-caffeine -40 mg per tablet Commonly known as:  Dorita Pata Your last dose was: Your next dose is: Take 2 Tabs by mouth every eight (8) hours as needed for Pain or Headache. Max Daily Amount: 6 Tabs. MUST LAST 30 DAYS. Indications: MIGRAINE, TENSION-TYPE HEADACHE 2 Tab DEMEROL 50 mg tablet Generic drug:  meperidine Your last dose was: Your next dose is: Take 50 mg by mouth three (3) times daily as needed for Pain. 50 mg  
    
   
   
   
  
 diazePAM 5 mg tablet Commonly known as:  VALIUM Your last dose was: Your next dose is: Take 1 Tab by mouth every twelve (12) hours as needed for Anxiety. Max Daily Amount: 10 mg. Refill at 1 month intervals 5 mg ELIQUIS 2.5 mg tablet Generic drug:  apixaban Your last dose was: Your next dose is: Take 2.5 mg by mouth two (2) times a day. Indications: PE  
 2.5 mg  
    
   
   
   
  
 gabapentin 300 mg capsule Commonly known as:  NEURONTIN Your last dose was: Your next dose is: TAKE ONE CAPSULE BY MOUTH THREE TIMES A DAY. hydroxychloroquine 200 mg tablet Commonly known as:  PLAQUENIL Your last dose was: Your next dose is: Take 400 mg by mouth daily (after dinner). For lupus 400 mg  
    
   
   
   
  
 promethazine 25 mg tablet Commonly known as:  PHENERGAN Your last dose was: Your next dose is: Take 25 mg by mouth every six (6) hours as needed for Nausea. 25 mg  
    
   
   
   
  
 tiZANidine 2 mg tablet Commonly known as:  Afia Conrad Your last dose was: Your next dose is:    
   
   
 1 po qam and 2 po qhs  
     
   
   
   
  
 MARIELOS (28) 3-0.02 mg Tab Generic drug:  drospirenone-ethinyl estradiol Your last dose was: Your next dose is: Take 1 Tab by mouth nightly. 1 Tab ZOFRAN 4 mg tablet Generic drug:  ondansetron hcl Your last dose was: Your next dose is: Take 4 mg by mouth every eight (8) hours as needed for Nausea. 4 mg STOP taking these medications   
 metoprolol succinate 100 mg tablet Commonly known as:  TOPROL-XL Opioid Education  Prescription Opioids: What You Need to Know: 
 
 
Cardiology Procedures this Admission: 1.  EP study with ablation (sinus node modification) on 6/21 2. Echo on 6/22 revealing normal left ventricular pumping function, no extracardiac fluid (pericardial effusion) 3.  Echo on 6/24 revealing normal left ventricular pumping function, Disposition: home Patient Instructions:  
Please check your blood pressure at least once daily to follow heart rate and blood pressure and log the results. You do not need a beta-blocker--the ablation took care of your need to have this medication to control heart rate. FOLLOW-UP: 
 
Call the office [phone] to make an appointment for 2 weeks with Jimmy Harding NP. Call sooner for any questions or concerns. [address] [facility], [address]    [phone] [facility], [address] [place] [address]    [email or web address] Signed: 
Celestino Murray MD 
 
 
  
  
  
Expa Announcement We are excited to announce that we are making your provider's discharge notes available to you in Expa. You will see these notes when they are completed and signed by the physician that discharged you from your recent hospital stay. If you have any questions or concerns about any information you see in Expa, please call the Health Information Department where you were seen or reach out to your Primary Care Provider for more information about your plan of care. Introducing hospitals & HEALTH SERVICES! Dear Kia Claire: 
Thank you for requesting a Expa account. Our records indicate that you already have an active Expa account.   You can access your account anytime at [email or web address] [email or web address] Did you know that you can access your hospital and ER discharge instructions at any time in Omnidrone? You can also review all of your test results from your hospital stay or ER visit. Additional Information If you have questions, please visit the Frequently Asked Questions section of the Omnidrone website at [email or web address] Remember, Omnidrone is NOT to be used for urgent needs. For medical emergencies, dial 911. Now available from your iPhone and Android! Introducing Víctor Sprague As a [facility] patient, I wanted to make you aware of our electronic visit tool called Víctor Sprague. [employer] allows you to connect within minutes with a medical provider 24 hours a day, seven days a week via a mobile device or tablet or logging into a secure website from your computer. You can access Víctor Sprague from anywhere in the [place]. A virtual visit might be right for you when you have a simple condition and feel like you just dont want to get out of bed, or cant get away from work for an appointment, when your regular [facility] provider is not available (evenings, weekends or holidays), or when youre out of town and need minor care. Electronic visits cost only $49 and if the [employer] provider determines a prescription is needed to treat your condition, one can be electronically transmitted to a nearby pharmacy*. Please take a moment to enroll today if you have not already done so. The enrollment process is free and takes just a few minutes. To enroll, please download the [employer] guy to your tablet or phone, or visit [email or web address] to enroll on your computer.    
And, as an [address] [place] [address] patient with a Novopyxis account, the results of your visits will be scanned into your electronic medical record and your primary care provider will be able to view the scanned results. We urge you to continue to see your regular [facility] provider for your ongoing medical care. And while your primary care provider may not be the one available when you seek a [employer] virtual visit, the peace of mind you get from getting a real diagnosis real time can be priceless. For more information on Audioscribe, view our Frequently Asked Questions (FAQs) at [email or web address]. Sincerely, 
 
Ángel Shelby MD 
Chief Medical Officer Jacquelyn Lux *:  certain medications cannot be prescribed via Audioscribe Unresulted tests-please follow up with your PCP on these results Procedure/Test Authorizing Provider 2D ECHO Haresh Yarbrough MD  
 2D ECHO Travis Huertas MD  
 CBC W/O DIFF Rivka Rosado MD  
 CBC W/O DIFF Rivka Rosado MD  
 ECG RHYTHM ANALYSIS ADULT Rivka Rosado MD  
 EKG, 12 LEAD, INITIAL Rivka Rosado MD  
 EKG, 12 LEAD, Mabel Richter MD  
 EKG, 12 LEAD, SUBSEQUENT Rivka Rosado MD  
 METABOLIC PANEL, Carol Schaeffer MD  
 METABOLIC PANEL, BASIC Rivka Rosado MD  
 XR CHEST PORT Jose Alberto Sears MD  
  
Providers Seen During Your Hospitalization Provider Specialty Primary office phone Rivka Rosado MD Cardiology [phone] Your Primary Care Physician (PCP) Primary Care Physician Office Phone Office Fax Keisha Riveraxander [phone] You are allergic to the following Allergen Reactions Codeine Hives Nausea and Vomiting Severe nausea & vomiting Hydrocodone Hives Nausea and Vomiting Severe nausea & vomiting Oxycontin (Oxycodone) Hives Nausea and Vomiting Severe nausea & vomiting, also has the same reaction from Percocet Percocet (Oxycodone-Acetaminophen) Hives Nausea and Vomiting Can take tylenol Dilaudid (Hydromorphone (Bulk)) Swelling Prednisone Other (comments) HX OF CROHN'S; not allergic, prefer not to use Recent Documentation Height Weight Breastfeeding? BMI OB Status Smoking Status 1.549 m 103.5 kg No 43.1 kg/m2 Postmenopausal Former Smoker Emergency Contacts Name Discharge Info Relation Home Work Mobile Stephanie Guerra CAREGIVER [3] Spouse [3] [phone] Patient Belongings The following personal items are in your possession at time of discharge: 
  Dental Appliances: None  Visual Aid: Glasses      Home Medications: None   Jewelry: None  Clothing: At bedside    Other Valuables: None  Personal Items Sent to Safe: none Please provide this summary of care documentation to your next provider. Signatures-by signing, you are acknowledging that this After Visit Summary has been reviewed with you and you have received a copy. Patient Signature:  ____________________________________________________________ Date:  ____________________________________________________________  
  
Willie Jerome Provider Signature:  ____________________________________________________________ Date:  ____________________________________________________________

## 2018-06-21 NOTE — PROGRESS NOTES
S/p EP study and sinus node modification using noncontact mapping array. Dobutamine infusion prior to ablation. ICE. Starting HR ~100, final HR~80. No immediate complications. Full report to follow.

## 2018-06-21 NOTE — ANESTHESIA POSTPROCEDURE EVALUATION
Post-Anesthesia Evaluation and Assessment    Patient: Ashutosh Craven MRN: [id number]  SSN: [id number]    YOB: 1965  Age: 46 y.o. Sex: female       Cardiovascular Function/Vital Signs  Visit Vitals    /81    Pulse 76    Temp 36.7 °C (98 °F)    Resp 20    Ht 5' 1\" (1.549 m)    Wt 87.1 kg (192 lb)    SpO2 100%    Breastfeeding No    BMI 36.28 kg/m2       Patient is status post MAC anesthesia for * No procedures listed *. Nausea/Vomiting: None    Postoperative hydration reviewed and adequate. Pain:  Pain Scale 1: Numeric (0 - 10) (06/21/18 0758)  Pain Intensity 1: 0 (06/21/18 0758)   Managed    Neurological Status: At baseline    Mental Status and Level of Consciousness: Arousable    Pulmonary Status:   O2 Device: CO2 nasal cannula (06/21/18 1248)   Adequate oxygenation and airway patent    Complications related to anesthesia: None    Post-anesthesia assessment completed.  No concerns    Signed By: Kim Allan MD     June 21, 2018

## 2018-06-21 NOTE — IP AVS SNAPSHOT
[address] 
[phone] Patient: Darci Fernandes MRN: [id number] :1965 A check belén indicates which time of day the medication should be taken. My Medications CONTINUE taking these medications Instructions Each Dose to Equal  
 Morning Noon Evening Bedtime  
 aspirin 325 mg tablet Commonly known as:  ASPIRIN Your last dose was: Your next dose is: Take 325 mg by mouth daily. 325 mg  
    
   
   
   
  
 butalbital-acetaminophen-caffeine -40 mg per tablet Commonly known as:  Berton Cons Your last dose was: Your next dose is: Take 2 Tabs by mouth every eight (8) hours as needed for Pain or Headache. Max Daily Amount: 6 Tabs. MUST LAST 30 DAYS. Indications: MIGRAINE, TENSION-TYPE HEADACHE 2 Tab DEMEROL 50 mg tablet Generic drug:  meperidine Your last dose was: Your next dose is: Take 50 mg by mouth three (3) times daily as needed for Pain. 50 mg  
    
   
   
   
  
 diazePAM 5 mg tablet Commonly known as:  VALIUM Your last dose was: Your next dose is: Take 1 Tab by mouth every twelve (12) hours as needed for Anxiety. Max Daily Amount: 10 mg. Refill at 1 month intervals 5 mg ELIQUIS 2.5 mg tablet Generic drug:  apixaban Your last dose was: Your next dose is: Take 2.5 mg by mouth two (2) times a day. Indications: PE  
 2.5 mg  
    
   
   
   
  
 gabapentin 300 mg capsule Commonly known as:  NEURONTIN Your last dose was: Your next dose is: TAKE ONE CAPSULE BY MOUTH THREE TIMES A DAY. hydroxychloroquine 200 mg tablet Commonly known as:  PLAQUENIL Your last dose was: Your next dose is: Take 400 mg by mouth daily (after dinner). For lupus 400 mg  
    
   
   
   
  
 promethazine 25 mg tablet Commonly known as:  PHENERGAN Your last dose was: Your next dose is: Take 25 mg by mouth every six (6) hours as needed for Nausea. 25 mg  
    
   
   
   
  
 tiZANidine 2 mg tablet Commonly known as:  Odin Able Your last dose was: Your next dose is:    
   
   
 1 po qam and 2 po qhs  
     
   
   
   
  
 MARIELOS (28) 3-0.02 mg Tab Generic drug:  drospirenone-ethinyl estradiol Your last dose was: Your next dose is: Take 1 Tab by mouth nightly. 1 Tab ZOFRAN 4 mg tablet Generic drug:  ondansetron hcl Your last dose was: Your next dose is: Take 4 mg by mouth every eight (8) hours as needed for Nausea. 4 mg STOP taking these medications   
 metoprolol succinate 100 mg tablet Commonly known as:  TOPROL-XL

## 2018-06-21 NOTE — PROGRESS NOTES
Patient up ambulating halls with no difficulty or complaints. Patient also voiding. Pt's bilat groin sites c/d/i w/ no bleeding or hematoma noted. Pt up sitting in chair eating, visiting with  with no complaints at this time.

## 2018-06-21 NOTE — PROCEDURES
[address]  [phone]    Patient ID:  Patient: Gokul Kingi  MRN: [id number]  Age: 46 y.o.  : 1965  Gender: female  Study Date: 2018    History:  This is a female with supraventricular tachycardia with symptoms, here for elective EP study and cardiac ablation. She has had a prior high right atrial tachycardia ablation and may have inappropriate sinus tachycardia.      Procedures Performed:   1.  Comprehensive EP study with SVT ablation (71239)   2.  Intracardiac electrophysiologic 3-D mapping (56820)    3.  Pacing and stimulation during IV drug infusion for arrhythmia induction (54567)  4. Use and interpretation of intra cardiac echocardiography (68924-59)      The patient was brought to EP lab in NPO state and after informed consent.  Continuous ECG and hemodynamic monitoring was performed.  Sedation was by the anesthesiologist who was in constant attendance throughout the procedure.  Right groin was anesthetized with 1% lidocaine and access obtained (2 safe sheaths in the right femoral vein, 1 safe sheath in the left femoral vein).     An 8Fr sheath on the right was changed to SR-0 long support sheath.  A catheter was positioned in the low septal RA (His region) and RV apex as needed.  An 8 Fr 5 mm tip deflectable mapping and ablation catheter was used. An intra-cardiac echo probe was advanced into the right atrium and used to visualize the valves, the left and right atrium, superior and inferior vena cava. Heparin was given prior to deployment of the mapping array and  boluses were given with a goal ACT of >300 sec.      A comprehensive EP study performed including both atrial and ventricular burst and extrastimulus pacing.  3D mapping was done using the [employer] mapping system.   This included noncontact mapping using an electrode array.     Drug infusion with dobutamine was used prior to any ablation during pacing and stimulation for arrhythmia induction. Protamine was given at the end of the procedure to reverse heparin.          Preoperative Diagnosis: As above. Postoperative Diagnosis: As above. Procedure:  As above. Surgeon(s) and Role: Isaias Ortiz MD - Primary   Anesthesia: [facility]. Estimated Blood Loss:  30 cc. Specimens: * No specimens in log *   Findings:  As below. Complications:  None.      Ablation therapy: 8 treatments to a total of 4.21 minutes  X-ray time:  10.4 minutes      Findings:  1.  At baseline, sinus tachycardia was present.  (, , QRS 91, and  ms).   The AH and HV intervals were 80 and 51 ms, respectively. 2.  After all therapy was complete, sinus node recovery time was measured using drive cycles of 034, 500, and 400 ms. The corrected sinus node recovery time as <500 ms for all drives. 3.  Antegrade conduction was midline and decremental without preexcitation.  WBCL was 340 ms.  Dual AV node physiology was not identified. The AVNERP and AERP were 600/<=210 and 600/210 ms, respectively. 4.  Retrograde conduction was midline and decremental.  The retrograde WBCL was 500 ms. The VAERP and VERP were 600/470 and 600/420 ms, respectively.    5.   A baseline activation map was derived after obtaining geometry with the array in place. Then, dobutamine infusion was started at 10 mcg/kg/min. The sinus cycle length was 460 ms and mapped to the superior and lateral right atrium. 6.  High output pacing to check for phrenic nerve stimulation was done prior to any ablation application. Using 3D mapping to facilitate precision of ablation therapy, a 8Fr 5 mm tip mapping and ablation catheter was used to ablate in the region of \"breakout\" resulting in an accelerated heart rate. The sinus cycle length dropped to 545 ms. The lesion was extended laterally and then inferiorly based on breakout.  Of note, when dobutamine was stopped, the cycle length dropped to 545 ms.   Dobutamine was restarted and the cycle length was just under 600 ms with 15 mcg/kg/min. Other breakout locations were either in the low lateral right atrium or closer to the tricuspid valve, somewhat unusual.  The P wave morphology did not suggest such a low focus, so I did not pursue ablating at these areas. 7.  At the end of the procedure, ICE confirmed no significant pericardial effusion and the superior vena cava orifice was not modified.      Impression:  1.  Successful modification of sinus node function. 2.  Normal antegrade conduction without preexcitation or infra-His block.  Dual AV node physiology was seen prior to ablation. 3.  Retrograde conduction was normal.      RECS:  After successful sinus node modification, continue beta-blocker.   Will reassess sinus node rate in the future.      Signed:  Liana Veronica MD

## 2018-06-21 NOTE — ANESTHESIA PREPROCEDURE EVALUATION
Anesthetic History   No history of anesthetic complications            Review of Systems / Medical History  Patient summary reviewed, nursing notes reviewed and pertinent labs reviewed    Pulmonary          Shortness of breath      Comments: Former smoker - Quit 2005  H/O PE   Neuro/Psych       CVA: no residual symptoms  TIA and headaches    Comments: Migraines  Lumbar radiculopathy  Cervical radiculopathy  Right-sided Cervico-Occipital Neuralgia  Phrenic nerve palsy  Right ulnar neuropathy Cardiovascular            Dysrhythmias : SVT      Exercise tolerance: <4 METS  Comments: Inappropriate Sinus Tachycardia    Bilateral Carotid Stenosis    S/P SVC Stricture repair (7/5/16) with subsequent sternal exploration and hardware removal (12/14/16)   GI/Hepatic/Renal               Comments: Crohn's Disease  S/P Partial colectomy Endo/Other        Obesity     Other Findings   Comments: Systemic Lupus Erythematosus    Vitamin D Deficiency         Physical Exam    Airway  Mallampati: I  TM Distance: > 6 cm  Neck ROM: normal range of motion   Mouth opening: Normal     Cardiovascular  Regular rate and rhythm,  S1 and S2 normal,  no murmur, click, rub, or gallop             Dental         Pulmonary  Breath sounds clear to auscultation               Abdominal  GI exam deferred       Other Findings            Anesthetic Plan    ASA: 3  Anesthesia type: MAC          Induction: Intravenous  Anesthetic plan and risks discussed with: Patient

## 2018-06-21 NOTE — PROGRESS NOTES
TRANSFER - IN REPORT:    Verbal report received from Anthony Castillo RN on Abhilash Renee  being received from EP for routine progression of care. Report consisted of patients Situation, Background, Assessment and Recommendations(SBAR). Information from the following report(s) Procedure Summary and MAR was reviewed with the receiving clinician. Opportunity for questions and clarification was provided. Assessment completed upon patients arrival to [address] [place] [address] and care assumed. Cardiac Cath Lab Recovery Arrival Note:    Abhilash Renee arrived to [facility] recovery area. Patient procedure= SVT. Patient on cardiac monitor, non-invasive blood pressure, SPO2 monitor. On room air. Patient status doing well without problems. Patient is A&Ox 3. Patient reports no c/o. PROCEDURE SITE CHECK:    Procedure site:without any bleeding and no hematoma, no pain/discomfort reported at procedure site. No change in patient status. Continue to monitor patient and status.

## 2018-06-22 PROBLEM — I95.2 HYPOTENSION DUE TO DRUGS: Status: ACTIVE | Noted: 2018-06-22

## 2018-06-22 LAB
ATRIAL RATE: 70 BPM
ATRIAL RATE: 82 BPM
CALCULATED P AXIS, ECG09: 25 DEGREES
CALCULATED P AXIS, ECG09: 57 DEGREES
CALCULATED R AXIS, ECG10: 26 DEGREES
CALCULATED R AXIS, ECG10: 37 DEGREES
CALCULATED T AXIS, ECG11: 37 DEGREES
CALCULATED T AXIS, ECG11: 47 DEGREES
DIAGNOSIS, 93000: NORMAL
DIAGNOSIS, 93000: NORMAL
P-R INTERVAL, ECG05: 134 MS
P-R INTERVAL, ECG05: 134 MS
Q-T INTERVAL, ECG07: 370 MS
Q-T INTERVAL, ECG07: 426 MS
QRS DURATION, ECG06: 80 MS
QRS DURATION, ECG06: 82 MS
QTC CALCULATION (BEZET), ECG08: 432 MS
QTC CALCULATION (BEZET), ECG08: 460 MS
VENTRICULAR RATE, ECG03: 70 BPM
VENTRICULAR RATE, ECG03: 82 BPM

## 2018-06-22 PROCEDURE — 93005 ELECTROCARDIOGRAM TRACING: CPT

## 2018-06-22 PROCEDURE — 99218 HC RM OBSERVATION: CPT

## 2018-06-22 PROCEDURE — 65270000029 HC RM PRIVATE

## 2018-06-22 PROCEDURE — 74011250637 HC RX REV CODE- 250/637: Performed by: INTERNAL MEDICINE

## 2018-06-22 PROCEDURE — 74011250636 HC RX REV CODE- 250/636: Performed by: INTERNAL MEDICINE

## 2018-06-22 PROCEDURE — 93308 TTE F-UP OR LMTD: CPT

## 2018-06-22 RX ORDER — METOPROLOL SUCCINATE 50 MG/1
50 TABLET, EXTENDED RELEASE ORAL DAILY
Status: DISCONTINUED | OUTPATIENT
Start: 2018-06-23 | End: 2018-06-23

## 2018-06-22 RX ORDER — METOPROLOL SUCCINATE 50 MG/1
50 TABLET, EXTENDED RELEASE ORAL 2 TIMES DAILY
Status: DISCONTINUED | OUTPATIENT
Start: 2018-06-22 | End: 2018-06-22

## 2018-06-22 RX ORDER — ATROPINE SULFATE 1 MG/ML
0.5 INJECTION, SOLUTION INTRAVENOUS ONCE
Status: COMPLETED | OUTPATIENT
Start: 2018-06-22 | End: 2018-06-22

## 2018-06-22 RX ADMIN — ASPIRIN 325 MG: 325 TABLET ORAL at 08:26

## 2018-06-22 RX ADMIN — MEPERIDINE HYDROCHLORIDE 50 MG: 50 TABLET ORAL at 20:17

## 2018-06-22 RX ADMIN — PROMETHAZINE HYDROCHLORIDE 25 MG: 25 TABLET ORAL at 08:26

## 2018-06-22 RX ADMIN — APIXABAN 2.5 MG: 2.5 TABLET, FILM COATED ORAL at 08:26

## 2018-06-22 RX ADMIN — Medication 10 ML: at 21:37

## 2018-06-22 RX ADMIN — DROSPIRENONE AND ETHINYL ESTRADIOL 1 TABLET: KIT at 20:17

## 2018-06-22 RX ADMIN — METOPROLOL SUCCINATE 100 MG: 50 TABLET, EXTENDED RELEASE ORAL at 08:26

## 2018-06-22 RX ADMIN — HYDROXYCHLOROQUINE SULFATE 400 MG: 200 TABLET, FILM COATED ENTERAL at 19:35

## 2018-06-22 RX ADMIN — ACETAMINOPHEN 650 MG: 325 TABLET ORAL at 23:21

## 2018-06-22 RX ADMIN — ACETAMINOPHEN 650 MG: 325 TABLET ORAL at 03:34

## 2018-06-22 RX ADMIN — ACETAMINOPHEN 650 MG: 325 TABLET ORAL at 17:37

## 2018-06-22 RX ADMIN — ATROPINE SULFATE 0.5 MG: 1 INJECTION, SOLUTION INTRAMUSCULAR; INTRAVENOUS; SUBCUTANEOUS at 10:00

## 2018-06-22 RX ADMIN — GABAPENTIN 300 MG: 300 CAPSULE ORAL at 17:37

## 2018-06-22 RX ADMIN — APIXABAN 2.5 MG: 2.5 TABLET, FILM COATED ORAL at 17:37

## 2018-06-22 RX ADMIN — TIZANIDINE 4 MG: 4 TABLET ORAL at 08:26

## 2018-06-22 RX ADMIN — GABAPENTIN 300 MG: 300 CAPSULE ORAL at 21:37

## 2018-06-22 RX ADMIN — ONDANSETRON 4 MG: 4 TABLET, ORALLY DISINTEGRATING ORAL at 08:30

## 2018-06-22 RX ADMIN — SODIUM CHLORIDE 1000 ML: 900 INJECTION, SOLUTION INTRAVENOUS at 10:00

## 2018-06-22 RX ADMIN — GABAPENTIN 300 MG: 300 CAPSULE ORAL at 08:26

## 2018-06-22 RX ADMIN — TIZANIDINE 4 MG: 4 TABLET ORAL at 21:37

## 2018-06-22 NOTE — DISCHARGE INSTRUCTIONS
Cardiology Discharge Instructions                Patient ID:  Edmund Klein  [id number]  75 y.o.  1965    Admit Date: 6/21/2018    Discharge Date: 6/25/2018   Admitting Physician: Froilan Talamantes MD   Discharge Physician: Froilan Talamantes MD    Cardiology Procedures this Admission:  1.  EP study with ablation (sinus node modification) on 6/21  2. Echo on 6/22 revealing normal left ventricular pumping function, no extracardiac fluid (pericardial effusion)  3.  Echo on 6/24 revealing normal left ventricular pumping function,       Disposition: home    Patient Instructions:   Please check your blood pressure at least once daily to follow heart rate and blood pressure and log the results. You do not need a beta-blocker--the ablation took care of your need to have this medication to control heart rate. FOLLOW-UP:    Call the office [phone] to make an appointment for 2 weeks with Nolberto Mcardle, NP. Call sooner for any questions or concerns. [address] [place] [address], [address]    [phone]  [place], [address] [facility]    [email or web address]      Signed:  Froilan Talamantes MD

## 2018-06-22 NOTE — PROGRESS NOTES
Felt OK this AM, walked the halls. Got beta-blocker dose around 8 am, now BP lower. When talking to her, she said that she felt dizzy while seated. HR 60's currently. I think she needs to be watched further, let beta-blocker wear off, probably too much for her now. Will give IVF, bedrest, reassess.

## 2018-06-22 NOTE — PROGRESS NOTES
Problem: Falls - Risk of  Goal: *Absence of Falls  Document Billy Fall Risk and appropriate interventions in the flowsheet.    Outcome: Progressing Towards Goal  Fall Risk Interventions:            Medication Interventions: Teach patient to arise slowly

## 2018-06-22 NOTE — PROGRESS NOTES
EP/ Arrhythmia/ Cardiology Progress Note    Patient ID:  Patient: Hema Wang  MRN: [id number]  Age: 46 y.o.  : 1965 5:12 PM  Admit Date: 2018    Assessment: 1. Hypotension due to beta-blocker. Echo  EF 65-70%, no pericardial effusion. 2. Mild chest pain, pleuritic, post-ablation. 3. Inappropriate sinus tachycardia s/p sinus node modification on .  4. History of high right atrial tachycardia ablation in 2018.  5. 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  6. Lupus, followed by Dr. Aan Montalvo (Rheum). 7. Crohn's disease, followed by Dr. Elise Rodriguez (GI). 8. Prior transient ischemic attack/stroke, followed by Dr. Kindra Malagon (Neuro). 9. Remote pulmonary embolism, followed by Dr. Mohinder Gutierrez (Pulm). 10. Chronic anticoagulation. 11. Chronic aspirin. Plan:     1. IVF given. 2. Atropine given x 1 earlier . 3. Metoprolol XL held. Will have to figure out what an appropriate dose is for her. Was on 100 mg bid, but will clearly need less. 4. Continue other medications. Home when beta-blocker dose figured out, hopefully tomorrow. Observation status. [x]       High complexity decision making was performed in this patient at high risk for decompensation in the outpatient setting    Subjective:     Hema Wang denies dyspnea, palpitations, paroxysmal nocturnal dyspnea. She has expected post-ablation chest discomfort with deep inspiration.  AM, felt dizzy but this has improved. Review of Systems - No melena, nausea, vomiting, diarrhea, gross hematuria, abdominal pain, headache, subjective fever, chills, hemoptysis.     Objective:     Physical Exam:  Temp (24hrs), Av.3 °F (36.8 °C), Min:97.6 °F (36.4 °C), Max:99.4 °F (37.4 °C)    Patient Vitals for the past 8 hrs:   Pulse   18 1554 83   18 1330 82   18 1300 68 06/22/18 1245 68   06/22/18 1230 67   06/22/18 1215 67   06/22/18 1203 67   06/22/18 1155 68   06/22/18 1131 68   06/22/18 1115 68   06/22/18 1100 67   06/22/18 1045 68   06/22/18 1040 68   06/22/18 1020 68    Patient Vitals for the past 8 hrs:   Resp   06/22/18 1554 16   06/22/18 1020 16    Patient Vitals for the past 8 hrs:   BP   06/22/18 1554 103/89   06/22/18 1330 114/65   06/22/18 1300 105/55   06/22/18 1245 105/62   06/22/18 1230 93/63   06/22/18 1215 99/52   06/22/18 1203 95/61   06/22/18 1155 98/48   06/22/18 1131 (!) 97/35   06/22/18 1115 113/44   06/22/18 1100 112/56   06/22/18 1045 104/52   06/22/18 1040 103/52   06/22/18 1020 (!) 87/57      No intake or output data in the 24 hours ending 06/22/18 1712    Nondiaphoretic, not in acute distress. MMM, no jaundice, HEENT stable. Unlabored, clear to auscultation bilaterally anteriorly, symmetric air movement. Regular rate and rhythm, no murmur, pericardial rub, knock, or gallop. No JVD. 1+ RLE peripheral edema (chronic). Faintly palpable radial pulses bilaterally. Abdomen soft, nontender, nondistended. No cyanosis. Skin warm and dry. No ulcers or rash. Musculoskeletal exam stable. Awake, appropriate, neuro grossly nonfocal.  No tremor. Cardiographics and Studies, I personally reviewed:    Telemetry:  SR 70's currently. ECG 6/22:  SR 70 bpm, no acute ischemia or infarct, no pericarditis changes. ECHO 6/22:  LEFT VENTRICLE: Size was normal. Systolic function was vigorous. Ejection  fraction was estimated in the range of 60 % to 70 %. There were no  regional wall motion abnormalities. There was mild concentric hypertrophy. RIGHT VENTRICLE: The size was normal. Systolic function was normal.    LEFT ATRIUM: The atrium was dilated. RIGHT ATRIUM: Size was normal.    MITRAL VALVE: Normal valve structure. There was normal leaflet separation. DOPPLER: The transmitral velocity was within the normal range.  There was  no evidence for stenosis. There was trivial regurgitation. AORTIC VALVE: The valve was trileaflet. Leaflets exhibited normal  thickness and normal cuspal separation. DOPPLER: Transaortic velocity was  within the normal range. There was no stenosis. There was no regurgitation. TRICUSPID VALVE: Not well visualized. DOPPLER: There was mild  regurgitation. PULMONIC VALVE: Not well visualized, but normal Doppler findings. AORTA: The root exhibited normal size. PERICARDIUM: There was no pericardial effusion. LAB Review:     No results for input(s): CPK, CKMB, CKNDX, TROIQ in the last 72 hours. No lab exists for component: CPKMB  Lab Results   Component Value Date/Time    Cholesterol, total 168 04/23/2016 03:57 AM    HDL Cholesterol 81 04/23/2016 03:57 AM    LDL, calculated 43.2 04/23/2016 03:57 AM    Triglyceride 219 (H) 04/23/2016 03:57 AM    CHOL/HDL Ratio 2.1 04/23/2016 03:57 AM     No results for input(s): INR, PTP, APTT in the last 72 hours. No lab exists for component: INREXT   No results for input(s): NA, K, CL, CO2, BUN, CREA, GLU, PHOS, CA, ALB, WBC, HGB, HCT, PLT, HGBEXT, HCTEXT, PLTEXT in the last 72 hours. No results for input(s): SGOT, GPT, AP, TBIL, TP, ALB, GLOB, GGT, AML, LPSE in the last 72 hours. No lab exists for component: AMYP, HLPSE  No components found for: GLPOC  No results for input(s): PH, PCO2, PO2 in the last 72 hours. Medications Reviewed:      Allergies   Allergen Reactions    Codeine Hives and Nausea and Vomiting     Severe nausea & vomiting    Hydrocodone Hives and Nausea and Vomiting     Severe nausea & vomiting    Oxycontin [Oxycodone] Hives and Nausea and Vomiting     Severe nausea & vomiting, also has the same reaction from Percocet    Percocet [Oxycodone-Acetaminophen] Hives and Nausea and Vomiting     Can take tylenol    Dilaudid [Hydromorphone (Bulk)] Swelling    Prednisone Other (comments)     HX OF CROHN'S; not allergic, prefer not to use         Current Facility-Administered Medications   Medication Dose Route Frequency    heparin 25,000 units in D5W 250 ml 25,000 unit/250 mL(100 unit/mL) infusion        apixaban (ELIQUIS) tablet 2.5 mg  2.5 mg Oral BID    aspirin (ASPIRIN) tablet 325 mg  325 mg Oral DAILY    diazePAM (VALIUM) tablet 5 mg  5 mg Oral Q12H PRN    drospirenone-ethinyl estradiol (MARIELOS) 3-0.02 mg per tablet 1 Tab (Patient Supplied)  1 Tab Oral QHS    gabapentin (NEURONTIN) capsule 300 mg  300 mg Oral TID    meperidine (DEMEROL) tablet 50 mg  50 mg Oral TID PRN    hydroxychloroquine (PLAQUENIL) tablet 400 mg  400 mg Oral PCD    ondansetron (ZOFRAN ODT) tablet 4 mg  4 mg Oral Q8H PRN    promethazine (PHENERGAN) tablet 25 mg  25 mg Oral Q6H PRN    tiZANidine (ZANAFLEX) tablet 4 mg  4 mg Oral BID    sodium chloride (NS) flush 5-10 mL  5-10 mL IntraVENous Q8H    sodium chloride (NS) flush 5-10 mL  5-10 mL IntraVENous PRN    acetaminophen (TYLENOL) tablet 650 mg  650 mg Oral Q6H PRN          Khurram Montalvo MD  6/22/2018

## 2018-06-22 NOTE — PROGRESS NOTES
Bedside and Verbal shift change report given to Carlos Asencio RN (oncoming nurse) by Maribel Hallman RN (offgoing nurse). Report included the following information SBAR, Kardex, Procedure Summary, Intake/Output, MAR, Accordion, Recent Results and Cardiac Rhythm NSR.

## 2018-06-22 NOTE — PROGRESS NOTES
BP 87/57 c/o of chest tightness 7/10, HR 70's, no c/o dizziness. IVF started at 100 ml/hr.      10:40 AM /52 HR 69 notified Dr. Manjeet Perdomo    11:07 AM   called echo tech     2:45 PM  Pt with c/o dhest tightness with a deep breath, VSS sats 100% RA  Encouraged pt to ambulate in yuan    3:51 PM   Pt feeling better walking in yuan

## 2018-06-22 NOTE — PROGRESS NOTES
SW met with pt to discuss discharge needs. SW offered pt HH at discharge and informed her of its benefits. Pt politely declined HH. CM will continue to follow and assist with additional discharge needs as they arise.     Nidhi Herron MSW  Ext [phone]

## 2018-06-22 NOTE — PROGRESS NOTES
7:25 PM Bedside report received from Lazaro Rios RN.    1:15 AM Dr. Kale Galvez notified patient is hypotensive (82/54) and symptomatic. Patient states, \"I feel dizzy and I just don't feel right. \" HR 60-63. Orders received for IVF bolus of 200 cc. See MAR. BP following bolus 84/41. Second bolus given of 250 cc. BP 83/36 following second bolus. Dr. Kale Galvez notified again and asked if patient needs a third bolus and continuous IVF. MD states no more boluses at this time. MD states to monitor patient closely and to call back if condition worsens. Will continue follow up and continue to monitor closely.

## 2018-06-23 ENCOUNTER — APPOINTMENT (OUTPATIENT)
Dept: GENERAL RADIOLOGY | Age: 53
DRG: 274 | End: 2018-06-23
Attending: INTERNAL MEDICINE
Payer: COMMERCIAL

## 2018-06-23 LAB
ANION GAP SERPL CALC-SCNC: 10 MMOL/L (ref 5–15)
ATRIAL RATE: 62 BPM
BUN SERPL-MCNC: 10 MG/DL (ref 6–20)
BUN/CREAT SERPL: 12 (ref 12–20)
CALCIUM SERPL-MCNC: 8 MG/DL (ref 8.5–10.1)
CALCULATED P AXIS, ECG09: 28 DEGREES
CALCULATED R AXIS, ECG10: 8 DEGREES
CALCULATED T AXIS, ECG11: 43 DEGREES
CHLORIDE SERPL-SCNC: 110 MMOL/L (ref 97–108)
CO2 SERPL-SCNC: 22 MMOL/L (ref 21–32)
CREAT SERPL-MCNC: 0.85 MG/DL (ref 0.55–1.02)
DIAGNOSIS, 93000: NORMAL
ERYTHROCYTE [DISTWIDTH] IN BLOOD BY AUTOMATED COUNT: 12.5 % (ref 11.5–14.5)
GLUCOSE SERPL-MCNC: 101 MG/DL (ref 65–100)
HCT VFR BLD AUTO: 29.2 % (ref 35–47)
HGB BLD-MCNC: 9.7 G/DL (ref 11.5–16)
MCH RBC QN AUTO: 29.4 PG (ref 26–34)
MCHC RBC AUTO-ENTMCNC: 33.2 G/DL (ref 30–36.5)
MCV RBC AUTO: 88.5 FL (ref 80–99)
NRBC # BLD: 0 K/UL (ref 0–0.01)
NRBC BLD-RTO: 0 PER 100 WBC
P-R INTERVAL, ECG05: 144 MS
PLATELET # BLD AUTO: 191 K/UL (ref 150–400)
PMV BLD AUTO: 10.9 FL (ref 8.9–12.9)
POTASSIUM SERPL-SCNC: 3.5 MMOL/L (ref 3.5–5.1)
Q-T INTERVAL, ECG07: 428 MS
QRS DURATION, ECG06: 80 MS
QTC CALCULATION (BEZET), ECG08: 434 MS
RBC # BLD AUTO: 3.3 M/UL (ref 3.8–5.2)
SODIUM SERPL-SCNC: 142 MMOL/L (ref 136–145)
VENTRICULAR RATE, ECG03: 62 BPM
WBC # BLD AUTO: 7.7 K/UL (ref 3.6–11)

## 2018-06-23 PROCEDURE — 85027 COMPLETE CBC AUTOMATED: CPT | Performed by: INTERNAL MEDICINE

## 2018-06-23 PROCEDURE — 74011000258 HC RX REV CODE- 258: Performed by: INTERNAL MEDICINE

## 2018-06-23 PROCEDURE — 74011250637 HC RX REV CODE- 250/637: Performed by: INTERNAL MEDICINE

## 2018-06-23 PROCEDURE — 74011250636 HC RX REV CODE- 250/636: Performed by: INTERNAL MEDICINE

## 2018-06-23 PROCEDURE — 71045 X-RAY EXAM CHEST 1 VIEW: CPT

## 2018-06-23 PROCEDURE — 36415 COLL VENOUS BLD VENIPUNCTURE: CPT | Performed by: INTERNAL MEDICINE

## 2018-06-23 PROCEDURE — 65270000029 HC RM PRIVATE

## 2018-06-23 PROCEDURE — 93005 ELECTROCARDIOGRAM TRACING: CPT

## 2018-06-23 PROCEDURE — 93041 RHYTHM ECG TRACING: CPT

## 2018-06-23 PROCEDURE — 80048 BASIC METABOLIC PNL TOTAL CA: CPT | Performed by: INTERNAL MEDICINE

## 2018-06-23 PROCEDURE — 99218 HC RM OBSERVATION: CPT

## 2018-06-23 RX ORDER — ACETAMINOPHEN 325 MG/1
650 TABLET ORAL
Status: DISCONTINUED | OUTPATIENT
Start: 2018-06-23 | End: 2018-06-26 | Stop reason: HOSPADM

## 2018-06-23 RX ORDER — FENTANYL CITRATE 50 UG/ML
25 INJECTION, SOLUTION INTRAMUSCULAR; INTRAVENOUS
Status: DISCONTINUED | OUTPATIENT
Start: 2018-06-23 | End: 2018-06-26 | Stop reason: HOSPADM

## 2018-06-23 RX ORDER — METOPROLOL SUCCINATE 50 MG/1
50 TABLET, EXTENDED RELEASE ORAL DAILY
Status: DISCONTINUED | OUTPATIENT
Start: 2018-06-23 | End: 2018-06-24

## 2018-06-23 RX ADMIN — APIXABAN 2.5 MG: 2.5 TABLET, FILM COATED ORAL at 17:08

## 2018-06-23 RX ADMIN — GABAPENTIN 300 MG: 300 CAPSULE ORAL at 09:43

## 2018-06-23 RX ADMIN — FENTANYL CITRATE 25 MCG: 50 INJECTION, SOLUTION INTRAMUSCULAR; INTRAVENOUS at 21:20

## 2018-06-23 RX ADMIN — GABAPENTIN 300 MG: 300 CAPSULE ORAL at 17:08

## 2018-06-23 RX ADMIN — SODIUM CHLORIDE 200 ML: 900 INJECTION, SOLUTION INTRAVENOUS at 01:25

## 2018-06-23 RX ADMIN — TIZANIDINE 4 MG: 4 TABLET ORAL at 09:47

## 2018-06-23 RX ADMIN — ACETAMINOPHEN 650 MG: 325 TABLET ORAL at 04:53

## 2018-06-23 RX ADMIN — DIAZEPAM 5 MG: 5 TABLET ORAL at 20:30

## 2018-06-23 RX ADMIN — MEPERIDINE HYDROCHLORIDE 50 MG: 50 TABLET ORAL at 17:08

## 2018-06-23 RX ADMIN — MEPERIDINE HYDROCHLORIDE 50 MG: 50 TABLET ORAL at 07:42

## 2018-06-23 RX ADMIN — Medication 10 ML: at 14:00

## 2018-06-23 RX ADMIN — HYDROXYCHLOROQUINE SULFATE 400 MG: 200 TABLET, FILM COATED ENTERAL at 17:17

## 2018-06-23 RX ADMIN — ACETAMINOPHEN 650 MG: 325 TABLET ORAL at 13:20

## 2018-06-23 RX ADMIN — ASPIRIN 325 MG: 325 TABLET ORAL at 09:42

## 2018-06-23 RX ADMIN — APIXABAN 2.5 MG: 2.5 TABLET, FILM COATED ORAL at 09:43

## 2018-06-23 RX ADMIN — Medication 10 ML: at 22:30

## 2018-06-23 RX ADMIN — Medication 10 ML: at 04:20

## 2018-06-23 RX ADMIN — ACETAMINOPHEN 650 MG: 325 TABLET ORAL at 17:17

## 2018-06-23 RX ADMIN — METOPROLOL SUCCINATE 50 MG: 50 TABLET, EXTENDED RELEASE ORAL at 17:08

## 2018-06-23 NOTE — PROGRESS NOTES
Cardiology Progress Note      6/23/2018 12:42 PM    Admit Date: 6/21/2018          Subjective: Hypotensive overnight after 100 mg Toprol XL yest. Still fatigued today          Visit Vitals    /90 (BP 1 Location: Left arm, BP Patient Position: At rest)    Pulse 88    Temp 97.7 °F (36.5 °C)    Resp 16    Ht 5' 1\" (1.549 m)    Wt 87.1 kg (192 lb)    SpO2 100%    Breastfeeding No    BMI 36.28 kg/m2     06/21 1901 - 06/23 0700  In: 480 [P.O.:480]  Out: -         Objective:      Physical Exam:  VS as above  Chest CTA  Card RRR no MRG     Data Review:   Labs:    BUN 10  Creat 0.85   Hgb 9.7     Telemetry: SR/ SB R 60-70       Assessment:     1. Hypotension due to beta-blocker. Echo 6/22 EF 65-70%, no pericardial effusion. 2. Mild chest pain, pleuritic, post-ablation. 3. Inappropriate sinus tachycardia s/p sinus node modification on 6/21.  4. History of high right atrial tachycardia ablation in 2/2018.  5. 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  6. Lupus, followed by Dr. Richi Joseph (Rheum). 7. Crohn's disease, followed by Dr. Nicole Godoy (GI). 8. Prior transient ischemic attack/stroke, followed by Dr. Cristian Dao (Neuro). 9. Remote pulmonary embolism, followed by Dr. Zunilda Saini (Pulm). 10. Chronic anticoagulation. 11. Chronic aspirin. Plan:  Will try Toprol XL 50 mg tonight

## 2018-06-23 NOTE — PROGRESS NOTES
[address] [facility] June 26, 2018       RE: Sebastian Magana      To Whom It May Concern,    This is to certify that Sebastian Magana was admitted in the hospital from Thursday June 21st to Tuesday June 26th. She is doing well, but I have asked that she not work until July 2nd. She may may return to work on Monday July 2nd at full work duty, no restrictions. Please feel free to contact my office if you have any questions or concerns. Thank you for your assistance in this matter.       Sincerely,      Celestino Murray MD   [facility]  Phone [phone]  Fax [phone]

## 2018-06-24 LAB
ATRIAL RATE: 85 BPM
CALCULATED P AXIS, ECG09: 28 DEGREES
CALCULATED R AXIS, ECG10: 5 DEGREES
CALCULATED T AXIS, ECG11: 41 DEGREES
DIAGNOSIS, 93000: NORMAL
P-R INTERVAL, ECG05: 136 MS
Q-T INTERVAL, ECG07: 366 MS
QRS DURATION, ECG06: 80 MS
QTC CALCULATION (BEZET), ECG08: 435 MS
VENTRICULAR RATE, ECG03: 85 BPM

## 2018-06-24 PROCEDURE — 74011250636 HC RX REV CODE- 250/636: Performed by: INTERNAL MEDICINE

## 2018-06-24 PROCEDURE — 74011250637 HC RX REV CODE- 250/637: Performed by: INTERNAL MEDICINE

## 2018-06-24 PROCEDURE — 65660000000 HC RM CCU STEPDOWN

## 2018-06-24 PROCEDURE — 93306 TTE W/DOPPLER COMPLETE: CPT

## 2018-06-24 PROCEDURE — 74011250636 HC RX REV CODE- 250/636

## 2018-06-24 PROCEDURE — 99218 HC RM OBSERVATION: CPT

## 2018-06-24 RX ORDER — ATROPINE SULFATE 0.1 MG/ML
INJECTION INTRAVENOUS
Status: COMPLETED
Start: 2018-06-24 | End: 2018-06-24

## 2018-06-24 RX ORDER — DOPAMINE HYDROCHLORIDE 320 MG/100ML
0-20 INJECTION, SOLUTION INTRAVENOUS
Status: DISCONTINUED | OUTPATIENT
Start: 2018-06-24 | End: 2018-06-24 | Stop reason: CLARIF

## 2018-06-24 RX ORDER — DOPAMINE HYDROCHLORIDE 320 MG/100ML
0-20 INJECTION, SOLUTION INTRAVENOUS
Status: DISCONTINUED | OUTPATIENT
Start: 2018-06-24 | End: 2018-06-26 | Stop reason: HOSPADM

## 2018-06-24 RX ADMIN — ASPIRIN 325 MG: 325 TABLET ORAL at 08:00

## 2018-06-24 RX ADMIN — ATROPINE SULFATE 1 MG: 0.1 INJECTION, SOLUTION INTRAVENOUS at 13:01

## 2018-06-24 RX ADMIN — MEPERIDINE HYDROCHLORIDE 50 MG: 50 TABLET ORAL at 16:48

## 2018-06-24 RX ADMIN — MEPERIDINE HYDROCHLORIDE 50 MG: 50 TABLET ORAL at 07:56

## 2018-06-24 RX ADMIN — DROSPIRENONE AND ETHINYL ESTRADIOL 1 TABLET: KIT at 21:00

## 2018-06-24 RX ADMIN — Medication 10 ML: at 22:59

## 2018-06-24 RX ADMIN — Medication 10 ML: at 01:47

## 2018-06-24 RX ADMIN — Medication 10 ML: at 22:55

## 2018-06-24 RX ADMIN — TIZANIDINE 4 MG: 4 TABLET ORAL at 08:03

## 2018-06-24 RX ADMIN — SODIUM CHLORIDE 250 ML: 900 INJECTION, SOLUTION INTRAVENOUS at 12:11

## 2018-06-24 RX ADMIN — Medication 10 ML: at 13:02

## 2018-06-24 RX ADMIN — HYDROXYCHLOROQUINE SULFATE 400 MG: 200 TABLET, FILM COATED ENTERAL at 22:58

## 2018-06-24 RX ADMIN — ACETAMINOPHEN 650 MG: 325 TABLET ORAL at 14:03

## 2018-06-24 RX ADMIN — DOPAMINE HYDROCHLORIDE IN DEXTROSE 5 MCG/KG/MIN: 3.2 INJECTION, SOLUTION INTRAVENOUS at 14:05

## 2018-06-24 RX ADMIN — GABAPENTIN 300 MG: 300 CAPSULE ORAL at 08:00

## 2018-06-24 RX ADMIN — TIZANIDINE 4 MG: 4 TABLET ORAL at 22:58

## 2018-06-24 RX ADMIN — APIXABAN 2.5 MG: 2.5 TABLET, FILM COATED ORAL at 08:00

## 2018-06-24 RX ADMIN — METOPROLOL SUCCINATE 50 MG: 50 TABLET, EXTENDED RELEASE ORAL at 08:00

## 2018-06-24 RX ADMIN — ACETAMINOPHEN 650 MG: 325 TABLET ORAL at 07:56

## 2018-06-24 RX ADMIN — SODIUM CHLORIDE 250 ML: 900 INJECTION, SOLUTION INTRAVENOUS at 13:02

## 2018-06-24 RX ADMIN — GABAPENTIN 300 MG: 300 CAPSULE ORAL at 16:49

## 2018-06-24 RX ADMIN — FENTANYL CITRATE 25 MCG: 50 INJECTION, SOLUTION INTRAMUSCULAR; INTRAVENOUS at 01:47

## 2018-06-24 RX ADMIN — GABAPENTIN 300 MG: 300 CAPSULE ORAL at 22:55

## 2018-06-24 NOTE — PROGRESS NOTES
Paged Dr. Jenifer Dickens for the patient's complaints of pain in her chest 10/10. Heart sounds S1S2     2110  Update, Orders received for portable stat chest xray and pain medication to alleviate her 10/10 pain. 2129  The patient was assisted to the bathroom to void and was back in bed reporting no pain and she's \"comfortable\". The patient was tucked in, call bell within reach    2215  The patient is resting in bed in NAD, denies any pain    0200  The patient reported no pain after being medicated with PRN IV Fentanyl for pain. She's resting in bed with no complaints.

## 2018-06-24 NOTE — PROGRESS NOTES
Cardiology Progress Note      6/24/2018 11:13 AM    Admit Date: 6/21/2018          Subjective: Severe chest pain last night requiring Fentanyl. Feels a little better today. BP and HR better          Visit Vitals    /69 (BP 1 Location: Left arm, BP Patient Position: Sitting)    Pulse 87    Temp 98.1 °F (36.7 °C)    Resp 18    Ht 5' 1\" (1.549 m)    Wt 87.1 kg (192 lb)    SpO2 94%    Breastfeeding No    BMI 36.28 kg/m2     06/22 1901 - 06/24 0700  In: 1990 [P.O.:1960; I.V.:30]  Out: 500 [Urine:500]        Objective:      Physical Exam:  VS as above  Chest CTA  Card RRR no pericardial rub heard    Data Review:   Labs:    12 lead SR, no ST T changes  CXR yest ok     Telemetry: Sr       Assessment:     1. Hypotension due to beta-blocker. Resolved  Echo 6/22 EF 65-70%, no pericardial effusion. 2.  chest pain, pleuritic, post-ablation. 3. Inappropriate sinus tachycardia s/p sinus node modification on 6/21.  4. History of high right atrial tachycardia ablation in 2/2018.  5. 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  6. Lupus, followed by Dr. Rocael Milligan (Rheum). 7. Crohn's disease, followed by Dr. Leonila Coe (GI). 8. Prior transient ischemic attack/stroke, followed by Dr. Odilia Rincon (Neuro). 9. Remote pulmonary embolism, followed by Dr. Raquel Phelan (Pulm). 10. Chronic anticoagulation. 11. Chronic aspirin. Plan: Cont Toprol at present dose. Gradually increase act today.  Limited echo in am

## 2018-06-24 NOTE — PROGRESS NOTES
Bedside report received from Sela Goldberg, [address] [place] [address]                   Assessment, Background, Procedure summary, Intake/Output, MAR, and recent results discussed. Care assumed. Patient complains of soreness to midline chest that worsens when she takes a deep breath. Patient states pain has persisted since procedure with no worsening or change in ain location or quality. MD aware  and medication administered with some relief. Patient denies dizziness feeling /lightheaded. VSS. Verbal report given to oncoming nurse Burgess Chandu RN    Report consisted of patients Situation, Background, Assessment, and   Recommendations    Information was reviewed with the receiving nurse. Opportunity for questions and clarification was provided. Vesta Dias RN    Patient states she is having sharp/ stabbing pain to midline chest. Rates pain 10/10  And states it \"makes it hard to take a deep breath\". EKG performed and Dr. Jenifer Dickens notified. No new orders at this time. Valium administered per patient request and 2L O2 applied. Patient's nurse notified of complaints.

## 2018-06-24 NOTE — PROGRESS NOTES
Bedside report received from Edmond Bui RN                 Assessment, Background, Procedure summary, Intake/Output, MAR, and recent results discussed. Care assumed. 1130 Patient reports dizziness while standing. Patient returned to chair and VS obtained. BP 79/50. Dr. Grullon Record notified. 250mL NS bolus ordered and administered. BP continued to decreased despite hydration. 500cc bolus administered. Patient remains alert but states she feels \"dizzy and foggy\". No reports of pain of SOB. HR WNL. Dr. Grullon Record notified of MEWS score of 3.    1301 1mg atropine IV administered after patient complained of feeling \"hot and nauseous\". BP improved slightly 80's / 50's but then trended back down. 1330 Dr. Grullon Record notified of decreasing BP. Echo changed to STAT and nursing supervisor called to notify ECHO Tech. Dopamine 0-20mg titrate to keep SBP> 90 ordered. Pharmacy notified. 1405 Dopamine started @ 5mcg/mg/min. 1413 Dopamine increased to 10mcg/kg/min. Patient complains of extreme pain/ pressure to chest several minutes after dopamine initiation. Dopamine decreased back down to 5mcg, then to 3mcg before IV infiltrated. Patient refuses dopamine stating that it made her feel so bad she \"Couldn't tolerate it\". IV infiltration looks good with no swelling to redness just C/o tenderness at site with palpation. PAtietn states CP vastly improved after Dopamine discontinued    SBp remained > 90. Multiple attempts to start IV performed unsuccessfully. Supervisor aware. Will attempt to aggain using ultrasound when available. Pt ambulated in hallway. Pt appears steady and has no complaints of pain, shortness of breath, dizziness, or light-headedness. Incision appears clean, dry, and intact with no swelling or hematoma present.       Verbal report given to oncoming nurse Abraham Pinzon RN    Report consisted of patients Situation, Background, Assessment, and   Recommendations    Information was reviewed with the receiving nurse. Opportunity for questions and clarification was provided. Sumaya Ovalle RN    .

## 2018-06-25 LAB
ANION GAP SERPL CALC-SCNC: 10 MMOL/L (ref 5–15)
BUN SERPL-MCNC: 16 MG/DL (ref 6–20)
BUN/CREAT SERPL: 22 (ref 12–20)
CALCIUM SERPL-MCNC: 8.4 MG/DL (ref 8.5–10.1)
CHLORIDE SERPL-SCNC: 109 MMOL/L (ref 97–108)
CO2 SERPL-SCNC: 22 MMOL/L (ref 21–32)
CREAT SERPL-MCNC: 0.72 MG/DL (ref 0.55–1.02)
ERYTHROCYTE [DISTWIDTH] IN BLOOD BY AUTOMATED COUNT: 12.6 % (ref 11.5–14.5)
GLUCOSE SERPL-MCNC: 105 MG/DL (ref 65–100)
HCT VFR BLD AUTO: 30.9 % (ref 35–47)
HGB BLD-MCNC: 10.1 G/DL (ref 11.5–16)
MCH RBC QN AUTO: 29.2 PG (ref 26–34)
MCHC RBC AUTO-ENTMCNC: 32.7 G/DL (ref 30–36.5)
MCV RBC AUTO: 89.3 FL (ref 80–99)
NRBC # BLD: 0 K/UL (ref 0–0.01)
NRBC BLD-RTO: 0 PER 100 WBC
PLATELET # BLD AUTO: 244 K/UL (ref 150–400)
PMV BLD AUTO: 11.3 FL (ref 8.9–12.9)
POTASSIUM SERPL-SCNC: 3.8 MMOL/L (ref 3.5–5.1)
RBC # BLD AUTO: 3.46 M/UL (ref 3.8–5.2)
SODIUM SERPL-SCNC: 141 MMOL/L (ref 136–145)
WBC # BLD AUTO: 8.9 K/UL (ref 3.6–11)

## 2018-06-25 PROCEDURE — 36415 COLL VENOUS BLD VENIPUNCTURE: CPT | Performed by: INTERNAL MEDICINE

## 2018-06-25 PROCEDURE — 65660000000 HC RM CCU STEPDOWN

## 2018-06-25 PROCEDURE — 85027 COMPLETE CBC AUTOMATED: CPT | Performed by: INTERNAL MEDICINE

## 2018-06-25 PROCEDURE — 74011250637 HC RX REV CODE- 250/637: Performed by: INTERNAL MEDICINE

## 2018-06-25 PROCEDURE — 80048 BASIC METABOLIC PNL TOTAL CA: CPT | Performed by: INTERNAL MEDICINE

## 2018-06-25 RX ADMIN — APIXABAN 2.5 MG: 2.5 TABLET, FILM COATED ORAL at 10:38

## 2018-06-25 RX ADMIN — Medication 10 ML: at 22:37

## 2018-06-25 RX ADMIN — Medication 10 ML: at 04:49

## 2018-06-25 RX ADMIN — MEPERIDINE HYDROCHLORIDE 50 MG: 50 TABLET ORAL at 09:24

## 2018-06-25 RX ADMIN — GABAPENTIN 300 MG: 300 CAPSULE ORAL at 09:18

## 2018-06-25 RX ADMIN — DROSPIRENONE AND ETHINYL ESTRADIOL 1 TABLET: KIT at 21:00

## 2018-06-25 RX ADMIN — TIZANIDINE 4 MG: 4 TABLET ORAL at 09:18

## 2018-06-25 RX ADMIN — GABAPENTIN 300 MG: 300 CAPSULE ORAL at 22:37

## 2018-06-25 RX ADMIN — HYDROXYCHLOROQUINE SULFATE 400 MG: 200 TABLET, FILM COATED ENTERAL at 18:02

## 2018-06-25 RX ADMIN — ASPIRIN 325 MG: 325 TABLET ORAL at 09:18

## 2018-06-25 RX ADMIN — APIXABAN 2.5 MG: 2.5 TABLET, FILM COATED ORAL at 18:02

## 2018-06-25 RX ADMIN — GABAPENTIN 300 MG: 300 CAPSULE ORAL at 18:02

## 2018-06-25 RX ADMIN — TIZANIDINE 4 MG: 4 TABLET ORAL at 22:36

## 2018-06-25 NOTE — PROGRESS NOTES
EP/ Arrhythmia Progress Note    Patient ID:  Patient: Maryan Malhotra  MRN: [id number]  Age: 46 y.o.  : 1965   Admit Date: 2018    Assessment: 1. Hypotension due to beta-blocker, challenged here and does not tolerate this. Echo  EF 65-70%, no pericardial effusion. Redo echo , no pericardial effusion. 2. Chest pain, pleuritic, post-ablation. Resolved. 3. Inappropriate sinus tachycardia s/p sinus node modification on .  4. History of high right atrial tachycardia ablation in 2018.  5. 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  6. Lupus, followed by Dr. Richi Joseph (Rheum). 7. Crohn's disease, followed by Dr. Nicole Godoy (GI). 8. Prior transient ischemic attack/stroke, followed by Dr. Cristian Dao (Neuro). 9. Remote pulmonary embolism, followed by Dr. Zunilda Saini (Pulm). 10. Chronic anticoagulation. 11. Chronic aspirin. Plan:     1. NO beta-blocker! 2. Would start midodrine if BP low AND beta-blocker completely out of system. 3. Continue other medications. Home when BP consistently OK, as early as tomorrow. All questions answered for her. [x]       High complexity decision making was performed in this patient at high risk for decompensation in the outpatient setting    Subjective:     Maryan Malhotra denies dyspnea, palpitations, paroxysmal nocturnal dyspnea. No dizziness so far this AM, but some \"fogginess\". She has hadd expected post-ablation chest discomfort with deep inspiration. None this AM.    Review of Systems - No melena, nausea, vomiting, diarrhea, gross hematuria, abdominal pain, headache, subjective fever, chills, hemoptysis.     Objective:     Physical Exam:  Temp (24hrs), Av.1 °F (36.7 °C), Min:97.8 °F (36.6 °C), Max:98.3 °F (36.8 °C)    Patient Vitals for the past 8 hrs:   Pulse   18 0411 72   18 0402 72 Patient Vitals for the past 8 hrs:   Resp   06/25/18 0411 18    Patient Vitals for the past 8 hrs:   BP   06/25/18 0731 102/43   06/25/18 0411 93/73   06/25/18 0402 92/61          Intake/Output Summary (Last 24 hours) at 06/25/18 0843  Last data filed at 06/24/18 2311   Gross per 24 hour   Intake          2222.29 ml   Output             1000 ml   Net          1222.29 ml       Nondiaphoretic, not in acute distress. MMM, no jaundice, HEENT stable. Unlabored, clear to auscultation bilaterally anteriorly, symmetric air movement. Regular rate and rhythm, no murmur, pericardial rub, knock, or gallop. No JVD. 1+ RLE peripheral edema (chronic). Faintly palpable radial pulses bilaterally. Abdomen soft, nontender, nondistended. No cyanosis. Skin warm and dry. No ulcers or rash. Musculoskeletal exam stable. Awake, appropriate, neuro grossly nonfocal.  No tremor. Cardiographics and Studies, I personally reviewed:    Telemetry:  SR 70's currently. ECG's:  6/22 SR 70 bpm, no acute ischemia or infarct, no pericarditis changes. 6/23, no changes. ECHO 6/22:  LEFT VENTRICLE: Size was normal. Systolic function was vigorous. Ejection  fraction was estimated in the range of 60 % to 70 %. There were no  regional wall motion abnormalities. There was mild concentric hypertrophy. RIGHT VENTRICLE: The size was normal. Systolic function was normal.    LEFT ATRIUM: The atrium was dilated. RIGHT ATRIUM: Size was normal.    MITRAL VALVE: Normal valve structure. There was normal leaflet separation. DOPPLER: The transmitral velocity was within the normal range. There was  no evidence for stenosis. There was trivial regurgitation. AORTIC VALVE: The valve was trileaflet. Leaflets exhibited normal  thickness and normal cuspal separation. DOPPLER: Transaortic velocity was  within the normal range. There was no stenosis. There was no regurgitation. TRICUSPID VALVE: Not well visualized.  DOPPLER: There was mild  regurgitation. PULMONIC VALVE: Not well visualized, but normal Doppler findings. AORTA: The root exhibited normal size. PERICARDIUM: There was no pericardial effusion. Echo 6/24:  No pericardial effusion. LAB Review:     No results for input(s): CPK, CKMB, CKNDX, TROIQ in the last 72 hours. No lab exists for component: CPKMB  Lab Results   Component Value Date/Time    Cholesterol, total 168 04/23/2016 03:57 AM    HDL Cholesterol 81 04/23/2016 03:57 AM    LDL, calculated 43.2 04/23/2016 03:57 AM    Triglyceride 219 (H) 04/23/2016 03:57 AM    CHOL/HDL Ratio 2.1 04/23/2016 03:57 AM     No results for input(s): INR, PTP, APTT in the last 72 hours. No lab exists for component: Huel Gosselin   Recent Labs      06/25/18   0350  06/23/18   0227   NA  141  142   K  3.8  3.5   CL  109*  110*   CO2  22  22   BUN  16  10   CREA  0.72  0.85   GLU  105*  101*   CA  8.4*  8.0*   WBC  8.9  7.7   HGB  10.1*  9.7*   HCT  30.9*  29.2*   PLT  244  191     No results for input(s): SGOT, GPT, AP, TBIL, TP, ALB, GLOB, GGT, AML, LPSE in the last 72 hours. No lab exists for component: AMYP, HLPSE  No components found for: GLPOC  No results for input(s): PH, PCO2, PO2 in the last 72 hours. Medications Reviewed:      Allergies   Allergen Reactions    Codeine Hives and Nausea and Vomiting     Severe nausea & vomiting    Hydrocodone Hives and Nausea and Vomiting     Severe nausea & vomiting    Oxycontin [Oxycodone] Hives and Nausea and Vomiting     Severe nausea & vomiting, also has the same reaction from Percocet    Percocet [Oxycodone-Acetaminophen] Hives and Nausea and Vomiting     Can take tylenol    Dilaudid [Hydromorphone (Bulk)] Swelling    Prednisone Other (comments)     HX OF CROHN'S; not allergic, prefer not to use         Current Facility-Administered Medications   Medication Dose Route Frequency    DOPamine (INTROPIN) 800 mg in dextrose 5% 250 mL infusion  0-20 mcg/kg/min IntraVENous TITRATE  acetaminophen (TYLENOL) tablet 650 mg  650 mg Oral Q4H PRN    fentaNYL citrate (PF) injection 25 mcg  25 mcg IntraVENous Q4H PRN    aspirin (ASPIRIN) tablet 325 mg  325 mg Oral DAILY    diazePAM (VALIUM) tablet 5 mg  5 mg Oral Q12H PRN    drospirenone-ethinyl estradiol (MARIELOS) 3-0.02 mg per tablet 1 Tab (Patient Supplied)  1 Tab Oral QHS    gabapentin (NEURONTIN) capsule 300 mg  300 mg Oral TID    meperidine (DEMEROL) tablet 50 mg  50 mg Oral TID PRN    hydroxychloroquine (PLAQUENIL) tablet 400 mg  400 mg Oral PCD    ondansetron (ZOFRAN ODT) tablet 4 mg  4 mg Oral Q8H PRN    promethazine (PHENERGAN) tablet 25 mg  25 mg Oral Q6H PRN    tiZANidine (ZANAFLEX) tablet 4 mg  4 mg Oral BID    sodium chloride (NS) flush 5-10 mL  5-10 mL IntraVENous Q8H    sodium chloride (NS) flush 5-10 mL  5-10 mL IntraVENous PRN          Denice Hemphill MD  6/25/2018

## 2018-06-26 VITALS
BODY MASS INDEX: 43.06 KG/M2 | WEIGHT: 228.1 LBS | OXYGEN SATURATION: 97 % | DIASTOLIC BLOOD PRESSURE: 62 MMHG | HEIGHT: 61 IN | RESPIRATION RATE: 16 BRPM | HEART RATE: 77 BPM | SYSTOLIC BLOOD PRESSURE: 118 MMHG | TEMPERATURE: 98 F

## 2018-06-26 PROCEDURE — 74011250637 HC RX REV CODE- 250/637: Performed by: INTERNAL MEDICINE

## 2018-06-26 RX ADMIN — ASPIRIN 325 MG: 325 TABLET ORAL at 08:12

## 2018-06-26 RX ADMIN — TIZANIDINE 4 MG: 4 TABLET ORAL at 08:12

## 2018-06-26 RX ADMIN — GABAPENTIN 300 MG: 300 CAPSULE ORAL at 08:12

## 2018-06-26 RX ADMIN — Medication 10 ML: at 06:51

## 2018-06-26 RX ADMIN — APIXABAN 2.5 MG: 2.5 TABLET, FILM COATED ORAL at 08:11

## 2018-06-26 NOTE — ROUTINE PROCESS
Pt reports feeling \"a little foggy. \" RN attempted to call MD but I told pt that he is in procedures all day. She is stable and has anxiety.

## 2018-06-26 NOTE — DISCHARGE SUMMARY
Cardiology Discharge Summary                Patient ID:  Hoda Klein  [id number]  16 y.o.  1965    Admit Date: 6/21/2018    Discharge Date: 6/26/2018   Admitting Physician: Jeanette Rizvi MD   Discharge Physician: Jeanette Rizvi MD    Cardiology Procedures this Admission:  1. Sinus node modification 6/21, successful  2. Echo 6/22, 6/24--no pericardial effusion    Disposition: home    Patient Instructions:   Current Discharge Medication List      CONTINUE these medications which have NOT CHANGED    Details   butalbital-acetaminophen-caffeine (FIORICET, ESGIC) -40 mg per tablet Take 2 Tabs by mouth every eight (8) hours as needed for Pain or Headache. Max Daily Amount: 6 Tabs. MUST LAST 30 DAYS. Indications: MIGRAINE, TENSION-TYPE HEADACHE  Qty: 50 Tab, Refills: 2    Associated Diagnoses: Cervical radiculopathy; Cerebrovascular disease, unspecified; Ulnar neuropathy at elbow of right upper extremity; Cervico-occipital neuralgia of right side; Stenosis of both carotid arteries without infarction      gabapentin (NEURONTIN) 300 mg capsule TAKE ONE CAPSULE BY MOUTH THREE TIMES A DAY. Qty: 100 Cap, Refills: 6    Associated Diagnoses: Cervical radiculopathy; Cerebrovascular disease, unspecified; Ulnar neuropathy at elbow of right upper extremity; Cervico-occipital neuralgia of right side; Stenosis of both carotid arteries without infarction      tiZANidine (ZANAFLEX) 2 mg tablet 1 po qam and 2 po qhs  Qty: 100 Tab, Refills: 5    Associated Diagnoses: Migraine with aura and without status migrainosus, not intractable; Stenosis of both carotid arteries without infarction; Cerebral microvascular disease; Cervico-occipital neuralgia of right side; Tension vascular headache; Dizziness and giddiness      apixaban (ELIQUIS) 2.5 mg tablet Take 2.5 mg by mouth two (2) times a day. Indications: PE      promethazine (PHENERGAN) 25 mg tablet Take 25 mg by mouth every six (6) hours as needed for Nausea. diazepam (VALIUM) 5 mg tablet Take 1 Tab by mouth every twelve (12) hours as needed for Anxiety. Max Daily Amount: 10 mg. Refill at 1 month intervals  Qty: 30 Tab, Refills: 2    Associated Diagnoses: Cervical radiculopathy; Cerebrovascular disease, unspecified; Ulnar neuropathy at elbow of right upper extremity; Cervico-occipital neuralgia of right side; Stenosis of both carotid arteries without infarction      aspirin (ASPIRIN) 325 mg tablet Take 325 mg by mouth daily. hydroxychloroquine (PLAQUENIL) 200 mg tablet Take 400 mg by mouth daily (after dinner). For lupus      ondansetron hcl (ZOFRAN) 4 mg tablet Take 4 mg by mouth every eight (8) hours as needed for Nausea. meperidine (DEMEROL) 50 mg tablet Take 50 mg by mouth three (3) times daily as needed for Pain. drospirenone-ethinyl estradiol (MARIELOS 28) 3-20 mg-mcg per tablet Take 1 Tab by mouth nightly. STOP taking these medications       metoprolol succinate (TOPROL-XL) 100 mg tablet Comments:   Reason for Stopping:               FOLLOW-UP:    Call the office [phone] to make an appointment for 2 weeks with Vicky Vargas NP.    [facility] [address] [facility], [address]    [phone]  [address]    [email or web address]          Assessment on Discharge: 1. Resolved hypotension due to beta-blocker, challenged here and does not tolerate this. Echo 6/22 EF 65-70%, no pericardial effusion. Redo echo 6/24, no pericardial effusion. 2. Chest pain, pleuritic, post-ablation. Resolved. 3. Inappropriate sinus tachycardia s/p sinus node modification on 6/21.  4. History of high right atrial tachycardia ablation in 2/2018.  5. 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  6. Lupus, followed by Dr. Radha Ross (Rheum). 7. Crohn's disease, followed by Dr. Kiya Go (GI). 8. Prior transient ischemic attack/stroke, followed by Dr. Evert Mayers (Neuro).   9. Remote pulmonary embolism, followed by  Иван Cordon (Pulm). 10. Chronic anticoagulation. 11. Chronic aspirin. Plan:     1. NO beta-blocker! 2. Continue other medications. Discharge today. All questions answered for her. F/U with Coby Ching NP in 2 weeks. [x]       High complexity decision making was performed in this patient >30 minutes    Subjective:     Roseline Winkler denies dyspnea, palpitations, paroxysmal nocturnal dyspnea. No dizziness today. Review of Systems - No melena, nausea, vomiting, diarrhea, gross hematuria, abdominal pain, headache, subjective fever, chills, hemoptysis. Objective:     Physical Exam:  Temp (24hrs), Av.3 °F (36.8 °C), Min:98 °F (36.7 °C), Max:98.8 °F (37.1 °C)    Patient Vitals for the past 8 hrs:   Pulse   18 0705 87   18 0436 74    Patient Vitals for the past 8 hrs:   Resp   18 0705 18   18 0436 18    Patient Vitals for the past 8 hrs:   BP   18 0705 139/90   18 0436 107/54          Intake/Output Summary (Last 24 hours) at 18 0837  Last data filed at 18 1953   Gross per 24 hour   Intake              500 ml   Output             1200 ml   Net             -700 ml       Nondiaphoretic, not in acute distress. MMM, no jaundice, HEENT stable. Unlabored, clear to auscultation bilaterally anteriorly, symmetric air movement. Regular rate and rhythm, no murmur, pericardial rub, knock, or gallop. No JVD. 1+ RLE peripheral edema (chronic). Faintly palpable radial pulses bilaterally. Abdomen soft, nontender, nondistended. No cyanosis. Skin warm and dry. No ulcers or rash. Musculoskeletal exam stable. Awake, appropriate, neuro grossly nonfocal.  No tremor. Cardiographics and Studies, I personally reviewed:    Telemetry:  SR 80's currently. ECG's:   SR 70 bpm, no acute ischemia or infarct, no pericarditis changes. , no changes. ECHO :  LEFT VENTRICLE: Size was normal. Systolic function was vigorous.  Ejection  fraction was estimated in the range of 60 % to 70 %. There were no  regional wall motion abnormalities. There was mild concentric hypertrophy. RIGHT VENTRICLE: The size was normal. Systolic function was normal.    LEFT ATRIUM: The atrium was dilated. RIGHT ATRIUM: Size was normal.    MITRAL VALVE: Normal valve structure. There was normal leaflet separation. DOPPLER: The transmitral velocity was within the normal range. There was  no evidence for stenosis. There was trivial regurgitation. AORTIC VALVE: The valve was trileaflet. Leaflets exhibited normal  thickness and normal cuspal separation. DOPPLER: Transaortic velocity was  within the normal range. There was no stenosis. There was no regurgitation. TRICUSPID VALVE: Not well visualized. DOPPLER: There was mild  regurgitation. PULMONIC VALVE: Not well visualized, but normal Doppler findings. AORTA: The root exhibited normal size. PERICARDIUM: There was no pericardial effusion. Echo 6/24:  No pericardial effusion. LAB Review:     No results for input(s): CPK, CKMB, CKNDX, TROIQ in the last 72 hours. No lab exists for component: CPKMB  Lab Results   Component Value Date/Time    Cholesterol, total 168 04/23/2016 03:57 AM    HDL Cholesterol 81 04/23/2016 03:57 AM    LDL, calculated 43.2 04/23/2016 03:57 AM    Triglyceride 219 (H) 04/23/2016 03:57 AM    CHOL/HDL Ratio 2.1 04/23/2016 03:57 AM     No results for input(s): INR, PTP, APTT in the last 72 hours. No lab exists for component: INREXT, INREXT   Recent Labs      06/25/18   0350   NA  141   K  3.8   CL  109*   CO2  22   BUN  16   CREA  0.72   GLU  105*   CA  8.4*   WBC  8.9   HGB  10.1*   HCT  30.9*   PLT  244     No results for input(s): SGOT, GPT, AP, TBIL, TP, ALB, GLOB, GGT, AML, LPSE in the last 72 hours. No lab exists for component: AMYP, HLPSE  No components found for: GLPOC  No results for input(s): PH, PCO2, PO2 in the last 72 hours. Medications Reviewed:      Allergies   Allergen Reactions  Codeine Hives and Nausea and Vomiting     Severe nausea & vomiting    Hydrocodone Hives and Nausea and Vomiting     Severe nausea & vomiting    Oxycontin [Oxycodone] Hives and Nausea and Vomiting     Severe nausea & vomiting, also has the same reaction from Percocet    Percocet [Oxycodone-Acetaminophen] Hives and Nausea and Vomiting     Can take tylenol    Dilaudid [Hydromorphone (Bulk)] Swelling    Prednisone Other (comments)     HX OF CROHN'S; not allergic, prefer not to use         Current Facility-Administered Medications   Medication Dose Route Frequency    apixaban (ELIQUIS) tablet 2.5 mg  2.5 mg Oral BID    DOPamine (INTROPIN) 800 mg in dextrose 5% 250 mL infusion  0-20 mcg/kg/min IntraVENous TITRATE    acetaminophen (TYLENOL) tablet 650 mg  650 mg Oral Q4H PRN    fentaNYL citrate (PF) injection 25 mcg  25 mcg IntraVENous Q4H PRN    aspirin (ASPIRIN) tablet 325 mg  325 mg Oral DAILY    diazePAM (VALIUM) tablet 5 mg  5 mg Oral Q12H PRN    drospirenone-ethinyl estradiol (MARIELOS) 3-0.02 mg per tablet 1 Tab (Patient Supplied)  1 Tab Oral QHS    gabapentin (NEURONTIN) capsule 300 mg  300 mg Oral TID    meperidine (DEMEROL) tablet 50 mg  50 mg Oral TID PRN    hydroxychloroquine (PLAQUENIL) tablet 400 mg  400 mg Oral PCD    ondansetron (ZOFRAN ODT) tablet 4 mg  4 mg Oral Q8H PRN    promethazine (PHENERGAN) tablet 25 mg  25 mg Oral Q6H PRN    tiZANidine (ZANAFLEX) tablet 4 mg  4 mg Oral BID    sodium chloride (NS) flush 5-10 mL  5-10 mL IntraVENous Q8H    sodium chloride (NS) flush 5-10 mL  5-10 mL IntraVENous PRN          Jose Gonzalez MD  6/26/2018

## 2018-06-26 NOTE — ROUTINE PROCESS
Spoke with MD who is not concerned with pt's BP. She is stable and MAP is high. It appears that pt's anxiety is a large part of her problem.

## 2018-06-26 NOTE — ROUTINE PROCESS
Discharge info reviewed with pt, opportunity for questions provided. Pt verbalized understanding. Pt has ambulated with RN.

## 2018-07-20 ENCOUNTER — APPOINTMENT (OUTPATIENT)
Dept: INFUSION THERAPY | Age: 53
End: 2018-07-20

## 2018-09-25 ENCOUNTER — DOCUMENTATION ONLY (OUTPATIENT)
Dept: ONCOLOGY | Age: 53
End: 2018-09-25

## 2018-09-25 NOTE — PROGRESS NOTES
Called to reschedule 9/28/18 appt due to Dr Erica Hutson being out of the office.   Left message for pt to call office

## 2018-10-05 ENCOUNTER — ANESTHESIA (OUTPATIENT)
Dept: ENDOSCOPY | Age: 53
End: 2018-10-05
Payer: COMMERCIAL

## 2018-10-05 ENCOUNTER — HOSPITAL ENCOUNTER (OUTPATIENT)
Age: 53
Setting detail: OUTPATIENT SURGERY
Discharge: HOME | End: 2018-10-05
Attending: INTERNAL MEDICINE | Admitting: INTERNAL MEDICINE
Payer: COMMERCIAL

## 2018-10-05 ENCOUNTER — ANESTHESIA EVENT (OUTPATIENT)
Dept: ENDOSCOPY | Age: 53
End: 2018-10-05
Payer: COMMERCIAL

## 2018-10-05 VITALS
TEMPERATURE: 98.6 F | SYSTOLIC BLOOD PRESSURE: 128 MMHG | DIASTOLIC BLOOD PRESSURE: 60 MMHG | OXYGEN SATURATION: 100 % | RESPIRATION RATE: 12 BRPM

## 2018-10-05 PROCEDURE — 77030022711 HC TU ENTRPRO BLN DISP OLSI -C: Performed by: INTERNAL MEDICINE

## 2018-10-05 PROCEDURE — 76060000031 HC ANESTHESIA FIRST 0.5 HR: Performed by: INTERNAL MEDICINE

## 2018-10-05 PROCEDURE — 77030027957 HC TBNG IRR ENDOGTR BUSS -B: Performed by: INTERNAL MEDICINE

## 2018-10-05 PROCEDURE — 74011250636 HC RX REV CODE- 250/636

## 2018-10-05 PROCEDURE — 77030009426 HC FCPS BIOP ENDOSC BSC -B: Performed by: INTERNAL MEDICINE

## 2018-10-05 PROCEDURE — 76040000019: Performed by: INTERNAL MEDICINE

## 2018-10-05 PROCEDURE — 88305 TISSUE EXAM BY PATHOLOGIST: CPT | Performed by: INTERNAL MEDICINE

## 2018-10-05 RX ORDER — EPINEPHRINE 0.1 MG/ML
1 INJECTION INTRACARDIAC; INTRAVENOUS
Status: DISCONTINUED | OUTPATIENT
Start: 2018-10-05 | End: 2018-10-05 | Stop reason: HOSPADM

## 2018-10-05 RX ORDER — SODIUM CHLORIDE 9 MG/ML
INJECTION, SOLUTION INTRAVENOUS
Status: DISCONTINUED | OUTPATIENT
Start: 2018-10-05 | End: 2018-10-05 | Stop reason: HOSPADM

## 2018-10-05 RX ORDER — SODIUM CHLORIDE 0.9 % (FLUSH) 0.9 %
5-10 SYRINGE (ML) INJECTION EVERY 8 HOURS
Status: DISCONTINUED | OUTPATIENT
Start: 2018-10-05 | End: 2018-10-05 | Stop reason: HOSPADM

## 2018-10-05 RX ORDER — SODIUM CHLORIDE 9 MG/ML
100 INJECTION, SOLUTION INTRAVENOUS CONTINUOUS
Status: DISCONTINUED | OUTPATIENT
Start: 2018-10-05 | End: 2018-10-05 | Stop reason: HOSPADM

## 2018-10-05 RX ORDER — SODIUM CHLORIDE 0.9 % (FLUSH) 0.9 %
5-10 SYRINGE (ML) INJECTION AS NEEDED
Status: DISCONTINUED | OUTPATIENT
Start: 2018-10-05 | End: 2018-10-05 | Stop reason: HOSPADM

## 2018-10-05 RX ORDER — DIPHENHYDRAMINE HYDROCHLORIDE 50 MG/ML
50 INJECTION, SOLUTION INTRAMUSCULAR; INTRAVENOUS ONCE
Status: DISCONTINUED | OUTPATIENT
Start: 2018-10-05 | End: 2018-10-05 | Stop reason: HOSPADM

## 2018-10-05 RX ORDER — LIDOCAINE HYDROCHLORIDE 20 MG/ML
INJECTION, SOLUTION EPIDURAL; INFILTRATION; INTRACAUDAL; PERINEURAL AS NEEDED
Status: DISCONTINUED | OUTPATIENT
Start: 2018-10-05 | End: 2018-10-05 | Stop reason: HOSPADM

## 2018-10-05 RX ORDER — PROPOFOL 10 MG/ML
INJECTION, EMULSION INTRAVENOUS AS NEEDED
Status: DISCONTINUED | OUTPATIENT
Start: 2018-10-05 | End: 2018-10-05 | Stop reason: HOSPADM

## 2018-10-05 RX ORDER — NALOXONE HYDROCHLORIDE 0.4 MG/ML
0.4 INJECTION, SOLUTION INTRAMUSCULAR; INTRAVENOUS; SUBCUTANEOUS
Status: DISCONTINUED | OUTPATIENT
Start: 2018-10-05 | End: 2018-10-05 | Stop reason: HOSPADM

## 2018-10-05 RX ORDER — ATROPINE SULFATE 0.1 MG/ML
0.5 INJECTION INTRAVENOUS
Status: DISCONTINUED | OUTPATIENT
Start: 2018-10-05 | End: 2018-10-05 | Stop reason: HOSPADM

## 2018-10-05 RX ORDER — FENTANYL CITRATE 50 UG/ML
100 INJECTION, SOLUTION INTRAMUSCULAR; INTRAVENOUS
Status: DISCONTINUED | OUTPATIENT
Start: 2018-10-05 | End: 2018-10-05 | Stop reason: HOSPADM

## 2018-10-05 RX ORDER — FLUMAZENIL 0.1 MG/ML
0.2 INJECTION INTRAVENOUS
Status: DISCONTINUED | OUTPATIENT
Start: 2018-10-05 | End: 2018-10-05 | Stop reason: HOSPADM

## 2018-10-05 RX ORDER — MIDAZOLAM HYDROCHLORIDE 1 MG/ML
.25-1 INJECTION, SOLUTION INTRAMUSCULAR; INTRAVENOUS
Status: DISCONTINUED | OUTPATIENT
Start: 2018-10-05 | End: 2018-10-05 | Stop reason: HOSPADM

## 2018-10-05 RX ORDER — DEXTROMETHORPHAN/PSEUDOEPHED 2.5-7.5/.8
1.2 DROPS ORAL
Status: DISCONTINUED | OUTPATIENT
Start: 2018-10-05 | End: 2018-10-05 | Stop reason: HOSPADM

## 2018-10-05 RX ADMIN — PROPOFOL 50 MG: 10 INJECTION, EMULSION INTRAVENOUS at 15:49

## 2018-10-05 RX ADMIN — PROPOFOL 50 MG: 10 INJECTION, EMULSION INTRAVENOUS at 15:57

## 2018-10-05 RX ADMIN — PROPOFOL 50 MG: 10 INJECTION, EMULSION INTRAVENOUS at 16:00

## 2018-10-05 RX ADMIN — SODIUM CHLORIDE: 9 INJECTION, SOLUTION INTRAVENOUS at 15:41

## 2018-10-05 RX ADMIN — PROPOFOL 100 MG: 10 INJECTION, EMULSION INTRAVENOUS at 15:43

## 2018-10-05 RX ADMIN — LIDOCAINE HYDROCHLORIDE 40 MG: 20 INJECTION, SOLUTION EPIDURAL; INFILTRATION; INTRACAUDAL; PERINEURAL at 15:43

## 2018-10-05 RX ADMIN — PROPOFOL 50 MG: 10 INJECTION, EMULSION INTRAVENOUS at 15:53

## 2018-10-05 RX ADMIN — PROPOFOL 50 MG: 10 INJECTION, EMULSION INTRAVENOUS at 15:56

## 2018-10-05 RX ADMIN — PROPOFOL 50 MG: 10 INJECTION, EMULSION INTRAVENOUS at 15:44

## 2018-10-05 RX ADMIN — PROPOFOL 50 MG: 10 INJECTION, EMULSION INTRAVENOUS at 15:46

## 2018-10-05 NOTE — IP AVS SNAPSHOT
2700 [facility] [address] [place] [address] 
[phone] Patient: Carmen Le MRN: [id number] :1965 About your hospitalization You were admitted on:  2018 You last received care in the:  [address] [place] [address] ENDOSCOPY You were discharged on:  2018 Why you were hospitalized Your primary diagnosis was:  Not on File Follow-up Information None Your Scheduled Appointments Wednesday October 10, 2018  8:45 AM EDT  
ESTABLISHED PATIENT with Kayy Ortiz MD  
[address] [facility]) [address] [facility]  
[phone] Discharge Orders None A check belén indicates which time of day the medication should be taken. My Medications CONTINUE taking these medications Instructions Each Dose to Equal  
 Morning Noon Evening Bedtime  
 aspirin 325 mg tablet Commonly known as:  ASPIRIN Your last dose was: Your next dose is: Take 325 mg by mouth daily. 325 mg  
    
   
   
   
  
 butalbital-acetaminophen-caffeine -40 mg per tablet Commonly known as:  Christen Woodruff Your last dose was: Your next dose is: Take 2 Tabs by mouth every eight (8) hours as needed for Pain or Headache. Max Daily Amount: 6 Tabs. MUST LAST 30 DAYS. Indications: MIGRAINE, TENSION-TYPE HEADACHE 2 Tab DEMEROL 50 mg tablet Generic drug:  meperidine Your last dose was: Your next dose is: Take 50 mg by mouth three (3) times daily as needed for Pain. 50 mg  
    
   
   
   
  
 diazePAM 5 mg tablet Commonly known as:  VALIUM Your last dose was: Your next dose is: Take 1 Tab by mouth every twelve (12) hours as needed for Anxiety. Max Daily Amount: 10 mg. Refill at 1 month intervals 5 mg  
    
   
   
   
  
 ELIQUIS 2.5 mg tablet Generic drug:  apixaban Your last dose was: Your next dose is: Take 2.5 mg by mouth two (2) times a day. Indications: PE  
 2.5 mg  
    
   
   
   
  
 gabapentin 300 mg capsule Commonly known as:  NEURONTIN Your last dose was: Your next dose is: TAKE ONE CAPSULE BY MOUTH THREE TIMES A DAY. hydroxychloroquine 200 mg tablet Commonly known as:  PLAQUENIL Your last dose was: Your next dose is: Take 400 mg by mouth daily (after dinner). For lupus 400 mg  
    
   
   
   
  
 promethazine 25 mg tablet Commonly known as:  PHENERGAN Your last dose was: Your next dose is: Take 25 mg by mouth every six (6) hours as needed for Nausea. 25 mg  
    
   
   
   
  
 tiZANidine 2 mg tablet Commonly known as:  Lenell Waldo Your last dose was: Your next dose is:    
   
   
 1 po qam and 2 po qhs  
     
   
   
   
  
 MARIELOS (28) 3-0.02 mg Tab Generic drug:  drospirenone-ethinyl estradiol Your last dose was: Your next dose is: Take 1 Tab by mouth nightly. 1 Tab ZOFRAN 4 mg tablet Generic drug:  ondansetron hcl Your last dose was: Your next dose is: Take 4 mg by mouth every eight (8) hours as needed for Nausea. 4 mg Opioid Education Prescription Opioids: What You Need to Know: 
 
Prescription opioids can be used to help relieve moderate-to-severe pain and are often prescribed following a surgery or injury, or for certain health conditions. These medications can be an important part of treatment but also come with serious risks. Opioids are strong pain medicines. Examples include hydrocodone, oxycodone, fentanyl, and morphine. Heroin is an example of an illegal opioid.   It is important to work with your health care provider to make sure you are getting the safest, most effective care. WHAT ARE THE RISKS AND SIDE EFFECTS OF OPIOID USE? Prescription opioids carry serious risks of addiction and overdose, especially with prolonged use. An opioid overdose, often marked by slow breathing, can cause sudden death. The use of prescription opioids can have a number of side effects as well, even when taken as directed. · Tolerance-meaning you might need to take more of a medication for the same pain relief · Physical dependence-meaning you have symptoms of withdrawal when the medication is stopped. Withdrawal symptoms can include nausea, sweating, chills, diarrhea, stomach cramps, and muscle aches. Withdrawal can last up to several weeks, depending on which drug you took and how long you took it. · Increased sensitivity to pain · Constipation · Nausea, vomiting, and dry mouth · Sleepiness and dizziness · Confusion · Depression · Low levels of testosterone that can result in lower sex drive, energy, and strength · Itching and sweating RISKS ARE GREATER WITH:      
· History of drug misuse, substance use disorder, or overdose · Mental health conditions (such as depression or anxiety) · Sleep apnea · Older age (72 years or older) · Pregnancy Avoid alcohol while taking prescription opioids. Also, unless specifically advised by your health care provider, medications to avoid include: · Benzodiazepines (such as Xanax or Valium) · Muscle relaxants (such as Soma or Flexeril) · Hypnotics (such as Ambien or Lunesta) · Other prescription opioids KNOW YOUR OPTIONS Talk to your health care provider about ways to manage your pain that don't involve prescription opioids. Some of these options may actually work better and have fewer risks and side effects. Consult your physician before adding or stopping any medications, treatments, or physical activity. Options may include: · Pain relievers such as acetaminophen, ibuprofen, and naproxen · Some medications that are also used for depression or seizures · Physical therapy and exercise · Counseling to help patients learn how to cope better with triggers of pain and stress. · Application of heat or cold compress · Massage therapy · Relaxation techniques Be Informed Make sure you know the name of your medication, how much and how often to take it, and its potential risks & side effects. IF YOU ARE PRESCRIBED OPIOIDS FOR PAIN: 
· Never take opioids in greater amounts or more often than prescribed. Remember the goal is not to be pain-free but to manage your pain at a tolerable level. · Follow up with your primary care provider to: · Work together to create a plan on how to manage your pain. · Talk about ways to help manage your pain that don't involve prescription opioids. · Talk about any and all concerns and side effects. · Help prevent misuse and abuse. · Never sell or share prescription opioids · Help prevent misuse and abuse. · Store prescription opioids in a secure place and out of reach of others (this may include visitors, children, friends, and family). · Safely dispose of unused/unwanted prescription opioids: Find your community drug take-back program or your pharmacy mail-back program, or flush them down the toilet, following guidance from the Food and Drug Administration (www.fda.gov/Drugs/ResourcesForYou). · Visit www.cdc.gov/drugoverdose to learn about the risks of opioid abuse and overdose. · If you believe you may be struggling with addiction, tell your health care provider and ask for guidance or call [address] [place] [address] at 8-476-901-UGBU. Discharge Instructions [address] [place] [address] DISCHARGE INSTRUCTIONS Claudell Epp [id number] 1965 Discomfort: 
Redness at IV site- apply warm compress to area; if redness or soreness persist- contact your physician There may be a slight amount of blood passed from the rectum Gaseous discomfort- walking, belching will help relieve any discomfort You may not operate a vehicle for 12 hours You may not engage in an occupation involving machinery or appliances for rest of today You may not drink alcoholic beverages for at least 12 hours Avoid making any critical decisions for at least 24 hour DIET: 
You may resume your regular diet  however -  remember your colon is empty and a heavy meal will produce gas. Avoid these foods:  vegetables, fried / greasy foods, carbonated drinks ACTIVITY: 
You may  resume your normal daily activities it is recommended that you spend the remainder of the day resting -  avoid any strenuous activity. CALL M.D. ANY SIGN OF: Increasing pain, nausea, vomiting Abdominal distension (swelling) New increased bleeding (oral or rectal) Fever (chills) Pain in chest area Bloody discharge from nose or mouth Shortness of breath Follow-up Instructions: 
 Call Dr. Karen Epps for any questions or problems at [phone] ENDOSCOPY FINDINGS: 
 Your colonoscopy showed ulcers at the ileo-colonic anastomosis, which were biopsied. Patches of inflammation in the sedrick-terminal ileum were biopsied. A small ulcer was seen in the anal canal, which was not biopsied. We will contact you about the pathology results and the next step. You may restart your Eliquis in two days. Telephone # [phone] Signed By: Geovanna Barros. Tyrell Bautista MD   
 10/5/2018  4:28 PM 
  
 
DISCHARGE SUMMARY from Nurse The following personal items collected during your admission are returned to you:  
Dental Appliance: Dental Appliances: None Vision: Visual Aid: Glasses Hearing Aid:   
Jewelry:   
Clothing:   
Other Valuables:   
Valuables sent to safe:   
 
 
 
  
  
  
Introducing \[facility]\"" & HEALTH SERVICES! Dear Yancy Flores: 
Thank you for requesting a [employer] account. Our records indicate that you already have an active [employer] account. You can access your account anytime at [email or web address] Did you know that you can access your hospital and ER discharge instructions at any time in [employer]? You can also review all of your test results from your hospital stay or ER visit. Additional Information If you have questions, please visit the Frequently Asked Questions section of the [employer] website at [email or web address] Remember, [employer] is NOT to be used for urgent needs. For medical emergencies, dial 911. Now available from your iPhone and Android! Introducing Víctor Sprague As a [facility] patient, I wanted to make you aware of our electronic visit tool called Víctor Sprague. [employer] allows you to connect within minutes with a medical provider 24 hours a day, seven days a week via a mobile device or tablet or logging into a secure website from your computer. You can access Víctor Sprague from anywhere in the [place]. A virtual visit might be right for you when you have a simple condition and feel like you just dont want to get out of bed, or cant get away from work for an appointment, when your regular [facility] provider is not available (evenings, weekends or holidays), or when youre out of town and need minor care. Electronic visits cost only $49 and if the [employer] provider determines a prescription is needed to treat your condition, one can be electronically transmitted to a nearby pharmacy*. Please take a moment to enroll today if you have not already done so. The enrollment process is free and takes just a few minutes. To enroll, please download the stylefruits guy to your tablet or phone, or visit [email or web address] to enroll on your computer. And, as an [address] [place] [address] patient with a [employer] account, the results of your visits will be scanned into your electronic medical record and your primary care provider will be able to view the scanned results. We urge you to continue to see your regular [employer] provider for your ongoing medical care. And while your primary care provider may not be the one available when you seek a Víctor Greenfin virtual visit, the peace of mind you get from getting a real diagnosis real time can be priceless. For more information on Víctor GoodBellyyanefin, view our Frequently Asked Questions (FAQs) at [email or web address]. Sincerely, 
 
Nickolas Al MD 
Chief Medical Officer [address] *:  certain medications cannot be prescribed via [employer] Providers Seen During Your Hospitalization Provider Specialty Primary office phone Royce Campos MD Gastroenterology [phone] Your Primary Care Physician (PCP) Primary Care Physician Office Phone Office Fax Katie Yamilet [phone] You are allergic to the following Allergen Reactions Codeine Hives Nausea and Vomiting Severe nausea & vomiting Hydrocodone Hives Nausea and Vomiting Severe nausea & vomiting Oxycontin (Oxycodone) Hives Nausea and Vomiting Severe nausea & vomiting, also has the same reaction from Percocet Percocet (Oxycodone-Acetaminophen) Hives Nausea and Vomiting Can take tylenol Dilaudid (Hydromorphone (Bulk)) Swelling Prednisone Other (comments) HX OF CROHN'S; not allergic, prefer not to use Recent Documentation OB Status Smoking Status Postmenopausal Former Smoker Emergency Contacts Name Discharge Info Relation Home Work Mobile Tiffani Dolan CAREGIVER [3] Spouse [3] [phone] Patient Belongings The following personal items are in your possession at time of discharge: 
  Dental Appliances: None  Visual Aid: Glasses Please provide this summary of care documentation to your next provider. Signatures-by signing, you are acknowledging that this After Visit Summary has been reviewed with you and you have received a copy. Patient Signature:  ____________________________________________________________ Date:  ____________________________________________________________  
  
Celester Rota Provider Signature:  ____________________________________________________________ Date:  ____________________________________________________________

## 2018-10-05 NOTE — DISCHARGE INSTRUCTIONS
[address]  174 [facility], [address] [place] [address]    COLON DISCHARGE INSTRUCTIONS    Albert Polanco  [id number]  1965    Discomfort:  Redness at IV site- apply warm compress to area; if redness or soreness persist- contact your physician  There may be a slight amount of blood passed from the rectum  Gaseous discomfort- walking, belching will help relieve any discomfort  You may not operate a vehicle for 12 hours  You may not engage in an occupation involving machinery or appliances for rest of today  You may not drink alcoholic beverages for at least 12 hours  Avoid making any critical decisions for at least 24 hour  DIET:  You may resume your regular diet - however -  remember your colon is empty and a heavy meal will produce gas. Avoid these foods:  vegetables, fried / greasy foods, carbonated drinks     ACTIVITY:  You may  resume your normal daily activities it is recommended that you spend the remainder of the day resting -  avoid any strenuous activity. CALL M.D. ANY SIGN OF:   Increasing pain, nausea, vomiting  Abdominal distension (swelling)  New increased bleeding (oral or rectal)  Fever (chills)  Pain in chest area  Bloody discharge from nose or mouth  Shortness of breath      Follow-up Instructions:   Call Dr. Adeel Prado for any questions or problems at [phone]          ENDOSCOPY FINDINGS:   Your colonoscopy showed ulcers at the ileo-colonic anastomosis, which were biopsied. Patches of inflammation in the sedrick-terminal ileum were biopsied. A small ulcer was seen in the anal canal, which was not biopsied. We will contact you about the pathology results and the next step. You may restart your Eliquis in two days. Telephone # [phone]       Signed By: Dayanara Valentine.  Wagner Anand MD     10/5/2018  4:28 PM       DISCHARGE SUMMARY from Nurse    The following personal items collected during your admission are returned to you:   Dental Appliance: Dental Appliances: None  Vision: Visual Aid: Glasses  Hearing Aid:    Jewelry:    Clothing:    Other Valuables:    Valuables sent to safe:

## 2018-10-05 NOTE — PROCEDURES
Paul 64  174 [facility], [address] [place] [address]        Colonoscopy Operative Report    Priscilla Tarango  [id number]  1965      Procedure Type:   Colonoscopy with biopsy     Indications: diarrhea, suspected ashvin-terminal ulceration on capsule endoscopy    Pre-operative Diagnosis: see indication above    Post-operative Diagnosis:  See findings below    :  Jenn Schofield MD      Referring Provider: Althea Le MD, Himanshu Velasquez MD      Sedation:  MAC anesthesia Propofol      Procedure Details:  After informed consent was obtained with all risks and benefits of procedure explained and preoperative exam completed, the patient was taken to the endoscopy suite and placed in the left lateral decubitus position. Upon sequential sedation as per above, a digital rectal exam was performed demonstrating internal hemorrhoids. The Olympus pediatric videocolonoscope  was inserted in the rectum and carefully advanced to the ashvin-terminal ileum. The cecum was identified by the ileocecal valve and appendiceal orifice. The quality of preparation was good. The colonoscope was slowly withdrawn with careful evaluation between folds. Retroflexion in the rectum was completed . Findings:   Anal canal: a 3-4 mm clean-based ulcer was seen  Rectum: normal  Sigmoid: normal  Descending Colon: normal  Transverse Colon: normal  Ascending Colon: absent  Cecum: absent  Ashvin-terminal Ileum: there were two clean-based ulcers at the ileo-colonic anastomosis. Cold biopsies were taken of the ulcer margin. There were patches of erythema in the ashvin-terminal ileum, which were biopsied. Specimen Removed:  1. Ashvin-terminal ileum, 2. Ileo-colonic anastomotic ulcer    Complications: None. EBL:  None. Impression:    1. Patchy erythema of the ashvin-terminal ileum - biopsied  2. Ileo-colonic anastomotic ulcers - biopsied  3. Anal canal ulcer    Recommendations:  1. Follow up surgical pathology  2.  Avoid non-essential NSAID's  3. Restart Eliquis in 2 days  4. Follow up with Dr. Danielle Starks By: Jessica Almeida MD     10/5/2018  4:37 PM

## 2018-10-05 NOTE — ANESTHESIA POSTPROCEDURE EVALUATION
Post-Anesthesia Evaluation and Assessment Patient: Luis Fernando Pierce MRN: [id number]  SSN: [id number] YOB: 1965  Age: 48 y.o. Sex: female Cardiovascular Function/Vital Signs Visit Vitals  BP (!) 144/91  Pulse 92  Temp 37 °C (98.6 °F)  Resp 18  SpO2 100% Patient is status post MAC anesthesia for Procedure(s): 
COLON BIOPSY COLONOSCOPY. Nausea/Vomiting: None Postoperative hydration reviewed and adequate. Pain: 
Pain Scale 1: Numeric (0 - 10) (10/05/18 1532) Pain Intensity 1: 0 (10/05/18 1532) Managed Neurological Status: At baseline Mental Status and Level of Consciousness: Arousable Pulmonary Status:  
O2 Device: Room air (10/05/18 1610) Adequate oxygenation and airway patent Complications related to anesthesia: None Post-anesthesia assessment completed. No concerns Signed By: Severo Andre MD   
 October 5, 2018

## 2018-10-05 NOTE — H&P
[address] 
611 [facility], 5300 [facility] History and Physical    
 
NAME:  Tammi Schultz :   1965 MRN:   [id number] History of Present Illness:  Patient is a 48 y.o. who is seen for diarrhea in setting of Crohn's disease. She is status post ileo-colonic resection. Please see office notes from Dr. Jessica Navarro and myself. PMH: 
Past Medical History:  
Diagnosis Date  Arrhythmia Afib  Chronic pain Lt and Rt back:  Dr High Foot;  Pain mgmt Karen Mallory PA  
 Cough   
 evaluated 14 by Dr Vega Sales ([phone]) \". . possible drug related lung is due to Methitrexate or atypical or fungal infection\" per office note dated 14  Crohn's disease ([facility] [address] Dr Jessica Navarro  Ill-defined condition   
 migraines  Lupus Dr Ware Nine [phone]  Pain from implanted hardware 2016 Exploration of sternal incision with removal of protruding sternal wires  Stenosis of both carotid arteries without cerebral infarction  Stroke [facility]) 2016 TIA's  SVC obstruction PSH: 
Past Surgical History:  
Procedure Laterality Date [address] [facility]  HX  SECTION    
 x1  
 HX COLECTOMY  7/24/10  
 colon resection-18\" removed; Dr Meche Ho  HX GI    
 bowel adhesions removed [address] [facility] Bowel resection  HX HEART CATHETERIZATION    
 no stents, open heart surgery  HX HEENT    
 sinus surgery for deviated septum  HX HYSTERECTOMY partial  
 HX LEFT SALPINGO-OOPHORECTOMY  2005  
 ovary and fallopian tube removed  HX MOHS PROCEDURES Right approx   
 with bone spur repair  HX OTHER SURGICAL    
 femerol vein removed  HX TONSILLECTOMY  HX VASCULAR ACCESS  6/11/10  
 portacath insertion and removal; Gets Remicaid q5 weeks  [place] COLONOSCOPY W/BIOPSY SINGLE/MULTIPLE  2013 Allergies: Allergies Allergen Reactions  Codeine Hives and Nausea and Vomiting Severe nausea & vomiting  Hydrocodone Hives and Nausea and Vomiting Severe nausea & vomiting  Oxycontin [Oxycodone] Hives and Nausea and Vomiting Severe nausea & vomiting, also has the same reaction from Percocet  Percocet [Oxycodone-Acetaminophen] Hives and Nausea and Vomiting Can take tylenol  Dilaudid [Hydromorphone (Bulk)] Swelling  Prednisone Other (comments) HX OF CROHN'S; not allergic, prefer not to use Home Medications: 
Prior to Admission Medications Prescriptions Last Dose Informant Patient Reported? Taking? apixaban (ELIQUIS) 2.5 mg tablet 10/2/2018  Yes No  
Sig: Take 2.5 mg by mouth two (2) times a day. Indications: PE  
aspirin (ASPIRIN) 325 mg tablet 10/3/2018  Yes No  
Sig: Take 325 mg by mouth daily. butalbital-acetaminophen-caffeine (FIORICET, ESGIC) -40 mg per tablet Not Taking at Unknown time  No No  
Sig: Take 2 Tabs by mouth every eight (8) hours as needed for Pain or Headache. Max Daily Amount: 6 Tabs. MUST LAST 30 DAYS. Indications: MIGRAINE, TENSION-TYPE HEADACHE  
diazepam (VALIUM) 5 mg tablet Not Taking at Unknown time  No No  
Sig: Take 1 Tab by mouth every twelve (12) hours as needed for Anxiety. Max Daily Amount: 10 mg. Refill at 1 month intervals  
drospirenone-ethinyl estradiol (MARIELOS 28) 3-20 mg-mcg per tablet 10/4/2018 at Unknown time  Yes Yes Sig: Take 1 Tab by mouth nightly.  
gabapentin (NEURONTIN) 300 mg capsule 10/4/2018 at Unknown time  No Yes Sig: TAKE ONE CAPSULE BY MOUTH THREE TIMES A DAY. hydroxychloroquine (PLAQUENIL) 200 mg tablet 10/4/2018 at Unknown time  Yes Yes Sig: Take 400 mg by mouth daily (after dinner). For lupus  
meperidine (DEMEROL) 50 mg tablet 10/4/2018 at Unknown time  Yes Yes Sig: Take 50 mg by mouth three (3) times daily as needed for Pain. ondansetron hcl (ZOFRAN) 4 mg tablet 10/4/2018 at Unknown time  Yes Yes Sig: Take 4 mg by mouth every eight (8) hours as needed for Nausea. promethazine (PHENERGAN) 25 mg tablet 2018 at Unknown time  Yes Yes Sig: Take 25 mg by mouth every six (6) hours as needed for Nausea. tiZANidine (ZANAFLEX) 2 mg tablet 10/4/2018 at Unknown time  No Yes Si po qam and 2 po qhs Facility-Administered Medications: None Hospital Medications: 
Current Facility-Administered Medications Medication Dose Route Frequency  0.9% sodium chloride infusion  100 mL/hr IntraVENous CONTINUOUS  
 sodium chloride (NS) flush 5-10 mL  5-10 mL IntraVENous Q8H  
 sodium chloride (NS) flush 5-10 mL  5-10 mL IntraVENous PRN  
 midazolam (VERSED) injection 0.25-10 mg  0.25-10 mg IntraVENous Multiple  fentaNYL citrate (PF) injection 100 mcg  100 mcg IntraVENous Multiple  naloxone (NARCAN) injection 0.4 mg  0.4 mg IntraVENous Multiple  flumazenil (ROMAZICON) 0.1 mg/mL injection 0.2 mg  0.2 mg IntraVENous Multiple  simethicone (MYLICON) 17WS/4.8AD oral drops 80 mg  1.2 mL Oral Multiple  diphenhydrAMINE (BENADRYL) injection 50 mg  50 mg IntraVENous ONCE  
 atropine injection 0.5 mg  0.5 mg IntraVENous ONCE PRN  
 EPINEPHrine (ADRENALIN) 0.1 mg/mL syringe 1 mg  1 mg Endoscopically ONCE PRN Social History: 
Social History Substance Use Topics  Smoking status: Former Smoker Packs/day: 0.50 Years: 2.50 Quit date: 3/29/2005  Smokeless tobacco: Never Used Comment: social  
 Alcohol use Yes Comment: Rare wine Family History: 
Family History Problem Relation Age of Onset  Cancer Father   
  stomach Corvinus.Mari Other Mother Auto accident Review of Systems: 
 
 
Constitutional: negative fever, negative chills, negative weight loss Eyes:   negative visual changes ENT:   negative sore throat, tongue or lip swelling Respiratory:  negative cough, negative dyspnea Cards:  negative for chest pain, palpitations, lower extremity edema GI:   See HPI 
:  negative for frequency, dysuria Integument:  negative for rash and pruritus Heme:  negative for easy bruising and gum/nose bleeding Musculoskel: negative for myalgias,  back pain and muscle weakness Neuro: negative for headaches, dizziness, vertigo Psych:  negative for feelings of anxiety, depression Objective:  
Patient Vitals for the past 8 hrs: 
 BP Temp Pulse Resp SpO2  
10/05/18 1532 (!) 144/91 98.6 °F (37 °C) 92 18 100 % EXAM:   
 NEURO-a&o HEENT-wnl LUNGS-clear COR-regular rate and rhythym ABD-soft , no tenderness, no rebound, good bs EXT-no edema Data Review No results for input(s): WBC, HGB, HCT, PLT, HGBEXT, HCTEXT, PLTEXT in the last 72 hours. No results for input(s): NA, K, CL, CO2, BUN, CREA, GLU, PHOS, CA in the last 72 hours. No results for input(s): SGOT, GPT, AP, TBIL, TP, ALB, GLOB, GGT, AML, LPSE in the last 72 hours. No lab exists for component: AMYP, HLPSE No results for input(s): INR, PTP, APTT in the last 72 hours. No lab exists for component: INREXT Assessment:  
· Diarrhea · Crohn's disease Patient Active Problem List  
Diagnosis Code  Crohn's disease of colon ([facility] [address] K50.10  Crohn's disease of ileum ([facility] [address] K50.00  Acute GI bleeding K92.2  Sinus tachycardia R00.0  Bronchitis, acute J20.9  Abdominal pain, acute, right upper quadrant R10.11  
 SLE (systemic lupus erythematosus) ([facility]) M32.9  Tension vascular headache G44.209  Migraine with aura and without status migrainosus, not intractable G43. [address] [facility]  Myopathy G72.9  Vitamin D deficiency E55.9  Back pain M54.9  Lumbar radiculopathy M54.16  Spondylolisthesis, grade 1, L4-L5 M43.10  Weakness R53.1  Ulnar neuropathy at elbow of right upper extremity G56.21  
 Cervical radiculopathy M54.12  
 Cervico-occipital neuralgia of right side M54.81  Stenosis of both carotid arteries without infarction I65.23  
 Acute ischemic stroke ([facility]) I63.9  Lupus L93.0  Pulmonary embolism (HCC) I26.99  
 Transient ischemic attack involving right internal carotid artery G45.1  SVC obstruction I87.1  Shortness of breath R06.02  
 Phrenic nerve palsy G58.8  Inappropriate sinus tachycardia R00.0  Paroxysmal SVT (supraventricular tachycardia) ([facility]) I47.1  Cerebral microvascular disease I67.9  Dizziness and giddiness R42  Altered mental status, unspecified R41.82  
 SVT (supraventricular tachycardia) ([facility]) I47.1  Hypotension due to drugs I95.2 Plan:  
· Endoscopic procedure with MAC Signed By: Rodri Stratton.  Lima Thomas MD   
 10/5/2018  3:40 PM

## 2018-10-05 NOTE — ROUTINE PROCESS
[facility] 1965 
[id number] Situation: 
Verbal report received from: Josse Dooley Procedure: Procedure(s): SINGLE BALLOON ENTEROSCOPY (RETROGRADE) with PEDS colonoscope COLON BIOPSY Background: 
 
Preoperative diagnosis: CROHNS DISEASE Postoperative diagnosis: 1. Ileo-Colonic Anastamosis Ulcer 2. Anal Canal Ulcer :  Dr. Moo Santana Assistant(s): Endoscopy Technician-1: Javad Parr Endoscopy RN-1: Jessica Castañeda RN Specimens:  
ID Type Source Tests Collected by Time Destination 1 : Ashvin Terminal Ileum Preservative   Royce Santana MD 10/5/2018 8148 Pathology 2 : Ileo-colonic Anastamosis Ulcer Preservative   Royce Santana MD 10/5/2018 1603 Pathology H. Pylori  no Assessment: 
Intra-procedure medications Anesthesia gave intra-procedure sedation and medications, see anesthesia flow sheet yes Intravenous fluids: NS@ [place] Pear Vital signs stable yes Abdominal assessment: round and soft  Yes Recommendation: 
Discharge patient per MD order yes . Return to floor na Family or Friend fam 
Permission to share finding with family or friend yes

## 2018-10-10 ENCOUNTER — OFFICE VISIT (OUTPATIENT)
Dept: ONCOLOGY | Age: 53
End: 2018-10-10

## 2018-10-10 VITALS
OXYGEN SATURATION: 99 % | WEIGHT: 192.6 LBS | BODY MASS INDEX: 36.36 KG/M2 | TEMPERATURE: 98.2 F | HEART RATE: 98 BPM | HEIGHT: 61 IN | RESPIRATION RATE: 16 BRPM | DIASTOLIC BLOOD PRESSURE: 84 MMHG | SYSTOLIC BLOOD PRESSURE: 136 MMHG

## 2018-10-10 DIAGNOSIS — L08.9 SKIN INFECTION: ICD-10-CM

## 2018-10-10 DIAGNOSIS — I26.99 OTHER PULMONARY EMBOLISM WITHOUT ACUTE COR PULMONALE, UNSPECIFIED CHRONICITY (HCC): Primary | ICD-10-CM

## 2018-10-10 PROBLEM — E66.01 SEVERE OBESITY (HCC): Status: ACTIVE | Noted: 2018-10-10

## 2018-10-10 RX ORDER — CEPHALEXIN 500 MG/1
500 CAPSULE ORAL 4 TIMES DAILY
Qty: 20 CAP | Refills: 0 | Status: SHIPPED | OUTPATIENT
Start: 2018-10-10 | End: 2018-10-15

## 2018-10-10 RX ORDER — GUAIFENESIN 100 MG/5ML
81 LIQUID (ML) ORAL DAILY
COMMUNITY

## 2018-10-10 NOTE — PROGRESS NOTES
Yung Dee is a 48 y.o. female here today for PE f/u. Hx of +Lupus & +Crohns. Pt reports taking Eliquis and ASA. VS stable. Patient denies pain. Patient denies cough. Patient denies SOB. Patient reports she feels a lump in her left breast and yellowish discoloration in the same area as the lump. Patient reports a family hx of cancer: pt states her father dies at age 44 of Lymphoma and her aunt (mothers sister) has met stomach cancer. Patient reports she recently had a colonscopy w/ Bx done by Dr. Cinthia Campos on 10/5/18.       Visit Vitals    /84    Pulse 98    Temp 98.2 °F (36.8 °C) (Oral)    Resp 16    Ht 5' 1\" (1.549 m)    Wt 192 lb 9.6 oz (87.4 kg)    SpO2 99%    BMI 36.39 kg/m2

## 2018-10-10 NOTE — PROGRESS NOTES
Follow-up Note        Patient: Kanika Gottlieb MRN: [id number]  SSN: [id number]    YOB: 1965  Age: 48 y.o. Sex: female      Subjective:      Kanika Gottlieb is a 48 y.o. female with a history of PE on systemic anticoagulation. She suffered a provoked PE from SVC occlusion/thrombus which was itself related to a chronic indwelling port-a-cath. She was diagnosed with PE in 04/2016. She has been on Apixaban since. She has not had any further episodes of VTE. She has noticed slightly increased bruising in the last few months. No bleeding from nose, gums, vagina or rectum. 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 She underwent a resection and bypass graft of the SVC. The SVC stenosis was felt to be related to a chronic indwelling port. She has been on Apixaban and prefers to remain on long-term systemic anticoagulation. Review of Systems:    Constitutional: negative  Eyes: negative  Ears, Nose, Mouth, Throat, and Face: negative  Respiratory: negative  Cardiovascular: negative  Gastrointestinal: negative  Genitourinary:negative  Integument/Breast: small patchy lesion left breast  Hematologic/Lymphatic: negative  Musculoskeletal:negative  Neurological: negative          Past Medical History:   Diagnosis Date    Arrhythmia     Afib    Chronic pain     Lt and Rt back:  Dr Marline Garcia;  Pain mgmt Karen Mallory PA    Cough     evaluated 8/25/14 by Dr Carlos Herrera ([phone]) \". . possible drug related lung is due to Methitrexate or atypical or fungal infection\" per office note dated 8/25/14    Crohn's disease [facility])     Dr Venkatesh Vallejo    Ill-defined condition     migraines    Lupus     Dr Kristen Gresham [phone]    Pain from implanted hardware 12/14/2016    Exploration of sternal incision with removal of protruding sternal wires    Stenosis of both carotid arteries without cerebral infarction     Stroke [facility]) 06/2016 TIA's     SVC obstruction      Past Surgical History:   Procedure Laterality Date    COLONOSCOPY N/A 10/5/2018    COLONOSCOPY performed by Pierce Goodell. Kaleigh Mathur MD at [facility] ENDOSCOPY     [address] HX  SECTION      x1    HX COLECTOMY  7/24/10    colon resection-18\" removed; Dr Rodo Irizarry      bowel adhesions removed    HX GI      Bowel resection    HX HEART CATHETERIZATION      no stents, open heart surgery    HX HEENT      sinus surgery for deviated septum    HX HYSTERECTOMY      partial    HX LEFT SALPINGO-OOPHORECTOMY      ovary and fallopian tube removed    HX MOHS PROCEDURES Right approx     with bone spur repair    HX OTHER SURGICAL      femerol vein removed    HX TONSILLECTOMY      HX VASCULAR ACCESS  6/11/10    portacath insertion and removal; Gets Remicaid q5 weeks    [place] COLONOSCOPY W/BIOPSY SINGLE/MULTIPLE  2013           Family History   Problem Relation Age of Onset    Cancer Father      stomach    Other Mother      Auto accident     Social History   Substance Use Topics    Smoking status: Former Smoker     Packs/day: 0.50     Years: 2.50     Quit date: 3/29/2005    Smokeless tobacco: Never Used      Comment: social    Alcohol use Yes      Comment: Rare wine      Prior to Admission medications    Medication Sig Start Date End Date Taking? Authorizing Provider   aspirin 81 mg chewable tablet Take 81 mg by mouth daily. Yes Historical Provider   cephALEXin (KEFLEX) 500 mg capsule Take 1 Cap by mouth four (4) times daily for 5 days. 10/10/18 10/15/18 Yes Janet Louis MD   gabapentin (NEURONTIN) 300 mg capsule TAKE ONE CAPSULE BY MOUTH THREE TIMES A DAY. 18  Yes Nani Truong MD   tiZANidine (ZANAFLEX) 2 mg tablet 1 po qam and 2 po qhs 18  Yes Nani Truong MD   apixaban (ELIQUIS) 2.5 mg tablet Take 2.5 mg by mouth two (2) times a day.  Indications: PE   Yes Historical Provider   butalbital-acetaminophen-caffeine (FIORICET, ESGIC) -40 mg per tablet Take 2 Tabs by mouth every eight (8) hours as needed for Pain or Headache. Max Daily Amount: 6 Tabs. MUST LAST 30 DAYS. Indications: MIGRAINE, TENSION-TYPE HEADACHE 9/13/17  Yes Zelma Homans, MD   promethazine (PHENERGAN) 25 mg tablet Take 25 mg by mouth every six (6) hours as needed for Nausea. Yes Historical Provider   diazepam (VALIUM) 5 mg tablet Take 1 Tab by mouth every twelve (12) hours as needed for Anxiety. Max Daily Amount: 10 mg. Refill at 1 month intervals 8/31/16  Yes Zelma Homans, MD   hydroxychloroquine (PLAQUENIL) 200 mg tablet Take 400 mg by mouth daily (after dinner). For lupus   Yes Historical Provider   ondansetron hcl (ZOFRAN) 4 mg tablet Take 4 mg by mouth every eight (8) hours as needed for Nausea. Yes Melisa Atwood MD   meperidine (DEMEROL) 50 mg tablet Take 50 mg by mouth three (3) times daily as needed for Pain. Yes Historical Provider   drospirenone-ethinyl estradiol (MARIELOS 28) 3-20 mg-mcg per tablet Take 1 Tab by mouth nightly.    Yes Historical Provider              Allergies   Allergen Reactions    Codeine Hives and Nausea and Vomiting     Severe nausea & vomiting    Hydrocodone Hives and Nausea and Vomiting     Severe nausea & vomiting    Oxycontin [Oxycodone] Hives and Nausea and Vomiting     Severe nausea & vomiting, also has the same reaction from Percocet    Percocet [Oxycodone-Acetaminophen] Hives and Nausea and Vomiting     Can take tylenol    Dilaudid [Hydromorphone (Bulk)] Swelling    Prednisone Other (comments)     HX OF CROHN'S; not allergic, prefer not to use            Objective:     Vitals:    10/10/18 0854   BP: 136/84   Pulse: 98   Resp: 16   Temp: 98.2 °F (36.8 °C)   TempSrc: Oral   SpO2: 99%   Weight: 192 lb 9.6 oz (87.4 kg)   Height: 5' 1\" (1.549 m)        Physical Exam:    GENERAL: alert, cooperative, no distress, appears stated age  EYE: negative  LYMPHATIC: Cervical, supraclavicular, and axillary nodes normal.   THROAT & NECK: normal and no erythema or exudates noted. LUNG: clear to auscultation bilaterally  HEART: regular rate and rhythm  ABDOMEN: soft, non-tender  EXTREMITIES: no cyanosis or edema  SKIN: Small patchy superficial skin lesion left breast  NEUROLOGIC: negative        Lab Results   Component Value Date/Time    WBC 8.9 06/25/2018 03:50 AM    HGB 10.1 (L) 06/25/2018 03:50 AM    HCT 30.9 (L) 06/25/2018 03:50 AM    PLATELET 806 79/04/8629 03:50 AM    MCV 89.3 06/25/2018 03:50 AM         Assessment:     1. Pulmonary embolism:    > 1st episode in 04/2016- provoked by indwelling catheter [facility]) and SVC stenosis and thrombus    Currently on Apixaban  H/O TIA   Patient prefers to continue systemic anticoagulation. Denies bleeding. Continue Eliquis 2.5 mg daily  Continue Aspirin 81 mg daily    Symptom management form reviewed with patient. 2. Skin infection left breast    > Cephalexin 500 mg po QID x 5 days      Plan:       > Continue Apixaban  > Continue ASA 81 daily  > Cephalexin x 5 days  > Follow-up in 1 year        Signed by: Nicola Wooten MD                     October 10, 2018        CC.  Sarai Adrian MD

## 2018-10-21 ENCOUNTER — HOSPITAL ENCOUNTER (OUTPATIENT)
Dept: MRI IMAGING | Age: 53
Discharge: HOME | End: 2018-10-21
Attending: INTERNAL MEDICINE
Payer: COMMERCIAL

## 2018-10-21 DIAGNOSIS — K62.6 ANAL ULCER: ICD-10-CM

## 2018-10-21 DIAGNOSIS — K50.80 CROHN'S DISEASE OF BOTH SMALL AND LARGE INTESTINE (HCC): ICD-10-CM

## 2018-10-21 PROCEDURE — A9575 INJ GADOTERATE MEGLUMI 0.1ML: HCPCS | Performed by: INTERNAL MEDICINE

## 2018-10-21 PROCEDURE — 74011250636 HC RX REV CODE- 250/636: Performed by: INTERNAL MEDICINE

## 2018-10-21 PROCEDURE — 72197 MRI PELVIS W/O & W/DYE: CPT

## 2018-10-21 RX ORDER — GADOTERATE MEGLUMINE 376.9 MG/ML
18 INJECTION INTRAVENOUS
Status: COMPLETED | OUTPATIENT
Start: 2018-10-21 | End: 2018-10-21

## 2018-10-21 RX ADMIN — GADOTERATE MEGLUMINE 18 ML: 376.9 INJECTION INTRAVENOUS at 15:00

## 2018-11-09 DIAGNOSIS — G43.109 MIGRAINE WITH AURA AND WITHOUT STATUS MIGRAINOSUS, NOT INTRACTABLE: ICD-10-CM

## 2018-11-09 DIAGNOSIS — G44.209 TENSION VASCULAR HEADACHE: ICD-10-CM

## 2018-11-09 DIAGNOSIS — R42 DIZZINESS AND GIDDINESS: ICD-10-CM

## 2018-11-09 DIAGNOSIS — M54.81 CERVICO-OCCIPITAL NEURALGIA OF RIGHT SIDE: ICD-10-CM

## 2018-11-09 DIAGNOSIS — I65.23 STENOSIS OF BOTH CAROTID ARTERIES WITHOUT INFARCTION: ICD-10-CM

## 2018-11-09 DIAGNOSIS — I67.89 CEREBRAL MICROVASCULAR DISEASE: ICD-10-CM

## 2018-11-09 NOTE — TELEPHONE ENCOUNTER
----- Message from Mattmckinley Flores sent at 11/9/2018  8:27 AM EST -----  Regarding: /Refill  Pt called requesting a refill on the medication tizanidine 4 mg. Pt use the [facility] in [place]. Pt took last dose last night . Pt best contact number is [phone] .

## 2018-11-12 RX ORDER — TIZANIDINE 2 MG/1
TABLET ORAL
Qty: 100 TAB | Refills: 5 | Status: SHIPPED | OUTPATIENT
Start: 2018-11-12 | End: 2019-07-25 | Stop reason: SDUPTHER

## 2018-11-12 NOTE — PERIOP NOTES
[facility] Preoperative Instructions Surgery Date 11/14/18          Time of Arrival 11:30AM 
 
1. On the day of your surgery, please report to the Surgical Services Registration Desk and sign in at your designated time. The Surgery Center is located to the right of the Emergency Room. 2. You must have someone with you to drive you home. You should not drive a car for 24 hours following surgery. Please make arrangements for a friend or family member to stay with you for the first 24 hours after your surgery. 3. Do not have anything to eat or drink (including water, gum, mints, coffee, juice) after midnight 11/13/18    . ? This may not apply to medications prescribed by your physician. ?(Please note below the special instructions with medications to take the morning of your procedure.) 4. We recommend you do not drink any alcoholic beverages for 24 hours before and after your surgery. 5. Contact your surgeons office for instructions on the following medications: non-steroidal anti-inflammatory drugs (i.e. Advil, Aleve), vitamins, and supplements. (Some surgeons will want you to stop these medications prior to surgery and others may allow you to take them) **If you are currently taking Plavix, Coumadin, Aspirin and/or other blood-thinning agents, contact your surgeon for instructions. ** Your surgeon will partner with the physician prescribing these medications to determine if it is safe to stop or if you need to continue taking. Please do not stop taking these medications without instructions from your surgeon 6. Wear comfortable clothes. Wear glasses instead of contacts. Do not bring any money or jewelry. Please bring picture ID, insurance card, and any prearranged co-payment or hospital payment. Do not wear make-up, particularly mascara the morning of your surgery. Do not wear nail polish, particularly if you are having foot /hand surgery.   Wear your hair loose or down, no ponytails, buns, miladis pins or clips. All body piercings must be removed. Please shower with antibacterial soap for three consecutive days before and on the morning of surgery, but do not apply any lotions, powders or deodorants after the shower on the day of surgery. Please use a fresh towels after each shower. Please sleep in clean clothes and change bed linens the night before surgery. Please do not shave for 48 hours prior to surgery. Shaving of the face is acceptable. 7. You should understand that if you do not follow these instructions your surgery may be cancelled. If your physical condition changes (I.e. fever, cold or flu) please contact your surgeon as soon as possible. 8. It is important that you be on time. If a situation occurs where you may be late, please call [phone] (OR Holding Area). 9. If you have any questions and or problems, please call [phone] (Pre-admission Testing). 10. Your surgery time may be subject to change. You will receive a phone call the evening prior if your time changes. 11.  If having outpatient surgery, you must have someone to drive you here, stay with you during the duration of your stay, and to drive you home at time of discharge. 12.   In an effort to improve the efficiency, privacy, and safety for all of our Pre-op patients visitors are not allowed in the Holding area. Once you arrive and are registered your family/visitors will be asked to remain in the waiting room. The Pre-op staff will get you from the Surgical Waiting Area and will explain to you and your family/visitors that the Pre-op phase is beginning. The staff will answer any questions and provide instructions for tracking of the patient, by use of the existing tracking number and color-coded status board in the waiting room.   At this time the staff will also ask for your designated spokesperson information in the event that the physician or staff need to provide an update or obtain any pertinent information. The designated spokesperson will be notified if the physician needs to speak to family during the pre-operative phase. If at any time your family/visitors has questions or concerns they may approach the volunteer desk in the waiting area for assistance. Special Instructions:Pt stated she is to admin 1 Fleet enema AM DOS. MEDICATIONS TO TAKE THE MORNING OF SURGERY WITH A SIP OF WATER: Valium, Gabapentin, Zofran, Phenergan as needed. I understand a pre-operative phone call will be made to verify my surgery time. In the event that I am not available, I give permission for a message to be left on my answering service and/or with another person? Yes  
 
 
 
 ___________________      __________   _________ 
  (Signature of Patient)             (Witness)                (Date and Time)

## 2018-11-12 NOTE — TELEPHONE ENCOUNTER
Future Appointments   Date Time Provider Mirtha Janeth   2018  8:45 AM [facility]   10/11/2019  8:30 AM MD Lamar Mendez. 49                         Last Appointment My Department:  18    Please advise of refill below.   Requested Prescriptions     Pending Prescriptions Disp Refills    tiZANidine (ZANAFLEX) 2 mg tablet 100 Tab 5     Si po qam and 2 po qhs

## 2018-11-14 ENCOUNTER — ANESTHESIA (OUTPATIENT)
Dept: SURGERY | Age: 53
End: 2018-11-14
Payer: COMMERCIAL

## 2018-11-14 ENCOUNTER — ANESTHESIA EVENT (OUTPATIENT)
Dept: SURGERY | Age: 53
End: 2018-11-14
Payer: COMMERCIAL

## 2018-11-14 ENCOUNTER — HOSPITAL ENCOUNTER (OUTPATIENT)
Age: 53
Setting detail: OUTPATIENT SURGERY
Discharge: HOME | End: 2018-11-14
Attending: SURGERY | Admitting: SURGERY
Payer: COMMERCIAL

## 2018-11-14 VITALS
HEART RATE: 96 BPM | HEIGHT: 62 IN | SYSTOLIC BLOOD PRESSURE: 137 MMHG | BODY MASS INDEX: 34.52 KG/M2 | TEMPERATURE: 98.1 F | WEIGHT: 187.61 LBS | OXYGEN SATURATION: 97 % | DIASTOLIC BLOOD PRESSURE: 52 MMHG | RESPIRATION RATE: 18 BRPM

## 2018-11-14 DIAGNOSIS — R52 PAIN: Primary | ICD-10-CM

## 2018-11-14 PROCEDURE — 76060000032 HC ANESTHESIA 0.5 TO 1 HR: Performed by: SURGERY

## 2018-11-14 PROCEDURE — 77030011264 HC ELECTRD BLD EXT COVD -A: Performed by: SURGERY

## 2018-11-14 PROCEDURE — 88305 TISSUE EXAM BY PATHOLOGIST: CPT

## 2018-11-14 PROCEDURE — 77030014007 HC SPNG HEMSTAT J&J -B: Performed by: SURGERY

## 2018-11-14 PROCEDURE — 77030011283 HC ELECTRD NDL COVD -A: Performed by: SURGERY

## 2018-11-14 PROCEDURE — 97602 WOUND(S) CARE NON-SELECTIVE: CPT

## 2018-11-14 PROCEDURE — 77030011640 HC PAD GRND REM COVD -A: Performed by: SURGERY

## 2018-11-14 PROCEDURE — 74011250636 HC RX REV CODE- 250/636: Performed by: ANESTHESIOLOGY

## 2018-11-14 PROCEDURE — 74011250636 HC RX REV CODE- 250/636

## 2018-11-14 PROCEDURE — 77030020782 HC GWN BAIR PAWS FLX 3M -B

## 2018-11-14 PROCEDURE — 74011250637 HC RX REV CODE- 250/637

## 2018-11-14 PROCEDURE — 77030032490 HC SLV COMPR SCD KNE COVD -B: Performed by: SURGERY

## 2018-11-14 PROCEDURE — 74011000250 HC RX REV CODE- 250

## 2018-11-14 PROCEDURE — 74011000250 HC RX REV CODE- 250: Performed by: SURGERY

## 2018-11-14 PROCEDURE — 76010000138 HC OR TIME 0.5 TO 1 HR: Performed by: SURGERY

## 2018-11-14 PROCEDURE — 76210000017 HC OR PH I REC 1.5 TO 2 HR: Performed by: SURGERY

## 2018-11-14 PROCEDURE — 76210000021 HC REC RM PH II 0.5 TO 1 HR: Performed by: SURGERY

## 2018-11-14 RX ORDER — TRAMADOL HYDROCHLORIDE 50 MG/1
TABLET ORAL
Status: COMPLETED
Start: 2018-11-14 | End: 2018-11-14

## 2018-11-14 RX ORDER — FENTANYL CITRATE 50 UG/ML
INJECTION, SOLUTION INTRAMUSCULAR; INTRAVENOUS AS NEEDED
Status: DISCONTINUED | OUTPATIENT
Start: 2018-11-14 | End: 2018-11-14 | Stop reason: HOSPADM

## 2018-11-14 RX ORDER — DIPHENHYDRAMINE HYDROCHLORIDE 50 MG/ML
25 INJECTION, SOLUTION INTRAMUSCULAR; INTRAVENOUS ONCE
Status: COMPLETED | OUTPATIENT
Start: 2018-11-14 | End: 2018-11-14

## 2018-11-14 RX ORDER — ONDANSETRON 2 MG/ML
4 INJECTION INTRAMUSCULAR; INTRAVENOUS AS NEEDED
Status: DISCONTINUED | OUTPATIENT
Start: 2018-11-14 | End: 2018-11-14 | Stop reason: HOSPADM

## 2018-11-14 RX ORDER — ONDANSETRON 2 MG/ML
INJECTION INTRAMUSCULAR; INTRAVENOUS AS NEEDED
Status: DISCONTINUED | OUTPATIENT
Start: 2018-11-14 | End: 2018-11-14 | Stop reason: HOSPADM

## 2018-11-14 RX ORDER — LIDOCAINE HYDROCHLORIDE 20 MG/ML
INJECTION, SOLUTION EPIDURAL; INFILTRATION; INTRACAUDAL; PERINEURAL AS NEEDED
Status: DISCONTINUED | OUTPATIENT
Start: 2018-11-14 | End: 2018-11-14 | Stop reason: HOSPADM

## 2018-11-14 RX ORDER — GLYCOPYRROLATE 0.2 MG/ML
INJECTION INTRAMUSCULAR; INTRAVENOUS AS NEEDED
Status: DISCONTINUED | OUTPATIENT
Start: 2018-11-14 | End: 2018-11-14 | Stop reason: HOSPADM

## 2018-11-14 RX ORDER — SODIUM CHLORIDE, SODIUM LACTATE, POTASSIUM CHLORIDE, CALCIUM CHLORIDE 600; 310; 30; 20 MG/100ML; MG/100ML; MG/100ML; MG/100ML
25 INJECTION, SOLUTION INTRAVENOUS CONTINUOUS
Status: DISCONTINUED | OUTPATIENT
Start: 2018-11-14 | End: 2018-11-14 | Stop reason: HOSPADM

## 2018-11-14 RX ORDER — LIDOCAINE HYDROCHLORIDE 10 MG/ML
0.1 INJECTION, SOLUTION EPIDURAL; INFILTRATION; INTRACAUDAL; PERINEURAL AS NEEDED
Status: DISCONTINUED | OUTPATIENT
Start: 2018-11-14 | End: 2018-11-14 | Stop reason: HOSPADM

## 2018-11-14 RX ORDER — BUPIVACAINE HYDROCHLORIDE AND EPINEPHRINE 5; 5 MG/ML; UG/ML
INJECTION, SOLUTION EPIDURAL; INTRACAUDAL; PERINEURAL AS NEEDED
Status: DISCONTINUED | OUTPATIENT
Start: 2018-11-14 | End: 2018-11-14 | Stop reason: HOSPADM

## 2018-11-14 RX ORDER — MORPHINE SULFATE 10 MG/ML
2 INJECTION, SOLUTION INTRAMUSCULAR; INTRAVENOUS
Status: DISCONTINUED | OUTPATIENT
Start: 2018-11-14 | End: 2018-11-14 | Stop reason: HOSPADM

## 2018-11-14 RX ORDER — MIDAZOLAM HYDROCHLORIDE 1 MG/ML
1 INJECTION, SOLUTION INTRAMUSCULAR; INTRAVENOUS AS NEEDED
Status: DISCONTINUED | OUTPATIENT
Start: 2018-11-14 | End: 2018-11-14 | Stop reason: HOSPADM

## 2018-11-14 RX ORDER — PROPOFOL 10 MG/ML
INJECTION, EMULSION INTRAVENOUS AS NEEDED
Status: DISCONTINUED | OUTPATIENT
Start: 2018-11-14 | End: 2018-11-14 | Stop reason: HOSPADM

## 2018-11-14 RX ORDER — FENTANYL CITRATE 50 UG/ML
25 INJECTION, SOLUTION INTRAMUSCULAR; INTRAVENOUS
Status: DISCONTINUED | OUTPATIENT
Start: 2018-11-14 | End: 2018-11-14 | Stop reason: HOSPADM

## 2018-11-14 RX ORDER — TRAMADOL HYDROCHLORIDE 50 MG/1
50 TABLET ORAL
Qty: 20 TAB | Refills: 0 | Status: SHIPPED | OUTPATIENT
Start: 2018-11-14 | End: 2018-12-19 | Stop reason: CLARIF

## 2018-11-14 RX ORDER — TRAMADOL HYDROCHLORIDE 50 MG/1
50 TABLET ORAL ONCE
Status: COMPLETED | OUTPATIENT
Start: 2018-11-14 | End: 2018-11-14

## 2018-11-14 RX ORDER — PROPOFOL 10 MG/ML
INJECTION, EMULSION INTRAVENOUS
Status: DISCONTINUED | OUTPATIENT
Start: 2018-11-14 | End: 2018-11-14 | Stop reason: HOSPADM

## 2018-11-14 RX ORDER — FENTANYL CITRATE 50 UG/ML
50 INJECTION, SOLUTION INTRAMUSCULAR; INTRAVENOUS AS NEEDED
Status: DISCONTINUED | OUTPATIENT
Start: 2018-11-14 | End: 2018-11-14 | Stop reason: HOSPADM

## 2018-11-14 RX ORDER — MIDAZOLAM HYDROCHLORIDE 1 MG/ML
INJECTION, SOLUTION INTRAMUSCULAR; INTRAVENOUS AS NEEDED
Status: DISCONTINUED | OUTPATIENT
Start: 2018-11-14 | End: 2018-11-14 | Stop reason: HOSPADM

## 2018-11-14 RX ORDER — KETAMINE HYDROCHLORIDE 10 MG/ML
INJECTION, SOLUTION INTRAMUSCULAR; INTRAVENOUS AS NEEDED
Status: DISCONTINUED | OUTPATIENT
Start: 2018-11-14 | End: 2018-11-14 | Stop reason: HOSPADM

## 2018-11-14 RX ADMIN — SODIUM CHLORIDE, SODIUM LACTATE, POTASSIUM CHLORIDE, AND CALCIUM CHLORIDE 25 ML/HR: 600; 310; 30; 20 INJECTION, SOLUTION INTRAVENOUS at 12:37

## 2018-11-14 RX ADMIN — FENTANYL CITRATE 50 MCG: 50 INJECTION, SOLUTION INTRAMUSCULAR; INTRAVENOUS at 13:54

## 2018-11-14 RX ADMIN — FENTANYL CITRATE 25 MCG: 50 INJECTION, SOLUTION INTRAMUSCULAR; INTRAVENOUS at 15:50

## 2018-11-14 RX ADMIN — PROPOFOL 20 MG: 10 INJECTION, EMULSION INTRAVENOUS at 14:03

## 2018-11-14 RX ADMIN — FENTANYL CITRATE 25 MCG: 50 INJECTION, SOLUTION INTRAMUSCULAR; INTRAVENOUS at 14:04

## 2018-11-14 RX ADMIN — FENTANYL CITRATE 25 MCG: 50 INJECTION, SOLUTION INTRAMUSCULAR; INTRAVENOUS at 15:40

## 2018-11-14 RX ADMIN — DIPHENHYDRAMINE HYDROCHLORIDE 25 MG: 50 INJECTION, SOLUTION INTRAMUSCULAR; INTRAVENOUS at 12:44

## 2018-11-14 RX ADMIN — FENTANYL CITRATE 25 MCG: 50 INJECTION, SOLUTION INTRAMUSCULAR; INTRAVENOUS at 14:05

## 2018-11-14 RX ADMIN — TRAMADOL HYDROCHLORIDE 50 MG: 50 TABLET ORAL at 15:55

## 2018-11-14 RX ADMIN — LIDOCAINE HYDROCHLORIDE 100 MG: 20 INJECTION, SOLUTION EPIDURAL; INFILTRATION; INTRACAUDAL; PERINEURAL at 13:54

## 2018-11-14 RX ADMIN — PROPOFOL 100 MCG/KG/MIN: 10 INJECTION, EMULSION INTRAVENOUS at 13:54

## 2018-11-14 RX ADMIN — PROPOFOL 50 MG: 10 INJECTION, EMULSION INTRAVENOUS at 13:50

## 2018-11-14 RX ADMIN — FENTANYL CITRATE 25 MCG: 50 INJECTION, SOLUTION INTRAMUSCULAR; INTRAVENOUS at 15:35

## 2018-11-14 RX ADMIN — MIDAZOLAM HYDROCHLORIDE 5 MG: 1 INJECTION, SOLUTION INTRAMUSCULAR; INTRAVENOUS at 13:42

## 2018-11-14 RX ADMIN — GLYCOPYRROLATE 0.2 MG: 0.2 INJECTION INTRAMUSCULAR; INTRAVENOUS at 13:42

## 2018-11-14 RX ADMIN — KETAMINE HYDROCHLORIDE 10 MG: 10 INJECTION, SOLUTION INTRAMUSCULAR; INTRAVENOUS at 13:54

## 2018-11-14 RX ADMIN — PROPOFOL 30 MG: 10 INJECTION, EMULSION INTRAVENOUS at 13:54

## 2018-11-14 RX ADMIN — FENTANYL CITRATE 25 MCG: 50 INJECTION, SOLUTION INTRAMUSCULAR; INTRAVENOUS at 15:45

## 2018-11-14 RX ADMIN — ONDANSETRON 4 MG: 2 INJECTION INTRAMUSCULAR; INTRAVENOUS at 14:06

## 2018-11-14 RX ADMIN — TRAMADOL HYDROCHLORIDE 50 MG: 50 TABLET, FILM COATED ORAL at 15:55

## 2018-11-14 NOTE — ANESTHESIA PREPROCEDURE EVALUATION
Anesthetic History No history of anesthetic complications Review of Systems / Medical History Patient summary reviewed, nursing notes reviewed and pertinent labs reviewed Pulmonary Within defined limits Neuro/Psych  
 
 
 
TIA and headaches (migraines) Pertinent negatives: No CVA Cardiovascular Dysrhythmias : SVT Exercise tolerance: >4 METS 
  
GI/Hepatic/Renal 
Within defined limits Endo/Other Morbid obesity Other Findings Comments: Crohn's disease SVC obstruction hx Lupus Stenosis of both carotid arteries Cervical radiculopathy Hx of PE Myopathy Phrenic nerve palsy Physical Exam 
 
Airway Mallampati: II 
TM Distance: 4 - 6 cm Neck ROM: normal range of motion Mouth opening: Normal 
 
 Cardiovascular Regular rate and rhythm,  S1 and S2 normal,  no murmur, click, rub, or gallop Dental 
No notable dental hx Pulmonary Breath sounds clear to auscultation Abdominal 
GI exam deferred Other Findings Anesthetic Plan ASA: 3 Anesthesia type: general 
 
 
 
 
Induction: Intravenous Anesthetic plan and risks discussed with: Patient

## 2018-11-14 NOTE — PERIOP NOTES
Handoff Report from Operating Room to PACU Report received from Latanya Brock CRNA and Franchesac Wharton RN regarding Yung Snare. Surgeon(s): 
Lindsay Palacios MD  And Procedure(s) (LRB): EXAM UNDER ANES, BIOPSY OF INTRA-ANAL LESION; FISTULATOMY (N/A)  confirmed  
with allergies and dressings discussed. Anesthesia type, drugs, patient history, complications, estimated blood loss, vital signs, intake and output, and last pain medication, lines and temperature were reviewed.

## 2018-11-14 NOTE — BRIEF OP NOTE
BRIEF OPERATIVE NOTE Date of Procedure: 11/14/2018 Preoperative Diagnosis: ANAL LESION Postoperative Diagnosis: ANAL LESION, SUBCUTANEOUS FISTULA Procedure(s): EXAM UNDER ANES, BIOPSY IN INTRA-ANAL LESION, FISTULOTOMY Surgeon(s) and Role: Eugene Munoz MD - Primary Surgical Assistant: Tre Pat Surgical Staff: 
Circ-1: Sobia Saletr Scrub Tech-1: Ash Albert Flokulwant Staff: Farhad Credit Event Time In Time Out Incision Start [phone] Incision Close 1419 Anesthesia: MAC Estimated Blood Loss: Minimal 
Specimens:  
ID Type Source Tests Collected by Time Destination 1 : Perianal Lesion Preservative Rectum  Stefany Wilson MD 11/14/2018 1411 Pathology 2 : Intra-Anal Lesion Preservative Rectum  Stefany Wilson MD 11/14/2018 1414 Pathology Findings: Subcutaneous fistula, posterior midline ulcer (soft), possible presacral mass Complications: None Implants: * No implants in log *

## 2018-11-14 NOTE — PERIOP NOTES
1230 Dr Brock Brownlee in, pt c/o rash bilateral forearms ,raised red rash noted states occurred after CHG wipes, Benadryl ordered. 1300 States itching improved.

## 2018-11-14 NOTE — PERIOP NOTES
1530: Updated patients  1610: Report given to Erlinda Tejada RN in phase 2 
 
1617: Patient transported to phase 2 via stretcher with belongings.

## 2018-11-14 NOTE — ANESTHESIA POSTPROCEDURE EVALUATION
Procedure(s): EXAM UNDER ANES, BIOPSY OF INTRA-ANAL LESION; FISTULATOMY. Anesthesia Post Evaluation Patient location during evaluation: PACU Note status: Adequate. Level of consciousness: responsive to verbal stimuli and sleepy but conscious Pain management: satisfactory to patient Airway patency: patent Anesthetic complications: no 
Cardiovascular status: acceptable Respiratory status: acceptable Hydration status: acceptable Comments: +Post-Anesthesia Evaluation and Assessment Patient: Ruma Soliman MRN: [id number]  SSN: [id number] YOB: 1965  Age: 48 y.o. Sex: female Cardiovascular Function/Vital Signs /77 (BP 1 Location: Right arm, BP Patient Position: At rest)   Pulse (!) 105   Temp 36.7 °C (98.1 °F)   Resp 15   Ht 5' 2\" (1.575 m)   Wt 85.1 kg (187 lb 9.8 oz)   SpO2 96%   BMI 34.31 kg/m² Patient is status post Procedure(s): EXAM UNDER ANES, BIOPSY OF INTRA-ANAL LESION; FISTULATOMY. Nausea/Vomiting: Controlled. Postoperative hydration reviewed and adequate. Pain: 
Pain Scale 1: Numeric (0 - 10) (11/14/18 1600) Pain Intensity 1: 4 (11/14/18 1600) Managed. Neurological Status:  
Neuro (WDL): Within Defined Limits (11/14/18 1500) At baseline. Mental Status and Level of Consciousness: Arousable. Pulmonary Status:  
O2 Device: Room air (11/14/18 1545) Adequate oxygenation and airway patent. Complications related to anesthesia: None Post-anesthesia assessment completed. No concerns. Signed By: Julia Rosenberg MD  
 11/14/2018 Post anesthesia nausea and vomiting:  controlled Visit Vitals /77 (BP 1 Location: Right arm, BP Patient Position: At rest) Pulse (!) 105 Temp 36.7 °C (98.1 °F) Resp 15 Ht 5' 2\" (1.575 m) Wt 85.1 kg (187 lb 9.8 oz) SpO2 96% BMI 34.31 kg/m²

## 2018-11-15 NOTE — DISCHARGE INSTRUCTIONS
David Mixon MD, [address] [facility] Rajilesli Bowling MD, [address] [facility] Leslie Cannon MD, [facility]  Mincamelia Dohertydexter. MD Phillip Herzog MD Dorene Breaker, MD Selene Earthly, MD    [facility] Discharge Instructions for Ambulatory 23-Hour Surgery Patients    1. Advance to high fiber diet as tolerated. 2. Take Metamucil/Citrucel 1 teaspoon mixed in a glass of water in AM and PM.  3. Remove dressing at 8pm.  4. Take 1 sitz baths today (warm water baths for 15-20 minutes). Begin four (4) times a day the day after surgery. 5. Apply Nupercainal Ointment (does not need a prescription) to the anal area after sitz baths, place a dry 4x4 gauze over the are between the buttocks. 6. Take pain medication as prescribed. (NO DRIVING WHILE ON PAIN MEDICATION). 7. No lifting any objects weighing more than 15 pounds. 8. No sitting more than 45 minutes without standing, walking, or lying down. 9. You may walk as desired. 10.  Please schedule an appointment in the office within 4 weeks. Call ahead to schedule your appointment [phone]. 11.  Call [facility] [phone] if you have any problems or questions after hours. 12.  Expect slight bright red blood up to four (4) weeks from surgery. 13.  Stitches are the dissolving type - they do not need removal in the office. 14.  If no bowel movement by tomorrow morning, take 30cc (1 tablespoon) of Milk of Magnesia. Repeat if no results. If still no bowel movement the next day, then call the office.   15. Resume Eliquis in 5 days      DISCHARGE SUMMARY from Nurse    PATIENT INSTRUCTIONS:    After general anesthesia or intravenous sedation, for 24 hours or while taking prescription Narcotics:  · Limit your activities  · Do not drive and operate hazardous machinery  · Do not make important personal or business decisions  · Do  not drink alcoholic beverages  · If you have not urinated within 8 hours after discharge, please contact your surgeon on call.    Report the following to your surgeon:  · Excessive pain, swelling, redness or odor of or around the surgical area  · Temperature over 100.5  · Nausea and vomiting lasting longer than 4 hours or if unable to take medications  · Any signs of decreased circulation or nerve impairment to extremity: change in color, persistent  numbness, tingling, coldness or increase pain  · Any questions    What to do at Home:  A common side effect of anesthesia following surgery is nausea and/or vomiting. In order to decrease symptoms, it is wise to avoid foods that are high in fat, greasy foods, milk products, and spicy foods for the first 24 hours. Acceptable foods for the first 24 hours following surgery include but are not limited to:     soup   broth    toast    crackers    applesauce    bananas    mashed potatoes,   soft or scrambled eggs   oatmeal    jello    It is important to eat when taking your pain medication. This will help to prevent nausea. If possible, please try to time your meals with your medications. It is very important to stay hydrated following surgery. Sip fluids frequently while awake. Avoid acidic drinks such as citrus juices and soda for 24 hours. Carbonated beverages may cause bloating and gas. Acceptable fluids include:    - water (flavor packets may add variety)  - coffee or tea (in moderation)  - Gatorade  - Henry-aid  - apple juice  - cranberry juice    You are encouraged to cough and deep breathe every hour when awake. This will help to prevent respiratory complications following anesthesia. You may want to hug a pillow when coughing and sneezing to add additional support to the surgical area and to decrease discomfort if you had abdominal or chest surgery. If you are discharged home with support stockings, you may remove them after 24 hours. Support stockings are used to help prevent blood clots in the legs following surgery.     Please take time to review all of your Home Care Instructions and Medication Information sheets provided in your discharge packet. If you have any questions, please contact your surgeons office. Thank you. TO PREVENT AN INFECTION      1. [address] YOUR HANDS     To prevent infection, good handwashing is the most important thing you or your caregiver can do.  Wash your hands with soap and water or use the hand  we gave you before you touch any wounds. 2. SHOWER     Use the antibacterial soap we gave you when you take a shower.  Shower with this soap until your wounds are healed.  To reach all areas of your body, you may need someone to help you.  Dont forget to clean your belly button with every shower. 3.  USE CLEAN SHEETS     Use freshly cleaned sheets on your bed after surgery.  To keep the surgery site clean, do not allow pets to sleep with you while your wound is still healing. 4. STOP SMOKING     Stop smoking, or at least cut back on smoking     Smoking slows your healing. 5.  CONTROL YOUR BLOOD SUGAR     High blood sugars slow wound healing.  If you are diabetic, control your blood sugar levels before and after your surgery. Tramadol (Ultram, Ultram ER, [place], Theratramadol-60) - (By mouth)   Why this medicine is used:   Treats moderate to severe pain. This medicine is a narcotic pain reliever. Contact a nurse or doctor right away if you have:  · Extreme weakness, shallow breathing  · Seizures  · Seeing or hearing things that are not there  · Anxiety, restlessness, muscle spasms, fever, sweating, diarrhea  · Slow or fast heartbeat     Common side effects:  · Nausea, vomiting, constipation  · Headache, drowsiness  · Itching, feeling of warmth, redness of the face, neck, arms, and upper chest  © 2017 [address] Information is for End User's use only and may not be sold, redistributed or otherwise used for commercial purposes.       No smoking/ No tobacco products/ Avoid exposure to second hand smoke  Surgeon General's Warning:  Quitting smoking now greatly reduces serious risk to your health. Obesity, smoking, and sedentary lifestyle greatly increases your risk for illness    A healthy diet, regular physical exercise & weight monitoring are important for maintaining a healthy lifestyle    You may be retaining fluid if you have a history of heart failure or if you experience any of the following symptoms:  Weight gain of 3 pounds or more overnight or 5 pounds in a week, increased swelling in our hands or feet or shortness of breath while lying flat in bed. Please call your doctor as soon as you notice any of these symptoms; do not wait until your next office visit. Recognize signs and symptoms of STROKE:    F-face looks uneven    A-arms unable to move or move unevenly    S-speech slurred or non-existent    T-time-call 911 as soon as signs and symptoms begin-DO NOT go       Back to bed or wait to see if you get better-TIME IS BRAIN. Warning Signs of HEART ATTACK     Call 911 if you have these symptoms:   Chest discomfort. Most heart attacks involve discomfort in the center of the chest that lasts more than a few minutes, or that goes away and comes back. It can feel like uncomfortable pressure, squeezing, fullness, or pain.  Discomfort in other areas of the upper body. Symptoms can include pain or discomfort in one or both arms, the back, neck, jaw, or stomach.  Shortness of breath with or without chest discomfort.  Other signs may include breaking out in a cold sweat, nausea, or lightheadedness. Don't wait more than five minutes to call 911 - MINUTES MATTER! Fast action can save your life. Calling 911 is almost always the fastest way to get lifesaving treatment. Emergency Medical Services staff can begin treatment when they arrive -- up to an hour sooner than if someone gets to the hospital by car.      The discharge information has been reviewed with the {PATIENT PARENT GUARDIAN:64790}. The {PATIENT PARENT GUARDIAN:69043} verbalized understanding. Discharge medications reviewed with the {Dishcarge meds reviewed JLUU:32831} and appropriate educational materials and side effects teaching were provided.   ___________________________________________________________________________________________________________________________________

## 2018-11-15 NOTE — OP NOTES
[address]  OPERATIVE REPORT    Sharri Aguilera  MR#: [id number]  : 1965  ACCOUNT #: [de-identified]   DATE OF SERVICE: 2018    PREOPERATIVE DIAGNOSIS:  Anal lesion. POSTOPERATIVE DIAGNOSIS:  Anal ulcer, subcutaneous fistula and possible presacral mass. PROCEDURE PERFORMED:  Exam under anesthesia, biopsy of intra-anal lesion and fistulotomy. SURGEON:  Cynthia Swann MD    ASSISTANT:  Susan Glynn    ANESTHESIA:  MAC.    ESTIMATED BLOOD LOSS:  Minimal.    SPECIMENS REMOVED:  Perianal and intra-anal lesions. FINDINGS:  A subcutaneous fistula, soft posterior midline ulcer and possibly a presacral mass. COMPLICATIONS:  None. IMPLANTS:  None. INDICATIONS:  This is a 49-year-old female who presents with difficulty evacuating her stools with constipation. She has chronic diarrhea actually at baseline and Crohn disease, but as of recently, she has been having issues with evacuating her bowels. She underwent a colonoscopy and was found to have a posterior midline ulcer. She also underwent an MRI which showed thickness in the posterior anal canal with enlarged lymph nodes. DESCRIPTION OF PROCEDURE:  The patient was brought to the operating suite. She was placed in the prone jackknife position. MAC anesthesia was induced. Venodyne stockings were placed. A timeout was performed indicating correct patient and procedure to be performed as per hospital protocol. Her perianal area was prepped and draped in the usual sterile fashion. A medium size Hill-Gustafson retractor was placed in the anal canal.  She was noted to have a small posterior soft ulcer in the posterior midline position. A biopsy of this was performed. This appeared to be soft and nonmalignant whatsoever. She also had a subcutaneous fistula like a skin bridge in the posterior midline position. The fistulotomy was performed by excising the tract and dividing on both ends.   This skin bridge was sent to pathology for further examination. Upon further examination, I felt fullness and firmness in the posterior midline position. This appeared to be deep to the mucosa and possibly in the presacral space. My finger was easily able to extend above the proximal extent of the lesion. I was unable to obtain adequate biopsies of this, plus I was concerned this was a presacral mass which would require a Kraske approach. As such, hemostasis was achieved, a Gelfoam was applied and the patient was awakened and taken to recovery room in stable condition. All sponge counts and needle counts were correct. I will have to further review of the imaging, possibly obtain a CT scan and possibly schedule her for a Kraske approach for this possible presacral mass.       Lauren Martinez M.D.       Luis Roa / Victor Manuel Barba  D: 11/14/2018 14:30     T: 11/14/2018 20:46  JOB #: [id number]

## 2018-11-29 ENCOUNTER — HOSPITAL ENCOUNTER (OUTPATIENT)
Dept: CARDIAC CATH/INVASIVE PROCEDURES | Age: 53
Discharge: HOME | End: 2018-11-29
Attending: INTERNAL MEDICINE | Admitting: INTERNAL MEDICINE
Payer: COMMERCIAL

## 2018-11-29 VITALS
TEMPERATURE: 98 F | DIASTOLIC BLOOD PRESSURE: 78 MMHG | HEART RATE: 75 BPM | BODY MASS INDEX: 34.74 KG/M2 | OXYGEN SATURATION: 100 % | WEIGHT: 184 LBS | HEIGHT: 61 IN | RESPIRATION RATE: 18 BRPM | SYSTOLIC BLOOD PRESSURE: 129 MMHG

## 2018-11-29 PROCEDURE — 77030018729 HC ELECTRD DEFIB PAD CARD -B

## 2018-11-29 PROCEDURE — 93660 TILT TABLE EVALUATION: CPT

## 2018-11-29 NOTE — PROGRESS NOTES
Cardiac Cath Lab Recovery Arrival Note: 
 
 
Edmond Jackman arrived to Cardiac Cath Lab, Recovery Area. Staff introduced to patient. Patient identifiers verified with NAME and DATE OF BIRTH. Procedure verified with patient. Consent forms reviewed and signed by patient or authorized representative and verified. Allergies verified. Patient and family oriented to department. Patient and family informed of procedure and plan of care. Questions answered with review. Patient prepped for procedure, per orders from physician, prior to arrival. 
 
Patient on cardiac monitor, non-invasive blood pressure, SPO2 monitor. On room air. Patient is A&Ox 3. Patient reports no c/o. Patient in stretcher, in low position, with side rails up, call bell within reach, patient instructed to call if assistance as needed. Patient prep in: [address], [place]. Patient family has pager # none Family in: CCL waiting area.   
Prep by: Lilliam Cannon RN and Barbara Cisse RN

## 2018-11-29 NOTE — PROGRESS NOTES
Pt with negative TILT. Ambulated to CCL recovery. No sedation given. VSS. Dc instructions reviewed with pt. Pt verbalizes understanding. SL dc'd without difficulty. Pt dc'd to home without wheelchair.

## 2018-11-29 NOTE — DISCHARGE INSTRUCTIONS
Cardiology Discharge Instructions                Patient ID:  Denia John  [id number]  80 y.o.  1965    Admit Date: 11/29/2018    Discharge Date: 11/29/2018   Admitting Physician: Troy Smith MD   Discharge Physician: Troy Smith MD    Cardiology Procedures this Admission:  1. TILT table test, normal      Visit Vitals  /78 (BP 1 Location: Right arm, BP Patient Position: At rest)   Pulse 75   Temp 98 °F (36.7 °C)   Resp 18   Ht 5' 1\" (1.549 m)   Wt 83.5 kg (184 lb)   SpO2 100%   Breastfeeding? No   BMI 34.77 kg/m²       Disposition: home    FOLLOW-UP:  Call the office [phone] to make an appointment as needed. Please keep me informed of any progress regarding your colon issue and how I can be of help.    [place] [address]    [phone]  [place] [address] [address] [facility]    [email or web address]    Thank you for placing your trust for your heart with the physicians and staff of [facility]. We have long provided [place] with the most advanced cardiovascular care possible using a personalized and caring approach. And we hope to continue this strong tradition well into the future. A heart condition, or the fear of having a heart condition, can be a stressful time for a patient and their family. There are appointments, testing procedures and unfamiliar terms that can cause natural questions and concerns. While we encourage you to speak with your nurse or physician regarding any questions you might have, please consider this web site as a powerful resource you can use at any time. Often, questions or concerns only arise once you've left our office. There are many useful sections on this web site and links to other professional organizations and advocacy groups. These sources can help answer many questions you might have from the comfort of your own home or office. Again, thank you for considering [facility] as your trusted provider of heart care.  Please don't hesitate to let us know if there is anything we can do to enhance your experience with us.      Signed:  Adneike Negrete MD  11/29/2018

## 2018-11-29 NOTE — PROCEDURES
[address]  [phone]    Patient ID:  Patient: Tammi Schultz  MRN: [id number]  Age: 48 y.o.  : 1965  Gender: female  Study Date: 2018    History:  This is a female with suspected dysautonomia as an explanation for upright dyspnea on exertion and palpitations, referred for elective tilt table testing. Procedure:  Head upright TILT table testing (57155)  The patient was brought to the EP laboratory in a post absorptive state after informed consent had been previously obtained. Continuous electrocardiographic and intermittent blood pressure monitoring was performed.  The patient was placed in the supine position on the tilt table and baseline measurements were taken. The table was then raised to the 70 degrees incline position and measurements were repeated for up to 30 minutes. All symptoms and signs were recorded. At completion of the first part of the study, the patient was returned to the supine position. At the completion, the patient was returned to the supine position and then transported to the recovery area at the end of all testing. Preoperative Diagnosis: As above. Postoperative Diagnosis: As above. Procedure:  As above. Surgeon(s) and Role:  Caterina Morel MD - Primary   Anesthesia:   None. Estimated Blood Loss:  None. Specimens: * No specimens in log *   Findings:  As below. Complications:  None. FINDINGS:  1.  At baseline, sinus rhythm a rate of 91 beats per minute with a blood pressure of 117/56 was recorded.  No symptoms. 2.  Upon rising to tilt, no orthostatic changes or symptoms were observed. 3.  Throughout the 30 minutes of study no significant findings. HR's ranged in the 70-90's, no hypotension observed. No symptoms. 4.  Final supine HR was 83 with a /65.     IMPRESSION:  1.  Negative tilt table test.  2.  Appropriate sinus rates (looks like her sinus node modification in 2018 has held up well). RECOMMENDATIONS:  1.  Maintain adequate hydration and salt liberalization.  Avoid alcohol in excess.  Avoid standing in the heat for prolonged periods of time. 2.  No beta-blocker recommended. 3.  The above was discussed this with the patient and all questions answered.       Signed By: Tony Mahmood MD     November 29, 2018

## 2018-11-29 NOTE — H&P
[address] HISTORY AND PHYSICAL Deon Castillo 
MR#: [id number] : 1965 ACCOUNT #: [de-identified] ADMIT DATE: 2018 CHIEF COMPLAINT:  Palpitations. HISTORY OF PRESENT ILLNESS:  This is a 24-year-old female with a history of lupus, Crohn's disease, removed Port-A-Cath, prior stroke, pulmonary embolism, and chronic Eliquis anticoagulation. I last saw her in the office in October after 2 separate ablation procedures. The first was in 2018 when I aimed to reduce her quite rapid heart rate despite maximum beta blocker therapy. I targeted a high right atrial tachycardia. This did not completely resolve her fast heart rates. Next, she underwent a sinus node modification in 2018. This was successful in getting her off beta blocker. In fact, immediately after her sinus node modification, when she received beta blocker and was actually bradycardic and hypotensive. Because of some increase in heart rates, she started a very low dose of beta blocker and this is proved to work well. On arrival today, her heart rate at rest is in the 70s. In the past, this was not even feasible. We decided to test further today as she has noted that when she walks her dog, she has some dyspnea as well as she feels her heart rate comes up some. No other significant symptoms. I thought she may be having orthostatic intolerance even though this didn't seem to be the case, we had not excluded it off beta-blocker. We did elect to HOLD on restarting a beta blocker until a tilt-table test was done and we could look at [facility]. AT [place] and 's. ALLERGIES:  PLEASE SEE THE LIST. MEDICATIONS:  Please see the list. 
 
PAST MEDICAL HISTORY: 
1. As above. 2.  SVC replacement after Port-A-Cath removal in 2016. This was at the right chest. 
 
FAMILY HISTORY:  Noncontributory. SOCIAL HISTORY:  Former smoker. . She works for a [employer].  
 
PHYSICAL EXAMINATION: 
 VITAL SIGNS:  Temperature 98 degrees Fahrenheit, pulse 75, blood pressure 129/78, respirations 18, oxygen saturation 100% on room air, weight 83.5 kg. GENERAL:  Not diaphoretic, not in acute distress. NEUROLOGIC:  Awake and appropriate, grossly nonfocal. 
 
TELEMETRY:  Sinus rhythm with a rate in the 70 beats per minute range. This is preprocedure. ASSESSMENT:  Palpitations and dyspnea, though I suspect this may not be due to a very rapid heart rate. Dysautonomia, amongst other possibilities given her multiple comorbidities, may be present. RECOMMENDATIONS:  Given the potential benefits, risks and alternatives, she agrees to proceed with elective tilt-table testing. We will screen her for a delayed orthostatic drop in blood pressure in particular. Trying to decide whether adding a low-dose beta blocker is even appropriate at this point. At first approximation, it looks like her sinus node modification has worked well. MD JOSE Garcia/NANCY 
D: 11/29/2018 09:43    
T: 11/29/2018 10:13 JOB #: [id number]

## 2018-12-03 DIAGNOSIS — M54.81 CERVICO-OCCIPITAL NEURALGIA OF RIGHT SIDE: ICD-10-CM

## 2018-12-03 DIAGNOSIS — I67.9 CEREBROVASCULAR DISEASE, UNSPECIFIED: ICD-10-CM

## 2018-12-03 DIAGNOSIS — M54.12 CERVICAL RADICULOPATHY: ICD-10-CM

## 2018-12-03 DIAGNOSIS — G56.21 ULNAR NEUROPATHY AT ELBOW OF RIGHT UPPER EXTREMITY: ICD-10-CM

## 2018-12-03 DIAGNOSIS — I65.23 STENOSIS OF BOTH CAROTID ARTERIES WITHOUT INFARCTION: ICD-10-CM

## 2018-12-03 RX ORDER — GABAPENTIN 300 MG/1
CAPSULE ORAL
Qty: 100 CAP | Refills: 5 | Status: SHIPPED | OUTPATIENT
Start: 2018-12-03

## 2018-12-19 NOTE — PERIOP NOTES
[facility]  Preoperative Instructions        Surgery Date 12/20/18          Time of Arrival 0915    1. On the day of your surgery, please report to the Surgical Services Registration Desk and sign in at your designated time. The Surgery Center is located to the right of the Emergency Room. 2. You must have someone with you to drive you home. You should not drive a car for 24 hours following surgery. Please make arrangements for a friend or family member to stay with you for the first 24 hours after your surgery. 3. Do not have anything to eat or drink (including water, gum, mints, coffee, juice) after midnight 12/19/18.?? Antoni Rapp ? This may not apply to medications prescribed by your physician. ?(Please note below the special instructions with medications to take the morning of your procedure.)    4. We recommend you do not drink any alcoholic beverages for 24 hours before and after your surgery. 5. Contact your surgeons office for instructions on the following medications: non-steroidal anti-inflammatory drugs (i.e. Advil, Aleve), vitamins, and supplements. (Some surgeons will want you to stop these medications prior to surgery and others may allow you to take them)  **If you are currently taking Plavix, Coumadin, Aspirin and/or other blood-thinning agents, contact your surgeon for instructions. ** Your surgeon will partner with the physician prescribing these medications to determine if it is safe to stop or if you need to continue taking. Please do not stop taking these medications without instructions from your surgeon    6. Wear comfortable clothes. Wear glasses instead of contacts. Do not bring any money or jewelry. Please bring picture ID, insurance card, and any prearranged co-payment or hospital payment. Do not wear make-up, particularly mascara the morning of your surgery. Do not wear nail polish, particularly if you are having foot /hand surgery.   Wear your hair loose or down, no ponytails, buns, miladis pins or clips. All body piercings must be removed. Please shower with antibacterial soap for three consecutive days before and on the morning of surgery, but do not apply any lotions, powders or deodorants after the shower on the day of surgery. Please use a fresh towels after each shower. Please sleep in clean clothes and change bed linens the night before surgery. Please do not shave for 48 hours prior to surgery. Shaving of the face is acceptable. 7. You should understand that if you do not follow these instructions your surgery may be cancelled. If your physical condition changes (I.e. fever, cold or flu) please contact your surgeon as soon as possible. 8. It is important that you be on time. If a situation occurs where you may be late, please call [phone] (OR Holding Area). 9. If you have any questions and or problems, please call [phone] (Pre-admission Testing). 10. Your surgery time may be subject to change. You will receive a phone call the evening prior if your time changes. 11.  If having outpatient surgery, you must have someone to drive you here, stay with you during the duration of your stay, and to drive you home at time of discharge. 12.   In an effort to improve the efficiency, privacy, and safety for all of our Pre-op patients visitors are not allowed in the Holding area. Once you arrive and are registered your family/visitors will be asked to remain in the waiting room. The Pre-op staff will get you from the Surgical Waiting Area and will explain to you and your family/visitors that the Pre-op phase is beginning. The staff will answer any questions and provide instructions for tracking of the patient, by use of the existing tracking number and color-coded status board in the waiting room.   At this time the staff will also ask for your designated spokesperson information in the event that the physician or staff need to provide an update or obtain any pertinent information. The designated spokesperson will be notified if the physician needs to speak to family during the pre-operative phase. If at any time your family/visitors has questions or concerns they may approach the volunteer desk in the waiting area for assistance. Special Instructions:Eliquis & ASA per surgeon    MEDICATIONS TO TAKE THE MORNING OF SURGERY WITH A SIP OF WATER:neurontin. zofran if needed. Pain meds per contract. I understand a pre-operative phone call will be made to verify my surgery time. In the event that I am not available, I give permission for a message to be left on my answering service and/or with another person?   {yes @ [phone].         ___________________      __________   _________    (Signature of Patient)             (Witness)                (Date and Time)

## 2018-12-20 ENCOUNTER — ANESTHESIA EVENT (OUTPATIENT)
Dept: SURGERY | Age: 53
End: 2018-12-20
Payer: COMMERCIAL

## 2018-12-20 ENCOUNTER — ANESTHESIA (OUTPATIENT)
Dept: SURGERY | Age: 53
End: 2018-12-20
Payer: COMMERCIAL

## 2018-12-20 ENCOUNTER — HOSPITAL ENCOUNTER (OUTPATIENT)
Age: 53
Setting detail: OUTPATIENT SURGERY
Discharge: HOME | End: 2018-12-20
Attending: SURGERY | Admitting: SURGERY
Payer: COMMERCIAL

## 2018-12-20 VITALS
WEIGHT: 186.95 LBS | HEART RATE: 83 BPM | HEIGHT: 61 IN | TEMPERATURE: 98.1 F | RESPIRATION RATE: 16 BRPM | BODY MASS INDEX: 35.3 KG/M2 | SYSTOLIC BLOOD PRESSURE: 141 MMHG | OXYGEN SATURATION: 98 % | DIASTOLIC BLOOD PRESSURE: 62 MMHG

## 2018-12-20 DIAGNOSIS — R52 PAIN: Primary | ICD-10-CM

## 2018-12-20 PROCEDURE — 74011000250 HC RX REV CODE- 250: Performed by: SURGERY

## 2018-12-20 PROCEDURE — 88331 PATH CONSLTJ SURG 1 BLK 1SPC: CPT

## 2018-12-20 PROCEDURE — 76010000149 HC OR TIME 1 TO 1.5 HR: Performed by: SURGERY

## 2018-12-20 PROCEDURE — 77030002888 HC SUT CHRMC J&J -A: Performed by: SURGERY

## 2018-12-20 PROCEDURE — 76210000020 HC REC RM PH II FIRST 0.5 HR: Performed by: SURGERY

## 2018-12-20 PROCEDURE — 74011250636 HC RX REV CODE- 250/636: Performed by: ANESTHESIOLOGY

## 2018-12-20 PROCEDURE — 88305 TISSUE EXAM BY PATHOLOGIST: CPT

## 2018-12-20 PROCEDURE — 77030011640 HC PAD GRND REM COVD -A: Performed by: SURGERY

## 2018-12-20 PROCEDURE — 74011000250 HC RX REV CODE- 250

## 2018-12-20 PROCEDURE — 77030032490 HC SLV COMPR SCD KNE COVD -B: Performed by: SURGERY

## 2018-12-20 PROCEDURE — 74011250637 HC RX REV CODE- 250/637: Performed by: ANESTHESIOLOGY

## 2018-12-20 PROCEDURE — 76210000000 HC OR PH I REC 2 TO 2.5 HR: Performed by: SURGERY

## 2018-12-20 PROCEDURE — 74011250636 HC RX REV CODE- 250/636

## 2018-12-20 PROCEDURE — 77030003481 HC NDL BIOP GUN BARD -B: Performed by: SURGERY

## 2018-12-20 PROCEDURE — 77030020782 HC GWN BAIR PAWS FLX 3M -B

## 2018-12-20 PROCEDURE — 76060000033 HC ANESTHESIA 1 TO 1.5 HR: Performed by: SURGERY

## 2018-12-20 PROCEDURE — 77030014007 HC SPNG HEMSTAT J&J -B: Performed by: SURGERY

## 2018-12-20 PROCEDURE — 77030018836 HC SOL IRR NACL ICUM -A: Performed by: SURGERY

## 2018-12-20 RX ORDER — BUTALBITAL, ACETAMINOPHEN AND CAFFEINE 50; 325; 40 MG/1; MG/1; MG/1
2 TABLET ORAL ONCE
Status: COMPLETED | OUTPATIENT
Start: 2018-12-20 | End: 2018-12-20

## 2018-12-20 RX ORDER — TRAMADOL HYDROCHLORIDE 50 MG/1
50 TABLET ORAL
Qty: 30 TAB | Refills: 0 | Status: SHIPPED | OUTPATIENT
Start: 2018-12-20 | End: 2019-08-13 | Stop reason: CLARIF

## 2018-12-20 RX ORDER — ONDANSETRON 2 MG/ML
INJECTION INTRAMUSCULAR; INTRAVENOUS
Status: COMPLETED
Start: 2018-12-20 | End: 2018-12-20

## 2018-12-20 RX ORDER — SODIUM CHLORIDE 0.9 % (FLUSH) 0.9 %
5-10 SYRINGE (ML) INJECTION EVERY 8 HOURS
Status: DISCONTINUED | OUTPATIENT
Start: 2018-12-20 | End: 2018-12-20 | Stop reason: HOSPADM

## 2018-12-20 RX ORDER — SODIUM CHLORIDE, SODIUM LACTATE, POTASSIUM CHLORIDE, CALCIUM CHLORIDE 600; 310; 30; 20 MG/100ML; MG/100ML; MG/100ML; MG/100ML
25 INJECTION, SOLUTION INTRAVENOUS CONTINUOUS
Status: DISCONTINUED | OUTPATIENT
Start: 2018-12-20 | End: 2018-12-20 | Stop reason: HOSPADM

## 2018-12-20 RX ORDER — LIDOCAINE HYDROCHLORIDE 20 MG/ML
INJECTION, SOLUTION EPIDURAL; INFILTRATION; INTRACAUDAL; PERINEURAL AS NEEDED
Status: DISCONTINUED | OUTPATIENT
Start: 2018-12-20 | End: 2018-12-20 | Stop reason: HOSPADM

## 2018-12-20 RX ORDER — BUPIVACAINE HYDROCHLORIDE AND EPINEPHRINE 2.5; 5 MG/ML; UG/ML
30 INJECTION, SOLUTION EPIDURAL; INFILTRATION; INTRACAUDAL; PERINEURAL ONCE
Status: COMPLETED | OUTPATIENT
Start: 2018-12-20 | End: 2018-12-20

## 2018-12-20 RX ORDER — KETAMINE HYDROCHLORIDE 10 MG/ML
INJECTION, SOLUTION INTRAMUSCULAR; INTRAVENOUS AS NEEDED
Status: DISCONTINUED | OUTPATIENT
Start: 2018-12-20 | End: 2018-12-20 | Stop reason: HOSPADM

## 2018-12-20 RX ORDER — PROPOFOL 10 MG/ML
INJECTION, EMULSION INTRAVENOUS
Status: DISCONTINUED | OUTPATIENT
Start: 2018-12-20 | End: 2018-12-20 | Stop reason: HOSPADM

## 2018-12-20 RX ORDER — LIDOCAINE HYDROCHLORIDE 10 MG/ML
0.1 INJECTION, SOLUTION EPIDURAL; INFILTRATION; INTRACAUDAL; PERINEURAL AS NEEDED
Status: DISCONTINUED | OUTPATIENT
Start: 2018-12-20 | End: 2018-12-20 | Stop reason: HOSPADM

## 2018-12-20 RX ORDER — ACETAMINOPHEN 10 MG/ML
INJECTION, SOLUTION INTRAVENOUS
Status: COMPLETED
Start: 2018-12-20 | End: 2018-12-20

## 2018-12-20 RX ORDER — ACETAMINOPHEN 10 MG/ML
1000 INJECTION, SOLUTION INTRAVENOUS ONCE
Status: COMPLETED | OUTPATIENT
Start: 2018-12-20 | End: 2018-12-20

## 2018-12-20 RX ORDER — SODIUM CHLORIDE 0.9 % (FLUSH) 0.9 %
5-10 SYRINGE (ML) INJECTION AS NEEDED
Status: DISCONTINUED | OUTPATIENT
Start: 2018-12-20 | End: 2018-12-20 | Stop reason: HOSPADM

## 2018-12-20 RX ORDER — DIPHENHYDRAMINE HYDROCHLORIDE 50 MG/ML
12.5 INJECTION, SOLUTION INTRAMUSCULAR; INTRAVENOUS AS NEEDED
Status: DISCONTINUED | OUTPATIENT
Start: 2018-12-20 | End: 2018-12-20 | Stop reason: HOSPADM

## 2018-12-20 RX ORDER — MIDAZOLAM HYDROCHLORIDE 1 MG/ML
INJECTION, SOLUTION INTRAMUSCULAR; INTRAVENOUS AS NEEDED
Status: DISCONTINUED | OUTPATIENT
Start: 2018-12-20 | End: 2018-12-20 | Stop reason: HOSPADM

## 2018-12-20 RX ORDER — ONDANSETRON 2 MG/ML
4 INJECTION INTRAMUSCULAR; INTRAVENOUS ONCE
Status: COMPLETED | OUTPATIENT
Start: 2018-12-20 | End: 2018-12-20

## 2018-12-20 RX ORDER — FENTANYL CITRATE 50 UG/ML
INJECTION, SOLUTION INTRAMUSCULAR; INTRAVENOUS AS NEEDED
Status: DISCONTINUED | OUTPATIENT
Start: 2018-12-20 | End: 2018-12-20 | Stop reason: HOSPADM

## 2018-12-20 RX ORDER — FENTANYL CITRATE 50 UG/ML
25 INJECTION, SOLUTION INTRAMUSCULAR; INTRAVENOUS
Status: DISCONTINUED | OUTPATIENT
Start: 2018-12-20 | End: 2018-12-20 | Stop reason: HOSPADM

## 2018-12-20 RX ADMIN — SODIUM CHLORIDE, SODIUM LACTATE, POTASSIUM CHLORIDE, AND CALCIUM CHLORIDE 25 ML/HR: 600; 310; 30; 20 INJECTION, SOLUTION INTRAVENOUS at 09:51

## 2018-12-20 RX ADMIN — ACETAMINOPHEN 1000 MG: 10 INJECTION, SOLUTION INTRAVENOUS at 12:04

## 2018-12-20 RX ADMIN — MIDAZOLAM HYDROCHLORIDE 2 MG: 1 INJECTION, SOLUTION INTRAMUSCULAR; INTRAVENOUS at 10:16

## 2018-12-20 RX ADMIN — KETAMINE HYDROCHLORIDE 20 MG: 10 INJECTION, SOLUTION INTRAMUSCULAR; INTRAVENOUS at 10:37

## 2018-12-20 RX ADMIN — KETAMINE HYDROCHLORIDE 20 MG: 10 INJECTION, SOLUTION INTRAMUSCULAR; INTRAVENOUS at 10:21

## 2018-12-20 RX ADMIN — FENTANYL CITRATE 25 MCG: 50 INJECTION, SOLUTION INTRAMUSCULAR; INTRAVENOUS at 10:36

## 2018-12-20 RX ADMIN — MEPERIDINE HYDROCHLORIDE 12.5 MG: 25 INJECTION, SOLUTION INTRAMUSCULAR; INTRAVENOUS; SUBCUTANEOUS at 11:43

## 2018-12-20 RX ADMIN — KETAMINE HYDROCHLORIDE 20 MG: 10 INJECTION, SOLUTION INTRAMUSCULAR; INTRAVENOUS at 10:59

## 2018-12-20 RX ADMIN — MIDAZOLAM HYDROCHLORIDE 1 MG: 1 INJECTION, SOLUTION INTRAMUSCULAR; INTRAVENOUS at 10:36

## 2018-12-20 RX ADMIN — KETAMINE HYDROCHLORIDE 20 MG: 10 INJECTION, SOLUTION INTRAMUSCULAR; INTRAVENOUS at 10:25

## 2018-12-20 RX ADMIN — FENTANYL CITRATE 50 MCG: 50 INJECTION, SOLUTION INTRAMUSCULAR; INTRAVENOUS at 10:21

## 2018-12-20 RX ADMIN — ONDANSETRON 4 MG: 2 INJECTION INTRAMUSCULAR; INTRAVENOUS at 12:42

## 2018-12-20 RX ADMIN — MIDAZOLAM HYDROCHLORIDE 1 MG: 1 INJECTION, SOLUTION INTRAMUSCULAR; INTRAVENOUS at 10:21

## 2018-12-20 RX ADMIN — BUTALBITAL, ACETAMINOPHEN AND CAFFEINE 2 TABLET: 50; 325; 40 TABLET ORAL at 13:10

## 2018-12-20 RX ADMIN — KETAMINE HYDROCHLORIDE 20 MG: 10 INJECTION, SOLUTION INTRAMUSCULAR; INTRAVENOUS at 10:35

## 2018-12-20 RX ADMIN — LIDOCAINE HYDROCHLORIDE 60 MG: 20 INJECTION, SOLUTION EPIDURAL; INFILTRATION; INTRACAUDAL; PERINEURAL at 10:22

## 2018-12-20 RX ADMIN — PROPOFOL 60 MCG/KG/MIN: 10 INJECTION, EMULSION INTRAVENOUS at 10:21

## 2018-12-20 RX ADMIN — FENTANYL CITRATE 25 MCG: 50 INJECTION, SOLUTION INTRAMUSCULAR; INTRAVENOUS at 10:38

## 2018-12-20 NOTE — OP NOTES
[address]  OPERATIVE REPORT    Khai Dinh  MR#: [id number]  : 1965  ACCOUNT #: [de-identified]   DATE OF SERVICE: 2018    PREOPERATIVE DIAGNOSIS:  Anal canal mass. POSTOPERATIVE DIAGNOSIS:  Anal canal lesion. PROCEDURE PERFORMED:  Exam under anesthesia and biopsy of anal canal lesion. SURGEON:  Sarah Nagel MD     ASSISTANT:   Phyllis Castillo,     ANESTHESIA:  MAC.    ESTIMATED BLOOD LOSS:  Minimal.    SPECIMENS REMOVED:  Postanal lesion. FINDINGS:  Fullness in the anal canal posteriorly. COMPLICATIONS:  None. IMPLANTS:  None. INDICATIONS:  This is a 80-year-old female with history of Crohn disease who has a sensation of fullness in anal canal and inability to evacuate her stools. She also reports rectal bleeding. I previously did an exam under anesthesia where I identified an anal canal lesion. She underwent MRI and CT scan and it was noted that this lesion was between the internal and external sphincter muscles with some lymphadenopathy. Due to concerns for possible malignancy versus inflammatory state of an adequate biopsy was necessary to confirm diagnosis. DESCRIPTION OF PROCEDURE:  The patient was brought to the operating suite. She was placed in the prone jackknife position. MAC anesthesia was induced. The perianal area was prepped and draped in the usual sterile fashion. A timeout was performed indicating the correct patient and procedure to be performed as per hospital protocol. A transverse incision was made posteriorly between the internal and external sphincter muscles. Dissection was carried out between the internal and external sphincter muscles. The area of fullness was identified posteriorly and a punch biopsy was used to obtain this tissue. This was cauterized at the base. I also used a [place] forceps and liver biopsy to obtain adequate specimen.   Once adequate specimen was obtained it was sent to pathology for further examination. On frozen section I identified inflammatory cells, confirming that this was not malignant in nature. There was no evidence of any malignant cells whatsoever after adequate biopsies were taken. The area in question was packed with Gelfoam.  Hemostasis was achieved. Patient was awakened and taken to recovery room in stable condition.       NICKI Sexton  D: 12/20/2018 11:28     T: 12/20/2018 13:02  JOB #: [id number]

## 2018-12-20 NOTE — BRIEF OP NOTE
BRIEF OPERATIVE NOTE    Date of Procedure: 12/20/2018   Preoperative Diagnosis: ANAL CANAL MASS  Postoperative Diagnosis: ANAL CANAL MASS    Procedure(s):  EXAM UNDER ANESTHESIA, BIOPSY  POST-ANAL LESION  Surgeon(s) and Role:     * Coury, Lexie Rinne, MD - Primary         Surgical Assistant: Darlene Bose    Surgical Staff:  Circ-1: Jacinta Dahl RN  Scrub Tech-1: Sharlyne Essex A  Surg Asst-1: Ty Starch  Event Time In Time Out   Incision Start 1039    Incision Close 1115      Anesthesia: MAC   Estimated Blood Loss: Minimal  Specimens:   ID Type Source Tests Collected by Time Destination   1 : POST ANAL LESION Frozen Section Nithya Reynoso MD 12/20/2018 1052 Pathology   2 : POST ANAL LESION Frozen Section Nithya Reynoso MD 12/20/2018 1107 Pathology      Findings: Fullness in anal canal posteriorly   Complications: None  Implants: * No implants in log *

## 2018-12-20 NOTE — ANESTHESIA POSTPROCEDURE EVALUATION
Procedure(s):  EXAM UNDER ANESTHESIA, BIOPSY  POST-ANAL LESIONS. Anesthesia Post Evaluation        Patient location during evaluation: PACU  Note status: Adequate. Level of consciousness: responsive to verbal stimuli and sleepy but conscious  Pain management: satisfactory to patient  Airway patency: patent  Anesthetic complications: no  Cardiovascular status: acceptable  Respiratory status: acceptable  Hydration status: acceptable  Comments: +Post-Anesthesia Evaluation and Assessment    Patient: Claudell Epp MRN: [id number]  SSN: [id number]   YOB: 1965  Age: 48 y.o. Sex: female      Cardiovascular Function/Vital Signs    /70 (BP 1 Location: Right arm, BP Patient Position: At rest)   Pulse 91   Temp 36.7 °C (98 °F)   Resp 12   Ht 5' 1\" (1.549 m)   Wt 84.8 kg (186 lb 15.2 oz)   SpO2 96%   BMI 35.32 kg/m²     Patient is status post Procedure(s):  EXAM UNDER ANESTHESIA, BIOPSY  POST-ANAL LESIONS. Nausea/Vomiting: Controlled. Postoperative hydration reviewed and adequate. Pain:  Pain Scale 1: Numeric (0 - 10) (12/20/18 1300)  Pain Intensity 1: 8 (12/20/18 1300)   Managed. Neurological Status:   Neuro (WDL): Exceptions to WDL (12/20/18 1126)   At baseline. Mental Status and Level of Consciousness: Arousable. Pulmonary Status:   O2 Device: Room air (12/20/18 1300)   Adequate oxygenation and airway patent. Complications related to anesthesia: None    Post-anesthesia assessment completed. No concerns.     Signed By: Josie Amezcua MD    12/20/2018  Post anesthesia nausea and vomiting:  controlled      Visit Vitals  /70 (BP 1 Location: Right arm, BP Patient Position: At rest)   Pulse 91   Temp 36.7 °C (98 °F)   Resp 12   Ht 5' 1\" (1.549 m)   Wt 84.8 kg (186 lb 15.2 oz)   SpO2 96%   BMI 35.32 kg/m²

## 2018-12-20 NOTE — PERIOP NOTES
Handoff Report from Operating Room to PACU    Report received from Deandre Johnston RN and Delbert Rincon CRNA regarding Jimmie Colvin. Surgeon(s):  Vicente Najera MD  And Procedure(s) (LRB):  EXAM UNDER ANESTHESIA, BIOPSY  POST-ANAL LESIONS (N/A)  confirmed   with allergies and dressings discussed. Anesthesia type, drugs, patient history, complications, estimated blood loss, vital signs, intake and output, and last pain medication, lines, reversal medications and temperature were reviewed.

## 2018-12-20 NOTE — PERIOP NOTES
TRANSFER - OUT REPORT:    Verbal report given to [facility], [address] [facility] (name) on Denia John  being transferred to Phase II (unit) for routine progression of care       Report consisted of patients Situation, Background, Assessment and   Recommendations(SBAR). Information from the following report(s) SBAR, OR Summary, Intake/Output, MAR, Recent Results and Cardiac Rhythm NSR was reviewed with the receiving nurse. Opportunity for questions and clarification was provided.       Patient transported with:   Registered Nurse

## 2018-12-20 NOTE — PERIOP NOTES
Patient states that her head hurts and the only thing that works for her at home is fioicet. Patient given medication. States, \"it takes a while to work but [facility] ready to go home. \"

## 2018-12-20 NOTE — DISCHARGE INSTRUCTIONS
David Deutsch MD, [address] [facility] Rajilesli Ho MD, [address] [facility] Jessi Ortiz MD, [facility]. MD Alana Patten MD Hermann Ito, MD Lind Burden, MD    [facility] Discharge Instructions for Ambulatory 23-Hour Surgery Patients    1. Advance to high fiber diet as tolerated. 2. Take Metamucil/Citrucel 1 teaspoon mixed in a glass of water in AM and PM.  3. Remove dressing at 8pm.  4. Take 1 sitz baths today (warm water baths for 15-20 minutes). Begin four (4) times a day the day after surgery. 5. Apply Nupercainal Ointment (does not need a prescription) to the anal area after sitz baths, place a dry 4x4 gauze over the are between the buttocks. 6. Take pain medication as prescribed. (NO DRIVING WHILE ON PAIN MEDICATION). 7. No lifting any objects weighing more than 15 pounds. 8. No sitting more than 45 minutes without standing, walking, or lying down. 9. You may walk as desired. 10. Resume Eliquis in 5 days  11. Please schedule an appointment in the office within 4 weeks. Call ahead to schedule your appointment [phone]. 12.  Call [facility] [phone] if you have any problems or questions after hours. 13.  Expect slight bright red blood up to four (4) weeks from surgery. 14.  Stitches are the dissolving type - they do not need removal in the office. 15.  If no bowel movement by tomorrow morning, take 30cc (1 tablespoon) of Milk of Magnesia. Repeat if no results. If still no bowel movement the next day, then call the office. Tramadol (Ultram, Ultram ER, [place], Theratramadol-60) - (By mouth)   Why this medicine is used:   Treats moderate to severe pain. This medicine is a narcotic pain reliever.   Contact a nurse or doctor right away if you have:  · Extreme weakness, shallow breathing  · Seizures  · Seeing or hearing things that are not there  · Anxiety, restlessness, muscle spasms, fever, sweating, diarrhea  · Slow or fast heartbeat Common side effects:  · Nausea, vomiting, constipation  · Headache, drowsiness  · Itching, feeling of warmth, redness of the face, neck, arms, and upper chest  © 2017 [address] Information is for End User's use only and may not be sold, redistributed or otherwise used for commercial purposes. TO PREVENT AN INFECTION      1. [address] YOUR HANDS     To prevent infection, good handwashing is the most important thing you or your caregiver can do.  Wash your hands with soap and water or use the hand  we gave you before you touch any wounds. 2. SHOWER     Use the antibacterial soap we gave you when you take a shower.  Shower with this soap until your wounds are healed.  To reach all areas of your body, you may need someone to help you.  Dont forget to clean your belly button with every shower. 3.  USE CLEAN SHEETS     Use freshly cleaned sheets on your bed after surgery.  To keep the surgery site clean, do not allow pets to sleep with you while your wound is still healing. 4. STOP SMOKING     Stop smoking, or at least cut back on smoking     Smoking slows your healing. 5.  CONTROL YOUR BLOOD SUGAR     High blood sugars slow wound healing.  If you are diabetic, control your blood sugar levels before and after your surgery. After general anesthesia or intravenous sedation, for 24 hours or while taking prescription Narcotics:  · Limit your activities  · Do not drive and operate hazardous machinery  · Do not make important personal or business decisions  · Do  not drink alcoholic beverages  · If you have not urinated within 8 hours after discharge, please contact your surgeon on call.     Report the following to your surgeon:  · Excessive pain, swelling, redness or odor of or around the surgical area  · Temperature over 100.5  · Nausea and vomiting lasting longer than 4 hours or if unable to take medications  · Any signs of decreased circulation or nerve impairment to extremity: change in color, persistent  numbness, tingling, coldness or increase pain  · Any questions    These are general instructions for a healthy lifestyle:    No smoking/ No tobacco products/ Avoid exposure to second hand smoke  Surgeon General's Warning:  Quitting smoking now greatly reduces serious risk to your health. Obesity, smoking, and sedentary lifestyle greatly increases your risk for illness    A healthy diet, regular physical exercise & weight monitoring are important for maintaining a healthy lifestyle    You may be retaining fluid if you have a history of heart failure or if you experience any of the following symptoms:  Weight gain of 3 pounds or more overnight or 5 pounds in a week, increased swelling in our hands or feet or shortness of breath while lying flat in bed. Please call your doctor as soon as you notice any of these symptoms; do not wait until your next office visit. Recognize signs and symptoms of STROKE:  F-face looks uneven    A-arms unable to move or move unevenly    S-speech slurred or non-existent    T-time-call 911 as soon as signs and symptoms begin-DO NOT go       Back to bed or wait to see if you get better-TIME IS BRAIN. Warning Signs of HEART ATTACK   Call 911 if you have these symptoms:   Chest discomfort. Most heart attacks involve discomfort in the center of the chest that lasts more than a few minutes, or that goes away and comes back. It can feel like uncomfortable pressure, squeezing, fullness, or pain.  Discomfort in other areas of the upper body. Symptoms can include pain or discomfort in one or both arms, the back, neck, jaw, or stomach.  Shortness of breath with or without chest discomfort.  Other signs may include breaking out in a cold sweat, nausea, or lightheadedness. Don't wait more than five minutes to call 911 - MINUTES MATTER! Fast action can save your life.  Calling 911 is almost always the fastest way to get lifesaving treatment. Emergency Medical Services staff can begin treatment when they arrive -- up to an hour sooner than if someone gets to the hospital by car. The discharge information has been reviewed with the patient and caregiver. The patient and caregiver verbalized understanding. Discharge medications reviewed with the patient and caregiver and appropriate educational materials and side effects teaching were provided.

## 2018-12-20 NOTE — PERIOP NOTES
Discharge instructions reviewed with patient and her . Opportunity for questions/answers given, able to verbalize understanding. Provided with sitz bath for use as instructed. Dressed self, voided prior to discharge. Complains of pressure at rectum from packing but wants to go home. Denies nausea. Escorted out for discharge home by nurse via wheelchair.

## 2018-12-20 NOTE — ANESTHESIA PREPROCEDURE EVALUATION
Anesthetic History   No history of anesthetic complications            Review of Systems / Medical History  Patient summary reviewed, nursing notes reviewed and pertinent labs reviewed    Pulmonary  Within defined limits                 Neuro/Psych         TIA and headaches (migraines)  Pertinent negatives: No CVA   Cardiovascular            Dysrhythmias : SVT      Exercise tolerance: >4 METS  Comments: S/P repair of SVC thrombosis    GI/Hepatic/Renal  Within defined limits              Endo/Other        Obesity     Other Findings   Comments: Anal canal mass    Crohn's disease     SVC obstruction hx    Lupus    Stenosis of both carotid arteries     Cervical radiculopathy    Hx of PE    Myopathy    Phrenic nerve palsy           Physical Exam    Airway  Mallampati: II  TM Distance: 4 - 6 cm  Neck ROM: normal range of motion   Mouth opening: Normal     Cardiovascular  Regular rate and rhythm,  S1 and S2 normal,  no murmur, click, rub, or gallop             Dental  No notable dental hx       Pulmonary  Breath sounds clear to auscultation               Abdominal  GI exam deferred       Other Findings            Anesthetic Plan    ASA: 3  Anesthesia type: total IV anesthesia and MAC          Induction: Intravenous  Anesthetic plan and risks discussed with: Patient

## 2018-12-20 NOTE — ROUTINE PROCESS
Patient: Kanika Gottlieb MRN: [id number]  SSN: [id number]   YOB: 1965  Age: 48 y.o. Sex: female     Patient is status post Procedure(s):  EXAM UNDER ANESTHESIA, BIOPSY  POST-ANAL LESIONS. Surgeon(s) and Role:     * Jennifer Sprague MD - Primary    Local/Dose/Irrigation:  30 ML LOCAL INJECTION ANAL AREA                  Peripheral IV 12/20/18 Left Arm (Active)   Site Assessment Clean, dry, & intact 12/20/2018  9:44 AM   Phlebitis Assessment 0 12/20/2018  9:44 AM   Infiltration Assessment 0 12/20/2018  9:44 AM   Dressing Status Clean, dry, & intact 12/20/2018  9:44 AM   Dressing Type Tape;Transparent 12/20/2018  9:44 AM   Hub Color/Line Status Pink; Infusing 12/20/2018  9:44 AM                           Dressing/Packing:  Wound Anus-DRESSING TYPE: 4 x 4;ABD pad;Packing(GELFOAM PACKING IN RECTUM, HYPAFIX TAPE) (12/20/18 1113)  Splint/Cast:  ]    Other:  NONE

## 2019-01-31 ENCOUNTER — HOSPITAL ENCOUNTER (OUTPATIENT)
Dept: MRI IMAGING | Age: 54
Discharge: HOME | End: 2019-01-31
Attending: SURGERY
Payer: COMMERCIAL

## 2019-01-31 VITALS — BODY MASS INDEX: 34.93 KG/M2 | HEIGHT: 61 IN | WEIGHT: 185 LBS

## 2019-01-31 DIAGNOSIS — R11.0 NAUSEA: ICD-10-CM

## 2019-01-31 DIAGNOSIS — K50.111 CROHN'S DISEASE OF LARGE INTESTINE WITH RECTAL BLEEDING (HCC): ICD-10-CM

## 2019-01-31 PROCEDURE — 74011000255 HC RX REV CODE- 255: Performed by: SURGERY

## 2019-01-31 PROCEDURE — 74011250636 HC RX REV CODE- 250/636: Performed by: SURGERY

## 2019-01-31 PROCEDURE — A9575 INJ GADOTERATE MEGLUMI 0.1ML: HCPCS | Performed by: SURGERY

## 2019-01-31 PROCEDURE — 72197 MRI PELVIS W/O & W/DYE: CPT

## 2019-01-31 RX ORDER — GADOTERATE MEGLUMINE 376.9 MG/ML
18 INJECTION INTRAVENOUS
Status: COMPLETED | OUTPATIENT
Start: 2019-01-31 | End: 2019-01-31

## 2019-01-31 RX ORDER — SODIUM CHLORIDE 0.9 % (FLUSH) 0.9 %
10 SYRINGE (ML) INJECTION ONCE
Status: COMPLETED | OUTPATIENT
Start: 2019-01-31 | End: 2019-01-31

## 2019-01-31 RX ADMIN — GADOTERATE MEGLUMINE 18 ML: 376.9 INJECTION INTRAVENOUS at 13:05

## 2019-01-31 RX ADMIN — GLUCAGON HYDROCHLORIDE 1 MG: KIT at 12:56

## 2019-01-31 RX ADMIN — Medication 10 ML: at 12:46

## 2019-01-31 RX ADMIN — BARIUM SULFATE 900 ML: 1 SUSPENSION ORAL at 13:05

## 2019-01-31 NOTE — PROGRESS NOTES
The patient was only able to drink 2 bottles of volumen. She was passing the some of the contrast in her stool. She tolerated the mri well. The contrast was where it needed to be.  Yg Urias RN

## 2019-07-25 DIAGNOSIS — R42 DIZZINESS AND GIDDINESS: ICD-10-CM

## 2019-07-25 DIAGNOSIS — M54.81 CERVICO-OCCIPITAL NEURALGIA OF RIGHT SIDE: ICD-10-CM

## 2019-07-25 DIAGNOSIS — G44.209 TENSION VASCULAR HEADACHE: ICD-10-CM

## 2019-07-25 DIAGNOSIS — I67.89 CEREBRAL MICROVASCULAR DISEASE: ICD-10-CM

## 2019-07-25 DIAGNOSIS — G43.109 MIGRAINE WITH AURA AND WITHOUT STATUS MIGRAINOSUS, NOT INTRACTABLE: ICD-10-CM

## 2019-07-25 DIAGNOSIS — I65.23 STENOSIS OF BOTH CAROTID ARTERIES WITHOUT INFARCTION: ICD-10-CM

## 2019-07-25 NOTE — TELEPHONE ENCOUNTER
Future Appointments   Date Time Provider Mirtha Fowler   10/11/2019  8:30 AM MD Eduardo Celeste Appointment My Department:  18    Please advise of refill below for Dr. Cade Deluna as he is out of the office    Requested Prescriptions     Pending Prescriptions Disp Refills    tiZANidine (ZANAFLEX) 2 mg tablet 100 Tab 0     Si po qam and 2 po qhs

## 2019-07-26 RX ORDER — TIZANIDINE 2 MG/1
TABLET ORAL
Qty: 100 TAB | Refills: 0 | Status: SHIPPED | OUTPATIENT
Start: 2019-07-26

## 2019-07-30 ENCOUNTER — HOSPITAL ENCOUNTER (OUTPATIENT)
Dept: PHYSICAL THERAPY | Age: 54
Discharge: HOME | End: 2019-07-30
Payer: COMMERCIAL

## 2019-07-30 PROCEDURE — 97112 NEUROMUSCULAR REEDUCATION: CPT | Performed by: PHYSICAL THERAPIST

## 2019-07-30 PROCEDURE — 97162 PT EVAL MOD COMPLEX 30 MIN: CPT | Performed by: PHYSICAL THERAPIST

## 2019-07-30 PROCEDURE — 97110 THERAPEUTIC EXERCISES: CPT | Performed by: PHYSICAL THERAPIST

## 2019-07-30 NOTE — PROGRESS NOTES
PT INITIAL EVALUATION NOTE 2-15    Patient Name: Bia Cole  Date:2019  : 1965  [x]  Patient  Verified  Payor: Talha Cazares / Plan: Zuhair Mcconnell / Product Type: HMO /    In time:1200  Out time:0100  Total Treatment Time (min): 60  Visit #: 1     Treatment Area: Fecal incontinence [R15.9]    SUBJECTIVE  Pain Level (0-10 scale): 0/10  Any medication changes, allergies to medications, adverse drug reactions, diagnosis change, or new procedure performed?: [] No    [x] Yes (see summary sheet for update)  Subjective:     Copied from chart (Dr. Esequiel Mckenna office)    PLOF: INDICATIONS:  This is a 45-year-old female with history of Crohn disease who has a sensation of fullness in anal canal and inability to evacuate her stools. She also reports rectal bleeding. I previously did an exam under anesthesia where I identified an anal canal lesion. She underwent MRI and CT scan and it was noted that this lesion was between the internal and external sphincter muscles with some lymphadenopathy. Due to concerns for possible malignancy versus inflammatory state of an adequate biopsy was necessary to confirm diagnosis. Patient underwent biopsy of rectal mass in  which was determined to be benign. She continues to have sensation of urge and frequent fecal smearing throughout the day. She reports difficulty with evacuating stool and more recently has noticed a mucous discharge in her stools. Her GI doctor is treating her Chron's with Aleksandra boudreaux, but thinks she will ultimately need to remove her rectum and give her a permanent ostomy. She is scheduled for another surgery on 08/15/19 for mass removal from the rectum and will having pelvic floor mapping done the next week. Her goals for physical therapy is to prevent further FI and improve her emptying abilities.     Mechanism of Injury: Chron's Dx since age 21  Previous Treatment/Compliance: n/a  PMHx/Surgical Hx: 2 biopsy's of rectal mass in 2018, 2 colon resections   Work Hx:    Living Situation: Lives with   Pt Goals: reduce FI  Barriers: Chrons  Motivation: yes  Substance use: none   FABQ Score: -  Cognition: A & O x 4        OBJECTIVE/EXAMINATION  SEMG: resting tone: 8mV  Max work: 12mV (1 sec hold)    Patient declined internal rectal or vaginal exam today.   Perfect score obtained using sEMG      PERFECT SCORE CHART  P =  Power (Laycock Scale Grade 0-5) 3-/5  E =  Endurance (How long pt holds max contraction) 1sec  R =  Repetitions (How many times the repeats holds) 4x  F =  Fast Twitch (How many 1 second contractions in 10 seconds) 3x  E =  Elevation (Lift of post vaginal wall toward pubic bone) n/a  C =  Coordinated cocontraction of transverse abdominus - absent  T = Timing (squeeze and lift with cough) -absent        15 min Therapeutic Exercise:  [x] See flow sheet :toileting postures, squatty potty, breathing, downtraining and bulging PFM   Rationale: increase ROM, increase strength, improve coordination and increase proprioception to improve the patients ability to prevent FI       15 min Neuromuscular Re-education:  [x]  See flow sheet :   Rationale: increase ROM, increase strength, improve coordination and increase proprioception  to improve the patients ability to reduce FI              With   [] TE   [] TA   [] neuro   [] other: Patient Education: [x] Review HEP    [] Progressed/Changed HEP based on:   [] positioning   [] body mechanics   [] transfers   [] heat/ice application    [] other:        Other Objective/Functional Measures:see above    Pain Level (0-10 scale) post treatment: 0      ASSESSMENT:      [x]  See Plan of [address] [place] [address], PT 7/30/2019

## 2019-07-31 NOTE — PROGRESS NOTES
[address] [facility] [address]  Phone: [phone]  Fax: [phone]    Plan of Care/ Statement of Necessity for Physical Therapy Services 2-15    Patient name: Clara Israel  : 1965  Provider#: [id number]  Referral source: Cassie Awad MD      Medical/Treatment Diagnosis: Fecal incontinence [R15.9]     Prior Hospitalization: see medical history     Comorbidities: Chron's disease  Prior Level of Function: see eval  Medications: Verified on Patient Summary List    Start of Care: 19      Onset Date: 30+ years       The Plan of Care and following information is based on the information from the initial evaluation. Assessment/ key information: Patient is 47year old female with a long history of Chron's disease and PFM dysfunction with secondary FI. She presents with hypersensitivity of the external anal sphincter, high resting tone, decreased flexibility, decreased strength, and poor coordination of the pelvic floor muscle. She will benefit from skilled PT to address these problems so that she reduce the amount of incidents of FI and can return to all ADL's at [facility]. Evaluation Complexity History MEDIUM  Complexity : 1-2 comorbidities / personal factors will impact the outcome/ POC ; Examination MEDIUM Complexity : 3 Standardized tests and measures addressing body structure, function, activity limitation and / or participation in recreation  ;Presentation MEDIUM Complexity : Evolving with changing characteristics  ; Clinical Decision Making MEDIUM Complexity : FOTO score of 26-74  Overall Complexity Rating: MEDIUM    Problem List: pain affecting function, decrease ROM, decrease strength, decrease ADL/ functional abilitiies, decrease activity tolerance and decrease flexibility/ joint mobility   Treatment Plan may include any combination of the following: Therapeutic exercise, Therapeutic activities, Neuromuscular re-education, Physical agent/modality, Manual therapy, Patient education, Self Care training and Functional mobility training  Patient / Family readiness to learn indicated by: asking questions, trying to perform skills and interest  Persons(s) to be included in education: patient (P)  Barriers to Learning/Limitations: None  Patient Goal (s): to reduce FI  Patient Self Reported Health Status: fair  Rehabilitation Potential: fair    Short Term Goals: To be accomplished in 6 weeks:  Patient will be independent with a progressive home exercise program    Patient will demonstrate & utilized pelvic floor ms protection techniques   Patient will demonstrate improved PFM strength to 3+/5 bilaterally in order to decrease FI symptoms   Patient will be independent with urge suppression techniques, bladder irritant elmination   Patient will be independent with progressive body scan relaxation program     Long Term Goals: To be accomplished in 12 weeks:  Patient will demonstrate 4/5 PFM strength bilaterally in order to eliminate FI symptoms. Patient will demonstrate 10 second PFM holds at 4/5 in order to elminate FI symptoms. Patient will have no loss of stool with cough/sneeze. Patient will have no loss of stool with urge triggers (key in door, pulling pants down)    Frequency / Duration: Patient to be seen 1-2 times per week for 12 weeks. Patient/ Caregiver education and instruction: self care, activity modification and exercises    [x]  Plan of care has been reviewed with FOUZIA Thorne, PT 7/31/2019     ________________________________________________________________________    I certify that the above Therapy Services are being furnished while the patient is under my care. I agree with the treatment plan and certify that this therapy is necessary.     [de-identified] Signature:____________________  Date:____________Time: _________

## 2019-08-12 ENCOUNTER — HOSPITAL ENCOUNTER (OUTPATIENT)
Dept: PHYSICAL THERAPY | Age: 54
End: 2019-08-12

## 2019-08-12 NOTE — PERIOP NOTES
LM with Dr Angie Yin's nurse. PCP, cardio and pulmonology all attempted to obtain patient anesthesia required Chemistry. No chemistry available. Advised patient will need chemistry prior to surgery.

## 2019-08-13 ENCOUNTER — HOSPITAL ENCOUNTER (OUTPATIENT)
Dept: PREADMISSION TESTING | Age: 54
Discharge: HOME | End: 2019-08-13
Payer: COMMERCIAL

## 2019-08-13 LAB
ANION GAP SERPL CALC-SCNC: 9 MMOL/L (ref 5–15)
BUN SERPL-MCNC: 15 MG/DL (ref 6–20)
BUN/CREAT SERPL: 17 (ref 12–20)
CALCIUM SERPL-MCNC: 8.8 MG/DL (ref 8.5–10.1)
CHLORIDE SERPL-SCNC: 106 MMOL/L (ref 97–108)
CO2 SERPL-SCNC: 23 MMOL/L (ref 21–32)
CREAT SERPL-MCNC: 0.88 MG/DL (ref 0.55–1.02)
GLUCOSE SERPL-MCNC: 74 MG/DL (ref 65–100)
POTASSIUM SERPL-SCNC: 4.1 MMOL/L (ref 3.5–5.1)
SODIUM SERPL-SCNC: 138 MMOL/L (ref 136–145)

## 2019-08-13 PROCEDURE — 36415 COLL VENOUS BLD VENIPUNCTURE: CPT

## 2019-08-13 PROCEDURE — 80048 BASIC METABOLIC PNL TOTAL CA: CPT

## 2019-08-13 NOTE — PERIOP NOTES
[facility]  Preoperative Instructions        Surgery Date 08/15/19          Time of Arrival 0545    1. On the day of your surgery, please report to the Surgical Services Registration Desk and sign in at your designated time. The Surgery Center is located to the right of the Emergency Room. 2. You must have someone with you to drive you home. You should not drive a car for 24 hours following surgery. Please make arrangements for a friend or family member to stay with you for the first 24 hours after your surgery. 3. Do not have anything to eat or drink (including water, gum, mints, coffee, juice) after midnight ?08/14/19? [place] ? This may not apply to medications prescribed by your physician. ?(Please note below the special instructions with medications to take the morning of your procedure.)    4. We recommend you do not drink any alcoholic beverages for 24 hours before and after your surgery. 5. Contact your surgeons office for instructions on the following medications: non-steroidal anti-inflammatory drugs (i.e. Advil, Aleve), vitamins, and supplements. (Some surgeons will want you to stop these medications prior to surgery and others may allow you to take them)  **If you are currently taking Plavix, Coumadin, Aspirin and/or other blood-thinning agents, contact your surgeon for instructions. ** Your surgeon will partner with the physician prescribing these medications to determine if it is safe to stop or if you need to continue taking. Please do not stop taking these medications without instructions from your surgeon    6. Wear comfortable clothes. Wear glasses instead of contacts. Do not bring any money or jewelry. Please bring picture ID, insurance card, and any prearranged co-payment or hospital payment. Do not wear make-up, particularly mascara the morning of your surgery. Do not wear nail polish, particularly if you are having foot /hand surgery.   Wear your hair loose or down, no ponytails, buns, miladis pins or clips. All body piercings must be removed. Please shower with antibacterial soap for three consecutive days before and on the morning of surgery, but do not apply any lotions, powders or deodorants after the shower on the day of surgery. Please use a fresh towels after each shower. Please sleep in clean clothes and change bed linens the night before surgery. Please do not shave for 48 hours prior to surgery. Shaving of the face is acceptable. 7. You should understand that if you do not follow these instructions your surgery may be cancelled. If your physical condition changes (I.e. fever, cold or flu) please contact your surgeon as soon as possible. 8. It is important that you be on time. If a situation occurs where you may be late, please call [phone] (OR Holding Area). 9. If you have any questions and or problems, please call [phone] (Pre-admission Testing). 10. Your surgery time may be subject to change. You will receive a phone call the evening prior if your time changes. 11.  If having outpatient surgery, you must have someone to drive you here, stay with you during the duration of your stay, and to drive you home at time of discharge. 12.   In an effort to improve the efficiency, privacy, and safety for all of our Pre-op patients visitors are not allowed in the Holding area. Once you arrive and are registered your family/visitors will be asked to remain in the waiting room. The Pre-op staff will get you from the Surgical Waiting Area and will explain to you and your family/visitors that the Pre-op phase is beginning. The staff will answer any questions and provide instructions for tracking of the patient, by use of the existing tracking number and color-coded status board in the waiting room.   At this time the staff will also ask for your designated spokesperson information in the event that the physician or staff need to provide an update or obtain any pertinent information. The designated spokesperson will be notified if the physician needs to speak to family during the pre-operative phase. If at any time your family/visitors has questions or concerns they may approach the volunteer desk in the waiting area for assistance. Special Instructions:    MEDICATIONS TO TAKE THE MORNING OF SURGERY WITH A SIP OF WATER: gabapentin      I understand a pre-operative phone call will be made to verify my surgery time. In the event that I am not available, I give permission for a message to be left on my answering service and/or with another person?   Yes          ___________________      __________   _________    (Signature of Patient)             (Witness)                (Date and Time)

## 2019-08-13 NOTE — PERIOP NOTES
Talked with Karri Duncan NP, about pts medical history and also hx of one side of lung elevated and puts additional pressure on diaphragm and causes SOB. That is patient normal. Pt is followed by pulmonogist, Dr Joshua Hwang. No new orders.

## 2019-08-15 ENCOUNTER — ANESTHESIA (OUTPATIENT)
Dept: SURGERY | Age: 54
End: 2019-08-15
Payer: COMMERCIAL

## 2019-08-15 ENCOUNTER — ANESTHESIA EVENT (OUTPATIENT)
Dept: SURGERY | Age: 54
End: 2019-08-15
Payer: COMMERCIAL

## 2019-08-15 ENCOUNTER — HOSPITAL ENCOUNTER (OUTPATIENT)
Age: 54
Setting detail: OUTPATIENT SURGERY
Discharge: HOME | End: 2019-08-15
Attending: SURGERY | Admitting: SURGERY
Payer: COMMERCIAL

## 2019-08-15 VITALS
DIASTOLIC BLOOD PRESSURE: 74 MMHG | WEIGHT: 192.24 LBS | HEIGHT: 62 IN | SYSTOLIC BLOOD PRESSURE: 151 MMHG | BODY MASS INDEX: 35.38 KG/M2 | RESPIRATION RATE: 16 BRPM | HEART RATE: 72 BPM | OXYGEN SATURATION: 100 % | TEMPERATURE: 98 F

## 2019-08-15 DIAGNOSIS — R52 PAIN: Primary | ICD-10-CM

## 2019-08-15 PROCEDURE — 76010000138 HC OR TIME 0.5 TO 1 HR: Performed by: SURGERY

## 2019-08-15 PROCEDURE — 76210000026 HC REC RM PH II 1 TO 1.5 HR: Performed by: SURGERY

## 2019-08-15 PROCEDURE — 77030011264 HC ELECTRD BLD EXT COVD -A: Performed by: SURGERY

## 2019-08-15 PROCEDURE — 76210000063 HC OR PH I REC FIRST 0.5 HR: Performed by: SURGERY

## 2019-08-15 PROCEDURE — 77030020782 HC GWN BAIR PAWS FLX 3M -B

## 2019-08-15 PROCEDURE — 77030040361 HC SLV COMPR DVT MDII -B: Performed by: SURGERY

## 2019-08-15 PROCEDURE — 74011250636 HC RX REV CODE- 250/636: Performed by: ANESTHESIOLOGY

## 2019-08-15 PROCEDURE — 77030002888 HC SUT CHRMC J&J -A: Performed by: SURGERY

## 2019-08-15 PROCEDURE — 74011000250 HC RX REV CODE- 250: Performed by: SURGERY

## 2019-08-15 PROCEDURE — 74011250636 HC RX REV CODE- 250/636: Performed by: NURSE ANESTHETIST, CERTIFIED REGISTERED

## 2019-08-15 PROCEDURE — 76060000032 HC ANESTHESIA 0.5 TO 1 HR: Performed by: SURGERY

## 2019-08-15 PROCEDURE — 77030011640 HC PAD GRND REM COVD -A: Performed by: SURGERY

## 2019-08-15 PROCEDURE — 74011000250 HC RX REV CODE- 250: Performed by: NURSE ANESTHETIST, CERTIFIED REGISTERED

## 2019-08-15 RX ORDER — PROPOFOL 10 MG/ML
INJECTION, EMULSION INTRAVENOUS AS NEEDED
Status: DISCONTINUED | OUTPATIENT
Start: 2019-08-15 | End: 2019-08-15 | Stop reason: HOSPADM

## 2019-08-15 RX ORDER — SODIUM CHLORIDE, SODIUM LACTATE, POTASSIUM CHLORIDE, CALCIUM CHLORIDE 600; 310; 30; 20 MG/100ML; MG/100ML; MG/100ML; MG/100ML
25 INJECTION, SOLUTION INTRAVENOUS CONTINUOUS
Status: DISCONTINUED | OUTPATIENT
Start: 2019-08-15 | End: 2019-08-15 | Stop reason: HOSPADM

## 2019-08-15 RX ORDER — DEXAMETHASONE SODIUM PHOSPHATE 4 MG/ML
INJECTION, SOLUTION INTRA-ARTICULAR; INTRALESIONAL; INTRAMUSCULAR; INTRAVENOUS; SOFT TISSUE AS NEEDED
Status: DISCONTINUED | OUTPATIENT
Start: 2019-08-15 | End: 2019-08-15 | Stop reason: HOSPADM

## 2019-08-15 RX ORDER — SODIUM CHLORIDE 0.9 % (FLUSH) 0.9 %
5-40 SYRINGE (ML) INJECTION AS NEEDED
Status: DISCONTINUED | OUTPATIENT
Start: 2019-08-15 | End: 2019-08-15 | Stop reason: HOSPADM

## 2019-08-15 RX ORDER — BUPIVACAINE HYDROCHLORIDE AND EPINEPHRINE 2.5; 5 MG/ML; UG/ML
30 INJECTION, SOLUTION EPIDURAL; INFILTRATION; INTRACAUDAL; PERINEURAL ONCE
Status: COMPLETED | OUTPATIENT
Start: 2019-08-15 | End: 2019-08-15

## 2019-08-15 RX ORDER — FENTANYL CITRATE 50 UG/ML
INJECTION, SOLUTION INTRAMUSCULAR; INTRAVENOUS AS NEEDED
Status: DISCONTINUED | OUTPATIENT
Start: 2019-08-15 | End: 2019-08-15 | Stop reason: HOSPADM

## 2019-08-15 RX ORDER — SODIUM CHLORIDE 0.9 % (FLUSH) 0.9 %
5-40 SYRINGE (ML) INJECTION EVERY 8 HOURS
Status: DISCONTINUED | OUTPATIENT
Start: 2019-08-15 | End: 2019-08-15 | Stop reason: HOSPADM

## 2019-08-15 RX ORDER — KETAMINE HYDROCHLORIDE 10 MG/ML
INJECTION, SOLUTION INTRAMUSCULAR; INTRAVENOUS AS NEEDED
Status: DISCONTINUED | OUTPATIENT
Start: 2019-08-15 | End: 2019-08-15 | Stop reason: HOSPADM

## 2019-08-15 RX ORDER — MIDAZOLAM HYDROCHLORIDE 1 MG/ML
INJECTION, SOLUTION INTRAMUSCULAR; INTRAVENOUS AS NEEDED
Status: DISCONTINUED | OUTPATIENT
Start: 2019-08-15 | End: 2019-08-15 | Stop reason: HOSPADM

## 2019-08-15 RX ORDER — DIPHENHYDRAMINE HYDROCHLORIDE 50 MG/ML
12.5 INJECTION, SOLUTION INTRAMUSCULAR; INTRAVENOUS
Status: DISCONTINUED | OUTPATIENT
Start: 2019-08-15 | End: 2019-08-15 | Stop reason: HOSPADM

## 2019-08-15 RX ORDER — MORPHINE SULFATE 10 MG/ML
2 INJECTION, SOLUTION INTRAMUSCULAR; INTRAVENOUS
Status: DISCONTINUED | OUTPATIENT
Start: 2019-08-15 | End: 2019-08-15 | Stop reason: HOSPADM

## 2019-08-15 RX ORDER — MEPERIDINE HYDROCHLORIDE 50 MG/1
50 TABLET ORAL
Qty: 15 TAB | Refills: 0 | Status: SHIPPED | OUTPATIENT
Start: 2019-08-15 | End: 2019-08-18

## 2019-08-15 RX ORDER — ONDANSETRON 2 MG/ML
INJECTION INTRAMUSCULAR; INTRAVENOUS AS NEEDED
Status: DISCONTINUED | OUTPATIENT
Start: 2019-08-15 | End: 2019-08-15 | Stop reason: HOSPADM

## 2019-08-15 RX ORDER — PROPOFOL 10 MG/ML
INJECTION, EMULSION INTRAVENOUS
Status: DISCONTINUED | OUTPATIENT
Start: 2019-08-15 | End: 2019-08-15 | Stop reason: HOSPADM

## 2019-08-15 RX ORDER — ONDANSETRON 2 MG/ML
4 INJECTION INTRAMUSCULAR; INTRAVENOUS AS NEEDED
Status: DISCONTINUED | OUTPATIENT
Start: 2019-08-15 | End: 2019-08-15 | Stop reason: HOSPADM

## 2019-08-15 RX ORDER — FENTANYL CITRATE 50 UG/ML
25 INJECTION, SOLUTION INTRAMUSCULAR; INTRAVENOUS
Status: DISCONTINUED | OUTPATIENT
Start: 2019-08-15 | End: 2019-08-15 | Stop reason: HOSPADM

## 2019-08-15 RX ORDER — LIDOCAINE HYDROCHLORIDE 10 MG/ML
0.1 INJECTION, SOLUTION EPIDURAL; INFILTRATION; INTRACAUDAL; PERINEURAL AS NEEDED
Status: DISCONTINUED | OUTPATIENT
Start: 2019-08-15 | End: 2019-08-15 | Stop reason: HOSPADM

## 2019-08-15 RX ADMIN — PROPOFOL 50 MCG/KG/MIN: 10 INJECTION, EMULSION INTRAVENOUS at 07:43

## 2019-08-15 RX ADMIN — MIDAZOLAM HYDROCHLORIDE 2 MG: 1 INJECTION INTRAMUSCULAR; INTRAVENOUS at 07:25

## 2019-08-15 RX ADMIN — MIDAZOLAM HYDROCHLORIDE 1 MG: 1 INJECTION INTRAMUSCULAR; INTRAVENOUS at 07:51

## 2019-08-15 RX ADMIN — DEXAMETHASONE SODIUM PHOSPHATE 4 MG: 4 INJECTION, SOLUTION INTRAMUSCULAR; INTRAVENOUS at 07:47

## 2019-08-15 RX ADMIN — KETAMINE HYDROCHLORIDE 30 MG: 10 INJECTION, SOLUTION INTRAMUSCULAR; INTRAVENOUS at 07:38

## 2019-08-15 RX ADMIN — SODIUM CHLORIDE, SODIUM LACTATE, POTASSIUM CHLORIDE, AND CALCIUM CHLORIDE 25 ML/HR: 600; 310; 30; 20 INJECTION, SOLUTION INTRAVENOUS at 06:36

## 2019-08-15 RX ADMIN — MIDAZOLAM HYDROCHLORIDE 1 MG: 1 INJECTION INTRAMUSCULAR; INTRAVENOUS at 07:37

## 2019-08-15 RX ADMIN — MIDAZOLAM HYDROCHLORIDE 1 MG: 1 INJECTION INTRAMUSCULAR; INTRAVENOUS at 07:26

## 2019-08-15 RX ADMIN — FENTANYL CITRATE 25 MCG: 50 INJECTION, SOLUTION INTRAMUSCULAR; INTRAVENOUS at 07:26

## 2019-08-15 RX ADMIN — ONDANSETRON HYDROCHLORIDE 4 MG: 2 INJECTION, SOLUTION INTRAMUSCULAR; INTRAVENOUS at 07:47

## 2019-08-15 RX ADMIN — PROPOFOL 50 MG: 10 INJECTION, EMULSION INTRAVENOUS at 07:38

## 2019-08-15 NOTE — ANESTHESIA POSTPROCEDURE EVALUATION
Procedure(s):  EXAM UNDER ANESTHESIA  WITH PLACEMENT OF SETON DRAIN.    total IV anesthesia, MAC    Anesthesia Post Evaluation        Patient location during evaluation: PACU  Note status: Adequate. Level of consciousness: responsive to verbal stimuli and sleepy but conscious  Pain management: satisfactory to patient  Airway patency: patent  Anesthetic complications: no  Cardiovascular status: acceptable  Respiratory status: acceptable  Hydration status: acceptable  Comments: +Post-Anesthesia Evaluation and Assessment    Patient: Joe Oh MRN: [id number]  SSN: [id number]   YOB: 1965  Age: 47 y.o. Sex: female      Cardiovascular Function/Vital Signs    /69 (BP 1 Location: Right arm, BP Patient Position: At rest)   Pulse 78   Temp 36.6 °C (97.8 °F)   Resp 16   Ht 5' 2\" (1.575 m)   Wt 87.2 kg (192 lb 3.9 oz)   SpO2 100%   BMI 35.16 kg/m²     Patient is status post Procedure(s):  EXAM UNDER ANESTHESIA  WITH PLACEMENT OF SETON DRAIN. Nausea/Vomiting: Controlled. Postoperative hydration reviewed and adequate. Pain:  Pain Scale 1: Numeric (0 - 10) (08/15/19 0830)  Pain Intensity 1: 0 (08/15/19 0830)   Managed. Neurological Status:   Neuro (WDL): Exceptions to WDL (08/15/19 0811)   At baseline. Mental Status and Level of Consciousness: Arousable. Pulmonary Status:   O2 Device: Room air (08/15/19 0830)   Adequate oxygenation and airway patent. Complications related to anesthesia: None    Post-anesthesia assessment completed. No concerns. Signed By: Chelita Sibley MD    8/15/2019  Post anesthesia nausea and vomiting:  controlled      Vitals Value Taken Time   /64 8/15/2019  8:30 AM   Temp 36.6 °C (97.8 °F) 8/15/2019  8:14 AM   Pulse 78 8/15/2019  8:32 AM   Resp 16 8/15/2019  8:32 AM   SpO2 100 % 8/15/2019  8:32 AM   Vitals shown include unvalidated device data.

## 2019-08-15 NOTE — ROUTINE PROCESS
Patient: Mery Caicedo MRN: [id number]  SSN: [id number] YOB: 1965  Age: 47 y.o. Sex: female Patient is status post Procedure(s): EXAM UNDER ANESTHESIA  WITH PLACEMENT OF SETON DRAIN. Surgeon(s) and Role: Nancy Gamboa MD - Primary Local/Dose/Irrigation:  20 ML LOCAL INJECTION ANAL AREA Peripheral IV 08/15/19 Anterior;Left;Proximal Forearm (Active) Site Assessment Clean, dry, & intact 8/15/2019  6:36 AM  
Phlebitis Assessment 0 8/15/2019  6:36 AM  
Infiltration Assessment 0 8/15/2019  6:36 AM  
Dressing Status Clean, dry, & intact 8/15/2019  6:36 AM  
Dressing Type Tape;Transparent 8/15/2019  6:36 AM  
Hub Color/Line Status Pink; Infusing 8/15/2019  6:36 AM  
                 
 
 
 
Dressing/Packing:  Wound Anus-Dressing Type: 4 x 4;ABD pad (08/15/19 0754) Splint/Cast:  ] Other:  NONE

## 2019-08-15 NOTE — OP NOTES
[address]  OPERATIVE REPORT    Name:  Elsi Momin  MR#:  [id number]  :  1965  ACCOUNT #:  [de-identified]  DATE OF SERVICE:  08/15/2019    PREOPERATIVE DIAGNOSIS:  Crohn's inflammation. POSTOPERATIVE DIAGNOSES:  Crohn's inflammation with perianal fistula. PROCEDURE PERFORMED:  Exam under anesthesia with placement of seton drain. SURGEON:  Marlee Gracia MD    ASSISTANT:  Sabrina Thorne. ANESTHESIA:  MAC.    COMPLICATIONS:  None. SPECIMENS REMOVED:  None. IMPLANTS:  None. ESTIMATED BLOOD LOSS:  Minimal.    FINDINGS:  Posterior midline transsphincteric anal fistula. INDICATIONS:  This is a 70-year-old female with a history of Crohn's disease and has had problems with inflammation of her anal canal with symptoms of fullness and inability to completely evacuate her rectum. She has had multiple imaging studies, which showed inflammation around the anal canal invading into the sphincter muscle and had performed biopsies in the past, but these were all chronic inflammatory issues and no evidence of a cancer. I told her that I would go ahead and perform an exam under anesthesia once again and possibly perform biopsies of this to see if there is any evidence of malignancy. I explained to her all the risks and benefits of the procedure including, but not limited to bleeding, infection, and damage to the surrounding structures. DESCRIPTION OF PROCEDURE:  The patient was brought to the operating suite, she was placed in the prone jackknife position. MAC anesthesia was induced. Her buttocks were taped apart. Her perianal area was prepped and draped in the usual sterile fashion. A time-out was performed indicating the correct patient and procedure to be performed as per hospital protocol.     I began by injecting 20 mL of 0.25% Marcaine with epinephrine subcutaneously around the anal canal.  Interestingly enough during my injection, I noted that the local anesthetic was squirting out from an external opening that was quite difficult to see. After fully injecting the local anesthetic, I explored this external opening in the anal canal.  It was located in the posterior midline position approximately 2 cm away from the anal verge. I placed a small Hill-Gustafson anoscope in the anal canal and placed a fistula probe into the external opening. It easily popped into the internal opening with no difficulty whatsoever and no resistance at all. I felt the muscle and noted that this was a transsphincteric anal fistula based on the involvement of the external sphincter muscle. As such, I did not want to perform a fistulotomy as this could cause severe incontinence. I tied 0 silk suture to the fistula probe and subsequently tied the other end of the silk suture to a blue vessel loop. The blue vessel loop was encircled into the fistula coming out from the anal opening. The blue vessel loop was encircled and tied to itself using 0 silk sutures. I examined the rest of the anal canal and no other abnormalities were noted. A dry dressing was applied and the patient was awakened and taken to the recovery room in stable condition.       Sarah Cooley MD      [id number]  D:  08/15/2019 8:09  T:  08/15/2019 10:45  JOB #:  [id number]

## 2019-08-15 NOTE — BRIEF OP NOTE
BRIEF OPERATIVE NOTE    Date of Procedure: 8/15/2019   Preoperative Diagnosis: CROHN'S INFLAMMATION  Postoperative Diagnosis: CROHNS INFLAMMATION    Procedure(s):  EXAM UNDER ANESTHESIA  WITH PLACEMENT OF SETON DRAIN  Surgeon(s) and Role:     * Arianne Alas MD - Primary         Surgical Assistant: Juancho Hebert    Surgical Staff:  Circ-1: Daniela Givens RN  Scrub Tech-1: Melinda Horne  Surg Asst-1: Jesus Land  Surg Asst-2: Angi Mancilla  Event Time In Time Out   Incision Start 4938    Incision Close 0802      Anesthesia: MAC   Estimated Blood Loss: Minimal  Specimens: * No specimens in log *   Findings: Posterior midline transsphincteric anal fistula   Complications: none  Implants: * No implants in log *

## 2019-08-15 NOTE — DISCHARGE INSTRUCTIONS
David Maravilla MD, [address] [facility] Alyssa Orozco MD, [address] [facility] Froilan Brice MD, FACS  Noman Gerard. Raenelle Riedel, MD Kandra Constant, MD Cherlynn Blender, MD Debria Benedict, MD    [facility] Discharge Instructions for Ambulatory 23-Hour Surgery Patients    1. Advance to high fiber diet as tolerated. 2. Take Metamucil/Citrucel 1 teaspoon mixed in a glass of water in AM and PM.  3. Remove dressing at 8pm.  4. Take 1 sitz baths today (warm water baths for 15-20 minutes). Begin four (4) times a day the day after surgery. 5. Apply Nupercainal Ointment (does not need a prescription) to the anal area after sitz baths, place a dry 4x4 gauze over the are between the buttocks. 6. Take pain medication as prescribed. (NO DRIVING WHILE ON PAIN MEDICATION). 7. No lifting any objects weighing more than 15 pounds. 8. No sitting more than 45 minutes without standing, walking, or lying down. 9. You may walk as desired. 10.  Please schedule an appointment in the office within 4 weeks. Call ahead to schedule your appointment [phone]. 11.  Call Exchange [phone] if you have any problems or questions after hours. 12.  Expect slight bright red blood up to four (4) weeks from surgery. 13.  Stitches are the dissolving type - they do not need removal in the office. 14.  If no bowel movement by tomorrow morning, take 30cc (1 tablespoon) of Milk of Magnesia. Repeat if no results. If still no bowel movement the next day, then call the office. TO PREVENT AN INFECTION      1. [address] YOUR HANDS     To prevent infection, good handwashing is the most important thing you or your caregiver can do.  Wash your hands with soap and water or use the hand  we gave you before you touch any wounds. 2. SHOWER     Use the antibacterial soap we gave you when you take a shower.  Shower with this soap until your wounds are healed.        To reach all areas of your body, you may need someone to help you.  Dont forget to clean your belly button with every shower. 3.  USE CLEAN SHEETS     Use freshly cleaned sheets on your bed after surgery.  To keep the surgery site clean, do not allow pets to sleep with you while your wound is still healing. 4. STOP SMOKING     Stop smoking, or at least cut back on smoking     Smoking slows your healing. 5.  CONTROL YOUR BLOOD SUGAR     High blood sugars slow wound healing. If you are diabetic, control your blood sugar levels before and after your surgery. A common side effect of anesthesia following surgery is nausea and/or vomiting. In order to decrease symptoms, it is wise to avoid foods that are high in fat, greasy foods, milk products, and spicy foods for the first 24 hours. Acceptable foods for the first 24 hours following surgery include but are not limited to:    soup  broth   toast   crackers   applesauce   bananas   mashed potatoes,  soft or scrambled eggs  oatmeal   jello    It is important to eat when taking your pain medication. This will help to prevent nausea. If possible, please try to time your meals with your medications. It is very important to stay hydrated following surgery. Sip fluids frequently while awake. Avoid acidic drinks such as citrus juices and soda for 24 hours. Carbonated beverages may cause bloating and gas. Acceptable fluids include:    water (flavor packets may add variety)  coffee or tea (in moderation)  Gatorade  Henry-aid  apple juice  cranberry juice    You are encouraged to cough and deep breathe every hour when awake. This will help to prevent respiratory complications following anesthesia. You may want to hug a pillow when coughing and sneezing to add additional support to the surgical area and to decrease discomfort if you had abdominal or chest surgery. If you are discharged home with support stockings, you may remove them after 24 hours.  Support stockings are used to help prevent blood clots in the legs following surgery. Please take time to review all of your Home Care Instructions and Medication Information sheets provided in your discharge packet. If you have any questions, please contact your surgeons office. Thank you. DISCHARGE SUMMARY from Nurse    The following personal items are in your possession at time of discharge:    Dental Appliances: None  Visual Aid: None  Home Medications: None  Jewelry: None  Clothing: None (left in in pt. room)  Other Valuables: None             PATIENT INSTRUCTIONS:    After general anesthesia or intravenous sedation, for 24 hours or while taking prescription Narcotics:  Limit your activities  Do not drive and operate hazardous machinery  Do not make important personal or business decisions  Do  not drink alcoholic beverages  If you have not urinated within 8 hours after discharge, please contact your surgeon on call. Report the following to your surgeon:  Excessive pain, swelling, redness or odor of or around the surgical area  Temperature over 100.5  Nausea and vomiting lasting longer than 4 hours or if unable to take medications  Any signs of decreased circulation or nerve impairment to extremity: change in color, persistent  numbness, tingling, coldness or increase pain  Any questions    To prevent infection remember to refer to your handout on handwashing given to you by your nurse. These are general instructions for a healthy lifestyle:    No smoking/ No tobacco products/ Avoid exposure to second hand smoke    Surgeon General's Warning:  Quitting smoking now greatly reduces serious risk to your health.     Obesity, smoking, and sedentary lifestyle greatly increases your risk for illness    A healthy diet, regular physical exercise & weight monitoring are important for maintaining a healthy lifestyle    You may be retaining fluid if you have a history of heart failure or if you experience any of the following symptoms:  Weight gain of 3 pounds or more overnight or 5 pounds in a week, increased swelling in our hands or feet or shortness of breath while lying flat in bed. Please call your doctor as soon as you notice any of these symptoms; do not wait until your next office visit. Recognize signs and symptoms of STROKE:    F-face looks uneven    A-arms unable to move or move unevenly    S-speech slurred or non-existent    T-time-call 911 as soon as signs and symptoms begin-DO NOT go       Back to bed or wait to see if you get better-TIME IS BRAIN. The discharge information has been reviewed with the patient. The patient verbalized understanding. Patient Education      Meperidine (Demerol, Demerol Hydrochloride, Meperitab) - (By mouth)   Why this medicine is used:   Treats pain. Contact a nurse or doctor right away if you have:  Extreme dizziness or weakness, shallow breathing, fast, slow or uneven heartbeat  Sweating, cold or clammy skin, seizures  Anxiety, restlessness, fever, sweating, muscle spasms, twitching, seeing or hearing things that are not there  Severe confusion, lightheadedness, dizziness, fainting  Stomach pain, nausea, vomiting, diarrhea, constipation     Common side effects:  Dizziness, drowsiness, or sleepiness  © 2017 [facility] Information is for End User's use only and may not be sold, redistributed or otherwise used for commercial purposes.

## 2019-08-15 NOTE — PROGRESS NOTES
Patient discharged to home. Iv removed. Discharge instructions, script, and medication education done with patient and spouse. A script for Demerol was given to patient.   Work note given to patient per Dr. Keely Garcia, excusing patient from work until 8-19-19

## 2019-08-15 NOTE — ANESTHESIA PREPROCEDURE EVALUATION
Anesthetic History   No history of anesthetic complications            Review of Systems / Medical History  Patient summary reviewed, nursing notes reviewed and pertinent labs reviewed    Pulmonary          Shortness of breath and smoker      Comments: Former smoker   Neuro/Psych         TIA and headaches (migraines)  Pertinent negatives: No CVA  Comments: Migraines Cardiovascular            Dysrhythmias : SVT  PAD    Exercise tolerance: >4 METS  Comments: S/P repair of SVC thrombosis and PE    ECHO from 6/2018 showed a 55-60% EF with no valvular abnormalities     Bilateral carotid artery stenosis           GI/Hepatic/Renal  Within defined limits              Endo/Other        Obesity  Pertinent negatives: No morbid obesity   Other Findings   Comments: Anal canal mass    Crohn's disease     SVC obstruction hx    Lupus    Stenosis of both carotid arteries     Cervical radiculopathy    Hx of PE    Myopathy    Phrenic nerve palsy           Physical Exam    Airway  Mallampati: I  TM Distance: > 6 cm  Neck ROM: normal range of motion   Mouth opening: Normal     Cardiovascular  Regular rate and rhythm,  S1 and S2 normal,  no murmur, click, rub, or gallop  Rhythm: regular  Rate: normal         Dental    Dentition: Upper dentition intact and Lower dentition intact     Pulmonary  Breath sounds clear to auscultation               Abdominal  GI exam deferred       Other Findings            Anesthetic Plan    ASA: 3  Anesthesia type: total IV anesthesia and MAC    Monitoring Plan: BIS      Induction: Intravenous  Anesthetic plan and risks discussed with: Patient      Previous grade 2 view here

## 2019-08-15 NOTE — PERIOP NOTES
Handoff Report from Operating Room to PACU    Report received from Fito Aceves RN and Alisha Jauregui CRNA regarding Stacy Giraldo. Surgeon(s):  Sheila Beckett MD  And Procedure(s) (LRB):  EXAM UNDER ANESTHESIA  WITH PLACEMENT OF SETON DRAIN (N/A)  confirmed   with allergies and dressings discussed. Anesthesia type, drugs, patient history, complications, estimated blood loss, vital signs, intake and output, and last pain medication, lines and temperature were reviewed.

## 2019-08-26 ENCOUNTER — HOSPITAL ENCOUNTER (OUTPATIENT)
Dept: PHYSICAL THERAPY | Age: 54
End: 2019-08-26

## 2019-10-10 NOTE — PERIOP NOTES
[facility]  Preoperative Instructions        Surgery Date 10/17/19          Time of Arrival 1000    1. On the day of your surgery, please report to the Surgical Services Registration Desk and sign in at your designated time. The Surgery Center is located to the right of the Emergency Room. 2. You must have someone with you to drive you home. You should not drive a car for 24 hours following surgery. Please make arrangements for a friend or family member to stay with you for the first 24 hours after your surgery. 3. Do not have anything to eat or drink (including water, gum, mints, coffee, juice) after midnight ?10/16/19? Javier Cisneros ? This may not apply to medications prescribed by your physician. ?(Please note below the special instructions with medications to take the morning of your procedure.)    4. We recommend you do not drink any alcoholic beverages for 24 hours before and after your surgery. 5. Contact your surgeons office for instructions on the following medications: non-steroidal anti-inflammatory drugs (i.e. Advil, Aleve), vitamins, and supplements. (Some surgeons will want you to stop these medications prior to surgery and others may allow you to take them)  **If you are currently taking Plavix, Coumadin, Aspirin and/or other blood-thinning agents, contact your surgeon for instructions. ** Your surgeon will partner with the physician prescribing these medications to determine if it is safe to stop or if you need to continue taking. Please do not stop taking these medications without instructions from your surgeon    6. Wear comfortable clothes. Wear glasses instead of contacts. Do not bring any money or jewelry. Please bring picture ID, insurance card, and any prearranged co-payment or hospital payment. Do not wear make-up, particularly mascara the morning of your surgery. Do not wear nail polish, particularly if you are having foot /hand surgery.   Wear your hair loose or down, no ponytails, buns, miladis pins or clips. All body piercings must be removed. Please shower with antibacterial soap for three consecutive days before and on the morning of surgery, but do not apply any lotions, powders or deodorants after the shower on the day of surgery. Please use a fresh towels after each shower. Please sleep in clean clothes and change bed linens the night before surgery. Please do not shave for 48 hours prior to surgery. Shaving of the face is acceptable. 7. You should understand that if you do not follow these instructions your surgery may be cancelled. If your physical condition changes (I.e. fever, cold or flu) please contact your surgeon as soon as possible. 8. It is important that you be on time. If a situation occurs where you may be late, please call [phone] (OR Holding Area). 9. If you have any questions and or problems, please call [phone] (Pre-admission Testing). 10. Your surgery time may be subject to change. You will receive a phone call the evening prior if your time changes. 11.  If having outpatient surgery, you must have someone to drive you here, stay with you during the duration of your stay, and to drive you home at time of discharge. 12.   In an effort to improve the efficiency, privacy, and safety for all of our Pre-op patients visitors are not allowed in the Holding area. Once you arrive and are registered your family/visitors will be asked to remain in the waiting room. The Pre-op staff will get you from the Surgical Waiting Area and will explain to you and your family/visitors that the Pre-op phase is beginning. The staff will answer any questions and provide instructions for tracking of the patient, by use of the existing tracking number and color-coded status board in the waiting room.   At this time the staff will also ask for your designated spokesperson information in the event that the physician or staff need to provide an update or obtain any pertinent information. The designated spokesperson will be notified if the physician needs to speak to family during the pre-operative phase. If at any time your family/visitors has questions or concerns they may approach the volunteer desk in the waiting area for assistance. Special Instructions:    MEDICATIONS TO TAKE THE MORNING OF SURGERY WITH A SIP OF WATER: gabapentin      I understand a pre-operative phone call will be made to verify my surgery time. In the event that I am not available, I give permission for a message to be left on my answering service and/or with another person?   Yes [phone]         ___________________      __________   _________    (Signature of Patient)             (Witness)                (Date and Time)

## 2019-10-15 ENCOUNTER — HOSPITAL ENCOUNTER (OUTPATIENT)
Dept: PREADMISSION TESTING | Age: 54
Discharge: HOME | End: 2019-10-15
Attending: SURGERY
Payer: COMMERCIAL

## 2019-10-15 LAB
ATRIAL RATE: 67 BPM
CALCULATED P AXIS, ECG09: 56 DEGREES
CALCULATED R AXIS, ECG10: 0 DEGREES
CALCULATED T AXIS, ECG11: 49 DEGREES
DIAGNOSIS, 93000: NORMAL
P-R INTERVAL, ECG05: 146 MS
Q-T INTERVAL, ECG07: 410 MS
QRS DURATION, ECG06: 82 MS
QTC CALCULATION (BEZET), ECG08: 433 MS
VENTRICULAR RATE, ECG03: 67 BPM

## 2019-10-15 PROCEDURE — 93005 ELECTROCARDIOGRAM TRACING: CPT

## 2019-10-17 ENCOUNTER — ANESTHESIA (OUTPATIENT)
Dept: SURGERY | Age: 54
End: 2019-10-17
Payer: COMMERCIAL

## 2019-10-17 ENCOUNTER — ANESTHESIA EVENT (OUTPATIENT)
Dept: SURGERY | Age: 54
End: 2019-10-17
Payer: COMMERCIAL

## 2019-10-17 ENCOUNTER — HOSPITAL ENCOUNTER (OUTPATIENT)
Age: 54
Setting detail: OBSERVATION
Discharge: HOME | End: 2019-10-18
Attending: SURGERY | Admitting: SURGERY
Payer: COMMERCIAL

## 2019-10-17 DIAGNOSIS — R52 PAIN: Primary | ICD-10-CM

## 2019-10-17 PROBLEM — K62.5 RECTAL BLEED: Status: ACTIVE | Noted: 2019-10-17

## 2019-10-17 PROCEDURE — 77030011640 HC PAD GRND REM COVD -A: Performed by: SURGERY

## 2019-10-17 PROCEDURE — 99218 HC RM OBSERVATION: CPT

## 2019-10-17 PROCEDURE — 74011250636 HC RX REV CODE- 250/636: Performed by: NURSE ANESTHETIST, CERTIFIED REGISTERED

## 2019-10-17 PROCEDURE — 77030018836 HC SOL IRR NACL ICUM -A: Performed by: SURGERY

## 2019-10-17 PROCEDURE — 74011000250 HC RX REV CODE- 250: Performed by: SURGERY

## 2019-10-17 PROCEDURE — 76060000033 HC ANESTHESIA 1 TO 1.5 HR: Performed by: SURGERY

## 2019-10-17 PROCEDURE — 74011250636 HC RX REV CODE- 250/636: Performed by: ANESTHESIOLOGY

## 2019-10-17 PROCEDURE — 77030026438 HC STYL ET INTUB CARD -A: Performed by: ANESTHESIOLOGY

## 2019-10-17 PROCEDURE — 74011250637 HC RX REV CODE- 250/637: Performed by: SURGERY

## 2019-10-17 PROCEDURE — 77030021052 HC RNG RETRCTR STAY COOP -A: Performed by: SURGERY

## 2019-10-17 PROCEDURE — 77010033678 HC OXYGEN DAILY

## 2019-10-17 PROCEDURE — 74011000250 HC RX REV CODE- 250: Performed by: NURSE ANESTHETIST, CERTIFIED REGISTERED

## 2019-10-17 PROCEDURE — 77030019908 HC STETH ESOPH SIMS -A: Performed by: ANESTHESIOLOGY

## 2019-10-17 PROCEDURE — 77030008684 HC TU ET CUF COVD -B: Performed by: ANESTHESIOLOGY

## 2019-10-17 PROCEDURE — 77030031139 HC SUT VCRL2 J&J -A: Performed by: SURGERY

## 2019-10-17 PROCEDURE — 77030014007 HC SPNG HEMSTAT J&J -B: Performed by: SURGERY

## 2019-10-17 PROCEDURE — 77030040922 HC BLNKT HYPOTHRM STRY -A

## 2019-10-17 PROCEDURE — 74011250636 HC RX REV CODE- 250/636: Performed by: NURSE PRACTITIONER

## 2019-10-17 PROCEDURE — 77030002888 HC SUT CHRMC J&J -A: Performed by: SURGERY

## 2019-10-17 PROCEDURE — 77030018390 HC SPNG HEMSTAT2 J&J -B: Performed by: SURGERY

## 2019-10-17 PROCEDURE — 77030040361 HC SLV COMPR DVT MDII -B: Performed by: SURGERY

## 2019-10-17 PROCEDURE — 76010000149 HC OR TIME 1 TO 1.5 HR: Performed by: SURGERY

## 2019-10-17 PROCEDURE — 76210000016 HC OR PH I REC 1 TO 1.5 HR: Performed by: SURGERY

## 2019-10-17 RX ORDER — MIDAZOLAM HYDROCHLORIDE 1 MG/ML
INJECTION, SOLUTION INTRAMUSCULAR; INTRAVENOUS AS NEEDED
Status: DISCONTINUED | OUTPATIENT
Start: 2019-10-17 | End: 2019-10-17 | Stop reason: HOSPADM

## 2019-10-17 RX ORDER — GABAPENTIN 300 MG/1
300 CAPSULE ORAL 3 TIMES DAILY
Status: DISCONTINUED | OUTPATIENT
Start: 2019-10-17 | End: 2019-10-18 | Stop reason: HOSPADM

## 2019-10-17 RX ORDER — ROCURONIUM BROMIDE 10 MG/ML
INJECTION, SOLUTION INTRAVENOUS AS NEEDED
Status: DISCONTINUED | OUTPATIENT
Start: 2019-10-17 | End: 2019-10-17 | Stop reason: HOSPADM

## 2019-10-17 RX ORDER — PROPOFOL 10 MG/ML
INJECTION, EMULSION INTRAVENOUS AS NEEDED
Status: DISCONTINUED | OUTPATIENT
Start: 2019-10-17 | End: 2019-10-17 | Stop reason: HOSPADM

## 2019-10-17 RX ORDER — SODIUM CHLORIDE, SODIUM LACTATE, POTASSIUM CHLORIDE, CALCIUM CHLORIDE 600; 310; 30; 20 MG/100ML; MG/100ML; MG/100ML; MG/100ML
25 INJECTION, SOLUTION INTRAVENOUS CONTINUOUS
Status: DISCONTINUED | OUTPATIENT
Start: 2019-10-17 | End: 2019-10-17 | Stop reason: HOSPADM

## 2019-10-17 RX ORDER — MORPHINE SULFATE 10 MG/ML
2 INJECTION, SOLUTION INTRAMUSCULAR; INTRAVENOUS
Status: DISCONTINUED | OUTPATIENT
Start: 2019-10-17 | End: 2019-10-17 | Stop reason: HOSPADM

## 2019-10-17 RX ORDER — LIDOCAINE HYDROCHLORIDE 10 MG/ML
0.1 INJECTION, SOLUTION EPIDURAL; INFILTRATION; INTRACAUDAL; PERINEURAL AS NEEDED
Status: DISCONTINUED | OUTPATIENT
Start: 2019-10-17 | End: 2019-10-17 | Stop reason: HOSPADM

## 2019-10-17 RX ORDER — DIAZEPAM 5 MG/1
5 TABLET ORAL
Status: DISCONTINUED | OUTPATIENT
Start: 2019-10-17 | End: 2019-10-18 | Stop reason: HOSPADM

## 2019-10-17 RX ORDER — GUAIFENESIN 100 MG/5ML
81 LIQUID (ML) ORAL DAILY
Status: DISCONTINUED | OUTPATIENT
Start: 2019-10-18 | End: 2019-10-18 | Stop reason: HOSPADM

## 2019-10-17 RX ORDER — HYDROXYCHLOROQUINE SULFATE 200 MG/1
400 TABLET, FILM COATED ORAL
Status: DISCONTINUED | OUTPATIENT
Start: 2019-10-17 | End: 2019-10-18 | Stop reason: HOSPADM

## 2019-10-17 RX ORDER — BUPIVACAINE HYDROCHLORIDE 2.5 MG/ML
INJECTION, SOLUTION EPIDURAL; INFILTRATION; INTRACAUDAL AS NEEDED
Status: DISCONTINUED | OUTPATIENT
Start: 2019-10-17 | End: 2019-10-17 | Stop reason: HOSPADM

## 2019-10-17 RX ORDER — GLYCOPYRROLATE 0.2 MG/ML
INJECTION INTRAMUSCULAR; INTRAVENOUS AS NEEDED
Status: DISCONTINUED | OUTPATIENT
Start: 2019-10-17 | End: 2019-10-17 | Stop reason: HOSPADM

## 2019-10-17 RX ORDER — NEOSTIGMINE METHYLSULFATE 1 MG/ML
INJECTION INTRAVENOUS AS NEEDED
Status: DISCONTINUED | OUTPATIENT
Start: 2019-10-17 | End: 2019-10-17 | Stop reason: HOSPADM

## 2019-10-17 RX ORDER — BUTALBITAL, ACETAMINOPHEN AND CAFFEINE 50; 325; 40 MG/1; MG/1; MG/1
2 TABLET ORAL
Status: DISCONTINUED | OUTPATIENT
Start: 2019-10-17 | End: 2019-10-18 | Stop reason: HOSPADM

## 2019-10-17 RX ORDER — FENTANYL CITRATE 50 UG/ML
25 INJECTION, SOLUTION INTRAMUSCULAR; INTRAVENOUS
Status: DISCONTINUED | OUTPATIENT
Start: 2019-10-17 | End: 2019-10-17 | Stop reason: HOSPADM

## 2019-10-17 RX ORDER — FENTANYL CITRATE 50 UG/ML
50 INJECTION, SOLUTION INTRAMUSCULAR; INTRAVENOUS AS NEEDED
Status: DISCONTINUED | OUTPATIENT
Start: 2019-10-17 | End: 2019-10-17 | Stop reason: HOSPADM

## 2019-10-17 RX ORDER — SODIUM CHLORIDE 0.9 % (FLUSH) 0.9 %
5-40 SYRINGE (ML) INJECTION AS NEEDED
Status: DISCONTINUED | OUTPATIENT
Start: 2019-10-17 | End: 2019-10-18 | Stop reason: HOSPADM

## 2019-10-17 RX ORDER — TRAMADOL HYDROCHLORIDE 50 MG/1
50 TABLET ORAL
Status: DISCONTINUED | OUTPATIENT
Start: 2019-10-17 | End: 2019-10-18 | Stop reason: HOSPADM

## 2019-10-17 RX ORDER — ONDANSETRON 2 MG/ML
4 INJECTION INTRAMUSCULAR; INTRAVENOUS
Status: DISCONTINUED | OUTPATIENT
Start: 2019-10-17 | End: 2019-10-18 | Stop reason: HOSPADM

## 2019-10-17 RX ORDER — MIDAZOLAM HYDROCHLORIDE 1 MG/ML
1 INJECTION, SOLUTION INTRAMUSCULAR; INTRAVENOUS AS NEEDED
Status: DISCONTINUED | OUTPATIENT
Start: 2019-10-17 | End: 2019-10-17 | Stop reason: HOSPADM

## 2019-10-17 RX ORDER — ONDANSETRON 2 MG/ML
4 INJECTION INTRAMUSCULAR; INTRAVENOUS AS NEEDED
Status: DISCONTINUED | OUTPATIENT
Start: 2019-10-17 | End: 2019-10-17 | Stop reason: HOSPADM

## 2019-10-17 RX ORDER — TIZANIDINE 2 MG/1
6 TABLET ORAL
Status: DISCONTINUED | OUTPATIENT
Start: 2019-10-17 | End: 2019-10-18 | Stop reason: HOSPADM

## 2019-10-17 RX ORDER — ONDANSETRON 2 MG/ML
INJECTION INTRAMUSCULAR; INTRAVENOUS AS NEEDED
Status: DISCONTINUED | OUTPATIENT
Start: 2019-10-17 | End: 2019-10-17 | Stop reason: HOSPADM

## 2019-10-17 RX ORDER — SODIUM CHLORIDE 0.9 % (FLUSH) 0.9 %
5-40 SYRINGE (ML) INJECTION EVERY 8 HOURS
Status: DISCONTINUED | OUTPATIENT
Start: 2019-10-17 | End: 2019-10-18 | Stop reason: HOSPADM

## 2019-10-17 RX ORDER — FENTANYL CITRATE 50 UG/ML
INJECTION, SOLUTION INTRAMUSCULAR; INTRAVENOUS AS NEEDED
Status: DISCONTINUED | OUTPATIENT
Start: 2019-10-17 | End: 2019-10-17 | Stop reason: HOSPADM

## 2019-10-17 RX ADMIN — FENTANYL CITRATE 25 MCG: 50 INJECTION INTRAMUSCULAR; INTRAVENOUS at 13:55

## 2019-10-17 RX ADMIN — Medication 10 ML: at 21:47

## 2019-10-17 RX ADMIN — ROCURONIUM BROMIDE 40 MG: 10 INJECTION INTRAVENOUS at 11:36

## 2019-10-17 RX ADMIN — SODIUM CHLORIDE, SODIUM LACTATE, POTASSIUM CHLORIDE, AND CALCIUM CHLORIDE 25 ML/HR: 600; 310; 30; 20 INJECTION, SOLUTION INTRAVENOUS at 10:45

## 2019-10-17 RX ADMIN — FENTANYL CITRATE 100 MCG: 50 INJECTION, SOLUTION INTRAMUSCULAR; INTRAVENOUS at 11:36

## 2019-10-17 RX ADMIN — GABAPENTIN 300 MG: 300 CAPSULE ORAL at 15:26

## 2019-10-17 RX ADMIN — TRAMADOL HYDROCHLORIDE 50 MG: 50 TABLET, FILM COATED ORAL at 13:46

## 2019-10-17 RX ADMIN — FENTANYL CITRATE 50 MCG: 50 INJECTION, SOLUTION INTRAMUSCULAR; INTRAVENOUS at 11:58

## 2019-10-17 RX ADMIN — NEOSTIGMINE METHYLSULFATE 3 MG: 1 INJECTION INTRAVENOUS at 12:45

## 2019-10-17 RX ADMIN — FENTANYL CITRATE 25 MCG: 50 INJECTION INTRAMUSCULAR; INTRAVENOUS at 13:51

## 2019-10-17 RX ADMIN — ONDANSETRON HYDROCHLORIDE 4 MG: 2 INJECTION, SOLUTION INTRAMUSCULAR; INTRAVENOUS at 12:22

## 2019-10-17 RX ADMIN — FENTANYL CITRATE 50 MCG: 50 INJECTION, SOLUTION INTRAMUSCULAR; INTRAVENOUS at 12:01

## 2019-10-17 RX ADMIN — MIDAZOLAM HYDROCHLORIDE 2 MG: 1 INJECTION INTRAMUSCULAR; INTRAVENOUS at 11:33

## 2019-10-17 RX ADMIN — ONDANSETRON 4 MG: 2 INJECTION INTRAMUSCULAR; INTRAVENOUS at 15:26

## 2019-10-17 RX ADMIN — Medication 10 ML: at 14:00

## 2019-10-17 RX ADMIN — GABAPENTIN 300 MG: 300 CAPSULE ORAL at 21:47

## 2019-10-17 RX ADMIN — TRAMADOL HYDROCHLORIDE 50 MG: 50 TABLET, FILM COATED ORAL at 19:59

## 2019-10-17 RX ADMIN — TIZANIDINE 6 MG: 2 TABLET ORAL at 22:02

## 2019-10-17 RX ADMIN — GLYCOPYRROLATE 0.4 MG: 0.2 INJECTION, SOLUTION INTRAMUSCULAR; INTRAVENOUS at 12:45

## 2019-10-17 RX ADMIN — PROPOFOL 200 MG: 10 INJECTION, EMULSION INTRAVENOUS at 11:36

## 2019-10-17 RX ADMIN — HYDROXYCHLOROQUINE SULFATE 400 MG: 200 TABLET, FILM COATED ORAL at 18:18

## 2019-10-17 NOTE — PROGRESS NOTES
General Surgery End of Shift Nursing Note    Bedside shift change report given to Libby Hurst S (oncoming nurse) by Rebecca Moreno (offgoing nurse). Report included the following information SBAR, Kardex, Procedure Summary, Intake/Output, MAR and Recent Results. Shift worked:   7a-7p   Summary of shift:    Pt transported from PACU. Frequently ambulated in hallway after surgery. Voiding appropriately. Has urge to pass stool but has not passed it yet.    Issues for physician to address:   None      Number times ambulated in hallway past shift: 4     Number of times OOB to chair past shift: 2    Pain Management:  Current medication: see MAR  Patient states pain is manageable on current pain medication: YES    GI:    Current diet:  DIET REGULAR    Tolerating current diet: YES  Passing flatus: YES  Last Bowel Movement: today before surgery      [employer]

## 2019-10-17 NOTE — ANESTHESIA POSTPROCEDURE EVALUATION
Post-Anesthesia Evaluation and Assessment    Patient: James Gabriel MRN: [id number]  SSN: [id number]    YOB: 1965  Age: 47 y.o. Sex: female      I have evaluated the patient and they are stable and ready for discharge from the PACU. Cardiovascular Function/Vital Signs  Visit Vitals  /67 (BP 1 Location: Right arm, BP Patient Position: At rest)   Pulse 99   Temp 37.1 °C (98.8 °F)   Resp 19   Ht 5' 1\" (1.549 m)   Wt 84.7 kg (186 lb 11.7 oz)   SpO2 100%   BMI 35.28 kg/m²       Patient is status post General anesthesia for Procedure(s):  LIGATION OF INTERSPHINCTERIC FISTULA TRACT. Nausea/Vomiting: None    Postoperative hydration reviewed and adequate. Pain:  Pain Scale 1: Numeric (0 - 10) (10/17/19 1330)  Pain Intensity 1: 2 (10/17/19 1330)   Managed    Neurological Status:   Neuro (WDL): Exceptions to WDL (10/17/19 1300)  Neuro  Neurologic State: Drowsy (10/17/19 1300)  Orientation Level: Oriented to person;Oriented to place;Oriented to situation (10/17/19 1300)  Cognition: Follows commands (10/17/19 1300)  Speech: Clear (10/17/19 1300)  LUE Motor Response: Purposeful (10/17/19 1300)  LLE Motor Response: Purposeful (10/17/19 1300)  RUE Motor Response: Purposeful (10/17/19 1300)  RLE Motor Response: Purposeful (10/17/19 1300)   At baseline    Mental Status, Level of Consciousness: Alert and  oriented to person, place, and time    Pulmonary Status:   O2 Device: Nasal cannula (10/17/19 1330)   Adequate oxygenation and airway patent    Complications related to anesthesia: None    Post-anesthesia assessment completed.  No concerns    Signed By: Caroline Sánchez MD     October 17, 2019

## 2019-10-17 NOTE — ANESTHESIA PREPROCEDURE EVALUATION
Anesthetic History   No history of anesthetic complications            Review of Systems / Medical History  Patient summary reviewed, nursing notes reviewed and pertinent labs reviewed    Pulmonary  Within defined limits                 Neuro/Psych       CVA       Cardiovascular            Dysrhythmias : atrial fibrillation  CAD      Comments: S/p CABG  Hx of PE    Left ventricle: Size was normal. Systolic function was by visual  assessment. Ejection fraction was estimated to be 55 % in the range of 60  %. No obvious wall motion abnormalities identified in the views obtained.     Negative stress test   GI/Hepatic/Renal  Within defined limits              Endo/Other        Obesity     Other Findings   Comments: lupus           Physical Exam    Airway  Mallampati: I  TM Distance: 4 - 6 cm  Neck ROM: normal range of motion   Mouth opening: Normal     Cardiovascular    Rhythm: regular  Rate: normal         Dental  No notable dental hx       Pulmonary  Breath sounds clear to auscultation               Abdominal  Abdominal exam normal       Other Findings            Anesthetic Plan    ASA: 2  Anesthesia type: general          Induction: Intravenous  Anesthetic plan and risks discussed with: Patient

## 2019-10-17 NOTE — PERIOP NOTES
Handoff Report from Operating Room to PACU    Report received from TENA Gordon RN and CLARA Pedro CRNA regarding [employer]. Surgeon(s):  Colleen Angelucci, MD  And Procedure(s) (LRB):  LIGATION OF INTERSPHINCTERIC FISTULA TRACT (N/A)  confirmed   with allergies, drains and dressings discussed. Anesthesia type, drugs, patient history, complications, estimated blood loss, vital signs, intake and output, and last pain medication, lines, reversal medications and temperature were reviewed.

## 2019-10-17 NOTE — BRIEF OP NOTE
BRIEF OPERATIVE NOTE    Date of Procedure: 10/17/2019   Preoperative Diagnosis: ANAL FISTULA  Postoperative Diagnosis: ANAL FISTULA    Procedure(s):  LIGATION OF INTERSPHINCTERIC FISTULA TRACT  Surgeon(s) and Role:     * Keyshawn Evans MD - Primary         Surgical Assistant: Melvia Gottron    Surgical Staff:  Circ-1: Elyssa Reid RN  Surg Asst-1: Vy Krueger RN  Surg Asst-Relief: Sandra Hayward  Event Time In Time Out   Incision Start 1149    Incision Close 1250      Anesthesia: General   Estimated Blood Loss: 20ml  Specimens: * No specimens in log *   Findings: High transphincteric fistula tract   Complications: none  Implants: * No implants in log *

## 2019-10-18 VITALS
TEMPERATURE: 97.8 F | HEART RATE: 97 BPM | DIASTOLIC BLOOD PRESSURE: 80 MMHG | SYSTOLIC BLOOD PRESSURE: 145 MMHG | OXYGEN SATURATION: 98 % | RESPIRATION RATE: 18 BRPM | BODY MASS INDEX: 35.25 KG/M2 | HEIGHT: 61 IN | WEIGHT: 186.73 LBS

## 2019-10-18 LAB
GLUCOSE BLD STRIP.AUTO-MCNC: 119 MG/DL (ref 65–100)
SERVICE CMNT-IMP: ABNORMAL

## 2019-10-18 PROCEDURE — 74011250637 HC RX REV CODE- 250/637: Performed by: SURGERY

## 2019-10-18 PROCEDURE — 51798 US URINE CAPACITY MEASURE: CPT

## 2019-10-18 PROCEDURE — 99218 HC RM OBSERVATION: CPT

## 2019-10-18 PROCEDURE — 82962 GLUCOSE BLOOD TEST: CPT

## 2019-10-18 PROCEDURE — 74011250637 HC RX REV CODE- 250/637: Performed by: NURSE PRACTITIONER

## 2019-10-18 RX ORDER — TRAMADOL HYDROCHLORIDE 50 MG/1
50 TABLET ORAL
Qty: 20 TAB | Refills: 0 | Status: SHIPPED | OUTPATIENT
Start: 2019-10-18 | End: 2019-10-21

## 2019-10-18 RX ORDER — TAMSULOSIN HYDROCHLORIDE 0.4 MG/1
0.4 CAPSULE ORAL 2 TIMES DAILY
Status: DISCONTINUED | OUTPATIENT
Start: 2019-10-18 | End: 2019-10-18 | Stop reason: HOSPADM

## 2019-10-18 RX ORDER — TAMSULOSIN HYDROCHLORIDE 0.4 MG/1
0.4 CAPSULE ORAL DAILY
Qty: 5 CAP | Refills: 0 | Status: SHIPPED | OUTPATIENT
Start: 2019-10-18 | End: 2019-10-23

## 2019-10-18 RX ORDER — TAMSULOSIN HYDROCHLORIDE 0.4 MG/1
0.4 CAPSULE ORAL DAILY
Qty: 5 CAP | Refills: 0 | Status: SHIPPED | OUTPATIENT
Start: 2019-10-18 | End: 2019-10-18

## 2019-10-18 RX ADMIN — TAMSULOSIN HYDROCHLORIDE 0.4 MG: 0.4 CAPSULE ORAL at 08:54

## 2019-10-18 RX ADMIN — TRAMADOL HYDROCHLORIDE 50 MG: 50 TABLET, FILM COATED ORAL at 04:07

## 2019-10-18 RX ADMIN — TRAMADOL HYDROCHLORIDE 50 MG: 50 TABLET, FILM COATED ORAL at 11:20

## 2019-10-18 RX ADMIN — ASPIRIN 81 MG 81 MG: 81 TABLET ORAL at 08:54

## 2019-10-18 RX ADMIN — GABAPENTIN 300 MG: 300 CAPSULE ORAL at 15:23

## 2019-10-18 RX ADMIN — Medication 10 ML: at 05:36

## 2019-10-18 RX ADMIN — GABAPENTIN 300 MG: 300 CAPSULE ORAL at 08:54

## 2019-10-18 NOTE — DISCHARGE SUMMARY
Post- Surgical Discharge Summary    Patient ID:  Ralf Kirk  [id number]  female  47 y.o.  1965    Admit date: 10/17/2019    Discharge date: 10/18/2019    Admitting Physician: Agus Vizcaino MD     Consulting Physician(s):   Treatment Team: Attending Provider: Didier Biggs MD; Staff Nurse: Faith Sexton    Date of Surgery:   10/17/2019     Preoperative Diagnosis:  ANAL FISTULA    Postoperative Diagnosis:   ANAL FISTULA    Procedure(s):  LIGATION OF INTERSPHINCTERIC FISTULA TRACT     Anesthesia Type:   General     Surgeon: Didier Biggs MD                            HPI:  Pt is a 47 y.o. female who has a history of ANAL FISTULA who presents at this time for a ligation following the failure of conservative management. Problem List:   Problem List as of 10/18/2019 Date Reviewed: 10/17/2019          Codes Class Noted - Resolved    Rectal bleed ICD-10-CM: K62.5  ICD-9-CM: 569.3  10/17/2019 - Present        Severe obesity ([facility].) ICD-10-CM: E66.01  ICD-9-CM: 278.01  10/10/2018 - Present        Hypotension due to drugs ICD-10-CM: I95.2  ICD-9-CM: 458.8, E947.9  6/22/2018 - Present        SVT (supraventricular tachycardia) ([facility] ICD-10-CM: I47.1  ICD-9-CM: 427.89  6/21/2018 - Present        Cerebral microvascular disease ICD-10-CM: I67.9  ICD-9-CM: 437.9  2/19/2018 - Present        Dizziness and giddiness ICD-10-CM: R42  ICD-9-CM: 780.4  2/19/2018 - Present        Altered mental status, unspecified ICD-10-CM: R41.82  ICD-9-CM: 780.97  2/19/2018 - Present        Paroxysmal SVT (supraventricular tachycardia) (HCC) ICD-10-CM: I47.1  ICD-9-CM: 427.0  2/1/2018 - Present        Inappropriate sinus tachycardia ICD-10-CM: R00.0  ICD-9-CM: 427.89  9/11/2017 - Present        Phrenic nerve palsy (Chronic) ICD-10-CM: G58.8  ICD-9-CM: 354.8  12/22/2016 - Present    Overview Signed 12/22/2016  2:59 PM by Chica Velasquez MD     Right, related to SVC lesion and repair.                Shortness of breath ICD-10-CM: R06.02  ICD-9-CM: 786.05  7/19/2016 - Present        SVC obstruction ICD-10-CM: I87.1  ICD-9-CM: 459.2  7/5/2016 - Present    Overview Addendum 7/5/2016 12:43 PM by MIL Meehan     7/5/16 RESECTION OF SUPERIOR VENA CAVA  STRICTURE, REPAIR  WITH SAPHENOUS VEIN SPIRAL INTERPOSITION GRAFT AND REMOVAL OF LEFT SIDED PORTACATH               Transient ischemic attack involving right internal carotid artery ICD-10-CM: G45.1  ICD-9-CM: 435.8  4/25/2016 - Present        Acute ischemic stroke ([facility] 75.) ICD-10-CM: I63.9  ICD-9-CM: 434.91  4/22/2016 - Present        Lupus ([facility] 75.) ICD-10-CM: M32.9  ICD-9-CM: 710.0  4/22/2016 - Present        Pulmonary embolism (HCC) ICD-10-CM: I26.99  ICD-9-CM: 415.19  4/22/2016 - Present        Cervico-occipital neuralgia of right side ICD-10-CM: M54.81  ICD-9-CM: 723.8  2/5/2016 - Present        Stenosis of both carotid arteries without infarction ICD-10-CM: I65.23  ICD-9-CM: 433.10, 433.30  2/5/2016 - Present        Ulnar neuropathy at elbow of right upper extremity ICD-10-CM: G56.21  ICD-9-CM: 354.2  5/21/2015 - Present        Cervical radiculopathy ICD-10-CM: M54.12  ICD-9-CM: 723.4  5/21/2015 - Present        SLE (systemic lupus erythematosus) ([facility] 75.) ICD-10-CM: M32.9  ICD-9-CM: 710.0  3/26/2015 - Present        Tension vascular headache ICD-10-CM: G44.209  ICD-9-CM: 307.81  3/26/2015 - Present        Migraine with aura and without status migrainosus, not intractable ICD-10-CM: G43.109  ICD-9-CM: 346.00  3/26/2015 - Present        Myopathy ICD-10-CM: G72.9  ICD-9-CM: 359.9  3/26/2015 - Present        Vitamin D deficiency ICD-10-CM: E55.9  ICD-9-CM: 268.9  3/26/2015 - Present        Back pain ICD-10-CM: M54.9  ICD-9-CM: 724.5  3/26/2015 - Present        Lumbar radiculopathy ICD-10-CM: M54.16  ICD-9-CM: 724.4  3/26/2015 - Present        Spondylolisthesis, grade 1, L4-L5 ICD-10-CM: M43.10  ICD-9-CM: 738.4  3/26/2015 - Present        Weakness ICD-10-CM: R53.1  ICD-9-CM: 780.79  3/26/2015 - Present        Sinus tachycardia ICD-10-CM: R00.0  ICD-9-CM: 427.89  8/1/2014 - Present        Bronchitis, acute ICD-10-CM: J20.9  ICD-9-CM: 466.0  8/1/2014 - Present        Abdominal pain, acute, right upper quadrant ICD-10-CM: R10.11  ICD-9-CM: 789.01, 338.19  8/1/2014 - Present        Acute GI bleeding ICD-10-CM: K92.2  ICD-9-CM: 578.9  8/15/2013 - Present        Crohn's disease of colon ([facility] 75.) ICD-10-CM: K50.10  ICD-9-CM: 555.1  7/26/2010 - Present        Crohn's disease of ileum ([facility] 75.) ICD-10-CM: K50.00  ICD-9-CM: 555.0  7/26/2010 - Present        RESOLVED: Sternal wound dehiscence ICD-10-CM: T81.32XA  ICD-9-CM: 998.31  12/14/2016 - 12/15/2016        RESOLVED: Pain from implanted hardware ICD-10-CM: T85.848A  ICD-9-CM: 996.70  12/14/2016 - 12/22/2016    Overview Signed 12/14/2016  9:46 AM by MIL Gant     Exploration of sternal incision with removal of protruding sternal wires             RESOLVED: Dehiscence of closure of sternum or sternotomy ICD-10-CM: V44.59YH  ICD-9-CM: 998.31  12/14/2016 - 12/15/2016               Hospital Course: The patient underwent surgery. Intra-operative complications: None; patient tolerated the procedure well. Was taken to the PACU in stable condition and then transferred to the surgical floor. Postoperative Pain Management:  Ultram    Postoperative transfusions:    Number of units banked PRBCs =   none     Post Op complications: POUR- Second voiding trial on the day of discharge with improved outcome. Patient able to void with negligible residual. Will continue on Flomax for 5 days to ensure complete resolution. Patient mobilized with nursing and was found to be safe and steady with ambulation.      Discharged to: Home    Condition on Discharge: Stable     Discharge instructions:    - Take pain medications as prescribed  - Diet Resume pre-surgery diet  - Discharge activity:    - Activity as tolerated    - Ambulate several times a day   - No heavy lifting for 4 weeks   - Do not drive for two weeks or while on opioid pain medications  - Wound Care: Keep wound(s) clean and dry. See discharge instruction sheet. Allergies: Allergies   Allergen Reactions    Chlorhexidine Towelette Rash    Codeine Hives and Nausea and Vomiting     Severe nausea & vomiting    Hydrocodone Hives and Nausea and Vomiting     Severe nausea & vomiting    Oxycontin [Oxycodone] Hives and Nausea and Vomiting     Severe nausea & vomiting, also has the same reaction from Percocet    Percocet [Oxycodone-Acetaminophen] Hives and Nausea and Vomiting     Can take tylenol    Dilaudid [Hydromorphone (Bulk)] Swelling    Prednisone Other (comments)     HX OF CROHN'S; not allergic, prefer not to use               -DISCHARGE MEDICATION LIST     Current Discharge Medication List      START taking these medications    Details   traMADol (ULTRAM) 50 mg tablet Take 1 Tab by mouth every six (6) hours as needed for Pain for up to 3 days. Max Daily Amount: 200 mg. Qty: 20 Tab, Refills: 0    Associated Diagnoses: Pain      tamsulosin (FLOMAX) 0.4 mg capsule Take 1 Cap by mouth daily for 5 days. Qty: 5 Cap, Refills: 0         CONTINUE these medications which have CHANGED    Details   apixaban (ELIQUIS) 2.5 mg tablet Take 1 Tab by mouth two (2) times a day. Resume on Monday 10/21/19  Indications: PE         CONTINUE these medications which have NOT CHANGED    Details   tiZANidine (ZANAFLEX) 2 mg tablet 1 po qam and 2 po qhs  Qty: 100 Tab, Refills: 0    Associated Diagnoses: Migraine with aura and without status migrainosus, not intractable; Stenosis of both carotid arteries without infarction; Cerebral microvascular disease; Cervico-occipital neuralgia of right side;  Tension vascular headache; Dizziness and giddiness      gabapentin (NEURONTIN) 300 mg capsule TAKE ONE CAPSULE BY MOUTH THREE TIMES A DAY  Qty: 100 Cap, Refills: 5    Associated Diagnoses: Cervical radiculopathy; Cerebrovascular disease, unspecified; Ulnar neuropathy at elbow of right upper extremity; Cervico-occipital neuralgia of right side; Stenosis of both carotid arteries without infarction      ondansetron hcl (ZOFRAN) 4 mg tablet Take 4 mg by mouth every eight (8) hours as needed for Nausea. drospirenone-ethinyl estradiol (MARIELOS 28) 3-20 mg-mcg per tablet Take 1 Tab by mouth nightly. ustekinumab (USTEKINAUMAB) 90 mg/mL injection 90 mg by SubCUTAneous route every two (2) months. aspirin 81 mg chewable tablet Take 81 mg by mouth daily. butalbital-acetaminophen-caffeine (FIORICET, ESGIC) -40 mg per tablet Take 2 Tabs by mouth every eight (8) hours as needed for Pain or Headache. Max Daily Amount: 6 Tabs. MUST LAST 30 DAYS. Indications: MIGRAINE, TENSION-TYPE HEADACHE  Qty: 50 Tab, Refills: 2    Associated Diagnoses: Cervical radiculopathy; Cerebrovascular disease, unspecified; Ulnar neuropathy at elbow of right upper extremity; Cervico-occipital neuralgia of right side; Stenosis of both carotid arteries without infarction      promethazine (PHENERGAN) 25 mg tablet Take 25 mg by mouth every six (6) hours as needed for Nausea. diazepam (VALIUM) 5 mg tablet Take 1 Tab by mouth every twelve (12) hours as needed for Anxiety. Max Daily Amount: 10 mg. Refill at 1 month intervals  Qty: 30 Tab, Refills: 2    Associated Diagnoses: Cervical radiculopathy; Cerebrovascular disease, unspecified; Ulnar neuropathy at elbow of right upper extremity; Cervico-occipital neuralgia of right side; Stenosis of both carotid arteries without infarction      hydroxychloroquine (PLAQUENIL) 200 mg tablet Take 400 mg by mouth daily (after dinner).  For lupus          per medical continuation form      -Follow up in office in 2 weeks      Signed:  Marge Ayala  MSN, APRN, FNP-C, [facility]  Surgical Nurse Practitioner    10/18/2019  2:47 PM

## 2019-10-18 NOTE — PROGRESS NOTES
RN and LPN explained process to patient for I&O cath patient denies allergies to iodine or shellfish. I&O cath completed x1 attempted, successful 400 of urine collected. Patient educated on next bladder scan due encouraged to continue to void on her own.

## 2019-10-18 NOTE — PROGRESS NOTES
Catheter inserted x 2 with LPN and x 1 with RN attempts. Patient tolerated well. Urine return noted in hussein.

## 2019-10-18 NOTE — PROGRESS NOTES
Problem: Pain - Acute  Goal: *Control of Pain  Outcome: Progressing Towards Goal  Goal: *Verbalizes understanding of type and use of pain medication  Outcome: Progressing Towards Goal     Problem: Risk for infection  Goal: Knowledge - Infection Control  Outcome: Progressing Towards Goal  Goal: *Prevention of infection  Outcome: Progressing Towards Goal

## 2019-10-18 NOTE — PROGRESS NOTES
Patient had c/o inability to void and feels she has to go. RN bladder scanned patient 65 noted MD on call paged awaiting return call.

## 2019-10-18 NOTE — PROGRESS NOTES
ONELIA Plan:     *Home with family assistance    Patient is being discharged home today without any needs or concerns.  to transport pt home. Follow-up appointment is on AVS    Care Management Interventions  PCP Verified by CM: Yes  Mode of Transport at Discharge:  Other (see comment)()  Transition of Care Consult (CM Consult): Discharge Planning  Discharge Durable Medical Equipment: No  Physical Therapy Consult: No  Occupational Therapy Consult: No  Speech Therapy Consult: No  Current Support Network: Lives with Spouse, Own Home  Plan discussed with Pt/Family/Caregiver: Yes  Discharge Location  Discharge Placement: Home with family assistance      Deena Solthom  Ext [phone]

## 2019-10-18 NOTE — DISCHARGE INSTRUCTIONS
David Burnham MD, [address] [facility] Alyssa Rain MD, [address] [facility] Rashard Clark MD, [facility]  Brianna Fuentes. Hugh Kocher, MD Ole Manchester, MD Lanny Platter, MD Loma Lava, MD    [facility] Discharge Instructions for Ambulatory 23-Hour Surgery Patients    1. Advance to high fiber diet as tolerated. 2. Take Metamucil/Citrucel 1 teaspoon mixed in a glass of water in AM and PM.  3. Remove dressing at 8pm.  4. Take 1 sitz baths today (warm water baths for 15-20 minutes). Begin four (4) times a day the day after surgery. 5. Apply Nupercainal Ointment (does not need a prescription) to the anal area after sitz baths, place a dry 4x4 gauze over the are between the buttocks. 6. Take pain medication as prescribed. (NO DRIVING WHILE ON PAIN MEDICATION). 7. No lifting any objects weighing more than 15 pounds. 8. No sitting more than 45 minutes without standing, walking, or lying down. 9. You may walk as desired. 10.  Please schedule an appointment in the office within 4 weeks. Call ahead to schedule your appointment [phone]. 11.  Call [facility] [phone] if you have any problems or questions after hours. 12.  Expect slight bright red blood up to four (4) weeks from surgery. 13.  Stitches are the dissolving type - they do not need removal in the office. 14.  If no bowel movement by tomorrow morning, take 30cc (1 tablespoon) of Milk of Magnesia. Repeat if no results. If still no bowel movement the next day, then call the office.

## 2019-10-18 NOTE — PROGRESS NOTES
I have reviewed discharge instructions with the patient and spouse. The patient and spouse verbalized understanding. Patient given 2 prescriptions, sitz bath kit, and information on follow-up appointments.

## 2019-10-18 NOTE — PROGRESS NOTES
Patient has not yet voided any significant amount. Bladder scan showed 143mL in bladder. Encouraged PO intake, will continue to monitor.

## 2019-10-18 NOTE — PROGRESS NOTES
CRS POD#1 s/p LIFT    Had urinary retention requiring straight cath and then hussein placement. Pain seems controlled on tramadol. No nausea or vomiting. No Bm yet    /67 (BP 1 Location: Right arm, BP Patient Position: Sitting)   Pulse 75   Temp 98.3 °F (36.8 °C)   Resp 18   Ht 5' 1\" (1.549 m)   Wt 84.7 kg (186 lb 11.7 oz)   SpO2 100%   BMI 35.28 kg/m²     Plan  -Re-attempt voiding trial.  If she fails, she will go home with leg bag and urology follow up.

## 2019-10-18 NOTE — PROGRESS NOTES
2000- bladder scan done since patient is having pain, and distended and unable to void. Bladder scan revealed 432 cc in bladder, will contact MD to get an order to straight cath. 2100- per MD order did straight cath patient, voided 400ml. Patient tolerated procedure well. 5- patient c/o discomfort and stated she still has not voided very much. Bladder scan revealed 367ml, advised patient will message MD for another straight cath order    0500- patient was up most of the night, was having discomfort and could not get comfortable due to rectal pain.      Odilia Salazar, BEBEN

## 2019-11-05 ENCOUNTER — HOSPITAL ENCOUNTER (OUTPATIENT)
Dept: MAMMOGRAPHY | Age: 54
Discharge: HOME | End: 2019-11-05
Attending: OBSTETRICS & GYNECOLOGY
Payer: COMMERCIAL

## 2019-11-05 DIAGNOSIS — Z12.31 VISIT FOR SCREENING MAMMOGRAM: ICD-10-CM

## 2019-11-05 PROCEDURE — 77067 SCR MAMMO BI INCL CAD: CPT

## 2019-11-06 ENCOUNTER — TELEPHONE (OUTPATIENT)
Dept: MAMMOGRAPHY | Age: 54
End: 2019-11-06

## 2019-11-06 ENCOUNTER — HOSPITAL ENCOUNTER (OUTPATIENT)
Dept: MAMMOGRAPHY | Age: 54
Discharge: HOME | End: 2019-11-06
Attending: OBSTETRICS & GYNECOLOGY
Payer: COMMERCIAL

## 2019-11-06 ENCOUNTER — HOSPITAL ENCOUNTER (OUTPATIENT)
Dept: ULTRASOUND IMAGING | Age: 54
Discharge: HOME | End: 2019-11-06
Attending: OBSTETRICS & GYNECOLOGY
Payer: COMMERCIAL

## 2019-11-06 DIAGNOSIS — R92.8 ABNORMAL MAMMOGRAM: ICD-10-CM

## 2019-11-06 PROCEDURE — 77065 DX MAMMO INCL CAD UNI: CPT

## 2019-11-06 PROCEDURE — 76642 ULTRASOUND BREAST LIMITED: CPT

## 2019-11-06 NOTE — PROGRESS NOTES
1515 - informed per Dr. Clotilde Dominguez pt. Needed to be scheduled for U/S guided left breast biopsy with post clip insertion. In to speak with pt. About above and pt. Given date of 11/12/2019 at 0900 here at [facility] U/S room 3 and pt. Agreed. Pt states she is on Eliquis which she is taking for a PE (Dr. Danny Duran sees pt. For this) and she had open heart surgery in 2016 from an occlusion (Dr. Mady Ness). Informed pt. I would call her if any problems with obtaining order from Dr. Whit Blanchard. Luis Felipe Arnett - called Dr. Daniel Hinds office - Maren Gonzales, Medical assistant for Dr. Анна Marin, inquired if pt. Could come off her Eliquis and for how long for her procedure - informed that Maren Gonzales states pt. Could be off her Eliquis for 5 days prior to procedure.  (pt. Had stated she was off her eliquis 5 days prior to her most recent surgery in October 2019). Inquired if Maren Gonzales would have Dr. Анна Marin place order for pt. To be off her Eliquis x 5 days prior to procedure in [facility]. 1 - called Dr. Hailey Sky office ([facility]) and left message on Katy's voicemail (nurse for Dr. Whit Blanchard) requesting order for above. [phone] - faxed order to Dr. Hailey Sky office at [phone] for him to sign and fax back with confirmation received.

## 2019-11-06 NOTE — TELEPHONE ENCOUNTER
- faxed order (to be signed and refaxed) by Dr. Robledo Fly to his office with confirmation received. Called pt. (had to leave message on personalized answering system per pt. Request) To inform her Tony Lockett (medical assistant for dr. Ai Coon said it was ok for her to stop the Eliquis for 5 days prior to her biopsy. Any questions or if pt. Wanted to verify, she is to call Dr. Ai Coon office at [phone].

## 2019-11-07 ENCOUNTER — TELEPHONE (OUTPATIENT)
Dept: MAMMOGRAPHY | Age: 54
End: 2019-11-07

## 2019-11-07 NOTE — TELEPHONE ENCOUNTER
Called Dr. Joe Nava office concerning request for order for ultrasound guided left breast biopsy - seen in [facility] that Ms. Tim Bolden has an appointment w/oncology this month - left message on [address], nurse for Dr. Venkat Figueroa, voicemail requesting call back to see if order was faxed (and we didn't receive) or the oncologist was going to be following this recommendation for breast biopsy ? - will continue to follow and talk w/Katy.

## 2019-11-08 ENCOUNTER — TELEPHONE (OUTPATIENT)
Dept: MAMMOGRAPHY | Age: 54
End: 2019-11-08

## 2019-11-08 NOTE — TELEPHONE ENCOUNTER
Received order from Dr. Shawn Aparicio office - scheduled w/scheduling advising them to get authorization, placed on ultrasounds scheduled for 11/12/10 @ 9am - order scanned into [facility].

## 2019-11-12 ENCOUNTER — HOSPITAL ENCOUNTER (OUTPATIENT)
Dept: ULTRASOUND IMAGING | Age: 54
Discharge: HOME | End: 2019-11-12
Attending: OBSTETRICS & GYNECOLOGY
Payer: COMMERCIAL

## 2019-11-12 ENCOUNTER — HOSPITAL ENCOUNTER (OUTPATIENT)
Dept: MAMMOGRAPHY | Age: 54
Discharge: HOME | End: 2019-11-12
Attending: OBSTETRICS & GYNECOLOGY
Payer: COMMERCIAL

## 2019-11-12 VITALS
HEIGHT: 61 IN | TEMPERATURE: 97.7 F | BODY MASS INDEX: 33.42 KG/M2 | WEIGHT: 177 LBS | DIASTOLIC BLOOD PRESSURE: 113 MMHG | OXYGEN SATURATION: 99 % | RESPIRATION RATE: 20 BRPM | HEART RATE: 77 BPM | SYSTOLIC BLOOD PRESSURE: 156 MMHG

## 2019-11-12 DIAGNOSIS — R92.8 ABNORMAL MAMMOGRAM: ICD-10-CM

## 2019-11-12 DIAGNOSIS — N63.0 BREAST MASS: ICD-10-CM

## 2019-11-12 PROCEDURE — 74011000250 HC RX REV CODE- 250: Performed by: RADIOLOGY

## 2019-11-12 PROCEDURE — 19083 BX BREAST 1ST LESION US IMAG: CPT

## 2019-11-12 PROCEDURE — 88305 TISSUE EXAM BY PATHOLOGIST: CPT

## 2019-11-12 PROCEDURE — 77065 DX MAMMO INCL CAD UNI: CPT

## 2019-11-12 RX ORDER — LIDOCAINE HYDROCHLORIDE 10 MG/ML
5 INJECTION INFILTRATION; PERINEURAL
Status: COMPLETED | OUTPATIENT
Start: 2019-11-12 | End: 2019-11-12

## 2019-11-12 RX ORDER — LIDOCAINE HYDROCHLORIDE AND EPINEPHRINE 10; 10 MG/ML; UG/ML
1.5 INJECTION, SOLUTION INFILTRATION; PERINEURAL ONCE
Status: COMPLETED | OUTPATIENT
Start: 2019-11-12 | End: 2019-11-12

## 2019-11-12 RX ADMIN — LIDOCAINE HYDROCHLORIDE AND EPINEPHRINE 15 MG: 10; 10 INJECTION, SOLUTION INFILTRATION; PERINEURAL at 11:00

## 2019-11-12 RX ADMIN — LIDOCAINE HYDROCHLORIDE 5 ML: 10 INJECTION, SOLUTION INFILTRATION; PERINEURAL at 11:00

## 2019-11-12 NOTE — ROUTINE PROCESS
I have reviewed discharge instructions with the patient. The patient verbalized understanding. Copy of discharge instructions per AVS given to pt. Prior to discharge along with reviewed with pt. And signature sheet signed and sent to [facility]. Rec. For scanning r/t penpad not working. Pt stable without c/o pain at this time. Ice pack applied to left breast biopsy site and informed pt. On usage. Left breast biopsy site noted clean/dry/intact without bleeding, swelling or pain noted. Pt dressed self and ambulated to waiting room for discharge to home via private vehicle with her friends driving her home. Pt accomp. By nurse to waiting room for discharge.

## 2019-11-12 NOTE — DISCHARGE INSTRUCTIONS
[address]  [phone]      Breast Biopsy Discharge Instructions          1. After the biopsy, we will place a clean covered ice pack over the biopsy site, within the bra - you should leave the ice pack on 30 minutes and then remove the ice pack for 1-2 hours until bedtime. If needed you can continue applying ice the following day. It is a good idea to wear your bra for support, both day and night unless this causes you discomfort. 2. You may take Tylenol (two tablets) every 4 to 6 hours as needed for pain. Do not take aspirin or aspirin products (e.g. ibuprofen, Advil, Motrin) as these may cause more bleeding. 3. You may expect some bruising and skin discoloration in the biopsy area. This is normal and generally should resolve in 5 to 7 days. 4. Most women do not find it necessary to restrict their activities after the procedure. You should rest as needed on the day of your biopsy. The next day, if you are feeling okay, you may resume your regular work/activity schedule. Avoid strenuous activity and heavy lifting, jogging, aerobics, or vacuuming for 48 hours after the procedure. 5. 48 hours after your biopsy, remove the large outer dressing and leave the steri-strips (tiny pieces of tape) in place. The steri-strips will usually fall off in a few days. You may shower 48 hours after your biopsy and you may get the steri-strips wet. If still present after 4 days, you may gently peel the strips off. Keep the area clean and dry and shower daily. 6. If you have bleeding from the incision area, hold firm pressure on the area for 20 minutes. This should control any slight oozing that might occur.   If you develop persistent bleeding or pain which does not respond to the above measures or if you develop a fever, excessive swelling, redness, heat or drainage, please call the Breast Health Navigator at [phone] during normal business hours (7 a.m. - 5 p.m.). After business hours, call [phone] and ask for the on-call Radiology Nurse to be paged, or your referring physician. 7. You will receive your biopsy results (pathology report) in 3-5 business days - the radiologist will review your results and you will receive a call from the radiology department and/or from your doctor.           Physician :  Dr. Go Johnson                                    Nurse:  Yaneth Williamson RN      Tech:  Giancarlo Coles U/S shaan                                               Mammo:

## 2019-11-12 NOTE — ROUTINE PROCESS
Pt ambulated to stereo room without difficulty accomp. By nurse. Pt awake, alert and oriented x 3. Pt without c/o pain at this time.

## 2019-11-12 NOTE — PROGRESS NOTES
1005 - pt ambulated to [facility]. For her post left breast biopsy mammogram without difficulty accomp. By nurse. 1008 - left breast biopsy samples to gross room via nurse. 1010 - left breast biopsy samples signed into lab log in gross room via nurse. 1014 - pt ambulated back to [facility] room without difficulty accomp. By nurse. Pt without c/o pain at this time. Dressing left breast biopsy site noted clean/dry/intact. Ice pack placed on breast biopsy site with instructions on usage.

## 2019-11-14 NOTE — PROGRESS NOTES
4109 - Dr. Vazquez Smart reviewed pathology result and recommended her to continue her yearly mammograms. 0518 -  The pt. Was notified of the benign results and recommendations for a follow up mammogram in 1 year. Advised pt to follow up with her doctor for any changes. Pt voiced relief to nurse.

## 2019-11-15 ENCOUNTER — TELEPHONE (OUTPATIENT)
Dept: MAMMOGRAPHY | Age: 54
End: 2019-11-15

## 2019-11-15 NOTE — TELEPHONE ENCOUNTER
Ms. Nam Andujar called advising that she started back on her Eliquis yesterday and has been lifting laundry and back to her normal routine. She noticed a \"lump enlarging\" at biopsy site and bruising noted. Ms. Nam Andujar requested for a nurse to evaluate. Noted mild bruising to upper outer side adjacent to biopsy site with approximate 1/2\" knot that appears to be a hematoma. Dr. Aranza Orozco advised to rest, monitor and limit arm usage for today and next few days. Pt states she has been lifting baskets of laundry and normal routine of arm movements w/restarting Eliquis. Pt states she was comfortable with limiting activity and tylenol was taking away the achyness. Ms. Nam Andujar has oncall contact numbers if needed.

## 2019-11-19 ENCOUNTER — TELEPHONE (OUTPATIENT)
Dept: MAMMOGRAPHY | Age: 54
End: 2019-11-19

## 2019-11-19 NOTE — TELEPHONE ENCOUNTER
Pt. Called and left message for nurse to return a call regarding her biopsy site -   [phone] - returned call to pt. And pt. Stating the \"lump-Hematoma\" has not gone down yet and the coloring is still there and extended  A little on her side. Informed pt. This was expected with a hematoma - informed pt. It takes ?weeks for the hematoma to dissolve along with the bruising to go away. Pt stated she thought it should have gone away - explained to pt. How bruises look initially and the succession of colors you may see until you don't see them anymore then pt. Voiced understanding. Informed pt. She could follow up with her doctor or call us back for any other questions/concerns she may have about the biopsy site.

## 2019-12-27 NOTE — PROGRESS NOTES
[facility] Physical Therapy  [place] [address] [facility], [address]  Phone: [phone]  Fax: [phone]    Discharge Summary  2-15    Patient name: Ruth Leach  : 1965  Provider#: [id number]  Referral source: Heather Krishnamurthy MD      Medical/Treatment Diagnosis: Fecal incontinence [R15.9]     Prior Hospitalization: see medical history     Comorbidities: See Plan of Care  Prior Level of Function:See Plan of Care  Medications: Verified on Patient Summary List    Start of Care: 19      Onset Date:30 + years   Visits from Start of Care: 1     Missed Visits: 1  Reporting Period : 19       ASSESSMENT/SUMMARY OF CARE: Patient seen for 1 visit for pelvic floor dysfunction. She was provided with a comprehensive HEP and education for symptom management.   Unable to assess goals at this time          RECOMMENDATIONS:  [x]Discontinue therapy: []Patient has reached or is progressing toward set goals      [x]Patient is non-compliant or has abdicated      []Due to lack of appreciable progress towards set goals      []Other    Ezequiel Sparrow, PT 2019

## 2020-04-03 ENCOUNTER — VIRTUAL VISIT (OUTPATIENT)
Dept: ONCOLOGY | Age: 55
End: 2020-04-03

## 2020-04-03 DIAGNOSIS — I26.99 OTHER PULMONARY EMBOLISM WITHOUT ACUTE COR PULMONALE, UNSPECIFIED CHRONICITY (HCC): Primary | ICD-10-CM

## 2020-04-03 NOTE — PROGRESS NOTES
Beba Ingram is a 47 y.o. female here for follow up for:  Chief Complaint   Patient presents with    Blood Clot     1. Have you been to the ER, urgent care clinic since your last visit? Hospitalized since your last visit? Yes Oct 2019 for anal fistula and had  Bx on left  Breast in November 2019 and are watching and will recheck on next mammogram.      2. Have you seen or consulted any other health care providers outside of the [address] [place] [address] since your last visit? Include any pap smears or colon screening.  No

## 2020-04-03 NOTE — PROGRESS NOTES
Follow-up Note        Patient: James Esquivel MRN: [id number]  SSN: [id number]    YOB: 1965  Age: 47 y.o. Sex: female        Reason for Visit:   James Esquivel is a 47 y.o. female who is seen by synchronous (real-time) audio-video technology for follow up of history of acute PE    Subjective:      James Esquivel is a 47 y.o. female with a history of PE on systemic anticoagulation. She suffered a provoked PE from SVC occlusion/thrombus which was itself related to a chronic indwelling port-a-cath. She was diagnosed with PE in 04/2016. 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 She underwent a resection and bypass graft of the SVC. The SVC stenosis was felt to be related to a chronic indwelling port. She has been on Apixaban and prefers to remain on long-term systemic anticoagulation. Denies any new symptoms      Review of Systems:    Constitutional: negative  Eyes: negative  Ears, Nose, Mouth, Throat, and Face: negative  Respiratory: negative  Cardiovascular: negative  Gastrointestinal: negative  Genitourinary:negative  Integument/Breast: negative  Hematologic/Lymphatic: negative  Musculoskeletal:negative  Neurological: negative          Past Medical History:   Diagnosis Date    Arrhythmia     Afib, sees Dr Violet Bravo Autoimmune disease ([facility]     Crohns julito    CAD (coronary artery disease)     Chronic pain     Lt and Rt back:  Dr Kristofer Verma;  Pain mgmt [facility] Mallory [facility]    Cough     evaluated 8/25/14 by Dr Giovanna Burgess ([phone]) \". . possible drug related lung is due to Methitrexate or atypical or fungal infection\" per office note dated 8/25/14    Crohn's disease [facility])     Dr Brendan Morales Ill-defined condition     migraines    Ill-defined condition     lt lung higher than normal so puts pressure on diaphragm increasing SOB    Lupus ([facility]     Dr Tiny Mackay [phone]    Pain from implanted hardware 2016    Exploration of sternal incision with removal of protruding sternal wires    Stenosis of both carotid arteries without cerebral infarction     Stroke [facility]) 2016    TIA's     SVC obstruction     resolved after surgery    Thromboembolus ([address] 2016    PE right lower lung, spontaneous pulm dr christal Melendez     Past Surgical History:   Procedure Laterality Date    CARDIAC SURG PROCEDURE UNLIST  , 2018, 10/18    ablation    COLONOSCOPY N/A 10/5/2018    COLONOSCOPY performed by Juan Moore.  Steve Hdze MD at [facility] ENDOSCOPY     [address] HX  SECTION      x1    HX COLECTOMY  7/24/10    colon resection-18\" removed; Dr Franklyn Krause GI      bowel adhesions removed    HX GI      Bowel resection    HX GI  2019    stent for anal fistula     HX HEART CATHETERIZATION      no stents, open heart surgery 2016    HX HEENT      sinus surgery for deviated septum    HX HYSTERECTOMY      partial    HX LEFT SALPINGO-OOPHORECTOMY      ovary and fallopian tube removed    HX MOHS PROCEDURES Right approx     with bone spur repair in shoulder    HX OTHER SURGICAL  2016    femerol vein removed    HX OTHER SURGICAL  2016    open heart for repair of blocked SVC    HX TONSILLECTOMY      HX VASCULAR ACCESS  6/11/10    portacath insertion and removal x 3; Gets Remicaid q5 weeks    [place] COL[place]OS[place] W/BIOPSY SINGLE/MULTIPLE  2013           Family History   Problem Relation Age of Onset    Cancer Father         stomach    Other Mother         Auto accident    Cancer Paternal Grandmother         breast    Breast Cancer Paternal Grandmother         not sure of age   [facility] Breast Cancer Maternal Aunt         not sure of age     Social History     Tobacco Use    Smoking status: Former Smoker     Packs/day: 0.50     Years: 2.50     Pack years: 1.25     Last attempt to quit: 3/29/2005     Years since quitting: 15.0    Smokeless tobacco: Never Used    Tobacco comment: social   Substance Use Topics    Alcohol use: Yes     Comment: Rare wine      Prior to Admission medications    Medication Sig Start Date End Date Taking? Authorizing Provider   apixaban (ELIQUIS) 2.5 mg tablet Take 1 Tab by mouth two (2) times a day. Resume on Monday 10/21/19  Indications: PE 10/18/19  Yes Romulo Brewster NP   ustekinumab (USTEKINAUMAB) 90 mg/mL injection 90 mg by SubCUTAneous route every two (2) months. Yes Provider, Historical   tiZANidine (ZANAFLEX) 2 mg tablet 1 po qam and 2 po qhs 7/26/19  Yes Mis Hinojosa MD   gabapentin (NEURONTIN) 300 mg capsule TAKE ONE CAPSULE BY MOUTH THREE TIMES A DAY 12/3/18  Yes Lauren Duque MD   aspirin 81 mg chewable tablet Take 81 mg by mouth daily. Yes Provider, Historical   butalbital-acetaminophen-caffeine (FIORICET, ESGIC) -40 mg per tablet Take 2 Tabs by mouth every eight (8) hours as needed for Pain or Headache. Max Daily Amount: 6 Tabs. MUST LAST 30 DAYS. Indications: MIGRAINE, TENSION-TYPE HEADACHE 9/13/17  Yes Lauren Duque MD   promethazine (PHENERGAN) 25 mg tablet Take 25 mg by mouth every six (6) hours as needed for Nausea. Yes Provider, Historical   diazepam (VALIUM) 5 mg tablet Take 1 Tab by mouth every twelve (12) hours as needed for Anxiety. Max Daily Amount: 10 mg. Refill at 1 month intervals 8/31/16  Yes Lauren Duque MD   hydroxychloroquine (PLAQUENIL) 200 mg tablet Take 400 mg by mouth daily (after dinner). For lupus   Yes Provider, Historical   ondansetron hcl (ZOFRAN) 4 mg tablet Take 4 mg by mouth every eight (8) hours as needed for Nausea. Yes Other, MD Melisa   drospirenone-ethinyl estradiol (MARIELOS 28) 3-20 mg-mcg per tablet Take 1 Tab by mouth nightly.    Yes Provider, Historical              Allergies   Allergen Reactions    Chlorhexidine Towelette Rash    Codeine Hives and Nausea and Vomiting     Severe nausea & vomiting    Hydrocodone Hives and Nausea and Vomiting     Severe nausea & vomiting    Oxycontin [Oxycodone] Hives and Nausea and Vomiting     Severe nausea & vomiting, also has the same reaction from Percocet    Percocet [Oxycodone-Acetaminophen] Hives and Nausea and Vomiting     Can take tylenol    Dilaudid [Hydromorphone (Bulk)] Swelling    Prednisone Other (comments)     HX OF CROHN'S; not allergic, prefer not to use            Objective:     General: alert, cooperative, no distress   Mental  status: mental status: alert, oriented to person, place, and time, normal mood, behavior, speech, dress, motor activity, and thought processes   Resp: resp: normal effort and no respiratory distress   Neuro: neuro: no gross deficits   Skin: skin: no discoloration or lesions of concern on visible areas     Due to this being a TeleHealth evaluation, many elements of the physical examination are unable to be assessed. Lab Results   Component Value Date/Time    WBC 8.9 06/25/2018 03:50 AM    HGB 10.1 (L) 06/25/2018 03:50 AM    HCT 30.9 (L) 06/25/2018 03:50 AM    PLATELET 588 58/73/8428 03:50 AM    MCV 89.3 06/25/2018 03:50 AM         Assessment:     1. Pulmonary embolism:    > 1st episode in 04/2016- provoked by indwelling catheter [facility]) and SVC stenosis and thrombus    Currently on Apixaban  H/O TIA   Patient prefers to continue systemic anticoagulation. Denies bleeding. Continue Eliquis 2.5 mg daily  Continue Aspirin 81 mg daily    Symptom management form reviewed with patient. 2. Skin infection left breast    > Cephalexin 500 mg po QID x 5 days      Plan:       > Continue Apixaban  > Continue ASA 81 daily  > Cephalexin x 5 days  > Follow-up in 1 year        Signed by: Deshaun Alonzo MD                     April 3, 2020        CCChris Pickett MD      She and/or her healthcare decision maker is aware that this patient-initiated Telehealth encounter is a billable service, with coverage as determined by her insurance carrier.  She is aware that she may receive a bill and has provided verbal consent to proceed: Yes    Pursuant to the emergency declaration under the [facility], [facility] waiver authority and the "[employer] and [employer] Act, this Virtual  Visit was conducted, with patient's consent, to reduce the patient's risk of exposure to COVID-19 and provide continuity of care for an established patient. Services were provided through a video synchronous discussion virtually to substitute for in-person clinic visit. This visit was completed virtually using [employer].

## 2020-05-16 ENCOUNTER — HOSPITAL ENCOUNTER (OUTPATIENT)
Dept: ULTRASOUND IMAGING | Age: 55
Discharge: HOME | End: 2020-05-16
Attending: NURSE PRACTITIONER
Payer: COMMERCIAL

## 2020-05-16 DIAGNOSIS — M79.89 SWELLING OF LEFT LOWER EXTREMITY: ICD-10-CM

## 2020-05-16 PROCEDURE — 93971 EXTREMITY STUDY: CPT

## 2020-08-24 ENCOUNTER — APPOINTMENT (OUTPATIENT)
Dept: GENERAL RADIOLOGY | Age: 55
End: 2020-08-24
Attending: EMERGENCY MEDICINE
Payer: COMMERCIAL

## 2020-08-24 ENCOUNTER — HOSPITAL ENCOUNTER (OUTPATIENT)
Age: 55
Setting detail: OBSERVATION
Discharge: HOME | End: 2020-08-26
Attending: EMERGENCY MEDICINE | Admitting: GENERAL ACUTE CARE HOSPITAL
Payer: COMMERCIAL

## 2020-08-24 ENCOUNTER — APPOINTMENT (OUTPATIENT)
Dept: CT IMAGING | Age: 55
End: 2020-08-24
Attending: EMERGENCY MEDICINE
Payer: COMMERCIAL

## 2020-08-24 DIAGNOSIS — G44.52 NEW DAILY PERSISTENT HEADACHE: ICD-10-CM

## 2020-08-24 DIAGNOSIS — G81.90 HEMIPARESIS, UNSPECIFIED HEMIPARESIS ETIOLOGY, UNSPECIFIED LATERALITY (HCC): Primary | ICD-10-CM

## 2020-08-24 DIAGNOSIS — R29.898 LEFT LEG WEAKNESS: ICD-10-CM

## 2020-08-24 DIAGNOSIS — G43.919 COMPLICATED MIGRAINE, INTRACTABLE: ICD-10-CM

## 2020-08-24 DIAGNOSIS — I65.23 STENOSIS OF BOTH CAROTID ARTERIES WITHOUT INFARCTION: ICD-10-CM

## 2020-08-24 DIAGNOSIS — R42 DIZZINESS AND GIDDINESS: ICD-10-CM

## 2020-08-24 LAB
ALBUMIN SERPL-MCNC: 3.2 G/DL (ref 3.5–5)
ALBUMIN/GLOB SERPL: 0.6 {RATIO} (ref 1.1–2.2)
ALP SERPL-CCNC: 92 U/L (ref 45–117)
ALT SERPL-CCNC: 22 U/L (ref 12–78)
ANION GAP SERPL CALC-SCNC: 8 MMOL/L (ref 5–15)
APPEARANCE UR: CLEAR
AST SERPL-CCNC: 23 U/L (ref 15–37)
BACTERIA URNS QL MICRO: ABNORMAL /HPF
BASOPHILS # BLD: 0 K/UL (ref 0–0.1)
BASOPHILS NFR BLD: 0 % (ref 0–1)
BILIRUB SERPL-MCNC: 0.4 MG/DL (ref 0.2–1)
BILIRUB UR QL: NEGATIVE
BUN SERPL-MCNC: 13 MG/DL (ref 6–20)
BUN/CREAT SERPL: 13 (ref 12–20)
CALCIUM SERPL-MCNC: 9.7 MG/DL (ref 8.5–10.1)
CHLORIDE SERPL-SCNC: 102 MMOL/L (ref 97–108)
CO2 SERPL-SCNC: 24 MMOL/L (ref 21–32)
COLOR UR: ABNORMAL
COMMENT, HOLDF: NORMAL
CREAT SERPL-MCNC: 1.03 MG/DL (ref 0.55–1.02)
DIFFERENTIAL METHOD BLD: ABNORMAL
EOSINOPHIL # BLD: 0.3 K/UL (ref 0–0.4)
EOSINOPHIL NFR BLD: 2 % (ref 0–7)
EPITH CASTS URNS QL MICRO: ABNORMAL /LPF
ERYTHROCYTE [DISTWIDTH] IN BLOOD BY AUTOMATED COUNT: 13.6 % (ref 11.5–14.5)
GLOBULIN SER CALC-MCNC: 5.4 G/DL (ref 2–4)
GLUCOSE BLD STRIP.AUTO-MCNC: 100 MG/DL (ref 65–100)
GLUCOSE SERPL-MCNC: 96 MG/DL (ref 65–100)
GLUCOSE UR STRIP.AUTO-MCNC: NEGATIVE MG/DL
HCT VFR BLD AUTO: 40.1 % (ref 35–47)
HGB BLD-MCNC: 12.9 G/DL (ref 11.5–16)
HGB UR QL STRIP: ABNORMAL
HYALINE CASTS URNS QL MICRO: ABNORMAL /LPF (ref 0–5)
IMM GRANULOCYTES # BLD AUTO: 0 K/UL (ref 0–0.04)
IMM GRANULOCYTES NFR BLD AUTO: 0 % (ref 0–0.5)
INR PPP: 1 (ref 0.9–1.1)
KETONES UR QL STRIP.AUTO: NEGATIVE MG/DL
LEUKOCYTE ESTERASE UR QL STRIP.AUTO: NEGATIVE
LYMPHOCYTES # BLD: 3.9 K/UL (ref 0.8–3.5)
LYMPHOCYTES NFR BLD: 33 % (ref 12–49)
MCH RBC QN AUTO: 27 PG (ref 26–34)
MCHC RBC AUTO-ENTMCNC: 32.2 G/DL (ref 30–36.5)
MCV RBC AUTO: 83.9 FL (ref 80–99)
MONOCYTES # BLD: 0.9 K/UL (ref 0–1)
MONOCYTES NFR BLD: 8 % (ref 5–13)
NEUTS SEG # BLD: 6.5 K/UL (ref 1.8–8)
NEUTS SEG NFR BLD: 57 % (ref 32–75)
NITRITE UR QL STRIP.AUTO: NEGATIVE
NRBC # BLD: 0 K/UL (ref 0–0.01)
NRBC BLD-RTO: 0 PER 100 WBC
PH UR STRIP: 5 [PH] (ref 5–8)
PLATELET # BLD AUTO: 336 K/UL (ref 150–400)
PMV BLD AUTO: 12.3 FL (ref 8.9–12.9)
POTASSIUM SERPL-SCNC: 4.1 MMOL/L (ref 3.5–5.1)
PROT SERPL-MCNC: 8.6 G/DL (ref 6.4–8.2)
PROT UR STRIP-MCNC: NEGATIVE MG/DL
PROTHROMBIN TIME: 10.3 SEC (ref 9–11.1)
RBC # BLD AUTO: 4.78 M/UL (ref 3.8–5.2)
RBC #/AREA URNS HPF: ABNORMAL /HPF (ref 0–5)
SAMPLES BEING HELD,HOLD: NORMAL
SERVICE CMNT-IMP: NORMAL
SODIUM SERPL-SCNC: 134 MMOL/L (ref 136–145)
SP GR UR REFRACTOMETRY: 1.01 (ref 1–1.03)
TROPONIN I SERPL-MCNC: <0.05 NG/ML
UA: UC IF INDICATED,UAUC: ABNORMAL
UROBILINOGEN UR QL STRIP.AUTO: 0.2 EU/DL (ref 0.2–1)
WBC # BLD AUTO: 11.6 K/UL (ref 3.6–11)
WBC URNS QL MICRO: ABNORMAL /HPF (ref 0–4)

## 2020-08-24 PROCEDURE — 99218 HC RM OBSERVATION: CPT

## 2020-08-24 PROCEDURE — 70450 CT HEAD/BRAIN W/O DYE: CPT

## 2020-08-24 PROCEDURE — 80053 COMPREHEN METABOLIC PANEL: CPT

## 2020-08-24 PROCEDURE — 85025 COMPLETE CBC W/AUTO DIFF WBC: CPT

## 2020-08-24 PROCEDURE — 99285 EMERGENCY DEPT VISIT HI MDM: CPT

## 2020-08-24 PROCEDURE — 82962 GLUCOSE BLOOD TEST: CPT

## 2020-08-24 PROCEDURE — 71045 X-RAY EXAM CHEST 1 VIEW: CPT

## 2020-08-24 PROCEDURE — 85610 PROTHROMBIN TIME: CPT

## 2020-08-24 PROCEDURE — 93005 ELECTROCARDIOGRAM TRACING: CPT

## 2020-08-24 PROCEDURE — 81001 URINALYSIS AUTO W/SCOPE: CPT

## 2020-08-24 PROCEDURE — 84484 ASSAY OF TROPONIN QUANT: CPT

## 2020-08-24 RX ORDER — ONDANSETRON 2 MG/ML
4 INJECTION INTRAMUSCULAR; INTRAVENOUS
Status: DISCONTINUED | OUTPATIENT
Start: 2020-08-24 | End: 2020-08-26 | Stop reason: HOSPADM

## 2020-08-24 NOTE — LETTER
NOTIFICATION OF RETURN TO WORK / SCHOOL 
 
8/26/2020 Ms. Olivia Valero [place] [phone] To Whom It May Concern: 
 
Olivia Valero was under the care of [facility] - from 08/24 to 08/26. She will return to work on 09/01 with no restrictions. If there are questions or concerns please have the patient contact our office.  
 
 
 
Sincerely, 
 
 
Elisha Cervantes MD

## 2020-08-24 NOTE — ED TRIAGE NOTES
Left sided numbness and weakness early Sunday at 0330 that resolved and then stated Left eye feels heavy today at 1330 and then left side felt weird today per patient report.

## 2020-08-25 ENCOUNTER — APPOINTMENT (OUTPATIENT)
Dept: NON INVASIVE DIAGNOSTICS | Age: 55
End: 2020-08-25
Attending: GENERAL ACUTE CARE HOSPITAL
Payer: COMMERCIAL

## 2020-08-25 ENCOUNTER — APPOINTMENT (OUTPATIENT)
Dept: VASCULAR SURGERY | Age: 55
End: 2020-08-25
Attending: GENERAL ACUTE CARE HOSPITAL
Payer: COMMERCIAL

## 2020-08-25 ENCOUNTER — APPOINTMENT (OUTPATIENT)
Dept: MRI IMAGING | Age: 55
End: 2020-08-25
Attending: INTERNAL MEDICINE
Payer: COMMERCIAL

## 2020-08-25 LAB
ANION GAP SERPL CALC-SCNC: 6 MMOL/L (ref 5–15)
ATRIAL RATE: 83 BPM
BUN SERPL-MCNC: 9 MG/DL (ref 6–20)
BUN/CREAT SERPL: 9 (ref 12–20)
CALCIUM SERPL-MCNC: 9.5 MG/DL (ref 8.5–10.1)
CALCULATED P AXIS, ECG09: 36 DEGREES
CALCULATED R AXIS, ECG10: 0 DEGREES
CALCULATED T AXIS, ECG11: 46 DEGREES
CHLORIDE SERPL-SCNC: 103 MMOL/L (ref 97–108)
CHOLEST SERPL-MCNC: 219 MG/DL
CO2 SERPL-SCNC: 28 MMOL/L (ref 21–32)
CREAT SERPL-MCNC: 0.99 MG/DL (ref 0.55–1.02)
DIAGNOSIS, 93000: NORMAL
EST. AVERAGE GLUCOSE BLD GHB EST-MCNC: 103 MG/DL
GLUCOSE SERPL-MCNC: 102 MG/DL (ref 65–100)
HBA1C MFR BLD: 5.2 % (ref 4–5.6)
HDLC SERPL-MCNC: 100 MG/DL
HDLC SERPL: 2.2 {RATIO} (ref 0–5)
LDLC SERPL CALC-MCNC: 86.6 MG/DL (ref 0–100)
LEFT CCA DIST DIAS: 19.5 CM/S
LEFT CCA DIST SYS: 74.3 CM/S
LEFT CCA PROX DIAS: 28.3 CM/S
LEFT CCA PROX SYS: 85.3 CM/S
LEFT ECA DIAS: 17.3 CM/S
LEFT ECA SYS: 129.2 CM/S
LEFT ICA DIST DIAS: 50.2 CM/S
LEFT ICA DIST SYS: 113.8 CM/S
LEFT ICA MID DIAS: 39.3 CM/S
LEFT ICA MID SYS: 89.7 CM/S
LEFT ICA PROX DIAS: 19.5 CM/S
LEFT ICA PROX SYS: 76.5 CM/S
LEFT ICA/CCA SYS: 1.5
LEFT SUBCLAVIAN DIAS: 0 CM/S
LEFT SUBCLAVIAN SYS: 133.6 CM/S
LEFT VERTEBRAL DIAS: 21.7 CM/S
LEFT VERTEBRAL SYS: 70 CM/S
LIPID PROFILE,FLP: ABNORMAL
MAGNESIUM SERPL-MCNC: 1.9 MG/DL (ref 1.6–2.4)
P-R INTERVAL, ECG05: 112 MS
PHOSPHATE SERPL-MCNC: 3.7 MG/DL (ref 2.6–4.7)
POTASSIUM SERPL-SCNC: 3.6 MMOL/L (ref 3.5–5.1)
Q-T INTERVAL, ECG07: 376 MS
QRS DURATION, ECG06: 78 MS
QTC CALCULATION (BEZET), ECG08: 441 MS
RIGHT CCA DIST DIAS: 19.9 CM/S
RIGHT CCA DIST SYS: 83.8 CM/S
RIGHT CCA PROX DIAS: 19.9 CM/S
RIGHT CCA PROX SYS: 82 CM/S
RIGHT ECA DIAS: 10.8 CM/S
RIGHT ECA SYS: 96.3 CM/S
RIGHT ICA DIST DIAS: 36.3 CM/S
RIGHT ICA DIST SYS: 111.2 CM/S
RIGHT ICA MID DIAS: 36.3 CM/S
RIGHT ICA MID SYS: 102.1 CM/S
RIGHT ICA PROX DIAS: 10.8 CM/S
RIGHT ICA PROX SYS: 80.2 CM/S
RIGHT ICA/CCA SYS: 1.3
RIGHT SUBCLAVIAN DIAS: 0 CM/S
RIGHT SUBCLAVIAN SYS: 175.9 CM/S
RIGHT VERTEBRAL DIAS: 10.8 CM/S
RIGHT VERTEBRAL SYS: 45.9 CM/S
SODIUM SERPL-SCNC: 137 MMOL/L (ref 136–145)
T3FREE SERPL-MCNC: 3.2 PG/ML (ref 2.2–4)
T4 FREE SERPL-MCNC: 1.2 NG/DL (ref 0.8–1.5)
TRIGL SERPL-MCNC: 162 MG/DL (ref ?–150)
TSH SERPL DL<=0.05 MIU/L-ACNC: 5.54 UIU/ML (ref 0.36–3.74)
VENTRICULAR RATE, ECG03: 83 BPM
VLDLC SERPL CALC-MCNC: 32.4 MG/DL

## 2020-08-25 PROCEDURE — 99218 HC RM OBSERVATION: CPT

## 2020-08-25 PROCEDURE — 97161 PT EVAL LOW COMPLEX 20 MIN: CPT | Performed by: PHYSICAL THERAPIST

## 2020-08-25 PROCEDURE — 97530 THERAPEUTIC ACTIVITIES: CPT | Performed by: OCCUPATIONAL THERAPIST

## 2020-08-25 PROCEDURE — 92610 EVALUATE SWALLOWING FUNCTION: CPT

## 2020-08-25 PROCEDURE — 74011250636 HC RX REV CODE- 250/636: Performed by: INTERNAL MEDICINE

## 2020-08-25 PROCEDURE — 84443 ASSAY THYROID STIM HORMONE: CPT

## 2020-08-25 PROCEDURE — 36415 COLL VENOUS BLD VENIPUNCTURE: CPT

## 2020-08-25 PROCEDURE — 74011250636 HC RX REV CODE- 250/636: Performed by: GENERAL ACUTE CARE HOSPITAL

## 2020-08-25 PROCEDURE — 84100 ASSAY OF PHOSPHORUS: CPT

## 2020-08-25 PROCEDURE — 84481 FREE ASSAY (FT-3): CPT

## 2020-08-25 PROCEDURE — 74011250637 HC RX REV CODE- 250/637: Performed by: INTERNAL MEDICINE

## 2020-08-25 PROCEDURE — 97530 THERAPEUTIC ACTIVITIES: CPT | Performed by: PHYSICAL THERAPIST

## 2020-08-25 PROCEDURE — 97535 SELF CARE MNGMENT TRAINING: CPT | Performed by: OCCUPATIONAL THERAPIST

## 2020-08-25 PROCEDURE — 83036 HEMOGLOBIN GLYCOSYLATED A1C: CPT

## 2020-08-25 PROCEDURE — 70553 MRI BRAIN STEM W/O & W/DYE: CPT

## 2020-08-25 PROCEDURE — A9575 INJ GADOTERATE MEGLUMI 0.1ML: HCPCS | Performed by: INTERNAL MEDICINE

## 2020-08-25 PROCEDURE — 80048 BASIC METABOLIC PNL TOTAL CA: CPT

## 2020-08-25 PROCEDURE — 93880 EXTRACRANIAL BILAT STUDY: CPT

## 2020-08-25 PROCEDURE — 84439 ASSAY OF FREE THYROXINE: CPT

## 2020-08-25 PROCEDURE — 97116 GAIT TRAINING THERAPY: CPT | Performed by: PHYSICAL THERAPIST

## 2020-08-25 PROCEDURE — 80061 LIPID PANEL: CPT

## 2020-08-25 PROCEDURE — 99215 OFFICE O/P EST HI 40 MIN: CPT | Performed by: PSYCHIATRY & NEUROLOGY

## 2020-08-25 PROCEDURE — 93880 EXTRACRANIAL BILAT STUDY: CPT | Performed by: PSYCHIATRY & NEUROLOGY

## 2020-08-25 PROCEDURE — 83735 ASSAY OF MAGNESIUM: CPT

## 2020-08-25 PROCEDURE — 97165 OT EVAL LOW COMPLEX 30 MIN: CPT | Performed by: OCCUPATIONAL THERAPIST

## 2020-08-25 PROCEDURE — 74011250637 HC RX REV CODE- 250/637: Performed by: GENERAL ACUTE CARE HOSPITAL

## 2020-08-25 PROCEDURE — 96374 THER/PROPH/DIAG INJ IV PUSH: CPT

## 2020-08-25 RX ORDER — TIZANIDINE 4 MG/1
2 TABLET ORAL
Status: DISCONTINUED | OUTPATIENT
Start: 2020-08-25 | End: 2020-08-26 | Stop reason: HOSPADM

## 2020-08-25 RX ORDER — BUTALBITAL, ACETAMINOPHEN AND CAFFEINE 50; 325; 40 MG/1; MG/1; MG/1
2 TABLET ORAL
Status: DISCONTINUED | OUTPATIENT
Start: 2020-08-25 | End: 2020-08-26 | Stop reason: HOSPADM

## 2020-08-25 RX ORDER — LABETALOL HYDROCHLORIDE 5 MG/ML
5 INJECTION, SOLUTION INTRAVENOUS
Status: DISCONTINUED | OUTPATIENT
Start: 2020-08-25 | End: 2020-08-26 | Stop reason: HOSPADM

## 2020-08-25 RX ORDER — DOCUSATE SODIUM 100 MG/1
100 CAPSULE, LIQUID FILLED ORAL 2 TIMES DAILY
Status: DISCONTINUED | OUTPATIENT
Start: 2020-08-25 | End: 2020-08-26 | Stop reason: HOSPADM

## 2020-08-25 RX ORDER — KETOROLAC TROMETHAMINE 30 MG/ML
30 INJECTION, SOLUTION INTRAMUSCULAR; INTRAVENOUS
Status: COMPLETED | OUTPATIENT
Start: 2020-08-25 | End: 2020-08-25

## 2020-08-25 RX ORDER — GABAPENTIN 300 MG/1
300 CAPSULE ORAL 3 TIMES DAILY
Status: DISCONTINUED | OUTPATIENT
Start: 2020-08-25 | End: 2020-08-25

## 2020-08-25 RX ORDER — KETOROLAC TROMETHAMINE 30 MG/ML
15 INJECTION, SOLUTION INTRAMUSCULAR; INTRAVENOUS
Status: COMPLETED | OUTPATIENT
Start: 2020-08-25 | End: 2020-08-25

## 2020-08-25 RX ORDER — ACETAMINOPHEN 650 MG/1
650 SUPPOSITORY RECTAL
Status: DISCONTINUED | OUTPATIENT
Start: 2020-08-25 | End: 2020-08-26 | Stop reason: HOSPADM

## 2020-08-25 RX ORDER — HYDROXYCHLOROQUINE SULFATE 200 MG/1
400 TABLET, FILM COATED ORAL
Status: DISCONTINUED | OUTPATIENT
Start: 2020-08-25 | End: 2020-08-26 | Stop reason: HOSPADM

## 2020-08-25 RX ORDER — GUAIFENESIN 100 MG/5ML
81 LIQUID (ML) ORAL DAILY
Status: DISCONTINUED | OUTPATIENT
Start: 2020-08-25 | End: 2020-08-26

## 2020-08-25 RX ORDER — ATORVASTATIN CALCIUM 40 MG/1
40 TABLET, FILM COATED ORAL
Status: DISCONTINUED | OUTPATIENT
Start: 2020-08-25 | End: 2020-08-26 | Stop reason: HOSPADM

## 2020-08-25 RX ORDER — ACETAMINOPHEN 325 MG/1
650 TABLET ORAL
Status: DISCONTINUED | OUTPATIENT
Start: 2020-08-25 | End: 2020-08-26 | Stop reason: HOSPADM

## 2020-08-25 RX ORDER — GUAIFENESIN 100 MG/5ML
81 LIQUID (ML) ORAL DAILY
Status: DISCONTINUED | OUTPATIENT
Start: 2020-08-25 | End: 2020-08-26 | Stop reason: HOSPADM

## 2020-08-25 RX ORDER — GADOTERATE MEGLUMINE 376.9 MG/ML
20 INJECTION INTRAVENOUS
Status: COMPLETED | OUTPATIENT
Start: 2020-08-25 | End: 2020-08-25

## 2020-08-25 RX ORDER — DIAZEPAM 5 MG/1
5 TABLET ORAL
Status: DISCONTINUED | OUTPATIENT
Start: 2020-08-25 | End: 2020-08-26 | Stop reason: HOSPADM

## 2020-08-25 RX ADMIN — ATORVASTATIN CALCIUM 40 MG: 40 TABLET, FILM COATED ORAL at 22:01

## 2020-08-25 RX ADMIN — KETOROLAC TROMETHAMINE 30 MG: 30 INJECTION, SOLUTION INTRAMUSCULAR at 03:59

## 2020-08-25 RX ADMIN — ASPIRIN 81 MG CHEWABLE TABLET 81 MG: 81 TABLET CHEWABLE at 08:36

## 2020-08-25 RX ADMIN — BUTALBITAL, ACETAMINOPHEN AND CAFFEINE 2 TABLET: 50; 325; 40 TABLET ORAL at 08:36

## 2020-08-25 RX ADMIN — GABAPENTIN 400 MG: 100 CAPSULE ORAL at 17:35

## 2020-08-25 RX ADMIN — GABAPENTIN 400 MG: 100 CAPSULE ORAL at 22:01

## 2020-08-25 RX ADMIN — APIXABAN 2.5 MG: 2.5 TABLET, FILM COATED ORAL at 17:35

## 2020-08-25 RX ADMIN — HYDROXYCHLOROQUINE SULFATE 400 MG: 200 TABLET ORAL at 17:35

## 2020-08-25 RX ADMIN — ATORVASTATIN CALCIUM 40 MG: 40 TABLET, FILM COATED ORAL at 03:58

## 2020-08-25 RX ADMIN — BUTALBITAL, ACETAMINOPHEN AND CAFFEINE 2 TABLET: 50; 325; 40 TABLET ORAL at 15:13

## 2020-08-25 RX ADMIN — APIXABAN 2.5 MG: 2.5 TABLET, FILM COATED ORAL at 08:36

## 2020-08-25 RX ADMIN — GADOTERATE MEGLUMINE 20 ML: 376.9 INJECTION INTRAVENOUS at 14:19

## 2020-08-25 RX ADMIN — GABAPENTIN 300 MG: 300 CAPSULE ORAL at 08:35

## 2020-08-25 RX ADMIN — KETOROLAC TROMETHAMINE 15 MG: 30 INJECTION, SOLUTION INTRAMUSCULAR at 11:21

## 2020-08-25 NOTE — PROGRESS NOTES
Problem: Self Care Deficits Care Plan (Adult)  Goal: *Acute Goals and Plan of Care (Insert Text)  Description: FUNCTIONAL STATUS PRIOR TO ADMISSION: Patient was independent and active without use of DME. Works as a  and drives to and from work independently    [address] [place] [address]: The patient lived with . Pt works from home. Occupational Therapy Goals:  Initiated 8/25/2020  1. Patient will perform grooming standing with item retrieval with modified independence within 7 days. 2. Patient will perform toileting with modified independence within 7 days. 3. Patient will perform upper body dressing and lower body dressing with modified independence within 7 days. 4. Patient will perform one IADL task in standing with modified independence within 7 days. 5. Patient will transfer from toilet with modified independence using the least restrictive device and appropriate durable medical equipment within 7 days. Outcome: Not Met   OCCUPATIONAL THERAPY EVALUATION  Patient: Deon Vogel (76 y.o. female)  Date: 8/25/2020  Primary Diagnosis: Left leg weakness [R29.898]        Precautions: none       ASSESSMENT  Based on the objective data described below, the patient presents with report of left UE/LE and left upper eye lid feeling \"heavy. \"  During formal assessment of strength and ROM of LUE pt had slower rate of movement and 4-/5 strength. Pt was able to perform ADLs but was tending to not use LUE and needed cues to incorporate left UE into tasks. This improved as session progressed. BP was elevated but pt reported HA and she was medicated by nursing for HA during session. RW needed at this time for balance with ADL mobility. Pt is expected to make good progress and educated pt on the importance of using RUE. Current Level of Function Impacting Discharge (ADLs/self-care): SBA overall for mobility and ADLS    Functional Outcome Measure:   The patient scored 60/100 on the barthel outcome measure which is indicative of minimal deficits with mobility and ADLS. Other factors to consider for discharge: none     Patient will benefit from skilled therapy intervention to address the above noted impairments. PLAN :  Recommendations and Planned Interventions: self care training, functional mobility training, therapeutic exercise, balance training, therapeutic activities, neuromuscular re-education, patient education, home safety training, and family training/education    Frequency/Duration: Patient will be followed by occupational therapy 4 times a week to address goals. Recommendation for discharge: (in order for the patient to meet his/her long term goals)  Occupational therapy at least 2 days/week in the home or if this cannot be arranged recommend OP PT    This discharge recommendation:  Has been made in collaboration with the attending provider and/or case management    IF patient discharges home will need the following DME: walker: rolling       SUBJECTIVE:   Patient stated Tomma Moises a atypical migraine cause this.     OBJECTIVE DATA SUMMARY:   HISTORY:   Past Medical History:   Diagnosis Date    Arrhythmia     Afib, sees Dr nelson    Autoimmune disease ([facility]     Crohns julito    CAD (coronary artery disease)     Chronic pain     Lt and Rt back:  Dr Dudley Certain;  Pain mgmt Cherguerrero Vick [facility]    Cough     evaluated 8/25/14 by Dr Lakeisha Garcia ([phone]) \". . possible drug related lung is due to Methitrexate or atypical or fungal infection\" per office note dated 8/25/14    Crohn's disease ([facility]     Dr Mary Washington    Ill-defined condition     migraines    Ill-defined condition     lt lung higher than normal so puts pressure on diaphragm increasing SOB    Lupus ([facility])     Dr Priscilla Ahn [phone]    Pain from implanted hardware 12/14/2016    Exploration of sternal incision with removal of protruding sternal wires    Stenosis of both carotid arteries without cerebral infarction Stroke [facility]) 2016    TIA's     SVC obstruction 2016    resolved after surgery    Thromboembolus ([address] 2016    PE right lower lung, spontaneous pulm dr christal Haddad     Past Surgical History:   Procedure Laterality Date    CARDIAC SURG PROCEDURE UNLIST  , 2018, 10/18    ablation    COLONOSCOPY N/A 10/5/2018    COLONOSCOPY performed by Jose Scott. Francisco Vaca MD at [facility] ENDOSCOPY    [address]    HX  SECTION      x1    HX COLECTOMY  7/24/10    colon resection-18\" removed; Dr Larisa Khanna GI      bowel adhesions removed    HX GI      Bowel resection    HX GI  2019    stent for anal fistula     HX HEART CATHETERIZATION      no stents, open heart surgery 2016    HX HEENT      sinus surgery for deviated septum    HX HYSTERECTOMY      partial    HX LEFT SALPINGO-OOPHORECTOMY      ovary and fallopian tube removed    HX MOHS PROCEDURES Right approx     with bone spur repair in shoulder    HX OTHER SURGICAL  2016    femerol vein removed    HX OTHER SURGICAL  2016    open heart for repair of blocked SVC    HX TONSILLECTOMY      HX VASCULAR ACCESS  6/11/10    portacath insertion and removal x 3; Gets Remicaid q5 weeks    [place] COL[place] W/BIOPSY SINGLE/MULTIPLE  2013            Expanded or extensive additional review of patient history:     Home Situation  Home Environment: Private residence  # Steps to Enter: 4  Rails to Enter: Yes  Hand Rails : Bilateral(wide)  One/Two Story Residence: Two story(bedroom on 2nd floor, 1/2 bath 1st floor)  # of Interior Steps: 15  Interior Rails: Right  Living Alone: No  Support Systems: Spouse/Significant Other/Partner  Patient Expects to be Discharged to[de-identified] Private residence  Current DME Used/Available at Home: None  Tub or Shower Type: Tub/Shower combination    Hand dominance: Right    EXAMINATION OF PERFORMANCE DEFICITS:  Cognitive/Behavioral Status:  Neurologic State: Alert; Appropriate for age  Orientation Level: Oriented X4  Cognition: Follows commands  Perception: Appears intact  Perseveration: No perseveration noted  Safety/Judgement: Fall prevention;Home safety    Hearing: Auditory  Auditory Impairment: None    Vision/Perceptual:                           Acuity: Within Defined Limits    Corrective Lenses: Glasses    Range of Motion:    AROM: Generally decreased, functional(LUE and LE, slow)  PROM: Within functional limits                      Strength:    Strength: Generally decreased, functional(left UE and LE with formal testing)                Coordination:  Coordination: Within functional limits(but increased effort and time with motion of LUE/LE)  Fine Motor Skills-Upper: Left Intact; Right Intact    Gross Motor Skills-Upper: Left Intact; Right Intact(slower motor control on LLE)    Tone & Sensation:    Tone: Normal  Sensation: Intact                      Balance:  Sitting: Intact  Standing: Impaired  Standing - Static: Good  Standing - Dynamic : Fair    Functional Mobility and Transfers for ADLs:  Bed Mobility:  Rolling: Independent  Supine to Sit: Independent  Scooting: Independent    Transfers:  Sit to Stand: Stand-by assistance  Stand to Sit: Stand-by assistance  Bed to Chair: Stand-by assistance(improved to supervision with RW)  Bathroom Mobility: Stand-by assistance(without assist devices and supervision with RW)  Toilet Transfer : Stand-by assistance    ADL Assessment:  Feeding: Modified independent    Oral Facial Hygiene/Grooming: Stand-by assistance    Bathing: Stand-by assistance    Upper Body Dressing: Stand-by assistance    Lower Body Dressing: Stand-by assistance    Toileting: Stand by assistance                ADL Intervention and task modifications:  Performed donning of socks seated bending forward method pt was attempting to only use RUE. Encouragement needed to use LUE and pt was able to pull sock up with left hand with inconsistent pinch and increased time.   Mobilized to bathroom without assist devices and SBA. Able to manage clothing with SBA. Stood at sink to wash hands with SBA. Cognitive Retraining  Safety/Judgement: Fall prevention;Home safety    Therapeutic Exercise:  Educated on general AROM of left UE and to focus on controlled deliberate movement. Pt tends to not use left UE during tasks without being cued    Functional Measure:  Barthel Index:    Bathin  Bladder: 10  Bowels: 10  Groomin  Dressin  Feedin  Mobility: 10  Stairs: 5  Toilet Use: 5  Transfer (Bed to Chair and Back): 10  Total: 60/100        The Barthel ADL Index: Guidelines  1. The index should be used as a record of what a patient does, not as a record of what a patient could do. 2. The main aim is to establish degree of independence from any help, physical or verbal, however minor and for whatever reason. 3. The need for supervision renders the patient not independent. 4. A patient's performance should be established using the best available evidence. Asking the patient, friends/relatives and nurses are the usual sources, but direct observation and common sense are also important. However direct testing is not needed. 5. Usually the patient's performance over the preceding 24-48 hours is important, but occasionally longer periods will be relevant. 6. Middle categories imply that the patient supplies over 50 per cent of the effort. 7. Use of aids to be independent is allowed. Yoselyn Barrow., Barthel, D.W. ([id number]). Functional evaluation: the Barthel Index. [address] [facility] (14)2. Liset Francois, CINDYJChrisMCHRIS, Chente Hallman., Lety Rios., [place], [address] [place] [address] (). Measuring the change indisability after inpatient rehabilitation; comparison of the responsiveness of the Barthel Index and Functional [place] Measure. Journal of Neurology, Neurosurgery, and Psychiatry, 66(4), 355-580.   Nay Martinez, N.J.A, TOBY Mcgarry.SHERIDAN, & Colt Mosley MChrisA. (2004.) Assessment of post-stroke quality of life in cost-effectiveness studies: The usefulness of the Barthel Index and the EuroQoL-5D. Quality of Life Research, 15, 231-18         Occupational Therapy Evaluation Charge Determination   History Examination Decision-Making   LOW Complexity : Brief history review  LOW Complexity : 1-3 performance deficits relating to physical, cognitive , or psychosocial skils that result in activity limitations and / or participation restrictions  LOW Complexity : No comorbidities that affect functional and no verbal or physical assistance needed to complete eval tasks       Based on the above components, the patient evaluation is determined to be of the following complexity level: LOW   Pain Rating:  Headache but was medicated by nursing during session    Activity Tolerance:   Fair and requires rest breaks  Please refer to the flowsheet for vital signs taken during this treatment. After treatment patient left in no apparent distress:    Sitting in chair, Call bell within reach, and Caregiver / family present    COMMUNICATION/EDUCATION:   The patients plan of care was discussed with: Physical therapist, Registered nurse, and patient and her  . Home safety education was provided and the patient/caregiver indicated understanding., Patient/family have participated as able in goal setting and plan of care. , and Patient/family agree to work toward stated goals and plan of care. This patients plan of care is appropriate for delegation to [facility].     Thank you for this referral.  Myla Underwood, OTR/L  Time Calculation: 30 mins

## 2020-08-25 NOTE — ED PROVIDER NOTES
EMERGENCY DEPARTMENT HISTORY AND PHYSICAL EXAM      Date: 8/24/2020  Patient Name: Deon Vogel    History of Presenting Illness     Chief Complaint   Patient presents with    Facial Droop     left eye feeling heavy since 1330 left side feels weird       History Provided By: Patient    HPI: Deon Vogel, 54 y.o. female with a past medical history significant for lupus, multiple venous thromboemboli, history of TIA presents to the ED with cc of new onset left-sided eye drooping, and left-sided weakness over the last 48 hours. Patient reports symptoms have been in the middle of the night between Sunday and Saturday. She reported associated headache as well and some tingling throughout the left side. It feels similar to her prior TIAs. Her weakness have improved but is not resolved. She denies any other associated symptoms. No other exacerbating ameliorating factors. There are no other complaints, changes, or physical findings at this time. PCP: None    No current facility-administered medications on file prior to encounter. Current Outpatient Medications on File Prior to Encounter   Medication Sig Dispense Refill    apixaban (ELIQUIS) 2.5 mg tablet Take 1 Tab by mouth two (2) times a day. Resume on Monday 10/21/19  Indications: PE      ustekinumab (USTEKINAUMAB) 90 mg/mL injection 90 mg by SubCUTAneous route every two (2) months.  tiZANidine (ZANAFLEX) 2 mg tablet 1 po qam and 2 po qhs 100 Tab 0    gabapentin (NEURONTIN) 300 mg capsule TAKE ONE CAPSULE BY MOUTH THREE TIMES A  Cap 5    aspirin 81 mg chewable tablet Take 81 mg by mouth daily.  butalbital-acetaminophen-caffeine (FIORICET, ESGIC) -40 mg per tablet Take 2 Tabs by mouth every eight (8) hours as needed for Pain or Headache. Max Daily Amount: 6 Tabs. MUST LAST 30 DAYS.   Indications: MIGRAINE, TENSION-TYPE HEADACHE 50 Tab 2    promethazine (PHENERGAN) 25 mg tablet Take 25 mg by mouth every six (6) hours as needed for Nausea.  diazepam (VALIUM) 5 mg tablet Take 1 Tab by mouth every twelve (12) hours as needed for Anxiety. Max Daily Amount: 10 mg. Refill at 1 month intervals 30 Tab 2    hydroxychloroquine (PLAQUENIL) 200 mg tablet Take 400 mg by mouth daily (after dinner). For lupus      ondansetron hcl (ZOFRAN) 4 mg tablet Take 4 mg by mouth every eight (8) hours as needed for Nausea.  drospirenone-ethinyl estradiol (MARIELOS 28) 3-20 mg-mcg per tablet Take 1 Tab by mouth nightly. Past History     Past Medical History:  Past Medical History:   Diagnosis Date    Arrhythmia     Afib, sees Dr Kalin Nails Autoimmune disease ([facility] [address]     Crohns julito    CAD (coronary artery disease)     Chronic pain     Lt and Rt back:  Dr Carmen Whitmore;  Pain mgmt Anell Hammed [facility]    Cough     evaluated 14 by Dr Danny Duran ([phone]) \". . possible drug related lung is due to Methitrexate or atypical or fungal infection\" per office note dated 14    Crohn's disease [facility])     Dr Yvette Gallagher Ill-defined condition     migraines    Ill-defined condition     lt lung higher than normal so puts pressure on diaphragm increasing SOB    Lupus ([facility] [address]     Dr David Ravi [phone]    Pain from implanted hardware 2016    Exploration of sternal incision with removal of protruding sternal wires    Stenosis of both carotid arteries without cerebral infarction     Stroke [facility]) 2016    TIA's     SVC obstruction     resolved after surgery    Thromboembolus ([facility] [address] 2016    PE right lower lung, spontaneous pulm dr christal Cosme Corpus       Past Surgical History:  Past Surgical History:   Procedure Laterality Date    CARDIAC SURG PROCEDURE UNLIST  , 2018, 10/18    ablation    COLONOSCOPY N/A 10/5/2018    COLONOSCOPY performed by Bianka Alberts.  Niya Richards MD at [address] HX  SECTION      x1    HX COLECTOMY  7/24/10    colon resection-18\" removed; Dr Sharol Soulier   bowel adhesions removed    HX GI  1999    Bowel resection    HX GI  2019    stent for anal fistula     HX HEART CATHETERIZATION      no stents, open heart surgery 2016    HX HEENT      sinus surgery for deviated septum    HX HYSTERECTOMY      partial    HX LEFT SALPINGO-OOPHORECTOMY  2005    ovary and fallopian tube removed    HX MOHS PROCEDURES Right approx 2013    with bone spur repair in shoulder    HX OTHER SURGICAL  2016    femerol vein removed    HX OTHER SURGICAL  07/05/2016    open heart for repair of blocked SVC    HX TONSILLECTOMY      HX VASCULAR ACCESS  6/11/10    portacath insertion and removal x 3; Gets Remicaid q5 weeks    AR COLONOSCOPY W/BIOPSY SINGLE/MULTIPLE  8/19/2013            Family History:  Family History   Problem Relation Age of Onset    Cancer Father         stomach    Other Mother         Auto accident    Cancer Paternal Grandmother         breast    Breast Cancer Paternal Grandmother         not sure of age   [address] [facility] Breast Cancer Maternal Aunt         not sure of age       Social History:  Social History     Tobacco Use    Smoking status: Former Smoker     Packs/day: 0.50     Years: 2.50     Pack years: 1.25     Last attempt to quit: 3/29/2005     Years since quitting: 15.4    Smokeless tobacco: Never Used    Tobacco comment: social   Substance Use Topics    Alcohol use: Yes     Comment: Rare wine    Drug use: No       Allergies:   Allergies   Allergen Reactions    Chlorhexidine Towelette Rash    Codeine Hives and Nausea and Vomiting     Severe nausea & vomiting    Hydrocodone Hives and Nausea and Vomiting     Severe nausea & vomiting    Oxycontin [Oxycodone] Hives and Nausea and Vomiting     Severe nausea & vomiting, also has the same reaction from Percocet    Percocet [Oxycodone-Acetaminophen] Hives and Nausea and Vomiting     Can take tylenol    Dilaudid [Hydromorphone (Bulk)] Swelling    Prednisone Other (comments)     HX OF CROHN'S; not allergic, prefer not to use          Review of Systems   Review of Systems   Constitutional: Negative for chills, diaphoresis, fatigue and fever. HENT: Negative for ear pain and sore throat. Eyes: Negative for pain and redness. Respiratory: Negative for cough and shortness of breath. Cardiovascular: Negative for chest pain and leg swelling. Gastrointestinal: Negative for abdominal pain, diarrhea, nausea and vomiting. Endocrine: Negative for cold intolerance and heat intolerance. Genitourinary: Negative for flank pain and hematuria. Musculoskeletal: Negative for back pain and neck stiffness. Skin: Negative for rash and wound. Neurological: Positive for weakness. Negative for dizziness, syncope and headaches. All other systems reviewed and are negative. Physical Exam   Physical Exam  Vitals signs and nursing note reviewed. Constitutional:       Appearance: She is well-developed. HENT:      Head: Normocephalic and atraumatic. Mouth/Throat:      Pharynx: No oropharyngeal exudate. Eyes:      Conjunctiva/sclera: Conjunctivae normal.      Pupils: Pupils are equal, round, and reactive to light. Neck:      Musculoskeletal: Normal range of motion. Cardiovascular:      Rate and Rhythm: Normal rate and regular rhythm. Heart sounds: No murmur. Pulmonary:      Effort: Pulmonary effort is normal. No respiratory distress. Breath sounds: Normal breath sounds. No wheezing. Abdominal:      General: Bowel sounds are normal. There is no distension. Palpations: Abdomen is soft. Tenderness: There is no abdominal tenderness. Musculoskeletal: Normal range of motion. General: No deformity. Skin:     General: Skin is warm and dry. Findings: No rash. Neurological:      Mental Status: She is alert and oriented to person, place, and time. Coordination: Coordination normal.      Comments: Patient has slight drooping of the upper branch of the facial nerve on the left.   She has no pronator drift in left upper extremity does have mild left lower extremity weakness. She has decreased sensation along the left face, left arm, and left lower leg. Her gait is symmetric and there is no ataxia. She has no visual field deficits. Psychiatric:         Behavior: Behavior normal.         Diagnostic Study Results     Labs -     Recent Results (from the past 24 hour(s))   GLUCOSE, POC    Collection Time: 08/24/20  5:16 PM   Result Value Ref Range    Glucose (POC) 100 65 - 100 mg/dL    Performed by Ike Ibarra PCT    SAMPLES BEING HELD    Collection Time: 08/24/20  5:20 PM   Result Value Ref Range    SAMPLES BEING HELD  PST, LAV, NORM     COMMENT        Add-on orders for these samples will be processed based on acceptable specimen integrity and analyte stability, which may vary by analyte. CBC WITH AUTOMATED DIFF    Collection Time: 08/24/20  5:20 PM   Result Value Ref Range    WBC 11.6 (H) 3.6 - 11.0 K/uL    RBC 4.78 3.80 - 5.20 M/uL    HGB 12.9 11.5 - 16.0 g/dL    HCT 40.1 35.0 - 47.0 %    MCV 83.9 80.0 - 99.0 FL    MCH 27.0 26.0 - 34.0 PG    MCHC 32.2 30.0 - 36.5 g/dL    RDW 13.6 11.5 - 14.5 %    PLATELET 342 840 - 707 K/uL    MPV 12.3 8.9 - 12.9 FL    NRBC 0.0 0  WBC    ABSOLUTE NRBC 0.00 0.00 - 0.01 K/uL    NEUTROPHILS 57 32 - 75 %    LYMPHOCYTES 33 12 - 49 %    MONOCYTES 8 5 - 13 %    EOSINOPHILS 2 0 - 7 %    BASOPHILS 0 0 - 1 %    IMMATURE GRANULOCYTES 0 0.0 - 0.5 %    ABS. NEUTROPHILS 6.5 1.8 - 8.0 K/UL    ABS. LYMPHOCYTES 3.9 (H) 0.8 - 3.5 K/UL    ABS. MONOCYTES 0.9 0.0 - 1.0 K/UL    ABS. EOSINOPHILS 0.3 0.0 - 0.4 K/UL    ABS. BASOPHILS 0.0 0.0 - 0.1 K/UL    ABS. IMM.  GRANS. 0.0 0.00 - 0.04 K/UL    DF AUTOMATED     PROTHROMBIN TIME + INR    Collection Time: 08/24/20  5:20 PM   Result Value Ref Range    INR 1.0 0.9 - 1.1      Prothrombin time 10.3 9.0 - 11.1 sec   TROPONIN I    Collection Time: 08/24/20  5:20 PM   Result Value Ref Range    Troponin-I, Qt. <0.05 <1.52 ng/mL   METABOLIC PANEL, COMPREHENSIVE    Collection Time: 08/24/20  5:20 PM   Result Value Ref Range    Sodium 134 (L) 136 - 145 mmol/L    Potassium 4.1 3.5 - 5.1 mmol/L    Chloride 102 97 - 108 mmol/L    CO2 24 21 - 32 mmol/L    Anion gap 8 5 - 15 mmol/L    Glucose 96 65 - 100 mg/dL    BUN 13 6 - 20 MG/DL    Creatinine 1.03 (H) 0.55 - 1.02 MG/DL    BUN/Creatinine ratio 13 12 - 20      GFR est AA >60 >60 ml/min/1.73m2    GFR est non-AA 56 (L) >60 ml/min/1.73m2    Calcium 9.7 8.5 - 10.1 MG/DL    Bilirubin, total 0.4 0.2 - 1.0 MG/DL    ALT (SGPT) 22 12 - 78 U/L    AST (SGOT) 23 15 - 37 U/L    Alk. phosphatase 92 45 - 117 U/L    Protein, total 8.6 (H) 6.4 - 8.2 g/dL    Albumin 3.2 (L) 3.5 - 5.0 g/dL    Globulin 5.4 (H) 2.0 - 4.0 g/dL    A-G Ratio 0.6 (L) 1.1 - 2.2     URINALYSIS W/ REFLEX CULTURE    Collection Time: 08/24/20  8:20 PM    Specimen: Urine   Result Value Ref Range    Color YELLOW/STRAW      Appearance CLEAR CLEAR      Specific gravity 1.013 1.003 - 1.030      pH (UA) 5.0 5.0 - 8.0      Protein Negative NEG mg/dL    Glucose Negative NEG mg/dL    Ketone Negative NEG mg/dL    Bilirubin Negative NEG      Blood TRACE (A) NEG      Urobilinogen 0.2 0.2 - 1.0 EU/dL    Nitrites Negative NEG      Leukocyte Esterase Negative NEG      WBC 0-4 0 - 4 /hpf    RBC 0-5 0 - 5 /hpf    Epithelial cells FEW FEW /lpf    Bacteria 1+ (A) NEG /hpf    UA:UC IF INDICATED CULTURE NOT INDICATED BY UA RESULT CNI      Hyaline cast 0-2 0 - 5 /lpf       Radiologic Studies -   CT HEAD WO CONT    (Results Pending)   XR CHEST PORT    (Results Pending)     CT Results  (Last 48 hours)    None        CXR Results  (Last 48 hours)    None          Medical Decision Making   I am the first provider for this patient. I reviewed the vital signs, available nursing notes, past medical history, past surgical history, family history and social history. Vital Signs-Reviewed the patient's vital signs.   Patient Vitals for the past 12 hrs:   Temp Pulse Resp BP SpO2 08/24/20 2154  85 16  99 %   08/24/20 2145  91 22 133/65    08/24/20 2100  86 17 136/87 99 %   08/24/20 2055  90 23  100 %   08/24/20 2053    (!) 126/103    08/24/20 2023  92 20  100 %   08/24/20 2020    157/71    08/24/20 1945  89 19 157/89 100 %   08/24/20 1933  94 15 143/62    08/24/20 1716 98.6 °F (37 °C) (!) 107 16 (!) 188/117 98 %       Records Reviewed: Nursing records and medical records reviewed    MDM:  Patient presents with stroke like symptoms and seen immediately by me on arrival. Spoke with EMS and/or family regarding symptoms, Time of onset, vitals and normal glucose. DDx: ischemic vs. Hemorrhagic stroke, complex migraine, duong's paralysis. Patient is not a TPA candidate given that her symptoms have been present for greater than 48 hours      Provider Notes (Medical Decision Making):   Patient is a 59-year-old female presenting with symptoms concerning for acute stroke versus lupus flare. Complex migraine is also on the differential.  She is not to be a candidate due to the fact that her deficits of been present for greater than 48 hours. She is on Eliquis currently. Her CT scan of the head is negative. I will admit her for further management. ED Course:   Initial assessment performed. The patients presenting problems have been discussed, and they are in agreement with the care plan formulated and outlined with them. I have encouraged them to ask questions as they arise throughout their visit. Critical Care:  None      Disposition:  Admit Note:  10:15 PM  Pt is being admitted by Dr. Lavell Villa. The results of their tests and reason(s) for their admission have been discussed with pt and/or available family. They convey agreement and understanding for the need to be admitted and for admission diagnosis. DISCHARGE PLAN:  1. Current Discharge Medication List        2. Follow-up Information    None       3.   Return to ED if worse     Diagnosis     Clinical Impression:   1. Hemiparesis, unspecified hemiparesis etiology, unspecified laterality ([facility] [address]        Attestations:    Karo Acosta MD    Please note that this dictation was completed with [employer] van [employer], the computer voice recognition software. Quite often unanticipated grammatical, syntax, homophones, and other interpretive errors are inadvertently transcribed by the computer software. Please disregard these errors. Please excuse any errors that have escaped final proofreading. Thank you.

## 2020-08-25 NOTE — H&P
Hospitalist Admission Note    NAME: Isidra Winkler   :  1965   MRN:  [id number]     Date/Time:  2020 10:33 PM    Patient PCP: None  ______________________________________________________________________  Given the patient's current clinical presentation, I have a high level of concern for decompensation if discharged from the emergency department. Complex decision making was performed, which includes reviewing the patient's available past medical records, laboratory results, and x-ray films. My assessment of this patient's clinical condition and my plan of care is as follows. Assessment / Plan:  Headache, left sided heaviness, left leg weakness - likely a complicated migraine, rule out CVA  SLE  Hx of provoked thrombus  Hx of SVC obstruction s/p surgery and graft  Hx of Atrial fibrillation?, rate controlled  Hx of Crohn's, not in flare  Admit for Observation  Stroke order set used  Lipid Panel, A1c, TSH  Echo  Doppler US  CT Head negative as per prelim reading given to ER doc   MRI pending  Permissive HTN  Continue with ASA and Eliquis  Neurology Consult  Continue Lupus medications  EKG ordered    Code Status: Full  Surrogate Decision Maker:   DVT Prophylaxis: Apixaban  GI Prophylaxis: not indicated  Baseline: Independent IADLs      Subjective:   CHIEF COMPLAINT: left sided symptoms    HISTORY OF PRESENT ILLNESS:     Isidra Winkler is a 54 y.o.  female who presents with CC listed above. Pt states that she has had a headache since early Saturday morning. Along with the headache, she is complaining of left sided \"heaviness. \" She also endorses left leg weakness. Pt endorses a history of migraines, for which she has seen Dr. Jasper Boyer of Neurology. She denies any issues with her vision, but believes that her left eye is \"heavy. \" Her headache envelops her whole head. She denies any fever, chills, or syncope.  She also complains of a bump on the left side of her occipital region. We were asked to admit for work up and evaluation of the above problems. Past Medical History:   Diagnosis Date    Arrhythmia     Afib, sees Dr Marti Robles Autoimmune disease [facility])     Crohns julito    CAD (coronary artery disease)     Chronic pain     Lt and Rt back:  Dr Jaime Brewer;  Pain mgmt [facility] Ebony [facility]    Cough     evaluated 14 by Dr Beckie Mahmood ([phone]) \". . possible drug related lung is due to Methitrexate or atypical or fungal infection\" per office note dated 14    Crohn's disease [facility])     Dr Alex Huertas Ill-defined condition     migraines    Ill-defined condition     lt lung higher than normal so puts pressure on diaphragm increasing SOB    Lupus ([address]     Dr Kingston Petersen [phone]    Pain from implanted hardware 2016    Exploration of sternal incision with removal of protruding sternal wires    Stenosis of both carotid arteries without cerebral infarction     Stroke [facility]) 2016    TIA's     SVC obstruction     resolved after surgery    Thromboembolus ([address] 2016    PE right lower lung, spontaneous pulm dr christal Yu        Past Surgical History:   Procedure Laterality Date   [address]  , 2018, 10/18    ablation    COLONOSCOPY N/A 10/5/2018    COLONOSCOPY performed by Leonardo Biswas.  Mya Mckay MD at [facility] ENDOSCOPY     [address] HX  SECTION      x1    HX COLECTOMY  7/24/10    colon resection-18\" removed; Dr Silvana Guzman GI      bowel adhesions removed    HX GI      Bowel resection    HX GI  2019    stent for anal fistula     HX HEART CATHETERIZATION      no stents, open heart surgery 2016    HX HEENT      sinus surgery for deviated septum    HX HYSTERECTOMY      partial    HX LEFT SALPINGO-OOPHORECTOMY  2005    ovary and fallopian tube removed    HX MOHS PROCEDURES Right approx     with bone spur repair in shoulder    HX OTHER SURGICAL  2016    femerol vein removed    HX OTHER SURGICAL  07/05/2016    open heart for repair of blocked SVC    HX TONSILLECTOMY      HX VASCULAR ACCESS  6/11/10    portacath insertion and removal x 3; Gets Remicaid q5 weeks    [place] COLONOSCOPY W/BIOPSY SINGLE/MULTIPLE  8/19/2013            Social History     Tobacco Use    Smoking status: Former Smoker     Packs/day: 0.50     Years: 2.50     Pack years: 1.25     Last attempt to quit: 3/29/2005     Years since quitting: 15.4    Smokeless tobacco: Never Used    Tobacco comment: social   Substance Use Topics    Alcohol use: Yes     Comment: Rare wine        Family History   Problem Relation Age of Onset    Cancer Father         stomach    Other Mother         Auto accident    Cancer Paternal Grandmother         breast    Breast Cancer Paternal Grandmother         not sure of age   [facility] Breast Cancer Maternal Aunt         not sure of age     Allergies   Allergen Reactions    Chlorhexidine Towelette Rash    Codeine Hives and Nausea and Vomiting     Severe nausea & vomiting    Hydrocodone Hives and Nausea and Vomiting     Severe nausea & vomiting    Oxycontin [Oxycodone] Hives and Nausea and Vomiting     Severe nausea & vomiting, also has the same reaction from Percocet    Percocet [Oxycodone-Acetaminophen] Hives and Nausea and Vomiting     Can take tylenol    Dilaudid [Hydromorphone (Bulk)] Swelling    Prednisone Other (comments)     HX OF CROHN'S; not allergic, prefer not to use         Prior to Admission medications    Medication Sig Start Date End Date Taking? Authorizing Provider   apixaban (ELIQUIS) 2.5 mg tablet Take 1 Tab by mouth two (2) times a day. Resume on Monday 10/21/19  Indications: PE 10/18/19   Jackie Helton NP   ustekinumab (USTEKINAUMAB) 90 mg/mL injection 90 mg by SubCUTAneous route every two (2) months.     Provider, Historical   tiZANidine (ZANAFLEX) 2 mg tablet 1 po qam and 2 po qhs 7/26/19   Janice De La Cruz MD   gabapentin (NEURONTIN) 300 mg capsule TAKE ONE CAPSULE BY MOUTH THREE TIMES A DAY 12/3/18   Zenon Mckenna MD   aspirin 81 mg chewable tablet Take 81 mg by mouth daily. Provider, Historical   butalbital-acetaminophen-caffeine (FIORICET, ESGIC) -40 mg per tablet Take 2 Tabs by mouth every eight (8) hours as needed for Pain or Headache. Max Daily Amount: 6 Tabs. MUST LAST 30 DAYS. Indications: MIGRAINE, TENSION-TYPE HEADACHE 9/13/17   Zenon Mckenna MD   promethazine (PHENERGAN) 25 mg tablet Take 25 mg by mouth every six (6) hours as needed for Nausea. Provider, Historical   diazepam (VALIUM) 5 mg tablet Take 1 Tab by mouth every twelve (12) hours as needed for Anxiety. Max Daily Amount: 10 mg. Refill at 1 month intervals 8/31/16   Zenon Mckenna MD   hydroxychloroquine (PLAQUENIL) 200 mg tablet Take 400 mg by mouth daily (after dinner). For lupus    Provider, Historical   ondansetron hcl (ZOFRAN) 4 mg tablet Take 4 mg by mouth every eight (8) hours as needed for Nausea. Other, MD Melisa   drospirenone-ethinyl estradiol (MARIELOS 28) 3-20 mg-mcg per tablet Take 1 Tab by mouth nightly. Provider, Historical       REVIEW OF SYSTEMS:     I am not able to complete the review of systems because:    The patient is intubated and sedated    The patient has altered mental status due to his acute medical problems    The patient has baseline aphasia from prior stroke(s)    The patient has baseline dementia and is not reliable historian    The patient is in acute medical distress and unable to provide information           Total of 12 systems reviewed as follows:       POSITIVE= underlined text  Negative = text not underlined  General:  fever, chills, sweats, generalized weakness, weight loss/gain,      loss of appetite   Eyes:    blurred vision, eye pain, loss of vision, double vision  ENT:    rhinorrhea, pharyngitis   Respiratory:   cough, sputum production, SOB, HENDRIX, wheezing, pleuritic pain   Cardiology:   chest pain, palpitations, orthopnea, PND, edema, syncope Gastrointestinal:  abdominal pain , N/V, diarrhea, dysphagia, constipation, bleeding   Genitourinary:  frequency, urgency, dysuria, hematuria, incontinence   Muskuloskeletal :  arthralgia, myalgia, back pain  Hematology:  easy bruising, nose or gum bleeding, lymphadenopathy   Dermatological: rash, ulceration, pruritis, color change / jaundice  Endocrine:   hot flashes or polydipsia   Neurological:  headache, dizziness, confusion, focal weakness, paresthesia,     Speech difficulties, memory loss, gait difficulty  Psychological: Feelings of anxiety, depression, agitation    Objective:   VITALS:    Visit Vitals  /65   Pulse 85   Temp 98.6 °F (37 °C)   Resp 16   Ht 5' 1\" (1.549 m)   Wt 180 lb (81.6 kg)   SpO2 99%   BMI 34.01 kg/m²       PHYSICAL EXAM:    General:    Alert, cooperative, no distress, appears stated age. HEENT: Unable to appreciate \"bump\" in occipital area - has tenderness in that area. Atraumatic, anicteric sclerae, pink conjunctivae     No oral ulcers, mucosa moist, throat clear, dentition fair  Neck:  Supple, symmetrical,  thyroid: non tender  Lungs:   Clear to auscultation bilaterally. No Wheezing or Rhonchi. No rales. Chest wall:  No tenderness  No Accessory muscle use. Heart:   Regular  rhythm,  No  murmur   No edema  Abdomen:   Soft, non-tender. Not distended. Bowel sounds normal  Extremities: No cyanosis. No clubbing,      Skin turgor normal, Capillary refill normal, Radial dial pulse 2+  Skin:     Not pale. Not Jaundiced  No rashes   Psych:  Good insight. Not depressed. Not anxious or agitated. Neurologic: EOMs intact. No facial asymmetry. No aphasia or slurred speech. Motor str LLE 3/5, Sensation grossly intact.  Alert and oriented X 4.     _______________________________________________________________________  Care Plan discussed with:    Comments   Patient x    Family      RN x    Care Manager                    Consultant: _______________________________________________________________________  Expected  Disposition:   Home with Family x   HH/PT/OT/RN    SNF/LTC    AMBROCIO    ________________________________________________________________________  TOTAL TIME:  54 Minutes    Critical Care Provided     Minutes non procedure based      Comments     Reviewed previous records   >50% of visit spent in counseling and coordination of care  Discussion with patient and/or family and questions answered       ________________________________________________________________________  Signed: Fabiano Oreilly MD    Procedures: see electronic medical records for all procedures/Xrays and details which were not copied into this note but were reviewed prior to creation of Plan. LAB DATA REVIEWED:    Recent Results (from the past 24 hour(s))   GLUCOSE, POC    Collection Time: 08/24/20  5:16 PM   Result Value Ref Range    Glucose (POC) 100 65 - 100 mg/dL    Performed by Jacqueline Villarreal PCT    SAMPLES BEING HELD    Collection Time: 08/24/20  5:20 PM   Result Value Ref Range    SAMPLES BEING HELD  PST, LAV, NORM     COMMENT        Add-on orders for these samples will be processed based on acceptable specimen integrity and analyte stability, which may vary by analyte. CBC WITH AUTOMATED DIFF    Collection Time: 08/24/20  5:20 PM   Result Value Ref Range    WBC 11.6 (H) 3.6 - 11.0 K/uL    RBC 4.78 3.80 - 5.20 M/uL    HGB 12.9 11.5 - 16.0 g/dL    HCT 40.1 35.0 - 47.0 %    MCV 83.9 80.0 - 99.0 FL    MCH 27.0 26.0 - 34.0 PG    MCHC 32.2 30.0 - 36.5 g/dL    RDW 13.6 11.5 - 14.5 %    PLATELET 032 439 - 440 K/uL    MPV 12.3 8.9 - 12.9 FL    NRBC 0.0 0  WBC    ABSOLUTE NRBC 0.00 0.00 - 0.01 K/uL    NEUTROPHILS 57 32 - 75 %    LYMPHOCYTES 33 12 - 49 %    MONOCYTES 8 5 - 13 %    EOSINOPHILS 2 0 - 7 %    BASOPHILS 0 0 - 1 %    IMMATURE GRANULOCYTES 0 0.0 - 0.5 %    ABS. NEUTROPHILS 6.5 1.8 - 8.0 K/UL    ABS. LYMPHOCYTES 3.9 (H) 0.8 - 3.5 K/UL    ABS.  MONOCYTES 0.9 0.0 - 1.0 K/UL    ABS. EOSINOPHILS 0.3 0.0 - 0.4 K/UL    ABS. BASOPHILS 0.0 0.0 - 0.1 K/UL    ABS. IMM. GRANS. 0.0 0.00 - 0.04 K/UL    DF AUTOMATED     PROTHROMBIN TIME + INR    Collection Time: 08/24/20  5:20 PM   Result Value Ref Range    INR 1.0 0.9 - 1.1      Prothrombin time 10.3 9.0 - 11.1 sec   TROPONIN I    Collection Time: 08/24/20  5:20 PM   Result Value Ref Range    Troponin-I, Qt. <0.05 <0.81 ng/mL   METABOLIC PANEL, COMPREHENSIVE    Collection Time: 08/24/20  5:20 PM   Result Value Ref Range    Sodium 134 (L) 136 - 145 mmol/L    Potassium 4.1 3.5 - 5.1 mmol/L    Chloride 102 97 - 108 mmol/L    CO2 24 21 - 32 mmol/L    Anion gap 8 5 - 15 mmol/L    Glucose 96 65 - 100 mg/dL    BUN 13 6 - 20 MG/DL    Creatinine 1.03 (H) 0.55 - 1.02 MG/DL    BUN/Creatinine ratio 13 12 - 20      GFR est AA >60 >60 ml/min/1.73m2    GFR est non-AA 56 (L) >60 ml/min/1.73m2    Calcium 9.7 8.5 - 10.1 MG/DL    Bilirubin, total 0.4 0.2 - 1.0 MG/DL    ALT (SGPT) 22 12 - 78 U/L    AST (SGOT) 23 15 - 37 U/L    Alk.  phosphatase 92 45 - 117 U/L    Protein, total 8.6 (H) 6.4 - 8.2 g/dL    Albumin 3.2 (L) 3.5 - 5.0 g/dL    Globulin 5.4 (H) 2.0 - 4.0 g/dL    A-G Ratio 0.6 (L) 1.1 - 2.2     URINALYSIS W/ REFLEX CULTURE    Collection Time: 08/24/20  8:20 PM    Specimen: Urine   Result Value Ref Range    Color YELLOW/STRAW      Appearance CLEAR CLEAR      Specific gravity 1.013 1.003 - 1.030      pH (UA) 5.0 5.0 - 8.0      Protein Negative NEG mg/dL    Glucose Negative NEG mg/dL    Ketone Negative NEG mg/dL    Bilirubin Negative NEG      Blood TRACE (A) NEG      Urobilinogen 0.2 0.2 - 1.0 EU/dL    Nitrites Negative NEG      Leukocyte Esterase Negative NEG      WBC 0-4 0 - 4 /hpf    RBC 0-5 0 - 5 /hpf    Epithelial cells FEW FEW /lpf    Bacteria 1+ (A) NEG /hpf    UA:UC IF INDICATED CULTURE NOT INDICATED BY UA RESULT CNI      Hyaline cast 0-2 0 - 5 /lpf

## 2020-08-25 NOTE — PROGRESS NOTES

## 2020-08-25 NOTE — PROGRESS NOTES
ONELIA Plan:    * Home with HH & f/u apts vs home with out-pt therapy & f/u apts    > Observation status addressed; State observation notice provided, copy on chart  > PT/OT/SLP consulted to assist with determining disposition at d/c  > [facility] to deliver requested DME (RW)  > Pt will need out-pt f/u apt secured prior to d/c  >  to transport at d/c    4:21 PM: CM received update that [facility] is able to provide requested DME (RW) at d/c; [facility] to deliver RW prior to d/c. CM still working to [place] [address] services for d/c. Referrals sent to AT [address], [facility], [facility], and [facility] were declined. Referral sent to [employer] OF [facility] is still pending. 1:37 PM: CM was informed pt will need HH and DME (RW) for d/c. CM met with pt at bedside to discuss [facility] intervention. CM inquired if pt had PCP; pt declined. CM explained that pt will need provider to sign HH order; pt stated \"I've had [facility] before and I've never heard of that. \" Pt continued stating \"one of my specialty providers will sign the order if need be. \" Pt reported she will \"work on getting a PCP. \" CM is actively working to [place] [address] and DME (RW) for d/c. Initial note: CM reviewed pt's chart and discussed updates in IDR prior to moving forward with d/c planning. CM acknowledged pt's observation status; CM provided verbal explanation of observation status as well as corresponding State notice - signed copy on chart. CM verified pt's demographic information is up-to-date in chart. CM will continue to follow pt for consults and needs prior to discharge. If any concerns or questions arise pertaining to pt discharge, please contact unit CM. Care Management Interventions  PCP Verified by CM: No  Mode of Transport at Discharge:  Other (see comment)(Pt's  will provide transport at d/c)  Transition of Care Consult (CM Consult): Discharge Planning  Discharge Durable Medical Equipment: No  Physical Therapy Consult: Yes  Occupational Therapy Consult: Yes  Speech Therapy Consult: Yes  Current Support Network: Lives with Spouse, Own Home(Pt lives with her  in 2 story home with 4 BOLA)  Confirm Follow Up Transport: [address] Provided?: No  Discharge Location  Discharge Placement: Home with home health    Julane Severs, [address] [place] [address], [address] [place] [address]

## 2020-08-25 NOTE — ROUTINE PROCESS
TRANSFER - OUT REPORT: 
 
Verbal report given to [address], RN(name) on Jp Forman  being transferred to NT Room 3110(unit) for routine progression of care Report consisted of patients Situation, Background, Assessment and  
Recommendations(SBAR). Information from the following report(s) SBAR, ED Summary, [facility] and Recent Results was reviewed with the receiving nurse. Lines:    
 
Opportunity for questions and clarification was provided. Patient transported with: 
 [employer]

## 2020-08-25 NOTE — PROGRESS NOTES
SPEECH PATHOLOGY BEDSIDE SWALLOW EVALUATION/DISCHARGE  Patient: Violeta Gudino [de-identified]54 y.o. female)  Date: 8/25/2020  Primary Diagnosis: Left leg weakness [R29.898]       Precautions:        ASSESSMENT :  Based on the objective data described below, the patient presents with no oral or pharyngeal dysphagia, and no overt s/s aspiration observed. Patient reported that motor speech, language, and cognitive function are all at baseline. Skilled acute therapy provided by a speech-language pathologist is not indicated at this time. PLAN :  Recommendations:  -Regular/thin liquid diet, straws ok  Discharge Recommendations: None     SUBJECTIVE:   Patient stated that she and her  have been  for 45 years, however  said they have been  for 257 years. OBJECTIVE:     Past Medical History:   Diagnosis Date    Arrhythmia     Afib, sees Dr Princess Land Autoimmune disease ([facility]     Crohns julito    CAD (coronary artery disease)     Chronic pain     Lt and Rt back:  Dr Dudley Certain;  Pain mgmt Cherl Vick [facility]    Cough     evaluated 8/25/14 by Dr Lakeisha Garcia [phone]) \". . possible drug related lung is due to Methitrexate or atypical or fungal infection\" per office note dated 8/25/14    Crohn's disease [facility])     Dr Pryor Fidelina Ill-defined condition     migraines    Ill-defined condition     lt lung higher than normal so puts pressure on diaphragm increasing SOB    Lupus ([facility]     Dr Priscilla Ahn [phone]    Pain from implanted hardware 12/14/2016    Exploration of sternal incision with removal of protruding sternal wires    Stenosis of both carotid arteries without cerebral infarction     Stroke [facility]) 06/2016    TIA's     SVC obstruction 2016    resolved after surgery    Thromboembolus ([facility] 04/2016    PE right lower lung, spontaneous pulm dr christal Grover     Past Surgical History:   Procedure Laterality Date    CARDIAC SURG PROCEDURE UNLIST  2016, 06/2018, 10/18    ablation    COLONOSCOPY N/A 10/5/2018    COLONOSCOPY performed by Alan Reilly. Kasandra Medrano MD at [facility] ENDOSCOPY     [address] HX  SECTION      x1    HX COLECTOMY  7/24/10    colon resection-18\" removed; Dr Garth Mujica GI      bowel adhesions removed    HX GI      Bowel resection    HX GI  2019    stent for anal fistula     HX HEART CATHETERIZATION      no stents, open heart surgery 2016    HX HEENT      sinus surgery for deviated septum    HX HYSTERECTOMY      partial    HX LEFT SALPINGO-OOPHORECTOMY      ovary and fallopian tube removed    HX MOHS PROCEDURES Right approx     with bone spur repair in shoulder    HX OTHER SURGICAL  2016    femerol vein removed    HX OTHER SURGICAL  2016    open heart for repair of blocked SVC    HX TONSILLECTOMY      HX VASCULAR ACCESS  6/11/10    portacath insertion and removal x 3; Gets Remicaid q5 weeks    [place] COLONOSCOPY W/BIOPSY SINGLE/MULTIPLE  2013          Prior Level of Function/Home Situation:   Home Situation  Home Environment: Private residence  # Steps to Enter: (P) 4  Rails to Enter: (P) Yes  Hand Rails : (P) Bilateral(wide)  One/Two Story Residence: (P) Two story  Living Alone: No  Support Systems: Spouse/Significant Other/Partner  Patient Expects to be Discharged to[de-identified] Private residence  Current DME Used/Available at Home: None  Diet prior to admission: regular/thin  Current Diet:  Regular/thin   Cognitive and Communication Status:  Neurologic State: Alert, Appropriate for age  Orientation Level: Oriented X4  Cognition: Follows commands           Oral Assessment:  Oral Assessment  Labial: No impairment  Dentition: Natural;Full  Oral Hygiene: moist oral mucosa  Lingual: No impairment  Velum: No impairment  Mandible: No impairment  P.O. Trials:  Patient Position: sitting EOB  Vocal quality prior to P.O.: No impairment  Consistency Presented: Thin liquid;Puree; Solid  How Presented: Self-fed/presented;Cup/sip;Cup/gulp; Spoon;Straw;Successive swallows     Bolus Acceptance: No impairment  Bolus Formation/Control: No impairment     Propulsion: No impairment  Oral Residue: None  Initiation of Swallow: No impairment  Laryngeal Elevation: Functional  Aspiration Signs/Symptoms: None  Pharyngeal Phase Characteristics: No impairment, issues, or problems              Oral Phase Severity: No impairment  Pharyngeal Phase Severity : No impairment  NOMS:   The NOMS functional outcome measure was used to quantify this patient's level of swallowing impairment. Based on the NOMS, the patient was determined to be at level 7 for swallow function     NOMS Swallowing Levels:  Level 1 (CN): NPO  Level 2 (CM): NPO but takes consistency in therapy  Level 3 (CL): Takes less than 50% of nutrition p.o. and continues with nonoral feedings; and/or safe with mod cues; and/or max diet restriction  Level 4 (CK): Safe swallow but needs mod cues; and/or mod diet restriction; and/or still requires some nonoral feeding/supplements  Level 5 (CJ): Safe swallow with min diet restriction; and/or needs min cues  Level 6 (CI): Independent with p.o.; rare cues; usually self cues; may need to avoid some foods or needs extra time  Level 7 ([address] [place] [address]): Independent for all p.o.  ANDRÉS. (2003). National Outcomes Measurement System (NOMS): Adult Speech-Language Pathology User's Guide. Pain:           After treatment:   Patient left in no apparent distress in bed, Call bell within reach, Nursing notified and Caregiver / family present    COMMUNICATION/EDUCATION:   Patient was educated regarding her deficit(s) of WFL swallow as this relates to her diagnosis of ?CVA. She demonstrated Good understanding as evidenced by verbalizing understanding. The patient's plan of care including recommendations, planned interventions, and recommended diet changes were discussed with: Registered nurse, PT, and OT.      Thank you for this referral.  [facility] Arian Person, SLP  Time Calculation: 15 mins

## 2020-08-25 NOTE — PROGRESS NOTES
Attempted to see pt for OT services. Pt is currently off the floor for testing. Will defer, but continue to follow.

## 2020-08-25 NOTE — PROGRESS NOTES
Physical Therapy   Consult received and chart reviewed. Attempting to see patient for PT evaluation this am. Patient currently off floor at testing. Will follow back.    Luis Soliman, PT

## 2020-08-25 NOTE — PROGRESS NOTES
Hospitalist Progress Note    NAME: Jp Forman   :  1965   MRN:  [id number]       Assessment / Plan:  Headache, left sided heaviness, left leg weakness - likely a complicated migraine, rule out CVA  SLE  Hx of provoked thrombus  Hx of SVC obstruction s/p surgery and graft  Hx of Atrial fibrillation?, rate controlled  Hx of Crohn's, not in flare  CT scan and MRI brain negative   Lipid Panel showed LDL 86, A1c 5.2  Echo: Ef 55 % , no intracardiac shunt   Doppler US carotids negative   Treat HTN as MRI neg   Continue with ASA and Eliquis  Neurology Consult appreciated : likely complex migraine   Continue Lupus medications  Pt still c/o HA , gabapentin dose increased and prn fioricet   C/w zanaflex   S/p 2dose IV toradol   PT recommended OP rehab  Vs Home PT      Code Status: Full  Surrogate Decision Maker:   DVT Prophylaxis: Apixaban  GI Prophylaxis: not indicated  Baseline: Independent IADLs         Subjective:     Chief Complaint / Reason for Physician Visit  FU Headaches . Still severe HA  And tender lump on the back of her head + nausea. Discussed with RN events overnight. Review of Systems:  Symptom Y/N Comments  Symptom Y/N Comments   Fever/Chills n   Chest Pain n    Poor Appetite    Edema     Cough n   Abdominal Pain n    Sputum    Joint Pain     SOB/HENDRIX n   Pruritis/Rash     Nausea/vomit n   Tolerating PT/OT     Diarrhea    Tolerating Diet y    Constipation    Other       Could NOT obtain due to:      Objective:     VITALS:   Last 24hrs VS reviewed since prior progress note.  Most recent are:  Patient Vitals for the past 24 hrs:   Temp Pulse Resp BP SpO2   20 1529 98.6 °F (37 °C) 80 16 145/82 100 %   20 1143 98.6 °F (37 °C) 91 16 172/86 96 %   20 1125  91  (!) 151/94    20 0901    126/67    20 0725 98.3 °F (36.8 °C) 85 18 126/57 99 %   20 0348 98.6 °F (37 °C) 85 16 150/71 100 %   20 0014 98.2 °F (36.8 °C) 83 16 141/78 100 %   20 2330  88 13 158/82 100 %   08/24/20 2315  91 14 156/88 99 %   08/24/20 2300  89 12 144/73 99 %   08/24/20 2245  87 13 145/63 100 %   08/24/20 2230  83 17 153/76 100 %   08/24/20 2215  88 16 171/85 98 %   08/24/20 2200  85 18 160/81 99 %   08/24/20 2154  85 16  99 %   08/24/20 2145  91 22 133/65    08/24/20 2130  87 18 154/83 100 %   08/24/20 2115  87 18 136/73 100 %   08/24/20 2100  86 17 136/87 99 %   08/24/20 2055  90 23  100 %   08/24/20 2053    (!) 126/103    08/24/20 2023  92 20  100 %   08/24/20 2020    157/71    08/24/20 1945  89 19 157/89 100 %   08/24/20 1933  94 15 143/62      No intake or output data in the 24 hours ending 08/25/20 1748     PHYSICAL EXAM:  General: WD, WN. Alert, cooperative, no acute distress    EENT:  EOMI. Anicteric sclerae. MMM  Resp:  CTA bilaterally, no wheezing or rales. No accessory muscle use  CV:  Regular  rhythm,  No edema  GI:  Soft, Non distended, Non tender.  +Bowel sounds  Neurologic:  Alert and oriented X 3, normal speech,   Psych:   Good insight. Not anxious nor agitated  Skin:  No rashes. No jaundice    Reviewed most current lab test results and cultures  YES  Reviewed most current radiology test results   YES  Review and summation of old records today    NO  Reviewed patient's current orders and MAR    YES  PMH/SH reviewed - no change compared to H&P  ________________________________________________________________________  Care Plan discussed with:    Comments   Patient x    Family      RN x    Care Manager     Consultant                        Multidiciplinary team rounds were held today with , nursing, pharmacist and clinical coordinator. Patient's plan of care was discussed; medications were reviewed and discharge planning was addressed.      ________________________________________________________________________  Total NON critical care TIME:  25 Minutes    Total CRITICAL CARE TIME Spent:   Minutes non procedure based Comments   >50% of visit spent in counseling and coordination of care x    ________________________________________________________________________  Cynthia Izquierdo MD     Procedures: see electronic medical records for all procedures/Xrays and details which were not copied into this note but were reviewed prior to creation of Plan. LABS:  I reviewed today's most current labs and imaging studies.   Pertinent labs include:  Recent Labs     08/24/20  1720   WBC 11.6*   HGB 12.9   HCT 40.1        Recent Labs     08/25/20  0407 08/24/20  1720    134*   K 3.6 4.1    102   CO2 28 24   * 96   BUN 9 13   CREA 0.99 1.03*   CA 9.5 9.7   MG 1.9  --    PHOS 3.7  --    ALB  --  3.2*   TBILI  --  0.4   ALT  --  22   INR  --  1.0       Signed: Cynthia Izquierdo MD

## 2020-08-25 NOTE — PROGRESS NOTES
Problem: Falls - Risk of  Goal: *Absence of Falls  Description: Document Lexx Le Fall Risk and appropriate interventions in the flowsheet.   Outcome: Progressing Towards Goal  Note: Fall Risk Interventions:                                Problem: Patient Education: Go to Patient Education Activity  Goal: Patient/Family Education  Outcome: Progressing Towards Goal     Problem: Patient Education: Go to Patient Education Activity  Goal: Patient/Family Education  Outcome: Progressing Towards Goal     Problem: TIA/CVA Stroke: 0-24 hours  Goal: Off Pathway (Use only if patient is Off Pathway)  Outcome: Progressing Towards Goal  Goal: Activity/Safety  Outcome: Progressing Towards Goal  Goal: Consults, if ordered  Outcome: Progressing Towards Goal  Goal: Diagnostic Test/Procedures  Outcome: Progressing Towards Goal  Goal: Nutrition/Diet  Outcome: Progressing Towards Goal  Goal: Discharge Planning  Outcome: Progressing Towards Goal  Goal: Medications  Outcome: Progressing Towards Goal  Goal: Respiratory  Outcome: Progressing Towards Goal  Goal: Treatments/Interventions/Procedures  Outcome: Progressing Towards Goal  Goal: Minimize risk of bleeding post-thrombolytic infusion  Outcome: Progressing Towards Goal  Goal: Monitor for complications post-thrombolytic infusion  Outcome: Progressing Towards Goal  Goal: Psychosocial  Outcome: Progressing Towards Goal  Goal: *Hemodynamically stable  Outcome: Progressing Towards Goal  Goal: *Neurologically stable  Description: Absence of additional neurological deficits    Outcome: Progressing Towards Goal  Goal: *Verbalizes anxiety and depression are reduced or absent  Outcome: Progressing Towards Goal  Goal: *Absence of Signs of Aspiration on Current Diet  Outcome: Progressing Towards Goal  Goal: *Absence of deep venous thrombosis signs and symptoms(Stroke Metric)  Outcome: Progressing Towards Goal  Goal: *Ability to perform ADLs and demonstrates progressive mobility and function  Outcome: Progressing Towards Goal  Goal: *Stroke education started(Stroke Metric)  Outcome: Progressing Towards Goal  Goal: *Dysphagia screen performed(Stroke Metric)  Outcome: Progressing Towards Goal  Goal: *Rehab consulted(Stroke Metric)  Outcome: Progressing Towards Goal

## 2020-08-25 NOTE — PROGRESS NOTES
Problem: Mobility Impaired (Adult and Pediatric)  Goal: *Acute Goals and Plan of Care (Insert Text)  Description: FUNCTIONAL STATUS PRIOR TO ADMISSION: Patient was independent and active without use of DME; working as [address]: The patient lived with  but did not require assist.    Physical Therapy Goals  Initiated 8/25/2020  1. Patient will move from supine to sit and sit to supine , scoot up and down, and roll side to side in bed with independence within 7 day(s). 2.  Patient will transfer from bed to chair and chair to bed with independence using the least restrictive device within 7 day(s). 3.  Patient will perform sit to stand with modified independence within 7 day(s). 4.  Patient will ambulate with modified independence for 250 feet with the least restrictive device within 7 day(s). 5.  Patient will ascend/descend 12 stairs with 1 handrail(s) with modified independence within 7 day(s). Outcome: Not Met   PHYSICAL THERAPY EVALUATION  Patient: Franco Stover (48 y.o. female)  Date: 8/25/2020  Primary Diagnosis: Left leg weakness [R29.898]        Precautions: falls      ASSESSMENT  Based on the objective data described below, the patient presents with mild weakness in LUE/LE,  impaired functional mobility, impaired standing balance and decreased endurance which limits functional independence. Patient able to transition to EOB independently and requires SBA for transfers. Patient ambulated to/from bathroom without use of AD- gait is unsteady demonstrating short shuffled steps and antalgic gait pattern. Patient transitioned to [facility] but gait remained unsteady. Then trialed RW with improvements in overall gait stability and mildly improved step length. Recommend use of RW for ambulation at this time.  Patient would benefit from HHPT with transition to OP for higher level balance training following discharge    Current Level of Function Impacting Discharge (mobility/balance): supervision to [facility]    Functional Outcome Measure: The patient scored 60/100 on the Barthel Index outcome measure which is indicative of 40% functional impairment. Patient will benefit from skilled therapy intervention to address the above noted impairments. PLAN :  Recommendations and Planned Interventions: bed mobility training, transfer training, gait training, therapeutic exercises, patient and family training/education, and therapeutic activities      Frequency/Duration: Patient will be followed by physical therapy:  4 times a week to address goals. Recommendation for discharge: (in order for the patient to meet his/her long term goals)  Physical therapy at least 2 days/week in the home with transition to OP PT for higher level balance training    This discharge recommendation:  Has been made in collaboration with the attending provider and/or case management    IF patient discharges home will need the following DME: rolling walker         SUBJECTIVE:   Patient stated I feel much better using this walker.     OBJECTIVE DATA SUMMARY:   HISTORY:    Past Medical History:   Diagnosis Date    Arrhythmia     Afib, sees Dr nelson    [facility]     Crohns julito    CAD (coronary artery disease)     Chronic pain     Lt and Rt back:  Dr Daryel Brunner;  Pain mgmt [facility] [place] [facility]    Cough     evaluated 8/25/14 by Dr Verito Arango ([phone]) \". . possible drug related lung is due to Methitrexate or atypical or fungal infection\" per office note dated 8/25/14    Crohn's disease ([facility]     Dr Vandana Castro    Ill-defined condition     migraines    Ill-defined condition     lt lung higher than normal so puts pressure on diaphragm increasing SOB    Lupus (HCC)     Dr Rubia Mancilla [phone]    Pain from implanted hardware 12/14/2016    Exploration of sternal incision with removal of protruding sternal wires    Stenosis of both carotid arteries without cerebral infarction     Stroke [facility]) 2016    TIA's     SVC obstruction     resolved after surgery    Thromboembolus ([address] 2016    PE right lower lung, spontaneous pulm dr christal Haddad     Past Surgical History:   Procedure Laterality Date    CARDIAC SURG PROCEDURE UNLIST  , 2018, 10/18    ablation    COLONOSCOPY N/A 10/5/2018    COLONOSCOPY performed by Jose Scott.  Francisco Vaca MD at [facility] ENDOSCOPY    [address]    HX  SECTION      x1    HX COLECTOMY  7/24/10    colon resection-18\" removed; Dr Larisa Khanna GI      bowel adhesions removed    HX GI      Bowel resection    HX GI  2019    stent for anal fistula     HX HEART CATHETERIZATION      no stents, open heart surgery 2016    HX HEENT      sinus surgery for deviated septum    HX HYSTERECTOMY      partial    HX LEFT SALPINGO-OOPHORECTOMY      ovary and fallopian tube removed    HX MOHS PROCEDURES Right approx     with bone spur repair in shoulder    HX OTHER SURGICAL      femerol vein removed    HX OTHER SURGICAL  2016    open heart for repair of blocked SVC    HX TONSILLECTOMY      HX VASCULAR ACCESS  6/11/10    portacath insertion and removal x 3; Gets Remicaid q5 weeks    IN COLONOSCOPY W/BIOPSY SINGLE/MULTIPLE  2013              Home Situation  Home Environment: Private residence  # Steps to Enter: 4  Rails to Enter: Yes  Hand Rails : Bilateral(wide)  One/Two Story Residence: Two story(bedroom on 2nd floor, 1/2 bath 1st floor)  # of Interior Steps: 15  Interior Rails: Right  Living Alone: No  Support Systems: Spouse/Significant Other/Partner  Patient Expects to be Discharged to[de-identified] Private residence  Current DME Used/Available at Home: None  Tub or Shower Type: Tub/Shower combination    EXAMINATION/PRESENTATION/DECISION MAKING:   Critical Behavior:  Neurologic State: Alert, Appropriate for age  Orientation Level: Oriented X4  Cognition: Follows commands  Safety/Judgement: Fall prevention, Home safety  Hearing: Auditory  Auditory Impairment: None    Range Of Motion:  AROM: Generally decreased, functional(LUE and LE, slow)           PROM: Within functional limits           Strength:    Strength: Generally decreased, functional(left UE and LE with formal testing)                    Tone & Sensation:   Tone: Normal              Sensation: Intact               Coordination:  Coordination: Within functional limits(but increased effort and time with motion of LUE/LE)  Vision:   Acuity: Within Defined Limits  Corrective Lenses: Glasses  Functional Mobility:  Bed Mobility:  Rolling: Independent  Supine to Sit: Independent     Scooting: Independent  Transfers:  Sit to Stand: Stand-by assistance  Stand to Sit: Stand-by assistance        Bed to Chair: Stand-by assistance(improved to supervision with RW)              Balance:   Sitting: Intact  Standing: Impaired  Standing - Static: Good  Standing - Dynamic : Fair  Ambulation/Gait Training:  Distance (ft): 125 Feet (ft)  Assistive Device: (none initially transitioning to [facility] then to )  Ambulation - Level of Assistance: Contact guard assistance(without AD and with use of SPC; supervision using RW)        Gait Abnormalities: Decreased step clearance;Hip Hike        Base of Support: Widened;Shift to right     Speed/Airam: Pace decreased (<100 feet/min); Slow  Step Length: Left shortened;Right shortened  Swing Pattern: (LLE remains fully extended)        Stairs:  Number of Stairs Trained: 12  Stairs - Level of Assistance: Contact guard assistance   Rail Use: Right       Functional Measure:  Barthel Index:    Bathin  Bladder: 10  Bowels: 10  Groomin  Dressin  Feedin  Mobility: 10  Stairs: 5  Toilet Use: 5  Transfer (Bed to Chair and Back): 10  Total: 60/100       The Barthel ADL Index: Guidelines  1. The index should be used as a record of what a patient does, not as a record of what a patient could do.   2. The main aim is to establish degree of independence from any help, physical or verbal, however minor and for whatever reason. 3. The need for supervision renders the patient not independent. 4. A patient's performance should be established using the best available evidence. Asking the patient, friends/relatives and nurses are the usual sources, but direct observation and common sense are also important. However direct testing is not needed. 5. Usually the patient's performance over the preceding 24-48 hours is important, but occasionally longer periods will be relevant. 6. Middle categories imply that the patient supplies over 50 per cent of the effort. 7. Use of aids to be independent is allowed. Abe Anglin., Barthel, D.W. ([id number]). Functional evaluation: the Barthel Index. [address] [facility] (14)2. JEANCARLOS Arenas, Lebron Edward, Liz Maharaj., [place], [facility] (1999). Measuring the change indisability after inpatient rehabilitation; comparison of the responsiveness of the Barthel Index and Functional [place] Measure. Journal of Neurology, Neurosurgery, and Psychiatry, 66(4), 847-577. Lendon Soulier, N.J.A, CODY Mcgarry, & Anthony Kirby M.A. (2004.) Assessment of post-stroke quality of life in cost-effectiveness studies: The usefulness of the Barthel Index and the EuroQoL-5D. Quality of Life Research, 13, 484-90          Activity Tolerance:   Fair  Please refer to the flowsheet for vital signs taken during this treatment. After treatment patient left in no apparent distress:   Sitting in chair, Call bell within reach, and Caregiver / family present    COMMUNICATION/EDUCATION:   The patients plan of care was discussed with: Occupational therapist and Registered nurse. Fall prevention education was provided and the patient/caregiver indicated understanding., Patient/family have participated as able in goal setting and plan of care. , and Patient/family agree to work toward stated goals and plan of care.     Thank you for this referral.  Darylene Peper, PT   Time Calculation: 30 mins

## 2020-08-25 NOTE — CONSULTS
Neurology Note    Patient ID:  Janelle Goyal  [id number]  74 y.o.  1965      Date of Consultation:  August 25, 2020    Referring Physician: Dr. Loy Zavala    Reason for Consultation:  Neurological symptoms. Subjective:headache and left-sided symptoms       History of Present Illness:   Janelle Goyal is a 54 y.o. female multiple medical problems including Crohn's, A. fib, lupus who presented to the emergency department after having developed a headache and left-sided heaviness. She felt that her left leg was also weak. She does have a history of migraine headaches as well as reported TIA and sees Dr. Keyshawn Farr. The patient states that her TIA in the past was after she had received a flush through an IV line. She developed very similar symptoms involving her left side. It did resolve over a short period of time of unclear duration. Since that time she had been doing well. She reports that there has been no new medication started. No new stressors. No recent trauma. She is also concerned about some swelling she has felt on the posterior occiput on the left. From a review of the medical records, it appears that she was last seen by Dr. Hemanth Melendez approximately 2 years ago in the neurology office. She was seen for headaches at that time and placed on a muscle relaxant. Past Medical History:   Diagnosis Date    Arrhythmia     Afib, sees Dr Sridevi Rodriguez Autoimmune disease [facility])     Crohns julito    CAD (coronary artery disease)     Chronic pain     Lt and Rt back:  Dr Cecil Gasca;  Pain mgmt Merl Jointer [facility]    Cough     evaluated 8/25/14 by Dr Zenia Morrow ([phone]) \". . possible drug related lung is due to Methitrexate or atypical or fungal infection\" per office note dated 8/25/14    Crohn's disease [facility])     Dr Calvin Bui Ill-defined condition     migraines    Ill-defined condition     lt lung higher than normal so puts pressure on diaphragm increasing SOB    Lupus ([facility]     Dr Roxie Camarillo [phone]    Pain from implanted hardware 2016    Exploration of sternal incision with removal of protruding sternal wires    Stenosis of both carotid arteries without cerebral infarction     Stroke [facility]) 2016    TIA's     SVC obstruction     resolved after surgery    Thromboembolus ([address] 2016    PE right lower lung, spontaneous pulm dr christal Palomares        Past Surgical History:   Procedure Laterality Date    CARDIAC SURG PROCEDURE UNLIST  , 2018, 10/18    ablation    COLONOSCOPY N/A 10/5/2018    COLONOSCOPY performed by Lisbeth Mitchell.  Sandra Weinstein MD at [facility] ENDOSCOPY     [address] HX  SECTION      x1    HX COLECTOMY  7/24/10    colon resection-18\" removed; Dr Ricco Najera GI      bowel adhesions removed    HX GI      Bowel resection    HX GI  2019    stent for anal fistula     HX HEART CATHETERIZATION      no stents, open heart surgery 2016    HX HEENT      sinus surgery for deviated septum    HX HYSTERECTOMY      partial    HX LEFT SALPINGO-OOPHORECTOMY      ovary and fallopian tube removed    HX MOHS PROCEDURES Right approx     with bone spur repair in shoulder    HX OTHER SURGICAL  2016    femerol vein removed    HX OTHER SURGICAL  2016    open heart for repair of blocked SVC    HX TONSILLECTOMY      HX VASCULAR ACCESS  6/11/10    portacath insertion and removal x 3; Gets Remicaid q5 weeks    [facility] COLONOSCOPY W/BIOPSY SINGLE/MULTIPLE  2013             Family History   Problem Relation Age of Onset    Cancer Father         stomach    Other Mother         Auto accident    Cancer Paternal Grandmother         breast    Breast Cancer Paternal Grandmother         not sure of age   Jack Breast Cancer Maternal Aunt         not sure of age        Social History     Tobacco Use    Smoking status: Former Smoker     Packs/day: 0.50     Years: 2.50     Pack years: 1.25     Last attempt to quit: 3/29/2005     Years since quitting: 15.4    Smokeless tobacco: Never Used    Tobacco comment: social   Substance Use Topics    Alcohol use: Yes     Comment: Rare wine        Allergies   Allergen Reactions    Chlorhexidine Towelette Rash    Codeine Hives and Nausea and Vomiting     Severe nausea & vomiting    Hydrocodone Hives and Nausea and Vomiting     Severe nausea & vomiting    Oxycontin [Oxycodone] Hives and Nausea and Vomiting     Severe nausea & vomiting, also has the same reaction from Percocet    Percocet [Oxycodone-Acetaminophen] Hives and Nausea and Vomiting     Can take tylenol    Dilaudid [Hydromorphone (Bulk)] Swelling    Prednisone Other (comments)     HX OF CROHN'S; not allergic, prefer not to use         Prior to Admission medications    Medication Sig Start Date End Date Taking? Authorizing Provider   apixaban (ELIQUIS) 2.5 mg tablet Take 1 Tab by mouth two (2) times a day. Resume on Monday 10/21/19  Indications: PE 10/18/19   Ina Bush NP   ustekinumab (USTEKINAUMAB) 90 mg/mL injection 90 mg by SubCUTAneous route every two (2) months. Provider, Historical   tiZANidine (ZANAFLEX) 2 mg tablet 1 po qam and 2 po qhs 7/26/19   Michael Robb MD   gabapentin (NEURONTIN) 300 mg capsule TAKE ONE CAPSULE BY MOUTH THREE TIMES A DAY 12/3/18   Natali Castellanos MD   aspirin 81 mg chewable tablet Take 81 mg by mouth daily. Provider, Historical   butalbital-acetaminophen-caffeine (FIORICET, ESGIC) -40 mg per tablet Take 2 Tabs by mouth every eight (8) hours as needed for Pain or Headache. Max Daily Amount: 6 Tabs. MUST LAST 30 DAYS. Indications: MIGRAINE, TENSION-TYPE HEADACHE 9/13/17   Natali Castellanos MD   promethazine (PHENERGAN) 25 mg tablet Take 25 mg by mouth every six (6) hours as needed for Nausea. Provider, Historical   diazepam (VALIUM) 5 mg tablet Take 1 Tab by mouth every twelve (12) hours as needed for Anxiety. Max Daily Amount: 10 mg.  Refill at 1 month intervals 8/31/16   Natali Castellanos MD hydroxychloroquine (PLAQUENIL) 200 mg tablet Take 400 mg by mouth daily (after dinner). For lupus    Provider, Historical   ondansetron hcl (ZOFRAN) 4 mg tablet Take 4 mg by mouth every eight (8) hours as needed for Nausea. Other, MD Melisa   drospirenone-ethinyl estradiol (MARIELOS 28) 3-20 mg-mcg per tablet Take 1 Tab by mouth nightly.     Provider, Historical     Current Facility-Administered Medications   Medication Dose Route Frequency    apixaban (ELIQUIS) tablet 2.5 mg  2.5 mg Oral BID    aspirin chewable tablet 81 mg  81 mg Oral DAILY    butalbital-acetaminophen-caffeine (FIORICET, ESGIC) -40 mg per tablet 2 Tab  2 Tab Oral Q4H PRN    diazePAM (VALIUM) tablet 5 mg  5 mg Oral Q12H PRN    gabapentin (NEURONTIN) capsule 300 mg  300 mg Oral TID    hydrOXYchloroQUINE (PLAQUENIL) tablet 400 mg  400 mg Oral PCD    tiZANidine (ZANAFLEX) tablet 2 mg  2 mg Oral Q6H PRN    acetaminophen (TYLENOL) tablet 650 mg  650 mg Oral Q4H PRN    Or    acetaminophen (TYLENOL) solution 650 mg  650 mg Per NG tube Q4H PRN    Or    acetaminophen (TYLENOL) suppository 650 mg  650 mg Rectal Q4H PRN    aspirin chewable tablet 81 mg  81 mg Oral DAILY    labetaloL (NORMODYNE;TRANDATE) injection 5 mg  5 mg IntraVENous Q10MIN PRN    atorvastatin (LIPITOR) tablet 40 mg  40 mg Oral QHS    docusate sodium (COLACE) capsule 100 mg  100 mg Oral BID    ondansetron (ZOFRAN) injection 4 mg  4 mg IntraVENous Q4H PRN       Review of Systems:    General, constitutional: Head pain  Eyes, vision: negative  Ears, nose, throat: negative  Cardiovascular, heart: negative  Respiratory: negative  Gastrointestinal: negative  Genitourinary: negative  Musculoskeletal: negative  Skin and integumentary: negative  Psychiatric: negative  Endocrine: negative  Neurological: negative, except for HPI  Hematologic/lymphatic: negative  Allergy/immunology: negative    Objective:     Visit Vitals  /71   Pulse 85   Temp 98.6 °F (37 °C)   Resp 16   Ht 5' 1\" (1.549 m)   Wt 180 lb (81.6 kg)   SpO2 100%   BMI 34.01 kg/m²       Physical Exam:  General:  appears well nourished in no acute distress  Neck: no carotid bruits  Lungs: clear to auscultation  Heart:  no murmurs, regular rate  Lower extremity: peripheral pulses palpable and no edema  Skin: intact. She is tender over the greater occipital nerve exit foramen on the left    Neurological exam:    Awake, alert, oriented to person, place and time  Recent and remote memory were normal  Attention and concentration were intact  Language was intact. There was no aphasia  Speech: no dysarthria  Fund of knowledge was preserved    Cranial nerves:   II-XII were tested    Perrrla  Fundoscopic examination revealed venous pulsations and no clear abnormalities  Visual fields were full  Eomi, no evidence of nystagmus  Facial sensation: She reports that sensation is only 25% in her left V1 to V3  Facial motor: normal and symmetric  Hearing intact  SCM strength intact  Tongue: midline without fasciculations    Motor: Tone normal  No pronator drift    No evidence of fasciculations    Strength testing:   deltoid triceps biceps Wrist ext. Wrist flex. intrinsics Hip flex. Hip ext. Knee ext. Knee flex Dorsi flex Plantar flex   Right 4 4 4 4 4 4 4 4 4 4 4 4   Left 5 5 5 5 5 5 5 5 5 5 5 5       Pain does limit her ability to sustain a contraction in her left upper and lower extremity. Sensory:  Upper extremity: Pinprick is only 25% in the left upper extremity. Vibration was normal  Lower extremity: Prick is only 10% in the left lower extremity. Vibration is normal    Reflexes:    Right Left  Biceps  2 2  Triceps 2 2  Brachiorad. 2 2  Patella  2 2  Achilles 2 2    Plantar response:  flexor bilaterally      Cerebellar testing:  no tremor apparent, finger/nose and constance were intact.   She is slightly slower in the left upper extremity    Gait was not assessed due to concerns over safety  Labs:     Lab Results   Component Value Date/Time Hemoglobin A1c 5.0 06/30/2016 03:30 PM    Sodium 137 08/25/2020 04:07 AM    Potassium 3.6 08/25/2020 04:07 AM    Chloride 103 08/25/2020 04:07 AM    Glucose 102 (H) 08/25/2020 04:07 AM    BUN 9 08/25/2020 04:07 AM    Creatinine 0.99 08/25/2020 04:07 AM    Calcium 9.5 08/25/2020 04:07 AM    WBC 11.6 (H) 08/24/2020 05:20 PM    HCT 40.1 08/24/2020 05:20 PM    HGB 12.9 08/24/2020 05:20 PM    PLATELET 566 65/23/3963 05:20 PM       Imaging:    Results from [place] encounter on 01/31/19   MRI ENTEROGRAPHY W WO CONT    Narrative MRI OF THE ABDOMEN AND PELVIS  WITH AND WITHOUT CONTRAST. - ENTEROGRAPHY. INDICATION: Crohn's disease of large intestine with rectal bleeding. Recent  perianal ulcer and status post ileocolectomy x2 with recent mild active ileitis  in the small bowel at the ileocolonic anastomosis at colonoscopy. COMPARISON: MRI pelvis 10/21/2018. CT abdomen 7/31/2014. TECHNIQUE: Multisequence and multiplanar MRI of the abdomen and pelvis was  performed after the administration of 18 mL of Dotarem IV contrast and Volumen  negative oral contrast. Images were obtained before and after IV contrast  administration. 0.4 mg IV glucagon was administered as well. FINDINGS:     The examination is overall mildly compromised by motion artifact. There is partially adequate oral contrast distention of small bowel. Oral  contrast is present within the colon as well. Ileocolonic anastomosis is noted  at about the mid transverse colon level in the anterior midline of the abdomen. There is no identified large or small bowel wall thickening, abnormal  enhancement, or stricture or dilation. No extraluminal collection, fistula  tract, or sharp bowel angulation is identified. No free fluid or free air is  seen. For sequences performed, the visualized liver, spleen, gallbladder, pancreas,  adrenal glands, and kidneys are unremarkable.     Within the pelvis the uterus and ovaries and urinary bladder appear  unremarkable. The visualized lower chest is unremarkable. Surrounding musculoskeletal and soft  tissue structures are unremarkable apart from anterior abdominal wall surgical  incisional change and some mild degenerative spine change. Impression IMPRESSION:No identified active inflammatory changes by MRI in this patient  status post ileocolonic resection and anastomosis. The previously seen  inflammatory changes posterior to the anus are not well evaluated on this exam.       Results from Hospital Encounter encounter on 08/24/20   CT HEAD WO CONT    Narrative EXAM: CT HEAD WO CONT    INDICATION: left-sided weakness x 2 days    COMPARISON: None. CONTRAST: None. TECHNIQUE: Unenhanced CT of the head was performed using 5 mm images. Brain and  bone windows were generated. Coronal and sagittal reformats. CT dose reduction  was achieved through use of a standardized protocol tailored for this  examination and automatic exposure control for dose modulation. FINDINGS:  The ventricles and sulci are normal in size, shape and configuration. . There is  no significant white matter disease. There is no intracranial hemorrhage,  extra-axial collection, or mass effect. The basilar cisterns are open. No CT  evidence of acute infarct. The bone windows demonstrate no abnormalities. The visualized portions of the  paranasal sinuses and mastoid air cells are clear. Impression IMPRESSION:       No acute abnormality     I did independently review the head CT from 8/24/2020. There is no acute abnormalities    LDL 86  TSH 5.54  Triglycerides 162      Assessment and Plan:    The patient is a pleasant 59-year-old femal with multiple medical conditions as noted above who presents with worsening headache and left-sided sensory disturbance and weakness. Her examination is limited due to pain but suggestive of weakness and sensory disturbance in her left upper and lower extremity.     Headache and left-sided weakness and sensory symptoms:    The differential for this does include an acute stroke versus a complicated migraine. She does have risk factors for stroke including atrial fibrillation  Continue anticoagulation  Mild dyslipidemia: LDL 86.   I would recommend starting statin therapy  Goal blood pressure is less than 140  Please obtain carotid Doppler study  MRI should be performed  Echocardiogram pending    Continue home medications for headaches including tizanidine and gabapentin      Neurology will continue to follow along closely during hospitalization     Active Problems:    Left leg weakness (8/24/2020)                   Signed By:  Mk Farrell DO FAAN    August 25, 2020

## 2020-08-25 NOTE — PROGRESS NOTES
Bedside shift change report given to [facility] AT SREEDHAR CHO RN (oncoming nurse) by Pollo Davenport RN (offgoing nurse). Report included the following information SBAR, Kardex, MAR and Quality Measures. Zone Phone:   [phone]      Significant changes during shift:  Seen by PT/OT/ speech, neuro. MRI, echo, duplex done.  Still complaints of headache        Patient Information    Brayan Hardy  54 y.o.  8/24/2020  7:24 PM by Brigid Hernandez MD. 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    Shortness of breath 07/19/2016    SVC obstruction 07/05/2016    Transient ischemic attack involving right internal carotid artery 04/25/2016    Acute ischemic stroke ([address] 04/22/2016    Lupus ([address] 04/22/2016    Pulmonary embolism ([address] 04/22/2016    Cervico-occipital neuralgia of right side 02/05/2016    Stenosis of both carotid arteries without infarction 02/05/2016    Ulnar neuropathy at elbow of right upper extremity 05/21/2015    Cervical radiculopathy 05/21/2015    SLE (systemic lupus erythematosus) ([address] 03/26/2015    Tension vascular headache 03/26/2015    Migraine with aura and without status migrainosus, not intractable 03/26/2015    Myopathy 03/26/2015    Vitamin D deficiency 03/26/2015    Back pain 03/26/2015    Lumbar radiculopathy 03/26/2015    Spondylolisthesis, grade 1, L4-L5 03/26/2015    Weakness 03/26/2015    Sinus tachycardia 08/01/2014    Bronchitis, acute 08/01/2014    Abdominal pain, acute, right upper quadrant 08/01/2014    Acute GI bleeding 08/15/2013    Crohn's disease of colon ([facility] 07/26/2010    Crohn's disease of ileum ([facility] 07/26/2010     Past Medical History:   Diagnosis Date    Arrhythmia     Afib, sees Dr Dale Armijo Autoimmune disease ([facility]     Crohns julito    CAD (coronary artery disease)     Chronic pain     Lt and Rt back:  Dr Heather Velez;  Pain mgmt Chelsey Littlejohn [facility]    Cough     evaluated 8/25/14 by Dr Clement Ellis ([phone]) \". . possible drug related lung is due to Methitrexate or atypical or fungal infection\" per office note dated 8/25/14    Crohn's disease [facility])     Dr Karl Steward Ill-defined condition     migraines    Ill-defined condition     lt lung higher than normal so puts pressure on diaphragm increasing SOB    Lupus ([facility]     Dr Ghada Fernandes [phone]    Pain from implanted hardware 12/14/2016    Exploration of sternal incision with removal of protruding sternal wires    Stenosis of both carotid arteries without cerebral infarction     Stroke [facility]) 06/2016    TIA's     SVC obstruction 2016    resolved after surgery    Thromboembolus ([facility] 04/2016    PE right lower lung, spontaneous pulm dr christal Brown Reusing         Core Measures:    CVA: Yes Yes  CHF:No No  PNA:No No    Post Op Surgical (If Applicable):     Number times ambulated in hallway past shift:  1  Number of times OOB to chair past shift:   0  NG Tube: No  Incentive Spirometer: No  Drains: No   Volume  0  Dressing Present:  No  Flatus:  Yes    Activity Status:    OOB to Chair No  Ambulated this shift Yes   Bed Rest No    Supplemental O2: (If Applicable)    NC No  NRB No  Venti-mask No  On 0 Liters/min      LINES AND DRAINS:    Central Line? No Placement date 0 Reason Medically Necessary 0    PICC LINE? No Placement date 0Reason Medically Necessary 0    Urinary Catheter?  No Placement Date 0 Reason Medically Necessary 0    DVT prophylaxis:    DVT prophylaxis Med- No  DVT prophylaxis SCD or GAUDENCIO- No Wounds: (If Applicable)    Wounds- No    Location 0    Patient Safety:    Falls Score Total Score: 3  Safety Level_______  Bed Alarm On? Yes  Sitter?  No    Plan for upcoming shift: safety, reduce pain        Discharge Plan: No TBD    Active Consults:  IP CONSULT TO HOSPITALIST  IP CONSULT TO NEUROLOGY

## 2020-08-25 NOTE — PROGRESS NOTES
Bedside shift change report given to Wiley Cabrera RN (oncoming nurse) by [facility] AT [facility] ([facility]going nurse). Report included the following information SBAR, Kardex, MAR and Quality Measures.     Zone Phone:   [phone]      Significant changes during shift:  New admit        Patient Information    Em Newman  54 y.o.  8/24/2020  7:24 PM by Charlotte Werner MD. 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    Shortness of breath 07/19/2016    SVC obstruction 07/05/2016    Transient ischemic attack involving right internal carotid artery 04/25/2016    Acute ischemic stroke ([address] 04/22/2016    Lupus ([address] 04/22/2016    Pulmonary embolism ([address] 04/22/2016    Cervico-occipital neuralgia of right side 02/05/2016    Stenosis of both carotid arteries without infarction 02/05/2016    Ulnar neuropathy at elbow of right upper extremity 05/21/2015    Cervical radiculopathy 05/21/2015    SLE (systemic lupus erythematosus) ([address] 03/26/2015    Tension vascular headache 03/26/2015    Migraine with aura and without status migrainosus, not intractable 03/26/2015    Myopathy 03/26/2015    Vitamin D deficiency 03/26/2015    Back pain 03/26/2015    Lumbar radiculopathy 03/26/2015    Spondylolisthesis, grade 1, L4-L5 03/26/2015    Weakness 03/26/2015    Sinus tachycardia 08/01/2014    Bronchitis, acute 08/01/2014    Abdominal pain, acute, right upper quadrant 08/01/2014    Acute GI bleeding 08/15/2013    Crohn's disease of colon ([facility] 07/26/2010    Crohn's disease of ileum ([facility] 07/26/2010     Past Medical History:   Diagnosis Date    Arrhythmia     Afib, sees Dr Marit Robles Autoimmune disease ([facility]     Crohns julito    CAD (coronary artery disease)     Chronic pain     Lt and Rt back:  Dr Jaime Brewer;  Pain mgmt [facility] Beck [facility]    Cough     evaluated 8/25/14 by Dr Beckie Mahmood ([phone]) \". . possible drug related lung is due to Methitrexate or atypical or fungal infection\" per office note dated 8/25/14    Crohn's disease [facility])     Dr Alex Huertas Ill-defined condition     migraines    Ill-defined condition     lt lung higher than normal so puts pressure on diaphragm increasing SOB    Lupus ([facility]     Dr Kingston Petersen [phone]    Pain from implanted hardware 12/14/2016    Exploration of sternal incision with removal of protruding sternal wires    Stenosis of both carotid arteries without cerebral infarction     Stroke [facility]) 06/2016    TIA's     SVC obstruction 2016    resolved after surgery    Thromboembolus ([facility] 04/2016    PE right lower lung, spontaneous pulm dr christal Yu         Core Measures:    CVA: Yes Yes  CHF:No No  PNA:No No    Post Op Surgical (If Applicable):     Number times ambulated in hallway past shift:  1  Number of times OOB to chair past shift:   0  NG Tube: No  Incentive Spirometer: No  Drains: No   Volume  0  Dressing Present:  No  Flatus:  Yes    Activity Status:    OOB to Chair No  Ambulated this shift Yes   Bed Rest No    Supplemental O2: (If Applicable)    NC No  NRB No  Venti-mask No  On 0 Liters/min      LINES AND DRAINS:    Central Line? No Placement date 0 Reason Medically Necessary 0    PICC LINE? No Placement date 0Reason Medically Necessary 0    Urinary Catheter?  No Placement Date 0 Reason Medically Necessary 0    DVT prophylaxis:    DVT prophylaxis Med- No  DVT prophylaxis SCD or GAUDENCIO- No     Wounds: (If Applicable)    Wounds- No    Location 0    Patient Safety:    Falls Score Total Score: 1  Safety Level_______  Bed Alarm On? Yes  Sitter?  No    Plan for upcoming shift: Neuro consult, echo, duplex        Discharge Plan: No TBD    Active Consults:  IP CONSULT TO HOSPITALIST  IP CONSULT TO NEUROLOGY

## 2020-08-25 NOTE — PROGRESS NOTES
TRANSFER - IN REPORT:    Verbal report received from Ana Paula(name) on [facility]  being received from ED(unit) for routine progression of care      Report consisted of patients Situation, Background, Assessment and   Recommendations(SBAR). Information from the following report(s) SBAR was reviewed with the receiving nurse. Opportunity for questions and clarification was provided. Assessment completed upon patients arrival to unit and care assumed.

## 2020-08-26 VITALS
OXYGEN SATURATION: 97 % | RESPIRATION RATE: 18 BRPM | HEART RATE: 86 BPM | HEIGHT: 61 IN | BODY MASS INDEX: 33.96 KG/M2 | DIASTOLIC BLOOD PRESSURE: 69 MMHG | WEIGHT: 179.9 LBS | TEMPERATURE: 98.1 F | SYSTOLIC BLOOD PRESSURE: 153 MMHG

## 2020-08-26 LAB
BASOPHILS # BLD: 0 K/UL (ref 0–0.1)
BASOPHILS NFR BLD: 0 % (ref 0–1)
DIFFERENTIAL METHOD BLD: ABNORMAL
EOSINOPHIL # BLD: 0.2 K/UL (ref 0–0.4)
EOSINOPHIL NFR BLD: 2 % (ref 0–7)
ERYTHROCYTE [DISTWIDTH] IN BLOOD BY AUTOMATED COUNT: 13.8 % (ref 11.5–14.5)
ERYTHROCYTE [SEDIMENTATION RATE] IN BLOOD: 42 MM/HR (ref 0–30)
HCT VFR BLD AUTO: 39.5 % (ref 35–47)
HGB BLD-MCNC: 12.9 G/DL (ref 11.5–16)
IMM GRANULOCYTES # BLD AUTO: 0 K/UL (ref 0–0.04)
IMM GRANULOCYTES NFR BLD AUTO: 0 % (ref 0–0.5)
LYMPHOCYTES # BLD: 3.1 K/UL (ref 0.8–3.5)
LYMPHOCYTES NFR BLD: 26 % (ref 12–49)
MCH RBC QN AUTO: 27 PG (ref 26–34)
MCHC RBC AUTO-ENTMCNC: 32.7 G/DL (ref 30–36.5)
MCV RBC AUTO: 82.6 FL (ref 80–99)
MONOCYTES # BLD: 0.7 K/UL (ref 0–1)
MONOCYTES NFR BLD: 6 % (ref 5–13)
NEUTS SEG # BLD: 7.8 K/UL (ref 1.8–8)
NEUTS SEG NFR BLD: 66 % (ref 32–75)
NRBC # BLD: 0 K/UL (ref 0–0.01)
NRBC BLD-RTO: 0 PER 100 WBC
PLATELET # BLD AUTO: 366 K/UL (ref 150–400)
PMV BLD AUTO: 11 FL (ref 8.9–12.9)
RBC # BLD AUTO: 4.78 M/UL (ref 3.8–5.2)
WBC # BLD AUTO: 11.9 K/UL (ref 3.6–11)

## 2020-08-26 PROCEDURE — 99218 HC RM OBSERVATION: CPT

## 2020-08-26 PROCEDURE — 74011000258 HC RX REV CODE- 258: Performed by: PSYCHIATRY & NEUROLOGY

## 2020-08-26 PROCEDURE — 85025 COMPLETE CBC W/AUTO DIFF WBC: CPT

## 2020-08-26 PROCEDURE — 85652 RBC SED RATE AUTOMATED: CPT

## 2020-08-26 PROCEDURE — 74011250637 HC RX REV CODE- 250/637: Performed by: INTERNAL MEDICINE

## 2020-08-26 PROCEDURE — 94760 N-INVAS EAR/PLS OXIMETRY 1: CPT

## 2020-08-26 PROCEDURE — 97110 THERAPEUTIC EXERCISES: CPT

## 2020-08-26 PROCEDURE — 99226 PR SBSQ OBSERVATION CARE/DAY 35 MINUTES: CPT | Performed by: PSYCHIATRY & NEUROLOGY

## 2020-08-26 PROCEDURE — 74011000250 HC RX REV CODE- 250: Performed by: PSYCHIATRY & NEUROLOGY

## 2020-08-26 PROCEDURE — 97116 GAIT TRAINING THERAPY: CPT

## 2020-08-26 PROCEDURE — 74011250637 HC RX REV CODE- 250/637: Performed by: GENERAL ACUTE CARE HOSPITAL

## 2020-08-26 PROCEDURE — 36415 COLL VENOUS BLD VENIPUNCTURE: CPT

## 2020-08-26 RX ADMIN — GABAPENTIN 400 MG: 100 CAPSULE ORAL at 08:38

## 2020-08-26 RX ADMIN — APIXABAN 2.5 MG: 2.5 TABLET, FILM COATED ORAL at 08:37

## 2020-08-26 RX ADMIN — ASPIRIN 81 MG CHEWABLE TABLET 81 MG: 81 TABLET CHEWABLE at 08:38

## 2020-08-26 RX ADMIN — SODIUM CHLORIDE 750 MG: 9 INJECTION, SOLUTION INTRAVENOUS at 14:19

## 2020-08-26 NOTE — PROGRESS NOTES
Bedside shift change report given to [address] (oncoming nurse) by Cassie Wooten RN (offgoing nurse). Report included the following information SBAR, Kardex, MAR and Quality Measures.     Zone Phone:   [phone]      Significant changes during shift:  Discharge orders        Patient Information    Jo Ann Cochran  54 y.o.  8/24/2020  7:24 PM by Deangelo Powers MD. Jo Ann Cochran was admitted from Home    Problem List    Patient Active Problem List    Diagnosis Date Noted    Left leg weakness 08/24/2020    Rectal bleed 10/17/2019    Severe obesity ([address] 10/10/2018    Hypotension due to drugs 06/22/2018    SVT (supraventricular tachycardia) ([address] 06/21/2018    Cerebral microvascular disease 02/19/2018    Dizziness and giddiness 02/19/2018    Altered mental status, unspecified 02/19/2018    Paroxysmal SVT (supraventricular tachycardia) ([address] 02/01/2018    Inappropriate sinus tachycardia 09/11/2017    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    Shortness of breath 07/19/2016    SVC obstruction 07/05/2016    Transient ischemic attack involving right internal carotid artery 04/25/2016    Acute ischemic stroke ([address] 04/22/2016    Lupus ([address] 04/22/2016    Pulmonary embolism ([address] 04/22/2016    Cervico-occipital neuralgia of right side 02/05/2016    Stenosis of both carotid arteries without infarction 02/05/2016    Ulnar neuropathy at elbow of right upper extremity 05/21/2015    Cervical radiculopathy 05/21/2015    SLE (systemic lupus erythematosus) ([address] 03/26/2015    Tension vascular headache 03/26/2015    Migraine with aura and without status migrainosus, not intractable 03/26/2015    Myopathy 03/26/2015    Vitamin D deficiency 03/26/2015    Back pain 03/26/2015    Lumbar radiculopathy 03/26/2015    Spondylolisthesis, grade 1, L4-L5 03/26/2015    Weakness 03/26/2015    Sinus tachycardia 08/01/2014    Bronchitis, acute 08/01/2014    Abdominal pain, acute, right upper quadrant 08/01/2014    Acute GI bleeding 08/15/2013  Crohn's disease of colon ([facility] 07/26/2010    Crohn's disease of ileum ([facility] 07/26/2010     Past Medical History:   Diagnosis Date    Arrhythmia     Afib, sees Dr Kalin Nails Autoimmune disease ([facility]     Crohns julito    CAD (coronary artery disease)     Chronic pain     Lt and Rt back:  Dr Carmen Whitmore;  Pain mgmt Anell Hammed [facility]    Cough     evaluated 8/25/14 by Dr Danny Duran ([phone]) \". . possible drug related lung is due to Methitrexate or atypical or fungal infection\" per office note dated 8/25/14    Crohn's disease [facility])     Dr Yvette Gallagher Ill-defined condition     migraines    Ill-defined condition     lt lung higher than normal so puts pressure on diaphragm increasing SOB    Lupus ([facility]     Dr David Ravi [phone]    Pain from implanted hardware 12/14/2016    Exploration of sternal incision with removal of protruding sternal wires    Stenosis of both carotid arteries without cerebral infarction     Stroke [facility]) 06/2016    TIA's     SVC obstruction 2016    resolved after surgery    Thromboembolus ([facility] 04/2016    PE right lower lung, spontaneous pulm dr christal Cosme Corpus         Core Measures:    CVA: Yes Yes  CHF:No No  PNA:No No    Post Op Surgical (If Applicable):     Number times ambulated in hallway past shift:  1  Number of times OOB to chair past shift:   0  NG Tube: No  Incentive Spirometer: No  Drains: No   Volume  0  Dressing Present:  No  Flatus:  Yes    Activity Status:    OOB to Chair yes  Ambulated this shift Yes   Bed Rest No    Supplemental O2: (If Applicable)    NC No  NRB No  Venti-mask No  On 0 Liters/min      LINES AND DRAINS:PIV    DVT prophylaxis:    DVT prophylaxis Med- No  DVT prophylaxis SCD or GAUDENCIO- No     Wounds: (If Applicable)    Wounds- No    Location 0    Patient Safety:    Falls Score Total Score: 2  Safety Level_______  Bed Alarm On? Yes  Sitter?  No    Plan for upcoming shift: [facility] discharge instructions and discharge        Discharge Plan:hoej with walker and HH    Active Consults:  IP CONSULT TO HOSPITALIST  IP CONSULT TO NEUROLOGY

## 2020-08-26 NOTE — PROGRESS NOTES
ONELIA Plan:     * [facility]      > Observation status addressed; State observation notice provided, copy on chart  > [facility] delivered requested DME (RW) 8/25/2020  > CM secured new-pt PCP apt for d/c; details reflected in AVS  >  to transport at d/c    Initial note: CM noted pt has been in observation status for an extended period of time; CM received update in IDR that pt is anticipated to d/c today. If pt doesn't d/c today, CM will consult with MD about changing status from observation to in-pt. CM received [facility] that [facility] at [facility]; CM placed accepting [place] agency's information in pt's AVS for reference. CM met with pt at bedside with list of BS providers in effort to secure new-pt PCP f/u apt for d/c. Pt identified [facility] as preferred office; pt identified no preference in provider at practice. CM secured new pt PCP f/u apt for d/c; details reflected in AVS for reference. CM confirmed [facility] delivered requested DME (RW) to pt's room 8/25/2020; CM placed [facility]'s information in pt's AVS for reference. CM confirmed pt is agreeable to [place] plan re: [facility]), new-pt PCP apt, & DME (RW) provided/delivered by [facility]. Pt's  will provide transportation at d/c. No further CM needs identified. CM notified pt's nurse of d/c. Care Management Interventions  PCP Verified by CM: Yes(new pt PCP apt secured for d/c)  Palliative Care Criteria Met (RRAT>21 & CHF Dx)?: No  Mode of Transport at Discharge:  Other (see comment)(Pt's  will transport at d/c)  Transition of Care Consult (CM Consult): Discharge Planning, [address]: No  Discharge Durable Medical Equipment: Yes(RW provided/delivered by [facility])  Physical Therapy Consult: Yes  Occupational Therapy Consult: Yes  Speech Therapy Consult: Yes  Current Support Network: Lives with Spouse, Own Home(Pt lives with her  in 2 story home with 4 BOLA)  Confirm Follow Up Transport: [address] Provided?: No  Discharge Location  Discharge Placement: Home with home health([facility] (PT/OT), f/u apts, & DME (RW) provided/delivered by [facility])    Caroline Medrano, [facility], [facility]  [phone]

## 2020-08-26 NOTE — PROGRESS NOTES
1800 discharge paperwork reviewed with patient and her . Aware of fu appointments.  Verbalized understanding with no further questions

## 2020-08-26 NOTE — DISCHARGE SUMMARY
Hospitalist Discharge Summary     Patient ID:  Yeimi Ellis  [id number]  54 y.o.  1965 8/24/2020    PCP on record: None    Admit date: 8/24/2020  Discharge date and time: 8/26/2020    DISCHARGE DIAGNOSIS:  Headache, left sided heaviness, left leg weakness - likely a complicated migraine, rule out CVA  SLE  Hx of provoked thrombus  Hx of SVC obstruction s/p surgery and graft  Hx of Atrial fibrillation?, rate controlled  Hx of Crohn's, not in flare  Leucocytosis most likely reactive , due to underlying autoimmune disease      CONSULTATIONS:  IP CONSULT TO HOSPITALIST  IP CONSULT TO NEUROLOGY    Excerpted HPI from H&P of Yahaira Lan MD:  Yeimi Ellis is a 54 y.o.  female who presents with CC listed above. Pt states that she has had a headache since early Saturday morning. Along with the headache, she is complaining of left sided \"heaviness. \" She also endorses left leg weakness. Pt endorses a history of migraines, for which she has seen Dr. Mikhail Mckeon of Neurology. She denies any issues with her vision, but believes that her left eye is \"heavy. \" Her headache envelops her whole head. She denies any fever, chills, or syncope. She also complains of a bump on the left side of her occipital region.     We were asked to admit for work up and evaluation of the above problems. ______________________________________________________________________  DISCHARGE SUMMARY/HOSPITAL COURSE:  for full details see H&P, daily progress notes, labs, consult notes.    Headache, left sided heaviness, left leg weakness - likely a complicated migraine, rule out CVA  SLE  Hx of provoked thrombus  Hx of SVC obstruction s/p surgery and graft  Hx of Atrial fibrillation?, rate controlled  Hx of Crohn's, not in flare  Leucocytosis   Pt describes occipitales HA with sensation tender lump  Which seems like a cyst on the occipital area   CT scan and MRI brain negative   Lipid Panel showed LDL 86, A1c 5.2  Echo: Ef 55 % , no intracardiac shunt   Doppler US carotids negative   Treat HTN as MRI neg   Continue with ASA and Eliquis  Neurology Consult appreciated : likely complex migraine   Continue Lupus medications  Pt still c/o HA , gabapentin dose increased and prn fioricet   C/w zanaflex   S/p 2dose IV toradol   S/p valproate one dose which improved her HA   Pt is allergic to steroids ( her throat closes)   Need OP FU with neuro for long term management of her migraines   Esr 42 ( less than 50)  and cbc 11k  but she has underlying autoimmune disease ( SLE)  ,  UA showed 1+ bacteria but pt denies any urinary symptoms. No cough   Will need repeat CBC in a week  Pt has no PCP      _______________________________________________________________________  Patient seen and examined by me on discharge day. Pertinent Findings:  Gen:    Not in distress  Chest: Clear lungs  CVS:   Regular rhythm. No edema  Abd:  Soft, not distended, not tender  Neuro:  Alert, orientedx3  _______________________________________________________________________  DISCHARGE MEDICATIONS:   Current Discharge Medication List      CONTINUE these medications which have NOT CHANGED    Details   apixaban (ELIQUIS) 2.5 mg tablet Take 1 Tab by mouth two (2) times a day. Resume on Monday 10/21/19  Indications: PE      ustekinumab (USTEKINAUMAB) 90 mg/mL injection 90 mg by SubCUTAneous route every two (2) months. tiZANidine (ZANAFLEX) 2 mg tablet 1 po qam and 2 po qhs  Qty: 100 Tab, Refills: 0    Associated Diagnoses: Migraine with aura and without status migrainosus, not intractable; Stenosis of both carotid arteries without infarction; Cerebral microvascular disease; Cervico-occipital neuralgia of right side;  Tension vascular headache; Dizziness and giddiness      gabapentin (NEURONTIN) 300 mg capsule TAKE ONE CAPSULE BY MOUTH THREE TIMES A DAY  Qty: 100 Cap, Refills: 5    Associated Diagnoses: Cervical radiculopathy; Cerebrovascular disease, unspecified; Ulnar neuropathy at elbow of right upper extremity; Cervico-occipital neuralgia of right side; Stenosis of both carotid arteries without infarction      aspirin 81 mg chewable tablet Take 81 mg by mouth daily. butalbital-acetaminophen-caffeine (FIORICET, ESGIC) -40 mg per tablet Take 2 Tabs by mouth every eight (8) hours as needed for Pain or Headache. Max Daily Amount: 6 Tabs. MUST LAST 30 DAYS. Indications: MIGRAINE, TENSION-TYPE HEADACHE  Qty: 50 Tab, Refills: 2    Associated Diagnoses: Cervical radiculopathy; Cerebrovascular disease, unspecified; Ulnar neuropathy at elbow of right upper extremity; Cervico-occipital neuralgia of right side; Stenosis of both carotid arteries without infarction      promethazine (PHENERGAN) 25 mg tablet Take 25 mg by mouth every six (6) hours as needed for Nausea. diazepam (VALIUM) 5 mg tablet Take 1 Tab by mouth every twelve (12) hours as needed for Anxiety. Max Daily Amount: 10 mg. Refill at 1 month intervals  Qty: 30 Tab, Refills: 2    Associated Diagnoses: Cervical radiculopathy; Cerebrovascular disease, unspecified; Ulnar neuropathy at elbow of right upper extremity; Cervico-occipital neuralgia of right side; Stenosis of both carotid arteries without infarction      hydroxychloroquine (PLAQUENIL) 200 mg tablet Take 400 mg by mouth daily (after dinner). For lupus      ondansetron hcl (ZOFRAN) 4 mg tablet Take 4 mg by mouth every eight (8) hours as needed for Nausea. drospirenone-ethinyl estradiol (MARIELOS 28) 3-20 mg-mcg per tablet Take 1 Tab by mouth nightly. Patient Follow Up Instructions:    Activity: Activity as tolerated  Diet: Regular Diet  [id number]}      Follow-up Information     Follow up With Specialties Details Why Contact Walt Dunbar NP Pediatric Medicine On 9/4/2020 For Virtual new patient appointment at 11:30AM  [address] [place] [address]  [phone]      Francesca Whitaker,[address]   This is your home health provider. If you don't hear from them within 24-48 hours please contact the office directly. [address] [place]  [phone]    [facility]   This is the provider of your rolling walker. Contact the office directly if you have any questions or concerns.  [address]  [phone]    None    None (395) Patient stated that they have no PCP          ________________________________________________________________    Risk of deterioration: Low    Condition at Discharge:  Stable  __________________________________________________________________    Disposition  Home with family, no needs    ____________________________________________________________________    Code Status: Full Code  ___________________________________________________________________      Total time in minutes spent coordinating this discharge (includes going over instructions, follow-up, prescriptions, and preparing report for sign off to her PCP) :  35 minutes    Signed:  Kevin Young MD

## 2020-08-26 NOTE — PROGRESS NOTES
Bedside shift change report given to, Idania Amin, RN (oncoming nurse) by Luly Pierce RN (offgoing nurse).  Report included the following information SBAR, Kardex, MAR and Quality Measures.     Zone Phone:   [phone]        Significant changes during shift:  None         Patient Information     Arias Rose  54 y.o.  8/24/2020  7:24 PM by Chinyere Barbosa MD. Arias Rose was admitted from Home     Problem List          Patient Active Problem List     Diagnosis Date Noted    Left leg weakness 08/24/2020    Rectal bleed 10/17/2019    Severe obesity ([address] 10/10/2018    Hypotension due to drugs 06/22/2018    SVT (supraventricular tachycardia) ([address] 06/21/2018    Cerebral microvascular disease 02/19/2018    Dizziness and giddiness 02/19/2018    Altered mental status, unspecified 02/19/2018    Paroxysmal SVT (supraventricular tachycardia) ([address] 02/01/2018    Inappropriate sinus tachycardia 09/11/2017    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    Shortness of breath 07/19/2016    SVC obstruction 07/05/2016    Transient ischemic attack involving right internal carotid artery 04/25/2016    Acute ischemic stroke ([address] 04/22/2016    Lupus ([address] 04/22/2016    Pulmonary embolism ([address] 04/22/2016    Cervico-occipital neuralgia of right side 02/05/2016    Stenosis of both carotid arteries without infarction 02/05/2016    Ulnar neuropathy at elbow of right upper extremity 05/21/2015    Cervical radiculopathy 05/21/2015    SLE (systemic lupus erythematosus) ([address] 03/26/2015    Tension vascular headache 03/26/2015    Migraine with aura and without status migrainosus, not intractable 03/26/2015    Myopathy 03/26/2015    Vitamin D deficiency 03/26/2015    Back pain 03/26/2015    Lumbar radiculopathy 03/26/2015    Spondylolisthesis, grade 1, L4-L5 03/26/2015    Weakness 03/26/2015    Sinus tachycardia 08/01/2014    Bronchitis, acute 08/01/2014    Abdominal pain, acute, right upper quadrant 08/01/2014    Acute GI bleeding 08/15/2013    Crohn's disease of colon ([facility] 07/26/2010    Crohn's disease of ileum ([facility] 07/26/2010          Past Medical History:   Diagnosis Date    Arrhythmia       Afib, sees Dr Dale Armijo Autoimmune disease [facility])       Crohns julito    CAD (coronary artery disease)      Chronic pain       Lt and Rt back:  Dr Heather Velez;  Pain mgmt Karen Mallory [facility]    Cough       evaluated 8/25/14 by Dr Clement Ellis ([phone]) \". . possible drug related lung is due to Methitrexate or atypical or fungal infection\" per office note dated 8/25/14    Crohn's disease [facility])       Dr Karl Steward Ill-defined condition       migraines    Ill-defined condition       lt lung higher than normal so puts pressure on diaphragm increasing SOB    Lupus [facility])       Dr Ghada Fernandes [phone]    Pain from implanted hardware 12/14/2016     Exploration of sternal incision with removal of protruding sternal wires    Stenosis of both carotid arteries without cerebral infarction      St[facility] 06/2016     TIA's     SVC obstruction 2016     resolved after surgery    Thromboembolus ([facility] 04/2016     PE right lower lung, spontaneous pulm dr christal Pang           Core Measures:     CVA: Yes Yes  CHF:No No  PNA:No No     Post Op Surgical (If Applicable):      Number times ambulated in hallway past shift:  1  Number of times OOB to chair past shift:   0  NG Tube: No  Incentive Spirometer: No  Drains: No   Volume  0  Dressing Present:  No  Flatus: Yes     Activity Status:     OOB to Chair No  Ambulated this shift Yes   Bed Rest No     Supplemental O2: (If Applicable)     NC No  NRB No  Venti-mask No  On 0 Liters/min        LINES AND DRAINS:     Central Line? No Placement date 0 Reason Medically Necessary 0     PICC LINE? No Placement date 0Reason Medically Necessary 0     Urinary Catheter?  No Placement Date 0 Reason Medically Necessary 0     DVT prophylaxis:     DVT prophylaxis Med- No  DVT prophylaxis SCD or GAUDENCIO- No      Wounds: (If Applicable)     Wounds- No     Location 0     Patient Safety:     Falls Score Total Score: 3  Safety Level_______  Bed Alarm On? Yes  Sitter?  No     Plan for upcoming shift: safety, reduce pain           Discharge Plan: No TBD     Active Consults:  IP CONSULT TO HOSPITALIST  IP CONSULT TO NEUROLOGY

## 2020-08-26 NOTE — CONSULTS
Neurology Note    Patient ID:  Alexandru Robles  [id number]  45 y.o.  1965      Date of Consultation:  August 26, 2020    Referring Physician: Dr. Tawanna Hernandez    Reason for Consultation:  Neurological symptoms. Subjective: My headache and left-sided symptoms are a bit better today       History of Present Illness:   Alexandru Robles is a 54 y.o. female multiple medical problems including Crohn's, A. fib, lupus who presented to the emergency department after having developed a headache and left-sided heaviness. She felt that her left leg was also weak. She does have a history of migraine headaches as well as reported TIA and sees Dr. Olga Braden. She did receive her MRI overnight. This a.m., the patient states that her headache is better but it is still persistent. She still also feels that there is some heaviness of her left upper extremity. From a review of the medical records, it appears that she was last seen by Dr. Raymond Morejon approximately 2 years ago in the neurology office. She was seen for headaches at that time and placed on a muscle relaxant. Past Medical History:   Diagnosis Date    Arrhythmia     Afib, sees Dr Gee Kumar Autoimmune disease [facility])     Crohns julito    CAD (coronary artery disease)     Chronic pain     Lt and Rt back:  Dr Severa Fu;  Pain mgmt [facility]    Cough     evaluated 8/25/14 by Dr Sil Martinez ([phone]) \". . possible drug related lung is due to Methitrexate or atypical or fungal infection\" per office note dated 8/25/14    Crohn's disease [facility])     Dr Elli Rothman Ill-defined condition     migraines    Ill-defined condition     lt lung higher than normal so puts pressure on diaphragm increasing SOB    Lupus ([facility] [address]     Dr Hillary Duffy [phone]    Pain from implanted hardware 12/14/2016    Exploration of sternal incision with removal of protruding sternal wires    Stenosis of both carotid arteries without cerebral infarction     Stroke [facility]) 06/2016 TIA's     SVC obstruction     resolved after surgery    Thromboembolus ([address] 2016    PE right lower lung, spontaneous pulm dr christal Foley        Past Surgical History:   Procedure Laterality Date    CARDIAC SURG PROCEDURE UNLIST  , 2018, 10/18    ablation    COLONOSCOPY N/A 10/5/2018    COLONOSCOPY performed by Viviana Samuel.  Jose Robles MD at [facility] ENDOSCOPY     [address] HX  SECTION      x1    HX COLECTOMY  7/24/10    colon resection-18\" removed; Dr Mabel Parks      bowel adhesions removed    HX GI      Bowel resection    HX GI  2019    stent for anal fistula     HX HEART CATHETERIZATION      no stents, open heart surgery 2016    HX HEENT      sinus surgery for deviated septum    HX HYSTERECTOMY      partial    HX LEFT SALPINGO-OOPHORECTOMY      ovary and fallopian tube removed    HX MOHS PROCEDURES Right approx     with bone spur repair in shoulder    HX OTHER SURGICAL  2016    femerol vein removed    HX OTHER SURGICAL  2016    open heart for repair of blocked SVC    HX TONSILLECTOMY      HX VASCULAR ACCESS  6/11/10    portacath insertion and removal x 3; Gets Remicaid q5 weeks    OR COLONOSCOPY W/BIOPSY SINGLE/MULTIPLE  2013             Family History   Problem Relation Age of Onset    Cancer Father         stomach    Other Mother         Auto accident    Cancer Paternal Grandmother         breast    Breast Cancer Paternal Grandmother         not sure of age   [facility] Breast Cancer Maternal Aunt         not sure of age        Social History     Tobacco Use    Smoking status: Former Smoker     Packs/day: 0.50     Years: 2.50     Pack years: 1.25     Last attempt to quit: 3/29/2005     Years since quitting: 15.4    Smokeless tobacco: Never Used    Tobacco comment: social   Substance Use Topics    Alcohol use: Yes     Comment: Rare wine        Allergies   Allergen Reactions    Chlorhexidine Towelette Rash    Codeine Hives and Nausea and Vomiting     Severe nausea & vomiting    Hydrocodone Hives and Nausea and Vomiting     Severe nausea & vomiting    Oxycontin [Oxycodone] Hives and Nausea and Vomiting     Severe nausea & vomiting, also has the same reaction from Percocet    Percocet [Oxycodone-Acetaminophen] Hives and Nausea and Vomiting     Can take tylenol    Dilaudid [Hydromorphone (Bulk)] Swelling    Prednisone Other (comments)     HX OF CROHN'S; not allergic, prefer not to use           Current Facility-Administered Medications   Medication Dose Route Frequency    valproate (DEPACON) 750 mg in 0.9% sodium chloride 50 mL IVPB  750 mg IntraVENous ONCE    apixaban (ELIQUIS) tablet 2.5 mg  2.5 mg Oral BID    butalbital-acetaminophen-caffeine (FIORICET, ESGIC) -40 mg per tablet 2 Tab  2 Tab Oral Q4H PRN    diazePAM (VALIUM) tablet 5 mg  5 mg Oral Q12H PRN    hydrOXYchloroQUINE (PLAQUENIL) tablet 400 mg  400 mg Oral PCD    tiZANidine (ZANAFLEX) tablet 2 mg  2 mg Oral Q6H PRN    acetaminophen (TYLENOL) tablet 650 mg  650 mg Oral Q4H PRN    Or    acetaminophen (TYLENOL) solution 650 mg  650 mg Per NG tube Q4H PRN    Or    acetaminophen (TYLENOL) suppository 650 mg  650 mg Rectal Q4H PRN    aspirin chewable tablet 81 mg  81 mg Oral DAILY    labetaloL (NORMODYNE;TRANDATE) injection 5 mg  5 mg IntraVENous Q10MIN PRN    atorvastatin (LIPITOR) tablet 40 mg  40 mg Oral QHS    docusate sodium (COLACE) capsule 100 mg  100 mg Oral BID    gabapentin (NEURONTIN) capsule 400 mg  400 mg Oral TID    ondansetron (ZOFRAN) injection 4 mg  4 mg IntraVENous Q4H PRN       Review of Systems:    General, constitutional: Head pain  Eyes, vision: negative  Ears, nose, throat: negative  Cardiovascular, heart: negative  Respiratory: negative  Gastrointestinal: negative  Genitourinary: negative  Musculoskeletal: negative  Skin and integumentary: negative  Psychiatric: negative  Endocrine: negative  Neurological: negative, except for HPI  Hematologic/lymphatic: negative  Allergy/immunology: negative    Objective:     Visit Vitals  /69   Pulse 86   Temp 98.1 °F (36.7 °C)   Resp 18   Ht 5' 1\" (1.549 m)   Wt 179 lb 14.3 oz (81.6 kg)   SpO2 97%   BMI 33.99 kg/m²       Physical Exam:  General:  appears well nourished in no acute distress  Neck: no carotid bruits  Lungs: clear to auscultation  Heart:  no murmurs, regular rate  Lower extremity: peripheral pulses palpable and no edema  Skin: intact. She is tender over the greater occipital nerve exit foramen on the left    Neurological exam:    Awake, alert, oriented to person, place and time  Recent and remote memory were normal  Attention and concentration were intact  Language was intact. There was no aphasia  Speech: no dysarthria  Fund of knowledge was preserved    Cranial nerves:   II-XII were tested    [employer] fields were full  Eomi, no evidence of nystagmus  Facial sensation: She reports that sensation is only 25% in her left V1 to V3  Facial motor: normal and symmetric  Hearing intact  SCM strength intact  Tongue: midline without fasciculations    Motor: Tone normal  No pronator drift    No evidence of fasciculations    Strength testing:   deltoid triceps biceps Wrist ext. Wrist flex. intrinsics Hip flex. Hip ext. Knee ext. Knee flex Dorsi flex Plantar flex   Right 4 4 4 4 4 4 4 4 4 4 4 4   Left 5 5 5 5 5 5 5 5 5 5 5 5       Pain and effort does limit her ability to sustain a contraction in her left upper and lower extremity. Sensory:  Upper extremity: Pinprick is only 25% in the left upper extremity. Vibration was normal  Lower extremity: Prick is only 10% in the left lower extremity. Vibration is normal    Reflexes:    Right Left  Biceps  2 2  Triceps 2 2  Brachiorad. 2 2  Patella  2 2  Achilles 2 2    Plantar response:  flexor bilaterally      Cerebellar testing:  no tremor apparent, finger/nose and constance were intact.   She is slightly slower in the left upper extremity however this may be slightly effort dependent    Gait was not assessed due to concerns over safety  Labs:     Lab Results   Component Value Date/Time    Hemoglobin A1c 5.2 08/25/2020 04:07 AM    Sodium 137 08/25/2020 04:07 AM    Potassium 3.6 08/25/2020 04:07 AM    Chloride 103 08/25/2020 04:07 AM    Glucose 102 (H) 08/25/2020 04:07 AM    BUN 9 08/25/2020 04:07 AM    Creatinine 0.99 08/25/2020 04:07 AM    Calcium 9.5 08/25/2020 04:07 AM    WBC 11.9 (H) 08/26/2020 09:44 AM    HCT 39.5 08/26/2020 09:44 AM    HGB 12.9 08/26/2020 09:44 AM    PLATELET 491 37/31/9257 09:44 AM       Imaging:    Results from Hospital Encounter encounter on 08/24/20   MRI BRAIN W WO CONT    Narrative EXAM:  MRI BRAIN W WO CONT    INDICATION:    stroke lorenzana    COMPARISON:  [place] [address] diffusion imaging does not show acute ischemic changes. There is no extra-axial fluid collection hemorrhage or shift. Nathaniel Ace CONTRAST: 20 ml Dotarem. TECHNIQUE:    Multiplanar multisequence acquisition without and with contrast of the brain. FINDINGS:  Diffusion imaging does not show acute ischemic changes. There is no extra-axial fluid collection hemorrhage or shift. There is no significant white matter disease. Ventricles are normal in size and midline. Flow voids in major vessels at the base of the brain are present. The enhancing lesion or masses her. Impression IMPRESSION: Negative examination and no change. Results from [place] encounter on 08/24/20   CT HEAD WO CONT    Narrative EXAM: CT HEAD WO CONT    INDICATION: left-sided weakness x 2 days    COMPARISON: None. CONTRAST: None. TECHNIQUE: Unenhanced CT of the head was performed using 5 mm images. Brain and  bone windows were generated. Coronal and sagittal reformats. CT dose reduction  was achieved through use of a standardized protocol tailored for this  examination and automatic exposure control for dose modulation.       FINDINGS:  The ventricles and sulci are normal in size, shape and configuration. . There is  no significant white matter disease. There is no intracranial hemorrhage,  extra-axial collection, or mass effect. The basilar cisterns are open. No CT  evidence of acute infarct. The bone windows demonstrate no abnormalities. The visualized portions of the  paranasal sinuses and mastoid air cells are clear. Impression IMPRESSION:       No acute abnormality     I did independently review the brain MRI from 8/25/2020. There is no acute stroke. LDL 86  TSH 5.54  Triglycerides 162      Assessment and Plan:    The patient is a pleasant 59-year-old female with multiple medical conditions as noted above who presents with worsening headache and left-sided sensory disturbance and weakness. Her examination is limited due to pain but suggestive of weakness and sensory disturbance in her left upper and lower extremity. 1. Headache and left-sided weakness and sensory symptoms:    Brain MRI is normal.  Given the persistence of symptoms in the head and left side, this is most likely related to a complicated migraine which has had a partial response to medications. She is unable to take steroids due to adverse effects or indomethacin as she has been told she has not allowed to take any anti-inflammatories due to her Crohn's disease  Carotid Doppler study was normal    Given this, I will give her a dose of IV Depakote to help to try to completely relieve her symptoms. She will follow-up with Dr. Dora Stephen as an outpatient after discharge in regards to prophylactic therapy for headaches if necessary. 2. She does have risk factors for stroke including atrial fibrillation  Continue anticoagulation  Mild dyslipidemia: LDL 86. Continue atorvastatin 40  Goal blood pressure is less than 140  Echocardiogram was performed.       Continue home medications for headaches including tizanidine and gabapentin      The patient should follow-up with her primary neurologist Dr. Beronica Lara approximately 3 to 4 weeks after discharge         Active Problems:    Left leg weakness (8/24/2020)               Signed By:  Yoni Crain DO FAAN    August 26, 2020

## 2020-08-26 NOTE — DISCHARGE INSTRUCTIONS
DISCHARGE DIAGNOSIS:  Headache, left sided heaviness, left leg weakness - likely a complicated migraine, rule out CVA  SLE  Hx of provoked thrombus  Hx of SVC obstruction s/p surgery and graft  Hx of Atrial fibrillation?, rate controlled  Hx of Crohn's, not in flare      MEDICATIONS:  · It is important that you take the medication exactly as they are prescribed. · Keep your medication in the bottles provided by the pharmacist and keep a list of the medication names, dosages, and times to be taken in your wallet. · Do not take other medications without consulting your doctor. Pain Management: per above medications    What to do at Home    Recommended diet:  Regular Diet    Recommended activity: Activity as tolerated    If you have questions regarding the hospital related prescriptions or hospital related issues please [facility] [place] [address] at . You can always direct your questions to your primary care doctor if you are unable to reach your hospital physician; your PCP works as an extension of your hospital doctor just like your hospital doctor is an extension of your PCP for your time at the hospital [facility]). If you experience any of the following symptoms then please call your primary care physician or return to the emergency room if you cannot get hold of your doctor:  Fever, chills, nausea, vomiting, diarrhea, change in mentation, falling, bleeding, shortness of breath  Patient Education        Migraine Headache: Care Instructions  Your Care Instructions  Migraines are painful, throbbing headaches that often start on one side of the head. They may cause nausea and vomiting and make you sensitive to light, sound, or smell. Without treatment, migraines can last from 4 hours to a few days. Medicines can help prevent migraines or stop them after they have started. Your doctor can help you find which ones work best for you. Follow-up care is a key part of your treatment and safety. Be sure to make and go to all appointments, and call your doctor if you are having problems. It's also a good idea to know your test results and keep a list of the medicines you take. How can you care for yourself at home? · Do not drive if you have taken a prescription pain medicine. · Rest in a quiet, dark room until your headache is gone. Close your eyes, and try to relax or go to sleep. Don't watch TV or read. · Put a cold, moist cloth or cold pack on the painful area for 10 to 20 minutes at a time. Put a thin cloth between the cold pack and your skin. · Use a warm, moist towel or a heating pad set on low to relax tight shoulder and neck muscles. · Have someone gently massage your neck and shoulders. · Take your medicines exactly as prescribed. Call your doctor if you think you are having a problem with your medicine. You will get more details on the specific medicines your doctor prescribes. · Don't take medicine for headache pain too often. Talk to your doctor if you are taking medicine more than 2 days a week to stop a headache. Taking too much pain medicine can lead to more headaches. These are called medicine-overuse headaches. To prevent migraines  · Keep a headache diary so you can figure out what triggers your headaches. Avoiding triggers may help you prevent headaches. Record when each headache began, how long it lasted, and what the pain was like. Write down any other symptoms you had with the headache, such as nausea, flashing lights or dark spots, or sensitivity to bright light or loud noise. Note if the headache occurred near your period. List anything that might have triggered the headache. Triggers may include certain foods (chocolate, cheese, wine) or odors, smoke, bright light, stress, or lack of sleep. · If your doctor has prescribed medicine for your migraines, take it as directed.  You may have medicine that you take only when you get a migraine and medicine that you take all the time to help prevent migraines. ? If your doctor has prescribed medicine for when you get a headache, take it at the first sign of a migraine, unless your doctor has given you other instructions. ? If your doctor has prescribed medicine to prevent migraines, take it exactly as prescribed. Call your doctor if you think you are having a problem with your medicine. · Find healthy ways to deal with stress. Migraines are most common during or right after stressful times. Try finding ways to reduce stress like practicing mindfulness or deep breathing exercises. · Get plenty of sleep and exercise. But be careful to not push yourself too hard during exercise. It may trigger a headache. · Eat meals on a regular schedule. Avoid foods and drinks that often trigger migraines. These include chocolate, alcohol (especially red wine and port), aspartame, monosodium glutamate (MSG), and some additives found in foods (such as hot dogs, cart[place]a, cold cuts, aged cheeses, and pickled foods). · Limit caffeine. Don't drink too much coffee, tea, or soda. But don't quit caffeine suddenly. That can also give you migraines. · Do not smoke or allow others to smoke around you. If you need help quitting, talk to your doctor about stop-smoking programs and medicines. These can increase your chances of quitting for good. · If you are taking birth control pills or hormone therapy, talk to your doctor about whether they are triggering your migraines. When should you call for help? [id number] anytime you think you may need emergency care. For example, call if:  · You have signs of a stroke. These may include:  ? Sudden numbness, paralysis, or weakness in your face, arm, or leg, especially on only one side of your body. ? Sudden vision changes. ? Sudden trouble speaking. ? Sudden confusion or trouble understanding simple statements. ? Sudden problems with walking or balance.   ? A sudden, severe headache that is different from past headaches. Call your doctor now or seek immediate medical care if:  · You have new or worse nausea and vomiting. · You have a new or higher fever. · Your headache gets much worse. Watch closely for changes in your health, and be sure to contact your doctor if:  · You are not getting better after 2 days (48 hours). Where can you learn more? Go to http://maria esther-sophia.info/  Enter S633 in the search box to learn more about \"Migraine Headache: Care Instructions. \"  Current as of: November 20, 2019               Content Version: 12.5  © 6708-7189 [employer], [employer]. Care instructions adapted under license by [employer] (which disclaims liability or warranty for this information). If you have questions about a medical condition or this instruction, always ask your healthcare professional. [address] any warranty or liability for your use of this information.

## 2020-08-26 NOTE — PROGRESS NOTES
Problem: Mobility Impaired (Adult and Pediatric)  Goal: *Acute Goals and Plan of Care (Insert Text)  Description: FUNCTIONAL STATUS PRIOR TO ADMISSION: Patient was independent and active without use of DME; working as [address]: The patient lived with  but did not require assist.    Physical Therapy Goals  Initiated 8/25/2020  1. Patient will move from supine to sit and sit to supine , scoot up and down, and roll side to side in bed with independence within 7 day(s). 2.  Patient will transfer from bed to chair and chair to bed with independence using the least restrictive device within 7 day(s). 3.  Patient will perform sit to stand with modified independence within 7 day(s). 4.  Patient will ambulate with modified independence for 250 feet with the least restrictive device within 7 day(s). 5.  Patient will ascend/descend 12 stairs with 1 handrail(s) with modified independence within 7 day(s). Outcome: Progressing Towards Goal   PHYSICAL THERAPY TREATMENT  Patient: Violeta Gudino (65 y.o. female)  Date: 8/26/2020  Diagnosis: Left leg weakness [R29.898]   <principal problem not specified>       Precautions:    Chart, physical therapy assessment, plan of care and goals were reviewed. ASSESSMENT  Patient continues with skilled PT services and is progressing towards goals. Patient remains limited by generalized \"heaviness of L side\" of body. Displays good safety awareness and intact balance with support from RW. Highly unstable without AD use. Ambulated 240' with Rw and step through gait pattern. Slowed gait speed but no buckling or LOB observed. Once in chair performed several rounds of UE/LE exercises with focus on eccentric control and near full ROM with assist from R side if needed. Current Level of Function Impacting Discharge (mobility/balance):  SBA with use of RW    Other factors to consider for discharge: good family support, intact safety awareness         PLAN :  Patient continues to benefit from skilled intervention to address the above impairments. Continue treatment per established plan of care. to address goals. Recommendation for discharge: (in order for the patient to meet his/her long term goals)  Physical therapy at least 2 days/week in the home     This discharge recommendation:  Has been made in collaboration with the attending provider and/or case management    IF patient discharges home will need the following DME: rolling walker       SUBJECTIVE:   Patient stated I tried walking a short distance without the walker.     OBJECTIVE DATA SUMMARY:   Critical Behavior:  Neurologic State: Alert  Orientation Level: Oriented X4  Cognition: Follows commands  Safety/Judgement: Fall prevention, Home safety  Functional Mobility Training:  Bed Mobility:  Rolling: Independent  Supine to Sit: Independent     Scooting: Independent        Transfers:  Sit to Stand: Stand-by assistance  Stand to Sit: Stand-by assistance        Bed to Chair: Stand-by assistance                    Balance:  Sitting: Intact  Standing: Impaired  Standing - Static: Good;Constant support  Ambulation/Gait Training:  Distance (ft): 240 Feet (ft)  Assistive Device: Gait belt;Walker, rolling  Ambulation - Level of Assistance: Stand-by assistance        Gait Abnormalities: Decreased step clearance        Base of Support: Widened     Speed/Airam: Pace decreased (<100 feet/min)  Step Length: Right shortened;Left shortened                    Stairs: Therapeutic Exercises: 2 sets of 10 reps bilaterally  Seated marching with hold, ankle [place], heel raises, toe raises, LAQ, shoulder flexion with AAROM to achieve full ROM and slowed lowering, shoulder abduction/adduction  Pain Rating:      Activity Tolerance:   Good  Please refer to the flowsheet for vital signs taken during this treatment.     After treatment patient left in no apparent distress:   Sitting in chair and Call bell within reach    COMMUNICATION/COLLABORATION:   The patients plan of care was discussed with: Registered nurse.      Doraine Hammans, PT   Time Calculation: 29 mins

## 2020-09-02 DIAGNOSIS — G43.109 MIGRAINE WITH AURA AND WITHOUT STATUS MIGRAINOSUS, NOT INTRACTABLE: Primary | ICD-10-CM

## 2020-09-02 RX ORDER — DIVALPROEX SODIUM 500 MG/1
500 TABLET, EXTENDED RELEASE ORAL
Qty: 100 TAB | Refills: 5 | Status: ON HOLD | OUTPATIENT
Start: 2020-09-02 | End: 2021-09-28

## 2020-09-04 ENCOUNTER — VIRTUAL VISIT (OUTPATIENT)
Dept: FAMILY MEDICINE CLINIC | Age: 55
End: 2020-09-04
Payer: COMMERCIAL

## 2020-09-04 DIAGNOSIS — D72.829 LEUKOCYTOSIS, UNSPECIFIED TYPE: ICD-10-CM

## 2020-09-04 DIAGNOSIS — Z09 HOSPITAL DISCHARGE FOLLOW-UP: ICD-10-CM

## 2020-09-04 DIAGNOSIS — Z11.59 ENCOUNTER FOR HEPATITIS C SCREENING TEST FOR LOW RISK PATIENT: ICD-10-CM

## 2020-09-04 DIAGNOSIS — Z86.711 HISTORY OF PULMONARY EMBOLUS (PE): ICD-10-CM

## 2020-09-04 DIAGNOSIS — Z00.00 ENCOUNTER FOR MEDICAL EXAMINATION TO ESTABLISH CARE: Primary | ICD-10-CM

## 2020-09-04 DIAGNOSIS — M32.9 LUPUS (HCC): ICD-10-CM

## 2020-09-04 DIAGNOSIS — E55.9 VITAMIN D INSUFFICIENCY: ICD-10-CM

## 2020-09-04 DIAGNOSIS — Z86.73 HISTORY OF TIA (TRANSIENT ISCHEMIC ATTACK): ICD-10-CM

## 2020-09-04 DIAGNOSIS — K50.119 CROHN'S DISEASE OF COLON WITH COMPLICATION (HCC): ICD-10-CM

## 2020-09-04 DIAGNOSIS — R82.90 ABNORMAL URINALYSIS: ICD-10-CM

## 2020-09-04 PROCEDURE — 99204 OFFICE O/P NEW MOD 45 MIN: CPT | Performed by: NURSE PRACTITIONER

## 2020-09-04 NOTE — PROGRESS NOTES
Subjective:   Jo Ann Cochran is a 54 y.o. female who was seen by synchronous (real-time) audio-video technology on 9/4/2020 for Establish Care        Previous PCP- none prior. History of TIA and migraine- followed by neurology, Dr Viv Figueroa. Followed by Hematology Dr Joe Duncan, history of PE. On Eliquis  Colorectal specialist Dr Hyun Powers for crohns, which she says is \"really bad\". Followed by Dr Reina Barroso as well. Says she thinks that she will need to have ileostomy soon. Hx of Lupus, followed by Rheum, Dr Dago Deleon - on plaquenil   Dr Ashley Hull pulmonary  Cardiology Dr Primo Mosquera- Dr Garry Verduzco for irregular menses. Has upcoming visit in February. Dr Daniela Lopez for pain management at [address] [place] [address] for crohns pain. On tramadol     Recently [id number] 8/24/2020-8/26/2020  DISCHARGE DIAGNOSIS:  Headache, left sided heaviness, left leg weakness - likely a complicated migraine, rule out CVA  SLE  Hx of provoked thrombus  Hx of SVC obstruction s/p surgery and graft  Hx of Atrial fibrillation?, rate controlled  Hx of Crohn's, not in flare  Leucocytosis most likely reactive , due to underlying autoimmune disease        CONSULTATIONS:  IP CONSULT TO HOSPITALIST  IP CONSULT TO NEUROLOGY     Excerpted HPI from H&P of Deangelo Powers MD:  Maria E Mays is a 54 y. o.   female who presents with CC listed above. Pt states that she has had a headache since early Saturday morning. Along with the headache, she is complaining of left sided \"heaviness. \" She also endorses left leg weakness. Pt endorses a history of migraines, for which she has seen Dr. Viv Figueroa of Neurology. She denies any issues with her vision, but believes that her left eye is \"heavy. \" Her headache envelops her whole head. She denies any fever, chills, or syncope. She also complains of a bump on the left side of her occipital region.     DISCHARGE SUMMARY/HOSPITAL COURSE:  for full details see H&P, daily progress notes, labs, consult notes.    Headache, left sided heaviness, left leg weakness - likely a complicated migraine, rule out CVA  SLE  Hx of provoked thrombus  Hx of SVC obstruction s/p surgery and graft  Hx of Atrial fibrillation?, rate controlled  Hx of Crohn's, not in flare  Leucocytosis   Pt describes occipitales HA with sensation tender lump  Which seems like a cyst on the occipital area   CT scan and MRI brain negative   Lipid Panel showed LDL 86, A1c 5.2  Echo: Ef 55 % , no intracardiac shunt   Doppler US carotids negative   Treat HTN as MRI neg   Continue with ASA and Eliquis  Neurology Consult appreciated : likely complex migraine   Continue Lupus medications  Pt still c/o HA , gabapentin dose increased and prn fioricet   C/w zanaflex   S/p 2dose IV toradol   S/p valproate one dose which improved her HA   Pt is allergic to steroids ( her throat closes)   Need OP FU with neuro for long term management of her migraines   Esr 42 ( less than 50)  and cbc 11k  but she has underlying autoimmune disease ( SLE)  ,  UA showed 1+ bacteria but pt denies any urinary symptoms. No cough   Will need repeat CBC in a week  Pt has no PCP        She has follow up with neurology Dr Rizwan Roberts on 9/8/2020  She says he has recently prescribed depakote for her. Needs to have repeat CBC  Feels pretty good right now. Currently she is in [place] visiting her son who just had a baby. She plans to return home this weekend. Prior to Admission medications    Medication Sig Start Date End Date Taking? Authorizing Provider   divalproex ER (Depakote ER) 500 mg ER tablet Take 1 Tab by mouth daily (with dinner). 9/2/20  Yes Alicia Iraheta MD   apixaban (ELIQUIS) 2.5 mg tablet Take 1 Tab by mouth two (2) times a day. Resume on Monday 10/21/19  Indications: PE 10/18/19  Yes Dakota Boudreaux NP   ustekinumab (USTEKINAUMAB) 90 mg/mL injection 90 mg by SubCUTAneous route every two (2) months.    Yes Provider, Historical   tiZANidine (ZANAFLEX) 2 mg tablet 1 po qam and 2 po qhs 7/26/19 Yes Melda Mcardle, MD   gabapentin (NEURONTIN) 300 mg capsule TAKE ONE CAPSULE BY MOUTH THREE TIMES A DAY 12/3/18  Yes Balbir Barroso MD   aspirin 81 mg chewable tablet Take 81 mg by mouth daily. Yes Provider, Historical   butalbital-acetaminophen-caffeine (FIORICET, ESGIC) -40 mg per tablet Take 2 Tabs by mouth every eight (8) hours as needed for Pain or Headache. Max Daily Amount: 6 Tabs. MUST LAST 30 DAYS. Indications: MIGRAINE, TENSION-TYPE HEADACHE 9/13/17  Yes Balbir Barroso MD   promethazine (PHENERGAN) 25 mg tablet Take 25 mg by mouth every six (6) hours as needed for Nausea. Yes Provider, Historical   diazepam (VALIUM) 5 mg tablet Take 1 Tab by mouth every twelve (12) hours as needed for Anxiety. Max Daily Amount: 10 mg. Refill at 1 month intervals 8/31/16  Yes Balbir Barroso MD   hydroxychloroquine (PLAQUENIL) 200 mg tablet Take 400 mg by mouth daily (after dinner). For lupus   Yes Provider, Historical   ondansetron hcl (ZOFRAN) 4 mg tablet Take 4 mg by mouth every eight (8) hours as needed for Nausea. Yes Other, MD Melisa   drospirenone-ethinyl estradiol (MARIELOS 28) 3-20 mg-mcg per tablet Take 1 Tab by mouth nightly.    Yes Provider, Historical     Patient Active Problem List    Diagnosis Date Noted    Left leg weakness 08/24/2020    Rectal bleed 10/17/2019    Severe obesity ([address] 10/10/2018    Hypotension due to drugs 06/22/2018    SVT (supraventricular tachycardia) (HCC) 06/21/2018    Cerebral microvascular disease 02/19/2018    Dizziness and giddiness 02/19/2018    Altered mental status, unspecified 02/19/2018    Paroxysmal SVT (supraventricular tachycardia) ([address] 02/01/2018    Inappropriate sinus tachycardia 09/11/2017    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    Shortness of breath 07/19/2016    SVC obstruction 07/05/2016    Transient ischemic attack involving right internal carotid artery 04/25/2016    Acute ischemic stroke ([address] 04/22/2016    Lupus [facility] 04/22/2016    Pulmonary embolism (HCC) 04/22/2016    Cervico-occipital neuralgia of right side 02/05/2016    Stenosis of both carotid arteries without infarction 02/05/2016    Ulnar neuropathy at elbow of right upper extremity 05/21/2015    Cervical radiculopathy 05/21/2015    SLE (systemic lupus erythematosus) ([facility] 03/26/2015    Tension vascular headache 03/26/2015    Migraine with aura and without status migrainosus, not intractable 03/26/2015    Myopathy 03/26/2015    Vitamin D deficiency 03/26/2015    Back pain 03/26/2015    Lumbar radiculopathy 03/26/2015    Spondylolisthesis, grade 1, L4-L5 03/26/2015    Weakness 03/26/2015    Sinus tachycardia 08/01/2014    Bronchitis, acute 08/01/2014    Abdominal pain, acute, right upper quadrant 08/01/2014    Acute GI bleeding 08/15/2013    Cro[facility]'s disease of colon ([facility] 07/26/2010    Crohn's disease of ileum ([facility] 07/26/2010     Past Medical History:   Diagnosis Date    Arrhythmia     Afib, sees Dr Aaron Campbell Autoimmune disease ([facility]     Crohns julito    CAD (coronary artery disease)     Chronic pain     Lt and Rt back:  Dr Robert Due;  Pain mgmt Lestine Carlos [facility]    Cough     evaluated 8/25/14 by Dr Alexia Jose ([phone]) \". . possible drug related lung is due to Methitrexate or atypical or fungal infection\" per office note dated 8/25/14    Crohn's disease [facility])     Dr Veronika Camarena Ill-defined condition     migraines    Ill-defined condition     lt lung higher than normal so puts pressure on diaphragm increasing SOB    Lupus ([facility]     Dr Domenica Boeck [phone]    Pain from implanted hardware 12/14/2016    Exploration of sternal incision with removal of protruding sternal wires    Stenosis of both carotid arteries without cerebral infarction     Stroke [facility]) 06/2016    TIA's     SVC obstruction 2016    resolved after surgery    Thromboembolus ([facility] 04/2016    PE right lower lung, spontaneous pulm dr christal Jaimes     Past Surgical History: Procedure Laterality Date    CARDIAC SURG PROCEDURE UNLIST  , 2018, 10/18    ablation    COLONOSCOPY N/A 10/5/2018    COLONOSCOPY performed by Prakash Degroot.  Quirino Paredes MD at [facility] ENDOSCOPY     [address] HX  SECTION      x1    HX COLECTOMY  7/24/10    colon resection-18\" removed; Dr Ayaz Hawthorne      bowel adhesions removed    HX GI      Bowel resection    HX GI  2019    stent for anal fistula     HX HEART CATHETERIZATION      no stents, open heart surgery 2016    HX HEENT      sinus surgery for deviated septum    HX HYSTERECTOMY      partial    HX LEFT SALPINGO-OOPHORECTOMY      ovary and fallopian tube removed    HX MOHS PROCEDURES Right approx 2013    with bone spur repair in shoulder    HX OTHER SURGICAL      femerol vein removed    HX OTHER SURGICAL  2016    open heart for repair of blocked SVC    HX TONSILLECTOMY      HX VASCULAR ACCESS  6/11/10    portacath insertion and removal x 3; Gets Remicaid q5 weeks    MA COLONOSCOPY W/BIOPSY SINGLE/MULTIPLE  2013            ROS  Gen: no new fatigue, fever, chills  Eyes: no excessive tearing, itching, or discharge  Nose: no rhinorrhea, no sinus pain  Mouth: no oral lesions, no sore throat  Resp: no shortness of breath, no wheezing, no cough  CV: no chest pain, no paroxysmal nocturnal dyspnea  Abd: no nausea, no vomiting  Neuro: no new headaches, no syncope or presyncopal episodes  Endo: no polyuria, no polydipsia      Objective:     Patient-Reported Vitals 2020   Patient-Reported Weight 185lb   Patient-Reported LMP absent        [INSTRUCTIONS:  \"[x]\" Indicates a positive item  \"[]\" Indicates a negative item  -- DELETE ALL ITEMS NOT EXAMINED]    Constitutional: [x] Appears well-developed and well-nourished [x] No apparent distress          Mental status: [x] Alert and awake  [x] Oriented to person/place/time [x] Able to follow commands         Eyes:   EOM    [x]  Normal      Sclera  [x] Normal              Discharge [x]  None visible       HENT: [x] Normocephalic, atraumatic     [x] Mouth/Throat: Mucous membranes are moist    External Ears [x] Normal      Neck: [x] No visualized mass     Pulmonary/Chest: [x] Respiratory effort normal   [x] No visualized signs of difficulty breathing or respiratory distress       Musculoskeletal:   [x] Normal gait with no signs of ataxia         [x] Normal range of motion of neck            Neurological:        [x] No Facial Asymmetry (Cranial nerve 7 motor function) (limited exam due to video visit)          [x] No gaze palsy                Skin:        [x] No significant exanthematous lesions or discoloration noted on facial skin                 Psychiatric:       [x] Normal Affect        [x] No Hallucinations          Assessment & Plan:   Diagnoses and all orders for this visit:    ICD-10-CM ICD-9-CM    1. Encounter for medical examination to establish care  Z00.00 V70.9    2. Hospital discharge follow-up  Z09 V67.59 CBC WITH AUTOMATED DIFF   3. Leukocytosis, unspecified type  D72.829 288.60 CBC WITH AUTOMATED DIFF      CULTURE, URINE   4. Encounter for hepatitis C screening test for low risk patient  Z11.59 V73.89 HEPATITIS C AB   5. Abnormal urinalysis  R82.90 791.9 CBC WITH AUTOMATED DIFF      URINALYSIS W/ RFLX MICROSCOPIC      CULTURE, URINE   6. Vitamin D insufficiency  E55.9 268.9 VITAMIN D, 25 HYDROXY   7. Lupus ([facility]  M32.9 710.0    8. Crohn's disease of colon with complication ([facility]  Z24.158 555.1    9. History of TIA (transient ischemic attack)  Z86.73 V12.54    10. History of pulmonary embolus (PE)  Z86.711 V12.55      Very pleasant lady, she is managed by multiple specialists for her complex medical history. Labs ordered for her to get done next week. Follow up in 4-6 weeks for in office visit. 1. Encounter for medical examination to establish care    2. Hospital discharge follow-up  -     CBC WITH AUTOMATED DIFF; Future    3.  Leukocytosis, unspecified type  -     CBC WITH AUTOMATED DIFF; Future  -     CULTURE, URINE; Future    4. Encounter for hepatitis C screening test for low risk patient  -     HEPATITIS C AB; Future    5. Abnormal urinalysis-no urinary symptoms, will repeat with labs   -     CBC WITH AUTOMATED DIFF; Future  -     URINALYSIS W/ RFLX MICROSCOPIC; Future  -     CULTURE, URINE; Future    6. Vitamin D insufficiency  -     VITAMIN D, 25 HYDROXY; Future    7. Lupus (HCC)-continue care with specialist    8. Crohn's disease of colon with complication (HCC)-continue current care with specialists     9. History of TIA (transient ischemic attack)-will see Neuro next week. 10. History of pulmonary embolus (PE)-on eliquis       We discussed the expected course, resolution and complications of the diagnosis(es) in detail. Medication risks, benefits, costs, interactions, and alternatives were discussed as indicated. I advised her to contact the office if her condition worsens, changes or fails to improve as anticipated. She expressed understanding with the diagnosis(es) and plan. Deon Vogel, who was evaluated through a patient-initiated, synchronous (real-time) audio-video encounter, and/or her healthcare decision maker, is aware that it is a billable service, with coverage as determined by her insurance carrier. She provided verbal consent to proceed: Yes, and patient identification was verified. It was conducted pursuant to the emergency declaration under the [facility], [address] [place] [address] authority and the [address] and [employer] Act. A caregiver was present when appropriate. Ability to conduct physical exam was limited. I was at home. The patient was at home. Doxy. me used for this encounter    Yury Frazier NP

## 2020-09-04 NOTE — PROGRESS NOTES
Chief Complaint   Patient presents with   [facility] Establish Care       1. Have you been to the ER, urgent care clinic since your last visit? Hospitalized since your last visit? Yes When: [facility] last week     2. Have you seen or consulted any other health care providers outside of the [address] [place] [address] since your last visit? Include any pap smears or colon screening.  No        Health Maintenance Due   Topic Date Due    Hepatitis C Screening  1965    DTaP/Tdap/Td series (1 - Tdap) 07/11/1986    PAP AKA CERVICAL CYTOLOGY  07/11/1986    Shingrix Vaccine Age 50> (1 of 2) 07/11/2015    FOBT Q1Y Age 50-75  07/11/2015    Flu Vaccine (1) 09/01/2020

## 2020-09-08 ENCOUNTER — OFFICE VISIT (OUTPATIENT)
Dept: NEUROLOGY | Age: 55
End: 2020-09-08
Payer: COMMERCIAL

## 2020-09-08 VITALS
HEART RATE: 84 BPM | RESPIRATION RATE: 16 BRPM | WEIGHT: 187 LBS | DIASTOLIC BLOOD PRESSURE: 88 MMHG | HEIGHT: 61 IN | OXYGEN SATURATION: 98 % | BODY MASS INDEX: 35.3 KG/M2 | SYSTOLIC BLOOD PRESSURE: 136 MMHG

## 2020-09-08 DIAGNOSIS — I67.9 CEREBROVASCULAR DISEASE, UNSPECIFIED: ICD-10-CM

## 2020-09-08 DIAGNOSIS — M54.81 CERVICO-OCCIPITAL NEURALGIA OF RIGHT SIDE: ICD-10-CM

## 2020-09-08 DIAGNOSIS — M54.12 CERVICAL RADICULOPATHY: ICD-10-CM

## 2020-09-08 DIAGNOSIS — I67.89 CEREBRAL MICROVASCULAR DISEASE: ICD-10-CM

## 2020-09-08 DIAGNOSIS — G44.209 TENSION VASCULAR HEADACHE: ICD-10-CM

## 2020-09-08 DIAGNOSIS — G43.109 MIGRAINE WITH AURA AND WITHOUT STATUS MIGRAINOSUS, NOT INTRACTABLE: Primary | ICD-10-CM

## 2020-09-08 DIAGNOSIS — I65.23 STENOSIS OF BOTH CAROTID ARTERIES WITHOUT INFARCTION: ICD-10-CM

## 2020-09-08 DIAGNOSIS — G56.21 ULNAR NEUROPATHY AT ELBOW OF RIGHT UPPER EXTREMITY: ICD-10-CM

## 2020-09-08 DIAGNOSIS — R42 DIZZINESS AND GIDDINESS: ICD-10-CM

## 2020-09-08 PROCEDURE — 99215 OFFICE O/P EST HI 40 MIN: CPT | Performed by: PSYCHIATRY & NEUROLOGY

## 2020-09-08 RX ORDER — FREMANEZUMAB-VFRM 225 MG/1.5ML
225 INJECTION SUBCUTANEOUS
Qty: 1 SYRINGE | Refills: 11 | Status: SHIPPED | OUTPATIENT
Start: 2020-09-08 | End: 2022-02-06

## 2020-09-08 NOTE — PATIENT INSTRUCTIONS
Office Policies 
 
o Phone calls/patient messages: Please allow up to 24 hours for someone in the office to contact you about your call or message. Be mindful your provider may be out of the office or your message may require further review. We encourage you to use [employer] for your messages as this is a faster, more efficient way to communicate with our office 
 
o Medication Refills: 
Prescription medications require up to 48 business hours to process. We encourage you to use [employer] for your refills. For controlled medications: Please allow up to 72 business hours to process. Certain medications may require you to  a written prescription at our office. NO narcotic/controlled medications will be prescribed after 4pm Monday through Friday or on weekends 
 
o Form/Paperwork Completion: We ask that you allow 7-14 business days. You may also download your forms to [employer] to have your doctor print off.

## 2020-09-08 NOTE — LETTER
9/8/20 Patient: Yeimi Ellis YOB: 1965 Date of Visit: 9/8/2020 Ron Leon NP 
[address] [facility] [address] VIA In Basket Dear Ron Leon NP, Thank you for referring Ms. Yeimi Ellis to [address] [place] [address] for evaluation. My notes for this consultation are attached. Consult Subjective:  
 
Yeimi Ellis is a 54 y.o. right-handed  female seen today in urgent work in basis at the request of Dr. Paula Chavis because of new problem of left-sided weakness and numbness that she got admitted to the hospital for at the end of August, and was seen by Dr. Valery Lora, and had a normal MRI of the brain showing no new lesions, normal MRA of the brain, unremarkable Dopplers, and a CTA that did not show much disease. She was diagnosed with a complicated migraine and discharged home on Depakote  mg a day. She still complains of left-sided numbness and weakness, and has a midline sensory loss on exam that seems to suggest functional overlay. She has persistent headaches in the left posterior head regions at the craniocervical junction, that occur on a daily basis wants to know why she has headaches and when she has a left-sided weakness and I told her I thought it would be nerves, and muscle tension that all her studies are negative and she has no new structural lesions stroke or vascular lesion or other causes of her symptoms. For her chronic headaches since she has failed all other medication we offered her the new episodic CGRP inhibitor Ubrelvy 50 mg to take at the onset of headache and can repeat in 2 hours if needed. She is also given the CGRP inhibitor or preventive agent a Nikos to take once a month as an injection and the risks and benefits of all these medications explained to the patient in detail and patient agrees to try that.   Because of her fluctuating headaches and neurologic symptoms we will check a 24-hour ambulatory EEG to make sure there is no cortical abnormality coinciding with her symptoms. She has had 3 cardiac ablations, and has chronic A. fib, and persistent tachycardia, and is currently also on anticoagulation with Eliquis 2.5 mg twice a day, and wants to be seen for medication adjustment. The headaches really sound like muscle tension headaches, but she denies any neck pain, TMJ problems, but does admit to some stress. She states she cannot take triptan's with her cardiac condition. She is already on Neurontin 300 mg 3 times a day for a lot of her musculoskeletal and other type pain, and that. She has Crohn's disease and carries a diagnosis of lupus. We looked at her scans on the computer today, and reviewed them personally on the PACS system with the patient, and I assured her that the and spine and all the paraspinal muscles look normal.  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 Carotid Dopplers were normal. Patient seen last year for progressive headaches for the last 6 months, becoming more frequent and more severe and more incapacitating. She does have lupus, and she also has Crohn's disease and this a chronic problem. She has a family history of migraines. She has a lot of muscle pain mainly in her legs, and significant back pain and is under pain management at [facility] with Dr. Rajeev Camargo. Her recent MRI scan of the lumbar spine was reviewed on the computer personally, and does show spondylolisthesis at L4-5 and mild degenerative disease without spinal stenosis or disc herniation. She feels as though her legs will give way at times, but her bowel and bladder function remain stable. She works daily as a . She's had no unusual fever, meningismus, denies bruxism, denies any increased stress or tension, or other causes for her headaches. Past Medical History:  
Diagnosis Date  Arrhythmia Afib, sees Dr Michelle Serra  Autoimmune disease ([facility] [address] Crohns julito  CAD (coronary artery disease)  Chronic pain Lt and Rt back:  Dr Maikol Thomas;  Pain mgmt Cloretta Earthly PA [facility]  Cough   
 evaluated 14 by Dr Jessika Gleason ([phone]) \". . possible drug related lung is due to Methitrexate or atypical or fungal infection\" per office note dated 14  Crohn's disease ([facility] [address] Dr Ozzy Fofana  Ill-defined condition   
 migraines  Ill-defined condition   
 lt lung higher than normal so puts pressure on diaphragm increasing SOB  Lupus ([facility] [address] Dr Brodie Jeans [phone]  Pain from implanted hardware 2016 Exploration of sternal incision with removal of protruding sternal wires  Stenosis of both carotid arteries without cerebral infarction  Stroke [facility]) 2016 TIA's  SVC obstruction   
 resolved after surgery  Thromboembolus ([facility] [address] 2016 PE right lower lung, spontaneous pulm dr siegel [facility] Past Surgical History:  
Procedure Laterality Date [facility] PROCEDURE UNLIST  , 2018, 10/18  
 ablation  COLONOSCOPY N/A 10/5/2018 COLONOSCOPY performed by Corey Owens. Clara Kenyon MD at [facility]  HX  SECTION    
 x1  
 HX COLECTOMY  7/24/10  
 colon resection-18\" removed; Dr Wali Zaragoza  HX GI  2005  
 bowel adhesions removed [id number] Bowel resection  HX GI  2019  
 stent for anal fistula  HX HEART CATHETERIZATION    
 no stents, open heart surgery 2016  HX HEENT    
 sinus surgery for deviated septum  HX HYSTERECTOMY partial  
 HX LEFT SALPINGO-OOPHORECTOMY  2005  
 ovary and fallopian tube removed  HX MOHS PROCEDURES Right approx   
 with bone spur repair in shoulder  HX OTHER SURGICAL  2016  
 femerol vein removed  HX OTHER SURGICAL  2016  
 open heart for repair of blocked SVC  HX TONSILLECTOMY  HX VASCULAR ACCESS  6/11/10 portacath insertion and removal x 3; Gets Remicaid q5 weeks  RI COLONOSCOPY W/BIOPSY SINGLE/MULTIPLE  8/19/2013 Family History Problem Relation Age of Onset  Cancer Father   
     stomach [employer] Other Mother Auto accident  Cancer Paternal Grandmother   
     breast  
 Breast Cancer Paternal Grandmother   
     not sure of age  Breast Cancer Maternal Aunt   
     not sure of age Social History Tobacco Use  Smoking status: Former Smoker Packs/day: 0.50 Years: 2.50 Pack years: 1.25 Last attempt to quit: 3/29/2005 Years since quitting: 15.4  Smokeless tobacco: Never Used  Tobacco comment: social  
Substance Use Topics  Alcohol use: Yes Comment: Rare wine Current Outpatient Medications:  
  ubrogepant (Ubrelvy) 50 mg tablet, 1 tab PO at onset of headache and can repeat in 2 hours if needed, max dose 2 per day, Disp: 10 Tab, Rfl: 11 
  fremanezumab-vfrm (Ajovy Autoinjector) 225 mg/1.5 mL atIn, 225 mg by SubCUTAneous route every thirty (30) days. , Disp: 1 Syringe, Rfl: 11 
  divalproex ER (Depakote ER) 500 mg ER tablet, Take 1 Tab by mouth daily (with dinner). , Disp: 100 Tab, Rfl: 5 
  apixaban (ELIQUIS) 2.5 mg tablet, Take 1 Tab by mouth two (2) times a day. Resume on Monday 10/21/19  Indications: PE, Disp: , Rfl:  
  ustekinumab (USTEKINAUMAB) 90 mg/mL injection, 90 mg by SubCUTAneous route every two (2) months., Disp: , Rfl:  
  tiZANidine (ZANAFLEX) 2 mg tablet, 1 po qam and 2 po qhs, Disp: 100 Tab, Rfl: 0 
  gabapentin (NEURONTIN) 300 mg capsule, TAKE ONE CAPSULE BY MOUTH THREE TIMES A DAY, Disp: 100 Cap, Rfl: 5 
  aspirin 81 mg chewable tablet, Take 81 mg by mouth daily. , Disp: , Rfl:  
  butalbital-acetaminophen-caffeine (FIORICET, ESGIC) -40 mg per tablet, Take 2 Tabs by mouth every eight (8) hours as needed for Pain or Headache. Max Daily Amount: 6 Tabs. MUST LAST 30 DAYS.   Indications: MIGRAINE, TENSION-TYPE HEADACHE, Disp: 50 Tab, Rfl: 2 
  promethazine (PHENERGAN) 25 mg tablet, Take 25 mg by mouth every six (6) hours as needed for Nausea., Disp: , Rfl:  
  diazepam (VALIUM) 5 mg tablet, Take 1 Tab by mouth every twelve (12) hours as needed for Anxiety. Max Daily Amount: 10 mg. Refill at 1 month intervals, Disp: 30 Tab, Rfl: 2 
  hydroxychloroquine (PLAQUENIL) 200 mg tablet, Take 400 mg by mouth daily (after dinner). For lupus, Disp: , Rfl:  
  ondansetron hcl (ZOFRAN) 4 mg tablet, Take 4 mg by mouth every eight (8) hours as needed for Nausea., Disp: , Rfl:  
  drospirenone-ethinyl estradiol (MARIELOS 28) 3-20 mg-mcg per tablet, Take 1 Tab by mouth nightly., Disp: , Rfl:  
 
 
 
Allergies Allergen Reactions  Chlorhexidine Towelette Rash  Codeine Hives and Nausea and Vomiting Severe nausea & vomiting  Hydrocodone Hives and Nausea and Vomiting Severe nausea & vomiting  Oxycontin [Oxycodone] Hives and Nausea and Vomiting Severe nausea & vomiting, also has the same reaction from Percocet  Percocet [Oxycodone-Acetaminophen] Hives and Nausea and Vomiting Can take tylenol  Dilaudid [Hydromorphone (Bulk)] Swelling  Prednisone Other (comments) HX OF CROHN'S; not allergic, prefer not to use Review of Systems: A comprehensive review of systems was negative except for: Constitutional: positive for fatigue and malaise Eyes: positive for visual disturbance Gastrointestinal: positive for dyspepsia, reflux symptoms, change in bowel habits, diarrhea and abdominal pain Musculoskeletal: positive for myalgias, arthralgias, stiff joints, back pain and muscle weakness Neurological: positive for headaches, gait problems and weakness Vitals:  
 09/08/20 1152 BP: 136/88 Pulse: 84 Resp: 16 SpO2: 98% Weight: 187 lb (84.8 kg) Height: 5' 1\" (1.549 m) Objective: I 
 
 
NEUROLOGICAL EXAM: 
 
 Appearance: The patient is well developed, well nourished, provides a coherent history and is in acute distress because of headache. Mental Status: Oriented to time, place and person, and the president, cognitive function is normal, speech is fluent. Mood and affect appropriate, but a little depressed and anxious. Cranial Nerves:   Intact visual fields. Fundi are benign, discs are flat but poorly seen. PATTI, EOM's full, no nystagmus, no ptosis. Facial sensation is normal. Corneal reflexes are not tested. Facial movement is symmetric. Hearing is abnormal bilaterally. Palate is midline with normal sternocleidomastoid and trapezius muscles are normal. Tongue is midline. Neck without meningismus or bruits Temporal arteries not tender or enlarged TMJ areas are not tender Patient very tender on palpation over the left craniocervical junction Motor:  4/5 strength in upper and lower proximal and distal muscles. Normal bulk and tone. No fasciculations. Patient has prominent tenderness in her lumbar spine Straight leg raising test negative in the sitting position bilateral  
Reflexes:   Deep tendon reflexes 1+/4 and symmetrical. 
No babinski or clonus present Sensory:   Abnormal to touch, pinprick and vibration and DSS and temperature on the whole left side in a midline fashion suggesting functional overlay, but normal on the right. Gait:  Abnormal gait as she walks slowly because of her back pain. Tremor:   No tremor noted. Cerebellar:  No cerebellar signs present. Neurovascular:  Normal heart sounds and regular rhythm, peripheral pulses decreased, and no carotid bruits. Assessment: ICD-10-CM ICD-9-CM 1. Migraine with aura and without status migrainosus, not intractable  G43.109 346.00 ubrogepant (Ubrelvy) 50 mg tablet  
   fremanezumab-vfrm (Ajovy Autoinjector) 225 mg/1.5 mL atIn NEURO EEG 24 HR 2.  Cervico-occipital neuralgia of right side  M54.81 723.8 ubrogepant Britney Joana) 50 mg tablet  
   fremanezumab-vfrm (Ajovy Autoinjector) 225 mg/1.5 mL atIn NEURO EEG 24 HR 3. Cervical radiculopathy  M54.12 723.4 ubrogepant (Ubrelvy) 50 mg tablet  
   fremanezumab-vfrm (Ajovy Autoinjector) 225 mg/1.5 mL atIn NEURO EEG 24 HR 4. Stenosis of both carotid arteries without infarction  I65.23 433.10 ubrogepant Britney Joana) 50 mg tablet 433.30 fremanezumab-vfrm (Ajovy Autoinjector) 225 mg/1.5 mL atIn NEURO EEG 24 HR 5. Tension vascular headache  G44.209 307.81 ubrogepant (Ubrelvy) 50 mg tablet  
   fremanezumab-vfrm (Ajovy Autoinjector) 225 mg/1.5 mL atIn NEURO EEG 24 HR 6. Cerebrovascular disease, unspecified  I67.9 437.9 ubrogepant (Ubrelvy) 50 mg tablet  
   fremanezumab-vfrm (Ajovy Autoinjector) 225 mg/1.5 mL atIn NEURO EEG 24 HR 7. Cerebral microvascular disease  I67.9 437.9 ubrogepant (Ubrelvy) 50 mg tablet  
   fremanezumab-vfrm (Ajovy Autoinjector) 225 mg/1.5 mL atIn NEURO EEG 24 HR  
8. Dizziness and giddiness  R42 780.4 ubrogepant (Ubrelvy) 50 mg tablet  
   fremanezumab-vfrm (Ajovy Autoinjector) 225 mg/1.5 mL atIn NEURO EEG 24 HR  
9. Ulnar neuropathy at elbow of right upper extremity  G56.21 354.2 ubrogepant (Ubrelvy) 50 mg tablet  
   fremanezumab-vfrm (Ajovy Autoinjector) 225 mg/1.5 mL atIn NEURO EEG 24 HR Plan:  
 
Patient with new problem of left-sided numbness and weakness, with a midline sensory deficit suggesting functional overlay on the left side, and I told the patient this was her nerves and stress and tension with the headaches, but we will try her on the new oral CGRP inhibitors and subcutaneous injections monthly as preventive agents just to see if that can help her. She is to continue her aspirin and statin in the interim. She just was in the hospital had a normal MRI of the brain and CTA ruling out any organic disease.  
Progressive headaches of the daily type described as pressure sensation on the top of her head, and left craniocervical junction, but not associated nausea vomiting,  She does not clench her teeth, and denies any new stress or tension. She will continue her Neurontin 3 times a day We will check an EEG to make sure there is no other brain problem happening MRI scan and MRA showed no clear structural lesions in the past 
Cervical spine x-rays and CT scans of head and neck reviewed personally on the PACS system Patient to continue pain management for occipital nerve block, and may need physical therapy We will continue her on Fioricet for the headaches She will call for results of her tests,, and further treatment and evaluation will depend on the results of her tests and her clinical course Difficult case Signed By: Wendy Urrutia MD   
 September 8, 2020 This note will not be viewable in [address] This note will not be viewable in [address] If you have questions, please do not hesitate to call me. I look forward to following your patient along with you. Sincerely, Wendy Urrutia MD

## 2020-09-09 NOTE — PROGRESS NOTES
Consult    Subjective:     Deon Vogel is a 54 y.o. right-handed  female seen today in urgent work in basis at the request of Dr. Josh Vincent because of new problem of left-sided weakness and numbness that she got admitted to the hospital for at the end of August, and was seen by Dr. Brie Vasquez, and had a normal MRI of the brain showing no new lesions, normal MRA of the brain, unremarkable Dopplers, and a CTA that did not show much disease. She was diagnosed with a complicated migraine and discharged home on Depakote  mg a day. She still complains of left-sided numbness and weakness, and has a midline sensory loss on exam that seems to suggest functional overlay. She has persistent headaches in the left posterior head regions at the craniocervical junction, that occur on a daily basis wants to know why she has headaches and when she has a left-sided weakness and I told her I thought it would be nerves, and muscle tension that all her studies are negative and she has no new structural lesions stroke or vascular lesion or other causes of her symptoms. For her chronic headaches since she has failed all other medication we offered her the new episodic CGRP inhibitor Ubrelvy 50 mg to take at the onset of headache and can repeat in 2 hours if needed. She is also given the CGRP inhibitor or preventive agent a Nikos to take once a month as an injection and the risks and benefits of all these medications explained to the patient in detail and patient agrees to try that. Because of her fluctuating headaches and neurologic symptoms we will check a 24-hour ambulatory EEG to make sure there is no cortical abnormality coinciding with her symptoms. She has had 3 cardiac ablations, and has chronic A. fib, and persistent tachycardia, and is currently also on anticoagulation with Eliquis 2.5 mg twice a day, and wants to be seen for medication adjustment.   The headaches really sound like muscle tension headaches, but she denies any neck pain, TMJ problems, but does admit to some stress. She states she cannot take triptan's with her cardiac condition. She is already on Neurontin 300 mg 3 times a day for a lot of her musculoskeletal and other type pain, and that. She has Crohn's disease and carries a diagnosis of lupus. We looked at her scans on the computer today, and reviewed them personally on the PACS system with the patient, and I assured her that the and spine and all the paraspinal muscles look normal.  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 Carotid Dopplers were normal. Patient seen last year for progressive headaches for the last 6 months, becoming more frequent and more severe and more incapacitating. She does have lupus, and she also has Crohn's disease and this a chronic problem. She has a family history of migraines. She has a lot of muscle pain mainly in her legs, and significant back pain and is under pain management at [facility] with Dr. Blaine Riggs. Her recent MRI scan of the lumbar spine was reviewed on the computer personally, and does show spondylolisthesis at L4-5 and mild degenerative disease without spinal stenosis or disc herniation. She feels as though her legs will give way at times, but her bowel and bladder function remain stable. She works daily as a . She's had no unusual fever, meningismus, denies bruxism, denies any increased stress or tension, or other causes for her headaches. Past Medical History:   Diagnosis Date    Arrhythmia     Afib, sees Dr Margarita Piña Autoimmune disease [facility])     Crohns julito    CAD (coronary artery disease)     Chronic pain     Lt and Rt back:  Dr Kelsey Harper;  Pain mgmt [facility]    Cough     evaluated 8/25/14 by Dr Yuko Marx ([phone]) \". . possible drug related lung is due to Methitrexate or atypical or fungal infection\" per office note dated 8/25/14   [address] [place] [address] Crohn's disease ([facility] [address]     Dr Gene Poon Ill-defined condition     migraines    Ill-defined condition     lt lung higher than normal so puts pressure on diaphragm increasing SOB    Lupus ([facility] [address]     Dr Michele Dominguez [phone]    Pain from implanted hardware 2016    Exploration of sternal incision with removal of protruding sternal wires    Stenosis of both carotid arteries without cerebral infarction     Stroke [facility]) 2016    TIA's     SVC obstruction 2016    resolved after surgery    Thromboembolus ([facility] [address] 2016    PE right lower lung, spontaneous pulm dr christal Zavala      Past Surgical History:   Procedure Laterality Date   [address]  , 2018, 10/18    ablation    COLONOSCOPY N/A 10/5/2018    COLONOSCOPY performed by Ute Crews.  Deidra Carranza MD at [facility] ENDOSCOPY     [address] HX  SECTION      x1    HX COLECTOMY  7/24/10    colon resection-18\" removed; Dr Khadar Simon GI      bowel adhesions removed    HX GI      Bowel resection    HX GI  2019    stent for anal fistula     HX HEART CATHETERIZATION      no stents, open heart surgery 2016    HX HEENT      sinus surgery for deviated septum    HX HYSTERECTOMY      partial    HX LEFT SALPINGO-OOPHORECTOMY  2005    ovary and fallopian tube removed    HX MOHS PROCEDURES Right approx     with bone spur repair in shoulder    HX OTHER SURGICAL  2016    femerol vein removed    HX OTHER SURGICAL  2016    open heart for repair of blocked SVC    HX TONSILLECTOMY      HX VASCULAR ACCESS  6/11/10    portacath insertion and removal x 3; Gets Remicaid q5 weeks    [place] COLONOSCOPY W/BIOPSY SINGLE/MULTIPLE  2013          Family History   Problem Relation Age of Onset    Cancer Father         stomach    Other Mother         Auto accident    Cancer Paternal Grandmother         breast    Breast Cancer Paternal Grandmother         not sure of age   [facility] Breast Cancer Maternal Aunt         not sure of age      Social History     Tobacco Use    Smoking status: Former Smoker     Packs/day: 0.50     Years: 2.50     Pack years: 1.25     Last attempt to quit: 3/29/2005     Years since quitting: 15.4    Smokeless tobacco: Never Used    Tobacco comment: social   Substance Use Topics    Alcohol use: Yes     Comment: Rare wine         Current Outpatient Medications:     ubrogepant (Ubrelvy) 50 mg tablet, 1 tab PO at onset of headache and can repeat in 2 hours if needed, max dose 2 per day, Disp: 10 Tab, Rfl: 11    fremanezumab-vfrm (Ajovy Autoinjector) 225 mg/1.5 mL atIn, 225 mg by SubCUTAneous route every thirty (30) days. , Disp: 1 Syringe, Rfl: 11    divalproex ER (Depakote ER) 500 mg ER tablet, Take 1 Tab by mouth daily (with dinner). , Disp: 100 Tab, Rfl: 5    apixaban (ELIQUIS) 2.5 mg tablet, Take 1 Tab by mouth two (2) times a day. Resume on Monday 10/21/19  Indications: PE, Disp: , Rfl:     ustekinumab (USTEKINAUMAB) 90 mg/mL injection, 90 mg by SubCUTAneous route every two (2) months., Disp: , Rfl:     tiZANidine (ZANAFLEX) 2 mg tablet, 1 po qam and 2 po qhs, Disp: 100 Tab, Rfl: 0    gabapentin (NEURONTIN) 300 mg capsule, TAKE ONE CAPSULE BY MOUTH THREE TIMES A DAY, Disp: 100 Cap, Rfl: 5    aspirin 81 mg chewable tablet, Take 81 mg by mouth daily. , Disp: , Rfl:     butalbital-acetaminophen-caffeine (FIORICET, ESGIC) -40 mg per tablet, Take 2 Tabs by mouth every eight (8) hours as needed for Pain or Headache. Max Daily Amount: 6 Tabs. MUST LAST 30 DAYS. Indications: MIGRAINE, TENSION-TYPE HEADACHE, Disp: 50 Tab, Rfl: 2    promethazine (PHENERGAN) 25 mg tablet, Take 25 mg by mouth every six (6) hours as needed for Nausea., Disp: , Rfl:     diazepam (VALIUM) 5 mg tablet, Take 1 Tab by mouth every twelve (12) hours as needed for Anxiety. Max Daily Amount: 10 mg.  Refill at 1 month intervals, Disp: 30 Tab, Rfl: 2    hydroxychloroquine (PLAQUENIL) 200 mg tablet, Take 400 mg by mouth daily (after dinner). For lupus, Disp: , Rfl:     ondansetron hcl (ZOFRAN) 4 mg tablet, Take 4 mg by mouth every eight (8) hours as needed for Nausea., Disp: , Rfl:     drospirenone-ethinyl estradiol (MARIELOS 28) 3-20 mg-mcg per tablet, Take 1 Tab by mouth nightly., Disp: , Rfl:         Allergies   Allergen Reactions    Chlorhexidine Towelette Rash    Codeine Hives and Nausea and Vomiting     Severe nausea & vomiting    Hydrocodone Hives and Nausea and Vomiting     Severe nausea & vomiting    Oxycontin [Oxycodone] Hives and Nausea and Vomiting     Severe nausea & vomiting, also has the same reaction from Percocet    Percocet [Oxycodone-Acetaminophen] Hives and Nausea and Vomiting     Can take tylenol    Dilaudid [Hydromorphone (Bulk)] Swelling    Prednisone Other (comments)     HX OF CROHN'S; not allergic, prefer not to use         Review of Systems:  A comprehensive review of systems was negative except for: Constitutional: positive for fatigue and malaise  Eyes: positive for visual disturbance  Gastrointestinal: positive for dyspepsia, reflux symptoms, change in bowel habits, diarrhea and abdominal pain  Musculoskeletal: positive for myalgias, arthralgias, stiff joints, back pain and muscle weakness  Neurological: positive for headaches, gait problems and weakness   Vitals:    09/08/20 1152   BP: 136/88   Pulse: 84   Resp: 16   SpO2: 98%   Weight: 187 lb (84.8 kg)   Height: 5' 1\" (1.549 m)     Objective:     I      NEUROLOGICAL EXAM:    Appearance: The patient is well developed, well nourished, provides a coherent history and is in acute distress because of headache. Mental Status: Oriented to time, place and person, and the president, cognitive function is normal, speech is fluent. Mood and affect appropriate, but a little depressed and anxious. Cranial Nerves:   Intact visual fields. Fundi are benign, discs are flat but poorly seen. PATTI, EOM's full, no nystagmus, no ptosis.  Facial sensation is normal. Corneal reflexes are not tested. Facial movement is symmetric. Hearing is abnormal bilaterally. Palate is midline with normal sternocleidomastoid and trapezius muscles are normal. Tongue is midline. Neck without meningismus or bruits  Temporal arteries not tender or enlarged  TMJ areas are not tender  Patient very tender on palpation over the left craniocervical junction   Motor:  4/5 strength in upper and lower proximal and distal muscles. Normal bulk and tone. No fasciculations. Patient has prominent tenderness in her lumbar spine  Straight leg raising test negative in the sitting position bilateral   Reflexes:   Deep tendon reflexes 1+/4 and symmetrical.  No babinski or clonus present   Sensory:   Abnormal to touch, pinprick and vibration and DSS and temperature on the whole left side in a midline fashion suggesting functional overlay, but normal on the right. Gait:  Abnormal gait as she walks slowly because of her back pain. Tremor:   No tremor noted. Cerebellar:  No cerebellar signs present. Neurovascular:  Normal heart sounds and regular rhythm, peripheral pulses decreased, and no carotid bruits. Assessment:       ICD-10-CM ICD-9-CM    1. Migraine with aura and without status migrainosus, not intractable  G43.109 346.00 ubrogepant (Ubrelvy) 50 mg tablet      fremanezumab-vfrm (Ajovy Autoinjector) 225 mg/1.5 mL atIn      NEURO EEG 24 HR   2. Cervico-occipital neuralgia of right side  M54.81 723.8 ubrogepant (Ubrelvy) 50 mg tablet      fremanezumab-vfrm (Ajovy Autoinjector) 225 mg/1.5 mL atIn      NEURO EEG 24 HR   3. Cervical radiculopathy  M54.12 723.4 ubrogepant (Ubrelvy) 50 mg tablet      fremanezumab-vfrm (Ajovy Autoinjector) 225 mg/1.5 mL atIn      NEURO EEG 24 HR   4. Stenosis of both carotid arteries without infarction  I65.23 433.10 ubrogepant (Ubrelvy) 50 mg tablet     433.30 fremanezumab-vfrm (Ajovy Autoinjector) 225 mg/1.5 mL atIn      NEURO EEG 24 HR   5.  Tension vascular headache G44.209 307.81 ubrogepant (Ubrelvy) 50 mg tablet      fremanezumab-vfrm (Ajovy Autoinjector) 225 mg/1.5 mL atIn      NEURO EEG 24 HR   6. Cerebrovascular disease, unspecified  I67.9 437.9 ubrogepant (Ubrelvy) 50 mg tablet      fremanezumab-vfrm (Ajovy Autoinjector) 225 mg/1.5 mL atIn      NEURO EEG 24 HR   7. Cerebral microvascular disease  I67.9 437.9 ubrogepant (Ubrelvy) 50 mg tablet      fremanezumab-vfrm (Ajovy Autoinjector) 225 mg/1.5 mL atIn      NEURO EEG 24 HR   8. Dizziness and giddiness  R42 780.4 ubrogepant (Ubrelvy) 50 mg tablet      fremanezumab-vfrm (Ajovy Autoinjector) 225 mg/1.5 mL atIn      NEURO EEG 24 HR   9. Ulnar neuropathy at elbow of right upper extremity  G56.21 354.2 ubrogepant (Ubrelvy) 50 mg tablet      fremanezumab-vfrm (Ajovy Autoinjector) 225 mg/1.5 mL atIn      NEURO EEG 24 HR         Plan:     Patient with new problem of left-sided numbness and weakness, with a midline sensory deficit suggesting functional overlay on the left side, and I told the patient this was her nerves and stress and tension with the headaches, but we will try her on the new oral CGRP inhibitors and subcutaneous injections monthly as preventive agents just to see if that can help her. She is to continue her aspirin and statin in the interim. She just was in the hospital had a normal MRI of the brain and CTA ruling out any organic disease. Progressive headaches of the daily type described as pressure sensation on the top of her head, and left craniocervical junction, but not associated nausea vomiting,  She does not clench her teeth, and denies any new stress or tension.   She will continue her Neurontin 3 times a day  We will check an EEG to make sure there is no other brain problem happening  MRI scan and MRA showed no clear structural lesions in the past  Cervical spine x-rays and CT scans of head and neck reviewed personally on the PACS system  Patient to continue pain management for occipital nerve block, and may need physical therapy  We will continue her on Fioricet for the headaches  She will call for results of her tests,, and further treatment and evaluation will depend on the results of her tests and her clinical course  Difficult case    Signed By: Severiano Carbajal MD     September 8, 2020         This note will not be viewable in [address] This note will not be viewable in [address]

## 2020-09-10 ENCOUNTER — HOSPITAL ENCOUNTER (OUTPATIENT)
Dept: NEUROLOGY | Age: 55
Discharge: HOME | End: 2020-09-10
Attending: PSYCHIATRY & NEUROLOGY
Payer: COMMERCIAL

## 2020-09-10 DIAGNOSIS — R55 CONVULSIVE SYNCOPE: ICD-10-CM

## 2020-09-10 DIAGNOSIS — R56.9 CONVULSIONS, UNSPECIFIED CONVULSION TYPE (HCC): ICD-10-CM

## 2020-09-10 DIAGNOSIS — R41.82 ALTERED MENTAL STATUS, UNSPECIFIED ALTERED MENTAL STATUS TYPE: ICD-10-CM

## 2020-09-10 PROCEDURE — 95719 EEG PHYS/QHP EA INCR W/O VID: CPT | Performed by: PSYCHIATRY & NEUROLOGY

## 2020-09-10 PROCEDURE — 95714 VEEG EA 12-26 HR UNMNTR: CPT | Performed by: PSYCHIATRY & NEUROLOGY

## 2020-09-13 LAB
25(OH)D3+25(OH)D2 SERPL-MCNC: 28 NG/ML (ref 30–100)
APPEARANCE UR: CLEAR
BACTERIA #/AREA URNS HPF: ABNORMAL /[HPF]
BACTERIA UR CULT: NORMAL
BASOPHILS # BLD AUTO: 0 X10E3/UL (ref 0–0.2)
BASOPHILS NFR BLD AUTO: 0 %
BILIRUB UR QL STRIP: NEGATIVE
CASTS URNS QL MICRO: ABNORMAL /LPF
COLOR UR: YELLOW
EOSINOPHIL # BLD AUTO: 0.1 X10E3/UL (ref 0–0.4)
EOSINOPHIL NFR BLD AUTO: 1 %
EPI CELLS #/AREA URNS HPF: >10 /HPF (ref 0–10)
ERYTHROCYTE [DISTWIDTH] IN BLOOD BY AUTOMATED COUNT: 14.4 % (ref 11.7–15.4)
GLUCOSE UR QL: NEGATIVE
HCT VFR BLD AUTO: 35.3 % (ref 34–46.6)
HCV AB S/CO SERPL IA: <0.1 S/CO RATIO (ref 0–0.9)
HGB BLD-MCNC: 11.7 G/DL (ref 11.1–15.9)
HGB UR QL STRIP: ABNORMAL
IMM GRANULOCYTES # BLD AUTO: 0 X10E3/UL (ref 0–0.1)
IMM GRANULOCYTES NFR BLD AUTO: 0 %
KETONES UR QL STRIP: NEGATIVE
LEUKOCYTE ESTERASE UR QL STRIP: NEGATIVE
LYMPHOCYTES # BLD AUTO: 1.8 X10E3/UL (ref 0.7–3.1)
LYMPHOCYTES NFR BLD AUTO: 17 %
MCH RBC QN AUTO: 27.4 PG (ref 26.6–33)
MCHC RBC AUTO-ENTMCNC: 33.1 G/DL (ref 31.5–35.7)
MCV RBC AUTO: 83 FL (ref 79–97)
MICRO URNS: ABNORMAL
MONOCYTES # BLD AUTO: 0.5 X10E3/UL (ref 0.1–0.9)
MONOCYTES NFR BLD AUTO: 5 %
MUCOUS THREADS URNS QL MICRO: PRESENT
NEUTROPHILS # BLD AUTO: 7.9 X10E3/UL (ref 1.4–7)
NEUTROPHILS NFR BLD AUTO: 77 %
NITRITE UR QL STRIP: NEGATIVE
PH UR STRIP: 5.5 [PH] (ref 5–7.5)
PLATELET # BLD AUTO: 235 X10E3/UL (ref 150–450)
PROT UR QL STRIP: ABNORMAL
RBC # BLD AUTO: 4.27 X10E6/UL (ref 3.77–5.28)
RBC #/AREA URNS HPF: ABNORMAL /HPF (ref 0–2)
SP GR UR: 1.02 (ref 1–1.03)
UROBILINOGEN UR STRIP-MCNC: 0.2 MG/DL (ref 0.2–1)
WBC # BLD AUTO: 10.3 X10E3/UL (ref 3.4–10.8)
WBC #/AREA URNS HPF: ABNORMAL /HPF (ref 0–5)

## 2020-09-13 NOTE — PROCEDURES
[address]  EEG    Name:  Daquan Guerrero  MR#:  [id number]  :  1965  ACCOUNT #:  [de-identified]  DATE OF SERVICE:  09/10/2020    A 24-HOUR AMBULATORY EEG RECORDING    DATE OF INTERPRETATION:  09/10/2020 to 2020    CLINICAL INDICATION:  The patient is a 79-year-old female with a history of altered mental status, confusion episodes, possible partial complex seizures. EEG to rule out seizures, rule out cortical abnormality, rule out encephalopathy. EEG CLASSIFICATION:  The patient has normal, awake, ambulatory 24-hour EEG recording. DESCRIPTION OF THE RECORD:  The background of this recording contains a posteriorly located occipital alpha rhythm of 8-9 Hz that does attenuate with eye opening in the posterior head region with the patient in an awake state. Throughout the recording, there were no clear areas of focal slowing or spike or spike-and-wave discharges seen. No recorded spells of any type on this EEG. The patient's clinical diary was not returned at this time. There was no correlation with clinical history. The patient did have some movement muscle electrode artifact. Overall, the study was of good quality and very interpretable. The patient had some stages of sleep noted during evening hours when the patient was asleep during the recording with K-complexes and sleep spindles seen in the central head regions. The patient did not have photic stimulation or hyperventilation performed. INTERPRETATION:  This is a normal ambulatory 24-hour EEG recording showing no clear focal abnormalities, no spike discharges, and no recorded spells of any time. The patient did not have the clinical diary returned at this time for clinical correlation. Clinical correlation is recommended.       Kary Moody MD      [id number]  D:  2020 15:31  T:  2020 21:54  JOB #:  [id number]

## 2020-09-14 NOTE — PROGRESS NOTES
Sent to [facility] [place]  are back  Urine culture negative for infection. WBC is normal now. Vitamin d is a little low. Should be taking vitamin d3 2000-4000units per day. This is over the counter supplement. Please let me know if you have any other questions.   [facility] NP

## 2020-09-15 ENCOUNTER — TELEPHONE (OUTPATIENT)
Dept: NEUROLOGY | Age: 55
End: 2020-09-15

## 2020-09-15 DIAGNOSIS — I67.89 CEREBRAL MICROVASCULAR DISEASE: ICD-10-CM

## 2020-09-15 DIAGNOSIS — M54.81 CERVICO-OCCIPITAL NEURALGIA OF RIGHT SIDE: ICD-10-CM

## 2020-09-15 DIAGNOSIS — G56.21 ULNAR NEUROPATHY AT ELBOW OF RIGHT UPPER EXTREMITY: ICD-10-CM

## 2020-09-15 DIAGNOSIS — I67.9 CEREBROVASCULAR DISEASE, UNSPECIFIED: ICD-10-CM

## 2020-09-15 DIAGNOSIS — G43.109 MIGRAINE WITH AURA AND WITHOUT STATUS MIGRAINOSUS, NOT INTRACTABLE: ICD-10-CM

## 2020-09-15 DIAGNOSIS — R42 DIZZINESS AND GIDDINESS: ICD-10-CM

## 2020-09-15 DIAGNOSIS — M54.12 CERVICAL RADICULOPATHY: ICD-10-CM

## 2020-09-15 DIAGNOSIS — I65.23 STENOSIS OF BOTH CAROTID ARTERIES WITHOUT INFARCTION: ICD-10-CM

## 2020-09-15 DIAGNOSIS — G44.209 TENSION VASCULAR HEADACHE: ICD-10-CM

## 2020-09-15 NOTE — TELEPHONE ENCOUNTER
Darryl JAEGER sent to [facility] via [facility]. Approval  PA Case: [id number], Status: Approved, Coverage Starts on: 9/15/2020 12:00:00 AM, Coverage Ends on: 9/15/2021 12:00:00 AM.    Faxed to [facility].

## 2020-10-07 ENCOUNTER — OFFICE VISIT (OUTPATIENT)
Dept: FAMILY MEDICINE CLINIC | Age: 55
End: 2020-10-07
Payer: COMMERCIAL

## 2020-10-07 VITALS
SYSTOLIC BLOOD PRESSURE: 107 MMHG | DIASTOLIC BLOOD PRESSURE: 71 MMHG | RESPIRATION RATE: 12 BRPM | HEIGHT: 61 IN | TEMPERATURE: 96 F | HEART RATE: 73 BPM | OXYGEN SATURATION: 99 % | WEIGHT: 198.6 LBS | BODY MASS INDEX: 37.49 KG/M2

## 2020-10-07 DIAGNOSIS — M32.9 LUPUS (HCC): Primary | ICD-10-CM

## 2020-10-07 DIAGNOSIS — Z86.711 HISTORY OF PULMONARY EMBOLUS (PE): ICD-10-CM

## 2020-10-07 DIAGNOSIS — G43.401 HEMIPLEGIC MIGRAINE WITH STATUS MIGRAINOSUS, NOT INTRACTABLE: ICD-10-CM

## 2020-10-07 DIAGNOSIS — Z86.73 HISTORY OF TIA (TRANSIENT ISCHEMIC ATTACK): ICD-10-CM

## 2020-10-07 DIAGNOSIS — K50.119 CROHN'S DISEASE OF COLON WITH COMPLICATION (HCC): ICD-10-CM

## 2020-10-07 PROCEDURE — 99214 OFFICE O/P EST MOD 30 MIN: CPT | Performed by: NURSE PRACTITIONER

## 2020-10-07 NOTE — PROGRESS NOTES
Subjective:     Chief Complaint   Patient presents with    Labs     discuss with physician         HPI:  54 y.o.  presents for follow up appointment, was seen as a new patient by me virtually on 9/4/2020 after hospital discharge and I wanted to follow up with her in a month to make sure all was still going well. She has complicated medical history, managed by multiple specialists. History of TIA and migraine- followed by neurology, Dr Raman Lowe. Followed by Hematology Dr Sudhakar Canales, history of PE. On Eliquis  Colorectal specialist Dr Eulalia Chapman for crohns, which she says is \"really bad\". Followed by Dr Isidro Corral as well. Says she thinks that she will need to have ileostomy soon. Hx of Lupus, followed by Rheum, Dr Stewart Minus - on plaquenil   Dr Jessi Aguirre pulmonary  Cardiology Dr Melina Gosselin- Dr dEwin Linares for irregular menses. Has upcoming visit in February. Dr Toney Davila for pain management at [address] [place] [address] for crohns pain. On tramadol     Was seen by neurology last month after our visit, started on Ubrelvy and SQ monthly injections (has had two injections total so far). Follow up in December. Did have a pretty bad migraine last week. Still has some rectal bleeding, chronic, only when wipes. Has follow up Dr Isidro Corral with GI in December. Has been seen by colorectal Dr uElalia Chapman, will need colostomy at some point. Last colonoscopy 2 years ago, done at outpatient with Dr Eulalia Chapman. Gets done every 2 years. No hospital, ER or specialist visits since last primary care visit except as noted above. Past Medical History:   Diagnosis Date    Arrhythmia     Afib, sees Dr Rola Cabrera Autoimmune disease [facility])     Crohns julito    CAD (coronary artery disease)     Chronic pain     Lt and Rt back:  Dr Aurea Parra;  [facility] mgmt [facility]    Cough     evaluated 8/25/14 by Dr Jean Gaxiola ([phone]) \". . possible drug related lung is due to Methitrexate or atypical or fungal infection\" per office note dated 8/25/14    Crohn's disease ([facility]     Dr Gallo Balloon Ill-defined condition     migraines    Ill-defined condition     lt lung higher than normal so puts pressure on diaphragm increasing SOB    Lupus ([facility]     Dr Gurinder Pickett [phone]    Pain from implanted hardware 12/14/2016    Exploration of sternal incision with removal of protruding sternal wires    Stenosis of both carotid arteries without cerebral infarction     Stroke ([facility] 06/2016    TIA's     SVC obstruction 2016    resolved after surgery    Thromboembolus ([facility] 04/2016    PE right lower lung, spontaneous pulm dr christal Murrell       Social History     Tobacco Use    Smoking status: Former Smoker     Packs/day: 0.50     Years: 2.50     Pack years: 1.25     Last attempt to quit: 3/29/2005     Years since quitting: 15.5    Smokeless tobacco: Never Used    Tobacco comment: social   Substance Use Topics    Alcohol use: Yes     Comment: Rare wine    Drug use: No       Outpatient Medications Marked as Taking for the 10/7/20 encounter (Office Visit) with Aristides Espinoza NP   Medication Sig Dispense Refill    ubrogepant (Ubrelvy) 50 mg tablet 1 tab PO at onset of headache and can repeat in 2 hours if needed, max dose 2 per day 10 Tab 11    fremanezumab-vfrm (Ajovy Autoinjector) 225 mg/1.5 mL atIn 225 mg by SubCUTAneous route every thirty (30) days. 1 Syringe 11    divalproex ER (Depakote ER) 500 mg ER tablet Take 1 Tab by mouth daily (with dinner). 100 Tab 5    apixaban (ELIQUIS) 2.5 mg tablet Take 1 Tab by mouth two (2) times a day. Resume on Monday 10/21/19  Indications: PE      ustekinumab (USTEKINAUMAB) 90 mg/mL injection 90 mg by SubCUTAneous route every two (2) months.  tiZANidine (ZANAFLEX) 2 mg tablet 1 po qam and 2 po qhs 100 Tab 0    gabapentin (NEURONTIN) 300 mg capsule TAKE ONE CAPSULE BY MOUTH THREE TIMES A  Cap 5    aspirin 81 mg chewable tablet Take 81 mg by mouth daily.       butalbital-acetaminophen-caffeine (FIORICET, ESGIC) -40 mg per tablet Take 2 Tabs by mouth every eight (8) hours as needed for Pain or Headache. Max Daily Amount: 6 Tabs. MUST LAST 30 DAYS. Indications: MIGRAINE, TENSION-TYPE HEADACHE 50 Tab 2    promethazine (PHENERGAN) 25 mg tablet Take 25 mg by mouth every six (6) hours as needed for Nausea.  diazepam (VALIUM) 5 mg tablet Take 1 Tab by mouth every twelve (12) hours as needed for Anxiety. Max Daily Amount: 10 mg. Refill at 1 month intervals 30 Tab 2    hydroxychloroquine (PLAQUENIL) 200 mg tablet Take 400 mg by mouth daily (after dinner). For lupus      ondansetron hcl (ZOFRAN) 4 mg tablet Take 4 mg by mouth every eight (8) hours as needed for Nausea.  drospirenone-ethinyl estradiol (MARIELOS 28) 3-20 mg-mcg per tablet Take 1 Tab by mouth nightly. Allergies   Allergen Reactions    Chlorhexidine Towelette Rash    Codeine Hives and Nausea and Vomiting     Severe nausea & vomiting    Hydrocodone Hives and Nausea and Vomiting     Severe nausea & vomiting    Oxycontin [Oxycodone] Hives and Nausea and Vomiting     Severe nausea & vomiting, also has the same reaction from Percocet    Percocet [Oxycodone-Acetaminophen] Hives and Nausea and Vomiting     Can take tylenol    Dilaudid [Hydromorphone (Bulk)] Swelling    Prednisone Other (comments)     HX OF CROHN'S; not allergic, prefer not to use        Health Maintenance reviewed -. ROS:  Gen: no fatigue, no fever, no chills, no unexplained weight loss or weight gain  Eyes: no excessive tearing, itching, or discharge  Nose: no rhinorrhea, no sinus pain  Mouth: no oral lesions, no sore throat, no difficulty swallowing  Resp: no shortness of breath, no wheezing, no cough  CV: no chest pain, no orthopnea, no paroxysmal nocturnal dyspnea, no palpitations  Abd: no nausea, no heartburn, no diarrhea, no constipation, no abdominal pain  Neuro:  no syncope or presyncopal episodes  Endo: no polyuria, no polydipsia.     : no hematuria, no dysuria, no frequency, no incontinence  Heme: no lymphadenopathy, no easy bruising or bleeding, no night sweats  MSK: no joint pain or swelling    PE:  Visit Vitals  /71 (BP 1 Location: Left arm, BP Patient Position: Sitting)   Pulse 73   Temp (!) 96 °F (35.6 °C) (Temporal)   Resp 12   Ht 5' 1\" (1.549 m)   Wt 198 lb 9.6 oz (90.1 kg)   SpO2 99%   BMI 37.53 kg/m²     Gen: alert, oriented, no acute distress  Head: normocephalic, atraumatic  Eyes: pupils equal round reactive to light, sclera clear, conjunctiva clear  Neck: symmetric normal sized thyroid, no carotid bruits, no jugular vein distention  Resp: no increase work of breathing, lungs clear to ausculation bilaterally, no wheezing, rales or rhonchi  CV: S1, S2 normal.  No murmurs, rubs, or gallops. Abd: soft, not tender, not distended. No hepatosplenomegaly. Normal bowel sounds. No hernias. No abdominal or renal bruits. Neuro: cranial nerves intact, normal strength and movement in all extremities, and sensation intact and symmetric. Skin: no lesion or rash  Extremities: no cyanosis or edema    No results found for this visit on 10/07/20. Assessment/Plan:  Differential diagnosis and treatment options reviewed with patient who is in agreement with treatment plan as outlined below. ICD-10-CM ICD-9-CM    1. [facility] ([facility]  M32.9 710.0    2. Crohn's disease of colon with complication ([facility]  K57.309 555.1    3. History of TIA (transient ischemic attack)  Z86.73 V12.54    4. History of pulmonary embolus (PE)  Z86.711 V12.55    5. Hemiplegic migraine with status migrainosus, not intractable  G43.401 346.32      BP at goal.  Continue to follow with specialists. Will get records from specialists. Labs done recently and looked okay. Discussed BMI and healthy weight. Encouraged patient to work to implement changes including diet high in raw fruits and vegetables, lean protein and good fats. Limit refined, processed carbohydrates and sugar.  Encouraged regular exercise. Recommended regular cardiovascular exercise 3-6 times per week for 30-60 minutes daily. Follow up in 6 months or sooner if needed. I have discussed the diagnosis with the patient and the intended plan as seen in the above orders. The patient has received an after-visit summary and questions were answered concerning future plans. I have discussed medication side effects and warnings with the patient as well. The patient verbalizes understanding and agreement with the plan.

## 2020-10-07 NOTE — PROGRESS NOTES
Chief Complaint   Patient presents with   [facility]     discuss with physician      1. Have you been to the ER, urgent care clinic since your last visit? Hospitalized since your last visit? No    2. Have you seen or consulted any other health care providers outside of the [address] [place] [address] since your last visit? Include any pap smears or colon screening. No    Health maintenance reviewed. Pt informed of health maintenance past due and/or upcoming. Pt verbalized understanding.      Health Maintenance Due   Topic Date Due    DTaP/Tdap/Td series (1 - Tdap) 07/11/1986    PAP AKA CERVICAL CYTOLOGY  07/11/1986    Colonoscopy  10/05/2020

## 2020-12-11 ENCOUNTER — VIRTUAL VISIT (OUTPATIENT)
Dept: NEUROLOGY | Age: 55
End: 2020-12-11
Payer: COMMERCIAL

## 2020-12-11 DIAGNOSIS — M54.12 CERVICAL RADICULOPATHY: ICD-10-CM

## 2020-12-11 DIAGNOSIS — I67.9 CEREBROVASCULAR DISEASE, UNSPECIFIED: ICD-10-CM

## 2020-12-11 DIAGNOSIS — G43.109 MIGRAINE WITH AURA AND WITHOUT STATUS MIGRAINOSUS, NOT INTRACTABLE: ICD-10-CM

## 2020-12-11 DIAGNOSIS — I65.23 STENOSIS OF BOTH CAROTID ARTERIES WITHOUT INFARCTION: ICD-10-CM

## 2020-12-11 DIAGNOSIS — I67.89 CEREBRAL MICROVASCULAR DISEASE: ICD-10-CM

## 2020-12-11 DIAGNOSIS — G56.21 ULNAR NEUROPATHY AT ELBOW OF RIGHT UPPER EXTREMITY: ICD-10-CM

## 2020-12-11 DIAGNOSIS — R41.82 ALTERED MENTAL STATUS, UNSPECIFIED ALTERED MENTAL STATUS TYPE: Primary | ICD-10-CM

## 2020-12-11 DIAGNOSIS — M54.81 CERVICO-OCCIPITAL NEURALGIA OF RIGHT SIDE: ICD-10-CM

## 2020-12-11 DIAGNOSIS — R55 CONVULSIVE SYNCOPE: ICD-10-CM

## 2020-12-11 DIAGNOSIS — R56.9 CONVULSIONS, UNSPECIFIED CONVULSION TYPE (HCC): ICD-10-CM

## 2020-12-11 PROCEDURE — 99214 OFFICE O/P EST MOD 30 MIN: CPT | Performed by: PSYCHIATRY & NEUROLOGY

## 2020-12-11 RX ORDER — BUTALBITAL, ACETAMINOPHEN AND CAFFEINE 50; 325; 40 MG/1; MG/1; MG/1
2 TABLET ORAL
Qty: 50 TAB | Refills: 5 | Status: SHIPPED | OUTPATIENT
Start: 2020-12-11

## 2020-12-11 NOTE — PROGRESS NOTES
Consult    Subjective:     Marcelo Woodard is a 54 y.o. right-handed  female seen at the request of Dr. Mojgan Cardoza because of new problem of evaluating her response to [place] which she is taking for her chronic migraine headaches, and states that she has had a significant improvement in her headaches, on these medications, and her headaches are markedly improved and she like his medications continued so we will continue these medications plus we refilled her Fioricet which she also takes occasionally for the headaches when they do breakthrough. He has had no side effect of the medication and is doing well. She says she is doing actually very well neurologically does not have any new neurologic problems. Because of her headaches and fluctuating neurologic problems last September she had a 24-hour amatory EEG done that was normal and we reviewed that with the patient again. She does have lupus and is followed by Dr. Gavin Gambino and is doing well from that standpoint also. He is on chronic anticoagulation because of chronic A. fib and she has had previous ablations that were unsuccessful. Patient has had no more TIAs or strokelike symptoms. Patient last summer 2020 had a normal MRI of the brain showing no new lesions, normal MRA of the brain, unremarkable Dopplers, and a CTA that did not show much disease. She was diagnosed with a complicated migraine and discharged home on Depakote  mg a day. She has had 3 cardiac ablations, and has chronic A. fib, and persistent tachycardia, and is currently also on anticoagulation with Eliquis 2.5 mg twice a day, and wants to be seen for medication adjustment. The headaches really sound like muscle tension headaches, but she denies any neck pain, TMJ problems, but does admit to some stress. She states she cannot take triptan's with her cardiac condition.   She is already on Neurontin 300 mg 3 times a day for a lot of her musculoskeletal and other type pain, and that. She has Crohn's disease and carries a diagnosis of lupus. We looked at her scans on the computer today, and reviewed them personally on the PACS system with the patient, and I assured her that the and spine and all the paraspinal muscles look normal.  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 Carotid Dopplers were normal. Patient seen last year for progressive headaches for the last 6 months, becoming more frequent and more severe and more incapacitating. She does have lupus, and she also has Crohn's disease and this a chronic problem. She has a family history of migraines. She has a lot of muscle pain mainly in her legs, and significant back pain and is under pain management at [facility] with Dr. Gómez Castillo. Her recent MRI scan of the lumbar spine was reviewed on the computer personally, and does show spondylolisthesis at L4-5 and mild degenerative disease without spinal stenosis or disc herniation. She feels as though her legs will give way at times, but her bowel and bladder function remain stable. She works daily as a . She's had no unusual fever, meningismus, denies bruxism, denies any increased stress or tension, or other causes for her headaches. Past Medical History:   Diagnosis Date    Arrhythmia     Afib, sees Dr Glasgow Levo Autoimmune disease [facility])     Crohns julito    CAD (coronary artery disease)     Chronic pain     Lt and Rt back:  Dr Emanuel See;  [facility]    Cough     evaluated 8/25/14 by Dr Zuhair Mcdowell ([phone]) \". . possible drug related lung is due to Methitrexate or atypical or fungal infection\" per office note dated 8/25/14    Crohn's disease [facility])     Dr Saleh Rising Ill-defined condition     migraines    Ill-defined condition     lt lung higher than normal so puts pressure on diaphragm increasing SOB    Lupus ([address]     Dr Anoop Gatica [phone]    Pain from implanted hardware 2016    Exploration of sternal incision with removal of protruding sternal wires    Stenosis of both carotid arteries without cerebral infarction     Stroke [facility]) 2016    TIA's     SVC obstruction     resolved after surgery    Thromboembolus ([address] 2016    PE right lower lung, spontaneous pulm dr christal Winkler      Past Surgical History:   Procedure Laterality Date    CARDIAC SURG PROCEDURE UNLIST  , 2018, 10/18    ablation    COLONOSCOPY N/A 10/5/2018    COLONOSCOPY performed by Gabby Crespo.  Corinne Ogles, MD at [facility] ENDOSCOPY     [address] HX  SECTION      x1    HX COLECTOMY  7/24/10    colon resection-18\" removed; Dr Marquita Sexton GI      bowel adhesions removed    HX GI      Bowel resection    HX GI  2019    stent for anal fistula     HX HEART CATHETERIZATION      no stents, open heart surgery 2016    HX HEENT      sinus surgery for deviated septum    HX HYSTERECTOMY      partial    HX LEFT SALPINGO-OOPHORECTOMY      ovary and fallopian tube removed    HX MOHS PROCEDURES Right approx     with bone spur repair in shoulder    HX OTHER SURGICAL  2016    femerol vein removed    HX OTHER SURGICAL  2016    open heart for repair of blocked SVC    HX TONSILLECTOMY      HX VASCULAR ACCESS  6/11/10    portacath insertion and removal x 3; Gets Remicaid q5 weeks    AR COLONOSCOPY W/BIOPSY SINGLE/MULTIPLE  2013          Family History   Problem Relation Age of Onset    Cancer Father         stomach    Other Mother         Auto accident    Cancer Paternal Grandmother         breast    Breast Cancer Paternal Grandmother         not sure of age   [place] Breast Cancer Maternal Aunt         not sure of age      Social History     Tobacco Use    Smoking status: Former Smoker     Packs/day: 0.50     Years: 2.50     Pack years: 1.25     Last attempt to quit: 3/29/2005     Years since quitting: 15.7    Smokeless tobacco: Never Used  Tobacco comment: social   Substance Use Topics    Alcohol use: Yes     Comment: Rare wine         Current Outpatient Medications:     butalbital-acetaminophen-caffeine (FIORICET, ESGIC) -40 mg per tablet, Take 2 Tabs by mouth every eight (8) hours as needed for Migraine. MUST LAST 30 DAYS. Indications: a migraine headache, tension headache, Disp: 50 Tab, Rfl: 5    ubrogepant (Ubrelvy) 50 mg tablet, 1 tab PO at onset of headache and can repeat in 2 hours if needed, max dose 2 per day, Disp: 10 Tab, Rfl: 11    fremanezumab-vfrm (Ajovy Autoinjector) 225 mg/1.5 mL atIn, 225 mg by SubCUTAneous route every thirty (30) days. , Disp: 1 Syringe, Rfl: 11    divalproex ER (Depakote ER) 500 mg ER tablet, Take 1 Tab by mouth daily (with dinner). , Disp: 100 Tab, Rfl: 5    apixaban (ELIQUIS) 2.5 mg tablet, Take 1 Tab by mouth two (2) times a day. Resume on Monday 10/21/19  Indications: PE, Disp: , Rfl:     ustekinumab (USTEKINAUMAB) 90 mg/mL injection, 90 mg by SubCUTAneous route every two (2) months., Disp: , Rfl:     tiZANidine (ZANAFLEX) 2 mg tablet, 1 po qam and 2 po qhs, Disp: 100 Tab, Rfl: 0    gabapentin (NEURONTIN) 300 mg capsule, TAKE ONE CAPSULE BY MOUTH THREE TIMES A DAY, Disp: 100 Cap, Rfl: 5    aspirin 81 mg chewable tablet, Take 81 mg by mouth daily. , Disp: , Rfl:     promethazine (PHENERGAN) 25 mg tablet, Take 25 mg by mouth every six (6) hours as needed for Nausea., Disp: , Rfl:     diazepam (VALIUM) 5 mg tablet, Take 1 Tab by mouth every twelve (12) hours as needed for Anxiety. Max Daily Amount: 10 mg. Refill at 1 month intervals, Disp: 30 Tab, Rfl: 2    hydroxychloroquine (PLAQUENIL) 200 mg tablet, Take 400 mg by mouth daily (after dinner).  For lupus, Disp: , Rfl:     ondansetron hcl (ZOFRAN) 4 mg tablet, Take 4 mg by mouth every eight (8) hours as needed for Nausea., Disp: , Rfl:     drospirenone-ethinyl estradiol (MARIELOS 28) 3-20 mg-mcg per tablet, Take 1 Tab by mouth nightly., Disp: , Rfl:         Allergies   Allergen Reactions    Chlorhexidine Towelette Rash    Codeine Hives and Nausea and Vomiting     Severe nausea & vomiting    Hydrocodone Hives and Nausea and Vomiting     Severe nausea & vomiting    Oxycontin [Oxycodone] Hives and Nausea and Vomiting     Severe nausea & vomiting, also has the same reaction from Percocet    Percocet [Oxycodone-Acetaminophen] Hives and Nausea and Vomiting     Can take tylenol    Dilaudid [Hydromorphone (Bulk)] Swelling    Prednisone Other (comments)     HX OF CROHN'S; not allergic, prefer not to use         Review of Systems:  A comprehensive review of systems was negative except for: Constitutional: positive for fatigue and malaise  Eyes: positive for visual disturbance  Gastrointestinal: positive for dyspepsia, reflux symptoms, change in bowel habits, diarrhea and abdominal pain  Musculoskeletal: positive for myalgias, arthralgias, stiff joints, back pain and muscle weakness  Neurological: positive for headaches, gait problems and weakness   There were no vitals filed for this visit. Objective:     I      NEUROLOGICAL EXAM:    Appearance: The patient is well developed, well nourished, provides a coherent history and is in acute distress because of headache. Mental Status: Oriented to time, place and person, and the president, cognitive function is normal, speech is fluent. Mood and affect appropriate, but a little depressed and anxious. Cranial Nerves:   Intact visual fields. Fundi are benign, discs are flat but poorly seen at last visit but not testable this visit. PATTI, EOM's full, no nystagmus, no ptosis. Facial sensation is normal. Corneal reflexes are not tested. Facial movement is symmetric. Hearing is abnormal bilaterally. Palate is midline with normal sternocleidomastoid and trapezius muscles are normal. Tongue is midline.   Neck without meningismus or bruits  Temporal arteries not tender or enlarged  TMJ areas are not tender  Patient very tender on palpation over the left craniocervical junction   Motor:  4/5 strength in upper and lower proximal and distal muscles. Normal bulk and tone. No fasciculations. Patient has prominent tenderness in her lumbar spine  Straight leg raising test negative in the sitting position bilateral at last visit but not testable this visit   Reflexes:   Deep tendon reflexes 1+/4 and symmetrical.  No babinski or clonus present   Sensory:   Abnormal to touch, pinprick and vibration and DSS and temperature on the whole left side in a midline fashion suggesting functional overlay, but normal on the right at last visit but not testable this visit   Gait:  Abnormal gait as she walks slowly because of her back pain. Tremor:   No tremor noted. Cerebellar:  No cerebellar signs present. Neurovascular:  Normal heart sounds and regular rhythm, peripheral pulses decreased, and no carotid bruits at last visit but not testable this visit           Assessment:       ICD-10-CM ICD-9-CM    1. Altered mental status, unspecified altered mental status type  R41.82 780.97    2. Migraine with aura and without status migrainosus, not intractable  G43.109 346.00    3. Cerebral microvascular disease  I67.89 437.8    4. Stenosis of both carotid arteries without infarction  I65.23 433.10 butalbital-acetaminophen-caffeine (FIORICET, ESGIC) -40 mg per tablet     433.30    5. Convulsive syncope  R55 780.2      780.39    6. Convulsions, unspecified convulsion type ([facility]  R56.9 780.39    7. Cervical radiculopathy  M54.12 723.4 butalbital-acetaminophen-caffeine (FIORICET, ESGIC) -40 mg per tablet   8. Cerebrovascular disease, unspecified  I67.9 437.9 butalbital-acetaminophen-caffeine (FIORICET, ESGIC) -40 mg per tablet   9. Ulnar neuropathy at elbow of right upper extremity  G56.21 354.2 butalbital-acetaminophen-caffeine (FIORICET, ESGIC) -40 mg per tablet   10.  Cervico-occipital neuralgia of right side  M54.81 723.8 butalbital-acetaminophen-caffeine (FIORICET, ESGIC) -40 mg per tablet         Plan:     Patient with new problem of being on the CGRP inhibitors for preventive and episodic for headaches, and doing very well and we will continue these medications for the patient because she is doing so well. She has had no side effects and no adverse reactions so far. Her Fioricet was also renewed for her today. For her strokes and TIAs she is to continue her aspirin and stay physically mentally active. She just was in the hospital summer 2020 and had a normal MRI of the brain and CTA ruling out any organic disease. Progressive headaches of the daily type described as pressure sensation on the top of her head, and left craniocervical junction, but not associated nausea vomiting,  She does not clench her teeth, and denies any new stress or tension. She will continue her Neurontin 3 times a day  EEG was normal in September 2024 for ambulatory 24-hour recording  MRI scan and MRA showed no clear structural lesions in the past  Cervical spine x-rays and CT scans of head and neck reviewed personally on the PACS system  Patient to continue pain management for occipital nerve block, and may need physical therapy  We will continue her on Fioricet for the headaches  She will call for results of her tests,, and further treatment and evaluation will depend on the results of her tests and her clinical course  Difficult case    Signed By: Aditya Mackay MD     December 11, 2020         Nicola Bledsoe was seen by synchronous (real-time) audio-video technology on 12/11/20. Consent:  She and/or her healthcare decision maker is aware that this patient-initiated Telehealth encounter is a billable service, with coverage as determined by her insurance carrier. She is aware that she may receive a bill and has provided verbal consent to proceed: Yes    I was in the office while conducting this encounter.     Pursuant to the emergency declaration under the [facility] waiver authority and the [employer] and [employer] Act, this Virtual  Visit was conducted, with patient's consent, to reduce the patient's risk of exposure to COVID-19 and provide continuity of care for an established patient. Services were provided through a video synchronous discussion virtually to substitute for in-person clinic visit.

## 2020-12-11 NOTE — PROGRESS NOTES
Identified pt with two pt identifiers(name and ). Reviewed record in preparation for visit and have obtained necessary documentation. Chief Complaint   Patient presents with    Migraine     3 month follow-up    Results     eeg      There were no vitals filed for this visit. Health Maintenance Review: Patient reminded of \"due or due soon\" health maintenance. I have asked the patient to contact his/her primary care provider (PCP) for follow-up on his/her health maintenance. Coordination of Care Questionnaire:  :   1) Have you been to an emergency room, urgent care, or hospitalized since your last visit? If yes, where when, and reason for visit? no       2. Have seen or consulted any other health care provider since your last visit? If yes, where when, and reason for visit? NO      Patient is accompanied by self I have received verbal consent from Gwen Aldana to discuss any/all medical information while they are present in the room.

## 2021-04-05 ENCOUNTER — TELEPHONE (OUTPATIENT)
Dept: ONCOLOGY | Age: 56
End: 2021-04-05

## 2021-04-05 NOTE — TELEPHONE ENCOUNTER
Called patient to remind of annulal f/u and to infrmm of address change - patient identified line -  left detailed VM

## 2021-04-22 ENCOUNTER — TRANSCRIBE ORDER (OUTPATIENT)
Dept: SCHEDULING | Age: 56
End: 2021-04-22

## 2021-04-22 DIAGNOSIS — K62.89 RECTAL PAIN: Primary | ICD-10-CM

## 2021-05-08 ENCOUNTER — HOSPITAL ENCOUNTER (OUTPATIENT)
Dept: MRI IMAGING | Age: 56
Discharge: HOME | End: 2021-05-08
Attending: SURGERY
Payer: COMMERCIAL

## 2021-05-08 DIAGNOSIS — K62.89 RECTAL PAIN: ICD-10-CM

## 2021-05-08 PROCEDURE — A9575 INJ GADOTERATE MEGLUMI 0.1ML: HCPCS | Performed by: SURGERY

## 2021-05-08 PROCEDURE — 72197 MRI PELVIS W/O & W/DYE: CPT

## 2021-05-08 PROCEDURE — 74011250636 HC RX REV CODE- 250/636: Performed by: SURGERY

## 2021-05-08 RX ORDER — GADOTERATE MEGLUMINE 376.9 MG/ML
20 INJECTION INTRAVENOUS
Status: COMPLETED | OUTPATIENT
Start: 2021-05-08 | End: 2021-05-08

## 2021-05-08 RX ADMIN — GADOTERATE MEGLUMINE 18 ML: 376.9 INJECTION INTRAVENOUS at 11:33

## 2021-05-17 NOTE — PROGRESS NOTES
Lucila Mitchell is a 54 y.o. female here for one year virtual visit follow up for PE. She is on Apixaban. 1. Have you been to the ER, urgent care clinic since your last visit? Hospitalized since your last visit? 8/24/2020 for left sided eye drooping and left sided weakness. (thais plegic migraine)    2. Have you seen or consulted any other health care providers outside of the [address] [place] [address] since your last visit? Include any pap smears or colon screening. See below    MRI done 2 weeks ago and willl be seeing Dr Edna Vogel on 24th about results. Also seeing Dr Reita Rinne today for pelvic floor study. Fecal defecation done at [facility] last Wednesday.

## 2021-05-19 ENCOUNTER — VIRTUAL VISIT (OUTPATIENT)
Dept: ONCOLOGY | Age: 56
End: 2021-05-19
Payer: COMMERCIAL

## 2021-05-19 DIAGNOSIS — I26.99 OTHER PULMONARY EMBOLISM WITHOUT ACUTE COR PULMONALE, UNSPECIFIED CHRONICITY (HCC): Primary | ICD-10-CM

## 2021-05-19 PROCEDURE — 99213 OFFICE O/P EST LOW 20 MIN: CPT | Performed by: INTERNAL MEDICINE

## 2021-05-19 NOTE — PROGRESS NOTES
Follow-up Note        Patient: Kathy Suarez MRN: [id number]  SSN: [id number]    YOB: 1965  Age: 54 y.o. Sex: female        Reason for Visit:   Kathy Suarez is a 54 y.o. female who is seen by synchronous (real-time) audio-video technology for follow up of history of acute PE    Subjective:      Kathy Suarez is a 54 y.o. female with a history of PE on systemic anticoagulation. She suffered a provoked PE from SVC occlusion/thrombus which was itself related to a chronic indwelling port-a-cath. She was diagnosed with PE in 04/2016. 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 She underwent a resection and bypass graft of the SVC. The SVC stenosis was felt to be related to a chronic indwelling port. She has been on Apixaban and prefers to remain on long-term systemic anticoagulation. Denies any new symptoms      Review of Systems:    Constitutional: negative  Eyes: negative  Ears, Nose, Mouth, Throat, and Face: negative  Respiratory: negative  Cardiovascular: negative  Gastrointestinal: negative  Genitourinary:negative  Integument/Breast: negative  Hematologic/Lymphatic: negative  Musculoskeletal:negative  Neurological: negative          Past Medical History:   Diagnosis Date    Arrhythmia     Afib, sees Dr Ashok Cabrera Autoimmune disease ([facility] [address]     Crohns julito    CAD (coronary artery disease)     Chronic pain     Lt and Rt back:  Dr Natalie Paez;  Pain mgmt Karen Mallory PA [facility]    Cough     evaluated 8/25/14 by Dr Dawit Brush ([phone]) \". . possible drug related lung is due to Methitrexate or atypical or fungal infection\" per office note dated 8/25/14    Crohn's disease [facility])     Dr Escalante Late Ill-defined condition     migraines    Ill-defined condition     lt lung higher than normal so puts pressure on diaphragm increasing SOB    Lupus ([facility] [address]     Dr Jose David Collins [phone]    Pain from implanted hardware 2016    Exploration of sternal incision with removal of protruding sternal wires    Stenosis of both carotid arteries without cerebral infarction     Stroke [facility]) 2016    TIA's     SVC obstruction 2016    resolved after surgery    Thromboembolus ([address] 2016    PE right lower lung, spontaneous pulm dr christal Mcneill     Past Surgical History:   Procedure Laterality Date    COLONOSCOPY N/A 10/5/2018    COLONOSCOPY performed by Kaylan Norton.  Young Lemus MD at [address] HX  SECTION      x1    HX GI      bowel adhesions removed    HX GI      Bowel resection    HX GI  2019    stent for anal fistula     HX HEART CATHETERIZATION      no stents, open heart surgery 2016    HX HEENT      sinus surgery for deviated septum    HX HYSTERECTOMY      partial    HX LEFT SALPINGO-OOPHORECTOMY      ovary and fallopian tube removed    HX MOHS PROCEDURES Right approx     with bone spur repair in shoulder    HX OTHER SURGICAL  2016    femerol vein removed    HX OTHER SURGICAL  2016    open heart for repair of blocked SVC    HX TONSILLECTOMY      HX TOTAL COLECTOMY  7/24/10    colon resection-18\" removed; Dr Dejuan Mojica  6/11/10    portacath insertion and removal x 3; Gets Remicaid q5 weeks    [place] CARDIAC SURG PROCEDURE UNLIST  , 2018, 10/18    ablation    [place] COLONOSCOPY W/BIOPSY SINGLE/MULTIPLE  2013           Family History   Problem Relation Age of Onset    Cancer Father         stomach    Other Mother         Auto accident    Cancer Paternal Grandmother         breast    Breast Cancer Paternal Grandmother         not sure of age   June [place]ourke Breast Cancer Maternal Aunt         not sure of age     Social History     Tobacco Use    Smoking status: Former Smoker     Packs/day: 0.50     Years: 2.50     Pack years: 1.25     Quit date: 3/29/2005     Years since quittin.1    Smokeless tobacco: Never Used    Tobacco comment: social   Substance Use Topics    Alcohol use: Yes     Comment: Rare wine      Prior to Admission medications    Medication Sig Start Date End Date Taking? Authorizing Provider   butalbital-acetaminophen-caffeine (FIORICET, ESGIC) -40 mg per tablet Take 2 Tabs by mouth every eight (8) hours as needed for Migraine. MUST LAST 30 DAYS. Indications: a migraine headache, tension headache 12/11/20  Yes Amanda Hanley MD   ubrogepant [place] [facility]) 50 mg tablet 1 tab PO at onset of headache and can repeat in 2 hours if needed, max dose 2 per day 9/8/20  Yes Amanda Hanley MD   fremanezumab-vfrm ([employer] Autoinjector) 225 mg/1.5 mL atIn 225 mg by SubCUTAneous route every thirty (30) days. 9/8/20  Yes Amanda Hanley MD   divalproex ER (Depakote ER) 500 mg ER tablet Take 1 Tab by mouth daily (with dinner). 9/2/20  Yes Amanda Hanley MD   apixaban (ELIQUIS) 2.5 mg tablet Take 1 Tab by mouth two (2) times a day. Resume on Monday 10/21/19  Indications: PE 10/18/19  Yes Ermalene Backbone, NP   ustekinumab (USTEKINAUMAB) 90 mg/mL injection 90 mg by SubCUTAneous route every two (2) months. Yes Provider, Historical   tiZANidine (ZANAFLEX) 2 mg tablet 1 po qam and 2 po qhs 7/26/19  Yes Markel Brizuela MD   gabapentin (NEURONTIN) 300 mg capsule TAKE ONE CAPSULE BY MOUTH THREE TIMES A DAY 12/3/18  Yes Amanda Hanley MD   aspirin 81 mg chewable tablet Take 81 mg by mouth daily. Yes Provider, Historical   promethazine (PHENERGAN) 25 mg tablet Take 25 mg by mouth every six (6) hours as needed for Nausea. Yes Provider, Historical   diazepam (VALIUM) 5 mg tablet Take 1 Tab by mouth every twelve (12) hours as needed for Anxiety. Max Daily Amount: 10 mg. Refill at 1 month intervals 8/31/16  Yes Amanda Hanley MD   hydroxychloroquine (PLAQUENIL) 200 mg tablet Take 400 mg by mouth daily (after dinner).  For lupus   Yes Provider, Historical   ondansetron hcl (ZOFRAN) 4 mg tablet Take 4 mg by mouth every eight (8) hours as needed for Nausea. Yes Other, MD Melisa   drospirenone-ethinyl estradiol (MARIELOS 28) 3-20 mg-mcg per tablet Take 1 Tab by mouth nightly. Yes Provider, Historical              Allergies   Allergen Reactions    Chlorhexidine Towelette Rash    Codeine Hives and Nausea and Vomiting     Severe nausea & vomiting    Hydrocodone Hives and Nausea and Vomiting     Severe nausea & vomiting    Oxycontin [Oxycodone] Hives and Nausea and Vomiting     Severe nausea & vomiting, also has the same reaction from Percocet    Percocet [Oxycodone-Acetaminophen] Hives and Nausea and Vomiting     Can take tylenol    Dilaudid [Hydromorphone (Bulk)] Swelling    Prednisone Other (comments)     HX OF CROHN'S; not allergic, prefer not to use            Objective:     General: alert, cooperative, no distress   Mental  status: mental status: alert, oriented to person, place, and time, normal mood, behavior, speech, dress, motor activity, and thought processes   Resp: resp: normal effort and no respiratory distress   Neuro: neuro: no gross deficits   Skin: skin: no discoloration or lesions of concern on visible areas     Due to this being a TeleHealth evaluation, many elements of the physical examination are unable to be assessed. Lab Results   Component Value Date/Time    WBC 10.3 09/11/2020 10:32 AM    HGB 11.7 09/11/2020 10:32 AM    HCT 35.3 09/11/2020 10:32 AM    PLATELET 325 86/08/2602 10:32 AM    MCV 83 09/11/2020 10:32 AM         Assessment:     1. Pulmonary embolism:    > 1st episode in 04/2016- provoked by indwelling catheter [facility]) and SVC stenosis and thrombus    Currently on Apixaban  H/O TIA   Patient prefers to continue systemic anticoagulation. Denies bleeding. Continue Eliquis 2.5 mg daily  Continue Aspirin 81 mg daily    Symptom management form reviewed with patient. Plan:       > Continue Apixaban. Asked her to hold Apixaban and ASA 5 days before rectal fistulectomy.    > Continue ASA 81 daily  > Follow-up in 1 year      Signed by: Becki Marquez MD                     May 19, 2021        CC. Rhina Snatoyo MD    The patient was evaluated through a synchronous (real-time) audio-video encounter. The patient (or guardian if applicable) is aware that this is a billable service. Verbal consent to proceed has been obtained within the past 12 months. The visit was conducted pursuant to the emergency declaration under the [address] [place] [address] and the [employer] and [employer] General Act. Patient identification was verified, and a caregiver was present when appropriate. The patient was located in a state where the provider was credentialed to provide care.

## 2021-09-15 ENCOUNTER — HOSPITAL ENCOUNTER (OUTPATIENT)
Dept: PREADMISSION TESTING | Age: 56
Discharge: HOME | End: 2021-09-15
Attending: SURGERY
Payer: COMMERCIAL

## 2021-09-15 ENCOUNTER — HOSPITAL ENCOUNTER (OUTPATIENT)
Dept: GENERAL RADIOLOGY | Age: 56
Discharge: HOME | End: 2021-09-15
Attending: SURGERY
Payer: COMMERCIAL

## 2021-09-15 VITALS
HEART RATE: 77 BPM | HEIGHT: 61 IN | RESPIRATION RATE: 16 BRPM | DIASTOLIC BLOOD PRESSURE: 71 MMHG | OXYGEN SATURATION: 100 % | TEMPERATURE: 98.4 F | SYSTOLIC BLOOD PRESSURE: 126 MMHG | BODY MASS INDEX: 38.58 KG/M2 | WEIGHT: 204.37 LBS

## 2021-09-15 LAB
ABO + RH BLD: NORMAL
ALBUMIN SERPL-MCNC: 2.9 G/DL (ref 3.5–5)
ALBUMIN/GLOB SERPL: 0.6 {RATIO} (ref 1.1–2.2)
ALP SERPL-CCNC: 127 U/L (ref 45–117)
ALT SERPL-CCNC: 18 U/L (ref 12–78)
ANION GAP SERPL CALC-SCNC: 6 MMOL/L (ref 5–15)
APPEARANCE UR: CLEAR
APTT PPP: 25.1 SEC (ref 22.1–31)
AST SERPL-CCNC: 18 U/L (ref 15–37)
ATRIAL RATE: 71 BPM
BACTERIA URNS QL MICRO: ABNORMAL /HPF
BILIRUB SERPL-MCNC: 0.6 MG/DL (ref 0.2–1)
BILIRUB UR QL: NEGATIVE
BLOOD GROUP ANTIBODIES SERPL: NORMAL
BUN SERPL-MCNC: 13 MG/DL (ref 6–20)
BUN/CREAT SERPL: 14 (ref 12–20)
CALCIUM SERPL-MCNC: 9 MG/DL (ref 8.5–10.1)
CALCULATED P AXIS, ECG09: 43 DEGREES
CALCULATED R AXIS, ECG10: 19 DEGREES
CALCULATED T AXIS, ECG11: 41 DEGREES
CHLORIDE SERPL-SCNC: 104 MMOL/L (ref 97–108)
CO2 SERPL-SCNC: 27 MMOL/L (ref 21–32)
COLOR UR: ABNORMAL
CREAT SERPL-MCNC: 0.92 MG/DL (ref 0.55–1.02)
DIAGNOSIS, 93000: NORMAL
EPITH CASTS URNS QL MICRO: ABNORMAL /LPF
ERYTHROCYTE [DISTWIDTH] IN BLOOD BY AUTOMATED COUNT: 12.2 % (ref 11.5–14.5)
EST. AVERAGE GLUCOSE BLD GHB EST-MCNC: 105 MG/DL
GLOBULIN SER CALC-MCNC: 5.1 G/DL (ref 2–4)
GLUCOSE SERPL-MCNC: 72 MG/DL (ref 65–100)
GLUCOSE UR STRIP.AUTO-MCNC: NEGATIVE MG/DL
HBA1C MFR BLD: 5.3 % (ref 4–5.6)
HCT VFR BLD AUTO: 40.3 % (ref 35–47)
HGB BLD-MCNC: 12.9 G/DL (ref 11.5–16)
HGB UR QL STRIP: ABNORMAL
HYALINE CASTS URNS QL MICRO: ABNORMAL /LPF (ref 0–5)
INR PPP: 1 (ref 0.9–1.1)
KETONES UR QL STRIP.AUTO: NEGATIVE MG/DL
LEUKOCYTE ESTERASE UR QL STRIP.AUTO: ABNORMAL
MAGNESIUM SERPL-MCNC: 1.8 MG/DL (ref 1.6–2.4)
MCH RBC QN AUTO: 29 PG (ref 26–34)
MCHC RBC AUTO-ENTMCNC: 32 G/DL (ref 30–36.5)
MCV RBC AUTO: 90.6 FL (ref 80–99)
MUCOUS THREADS URNS QL MICRO: ABNORMAL /LPF
NITRITE UR QL STRIP.AUTO: NEGATIVE
NRBC # BLD: 0 K/UL (ref 0–0.01)
NRBC BLD-RTO: 0 PER 100 WBC
P-R INTERVAL, ECG05: 132 MS
PH UR STRIP: 5.5 [PH] (ref 5–8)
PHOSPHATE SERPL-MCNC: 3 MG/DL (ref 2.6–4.7)
PLATELET # BLD AUTO: 230 K/UL (ref 150–400)
PMV BLD AUTO: 11 FL (ref 8.9–12.9)
POTASSIUM SERPL-SCNC: 3.6 MMOL/L (ref 3.5–5.1)
PROT SERPL-MCNC: 8 G/DL (ref 6.4–8.2)
PROT UR STRIP-MCNC: NEGATIVE MG/DL
PROTHROMBIN TIME: 10.9 SEC (ref 9–11.1)
Q-T INTERVAL, ECG07: 404 MS
QRS DURATION, ECG06: 84 MS
QTC CALCULATION (BEZET), ECG08: 439 MS
RBC # BLD AUTO: 4.45 M/UL (ref 3.8–5.2)
RBC #/AREA URNS HPF: ABNORMAL /HPF (ref 0–5)
SODIUM SERPL-SCNC: 137 MMOL/L (ref 136–145)
SP GR UR REFRACTOMETRY: 1.02 (ref 1–1.03)
SPECIMEN EXP DATE BLD: NORMAL
THERAPEUTIC RANGE,PTTT: NORMAL SECS (ref 58–77)
UA: UC IF INDICATED,UAUC: ABNORMAL
UROBILINOGEN UR QL STRIP.AUTO: 0.2 EU/DL (ref 0.2–1)
VENTRICULAR RATE, ECG03: 71 BPM
WBC # BLD AUTO: 8.3 K/UL (ref 3.6–11)
WBC URNS QL MICRO: ABNORMAL /HPF (ref 0–4)

## 2021-09-15 PROCEDURE — 80053 COMPREHEN METABOLIC PANEL: CPT

## 2021-09-15 PROCEDURE — 71046 X-RAY EXAM CHEST 2 VIEWS: CPT

## 2021-09-15 PROCEDURE — 85027 COMPLETE CBC AUTOMATED: CPT

## 2021-09-15 PROCEDURE — 93005 ELECTROCARDIOGRAM TRACING: CPT

## 2021-09-15 PROCEDURE — 84100 ASSAY OF PHOSPHORUS: CPT

## 2021-09-15 PROCEDURE — 83036 HEMOGLOBIN GLYCOSYLATED A1C: CPT

## 2021-09-15 PROCEDURE — 36415 COLL VENOUS BLD VENIPUNCTURE: CPT

## 2021-09-15 PROCEDURE — 85730 THROMBOPLASTIN TIME PARTIAL: CPT

## 2021-09-15 PROCEDURE — 86901 BLOOD TYPING SEROLOGIC RH(D): CPT

## 2021-09-15 PROCEDURE — 81001 URINALYSIS AUTO W/SCOPE: CPT

## 2021-09-15 PROCEDURE — 83735 ASSAY OF MAGNESIUM: CPT

## 2021-09-15 PROCEDURE — 85610 PROTHROMBIN TIME: CPT

## 2021-09-15 NOTE — PERIOP NOTES
Hibiclens/Chlorhexidine    Preventing Infections Before and After  Your Surgery    IMPORTANT INSTRUCTIONS    Please read and follow these instructions carefully. If you are unable to comply with the below instructions your procedure will be cancelled. Every Night for Three (3) nights before your surgery:  1. Shower with an antibacterial soap, such as Dial, or the soap provided at your preassessment appointment. A shower is better than a bath for cleaning your skin. 2. If needed, ask someone to help you reach all areas of your body. Dont forget to clean your belly button with every shower. The night before your surgery: If you lose your Hibiclens/chlorhexidine please contact surgery center or you can purchase it at a local pharmacy  1. On the night before your surgery, shower with an antibacterial soap, such as Dial, or the soap provided at your preassessment appointment. 2. With one packet of Hibiclens/Chlorhexidine in hand, turn water off.  3. Apply Hibiclens antiseptic skin cleanser with a clean, freshly washed washcloth. ? Gently apply to your body from chin to toes (except the genital area) and especially the area(s) where your incision(s) will be. ? Leave Hibiclens/Chlorhexidine on your skin for at least 20 seconds. CAUTION: If needed, Hibiclens/chlorhexidine may be used to clean the folds of skin of the legs (such as in the area of the groin) and on your buttocks and hips. However, do not use Hibiclens/Chlorhexidine above the neck or in the genital area (your bottom) or put inside any area of your body. 4. Turn the water back on and rinse. 5. Dry gently with a clean, freshly washed towel. 6. After your shower, do not use any powder, deodorant, perfumes or lotion. 7. Use clean, freshly washed towels and washcloths every time you shower. 8. Wear clean, freshly washed pajamas to bed the night before surgery. 9. Sleep on clean, freshly washed sheets.   10. Do not allow pets to sleep in your bed with you. The Morning of your surgery:  1. Shower again thoroughly with an antibacterial soap, such as Dial or the soap provided at your preassessment appointment. If needed, ask someone for help to reach all areas of your body. Dont forget to clean your belly button! Rinse. 2. Dry gently with a clean, freshly washed towel. 3. After your shower, do not use any powder, deodorant, perfumes or lotion prior to surgery. 4. Put on clean, freshly washed clothing. Tips to help prevent infections after your surgery:  1. Protect your surgical wound from germs:  ? Hand washing is the most important thing you and your caregivers can do to prevent infections. ? Keep your bandage clean and dry! ? Do not touch your surgical wound. 2. Use clean, freshly washed towels and washcloths every time you shower; do not share bath linens with others. 3. Until your surgical wound is healed, wear clothing and sleep on bed linens each day that are clean and freshly washed. 4. Do not allow pets to sleep in your bed with you or touch your surgical wound. 5. Do not smoke  smoking delays wound healing. This may be a good time to stop smoking. 6. If you have diabetes, it is important for you to manage your blood sugar levels properly before your surgery as well as after your surgery. Poorly managed blood sugar levels slow down wound healing and prevent you from healing completely. If you lose your Hibiclens/chlorhexidine, please call the [facility], or it is available for purchase at your pharmacy.                ___________________      ___________________      ________________  (Signature of Patient)          (Witness)                   (Date and Time)

## 2021-09-15 NOTE — PERIOP NOTES
Incentive Spirometer        Using the incentive spirometer helps expand the small air sacs of your lungs, helps you breathe deeply, and helps improve your lung function. Use your incentive spirometer twice a day (10 breaths each time) prior to surgery. How to Use Your Incentive Spirometer:  1. Hold the incentive spirometer in an upright position. 2. Breathe out as usual.   3. Place the mouthpiece in your mouth and seal your lips tightly around it. 4. Take a deep breath. Breathe in slowly and as deeply as possible. Keep the blue flow rate guide between the arrows. 5. Hold your breath as long as possible. Then exhale slowly and allow the piston to fall to the bottom of the column. 6. Rest for a few seconds and repeat steps one through five at least 10 times. PAT Tidal Volume________1750__________  x_______2________  Date__________9/15/21_____________    Yasmeen Garciacher THE INCENTIVE SPIROMETER WITH YOU TO THE HOSPITAL ON THE DAY OF YOUR SURGERY. Opportunity given to ask and answer questions as well as to observe return demonstration.       Patient signature_____________________________          Witness____________________________

## 2021-09-15 NOTE — PERIOP NOTES
The [employer] book was reviewed with the patient during the pre-admission testing (PAT) appointment. An opportunity for questions was provided, patient verbalized understanding.

## 2021-09-15 NOTE — PERIOP NOTES
[facility]  Preoperative Instructions        Surgery Date Tue., 9/28/21          Time of Arrival 10:30am, [facility] @ [phone]    1. On the day of your surgery, please report to the Surgical Services Registration Desk and sign in at your designated time. The Surgery Center is located to the right of the Emergency Room. 2. You must have someone with you to drive you home. You should not drive a car for 24 hours following surgery. Please make arrangements for a friend or family member to stay with you for the first 24 hours after your surgery. 3. Refer to your handbook for instructions on drinking liquids prior to surgery. 4. We recommend you do not drink any alcoholic beverages for 24 hours before and after your surgery. 5. Contact your surgeons office for instructions on the following medications: non-steroidal anti-inflammatory drugs (i.e. Advil, Aleve), vitamins, and supplements. (Some surgeons will want you to stop these medications prior to surgery and others may allow you to take them)  **If you are currently taking Plavix, Coumadin, Aspirin and/or other blood-thinning agents, contact your surgeon for instructions. ** Your surgeon will partner with the physician prescribing these medications to determine if it is safe to stop or if you need to continue taking. Please do not stop taking these medications without instructions from your surgeon    6. Wear comfortable clothes. Wear glasses instead of contacts. Do not bring any money or jewelry. Please bring picture ID, insurance card, and any prearranged co-payment or hospital payment. Do not wear make-up, particularly mascara the morning of your surgery. Do not wear nail polish, particularly if you are having foot /hand surgery. Wear your hair loose or down, no ponytails, buns, miladis pins or clips. All body piercings must be removed.   Please shower with antibacterial soap for three consecutive days before and on the morning of surgery, but do not apply any lotions, powders or deodorants after the shower on the day of surgery. Please use a fresh towels after each shower. Please sleep in clean clothes and change bed linens the night before surgery. Please do not shave for 48 hours prior to surgery. Shaving of the face is acceptable. 7. You should understand that if you do not follow these instructions your surgery may be cancelled. If your physical condition changes (I.e. fever, cold or flu) please contact your surgeon as soon as possible. 8. It is important that you be on time. If a situation occurs where you may be late, please call [phone] (OR Holding Area). 9. If you have any questions and or problems, please call [phone] (Pre-admission Testing). 10. Your surgery time may be subject to change. You will receive a phone call the evening prior if your time changes. 11.  If having outpatient surgery, you must have someone to drive you here, stay with you during the duration of your stay, and to drive you home at time of discharge. 12.   In an effort to improve the efficiency, privacy, and safety for all of our Pre-op patients visitors are not allowed in the Holding area. Once you arrive and are registered your family/visitors will be asked to remain in your vehicle. The Pre-op staff will call you when they are ready for you to enter the building and will explain to you and your family/visitors that the Pre-op phase is beginning. At this time, if your procedure is outpatient, your family member will be asked to stay in their vehicle until the surgery is complete and you are ready for discharge. If you are going to be admitted after your surgery, once you are called to come inside the building, your family will be able to leave the parking lot.    At this time the staff will also ask for your designated spokesperson information in the event that the physician or staff need to provide an update or obtain any pertinent information. The designated spokesperson will be notified if the physician needs to speak to family during the pre-operative phase.and/or in the post op phase. Special Instructions:   * A Covid-19 test is scheduled on Fri., 9/24/21 between 07:00am - 12:00/noon. Please see the attached location/directions and self-quarantine after test through day of surgery. * Follow your physician instructions for holding Eliquis 2 days prior to surgery. * Follow your physician/surgeon instructions for holding all non-steroidal anti-inflammatory drugs/NSAIDs, blood-thinning agents (Eliquis, Aspirin), vitamins & supplements prior to surgery. TAKE ALL MEDICATIONS THE DAY OF SURGERY EXCEPT: gabapentin      I understand a pre-operative phone call will be made to verify my surgery time. In the event that I am not available, I give permission for a message to be left on my answering service and/or with another person?   yes         ___________________      __________   _________    (Signature of Patient)             (Witness)                (Date and Time)

## 2021-09-15 NOTE — WOUND CARE
Wound and Ostomy nurse consulted for pre-op site marking and first ostomy teaching today during PAT visit. Patient is an ERAS candidate and received the book for this education. She will be getting a colostomy diversion due to Crohn's disease. This may be Semi-permanent for her or permanent. She has committed to at least 1 year of the colostomy. Pt. Has watched videos that were recommended on-line for her. I gave her the Ostomy [employer] information. she has met with an ostomy association member for lunch one day to discuss colostomy care and support and she has attended several support group meetings on -line and will continue this. All in all she has a good attitude going into this and is committed to learning the material.   The ostomy was discussed and the site marking done on the left quadrant on a flat pouchable surface that the patient could see. The model was used with the ostomy  Pre-op kit to explain everything - how to empty and how to change the pouch as needed. Lots more to learn but she got the basics during this meeting today. Her first lesson is complete and she asked appropriate questions as needed. Plan: Will meet with patient on the same day or day after her surgery to start the ostomy teaching post op. She will bring her ostomy books / folder with her to the hospital. For now they are for review.    Anh Peralta RN, BSN, [employer]

## 2021-09-16 LAB
BACTERIA SPEC CULT: NORMAL
BACTERIA SPEC CULT: NORMAL
SERVICE CMNT-IMP: NORMAL

## 2021-09-24 ENCOUNTER — HOSPITAL ENCOUNTER (OUTPATIENT)
Dept: PREADMISSION TESTING | Age: 56
Discharge: HOME | End: 2021-09-24
Payer: COMMERCIAL

## 2021-09-24 PROCEDURE — U0005 INFEC AGEN DETEC AMPLI PROBE: HCPCS

## 2021-09-25 LAB
SARS-COV-2, XPLCVT: NOT DETECTED
SOURCE, COVRS: NORMAL

## 2021-09-28 ENCOUNTER — ANESTHESIA (OUTPATIENT)
Dept: SURGERY | Age: 56
DRG: 331 | End: 2021-09-28
Payer: COMMERCIAL

## 2021-09-28 ENCOUNTER — ANESTHESIA EVENT (OUTPATIENT)
Dept: SURGERY | Age: 56
DRG: 331 | End: 2021-09-28
Payer: COMMERCIAL

## 2021-09-28 ENCOUNTER — HOSPITAL ENCOUNTER (INPATIENT)
Age: 56
LOS: 3 days | Discharge: HOME HEALTH | DRG: 331 | End: 2021-10-01
Attending: SURGERY | Admitting: SURGERY
Payer: COMMERCIAL

## 2021-09-28 DIAGNOSIS — G43.109 MIGRAINE WITH AURA AND WITHOUT STATUS MIGRAINOSUS, NOT INTRACTABLE: ICD-10-CM

## 2021-09-28 DIAGNOSIS — K50.919 CROHN'S DISEASE WITH COMPLICATION, UNSPECIFIED GASTROINTESTINAL TRACT LOCATION (HCC): ICD-10-CM

## 2021-09-28 DIAGNOSIS — K50.119 CROHN'S DISEASE OF LARGE INTESTINE WITH COMPLICATION (HCC): Primary | ICD-10-CM

## 2021-09-28 PROBLEM — K50.90 CROHN DISEASE (HCC): Status: ACTIVE | Noted: 2021-09-28

## 2021-09-28 PROCEDURE — C1781 MESH (IMPLANTABLE): HCPCS | Performed by: SURGERY

## 2021-09-28 PROCEDURE — 77030002966 HC SUT PDS J&J -A: Performed by: SURGERY

## 2021-09-28 PROCEDURE — 77030035279 HC SEAL VSL ENDOWR XI INTU -I2: Performed by: SURGERY

## 2021-09-28 PROCEDURE — 77030034667 HC ACC PLATFRM ENDO GELPNT AMR -E: Performed by: SURGERY

## 2021-09-28 PROCEDURE — 76210000001 HC OR PH I REC 2.5 TO 3 HR: Performed by: SURGERY

## 2021-09-28 PROCEDURE — 74011250636 HC RX REV CODE- 250/636: Performed by: NURSE ANESTHETIST, CERTIFIED REGISTERED

## 2021-09-28 PROCEDURE — 74011000258 HC RX REV CODE- 258: Performed by: SURGERY

## 2021-09-28 PROCEDURE — 77030029357 HC DEV CLSR FAC SYS EFX TELE -C: Performed by: SURGERY

## 2021-09-28 PROCEDURE — 77030038613 HC SUT PDS STRATA SPIRL J&J -B: Performed by: SURGERY

## 2021-09-28 PROCEDURE — 74011250636 HC RX REV CODE- 250/636: Performed by: SURGERY

## 2021-09-28 PROCEDURE — 77030005513 HC CATH URETH FOL11 MDII -B: Performed by: SURGERY

## 2021-09-28 PROCEDURE — P9045 ALBUMIN (HUMAN), 5%, 250 ML: HCPCS | Performed by: NURSE ANESTHETIST, CERTIFIED REGISTERED

## 2021-09-28 PROCEDURE — 77030002933 HC SUT MCRYL J&J -A: Performed by: SURGERY

## 2021-09-28 PROCEDURE — 77030013079 HC BLNKT BAIR HGGR 3M -A: Performed by: ANESTHESIOLOGY

## 2021-09-28 PROCEDURE — 77030018673: Performed by: SURGERY

## 2021-09-28 PROCEDURE — 74011000272 HC RX REV CODE- 272: Performed by: SURGERY

## 2021-09-28 PROCEDURE — 74011250636 HC RX REV CODE- 250/636

## 2021-09-28 PROCEDURE — 74011000258 HC RX REV CODE- 258: Performed by: NURSE ANESTHETIST, CERTIFIED REGISTERED

## 2021-09-28 PROCEDURE — 74011000250 HC RX REV CODE- 250: Performed by: NURSE ANESTHETIST, CERTIFIED REGISTERED

## 2021-09-28 PROCEDURE — 2709999900 HC NON-CHARGEABLE SUPPLY: Performed by: SURGERY

## 2021-09-28 PROCEDURE — 77030020703 HC SEAL CANN DISP INTU -B: Performed by: SURGERY

## 2021-09-28 PROCEDURE — 77030019908 HC STETH ESOPH SIMS -A: Performed by: ANESTHESIOLOGY

## 2021-09-28 PROCEDURE — 77030040259 HC STPLR DEV SUREFORM DVNCI INTU -F: Performed by: SURGERY

## 2021-09-28 PROCEDURE — 77030025303 HC STPLR ENDOSC J&J -G: Performed by: SURGERY

## 2021-09-28 PROCEDURE — 77030020061 HC IV BLD WRMR ADMIN SET 3M -B: Performed by: ANESTHESIOLOGY

## 2021-09-28 PROCEDURE — 77030014007 HC SPNG HEMSTAT J&J -B: Performed by: SURGERY

## 2021-09-28 PROCEDURE — 74011000250 HC RX REV CODE- 250: Performed by: SURGERY

## 2021-09-28 PROCEDURE — 76060000037 HC ANESTHESIA 3 TO 3.5 HR: Performed by: SURGERY

## 2021-09-28 PROCEDURE — 74011250637 HC RX REV CODE- 250/637: Performed by: SURGERY

## 2021-09-28 PROCEDURE — 77030035029 HC NDL INSUF VERES DISP COVD -B: Performed by: SURGERY

## 2021-09-28 PROCEDURE — 74011250636 HC RX REV CODE- 250/636: Performed by: ANESTHESIOLOGY

## 2021-09-28 PROCEDURE — 77030026438 HC STYL ET INTUB CARD -A: Performed by: ANESTHESIOLOGY

## 2021-09-28 PROCEDURE — 77030008771 HC TU NG SALEM SUMP -A: Performed by: ANESTHESIOLOGY

## 2021-09-28 PROCEDURE — 77030031139 HC SUT VCRL2 J&J -A: Performed by: SURGERY

## 2021-09-28 PROCEDURE — 77030008756 HC TU IRR SUC STRY -B: Performed by: SURGERY

## 2021-09-28 PROCEDURE — 77030035277 HC OBTRTR BLDELSS DISP INTU -B: Performed by: SURGERY

## 2021-09-28 PROCEDURE — 77030008684 HC TU ET CUF COVD -B: Performed by: ANESTHESIOLOGY

## 2021-09-28 PROCEDURE — 65270000029 HC RM PRIVATE

## 2021-09-28 PROCEDURE — 77030008606 HC TRCR ENDOSC KII AMR -B: Performed by: SURGERY

## 2021-09-28 PROCEDURE — 77030003029 HC SUT VCRL J&J -B: Performed by: SURGERY

## 2021-09-28 PROCEDURE — 77030002986 HC SUT PROL J&J -A: Performed by: SURGERY

## 2021-09-28 PROCEDURE — 76010000878 HC OR TIME 3 TO 3.5HR INTENSV - TIER 2: Performed by: SURGERY

## 2021-09-28 DEVICE — BIO-A TISSUE REINFORCEMENT 8CMX8CM
Type: IMPLANTABLE DEVICE | Site: ABDOMEN | Status: FUNCTIONAL
Brand: GORE BIO-A TISSUE REINFORCEMENT

## 2021-09-28 RX ORDER — DIPHENHYDRAMINE HYDROCHLORIDE 50 MG/ML
INJECTION, SOLUTION INTRAMUSCULAR; INTRAVENOUS
Status: COMPLETED
Start: 2021-09-28 | End: 2021-09-28

## 2021-09-28 RX ORDER — MIDAZOLAM HYDROCHLORIDE 1 MG/ML
1 INJECTION, SOLUTION INTRAMUSCULAR; INTRAVENOUS AS NEEDED
Status: DISCONTINUED | OUTPATIENT
Start: 2021-09-28 | End: 2021-09-28

## 2021-09-28 RX ORDER — SUCCINYLCHOLINE CHLORIDE 20 MG/ML
INJECTION INTRAMUSCULAR; INTRAVENOUS AS NEEDED
Status: DISCONTINUED | OUTPATIENT
Start: 2021-09-28 | End: 2021-09-28 | Stop reason: HOSPADM

## 2021-09-28 RX ORDER — LIDOCAINE HYDROCHLORIDE 20 MG/ML
INJECTION, SOLUTION EPIDURAL; INFILTRATION; INTRACAUDAL; PERINEURAL AS NEEDED
Status: DISCONTINUED | OUTPATIENT
Start: 2021-09-28 | End: 2021-09-28 | Stop reason: HOSPADM

## 2021-09-28 RX ORDER — GLYCOPYRROLATE 0.2 MG/ML
INJECTION INTRAMUSCULAR; INTRAVENOUS AS NEEDED
Status: DISCONTINUED | OUTPATIENT
Start: 2021-09-28 | End: 2021-09-28 | Stop reason: HOSPADM

## 2021-09-28 RX ORDER — DIVALPROEX SODIUM 500 MG/1
TABLET, EXTENDED RELEASE ORAL
Qty: 100 TABLET | Refills: 5 | Status: SHIPPED | OUTPATIENT
Start: 2021-09-28

## 2021-09-28 RX ORDER — DIPHENHYDRAMINE HYDROCHLORIDE 50 MG/ML
12.5 INJECTION, SOLUTION INTRAMUSCULAR; INTRAVENOUS
Status: DISCONTINUED | OUTPATIENT
Start: 2021-09-28 | End: 2021-09-29

## 2021-09-28 RX ORDER — NEOSTIGMINE METHYLSULFATE 1 MG/ML
INJECTION, SOLUTION INTRAVENOUS AS NEEDED
Status: DISCONTINUED | OUTPATIENT
Start: 2021-09-28 | End: 2021-09-28 | Stop reason: HOSPADM

## 2021-09-28 RX ORDER — FENTANYL CITRATE 50 UG/ML
INJECTION, SOLUTION INTRAMUSCULAR; INTRAVENOUS AS NEEDED
Status: DISCONTINUED | OUTPATIENT
Start: 2021-09-28 | End: 2021-09-28 | Stop reason: HOSPADM

## 2021-09-28 RX ORDER — BUPIVACAINE HYDROCHLORIDE AND EPINEPHRINE 2.5; 5 MG/ML; UG/ML
INJECTION, SOLUTION EPIDURAL; INFILTRATION; INTRACAUDAL; PERINEURAL AS NEEDED
Status: DISCONTINUED | OUTPATIENT
Start: 2021-09-28 | End: 2021-09-28 | Stop reason: HOSPADM

## 2021-09-28 RX ORDER — ONDANSETRON 2 MG/ML
INJECTION INTRAMUSCULAR; INTRAVENOUS AS NEEDED
Status: DISCONTINUED | OUTPATIENT
Start: 2021-09-28 | End: 2021-09-28 | Stop reason: HOSPADM

## 2021-09-28 RX ORDER — SODIUM CHLORIDE, SODIUM LACTATE, POTASSIUM CHLORIDE, CALCIUM CHLORIDE 600; 310; 30; 20 MG/100ML; MG/100ML; MG/100ML; MG/100ML
75 INJECTION, SOLUTION INTRAVENOUS CONTINUOUS
Status: DISCONTINUED | OUTPATIENT
Start: 2021-09-28 | End: 2021-09-30

## 2021-09-28 RX ORDER — HYDROMORPHONE HYDROCHLORIDE 1 MG/ML
INJECTION, SOLUTION INTRAMUSCULAR; INTRAVENOUS; SUBCUTANEOUS
Status: COMPLETED
Start: 2021-09-28 | End: 2021-09-28

## 2021-09-28 RX ORDER — FENTANYL CITRATE 50 UG/ML
INJECTION, SOLUTION INTRAMUSCULAR; INTRAVENOUS
Status: COMPLETED
Start: 2021-09-28 | End: 2021-09-28

## 2021-09-28 RX ORDER — PROPOFOL 10 MG/ML
INJECTION, EMULSION INTRAVENOUS AS NEEDED
Status: DISCONTINUED | OUTPATIENT
Start: 2021-09-28 | End: 2021-09-28 | Stop reason: HOSPADM

## 2021-09-28 RX ORDER — LIDOCAINE HYDROCHLORIDE ANHYDROUS AND DEXTROSE MONOHYDRATE .8; 5 G/100ML; G/100ML
1 INJECTION, SOLUTION INTRAVENOUS CONTINUOUS
Status: DISPENSED | OUTPATIENT
Start: 2021-09-28 | End: 2021-09-29

## 2021-09-28 RX ORDER — MIDAZOLAM HYDROCHLORIDE 1 MG/ML
INJECTION, SOLUTION INTRAMUSCULAR; INTRAVENOUS AS NEEDED
Status: DISCONTINUED | OUTPATIENT
Start: 2021-09-28 | End: 2021-09-28 | Stop reason: HOSPADM

## 2021-09-28 RX ORDER — FENTANYL CITRATE 50 UG/ML
25 INJECTION, SOLUTION INTRAMUSCULAR; INTRAVENOUS
Status: COMPLETED | OUTPATIENT
Start: 2021-09-28 | End: 2021-09-28

## 2021-09-28 RX ORDER — SODIUM CHLORIDE, SODIUM LACTATE, POTASSIUM CHLORIDE, CALCIUM CHLORIDE 600; 310; 30; 20 MG/100ML; MG/100ML; MG/100ML; MG/100ML
75 INJECTION, SOLUTION INTRAVENOUS CONTINUOUS
Status: DISCONTINUED | OUTPATIENT
Start: 2021-09-28 | End: 2021-09-28 | Stop reason: HOSPADM

## 2021-09-28 RX ORDER — ALBUMIN HUMAN 50 G/1000ML
SOLUTION INTRAVENOUS AS NEEDED
Status: DISCONTINUED | OUTPATIENT
Start: 2021-09-28 | End: 2021-09-28 | Stop reason: HOSPADM

## 2021-09-28 RX ORDER — SODIUM CHLORIDE, SODIUM LACTATE, POTASSIUM CHLORIDE, CALCIUM CHLORIDE 600; 310; 30; 20 MG/100ML; MG/100ML; MG/100ML; MG/100ML
25 INJECTION, SOLUTION INTRAVENOUS CONTINUOUS
Status: DISCONTINUED | OUTPATIENT
Start: 2021-09-28 | End: 2021-09-28 | Stop reason: HOSPADM

## 2021-09-28 RX ORDER — ACETAMINOPHEN 500 MG
1000 TABLET ORAL ONCE
Status: COMPLETED | OUTPATIENT
Start: 2021-09-28 | End: 2021-09-28

## 2021-09-28 RX ORDER — ONDANSETRON 2 MG/ML
4 INJECTION INTRAMUSCULAR; INTRAVENOUS AS NEEDED
Status: DISCONTINUED | OUTPATIENT
Start: 2021-09-28 | End: 2021-09-28 | Stop reason: HOSPADM

## 2021-09-28 RX ORDER — LIDOCAINE HYDROCHLORIDE ANHYDROUS AND DEXTROSE MONOHYDRATE .8; 5 G/100ML; G/100ML
INJECTION, SOLUTION INTRAVENOUS
Status: DISCONTINUED | OUTPATIENT
Start: 2021-09-28 | End: 2021-09-28 | Stop reason: HOSPADM

## 2021-09-28 RX ORDER — MAGNESIUM SULFATE HEPTAHYDRATE 40 MG/ML
INJECTION, SOLUTION INTRAVENOUS AS NEEDED
Status: DISCONTINUED | OUTPATIENT
Start: 2021-09-28 | End: 2021-09-28 | Stop reason: HOSPADM

## 2021-09-28 RX ORDER — CELECOXIB 200 MG/1
200 CAPSULE ORAL ONCE
Status: COMPLETED | OUTPATIENT
Start: 2021-09-28 | End: 2021-09-28

## 2021-09-28 RX ORDER — KETAMINE HYDROCHLORIDE 50 MG/ML
INJECTION, SOLUTION INTRAMUSCULAR; INTRAVENOUS AS NEEDED
Status: DISCONTINUED | OUTPATIENT
Start: 2021-09-28 | End: 2021-09-28 | Stop reason: HOSPADM

## 2021-09-28 RX ORDER — HYDROMORPHONE HYDROCHLORIDE 1 MG/ML
0.2 INJECTION, SOLUTION INTRAMUSCULAR; INTRAVENOUS; SUBCUTANEOUS
Status: DISCONTINUED | OUTPATIENT
Start: 2021-09-28 | End: 2021-09-29 | Stop reason: HOSPADM

## 2021-09-28 RX ORDER — HYDROMORPHONE HYDROCHLORIDE 1 MG/ML
0.5 INJECTION, SOLUTION INTRAMUSCULAR; INTRAVENOUS; SUBCUTANEOUS
Status: DISCONTINUED | OUTPATIENT
Start: 2021-09-28 | End: 2021-10-01 | Stop reason: HOSPADM

## 2021-09-28 RX ORDER — DEXAMETHASONE SODIUM PHOSPHATE 4 MG/ML
INJECTION, SOLUTION INTRA-ARTICULAR; INTRALESIONAL; INTRAMUSCULAR; INTRAVENOUS; SOFT TISSUE AS NEEDED
Status: DISCONTINUED | OUTPATIENT
Start: 2021-09-28 | End: 2021-09-28 | Stop reason: HOSPADM

## 2021-09-28 RX ORDER — MEPERIDINE HYDROCHLORIDE 50 MG/1
50 TABLET ORAL
Status: DISCONTINUED | OUTPATIENT
Start: 2021-09-28 | End: 2021-09-30

## 2021-09-28 RX ORDER — MIDAZOLAM HYDROCHLORIDE 1 MG/ML
2 INJECTION, SOLUTION INTRAMUSCULAR; INTRAVENOUS AS NEEDED
Status: COMPLETED | OUTPATIENT
Start: 2021-09-28 | End: 2021-09-28

## 2021-09-28 RX ORDER — DEXMEDETOMIDINE HYDROCHLORIDE 100 UG/ML
INJECTION, SOLUTION INTRAVENOUS AS NEEDED
Status: DISCONTINUED | OUTPATIENT
Start: 2021-09-28 | End: 2021-09-28 | Stop reason: HOSPADM

## 2021-09-28 RX ORDER — KETOROLAC TROMETHAMINE 30 MG/ML
15 INJECTION, SOLUTION INTRAMUSCULAR; INTRAVENOUS EVERY 6 HOURS
Status: DISCONTINUED | OUTPATIENT
Start: 2021-09-28 | End: 2021-10-01 | Stop reason: HOSPADM

## 2021-09-28 RX ORDER — GABAPENTIN 300 MG/1
600 CAPSULE ORAL ONCE
Status: COMPLETED | OUTPATIENT
Start: 2021-09-28 | End: 2021-09-28

## 2021-09-28 RX ORDER — KETOROLAC TROMETHAMINE 30 MG/ML
INJECTION, SOLUTION INTRAMUSCULAR; INTRAVENOUS
Status: COMPLETED
Start: 2021-09-28 | End: 2021-09-28

## 2021-09-28 RX ORDER — ROCURONIUM BROMIDE 10 MG/ML
INJECTION, SOLUTION INTRAVENOUS AS NEEDED
Status: DISCONTINUED | OUTPATIENT
Start: 2021-09-28 | End: 2021-09-28 | Stop reason: HOSPADM

## 2021-09-28 RX ADMIN — CELECOXIB 200 MG: 200 CAPSULE ORAL at 11:48

## 2021-09-28 RX ADMIN — HYDROMORPHONE HYDROCHLORIDE 0.2 MG: 1 INJECTION, SOLUTION INTRAMUSCULAR; INTRAVENOUS; SUBCUTANEOUS at 19:10

## 2021-09-28 RX ADMIN — Medication 3 AMPULE: at 11:46

## 2021-09-28 RX ADMIN — KETAMINE HYDROCHLORIDE 50 MG: 50 INJECTION, SOLUTION, CONCENTRATE INTRAMUSCULAR; INTRAVENOUS at 14:30

## 2021-09-28 RX ADMIN — HYDROMORPHONE HYDROCHLORIDE 0.5 MG: 1 INJECTION, SOLUTION INTRAMUSCULAR; INTRAVENOUS; SUBCUTANEOUS at 21:18

## 2021-09-28 RX ADMIN — ROCURONIUM BROMIDE 10 MG: 10 INJECTION INTRAVENOUS at 16:26

## 2021-09-28 RX ADMIN — ALBUMIN (HUMAN) 250 ML: 2.5 SOLUTION INTRAVENOUS at 17:08

## 2021-09-28 RX ADMIN — DEXMEDETOMIDINE HYDROCHLORIDE 10 MCG: 100 INJECTION, SOLUTION, CONCENTRATE INTRAVENOUS at 16:42

## 2021-09-28 RX ADMIN — NALOXEGOL OXALATE 25 MG: 25 TABLET, FILM COATED ORAL at 11:47

## 2021-09-28 RX ADMIN — CEFOXITIN 2 G: 2 INJECTION, POWDER, FOR SOLUTION INTRAVENOUS at 16:45

## 2021-09-28 RX ADMIN — ONDANSETRON HYDROCHLORIDE 4 MG: 2 INJECTION, SOLUTION INTRAMUSCULAR; INTRAVENOUS at 17:23

## 2021-09-28 RX ADMIN — NEOSTIGMINE METHYLSULFATE 4 MG: 1 INJECTION, SOLUTION INTRAVENOUS at 17:23

## 2021-09-28 RX ADMIN — LIDOCAINE HYDROCHLORIDE 100 MG: 20 INJECTION, SOLUTION INTRAVENOUS at 14:23

## 2021-09-28 RX ADMIN — SODIUM CHLORIDE, POTASSIUM CHLORIDE, SODIUM LACTATE AND CALCIUM CHLORIDE 25 ML/HR: 600; 310; 30; 20 INJECTION, SOLUTION INTRAVENOUS at 11:46

## 2021-09-28 RX ADMIN — CEFOXITIN 2 G: 2 INJECTION, POWDER, FOR SOLUTION INTRAVENOUS at 14:45

## 2021-09-28 RX ADMIN — KETOROLAC TROMETHAMINE 15 MG: 30 INJECTION, SOLUTION INTRAMUSCULAR; INTRAVENOUS at 19:19

## 2021-09-28 RX ADMIN — PROPOFOL 200 MG: 10 INJECTION, EMULSION INTRAVENOUS at 14:23

## 2021-09-28 RX ADMIN — GABAPENTIN 600 MG: 300 CAPSULE ORAL at 11:47

## 2021-09-28 RX ADMIN — DEXMEDETOMIDINE HYDROCHLORIDE 10 MCG: 100 INJECTION, SOLUTION, CONCENTRATE INTRAVENOUS at 15:09

## 2021-09-28 RX ADMIN — MEPERIDINE HYDROCHLORIDE 12.5 MG: 25 INJECTION INTRAMUSCULAR; INTRAVENOUS; SUBCUTANEOUS at 18:45

## 2021-09-28 RX ADMIN — MIDAZOLAM HYDROCHLORIDE 2 MG: 1 INJECTION, SOLUTION INTRAMUSCULAR; INTRAVENOUS at 11:53

## 2021-09-28 RX ADMIN — GLYCOPYRROLATE 0.6 MG: 0.2 INJECTION, SOLUTION INTRAMUSCULAR; INTRAVENOUS at 17:23

## 2021-09-28 RX ADMIN — ROCURONIUM BROMIDE 5 MG: 10 INJECTION INTRAVENOUS at 14:23

## 2021-09-28 RX ADMIN — MAGNESIUM SULFATE IN WATER 2 G: 40 INJECTION, SOLUTION INTRAVENOUS at 14:45

## 2021-09-28 RX ADMIN — GLYCOPYRROLATE 0.2 MG: 0.2 INJECTION, SOLUTION INTRAMUSCULAR; INTRAVENOUS at 14:14

## 2021-09-28 RX ADMIN — FENTANYL CITRATE 25 MCG: 50 INJECTION, SOLUTION INTRAMUSCULAR; INTRAVENOUS at 19:05

## 2021-09-28 RX ADMIN — LIDOCAINE HYDROCHLORIDE 1 MG/KG/HR: 8 INJECTION, SOLUTION INTRAVENOUS at 19:12

## 2021-09-28 RX ADMIN — MIDAZOLAM HYDROCHLORIDE 2 MG: 1 INJECTION, SOLUTION INTRAMUSCULAR; INTRAVENOUS at 13:15

## 2021-09-28 RX ADMIN — DIPHENHYDRAMINE HYDROCHLORIDE 12.5 MG: 50 INJECTION, SOLUTION INTRAMUSCULAR; INTRAVENOUS at 19:00

## 2021-09-28 RX ADMIN — ROCURONIUM BROMIDE 45 MG: 10 INJECTION INTRAVENOUS at 14:30

## 2021-09-28 RX ADMIN — FENTANYL CITRATE 100 MCG: 50 INJECTION, SOLUTION INTRAMUSCULAR; INTRAVENOUS at 14:23

## 2021-09-28 RX ADMIN — SODIUM CHLORIDE, POTASSIUM CHLORIDE, SODIUM LACTATE AND CALCIUM CHLORIDE: 600; 310; 30; 20 INJECTION, SOLUTION INTRAVENOUS at 14:35

## 2021-09-28 RX ADMIN — PHENYLEPHRINE HYDROCHLORIDE 25 MCG/MIN: 10 INJECTION INTRAVENOUS at 14:33

## 2021-09-28 RX ADMIN — PHENYLEPHRINE HYDROCHLORIDE 100 MCG: 10 INJECTION INTRAVENOUS at 17:05

## 2021-09-28 RX ADMIN — SUCCINYLCHOLINE CHLORIDE 200 MG: 20 INJECTION, SOLUTION INTRAMUSCULAR; INTRAVENOUS at 14:23

## 2021-09-28 RX ADMIN — MEPERIDINE HYDROCHLORIDE 12.5 MG: 25 INJECTION INTRAMUSCULAR; INTRAVENOUS; SUBCUTANEOUS at 18:40

## 2021-09-28 RX ADMIN — DEXAMETHASONE SODIUM PHOSPHATE 8 MG: 4 INJECTION, SOLUTION INTRAMUSCULAR; INTRAVENOUS at 14:55

## 2021-09-28 RX ADMIN — ROCURONIUM BROMIDE 10 MG: 10 INJECTION INTRAVENOUS at 15:45

## 2021-09-28 RX ADMIN — SODIUM CHLORIDE, POTASSIUM CHLORIDE, SODIUM LACTATE AND CALCIUM CHLORIDE 75 ML/HR: 600; 310; 30; 20 INJECTION, SOLUTION INTRAVENOUS at 19:13

## 2021-09-28 RX ADMIN — PHENYLEPHRINE HYDROCHLORIDE 100 MCG: 10 INJECTION INTRAVENOUS at 16:55

## 2021-09-28 RX ADMIN — PHENYLEPHRINE HYDROCHLORIDE 100 MCG: 10 INJECTION INTRAVENOUS at 17:00

## 2021-09-28 RX ADMIN — HYDROMORPHONE HYDROCHLORIDE 0.5 MG: 1 INJECTION, SOLUTION INTRAMUSCULAR; INTRAVENOUS; SUBCUTANEOUS at 23:00

## 2021-09-28 RX ADMIN — HYDROMORPHONE HYDROCHLORIDE 0.2 MG: 1 INJECTION, SOLUTION INTRAMUSCULAR; INTRAVENOUS; SUBCUTANEOUS at 19:25

## 2021-09-28 RX ADMIN — MIDAZOLAM HYDROCHLORIDE 2 MG: 1 INJECTION, SOLUTION INTRAMUSCULAR; INTRAVENOUS at 14:14

## 2021-09-28 RX ADMIN — LIDOCAINE HYDROCHLORIDE 2 MG/KG/HR: 8 INJECTION, SOLUTION INTRAVENOUS at 14:36

## 2021-09-28 RX ADMIN — HYDROMORPHONE HYDROCHLORIDE 0.2 MG: 1 INJECTION, SOLUTION INTRAMUSCULAR; INTRAVENOUS; SUBCUTANEOUS at 19:40

## 2021-09-28 RX ADMIN — ACETAMINOPHEN 1000 MG: 500 TABLET ORAL at 11:47

## 2021-09-28 RX ADMIN — HYDROMORPHONE HYDROCHLORIDE 0.2 MG: 1 INJECTION, SOLUTION INTRAMUSCULAR; INTRAVENOUS; SUBCUTANEOUS at 20:00

## 2021-09-28 NOTE — ANESTHESIA PREPROCEDURE EVALUATION
Anesthetic History   No history of anesthetic complications            Review of Systems / Medical History  Patient summary reviewed, nursing notes reviewed and pertinent labs reviewed    Pulmonary  Within defined limits                 Neuro/Psych       CVA  TIA and headaches     Cardiovascular            Dysrhythmias : atrial fibrillation  CAD    Exercise tolerance: >4 METS  Comments: S/p CABG  Hx of PE    Left ventricle: Size was normal. Systolic function was by visual  assessment. Ejection fraction was estimated to be 55 % in the range of 60  %. No obvious wall motion abnormalities identified in the views obtained.     Negative stress test   GI/Hepatic/Renal  Within defined limits              Endo/Other        Obesity     Other Findings   Comments: lupus           Physical Exam    Airway  Mallampati: I  TM Distance: 4 - 6 cm  Neck ROM: normal range of motion   Mouth opening: Normal     Cardiovascular    Rhythm: regular  Rate: normal         Dental  No notable dental hx       Pulmonary  Breath sounds clear to auscultation               Abdominal  Abdominal exam normal       Other Findings            Anesthetic Plan    ASA: 3  Anesthesia type: general          Induction: Intravenous  Anesthetic plan and risks discussed with: Patient

## 2021-09-28 NOTE — PROGRESS NOTES
09/28/21 1607   Family Communication   Family Update Message Surgeon working   Delivery Origin Nurse  (ERICA Chung 9)    Relationship to Patient Spouse    Phone Number Michael Lopez [phone]   Family/Significant Other Update Called

## 2021-09-28 NOTE — PERIOP NOTES
11:04= mepilex dsg applied to sacrum; no erythema or skin breakdown noted; skin CDI. Warming blanket applied. 11:12= has had both Moderna vaccinations and COVID test results negative; states she has not had any exposures or s/s virus. 11:53= 2mg versed IV given for anxiety per Dr Carnell Sever order. 13:15= additional 2mg versed IV given per Dr Carnell Sever order.

## 2021-09-28 NOTE — ROUTINE PROCESS
TRANSFER - IN REPORT:    Verbal report received from [facility]. Wally CRNA(name) on [facility]  being received from OR(unit) for routine progression of care      Report consisted of patients Situation, Background, Assessment and   Recommendations(SBAR). Information from the following report(s) OR Summary was reviewed with the receiving nurse. Opportunity for questions and clarification was provided. Assessment completed upon patients arrival to unit and care assumed.

## 2021-09-28 NOTE — BRIEF OP NOTE
Brief Postoperative Note    Patient: Unk Sandhoff  YOB: 1965  MRN: [id number]    Date of Procedure: 9/28/2021     Pre-Op Diagnosis: CROHNS DISEASE WITH ANAL FISTULA    Post-Op Diagnosis: Same as preoperative diagnosis. Procedure(s):  ROBOTIC ASSISTED LAPAROSCOPIC END COLOSTOMY CREATION WITH UNDER LAY BIOLOGIC MESH/EXAM UNDER ANESTHESIA  WITH DEBRIDEMENT OF INTRA -ANAL SINUS    Surgeon(s):  Clari Lozano MD    Surgical Assistant: Surg Asst-1: Елена Jerome RN    Anesthesia: General     Estimated Blood Loss (mL): less than 50     Complications: None    Specimens: * No specimens in log *     Implants:   Implant Name Type Inv. Item Serial No.  Lot No. LRB No. Used Action   MESH BIO-A TISS FLT SHT 8X8CM --  - [id number]  MESH BIO-A TISS FLT SHT 8X8CM --  [id number]  [employer] N/A N/A 1 Implanted       Drains:   Orogastric Tube 09/28/21 (Active)       Findings: Intra-anal posterior midline sinus tract. No fistula.     Electronically Signed by Poonam Enriquez MD on 9/28/2021 at 5:31 PM

## 2021-09-28 NOTE — ANESTHESIA POSTPROCEDURE EVALUATION
Procedure(s):  ROBOTIC ASSISTED LAPAROSCOPIC END COLOSTOMY CREATION WITH UNDER LAY BIOLOGIC MESH/EXAM UNDER ANESTHESIA  WITH DEBRIDEMENT OF INTRA -ANAL SINUS. general    Anesthesia Post Evaluation        Patient location during evaluation: PACU  Note status: Adequate. Level of consciousness: responsive to verbal stimuli and sleepy but conscious  Pain management: satisfactory to patient  Airway patency: patent  Anesthetic complications: no  Cardiovascular status: acceptable  Respiratory status: acceptable  Hydration status: acceptable  Comments: +Post-Anesthesia Evaluation and Assessment    Patient: Heidy Miller MRN: [id number]  SSN: [id number]   YOB: 1965  Age: 64 y.o. Sex: female      Cardiovascular Function/Vital Signs    BP (!) 125/56   Pulse 76   Temp 37 °C (98.6 °F)   Resp 20   Ht 5' 1\" (1.549 m)   Wt 91.8 kg (202 lb 6.1 oz)   SpO2 97%   BMI 38.24 kg/m²     Patient is status post Procedure(s):  ROBOTIC ASSISTED LAPAROSCOPIC END COLOSTOMY CREATION WITH UNDER LAY BIOLOGIC MESH/EXAM UNDER ANESTHESIA  WITH DEBRIDEMENT OF INTRA -ANAL SINUS. Nausea/Vomiting: Controlled. Postoperative hydration reviewed and adequate. Pain:  Pain Scale 1: Visual (09/28/21 1800)  Pain Intensity 1: 0 (09/28/21 1800)   Managed. Neurological Status:   Neuro (WDL): Exceptions to WDL (09/28/21 1750)   At baseline. Mental Status and Level of Consciousness: Arousable. Pulmonary Status:   O2 Device: Nasal cannula (09/28/21 1742)   Adequate oxygenation and airway patent. Complications related to anesthesia: None    Post-anesthesia assessment completed. No concerns.     Signed By: Liborio Snyder MD    9/28/2021  Post anesthesia nausea and vomiting:  controlled      INITIAL Post-op Vital signs:   Vitals Value Taken Time   /62 09/28/21 1815   Temp 37 °C (98.6 °F) 09/28/21 1742   Pulse 72 09/28/21 1818   Resp 18 09/28/21 1818   SpO2 99 % 09/28/21 1818   Vitals shown include unvalidated device data.

## 2021-09-29 LAB
ANION GAP SERPL CALC-SCNC: 7 MMOL/L (ref 5–15)
BASOPHILS # BLD: 0 K/UL (ref 0–0.1)
BASOPHILS NFR BLD: 0 % (ref 0–1)
BUN SERPL-MCNC: 12 MG/DL (ref 6–20)
BUN/CREAT SERPL: 14 (ref 12–20)
CALCIUM SERPL-MCNC: 8.1 MG/DL (ref 8.5–10.1)
CHLORIDE SERPL-SCNC: 104 MMOL/L (ref 97–108)
CO2 SERPL-SCNC: 22 MMOL/L (ref 21–32)
CREAT SERPL-MCNC: 0.85 MG/DL (ref 0.55–1.02)
DIFFERENTIAL METHOD BLD: ABNORMAL
EOSINOPHIL # BLD: 0 K/UL (ref 0–0.4)
EOSINOPHIL NFR BLD: 0 % (ref 0–7)
ERYTHROCYTE [DISTWIDTH] IN BLOOD BY AUTOMATED COUNT: 12.2 % (ref 11.5–14.5)
GLUCOSE SERPL-MCNC: 104 MG/DL (ref 65–100)
HCT VFR BLD AUTO: 34 % (ref 35–47)
HGB BLD-MCNC: 11.1 G/DL (ref 11.5–16)
IMM GRANULOCYTES # BLD AUTO: 0.1 K/UL (ref 0–0.04)
IMM GRANULOCYTES NFR BLD AUTO: 1 % (ref 0–0.5)
LYMPHOCYTES # BLD: 1.7 K/UL (ref 0.8–3.5)
LYMPHOCYTES NFR BLD: 11 % (ref 12–49)
MCH RBC QN AUTO: 28.9 PG (ref 26–34)
MCHC RBC AUTO-ENTMCNC: 32.6 G/DL (ref 30–36.5)
MCV RBC AUTO: 88.5 FL (ref 80–99)
MONOCYTES # BLD: 1.4 K/UL (ref 0–1)
MONOCYTES NFR BLD: 9 % (ref 5–13)
NEUTS SEG # BLD: 12.2 K/UL (ref 1.8–8)
NEUTS SEG NFR BLD: 79 % (ref 32–75)
NRBC # BLD: 0 K/UL (ref 0–0.01)
NRBC BLD-RTO: 0 PER 100 WBC
PLATELET # BLD AUTO: 216 K/UL (ref 150–400)
PMV BLD AUTO: 10.6 FL (ref 8.9–12.9)
POTASSIUM SERPL-SCNC: 4.2 MMOL/L (ref 3.5–5.1)
RBC # BLD AUTO: 3.84 M/UL (ref 3.8–5.2)
SODIUM SERPL-SCNC: 133 MMOL/L (ref 136–145)
WBC # BLD AUTO: 15.3 K/UL (ref 3.6–11)

## 2021-09-29 PROCEDURE — 74011250636 HC RX REV CODE- 250/636: Performed by: SURGERY

## 2021-09-29 PROCEDURE — 74011250637 HC RX REV CODE- 250/637: Performed by: NURSE PRACTITIONER

## 2021-09-29 PROCEDURE — 85025 COMPLETE CBC W/AUTO DIFF WBC: CPT

## 2021-09-29 PROCEDURE — 80048 BASIC METABOLIC PNL TOTAL CA: CPT

## 2021-09-29 PROCEDURE — 77030013076 HC PCH OST BAG COLO -A

## 2021-09-29 PROCEDURE — 94760 N-INVAS EAR/PLS OXIMETRY 1: CPT

## 2021-09-29 PROCEDURE — 74011250637 HC RX REV CODE- 250/637: Performed by: SURGERY

## 2021-09-29 PROCEDURE — 77030040162

## 2021-09-29 PROCEDURE — 2709999900 HC NON-CHARGEABLE SUPPLY

## 2021-09-29 PROCEDURE — 36415 COLL VENOUS BLD VENIPUNCTURE: CPT

## 2021-09-29 PROCEDURE — 65270000029 HC RM PRIVATE

## 2021-09-29 PROCEDURE — 77010033678 HC OXYGEN DAILY

## 2021-09-29 PROCEDURE — 8E0W4CZ ROBOTIC ASSISTED PROCEDURE OF TRUNK REGION, PERCUTANEOUS ENDOSCOPIC APPROACH: ICD-10-PCS | Performed by: SURGERY

## 2021-09-29 PROCEDURE — 0D1N4Z4 BYPASS SIGMOID COLON TO CUTANEOUS, PERCUTANEOUS ENDOSCOPIC APPROACH: ICD-10-PCS | Performed by: SURGERY

## 2021-09-29 RX ORDER — ONDANSETRON 2 MG/ML
4 INJECTION INTRAMUSCULAR; INTRAVENOUS
Status: DISCONTINUED | OUTPATIENT
Start: 2021-09-29 | End: 2021-10-01 | Stop reason: HOSPADM

## 2021-09-29 RX ORDER — ONDANSETRON 4 MG/1
4 TABLET, ORALLY DISINTEGRATING ORAL
Status: DISCONTINUED | OUTPATIENT
Start: 2021-09-29 | End: 2021-10-01 | Stop reason: HOSPADM

## 2021-09-29 RX ORDER — TAMSULOSIN HYDROCHLORIDE 0.4 MG/1
0.4 CAPSULE ORAL 2 TIMES DAILY
Status: DISCONTINUED | OUTPATIENT
Start: 2021-09-29 | End: 2021-09-30

## 2021-09-29 RX ORDER — SODIUM CHLORIDE 0.9 % (FLUSH) 0.9 %
5-40 SYRINGE (ML) INJECTION AS NEEDED
Status: DISCONTINUED | OUTPATIENT
Start: 2021-09-29 | End: 2021-10-01 | Stop reason: HOSPADM

## 2021-09-29 RX ORDER — SODIUM CHLORIDE 0.9 % (FLUSH) 0.9 %
5-40 SYRINGE (ML) INJECTION EVERY 8 HOURS
Status: DISCONTINUED | OUTPATIENT
Start: 2021-09-29 | End: 2021-10-01 | Stop reason: HOSPADM

## 2021-09-29 RX ORDER — ENOXAPARIN SODIUM 100 MG/ML
40 INJECTION SUBCUTANEOUS DAILY
Status: DISCONTINUED | OUTPATIENT
Start: 2021-09-29 | End: 2021-10-01 | Stop reason: HOSPADM

## 2021-09-29 RX ORDER — ACETAMINOPHEN 325 MG/1
650 TABLET ORAL EVERY 6 HOURS
Status: DISCONTINUED | OUTPATIENT
Start: 2021-09-29 | End: 2021-10-01 | Stop reason: HOSPADM

## 2021-09-29 RX ORDER — DIPHENHYDRAMINE HYDROCHLORIDE 50 MG/ML
12.5 INJECTION, SOLUTION INTRAMUSCULAR; INTRAVENOUS
Status: DISCONTINUED | OUTPATIENT
Start: 2021-09-29 | End: 2021-10-01 | Stop reason: HOSPADM

## 2021-09-29 RX ADMIN — HYDROMORPHONE HYDROCHLORIDE 0.5 MG: 1 INJECTION, SOLUTION INTRAMUSCULAR; INTRAVENOUS; SUBCUTANEOUS at 07:06

## 2021-09-29 RX ADMIN — ACETAMINOPHEN 650 MG: 325 TABLET ORAL at 14:05

## 2021-09-29 RX ADMIN — HYDROMORPHONE HYDROCHLORIDE 0.5 MG: 1 INJECTION, SOLUTION INTRAMUSCULAR; INTRAVENOUS; SUBCUTANEOUS at 17:19

## 2021-09-29 RX ADMIN — KETOROLAC TROMETHAMINE 15 MG: 30 INJECTION, SOLUTION INTRAMUSCULAR; INTRAVENOUS at 00:29

## 2021-09-29 RX ADMIN — HYDROMORPHONE HYDROCHLORIDE 0.5 MG: 1 INJECTION, SOLUTION INTRAMUSCULAR; INTRAVENOUS; SUBCUTANEOUS at 04:56

## 2021-09-29 RX ADMIN — DIPHENHYDRAMINE HYDROCHLORIDE 12.5 MG: 50 INJECTION, SOLUTION INTRAMUSCULAR; INTRAVENOUS at 05:29

## 2021-09-29 RX ADMIN — KETOROLAC TROMETHAMINE 15 MG: 30 INJECTION, SOLUTION INTRAMUSCULAR; INTRAVENOUS at 14:05

## 2021-09-29 RX ADMIN — NALOXEGOL OXALATE 25 MG: 25 TABLET, FILM COATED ORAL at 11:45

## 2021-09-29 RX ADMIN — DIPHENHYDRAMINE HYDROCHLORIDE 12.5 MG: 50 INJECTION, SOLUTION INTRAMUSCULAR; INTRAVENOUS at 14:51

## 2021-09-29 RX ADMIN — HYDROMORPHONE HYDROCHLORIDE 0.5 MG: 1 INJECTION, SOLUTION INTRAMUSCULAR; INTRAVENOUS; SUBCUTANEOUS at 03:01

## 2021-09-29 RX ADMIN — ACETAMINOPHEN 650 MG: 325 TABLET ORAL at 07:06

## 2021-09-29 RX ADMIN — ENOXAPARIN SODIUM 40 MG: 40 INJECTION SUBCUTANEOUS at 09:16

## 2021-09-29 RX ADMIN — DIPHENHYDRAMINE HYDROCHLORIDE 12.5 MG: 50 INJECTION, SOLUTION INTRAMUSCULAR; INTRAVENOUS at 23:11

## 2021-09-29 RX ADMIN — HYDROMORPHONE HYDROCHLORIDE 0.5 MG: 1 INJECTION, SOLUTION INTRAMUSCULAR; INTRAVENOUS; SUBCUTANEOUS at 19:40

## 2021-09-29 RX ADMIN — HYDROMORPHONE HYDROCHLORIDE 0.5 MG: 1 INJECTION, SOLUTION INTRAMUSCULAR; INTRAVENOUS; SUBCUTANEOUS at 00:48

## 2021-09-29 RX ADMIN — Medication 10 ML: at 23:12

## 2021-09-29 RX ADMIN — Medication 10 ML: at 14:50

## 2021-09-29 RX ADMIN — HYDROMORPHONE HYDROCHLORIDE 0.5 MG: 1 INJECTION, SOLUTION INTRAMUSCULAR; INTRAVENOUS; SUBCUTANEOUS at 09:15

## 2021-09-29 RX ADMIN — ACETAMINOPHEN 650 MG: 325 TABLET ORAL at 18:55

## 2021-09-29 RX ADMIN — SODIUM CHLORIDE, POTASSIUM CHLORIDE, SODIUM LACTATE AND CALCIUM CHLORIDE 75 ML/HR: 600; 310; 30; 20 INJECTION, SOLUTION INTRAVENOUS at 09:46

## 2021-09-29 RX ADMIN — TAMSULOSIN HYDROCHLORIDE 0.4 MG: 0.4 CAPSULE ORAL at 14:50

## 2021-09-29 RX ADMIN — HYDROMORPHONE HYDROCHLORIDE 0.5 MG: 1 INJECTION, SOLUTION INTRAMUSCULAR; INTRAVENOUS; SUBCUTANEOUS at 11:42

## 2021-09-29 RX ADMIN — HYDROMORPHONE HYDROCHLORIDE 0.5 MG: 1 INJECTION, SOLUTION INTRAMUSCULAR; INTRAVENOUS; SUBCUTANEOUS at 23:52

## 2021-09-29 RX ADMIN — SODIUM CHLORIDE, POTASSIUM CHLORIDE, SODIUM LACTATE AND CALCIUM CHLORIDE 75 ML/HR: 600; 310; 30; 20 INJECTION, SOLUTION INTRAVENOUS at 23:53

## 2021-09-29 RX ADMIN — HYDROMORPHONE HYDROCHLORIDE 0.5 MG: 1 INJECTION, SOLUTION INTRAMUSCULAR; INTRAVENOUS; SUBCUTANEOUS at 21:50

## 2021-09-29 RX ADMIN — Medication 10 ML: at 00:48

## 2021-09-29 RX ADMIN — DIPHENHYDRAMINE HYDROCHLORIDE 12.5 MG: 50 INJECTION, SOLUTION INTRAMUSCULAR; INTRAVENOUS at 10:23

## 2021-09-29 RX ADMIN — KETOROLAC TROMETHAMINE 15 MG: 30 INJECTION, SOLUTION INTRAMUSCULAR; INTRAVENOUS at 07:06

## 2021-09-29 RX ADMIN — DIPHENHYDRAMINE HYDROCHLORIDE 12.5 MG: 50 INJECTION, SOLUTION INTRAMUSCULAR; INTRAVENOUS at 19:00

## 2021-09-29 NOTE — PROGRESS NOTES
Surgery NP Progress Note    Shane Do  [id number]  female  64 y.o.  1965    s/p ROBOTIC ASSISTED LAPAROSCOPIC END COLOSTOMY CREATION WITH UNDER LAY BIOLOGIC MESH/EXAM UNDER ANESTHESIA  WITH DEBRIDEMENT OF INTRA -ANAL SINUS on 2021   Pt seen with Dr. Whitlock Field      Assessment:   Active Problems:    Crohn disease ([address] (2021)        Expected post-op progress. Plan/Recommendations/Medical Decision Making:     - Mobilize with nursing and OOB to chair for meals  - Continue diet  - Pain management- Continue current pain control methods.   - VTE Prophylaxis: Lovenox     Discharge Planning    Plan for patient to discharge to Home with Home Health for Ostomy Care and Teaching    Anticipated discharge date 10/4/221    Discharge plan discussed with:  Patient, Care Manager and Primary Care Team Provider    Subjective:     Patient has complaints of pain. Pain control adequate. Patient reports PO intake adequate. No nausea/vomiting. Negative flatus. Negative stool output. Objective:     Blood pressure (!) 146/69, pulse 75, temperature 98 °F (36.7 °C), resp. rate 18, height 5' 1\" (1.549 m), weight 91.8 kg (202 lb 6.1 oz), SpO2 99 %. Temp (24hrs), Av.3 °F (36.8 °C), Min:98 °F (36.7 °C), Max:98.7 °F (37.1 °C)      Recent Results (from the past 48 hour(s))   CBC WITH AUTOMATED DIFF    Collection Time: 21 11:38 AM   Result Value Ref Range    WBC 15.3 (H) 3.6 - 11.0 K/uL    RBC 3.84 3.80 - 5.20 M/uL    HGB 11.1 (L) 11.5 - 16.0 g/dL    HCT 34.0 (L) 35.0 - 47.0 %    MCV 88.5 80.0 - 99.0 FL    MCH 28.9 26.0 - 34.0 PG    MCHC 32.6 30.0 - 36.5 g/dL    RDW 12.2 11.5 - 14.5 %    PLATELET 413 072 - 563 K/uL    MPV 10.6 8.9 - 12.9 FL    NRBC 0.0 0  WBC    ABSOLUTE NRBC 0.00 0.00 - 0.01 K/uL    NEUTROPHILS 79 (H) 32 - 75 %    LYMPHOCYTES 11 (L) 12 - 49 %    MONOCYTES 9 5 - 13 %    EOSINOPHILS 0 0 - 7 %    BASOPHILS 0 0 - 1 %    IMMATURE GRANULOCYTES 1 (H) 0.0 - 0.5 %    ABS.  NEUTROPHILS 12.2 (H) 1.8 - 8.0 K/UL    ABS. LYMPHOCYTES 1.7 0.8 - 3.5 K/UL    ABS. MONOCYTES 1.4 (H) 0.0 - 1.0 K/UL    ABS. EOSINOPHILS 0.0 0.0 - 0.4 K/UL    ABS. BASOPHILS 0.0 0.0 - 0.1 K/UL    ABS. IMM. GRANS. 0.1 (H) 0.00 - 0.04 K/UL    DF AUTOMATED     METABOLIC PANEL, BASIC    Collection Time: 09/29/21 11:38 AM   Result Value Ref Range    Sodium 133 (L) 136 - 145 mmol/L    Potassium 4.2 3.5 - 5.1 mmol/L    Chloride 104 97 - 108 mmol/L    CO2 22 21 - 32 mmol/L    Anion gap 7 5 - 15 mmol/L    Glucose 104 (H) 65 - 100 mg/dL    BUN 12 6 - 20 MG/DL    Creatinine 0.85 0.55 - 1.02 MG/DL    BUN/Creatinine ratio 14 12 - 20      GFR est AA >60 >60 ml/min/1.73m2    GFR est non-AA >60 >60 ml/min/1.73m2    Calcium 8.1 (L) 8.5 - 10.1 MG/DL       Pt resting in bed. NAD   Incisions CDI. Abd soft and appropriately TTP  SCDs for mechanical DVT proph while in bed     Body mass index is 38.24 kg/m². Reference: BMI greater than 30 is classified as obesity and greater than 40 is classified as morbid obesity.      Last 3 Recorded Weights in this Encounter    09/28/21 1128   Weight: 91.8 kg (202 lb 6.1 oz)         Gayatri Daily, NP   MSN, APRN, FNP-C, CWOCN-AP    09/29/21

## 2021-09-29 NOTE — WOUND CARE
Wound care consult for the first Colostomy teaching today post op day # 1:  Chart reviewed and patient assessed. I met with this patient in the pre-op setting on her Pre admission testing day to review the ostomy care and what to expect from me as her ostomy nurse. Patient has been doing research and meeting with ostomy association members to learn all she can about this process and this has paid off for her. She asked good questions and her  was here to learn the care as well. Assessment:  Pt. Is alert, oriented x 4.  the stoma is pink but fairly flat / flush with the skin and functioning well to this point. No stool in the colostomy bag yet - only serosanguinous drainage noted. The peristomal skin is intact. The other surgical sites are glued and intact. Patient does c/o some itching on her torso and on her neck. Her face is slightly flushed. No difficulty with her breathing. Patient took some Benadryl earlier this morning. Treatment / Teaching: Reviewed the colostomy care with the patient. the first bag was changed today - the stoma was examined and the new stoma was measured at 25 mm slightly oval. Pt. Cut out the new template and the two piece pouch was applied. We discussed the dietary needs / encouraged fluid intake of 2 liters of water based fluids per day. Talked about colostomy irrigation as an option later next year as well as the non-drainable colostomy bags that just get trashed after the BM occurs (if the BM's are down to 1-2 per day). Discussed the catalog and we will fill this out tomorrow for her first order from [employer].  Plan: Tomorrow we will talk more about emptying and about skin care and ordering her supplies. Next pouch change will be on Friday just for teaching / return demonstration by the patient. Until then the patient will just be learning hands on care of the ostomy.    Belinda Kinsey, RN, BSN, [employer]

## 2021-09-29 NOTE — PERIOP NOTES
TRANSFER - OUT REPORT:    Verbal report given to Jenn RN(name) on Stanislaw Jordan  being transferred to [facility](unit) for routine progression of care       Report consisted of patients Situation, Background, Assessment and   Recommendations(SBAR). Information from the following report(s) OR Summary, Intake/Output and MAR was reviewed with the receiving nurse. Opportunity for questions and clarification was provided.       Patient transported with:   O2 @ 2 liters  Registered Nurse

## 2021-09-29 NOTE — OP NOTES
[address]  OPERATIVE REPORT    Name:  Jamie Xiong  MR#:  [id number]  :  1965  ACCOUNT #:  [de-identified]  DATE OF SERVICE:  2021    PREOPERATIVE DIAGNOSES:  Crohn's disease with anal fistula. POSTOPERATIVE DIAGNOSES:  Crohn's disease, anal sinus tract. PROCEDURE PERFORMED:  Robotic-assisted end colostomy creation with Bio [place] mesh and debridement of intra-anal sinus tract. SURGEON:  Boy Graff MD    ASSISTANT:  Ellie Castillo    ANESTHESIA:  General.    COMPLICATIONS:  None. SPECIMENS REMOVED:  None. IMPLANTS:  8 cm x 6 cm Bio-A mesh. DRAINS:  None. ESTIMATED BLOOD LOSS:  Less than 50 mL. FINDINGS:  Intra-anal posterior midline anal sinus tract, no fistula. DESCRIPTION OF PROCEDURE:  The patient was brought to the operating suite, placed in supine position. General endotracheal anesthesia was induced. Venodyne stockings were placed as well as a Erickson catheter. She was then placed in the low lithotomy position. Her abdomen was prepped and draped in the usual sterile fashion and a time-out was performed indicating the correct patient and procedure to be performed as per hospital protocol. An 8-mm incision was made in the left upper quadrant. A Veress needle was inserted into the abdomen and the abdomen was insufflated. An 8-mm robotic trocar was placed in this location using a 5-mm 0 degree scope under direct visualization. An 8-mm robotic trocar was placed in the right mid abdomen. A 12-mm robotic trocar was placed in the right lower quadrant and 5-mm assistant port was placed in the right upper quadrant. It was through these four trocar sites that the entirety of the dissection was completed. The patient was placed in the steep Trendelenburg position and the robot was docked. I began by freeing up the scar tissue along the anterior abdominal wall and clearing that overlying the rectum as well as going up into the sigmoid colon. I identified the rectosigmoid junction with where the tenia were splaying overlying the sacral promontory. A mesenteric window was created in this location and the upper rectum was divided using a robotic 60-mm green load stapler. The descending colon and sigmoid colon were mobilized by freeing it off of the lateral peritoneal reflection. Due to the patient's mesentery, only a certain amount of reach could be obtained to get up towards the abdominal wall. Once I had adequate reach, to reach the abdominal wall, I then made a pocket in the retrorectal space for the mesh. An incision was made along the peritoneum. I gained access into the retrorectal space, identifying the rectus muscle. This plane was developed along the avascular plane along this location until an adequate size pocket was developed. This was measured at 6 cm wide x 8 cm long. I placed a 6 x 8 cm Colquitt-A mesh in this location and secured it in place with a running 3-0 Stratafix suture. The peritoneal layer was closed with 3-0 Stratafix suture. I then made a circular incision overlying the area, where the colostomy site was marked. A cruciate incision was made along the anterior rectus sheath and muscle was divided and the mesh was incised to allow for the bowel to go through the mesh. The colon was delivered through the mesh into the left lower quadrant colostomy site and held in place with a [place]. I then closed the right lower quadrant 12-mm trocar site with 0 Vicryl suture using a Weck device. All of the skin incisions were closed with 4-0 Monocryl followed by Dermabond. A 30 mL of 0.25% Marcaine with epinephrine was injected subcutaneously. I then cleared the Jazmin colostomy using interrupted 3-0 Vicryl sutures. This was somewhat challenging due to the patient's body habitus, but I was able to get it sitting above the level of the skin.   After securing the colostomy in place, I then turned my attention to the perianal area, where I performed an exam under anesthesia. A medium Hill-Gustafson retractor was placed in the anal canal.  The patient was noted to have a significant amount of scar tissue along the posterior midline anal canal and this delved into a sinus tract in the intra-anal and intersphincteric space. I gained access into the space using a hemostat and then used a curette to debride the space. There was no evidence of fistula tract, no evidence of external openings. No fistula could be identified, whatsoever. There were no other abnormalities in the anal canal.  A dry dressing was applied. The patient was awakened and taken to the recovery room in stable condition.         Bennett Mohs, MD      [id number]  D:  09/28/2021 17:39  T:  09/29/2021 5:12  JOB #:  [id number]

## 2021-09-29 NOTE — PROGRESS NOTES
Transition of Care Plan:    RUR: 9% - low risk  Disposition: Home with [place]  Follow up appointments: PCP, Specialist  DME needed: No needs identified  Transportation at Discharge:   [address] or means to access home:  yes      IM Medicare Letter: N/a  Is patient a BCPI-A Bundle:  No         If yes, was Bundle Letter given?: N/A    Caregiver Contact: Huber Camacho, spouse, [phone]  Discharge Caregiver contacted prior to discharge? CM will contact prior to discharge, if pt request.     Reason for Admission:  Crohn's Disease with anal fistula                  RUR Score:   9% - low risk                 Plan for utilizing home health:  Anticipating [place]      PCP: First and Last name:  Sadaf Mejias NP   Name of Practice: [address]   Are you a current patient: Yes/No: yes Approximate date of last visit: 1 year ago   Can you participate in a virtual visit with your PCP: yes                    Current Advanced Directive/Advance Care Plan: Full Code   [address] (ACP) Conversation      Date of Conversation: 9/29/2021  Conducted with: Patient with Decision Making Capacity    Healthcare Decision Maker:   Huber Camacho, spouse, [phone]    Today we documented Decision Maker(s) consistent with Legal Next of Kin hierarchy. No ACP. Pt states her  would be her primary care decision maker. Content/Action Overview:   Has NO ACP documents/care preferences - information provided, considering goals and options  Reviewed DNR/DNI and patient elects Full Code (Attempt Resuscitation)    Length of Voluntary ACP Conversation in minutes:  <16 minutes (Non-Billable)    Healthcare Decision Maker:   Huber Camacho, spouse, [phone]                Transition of Care Plan:   Home with [place]    CM met with patient at the bedside to complete initial assessment. CM introduced role, verified demographics, and discussed discharge planning.     Ms. Ely Alvares is a 64year old female admitted for Crohn's Disease with anal fistula. She is insured with [employer] and uses the [employer] on [address]. Pt lives with ehr  in a 2-story home that has 5-6 steps to enter. Her bedroom is on the 2nd floor (12-13 steps). Pt has a cane and RW at home. Pt states she is independent with ADLs and IADLs. Pt drives. Pt has used [facility] before but cannot remember agency. Pt denies hx of SNF or inpt rehab. At time of discharge, pt's  will transport her home. Anticipating HH at discharge. Pt gave pt a list of [facility] agencies and will follow up. Unit CM will continue to follow. Care Management Interventions  PCP Verified by CM: Yes (Angelina Enrique NP. last appt 1 year agol.)  Mode of Transport at Discharge:  Other (see comment) ()  Transition of Care Consult (CM Consult): Discharge Planning  Discharge Durable Medical Equipment: No  Physical Therapy Consult: No  Occupational Therapy Consult: No  Speech Therapy Consult: No  Support Systems: Spouse/Significant Other  Confirm Follow Up Transport: Family  The Plan for Transition of Care is Related to the Following Treatment Goals : Home with [facility]  Discharge Location  Discharge Placement: Home with home health    Georgi Reyes RN, BSN, [address] [facility]  [phone]

## 2021-09-30 LAB
ANION GAP SERPL CALC-SCNC: 3 MMOL/L (ref 5–15)
BASOPHILS # BLD: 0 K/UL (ref 0–0.1)
BASOPHILS NFR BLD: 0 % (ref 0–1)
BUN SERPL-MCNC: 11 MG/DL (ref 6–20)
BUN/CREAT SERPL: 14 (ref 12–20)
CALCIUM SERPL-MCNC: 8 MG/DL (ref 8.5–10.1)
CHLORIDE SERPL-SCNC: 109 MMOL/L (ref 97–108)
CO2 SERPL-SCNC: 26 MMOL/L (ref 21–32)
CREAT SERPL-MCNC: 0.77 MG/DL (ref 0.55–1.02)
DIFFERENTIAL METHOD BLD: ABNORMAL
EOSINOPHIL # BLD: 0.2 K/UL (ref 0–0.4)
EOSINOPHIL NFR BLD: 2 % (ref 0–7)
ERYTHROCYTE [DISTWIDTH] IN BLOOD BY AUTOMATED COUNT: 12.6 % (ref 11.5–14.5)
GLUCOSE SERPL-MCNC: 117 MG/DL (ref 65–100)
HCT VFR BLD AUTO: 30.8 % (ref 35–47)
HGB BLD-MCNC: 10.1 G/DL (ref 11.5–16)
IMM GRANULOCYTES # BLD AUTO: 0 K/UL (ref 0–0.04)
IMM GRANULOCYTES NFR BLD AUTO: 0 % (ref 0–0.5)
LYMPHOCYTES # BLD: 2.1 K/UL (ref 0.8–3.5)
LYMPHOCYTES NFR BLD: 21 % (ref 12–49)
MCH RBC QN AUTO: 28.9 PG (ref 26–34)
MCHC RBC AUTO-ENTMCNC: 32.8 G/DL (ref 30–36.5)
MCV RBC AUTO: 88 FL (ref 80–99)
MONOCYTES # BLD: 0.9 K/UL (ref 0–1)
MONOCYTES NFR BLD: 9 % (ref 5–13)
NEUTS SEG # BLD: 7 K/UL (ref 1.8–8)
NEUTS SEG NFR BLD: 68 % (ref 32–75)
NRBC # BLD: 0 K/UL (ref 0–0.01)
NRBC BLD-RTO: 0 PER 100 WBC
PLATELET # BLD AUTO: 189 K/UL (ref 150–400)
PMV BLD AUTO: 10.9 FL (ref 8.9–12.9)
POTASSIUM SERPL-SCNC: 3.7 MMOL/L (ref 3.5–5.1)
RBC # BLD AUTO: 3.5 M/UL (ref 3.8–5.2)
SODIUM SERPL-SCNC: 138 MMOL/L (ref 136–145)
WBC # BLD AUTO: 10.3 K/UL (ref 3.6–11)

## 2021-09-30 PROCEDURE — 2709999900 HC NON-CHARGEABLE SUPPLY

## 2021-09-30 PROCEDURE — 65270000029 HC RM PRIVATE

## 2021-09-30 PROCEDURE — 74011000250 HC RX REV CODE- 250: Performed by: NURSE PRACTITIONER

## 2021-09-30 PROCEDURE — 85025 COMPLETE CBC W/AUTO DIFF WBC: CPT

## 2021-09-30 PROCEDURE — 74011250637 HC RX REV CODE- 250/637: Performed by: NURSE PRACTITIONER

## 2021-09-30 PROCEDURE — 80048 BASIC METABOLIC PNL TOTAL CA: CPT

## 2021-09-30 PROCEDURE — 36415 COLL VENOUS BLD VENIPUNCTURE: CPT

## 2021-09-30 PROCEDURE — 74011250636 HC RX REV CODE- 250/636: Performed by: SURGERY

## 2021-09-30 PROCEDURE — 94760 N-INVAS EAR/PLS OXIMETRY 1: CPT

## 2021-09-30 PROCEDURE — 74011250637 HC RX REV CODE- 250/637: Performed by: SURGERY

## 2021-09-30 PROCEDURE — 77030041153 HC BELT OST COLO -B

## 2021-09-30 PROCEDURE — 74011250636 HC RX REV CODE- 250/636: Performed by: NURSE PRACTITIONER

## 2021-09-30 PROCEDURE — 77030037255 HC PCH OST FLX SENSURA COLO -A

## 2021-09-30 RX ORDER — ACETAMINOPHEN 325 MG/1
650 TABLET ORAL EVERY 6 HOURS
Qty: 56 TABLET | Refills: 0 | Status: SHIPPED | OUTPATIENT
Start: 2021-09-30 | End: 2021-10-07

## 2021-09-30 RX ORDER — HYDROMORPHONE HYDROCHLORIDE 2 MG/1
2-4 TABLET ORAL
Status: DISCONTINUED | OUTPATIENT
Start: 2021-09-30 | End: 2021-10-01 | Stop reason: HOSPADM

## 2021-09-30 RX ORDER — DIPHENHYDRAMINE HCL 25 MG
25 CAPSULE ORAL
Status: DISCONTINUED | OUTPATIENT
Start: 2021-09-30 | End: 2021-10-01 | Stop reason: HOSPADM

## 2021-09-30 RX ORDER — MEPERIDINE HYDROCHLORIDE 50 MG/1
50 TABLET ORAL
Qty: 25 TABLET | Refills: 0 | Status: SHIPPED | OUTPATIENT
Start: 2021-09-30 | End: 2021-10-05

## 2021-09-30 RX ADMIN — HYDROMORPHONE HYDROCHLORIDE 0.5 MG: 1 INJECTION, SOLUTION INTRAMUSCULAR; INTRAVENOUS; SUBCUTANEOUS at 05:14

## 2021-09-30 RX ADMIN — TAMSULOSIN HYDROCHLORIDE 0.4 MG: 0.4 CAPSULE ORAL at 09:09

## 2021-09-30 RX ADMIN — ACETAMINOPHEN 650 MG: 325 TABLET ORAL at 20:56

## 2021-09-30 RX ADMIN — ACETAMINOPHEN 650 MG: 325 TABLET ORAL at 01:30

## 2021-09-30 RX ADMIN — HYDROMORPHONE HYDROCHLORIDE 0.5 MG: 1 INJECTION, SOLUTION INTRAMUSCULAR; INTRAVENOUS; SUBCUTANEOUS at 12:36

## 2021-09-30 RX ADMIN — ONDANSETRON 4 MG: 2 INJECTION INTRAMUSCULAR; INTRAVENOUS at 12:32

## 2021-09-30 RX ADMIN — HYDROMORPHONE HYDROCHLORIDE 4 MG: 2 TABLET ORAL at 15:42

## 2021-09-30 RX ADMIN — HYDROMORPHONE HYDROCHLORIDE 0.5 MG: 1 INJECTION, SOLUTION INTRAMUSCULAR; INTRAVENOUS; SUBCUTANEOUS at 07:16

## 2021-09-30 RX ADMIN — HYDROMORPHONE HYDROCHLORIDE 0.5 MG: 1 INJECTION, SOLUTION INTRAMUSCULAR; INTRAVENOUS; SUBCUTANEOUS at 14:38

## 2021-09-30 RX ADMIN — ENOXAPARIN SODIUM 40 MG: 40 INJECTION SUBCUTANEOUS at 09:09

## 2021-09-30 RX ADMIN — DIPHENHYDRAMINE HYDROCHLORIDE 25 MG: 25 CAPSULE ORAL at 21:01

## 2021-09-30 RX ADMIN — HYDROMORPHONE HYDROCHLORIDE 0.5 MG: 1 INJECTION, SOLUTION INTRAMUSCULAR; INTRAVENOUS; SUBCUTANEOUS at 01:36

## 2021-09-30 RX ADMIN — Medication 10 ML: at 07:16

## 2021-09-30 RX ADMIN — DIPHENHYDRAMINE HYDROCHLORIDE 12.5 MG: 50 INJECTION, SOLUTION INTRAMUSCULAR; INTRAVENOUS at 07:16

## 2021-09-30 RX ADMIN — PROCHLORPERAZINE EDISYLATE 10 MG: 5 INJECTION INTRAMUSCULAR; INTRAVENOUS at 15:36

## 2021-09-30 RX ADMIN — ACETAMINOPHEN 650 MG: 325 TABLET ORAL at 07:15

## 2021-09-30 RX ADMIN — HYDROMORPHONE HYDROCHLORIDE 4 MG: 2 TABLET ORAL at 20:56

## 2021-09-30 RX ADMIN — Medication 10 ML: at 14:38

## 2021-09-30 RX ADMIN — DIPHENHYDRAMINE HYDROCHLORIDE 12.5 MG: 50 INJECTION, SOLUTION INTRAMUSCULAR; INTRAVENOUS at 12:36

## 2021-09-30 RX ADMIN — DIPHENHYDRAMINE HYDROCHLORIDE 12.5 MG: 50 INJECTION, SOLUTION INTRAMUSCULAR; INTRAVENOUS at 03:12

## 2021-09-30 RX ADMIN — HYDROMORPHONE HYDROCHLORIDE 0.5 MG: 1 INJECTION, SOLUTION INTRAMUSCULAR; INTRAVENOUS; SUBCUTANEOUS at 09:18

## 2021-09-30 NOTE — PROGRESS NOTES
End of Shift Note    Bedside shift change report given to *** (oncoming nurse) by Graig Lefort, RN (offgoing nurse). Report included the following information SBAR, Kardex, Intake/Output, MAR and Recent Results    Shift worked:  7a-7p     Shift summary and any significant changes:     walked in hallway, ostomy teaching with jimmy, pt showered, fluids d/c, up to chair for meals, medicated for pain , switched to PO dilaudid     Concerns for physician to address:      Zone phone for oncoming shift:          Activity:  Activity Level: Up with Assistance  Number times ambulated in hallways past shift: 1  Number of times OOB to chair past shift: 3    Cardiac:   Cardiac Monitoring: No      Cardiac Rhythm: Sinus Rhythm    Access:   Current line(s): PIV     Genitourinary:   Urinary status: voiding    Respiratory:   O2 Device: None (Room air)  Chronic home O2 use?: NO  Incentive spirometer at bedside: YES     GI:  Last Bowel Movement Date: 09/30/21  Current diet:  ADULT DIET Regular; Low Fiber  Passing flatus: YES  Tolerating current diet: YES       Pain Management:   Patient states pain is manageable on current regimen: NO    Skin:  Yonis Score: 20  Interventions: float heels and increase time out of bed    Patient Safety:  Fall Score:  Total Score: 2  Interventions: assistive device (walker, cane, etc), gripper socks, pt to call before getting OOB and stay with me (per policy)       Length of Stay:  Expected LOS: 3d 7h  Actual LOS: 2      Graig Lefort, RN

## 2021-09-30 NOTE — PROGRESS NOTES
End of Shift Note    Bedside shift change report given to Lilliana Lamas RN (oncoming nurse) by Margarita Veloz RN (offgoing nurse). Report included the following information SBAR, Kardex, Intake/Output and MAR    Shift worked:  7am-7pm     Shift summary and any significant changes:     Pt ambulated to the chair as a Assist x2 and fairly tolerated the activity. Pt complained of pain throughout the shift. RN had to administer Dilaudid almost every 2 hours. Pt is tolerating the current diet. Pt is still not passing gas. Pt bowel sounds remain hypoactive. The output from the ostomy changed from serosanguinous to brown. Concerns for physician to address:       Zone phone for oncoming shift:          Activity:  Activity Level: Up with Assistance  Number times ambulated in hallways past shift: 0  Number of times OOB to chair past shift: 1    Cardiac:   Cardiac Monitoring: No      Cardiac Rhythm: Sinus Rhythm    Access:   Current line(s): PIV     Genitourinary:   Urinary status: hussein    Respiratory:   O2 Device: None (Room air)  Chronic home O2 use?: NO  Incentive spirometer at bedside: YES     GI:     Current diet:  ADULT DIET Regular; Low Fiber  Passing flatus: NO  Tolerating current diet: YES       Pain Management:   Patient states pain is manageable on current regimen: NO    Skin:  Yonis Score: 20  Interventions: float heels, increase time out of bed and internal/external urinary devices    Patient Safety:  Fall Score:  Total Score: 2  Interventions: assistive device (walker, cane, etc), gripper socks and pt to call before getting OOB       Length of Stay:  Expected LOS: - - -  Actual LOS: VIKY [place]

## 2021-09-30 NOTE — PROGRESS NOTES
In a CM perspective, pt is ready for discharge. RN made aware. Transition of Care Plan:     RUR: 9% - low risk  Disposition: Home with HH ([facility] accepted)  9/30 - referrals sent via [employer] and [facility]  Follow up appointments: PCP, Specialist  DME needed: No needs identified  Transportation at Discharge:   [address] or means to access home:  yes       Medicare Letter: N/A  Is patient a BCPI-A Bundle:  No                    If yes, was Bundle Letter given?: N/A    Caregiver Contact: Romero Morales, spouse, [phone]  Discharge Caregiver contacted prior to discharge? CM will contact prior to discharge, if pt request.     Updated Note 10:30 am: CM spoke to [facility], spoke to Janet Bowen. The will see pt either Sunday / Monday. If a problem occurs, have pt call office and they will send on-call RN. CM arranged appointments and placed in CM one stop. CM at bedside and spoke to pt and spouse regarding discharge planning. Both verbalize understanding and have no questions / concerns for CM. Pt states she is waiting for wound care RN and NP to check things out later before discharge. In a CM perspective, pt is ready for discharge. RN made aware. Initial Note 2:40 pm: CM at bedside to discuss discharge planning. Pt given freedom of choice (in chart). Selected the following [facility] agencies: [address], [address], and [facility]. Referrals sent. Pt states she night go home tomorrow. Unit CM will continue to follow. Care Management Interventions  PCP Verified by CM: Yes  Mode of Transport at Discharge:  Other (see comment) ()  Transition of Care Consult (CM Consult): [address], Discharge [address] [facility] [address]: No  Reason Outside [place]: Out of service area  Discharge Durable Medical Equipment: No  Physical Therapy Consult: No  Occupational Therapy Consult: No  Speech Therapy Consult: No  Support Systems: Spouse/Significant Other  Confirm Follow Up Transport: Self (If pt cannot drive, family can transport.)  The Plan for Transition of Care is Related to the Following Treatment Goals : Home with [facility]  The Patient and/or Patient Representative was Provided with a Choice of Provider and Agrees with the Discharge Plan?: Yes  Name of the Patient Representative Who was Provided with a Choice of Provider and Agrees with the Discharge Plan: patient and spouse Gerald Yanes)  [place] of Choice List was Provided with Basic Dialogue that Supports the Patient's Individualized Plan of Care/Goals, Treatment Preferences and Shares the Quality Data Associated with the Providers?: Yes  Discharge Location  Discharge Placement: Home with home health    Jaylen Georges RN, BSN, [address] [facility]  [phone]

## 2021-09-30 NOTE — PROGRESS NOTES
Surgery NP Progress Note    Stanislaw Jordan  [id number]  female  64 y.o.  1965    s/p ROBOTIC ASSISTED LAPAROSCOPIC END COLOSTOMY CREATION WITH UNDER LAY BIOLOGIC MESH/EXAM UNDER ANESTHESIA  WITH DEBRIDEMENT OF INTRA -ANAL SINUS on 2021       Assessment:   Active Problems:    Crohn disease ([address] (2021)        Expected post-op progress. Plan/Recommendations/Medical Decision Making:     - Mobilize with nursing and OOB to chair for meals  - Continue diet  - Pain management- Continue current pain control methods. Favor PO pain meds over Dilaudid. Patient in agreement. - VTE Prophylaxis: Lovenox. Can resume Eliquis on post-op day 5. Discharge Planning    Plan for patient to discharge to Home with Home Health for Ostomy Care and Teaching    Anticipated discharge date 10/1/21    Discharge plan discussed with:  Patient, Care Manager and Primary Care Team Provider    Subjective:     Patient very anxious about stool at the opening of her pouch. She is aware that Tae Liang will be coming shortly to see her. She is having stool output and flatus. Objective:     Blood pressure (!) 150/85, pulse 83, temperature 97.6 °F (36.4 °C), resp. rate 18, height 5' 1\" (1.549 m), weight 91.8 kg (202 lb 6.1 oz), SpO2 100 %.     Temp (24hrs), Av.9 °F (36.6 °C), Min:97.6 °F (36.4 °C), Max:98.1 °F (36.7 °C)      Recent Results (from the past 48 hour(s))   CBC WITH AUTOMATED DIFF    Collection Time: 21 11:38 AM   Result Value Ref Range    WBC 15.3 (H) 3.6 - 11.0 K/uL    RBC 3.84 3.80 - 5.20 M/uL    HGB 11.1 (L) 11.5 - 16.0 g/dL    HCT 34.0 (L) 35.0 - 47.0 %    MCV 88.5 80.0 - 99.0 FL    MCH 28.9 26.0 - 34.0 PG    MCHC 32.6 30.0 - 36.5 g/dL    RDW 12.2 11.5 - 14.5 %    PLATELET 891 447 - 603 K/uL    MPV 10.6 8.9 - 12.9 FL    NRBC 0.0 0  WBC    ABSOLUTE NRBC 0.00 0.00 - 0.01 K/uL    NEUTROPHILS 79 (H) 32 - 75 %    LYMPHOCYTES 11 (L) 12 - 49 %    MONOCYTES 9 5 - 13 %    EOSINOPHILS 0 0 - 7 %    BASOPHILS 0 0 - 1 %    IMMATURE GRANULOCYTES 1 (H) 0.0 - 0.5 %    ABS. NEUTROPHILS 12.2 (H) 1.8 - 8.0 K/UL    ABS. LYMPHOCYTES 1.7 0.8 - 3.5 K/UL    ABS. MONOCYTES 1.4 (H) 0.0 - 1.0 K/UL    ABS. EOSINOPHILS 0.0 0.0 - 0.4 K/UL    ABS. BASOPHILS 0.0 0.0 - 0.1 K/UL    ABS. IMM. GRANS. 0.1 (H) 0.00 - 0.04 K/UL    DF AUTOMATED     METABOLIC PANEL, BASIC    Collection Time: 09/29/21 11:38 AM   Result Value Ref Range    Sodium 133 (L) 136 - 145 mmol/L    Potassium 4.2 3.5 - 5.1 mmol/L    Chloride 104 97 - 108 mmol/L    CO2 22 21 - 32 mmol/L    Anion gap 7 5 - 15 mmol/L    Glucose 104 (H) 65 - 100 mg/dL    BUN 12 6 - 20 MG/DL    Creatinine 0.85 0.55 - 1.02 MG/DL    BUN/Creatinine ratio 14 12 - 20      GFR est AA >60 >60 ml/min/1.73m2    GFR est non-AA >60 >60 ml/min/1.73m2    Calcium 8.1 (L) 8.5 - 89.9 MG/DL   METABOLIC PANEL, BASIC    Collection Time: 09/30/21  3:21 AM   Result Value Ref Range    Sodium 138 136 - 145 mmol/L    Potassium 3.7 3.5 - 5.1 mmol/L    Chloride 109 (H) 97 - 108 mmol/L    CO2 26 21 - 32 mmol/L    Anion gap 3 (L) 5 - 15 mmol/L    Glucose 117 (H) 65 - 100 mg/dL    BUN 11 6 - 20 MG/DL    Creatinine 0.77 0.55 - 1.02 MG/DL    BUN/Creatinine ratio 14 12 - 20      GFR est AA >60 >60 ml/min/1.73m2    GFR est non-AA >60 >60 ml/min/1.73m2    Calcium 8.0 (L) 8.5 - 10.1 MG/DL   CBC WITH AUTOMATED DIFF    Collection Time: 09/30/21  3:21 AM   Result Value Ref Range    WBC 10.3 3.6 - 11.0 K/uL    RBC 3.50 (L) 3.80 - 5.20 M/uL    HGB 10.1 (L) 11.5 - 16.0 g/dL    HCT 30.8 (L) 35.0 - 47.0 %    MCV 88.0 80.0 - 99.0 FL    MCH 28.9 26.0 - 34.0 PG    MCHC 32.8 30.0 - 36.5 g/dL    RDW 12.6 11.5 - 14.5 %    PLATELET 683 497 - 897 K/uL    MPV 10.9 8.9 - 12.9 FL    NRBC 0.0 0  WBC    ABSOLUTE NRBC 0.00 0.00 - 0.01 K/uL    NEUTROPHILS 68 32 - 75 %    LYMPHOCYTES 21 12 - 49 %    MONOCYTES 9 5 - 13 %    EOSINOPHILS 2 0 - 7 %    BASOPHILS 0 0 - 1 %    IMMATURE GRANULOCYTES 0 0.0 - 0.5 %    ABS. NEUTROPHILS 7.0 1.8 - 8.0 K/UL    ABS. LYMPHOCYTES 2.1 0.8 - 3.5 K/UL    ABS. MONOCYTES 0.9 0.0 - 1.0 K/UL    ABS. EOSINOPHILS 0.2 0.0 - 0.4 K/UL    ABS. BASOPHILS 0.0 0.0 - 0.1 K/UL    ABS. IMM. GRANS. 0.0 0.00 - 0.04 K/UL    DF AUTOMATED         Pt resting in bed. NAD   Incisions CDI. Abd soft and appropriately TTP  Ostomy pink, moist and with stool and flatus. SCDs for mechanical DVT proph while in bed     Body mass index is 38.24 kg/m². Reference: BMI greater than 30 is classified as obesity and greater than 40 is classified as morbid obesity.      Last 3 Recorded Weights in this Encounter    09/28/21 1128   Weight: 91.8 kg (202 lb 6.1 oz)         Rose Laureano, NP   MSN, APRN, FNP-C, CWOCN-AP    09/30/21

## 2021-09-30 NOTE — PROGRESS NOTES
End of Shift Note    Bedside shift change report given to Sirisha Morales (oncoming nurse) by Theresa Weir RN (offgoing nurse). Report included the following information SBAR, Kardex and MAR    Shift worked:  7PM-7AM     Shift summary and any significant changes:     patient continues to require prn dilaudid every 2hrs. Prn benadryl continues to be required every 4hrs. No drainage noted to rectum dressing. Colostomy is having output. Concerns for physician to address:  patient needs a po pain medication     Zone phone for oncoming shift:   [phone]       Activity:  Activity Level: Up with Assistance  Number times ambulated in hallways past shift: 2 ambulated to bathroom x2  Number of times OOB to chair past shift: 0    Cardiac:   Cardiac Monitoring: No      Cardiac Rhythm: Sinus Rhythm    Access:   Current line(s): PIV     Genitourinary:   Urinary status: voiding    Respiratory:   O2 Device: None (Room air)  Chronic home O2 use?: NO  Incentive spirometer at bedside: YES     GI:  Last Bowel Movement Date: 09/29/21  Current diet:  ADULT DIET Regular; Low Fiber  Passing flatus: YES  Tolerating current diet: YES       Pain Management:   Patient states pain is manageable on current regimen: YES    Skin:  Yonis Score: 20  Interventions: float heels and increase time out of bed    Patient Safety:  Fall Score:  Total Score: 2  Interventions: gripper socks       Length of Stay:  Expected LOS: - - -  Actual LOS: 2      Theresa Weir RN

## 2021-09-30 NOTE — WOUND CARE
Wound Care Follow up for the second day of ostomy teaching:  Chart reviewed and patient assessed. She just finished taking a shower but she appears to be anxious today - most likely due to the fecal contents in her colostomy pouch that just appeared last night. The colostomy bag was a little loose at the top but only a long the edge. Assessment and Teaching today:  Today I had the patient sit on the toilet to do her pouch. She emptied the pouch herself into a container but then she stool at the sink to apply the colostomy pouch. Her  participated by cutting out the barrier for her. He is very supportive and together they are humorous and supportive of each other. Today we did the pouch change twice due to the bag folding up on the bottom of the first pouch so we did two practice changes. Today I showed her the option of using an ostomy belt (especially at work) if she wanted to. Pt. Appeared to more calm about all of this after having the new bag on her properly. She is doing well with her water drinking - she drank 2 liters yesterday. Plan: patient and her  have homework today to prep the pouch / bag for tomorrow by cutting it out using the prepared template. Pt. Signed up for [employer] today. She will receive a Starter kit in the mail within a week with a few samples.    Reynaldo Madera, RN, BSN, [facility]

## 2021-10-01 ENCOUNTER — TELEPHONE (OUTPATIENT)
Dept: FAMILY MEDICINE CLINIC | Age: 56
End: 2021-10-01

## 2021-10-01 VITALS
BODY MASS INDEX: 38.21 KG/M2 | HEIGHT: 61 IN | SYSTOLIC BLOOD PRESSURE: 155 MMHG | OXYGEN SATURATION: 99 % | TEMPERATURE: 98.1 F | RESPIRATION RATE: 17 BRPM | HEART RATE: 92 BPM | WEIGHT: 202.38 LBS | DIASTOLIC BLOOD PRESSURE: 79 MMHG

## 2021-10-01 LAB
ANION GAP SERPL CALC-SCNC: 3 MMOL/L (ref 5–15)
BASOPHILS # BLD: 0 K/UL (ref 0–0.1)
BASOPHILS NFR BLD: 0 % (ref 0–1)
BUN SERPL-MCNC: 9 MG/DL (ref 6–20)
BUN/CREAT SERPL: 13 (ref 12–20)
CALCIUM SERPL-MCNC: 8.1 MG/DL (ref 8.5–10.1)
CHLORIDE SERPL-SCNC: 106 MMOL/L (ref 97–108)
CO2 SERPL-SCNC: 27 MMOL/L (ref 21–32)
CREAT SERPL-MCNC: 0.68 MG/DL (ref 0.55–1.02)
DIFFERENTIAL METHOD BLD: ABNORMAL
EOSINOPHIL # BLD: 0.4 K/UL (ref 0–0.4)
EOSINOPHIL NFR BLD: 3 % (ref 0–7)
ERYTHROCYTE [DISTWIDTH] IN BLOOD BY AUTOMATED COUNT: 12.6 % (ref 11.5–14.5)
GLUCOSE SERPL-MCNC: 89 MG/DL (ref 65–100)
HCT VFR BLD AUTO: 31.3 % (ref 35–47)
HGB BLD-MCNC: 10.4 G/DL (ref 11.5–16)
IMM GRANULOCYTES # BLD AUTO: 0 K/UL (ref 0–0.04)
IMM GRANULOCYTES NFR BLD AUTO: 0 % (ref 0–0.5)
LYMPHOCYTES # BLD: 2.4 K/UL (ref 0.8–3.5)
LYMPHOCYTES NFR BLD: 22 % (ref 12–49)
MCH RBC QN AUTO: 29.2 PG (ref 26–34)
MCHC RBC AUTO-ENTMCNC: 33.2 G/DL (ref 30–36.5)
MCV RBC AUTO: 87.9 FL (ref 80–99)
MONOCYTES # BLD: 0.9 K/UL (ref 0–1)
MONOCYTES NFR BLD: 8 % (ref 5–13)
NEUTS SEG # BLD: 7.2 K/UL (ref 1.8–8)
NEUTS SEG NFR BLD: 67 % (ref 32–75)
NRBC # BLD: 0 K/UL (ref 0–0.01)
NRBC BLD-RTO: 0 PER 100 WBC
PLATELET # BLD AUTO: 199 K/UL (ref 150–400)
PMV BLD AUTO: 10.6 FL (ref 8.9–12.9)
POTASSIUM SERPL-SCNC: 3.8 MMOL/L (ref 3.5–5.1)
RBC # BLD AUTO: 3.56 M/UL (ref 3.8–5.2)
SODIUM SERPL-SCNC: 136 MMOL/L (ref 136–145)
WBC # BLD AUTO: 10.9 K/UL (ref 3.6–11)

## 2021-10-01 PROCEDURE — 74011250637 HC RX REV CODE- 250/637: Performed by: SURGERY

## 2021-10-01 PROCEDURE — 74011250637 HC RX REV CODE- 250/637: Performed by: NURSE PRACTITIONER

## 2021-10-01 PROCEDURE — 80048 BASIC METABOLIC PNL TOTAL CA: CPT

## 2021-10-01 PROCEDURE — 94760 N-INVAS EAR/PLS OXIMETRY 1: CPT

## 2021-10-01 PROCEDURE — 74011250636 HC RX REV CODE- 250/636: Performed by: SURGERY

## 2021-10-01 PROCEDURE — 85025 COMPLETE CBC W/AUTO DIFF WBC: CPT

## 2021-10-01 PROCEDURE — 36415 COLL VENOUS BLD VENIPUNCTURE: CPT

## 2021-10-01 RX ORDER — NALOXONE HYDROCHLORIDE 4 MG/.1ML
1 SPRAY NASAL AS NEEDED
Qty: 1 EACH | Refills: 0 | Status: SHIPPED | OUTPATIENT
Start: 2021-10-01

## 2021-10-01 RX ORDER — LORAZEPAM 0.5 MG/1
0.5 TABLET ORAL
Qty: 20 TABLET | Refills: 0 | Status: SHIPPED | OUTPATIENT
Start: 2021-10-01

## 2021-10-01 RX ADMIN — ENOXAPARIN SODIUM 40 MG: 40 INJECTION SUBCUTANEOUS at 08:28

## 2021-10-01 RX ADMIN — DIPHENHYDRAMINE HYDROCHLORIDE 25 MG: 25 CAPSULE ORAL at 06:26

## 2021-10-01 RX ADMIN — DIPHENHYDRAMINE HYDROCHLORIDE 25 MG: 25 CAPSULE ORAL at 10:34

## 2021-10-01 RX ADMIN — ACETAMINOPHEN 650 MG: 325 TABLET ORAL at 06:17

## 2021-10-01 RX ADMIN — HYDROMORPHONE HYDROCHLORIDE 4 MG: 2 TABLET ORAL at 10:34

## 2021-10-01 RX ADMIN — DIPHENHYDRAMINE HYDROCHLORIDE 25 MG: 25 CAPSULE ORAL at 01:10

## 2021-10-01 RX ADMIN — HYDROMORPHONE HYDROCHLORIDE 4 MG: 2 TABLET ORAL at 06:17

## 2021-10-01 RX ADMIN — ACETAMINOPHEN 650 MG: 325 TABLET ORAL at 01:10

## 2021-10-01 RX ADMIN — HYDROMORPHONE HYDROCHLORIDE 4 MG: 2 TABLET ORAL at 01:06

## 2021-10-01 NOTE — DISCHARGE INSTRUCTIONS
David Hunter MD, [address] Gloris Siemens, MD, [address] [facility] Wendy Hager MD, [facility] Shaina. Nidia Henry MD, Sherill Foreman, MD Marlane Castleman, MD Blima Crouch, MD      Discharge Instructions for [facility] Colorectal Surgery Patients       1. Do not lift any objects weighing more than 10 pounds for 6 weeks. 2. Do not do any housework including vacuuming, scrubbing or yardwork for 4 weeks. 3. Do not drive for two weeks or while taking sedating medications. 4. You may walk as desired and go up and down stairs as needed. 5. You may shower. Do not take tub baths, swim or use hot tubs for 4 weeks. 6. Leave steri-strips on incision. They will fall off on their own. You may have dermabond on your incisions which is surgical glue. This will dissolve over time. Do not scrub around incisions. 7. Follow low-fiber diet for 2-3 weeks. (See handbook for additional details). 8. Drink nutritional supplements 2 times per day until your diet and appetite are back to normal. Diabetic patients should drink one-half bottle 4 times daily. 9. Multiple bowel movements are normal each day. Contact your surgeons office for any concerns. 10. Take Acetaminophen 1000mg every 6 hours for 24 hours, then as needed for pain and Ibuprofen 200-400 mg every 8 hours as needed for pain    Demerol as needed for pain. 11. Follow up with providers as scheduled. 12. If your surgery involves an ostomy bag, please bring your supplies to the first office visit with your surgeon. 13. If you experience fever (greater than 100.5), chills, vomiting or redness or drainage at surgical site, please contact your surgeons office. 14. For questions regarding quality or quantity of ostomy output, refer to [facility] handbook or call surgeons office. Ostomy Instructions:  Change your Colostomy pouch every 3-5 days but it will need to be emptied when no more than 1/2 full.  When working with [facility] agency, use the skills you learned in the hospital. The nurse will be there to supervise and further  you in your home environment. [employer] will be your supply resource once Home Care has been completed. Your starter kit should be arriving at your home within a few days of your discharge home. Give yourself time to adjust to the colostomy. Drink 2 liters of water per day to keep hydrated. Crohns can cause a lot of diarrhea and dehydration, which causes more colostomy odor and generally makes you feel like you have less energy. Keep walking to keep yourself conditioned. Call your ostomy nurse if you have issues that are not able to be answered by your home nurse. She can guide you over the phone but the hours are limited (available Mon - Fri 8 - 4:30 pm) . Another resource if your home nurse and / or you ostomy association \"colleagues\"  You can do this! Please see handbook for additional instructions. If you have further questions, please call your surgeons office.

## 2021-10-01 NOTE — PROGRESS NOTES
DISCHARGE NOTE FROM [facility] NURSE    Patient determined to be stable for discharge by attending provider. I have reviewed the discharge instructions and follow-up appointments with the patient and spouse. They verbalized understanding and all questions were answered to their satisfaction. No complaints or further questions were expressed. Medications sent to pharmacy. Appropriate educational materials and medication side effect teaching were provided. PIV were removed prior to discharge. Patient did not discharge with any line, hussein, or drain. All personal items collected during admission were returned to the patient prior to discharge. Post-op patient: Yes- Patient given post-op discharge kit and instructed on use.        Alexa Orozco RN

## 2021-10-01 NOTE — DISCHARGE SUMMARY
Post- Surgical Discharge Summary    Patient ID:  Fermin Roach  [id number]  female  64 y.o.  1965    Admit date: 9/28/2021    Discharge date: 10/1/2021    Admitting Physician: Rhys Melendez MD     Consulting Physician(s):   Treatment Team: Attending Provider: Arnaud Diana MD; Utilization Review: Charity Sanz; Care Manager: Roxie Mayo RN; Primary Nurse: Henrietta Villar RN; Staff Nurse: Aleena Oglesby RN    Date of Surgery:   9/28/2021     Preoperative Diagnosis:  CROHNS DISEASE WITH ANAL FISTULA    Postoperative Diagnosis:   CROHNS DISEASE WITH ANAL FISTULA    Procedure(s):  ROBOTIC ASSISTED LAPAROSCOPIC END COLOSTOMY CREATION WITH UNDER LAY BIOLOGIC MESH/EXAM UNDER ANESTHESIA  WITH DEBRIDEMENT OF INTRA -ANAL SINUS     Anesthesia Type:   General     Surgeon: Arnaud Diana MD                            HPI:  Pt is a 64 y.o. female who has a history of [address] who presents at this time for a end colostomy creation and debridement of intra-anal sinus tract following the failure of conservative management.     Problem List:   Problem List as of 10/1/2021 Date Reviewed: 12/11/2020        Codes Class Noted - Resolved    Crohn disease ([facility] [address] ICD-10-CM: K50.90  ICD-9-CM: 555.9  9/28/2021 - Present        Cerebrovascular disease, unspecified ICD-10-CM: I67.9  ICD-9-CM: 437.9  9/8/2020 - Present        Left leg weakness ICD-10-CM: R29.898  ICD-9-CM: 729.89  8/24/2020 - Present        Rectal bleed ICD-10-CM: K62.5  ICD-9-CM: 569.3  10/17/2019 - Present        Severe obesity ([facility] [address] ICD-10-CM: E66.01  ICD-9-CM: 278.01  10/10/2018 - Present        Hypotension due to drugs ICD-10-CM: I95.2  ICD-9-CM: 458.8, E947.9  6/22/2018 - Present        SVT (supraventricular tachycardia) (HCC) ICD-10-CM: I47.1  ICD-9-CM: 427.89  6/21/2018 - Present        Cerebral microvascular disease ICD-10-CM: I67.89  ICD-9-CM: 437.8  2/19/2018 - Present        Dizziness and giddiness ICD-10-CM: R42  ICD-9-CM: 780.4  2/19/2018 - Present        Altered mental status, unspecified ICD-10-CM: R41.82  ICD-9-CM: 780.97  2/19/2018 - Present        Paroxysmal SVT (supraventricular tachycardia) (HCC) ICD-10-CM: I47.1  ICD-9-CM: 427.0  2/1/2018 - Present        Inappropriate sinus tachycardia ICD-10-CM: R00.0  ICD-9-CM: 427.89  9/11/2017 - Present        Phrenic nerve palsy (Chronic) ICD-10-CM: G56.80  ICD-9-CM: 354.8  12/22/2016 - Present    Overview Signed 12/22/2016  2:59 PM by Benjamin Vance, related to SVC lesion and repair.                Shortness of breath ICD-10-CM: R06.02  ICD-9-CM: 786.05  7/19/2016 - Present        SVC obstruction ICD-10-CM: I87.1  ICD-9-CM: 459.2  7/5/2016 - Present    Overview Addendum 7/5/2016 12:43 PM by MIL Quintero     7/5/16 RESECTION OF SUPERIOR VENA CAVA  STRICTURE, REPAIR  WITH SAPHENOUS VEIN SPIRAL INTERPOSITION GRAFT AND REMOVAL OF LEFT SIDED PORTACATH               Transient ischemic attack involving right internal carotid artery ICD-10-CM: G45.1  ICD-9-CM: 435.8  4/25/2016 - Present        Acute ischemic stroke ([facility] [address] ICD-10-CM: I63.9  ICD-9-CM: 434.91  4/22/2016 - Present        Lupus ([facility] [address] ICD-10-CM: M32.9  ICD-9-CM: 710.0  4/22/2016 - Present        Pulmonary embolism (HCC) ICD-10-CM: I26.99  ICD-9-CM: 415.19  4/22/2016 - Present        Cervico-occipital neuralgia of right side ICD-10-CM: M54.81  ICD-9-CM: 723.8  2/5/2016 - Present        Stenosis of both carotid arteries without infarction ICD-10-CM: I65.23  ICD-9-CM: 433.10, 433.30  2/5/2016 - Present        Ulnar neuropathy at elbow of right upper extremity ICD-10-CM: G56.21  ICD-9-CM: 354.2  5/21/2015 - Present        Cervical radiculopathy ICD-10-CM: M54.12  ICD-9-CM: 723.4  5/21/2015 - Present        SLE (systemic lupus erythematosus) ([facility] ICD-10-CM: M32.9  ICD-9-CM: 710.0  3/26/2015 - Present        Tension vascular headache ICD-10-CM: M74.078  ICD-9-CM: 307.81  3/26/2015 - Present        Migraine with aura and without status migrainosus, not intractable ICD-10-CM: G43.109  ICD-9-CM: 346.00  3/26/2015 - Present        Myopathy ICD-10-CM: G72.9  ICD-9-CM: 359.9  3/26/2015 - Present        Vitamin D deficiency ICD-10-CM: E55.9  ICD-9-CM: 268.9  3/26/2015 - Present        Back pain ICD-10-CM: M54.9  ICD-9-CM: 724.5  3/26/2015 - Present        Lumbar radiculopathy ICD-10-CM: M54.16  ICD-9-CM: 724.4  3/26/2015 - Present        Spondylolisthesis, grade 1, L4-L5 ICD-10-CM: M43.10  ICD-9-CM: 738.4  3/26/2015 - Present        Weakness ICD-10-CM: R53.1  ICD-9-CM: 780.79  3/26/2015 - Present        Sinus tachycardia ICD-10-CM: R00.0  ICD-9-CM: 427.89  8/1/2014 - Present        Bronchitis, acute ICD-10-CM: J20.9  ICD-9-CM: 466.0  8/1/2014 - Present        Abdominal pain, acute, right upper quadrant ICD-10-CM: R10.11  ICD-9-CM: 789.01, 338.19  8/1/2014 - Present        Acute GI bleeding ICD-10-CM: K92.2  ICD-9-CM: 578.9  8/15/2013 - Present        Crohn's disease of colon ([facility] ICD-10-CM: K50.10  ICD-9-CM: 555.1  7/26/2010 - Present        Crohn's disease of ileum ([facility] ICD-10-CM: K50.00  ICD-9-CM: 555.0  7/26/2010 - Present        RESOLVED: Sternal wound dehiscence ICD-10-CM: T81.32XA  ICD-9-CM: 998.31  12/14/2016 - 12/15/2016        RESOLVED: Pain from implanted hardware ICD-10-CM: T85.848A  ICD-9-CM: 996.70  12/14/2016 - 12/22/2016    Overview Signed 12/14/2016  9:46 AM by MIL Thomas     Exploration of sternal incision with removal of protruding sternal wires             RESOLVED: Dehiscence of closure of sternum or sternotomy ICD-10-CM: D78.05VE  ICD-9-CM: 998.31  12/14/2016 - 12/15/2016               Hospital Course: The patient underwent surgery. Intra-operative complications: None; patient tolerated the procedure well. Was taken to the PACU in stable condition and then transferred to the surgical floor.       Perioperative Antibiotics: Cefoxitin    Postoperative Pain Management:  Dilaudid with plan to d/c on demerol. Postoperative transfusions:    Number of units banked PRBCs =   none     Post Op complications: None     Incisions  - clean, dry and intact. No significant erythema or swelling. Wound(s) appear to be healing without any evidence of infection. Patient mobilized with nursing and was found to be safe and steady with ambulation. Discharged to: Home     Condition on Discharge: Stable     Discharge instructions:    - Take pain medications as prescribed  - Diet Low Fiber  - Discharge activity:    - Activity as tolerated    - Ambulate several times a day   - No heavy lifting for 4 weeks   - Do not drive for two weeks or while on opioid pain medications  - Wound Care: Keep wound(s) clean and dry. See discharge instruction sheet. Allergies: Allergies   Allergen Reactions    Chlorhexidine Towelette Rash     \"required IV Benadryl post\"    Codeine Hives     Difficulty breathing    Hydrocodone Hives     Difficulty breathing      Oxycontin [Oxycodone] Hives     Difficulty breathing    Percocet [Oxycodone-Acetaminophen] Hives     Difficulty breathing; Can take tylenol    Prednisone Other (comments)     HX OF CROHN'S; not allergic, prefer not to use               -DISCHARGE MEDICATION LIST     Current Discharge Medication List      START taking these medications    Details   acetaminophen (TYLENOL) 325 mg tablet Take 2 Tablets by mouth every six (6) hours for 7 days. Qty: 56 Tablet, Refills: 0  Start date: 9/30/2021, End date: 10/7/2021      meperidine (DEMEROL) 50 mg tablet Take 1 Tablet by mouth every four (4) hours as needed for Pain for up to 5 days. Max Daily Amount: 300 mg. Qty: 25 Tablet, Refills: 0  Start date: 9/30/2021, End date: 10/5/2021    Associated Diagnoses: Crohn's disease of large intestine with complication ([facility] [address]         CONTINUE these medications which have NOT CHANGED    Details   metoclopramide HCl (REGLAN PO) Take  by mouth.  Follow day prior to surgery instructions neomycin sulfate (NEOMYCIN PO) Take  by mouth. Follow day prior to surgery instructions      metronidazole (FLAGYL PO) Take  by mouth. Follow day prior to surgery instructions      tiZANidine (ZANAFLEX) 2 mg tablet 1 po qam and 2 po qhs  Qty: 100 Tab, Refills: 0    Associated Diagnoses: Migraine with aura and without status migrainosus, not intractable; Stenosis of both carotid arteries without infarction; Cerebral microvascular disease; Cervico-occipital neuralgia of right side; Tension vascular headache; Dizziness and giddiness      gabapentin (NEURONTIN) 300 mg capsule TAKE ONE CAPSULE BY MOUTH THREE TIMES A DAY  Qty: 100 Cap, Refills: 5    Associated Diagnoses: Cervical radiculopathy; Cerebrovascular disease, unspecified; Ulnar neuropathy at elbow of right upper extremity; Cervico-occipital neuralgia of right side; Stenosis of both carotid arteries without infarction      hydroxychloroquine (PLAQUENIL) 200 mg tablet Take 400 mg by mouth daily (after dinner). For lupus      ondansetron hcl (ZOFRAN) 4 mg tablet Take 4 mg by mouth every eight (8) hours as needed for Nausea. drospirenone-ethinyl estradiol (MARIELOS 28) 3-20 mg-mcg per tablet Take 1 Tab by mouth nightly. divalproex ER (DEPAKOTE ER) 500 mg ER tablet TAKE ONE TABLET BY MOUTH ONE TIME DAILY WITH DINNER  Qty: 100 Tablet, Refills: 5  Start date: 9/28/2021    Associated Diagnoses: Migraine with aura and without status migrainosus, not intractable      butalbital-acetaminophen-caffeine (FIORICET, ESGIC) -40 mg per tablet Take 2 Tabs by mouth every eight (8) hours as needed for Migraine. MUST LAST 30 DAYS.   Indications: a migraine headache, tension headache  Qty: 50 Tab, Refills: 5    Associated Diagnoses: Cervical radiculopathy; Cerebrovascular disease, unspecified; Ulnar neuropathy at elbow of right upper extremity; Cervico-occipital neuralgia of right side; Stenosis of both carotid arteries without infarction      ubrogepant Leni Butt) 50 mg tablet 1 tab PO at onset of headache and can repeat in 2 hours if needed, max dose 2 per day  Qty: 10 Tab, Refills: 11    Associated Diagnoses: Migraine with aura and without status migrainosus, not intractable; Cervico-occipital neuralgia of right side; Cervical radiculopathy; Stenosis of both carotid arteries without infarction; Tension vascular headache; Cerebrovascular disease, unspecified; Cerebral microvascular disease; Dizziness and giddiness; Ulnar neuropathy at elbow of right upper extremity      fremanezumab-vfrm ([employer] Autoinjector) 225 mg/1.5 mL atIn 225 mg by SubCUTAneous route every thirty (30) days. Qty: 1 Syringe, Refills: 11    Associated Diagnoses: Migraine with aura and without status migrainosus, not intractable; Cervico-occipital neuralgia of right side; Cervical radiculopathy; Stenosis of both carotid arteries without infarction; Tension vascular headache; Cerebrovascular disease, unspecified; Cerebral microvascular disease; Dizziness and giddiness; Ulnar neuropathy at elbow of right upper extremity      apixaban (ELIQUIS) 2.5 mg tablet Take 1 Tab by mouth two (2) times a day. Resume on Monday 10/21/19  Indications: PE      ustekinumab (USTEKINAUMAB) 90 mg/mL injection 90 mg by SubCUTAneous route every two (2) months. aspirin 81 mg chewable tablet Take 81 mg by mouth daily. promethazine (PHENERGAN) 25 mg tablet Take 25 mg by mouth every six (6) hours as needed for Nausea. diazepam (VALIUM) 5 mg tablet Take 1 Tab by mouth every twelve (12) hours as needed for Anxiety. Max Daily Amount: 10 mg.  Refill at 1 month intervals  Qty: 30 Tab, Refills: 2    Associated Diagnoses: Cervical radiculopathy; Cerebrovascular disease, unspecified; Ulnar neuropathy at elbow of right upper extremity; Cervico-occipital neuralgia of right side; Stenosis of both carotid arteries without infarction          per medical continuation form      -Follow up in office in 2 weeks      Signed:  Dayanara Jack Vikash Elizabeth  MSN, APRN, FNP-C, [facility]  Surgical Nurse Practitioner    10/1/2021  11:43 AM

## 2021-10-01 NOTE — WOUND CARE
Wound Care consult for Last day of ostomy teaching with return demonstration. Patient has proven several times that she has read through all of the teaching materials and has viewed videos on ostomy care. She has done well with the ostomy care here. Assessment:  Patient did apply her own colostomy pouch this morning before I got into the room. She stated, \"I wanted to impress you\". Her  is an excellent support for her and he encourages her well. The bag was checked and she did do well with the application. Today we talked more about skin care and about diet and the discharge planning. Continued to discuss the hydration as well as sticking with the Crohn's recommendations. We talked about the future preparations of a possible APR with regard to diet, exercise and making sure she is healthy prior to that surgery (if she has it). Gave her the [facility] [employer] information that I ordered yesterday. Gave her about 7 pouches to take home with rings and skin prep. Home care will be at their home on Sunday to change her pouch. Plan: Pt. Is ready for discharge from an ostomy education standpoint. Discussed with [address], RN.    Arnulfo Simental, RN, BSN, [facility]

## 2021-10-01 NOTE — PROGRESS NOTES
Problem: Falls - Risk of  Goal: *Absence of Falls  Description: Document Karyn Hassan Fall Risk and appropriate interventions in the flowsheet.   Outcome: Resolved/Met     Problem: Patient Education: Go to Patient Education Activity  Goal: Patient/Family Education  Outcome: Resolved/Met

## 2021-10-01 NOTE — TELEPHONE ENCOUNTER
Verbal auth given per Maciel to follow pt upon discharge from [facility]. [facility] will fax documents to the office.

## 2021-10-02 NOTE — PROGRESS NOTES
Problem: Falls - Risk of  Goal: *Absence of Falls  Description: Document Halima Eid Fall Risk and appropriate interventions in the flowsheet.   Outcome: Progressing Towards Goal  Note: Fall Risk Interventions:  Mobility Interventions: Bed/chair exit alarm         Medication Interventions: Bed/chair exit alarm    Elimination Interventions: Bed/chair exit alarm, Call light in reach              Problem: Patient Education: Go to Patient Education Activity  Goal: Patient/Family Education  Outcome: Progressing Towards Goal     Problem: Patient Education: Go to Patient Education Activity  Goal: Patient/Family Education  Outcome: Progressing Towards Goal     Problem: TIA/CVA Stroke: 0-24 hours  Goal: Off Pathway (Use only if patient is Off Pathway)  Outcome: Progressing Towards Goal  Goal: Activity/Safety  Outcome: Progressing Towards Goal  Goal: Consults, if ordered  Outcome: Progressing Towards Goal  Goal: Diagnostic Test/Procedures  Outcome: Progressing Towards Goal  Goal: Nutrition/Diet  Outcome: Progressing Towards Goal  Goal: Discharge Planning  Outcome: Progressing Towards Goal  Goal: Medications  Outcome: Progressing Towards Goal  Goal: Respiratory  Outcome: Progressing Towards Goal  Goal: Treatments/Interventions/Procedures  Outcome: Progressing Towards Goal  Goal: Minimize risk of bleeding post-thrombolytic infusion  Outcome: Progressing Towards Goal  Goal: Monitor for complications post-thrombolytic infusion  Outcome: Progressing Towards Goal  Goal: Psychosocial  Outcome: Progressing Towards Goal  Goal: *Hemodynamically stable  Outcome: Progressing Towards Goal  Goal: *Neurologically stable  Description: Absence of additional neurological deficits    Outcome: Progressing Towards Goal  Goal: *Verbalizes anxiety and depression are reduced or absent  Outcome: Progressing Towards Goal  Goal: *Absence of Signs of Aspiration on Current Diet  Outcome: Progressing Towards Goal  Goal: *Absence of deep venous thrombosis signs and symptoms(Stroke Metric)  Outcome: Progressing Towards Goal  Goal: *Ability to perform ADLs and demonstrates progressive mobility and function  Outcome: Progressing Towards Goal  Goal: *Stroke education started(Stroke Metric)  Outcome: Progressing Towards Goal  Goal: *Dysphagia screen performed(Stroke Metric)  Outcome: Progressing Towards Goal  Goal: *Rehab consulted(Stroke Metric)  Outcome: Progressing Towards Goal     Problem: Patient Education: Go to Patient Education Activity  Goal: Patient/Family Education  Outcome: Progressing Towards Goal     Problem: Patient Education: Go to Patient Education Activity  Goal: Patient/Family Education  Outcome: Progressing Towards Goal Pfizer dose 1 and 2

## 2021-10-20 ENCOUNTER — VIRTUAL VISIT (OUTPATIENT)
Dept: FAMILY MEDICINE CLINIC | Age: 56
End: 2021-10-20
Payer: COMMERCIAL

## 2021-10-20 DIAGNOSIS — K50.119 CROHN'S DISEASE OF COLON WITH COMPLICATION (HCC): ICD-10-CM

## 2021-10-20 DIAGNOSIS — Z86.711 HISTORY OF PULMONARY EMBOLUS (PE): ICD-10-CM

## 2021-10-20 DIAGNOSIS — Z90.49 S/P COLECTOMY: ICD-10-CM

## 2021-10-20 DIAGNOSIS — Z09 HOSPITAL DISCHARGE FOLLOW-UP: Primary | ICD-10-CM

## 2021-10-20 DIAGNOSIS — M32.9 LUPUS (HCC): ICD-10-CM

## 2021-10-20 PROCEDURE — 99214 OFFICE O/P EST MOD 30 MIN: CPT | Performed by: NURSE PRACTITIONER

## 2021-10-20 NOTE — PROGRESS NOTES
Evans Sharma (: 1965) is a 64 y.o. female, established patient, here for evaluation of the following chief complaint(s):   Hospital Follow Up        ASSESSMENT/PLAN:  Below is the assessment and plan developed based on review of pertinent history, labs, studies, and medications. 1. Hospital discharge follow-up  2. Crohn's disease of colon with complication ([facility] [address]  3. History of pulmonary embolus (PE)  4. Lupus ([facility] [address] [facility]  5. S/P colectomy    Has upcoming specialists appointments  No complaints or concerns today  She says she is healing well post operatively. Will call if any changes or new concerns. Return in about 6 months (around 2022), or if symptoms worsen or fail to improve. SUBJECTIVE/OBJECTIVE:  HPI  Admitted on 21 and discharged 10/1/21 to [facility]  For surgery    Preoperative Diagnosis:  CROHNS DISEASE WITH ANAL FISTULA  Postoperative Diagnosis:   CROHNS DISEASE WITH ANAL FISTULA  Procedure(s):  ROBOTIC ASSISTED LAPAROSCOPIC END COLOSTOMY CREATION WITH UNDER LAY BIOLOGIC MESH/EXAM UNDER ANESTHESIA  WITH DEBRIDEMENT OF INTRA -[facility] [facility] nurse came today, last visit. Was seeing her for colostomy care. Walking more, 3-4 times per day walks for 10-15 minutes. Feeling pretty good. Eating and drinking well. No fever. Has some colostomy leaks but \"I think that is just because of where it is located, I think Im figuring that out, I have not had leak since Monday. \"     has follow up with Dr Sae Krause  Seen post op two weeks ago. Followed by Dr Elfida Mcardle [facility], managing eliquis. Next follow up in march    Followed by Rheum Dr Rachna Smart for lupus - next  Follow up is in January     Neurology Dr Sanjana Vieyra for migraines. Next appointment pending.          Review of Systems   Gen: +fatigue, -fever, -chills  Eyes: no excessive tearing, itching, or discharge  Nose: no rhinorrhea, no sinus pain  Mouth: no oral lesions, no sore throat  Resp: no shortness of breath, no wheezing, no cough  CV: no chest pain, no paroxysmal nocturnal dyspnea  Abd: no nausea, no heartburn, no diarrhea, no constipation, no abdominal pain  Neuro: no new headaches, no syncope or presyncopal episodes      No data recorded     Physical Exam    [INSTRUCTIONS:  \"[x]\" Indicates a positive item  \"[]\" Indicates a negative item  -- DELETE ALL ITEMS NOT EXAMINED]    Constitutional: [x] Appears well-developed and well-nourished [x] No apparent distress      [] Abnormal -     Mental status: [x] Alert and awake  [x] Oriented to person/place/time [x] Able to follow commands    [] Abnormal -     Eyes:   EOM    [x]  Normal    [] Abnormal -   Sclera  [x]  Normal    [] Abnormal -          Discharge [x]  None visible   [] Abnormal -     HENT: [x] Normocephalic, atraumatic  [] Abnormal -   [x] Mouth/Throat: Mucous membranes are moist    External Ears [x] Normal  [] Abnormal -    Neck: [x] No visualized mass [] Abnormal -     Pulmonary/Chest: [x] Respiratory effort normal   [x] No visualized signs of difficulty breathing or respiratory distress        [] Abnormal -      Musculoskeletal:   [x] Normal gait with no signs of ataxia         [x] Normal range of motion of neck        [] Abnormal -     Neurological:        [x] No Facial Asymmetry (Cranial nerve 7 motor function) (limited exam due to video visit)          [x] No gaze palsy        [] Abnormal -          Skin:        [x] No significant exanthematous lesions or discoloration noted on facial skin         [] Abnormal -            Psychiatric:       [x] Normal Affect [] Abnormal -        [x] No Hallucinations            [facility], was evaluated through a synchronous (real-time) audio-video encounter. The patient (or guardian if applicable) is aware that this is a billable service. Verbal consent to proceed has been obtained within the past 12 months.  The visit was conducted pursuant to the emergency declaration under the [address] waiver authority and the [employer] and [employer] General Act. Patient identification was verified, and a caregiver was present when appropriate. The patient was located in a state where the provider was credentialed to provide care. An electronic signature was used to authenticate this note.   -- Brenda Gray, ANALY

## 2021-10-20 NOTE — PROGRESS NOTES
1. Have you been to the ER, urgent care clinic since your last visit? Hospitalized since your last visit? No    2. Have you seen or consulted any other health care providers outside of the [address] [place] [address] since your last visit? Include any pap smears or colon screening.  No    Health Maintenance Due   Topic Date Due    DTaP/Tdap/Td series (1 - Tdap) Never done    Shingrix Vaccine Age 50> (2 of 2) 10/12/2020    COVID-19 Vaccine (2 - Moderna 2-dose series) 05/05/2021    Flu Vaccine (1) 09/01/2021    Breast Cancer Screen Mammogram  11/12/2021     Chief Complaint   Patient presents with   [facility] Follow Up

## 2022-02-06 DIAGNOSIS — I67.89 CEREBRAL MICROVASCULAR DISEASE: ICD-10-CM

## 2022-02-06 DIAGNOSIS — I65.23 STENOSIS OF BOTH CAROTID ARTERIES WITHOUT INFARCTION: ICD-10-CM

## 2022-02-06 DIAGNOSIS — I67.9 CEREBROVASCULAR DISEASE, UNSPECIFIED: ICD-10-CM

## 2022-02-06 DIAGNOSIS — R42 DIZZINESS AND GIDDINESS: ICD-10-CM

## 2022-02-06 DIAGNOSIS — G44.209 TENSION VASCULAR HEADACHE: ICD-10-CM

## 2022-02-06 DIAGNOSIS — G56.21 ULNAR NEUROPATHY AT ELBOW OF RIGHT UPPER EXTREMITY: ICD-10-CM

## 2022-02-06 DIAGNOSIS — M54.12 CERVICAL RADICULOPATHY: ICD-10-CM

## 2022-02-06 DIAGNOSIS — M54.81 CERVICO-OCCIPITAL NEURALGIA OF RIGHT SIDE: ICD-10-CM

## 2022-02-06 DIAGNOSIS — G43.109 MIGRAINE WITH AURA AND WITHOUT STATUS MIGRAINOSUS, NOT INTRACTABLE: ICD-10-CM

## 2022-02-06 RX ORDER — UBROGEPANT 50 MG/1
TABLET ORAL
Qty: 10 TABLET | Refills: 11 | Status: SHIPPED | OUTPATIENT
Start: 2022-02-06

## 2022-02-06 RX ORDER — FREMANEZUMAB-VFRM 225 MG/1.5ML
INJECTION SUBCUTANEOUS
Qty: 1.5 ML | Refills: 11 | Status: SHIPPED | OUTPATIENT
Start: 2022-02-06

## 2022-02-11 ENCOUNTER — TRANSCRIBE ORDER (OUTPATIENT)
Dept: REGISTRATION | Age: 57
End: 2022-02-11

## 2022-02-11 ENCOUNTER — HOSPITAL ENCOUNTER (OUTPATIENT)
Dept: GENERAL RADIOLOGY | Age: 57
Discharge: HOME | End: 2022-02-11
Payer: COMMERCIAL

## 2022-02-11 DIAGNOSIS — M53.3 DISORDER OF SACRUM: Primary | ICD-10-CM

## 2022-02-11 DIAGNOSIS — M53.3 DISORDER OF SACRUM: ICD-10-CM

## 2022-02-11 PROCEDURE — 72220 X-RAY EXAM SACRUM TAILBONE: CPT

## 2022-02-11 PROCEDURE — 72100 X-RAY EXAM L-S SPINE 2/3 VWS: CPT

## 2022-03-18 PROBLEM — I47.10 PAROXYSMAL SVT (SUPRAVENTRICULAR TACHYCARDIA): Status: ACTIVE | Noted: 2018-02-01

## 2022-03-18 PROBLEM — I95.2 HYPOTENSION DUE TO DRUGS: Status: ACTIVE | Noted: 2018-06-22

## 2022-03-18 PROBLEM — I67.9 CEREBROVASCULAR DISEASE, UNSPECIFIED: Status: ACTIVE | Noted: 2020-09-08

## 2022-03-18 PROBLEM — I47.1 PAROXYSMAL SVT (SUPRAVENTRICULAR TACHYCARDIA) (HCC): Status: ACTIVE | Noted: 2018-02-01

## 2022-03-19 PROBLEM — I47.10 SVT (SUPRAVENTRICULAR TACHYCARDIA): Status: ACTIVE | Noted: 2018-06-21

## 2022-03-19 PROBLEM — K50.90 CROHN DISEASE (HCC): Status: ACTIVE | Noted: 2021-09-28

## 2022-03-19 PROBLEM — R29.898 LEFT LEG WEAKNESS: Status: ACTIVE | Noted: 2020-08-24

## 2022-03-19 PROBLEM — I47.1 SVT (SUPRAVENTRICULAR TACHYCARDIA) (HCC): Status: ACTIVE | Noted: 2018-06-21

## 2022-03-19 PROBLEM — I67.89 CEREBRAL MICROVASCULAR DISEASE: Status: ACTIVE | Noted: 2018-02-19

## 2022-03-19 PROBLEM — R42 DIZZINESS AND GIDDINESS: Status: ACTIVE | Noted: 2018-02-19

## 2022-03-20 PROBLEM — E66.01 SEVERE OBESITY (HCC): Status: ACTIVE | Noted: 2018-10-10

## 2022-03-20 PROBLEM — R00.0 INAPPROPRIATE SINUS TACHYCARDIA: Status: ACTIVE | Noted: 2017-09-11

## 2022-03-20 PROBLEM — R41.82 ALTERED MENTAL STATUS, UNSPECIFIED: Status: ACTIVE | Noted: 2018-02-19

## 2022-03-20 PROBLEM — K62.5 RECTAL BLEED: Status: ACTIVE | Noted: 2019-10-17

## 2022-03-20 PROBLEM — I47.11 INAPPROPRIATE SINUS TACHYCARDIA: Status: ACTIVE | Noted: 2017-09-11

## 2022-08-15 ENCOUNTER — TRANSCRIBE ORDER (OUTPATIENT)
Dept: SCHEDULING | Age: 57
End: 2022-08-15

## 2022-08-15 DIAGNOSIS — D44.9: ICD-10-CM

## 2022-08-15 DIAGNOSIS — K27.9: ICD-10-CM

## 2022-08-15 DIAGNOSIS — R10.84 ABDOMINAL PAIN, GENERALIZED: ICD-10-CM

## 2022-08-15 DIAGNOSIS — K50.118 REGIONAL COLITIS, OTHER COMPLICATION (HCC): Primary | ICD-10-CM

## 2022-08-26 ENCOUNTER — HOSPITAL ENCOUNTER (OUTPATIENT)
Dept: CT IMAGING | Age: 57
Discharge: HOME | End: 2022-08-26
Attending: SURGERY
Payer: COMMERCIAL

## 2022-08-26 DIAGNOSIS — K27.9: ICD-10-CM

## 2022-08-26 DIAGNOSIS — R10.84 ABDOMINAL PAIN, GENERALIZED: ICD-10-CM

## 2022-08-26 DIAGNOSIS — K50.118 REGIONAL COLITIS, OTHER COMPLICATION (HCC): ICD-10-CM

## 2022-08-26 DIAGNOSIS — D44.9: ICD-10-CM

## 2022-08-26 PROCEDURE — 74011000636 HC RX REV CODE- 636: Performed by: RADIOLOGY

## 2022-08-26 PROCEDURE — 74177 CT ABD & PELVIS W/CONTRAST: CPT

## 2022-08-26 PROCEDURE — 74011000250 HC RX REV CODE- 250: Performed by: RADIOLOGY

## 2022-08-26 RX ADMIN — IOPAMIDOL 100 ML: 755 INJECTION, SOLUTION INTRAVENOUS at 15:41

## 2022-08-26 RX ADMIN — Medication 1500 ML: at 15:00

## 2022-09-01 NOTE — PROGRESS NOTES
Mary Ann Magdaleno is a 62 y.o. female here for one year follow up for PE and now anemia. She is taking Apixaban. She is trying to adjust to her colostomy. Recent CT showing possible cyst on liver. Pt states she is wiped out by the end of the day. She does not have much strength these days. This has been going on for a few months. 1. Have you been to the ER, urgent care clinic since your last visit? Hospitalized since your last visit? 10/2/21 Procedure(s):  ROBOTIC ASSISTED LAPAROSCOPIC END COLOSTOMY      2. Have you seen or consulted any other health care providers outside of the [address] [place] [address] since your last visit? Include any pap smears or colon screening.   no

## 2022-09-02 ENCOUNTER — TELEPHONE (OUTPATIENT)
Dept: NEUROLOGY | Age: 57
End: 2022-09-02

## 2022-09-02 NOTE — TELEPHONE ENCOUNTER
LMVM to call me. Ajovy is no longer covered by insurance. Pt to call to check to see which meds are on formulary and let us know.

## 2022-09-06 ENCOUNTER — OFFICE VISIT (OUTPATIENT)
Dept: ONCOLOGY | Age: 57
End: 2022-09-06
Payer: COMMERCIAL

## 2022-09-06 VITALS
OXYGEN SATURATION: 97 % | HEIGHT: 61 IN | BODY MASS INDEX: 38.29 KG/M2 | HEART RATE: 82 BPM | SYSTOLIC BLOOD PRESSURE: 127 MMHG | DIASTOLIC BLOOD PRESSURE: 80 MMHG | TEMPERATURE: 98.4 F | WEIGHT: 202.8 LBS

## 2022-09-06 DIAGNOSIS — D64.9 ANEMIA, UNSPECIFIED TYPE: Primary | ICD-10-CM

## 2022-09-06 DIAGNOSIS — I26.99 OTHER PULMONARY EMBOLISM WITHOUT ACUTE COR PULMONALE, UNSPECIFIED CHRONICITY (HCC): ICD-10-CM

## 2022-09-06 PROCEDURE — 99213 OFFICE O/P EST LOW 20 MIN: CPT | Performed by: INTERNAL MEDICINE

## 2022-09-06 NOTE — LETTER
9/6/2022    Patient: Mars Alcala   YOB: 1965   Date of Visit: 9/6/2022     Johnathon Landeros NP  [address] [place] [address] [phone]  Via In [facility] [address]    Dear Johnathon Landeros NP,      Thank you for referring Ms. Mars Alcala to [address] [place] [address] for evaluation. My notes for this consultation are attached. If you have questions, please do not hesitate to call me. I look forward to following your patient along with you.       Sincerely,    Juan Ordoñez MD

## 2022-09-06 NOTE — PROGRESS NOTES
[address] [facility] [address], [address] [facility], [address] [place] [address]  [phone]    Follow-up Note        Patient: Iva Florian MRN: [id number]  SSN: [id number]    YOB: 1965  Age: 62 y.o. Sex: female        Subjective:      Iva Florian is a 62 y.o. female with a history of PE on systemic anticoagulation. She suffered a provoked PE from SVC occlusion/thrombus which was itself related to a chronic indwelling port-a-cath. She was diagnosed with PE in 04/2016. 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 She underwent a resection and bypass graft of the SVC. The SVC stenosis was felt to be related to a chronic indwelling port. She has been on Apixaban and prefers to remain on long-term systemic anticoagulation. Denies any new symptoms. She is suffering with fatigue. Review of Systems:    Constitutional: negative  Eyes: negative  Ears, Nose, Mouth, Throat, and Face: negative  Respiratory: negative  Cardiovascular: negative  Gastrointestinal: negative  Genitourinary:negative  Integument/Breast: negative  Hematologic/Lymphatic: negative  Musculoskeletal:negative  Neurological: negative          Past Medical History:   Diagnosis Date    Adverse effect of anesthesia     \"takes me a while to go under\"    Anemia     Arrhythmia     Afib, sees Dr Bassett Console    Autoimmune disease ([facility] [address]     Crohns julito    CAD (coronary artery disease)     occluded SVC r/t port-cath    Chronic pain     Lt and Rt back:  Dr Rodrigo To;  Pain mgmt Karen JAEGER [facility]    Cough     evaluated 8/25/14 by Dr Candelario Dumont ([phone]) \". . possible drug related lung is due to Methitrexate or atypical or fungal infection\" per office note dated 8/25/14    Crohn's disease ([facility] [address]     Dr Mary Jo Copeland    Ill-defined condition     lt lung higher than normal so puts pressure on diaphragm increasing SOB    Lupus (HCC)     Dr Claudia Willson [phone]    Lupus [facility])     Migraines     Pain from implanted hardware 12/14/2016    Exploration of sternal incision with removal of protruding sternal wires    Stenosis of both carotid arteries without cerebral infarction     Stroke [facility]) 06/2016    TIA's     SVC obstruction 2016    resolved after surgery    Thromboembolus ([address] 04/2016    PE right lower lung, spontaneous pulm dr christal Abdi     Past Surgical History:   Procedure Laterality Date    COLONOSCOPY N/A 10/5/2018    COLONOSCOPY performed by Daniella Henry.  Esha Villarreal MD at [address] [place] [address]    x1    HX COLOSTOMY  09/28/2021    HX GI  2005    bowel adhesions removed    HX GI  1999    Bowel resection    HX GI  2019    stent for anal fistula     HX HEART CATHETERIZATION      no stents, open heart surgery 2016    HX HEENT      sinus surgery for deviated septum    HX HYSTERECTOMY      partial    HX LEFT SALPINGO-OOPHORECTOMY  2005    ovary and fallopian tube removed    HX MOHS PROCEDURES Right approx 2013    with bone spur repair in shoulder    HX OTHER SURGICAL  2016    femerol vein removed    HX OTHER SURGICAL  07/05/2016    open heart for repair of blocked SVC    HX PARTIAL COLECTOMY      1990's x 2, early 2000's & 2010    HX TONSILLECTOMY      HX TOTAL COLECTOMY  7/24/10    colon resection-18\" removed; Dr John Jennings  6/11/10    portacath insertion and removal x 3; Gets Remicaid q5 weeks    OH CARDIAC SURG PROCEDURE UNLIST  2016, 06/2018, 10/18    ablation    [place] COLONOSCOPY W/BIOPSY SINGLE/MULTIPLE  8/19/2013           Family History   Problem Relation Age of Onset    Cancer Father         stomach, lymphoma    Other Mother         bicycle accident    Cancer Paternal Grandmother         breast    Breast Cancer Paternal Grandmother         not sure of age    Breast Cancer Maternal Aunt         not sure of age     Social History     Tobacco Use    Smoking status: Former     Packs/day: 0.50     Years: 2.50     Pack years: 1.25     Types: Cigarettes     Quit date:      Years since quittin.6    Smokeless tobacco: Never    Tobacco comments:     social   Substance Use Topics    Alcohol use: Yes     Comment: 1-2 glasses of wine, 1-2 times per week      Prior to Admission medications    Medication Sig Start Date End Date Taking? Authorizing Provider   Ajovy Autoinjector 225 mg/1.5 mL auto-injector INJECT 225MG UNDER THE SKIN ONCE EVERY 30 DAYS 22  Yes Quiana Noel MD   Ubrelvy 50 mg tablet TAKE ONE TABLET BY MOUTH AT ONSET OF HEADACHE AND CAN REPEAT IN 2 HOURS IF NEEDED (MAX DOSE 2 PER DAY) 22  Yes Quiana Noel MD   LORazepam (ATIVAN) 0.5 mg tablet Take 1 Tablet by mouth every six (6) hours as needed for Anxiety. Max Daily Amount: 2 mg. 10/1/21  Yes Chente Arriaga NP   divalproex ER (DEPAKOTE ER) 500 mg ER tablet TAKE ONE TABLET BY MOUTH ONE TIME DAILY WITH DINNER 21  Yes Quiana Noel MD   butalbital-acetaminophen-caffeine (FIORICET, ESGIC) -40 mg per tablet Take 2 Tabs by mouth every eight (8) hours as needed for Migraine. MUST LAST 30 DAYS. Indications: a migraine headache, tension headache 20  Yes Quiana Noel MD   apixaban (ELIQUIS) 2.5 mg tablet Take 1 Tab by mouth two (2) times a day. Resume on Monday 10/21/19  Indications: PE 10/18/19  Yes Chente Arriaga NP   ustekinumab (STELARA) 90 mg/mL injection 90 mg by SubCUTAneous route every two (2) months. Yes Provider, Historical   tiZANidine (ZANAFLEX) 2 mg tablet 1 po qam and 2 po qhs 19  Yes Romeo Golden MD   gabapentin (NEURONTIN) 300 mg capsule TAKE ONE CAPSULE BY MOUTH THREE TIMES A DAY 12/3/18  Yes Quiana Noel MD   aspirin 81 mg chewable tablet Take 81 mg by mouth daily. Yes Provider, Historical   hydroxychloroquine (PLAQUENIL) 200 mg tablet Take 400 mg by mouth daily (after dinner).  For lupus   Yes Provider, Historical   ondansetron hcl (ZOFRAN) 4 mg tablet Take 4 mg by mouth every eight (8) hours as needed for Nausea. Yes Other, MD Melisa   drospirenone-ethinyl estradioL (MARIELOS) 3-0.02 mg tab Take 1 Tab by mouth nightly. Yes Provider, Historical   naloxone (Narcan) 4 mg/actuation nasal spray 1 Moon by IntraNASal route as needed (Respiratory depression). Use 1 spray intranasally into 1 nostril. Call 911. Use a new Narcan nasal spray for subsequent doses and administer into alternating nostrils. May repeat every 2 to 3 minutes as needed. Patient not taking: Reported on 9/6/2022 10/1/21   Hilaria Monae NP   metoclopramide HCl (REGLAN PO) Take  by mouth. Follow day prior to surgery instructions  Patient not taking: Reported on 9/6/2022 9/27/21   Provider, Historical   neomycin sulfate (NEOMYCIN PO) Take  by mouth. Follow day prior to surgery instructions  Patient not taking: Reported on 9/6/2022 9/27/21   Provider, Historical   metronidazole (FLAGYL PO) Take  by mouth.  Follow day prior to surgery instructions  Patient not taking: Reported on 9/6/2022 9/27/21   Provider, Historical              Allergies   Allergen Reactions    Chlorhexidine Towelette Rash     \"required IV Benadryl post\"    Codeine Hives     Difficulty breathing    Hydrocodone Hives     Difficulty breathing      Oxycontin [Oxycodone] Hives     Difficulty breathing    Percocet [Oxycodone-Acetaminophen] Hives     Difficulty breathing; Can take tylenol    Prednisone Other (comments)     HX OF CROHN'S; not allergic, prefer not to use            Objective:     Visit Vitals  /80 (BP 1 Location: Left upper arm, BP Patient Position: Sitting)   Pulse 82   Temp 98.4 °F (36.9 °C) (Temporal)   Ht 5' 1\" (1.549 m)   Wt 202 lb 12.8 oz (92 kg)   SpO2 97%   BMI 38.32 kg/m²     Physical Examination:   General appearance - alert, well appearing, and in no distress  Eyes - pupils equal and reactive, extraocular eye movements intact  Mouth - mucous membranes moist, pharynx normal without lesions  Neck - supple, no significant adenopathy  Lymphatics - no palpable lymphadenopathy, no hepatosplenomegaly  Chest - clear to auscultation, no wheezes, rales or rhonchi, symmetric air entry  Heart - normal rate, regular rhythm, normal S1, S2, no murmurs, rubs, clicks or gallops  Abdomen - soft, nontender, nondistended, no masses or organomegaly  Neurological - alert, oriented, normal speech, no focal findings or movement disorder noted  Musculoskeletal - no joint tenderness, deformity or swelling  Extremities - no pedal edema, no clubbing or cyanosis  Skin - no rashes, no suspicious skin lesions noted       Lab Results   Component Value Date/Time    WBC 10.9 10/01/2021 04:34 AM    HGB 10.4 (L) 10/01/2021 04:34 AM    HCT 31.3 (L) 10/01/2021 04:34 AM    PLATELET 820 19/06/6495 04:34 AM    MCV 87.9 10/01/2021 04:34 AM         Assessment:     1. Pulmonary embolism:    > 1st episode in 04/2016- provoked by indwelling catheter [facility]) and SVC stenosis and thrombus    Currently on Apixaban  H/O TIA   Patient prefers to continue systemic anticoagulation. Denies bleeding. Continue Eliquis 2.5 mg daily  Continue Aspirin 81 mg daily    Symptom management form reviewed with patient. 2. Anemia    Mild  No additional work is necessaary      Plan:       > Continue Apixaban.   > Continue ASA 81 daily  > Follow-up as needed      Signed by: Loli Lewis MD                     September 6, 2022      CC.  Lauren Zepeda MD

## 2022-09-07 ENCOUNTER — TELEPHONE (OUTPATIENT)
Dept: NEUROLOGY | Age: 57
End: 2022-09-07

## 2022-09-07 NOTE — TELEPHONE ENCOUNTER
Patient stated that she spoke w/ [facility] and they stated she needs PA for her Ajovy.     Fax #: [phone]    Phone #:[phone]

## 2022-09-07 NOTE — TELEPHONE ENCOUNTER
Spoke with patient. Verified patient with two patient identifiers. Niranjan Caldwell is no longer covered by her ins. She will call them see what medication is on formulary and let us know. Also scheduled appt for pt in March 2023 since she is overdue for appt. Patient verbalized understanding.

## 2022-09-08 NOTE — TELEPHONE ENCOUNTER
VM to call me.     ? No meds on formulary? Pt states she was told we need to obtain the PA. Will forward to Jhonathan.

## 2022-09-08 NOTE — TELEPHONE ENCOUNTER
Katie Fay  Signed     Yesterday     Copy     Patient stated that she spoke w/ [facility] and they stated she needs PA for her Ajovy.      Fax #: [phone]     Phone #:[phone]

## 2022-09-19 ENCOUNTER — TELEPHONE (OUTPATIENT)
Dept: NEUROLOGY | Age: 57
End: 2022-09-19

## 2022-09-19 NOTE — TELEPHONE ENCOUNTER
Jak JAEGER sent via CMM to [employer] w/ last office note of Dec 2020.      Approved -   Approvedtoday  PA Case: [id number], Status: Approved, Coverage Starts on: 9/19/2022 12:00:00 AM, Coverage Ends on: 9/19/2023 12:00:00 AM.    Faxed to [employer]

## 2022-09-21 ENCOUNTER — TELEPHONE (OUTPATIENT)
Dept: NEUROLOGY | Age: 57
End: 2022-09-21

## 2022-09-22 NOTE — TELEPHONE ENCOUNTER
Spoke with patient. Verified patient with two patient identifiers. Thanked us for getting Jak approved. She has picked up the med. Patient verbalized understanding.

## 2022-09-22 NOTE — TELEPHONE ENCOUNTER
Approved Christopher Marvin Approved today    PA Case: [id number], Status: Approved,     Coverage Starts on: 9/19/2022 12:00:00 AM, Coverage Ends on: 9/19/2023 12:00:00 AM.

## 2022-09-29 PROBLEM — E53.8 B12 DEFICIENCY: Status: ACTIVE | Noted: 2022-09-29

## 2022-09-30 ENCOUNTER — DOCUMENTATION ONLY (OUTPATIENT)
Dept: ONCOLOGY | Age: 57
End: 2022-09-30

## 2022-10-04 ENCOUNTER — DOCUMENTATION ONLY (OUTPATIENT)
Dept: ONCOLOGY | Age: 57
End: 2022-10-04

## 2022-10-04 NOTE — PROGRESS NOTES
Called and spoke with patient. Has tried gummies, tablets, and chewable iron in the past. All forms she can not absorb and come out whole through her ostomy.

## 2022-10-10 ENCOUNTER — HOSPITAL ENCOUNTER (OUTPATIENT)
Dept: INFUSION THERAPY | Age: 57
Discharge: HOME | End: 2022-10-10
Payer: COMMERCIAL

## 2022-10-10 VITALS
TEMPERATURE: 97.9 F | WEIGHT: 204.4 LBS | RESPIRATION RATE: 17 BRPM | OXYGEN SATURATION: 100 % | HEART RATE: 90 BPM | DIASTOLIC BLOOD PRESSURE: 89 MMHG | SYSTOLIC BLOOD PRESSURE: 148 MMHG | BODY MASS INDEX: 38.62 KG/M2

## 2022-10-10 DIAGNOSIS — E53.8 B12 DEFICIENCY: Primary | ICD-10-CM

## 2022-10-10 PROCEDURE — 74011000258 HC RX REV CODE- 258: Performed by: INTERNAL MEDICINE

## 2022-10-10 PROCEDURE — 96365 THER/PROPH/DIAG IV INF INIT: CPT

## 2022-10-10 PROCEDURE — 74011000250 HC RX REV CODE- 250: Performed by: INTERNAL MEDICINE

## 2022-10-10 PROCEDURE — 74011250636 HC RX REV CODE- 250/636: Performed by: INTERNAL MEDICINE

## 2022-10-10 PROCEDURE — 96372 THER/PROPH/DIAG INJ SC/IM: CPT

## 2022-10-10 RX ORDER — SODIUM CHLORIDE 0.9 % (FLUSH) 0.9 %
5-10 SYRINGE (ML) INJECTION AS NEEDED
Status: DISCONTINUED | OUTPATIENT
Start: 2022-10-10 | End: 2022-10-11 | Stop reason: HOSPADM

## 2022-10-10 RX ORDER — SODIUM CHLORIDE 9 MG/ML
25 INJECTION, SOLUTION INTRAVENOUS AS NEEDED
Status: DISCONTINUED | OUTPATIENT
Start: 2022-10-10 | End: 2022-10-11 | Stop reason: HOSPADM

## 2022-10-10 RX ORDER — CYANOCOBALAMIN 1000 UG/ML
1000 INJECTION, SOLUTION INTRAMUSCULAR; SUBCUTANEOUS ONCE
Status: COMPLETED | OUTPATIENT
Start: 2022-10-10 | End: 2022-10-10

## 2022-10-10 RX ORDER — SODIUM CHLORIDE 9 MG/ML
25 INJECTION, SOLUTION INTRAVENOUS CONTINUOUS
Status: DISCONTINUED | OUTPATIENT
Start: 2022-10-10 | End: 2022-10-12 | Stop reason: HOSPADM

## 2022-10-10 RX ADMIN — SODIUM CHLORIDE, PRESERVATIVE FREE 10 ML: 5 INJECTION INTRAVENOUS at 17:28

## 2022-10-10 RX ADMIN — SODIUM CHLORIDE 25 ML/HR: 900 INJECTION, SOLUTION INTRAVENOUS at 16:09

## 2022-10-10 RX ADMIN — IRON SUCROSE 200 MG: 20 INJECTION, SOLUTION INTRAVENOUS at 16:10

## 2022-10-10 RX ADMIN — CYANOCOBALAMIN 1000 MCG: 1000 INJECTION, SOLUTION INTRAMUSCULAR; SUBCUTANEOUS at 16:12

## 2022-10-10 RX ADMIN — SODIUM CHLORIDE, PRESERVATIVE FREE 10 ML: 5 INJECTION INTRAVENOUS at 16:10

## 2022-10-10 NOTE — PROGRESS NOTES
[facility] Progress Note  Name:Maria E Mays  : 1965  MRN: [id number]    Pt arrived to [facility] ambulatory in no acute distress at 1500 for Venofer . Assessment unremarkable . IV established in right hand site WNL. Per Jneniffer Cook. NPOk to proceed with treatment with elevated BP. Vital Signs:  Patient Vitals for the past 12 hrs:   Temp Pulse Resp BP SpO2   10/10/22 1742 -- 90 -- (!) 148/89 --   10/10/22 1526 -- 81 -- (!) 157/95 --   10/10/22 1509 97.9 °F (36.6 °C) 85 17 (!) 180/91 100 %       Patient denied having any symptoms of COVID-19, i.e. SOB, coughing, fever, or generally not feeling well. Also denies having been exposed to COVID-19 recently or having had any recent contact with family/friend that has a pending COVID test.     The following medications administered:  Medications Administered       0.9% sodium chloride infusion       Admin Date  10/10/2022 Action  New Bag Dose  25 mL/hr Rate  25 mL/hr Route  IntraVENous Administered By  Hever Farooq RN              cyanocobalamin (VITAMIN B12) injection 1,000 mcg       Admin Date  10/10/2022 Action  Given Dose  1,000 mcg Route  IntraMUSCular Administered By  Hever Farooq RN              iron sucrose (VENOFER) 200 mg in 0.9% sodium chloride 100 mL IVPB       Admin Date  10/10/2022 Action  New Bag Dose  200 mg Rate  100 mL/hr Route  IntraVENous Administered By  Hever Farooq RN              sodium chloride (NS) flush 5-10 mL       Admin Date  10/10/2022 Action  Given Dose  10 mL Route  IntraVENous Administered By  Hever Farooq RN                   Patient observed for 30 minutes post infusion with no adverse signs or symptoms noted. Patient educated on medication, has no questions or concerns. Pt tolerated treatment well. IV flushed per policy and removed, 2x2 and coban placed. Pt discharged ambulatory in no acute distress at 1745, accompanied by spouse. Next appointment 10/17/22 @ 1500.     Future Appointments   Date Time Provider Port [place]   10/17/2022  3:00 PM PHAM FASTRACK 5 [facility]   10/24/2022  1:00 PM PHAM FASTRACK 7 [facility]   10/31/2022  3:00 PM PHAM FASTRACK 5 [facility]   11/7/2022  3:00 PM PHAM FASTRACK 6 [facility]   11/28/2022  3:00 PM PHAM FASTRACK 5 [facility]   12/27/2022  3:30 PM PHAM FASTRACK 5 [facility]   1/23/2023  3:30 PM PHAM FASTRACK 5 [facility]   3/21/2023 10:40 AM Lalito Elias MD NEU BS AMB   9/6/2023 10:30 AM Gilberto Eagle MD ONCMR BS AMB

## 2022-10-17 ENCOUNTER — HOSPITAL ENCOUNTER (OUTPATIENT)
Dept: INFUSION THERAPY | Age: 57
End: 2022-10-17

## 2022-10-24 ENCOUNTER — HOSPITAL ENCOUNTER (OUTPATIENT)
Dept: INFUSION THERAPY | Age: 57
End: 2022-10-24

## 2022-10-25 ENCOUNTER — DOCUMENTATION ONLY (OUTPATIENT)
Dept: ONCOLOGY | Age: 57
End: 2022-10-25

## 2022-10-25 DIAGNOSIS — D64.9 ANEMIA, UNSPECIFIED TYPE: Primary | ICD-10-CM

## 2022-10-25 DIAGNOSIS — D64.9 ANEMIA, UNSPECIFIED TYPE: ICD-10-CM

## 2022-10-25 RX ORDER — CYANOCOBALAMIN 1000 UG/ML
1000 INJECTION, SOLUTION INTRAMUSCULAR; SUBCUTANEOUS
Qty: 6 ML | Refills: 2
Start: 2022-10-25 | End: 2022-11-04 | Stop reason: SDUPTHER

## 2022-10-25 NOTE — PROGRESS NOTES
Insurance would not approve IV iron in hospital based infusion center. Due to patient preference in being close to a hospital sent over ambulatory IV [facility]er orders to [facility] [address]. Orders for 200mg IVPB over 120 minutes (extended infusion due to difficult stick and previous IV burning) x 4 doses once weekly. Also asked for an additional 250cc to be infused to help with burning. B12 was also ordered in the infusion center and due to approval issues sent B12 to [facility]. Patient has experience giving herself injections as she is currently on Ajovy SQ PRN at home. Will continue with three more once weekly injections then transition to once monthly. Lastly, Will send through follow up labs 4-6 weeks after finishing venofer infusions for iron profile, ferritin, B12 at [facility]. Patient verbalized understanding of the above plan and thanked writer for the call and plan.

## 2022-10-31 ENCOUNTER — APPOINTMENT (OUTPATIENT)
Dept: INFUSION THERAPY | Age: 57
End: 2022-10-31

## 2022-11-04 ENCOUNTER — TELEPHONE (OUTPATIENT)
Dept: ONCOLOGY | Age: 57
End: 2022-11-04

## 2022-11-04 ENCOUNTER — DOCUMENTATION ONLY (OUTPATIENT)
Dept: ONCOLOGY | Age: 57
End: 2022-11-04

## 2022-11-04 RX ORDER — CYANOCOBALAMIN 1000 UG/ML
1000 INJECTION, SOLUTION INTRAMUSCULAR; SUBCUTANEOUS
Qty: 6 ML | Refills: 2
Start: 2022-11-04

## 2022-11-04 NOTE — PROGRESS NOTES
Left Ms Adam  a message on self identified VM. Spoke with [facility]. They have sent the iron back through redetermination. Was told insurance denied claim due to no notes about trying oral iron despite documentation in the chart. Should hopefully hear back about second determination by the end of next week. [facility] sent through the information that was faxed over to them. In the meantime encouraged Ms. Mays to continue with B12 load as hopefully this will help with overall fatigue. (She should have these through the outpatient pharmacy) If she needs us please return call to the office or message through [employer].

## 2022-11-04 NOTE — TELEPHONE ENCOUNTER
Patient said that [facility] insurance denied and said her ins company said they sent us a letter. She said Dr Quang Saldana would have to do an appeal. This is for her receiving iron infusions . She is not feeling well, run down. Asking if we can please help.

## 2022-11-07 ENCOUNTER — APPOINTMENT (OUTPATIENT)
Dept: INFUSION THERAPY | Age: 57
End: 2022-11-07

## 2022-11-23 ENCOUNTER — TRANSCRIBE ORDER (OUTPATIENT)
Dept: SCHEDULING | Age: 57
End: 2022-11-23

## 2022-11-23 DIAGNOSIS — R10.30 LOWER ABDOMINAL PAIN: Primary | ICD-10-CM

## 2022-11-28 ENCOUNTER — APPOINTMENT (OUTPATIENT)
Dept: INFUSION THERAPY | Age: 57
End: 2022-11-28

## 2022-12-01 ENCOUNTER — HOSPITAL ENCOUNTER (OUTPATIENT)
Dept: CT IMAGING | Age: 57
Discharge: HOME | End: 2022-12-01
Attending: SURGERY
Payer: COMMERCIAL

## 2022-12-01 DIAGNOSIS — R10.30 LOWER ABDOMINAL PAIN: ICD-10-CM

## 2022-12-01 PROCEDURE — 74011000636 HC RX REV CODE- 636: Performed by: SURGERY

## 2022-12-01 PROCEDURE — 74177 CT ABD & PELVIS W/CONTRAST: CPT

## 2022-12-01 RX ADMIN — IOPAMIDOL 100 ML: 755 INJECTION, SOLUTION INTRAVENOUS at 14:52

## 2022-12-27 ENCOUNTER — APPOINTMENT (OUTPATIENT)
Dept: INFUSION THERAPY | Age: 57
End: 2022-12-27

## 2023-01-05 ENCOUNTER — TRANSCRIBE ORDER (OUTPATIENT)
Dept: SCHEDULING | Age: 58
End: 2023-01-05

## 2023-01-05 DIAGNOSIS — Z12.31 SCREENING MAMMOGRAM FOR HIGH-RISK PATIENT: Primary | ICD-10-CM

## 2023-01-06 ENCOUNTER — DOCUMENTATION ONLY (OUTPATIENT)
Dept: ONCOLOGY | Age: 58
End: 2023-01-06

## 2023-01-06 DIAGNOSIS — D64.9 ANEMIA, UNSPECIFIED TYPE: ICD-10-CM

## 2023-01-06 DIAGNOSIS — D50.9 IRON DEFICIENCY ANEMIA, UNSPECIFIED IRON DEFICIENCY ANEMIA TYPE: Primary | ICD-10-CM

## 2023-01-06 DIAGNOSIS — D50.9 IRON DEFICIENCY ANEMIA, UNSPECIFIED IRON DEFICIENCY ANEMIA TYPE: ICD-10-CM

## 2023-01-06 NOTE — PROGRESS NOTES
Contacted by [facility] and denial was upheld for IV iron stating that it was not medically necessary and labs were too old. Spoke with patient and ask that she get her labs redrawn and will work to [email or web address]. Last set of labs were done by [facility] in 2022. Patient has been doing monthly B12 shots so will check Iron Profile/Ferritin/CBC/B12/Folate levels. Patient verbalized understanding.      For Pharmacy Admin Tracking Only    Program: Medical Group  CPA in place: Yes  Recommendation Provided To: Patient/Caregiver: 1 via Telephone  Intervention Detail: Lab(s) Ordered  Intervention Accepted By: Patient/Caregiver: 1  Time Spent (min): 15

## 2023-01-07 ENCOUNTER — HOSPITAL ENCOUNTER (OUTPATIENT)
Dept: MAMMOGRAPHY | Age: 58
End: 2023-01-07
Payer: COMMERCIAL

## 2023-01-07 DIAGNOSIS — Z12.31 SCREENING MAMMOGRAM FOR HIGH-RISK PATIENT: ICD-10-CM

## 2023-01-07 PROCEDURE — 77063 BREAST TOMOSYNTHESIS BI: CPT

## 2023-01-10 LAB
BASOPHILS # BLD AUTO: 0 X10E3/UL (ref 0–0.2)
BASOPHILS NFR BLD AUTO: 0 %
EOSINOPHIL # BLD AUTO: 0.3 X10E3/UL (ref 0–0.4)
EOSINOPHIL NFR BLD AUTO: 3 %
ERYTHROCYTE [DISTWIDTH] IN BLOOD BY AUTOMATED COUNT: 15.6 % (ref 11.7–15.4)
FERRITIN SERPL-MCNC: 10 NG/ML (ref 15–150)
FOLATE SERPL-MCNC: 5 NG/ML
HCT VFR BLD AUTO: 35 % (ref 34–46.6)
HGB BLD-MCNC: 11.3 G/DL (ref 11.1–15.9)
IMM GRANULOCYTES # BLD AUTO: 0 X10E3/UL (ref 0–0.1)
IMM GRANULOCYTES NFR BLD AUTO: 0 %
IRON SATN MFR SERPL: 4 % (ref 15–55)
IRON SERPL-MCNC: 25 UG/DL (ref 27–159)
LYMPHOCYTES # BLD AUTO: 2.4 X10E3/UL (ref 0.7–3.1)
LYMPHOCYTES NFR BLD AUTO: 24 %
MCH RBC QN AUTO: 25.4 PG (ref 26.6–33)
MCHC RBC AUTO-ENTMCNC: 32.3 G/DL (ref 31.5–35.7)
MCV RBC AUTO: 79 FL (ref 79–97)
MONOCYTES # BLD AUTO: 0.8 X10E3/UL (ref 0.1–0.9)
MONOCYTES NFR BLD AUTO: 8 %
NEUTROPHILS # BLD AUTO: 6.5 X10E3/UL (ref 1.4–7)
NEUTROPHILS NFR BLD AUTO: 65 %
PLATELET # BLD AUTO: 283 X10E3/UL (ref 150–450)
RBC # BLD AUTO: 4.45 X10E6/UL (ref 3.77–5.28)
TIBC SERPL-MCNC: 620 UG/DL (ref 250–450)
UIBC SERPL-MCNC: 595 UG/DL (ref 131–425)
VIT B12 SERPL-MCNC: 286 PG/ML (ref 232–1245)
WBC # BLD AUTO: 10 X10E3/UL (ref 3.4–10.8)

## 2023-01-23 ENCOUNTER — APPOINTMENT (OUTPATIENT)
Dept: INFUSION THERAPY | Age: 58
End: 2023-01-23

## 2023-02-03 NOTE — PROGRESS NOTES
Daisy Nagel is a 62 y.o. female here for follow up for PE and now anemia. She is taking Apixaban. 9/6/22  She is trying to adjust to her colostomy. Recent CT showing possible cyst on liver. Pt states she is wiped out by the end of the day. She does not have much strength these days. This has been going on for a few months. 2/6/23  No concerns bought up.

## 2023-02-06 ENCOUNTER — OFFICE VISIT (OUTPATIENT)
Dept: ONCOLOGY | Age: 58
End: 2023-02-06
Payer: COMMERCIAL

## 2023-02-06 VITALS
HEIGHT: 61 IN | BODY MASS INDEX: 38.06 KG/M2 | OXYGEN SATURATION: 98 % | DIASTOLIC BLOOD PRESSURE: 61 MMHG | WEIGHT: 201.6 LBS | TEMPERATURE: 98.1 F | SYSTOLIC BLOOD PRESSURE: 129 MMHG | HEART RATE: 76 BPM

## 2023-02-06 DIAGNOSIS — I26.99 OTHER PULMONARY EMBOLISM WITHOUT ACUTE COR PULMONALE, UNSPECIFIED CHRONICITY (HCC): ICD-10-CM

## 2023-02-06 DIAGNOSIS — D50.0 IRON DEFICIENCY ANEMIA SECONDARY TO BLOOD LOSS (CHRONIC): Primary | ICD-10-CM

## 2023-02-06 PROCEDURE — 99214 OFFICE O/P EST MOD 30 MIN: CPT | Performed by: INTERNAL MEDICINE

## 2023-02-06 NOTE — PROGRESS NOTES
[address], [address] [facility], [address] [place] [address], [address] [place] [address]  [phone]    Follow-up Note        Patient: Yoselin Pizarro MRN: [id number]  SSN: [id number]    YOB: 1965  Age: 62 y.o. Sex: female        Subjective:      Yoselin Pizarro is a 62 y.o. female with a history of PE on systemic anticoagulation. She suffered a provoked PE from SVC occlusion/thrombus which was itself related to a chronic indwelling port-a-cath. She was diagnosed with PE in 04/2016. 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 She underwent a resection and bypass graft of the SVC. The SVC stenosis was felt to be related to a chronic indwelling port. She has been on Apixaban and prefers to remain on long-term systemic anticoagulation. Denies any new symptoms. She is suffering with fatigue. She has been taking oral iron and iron levels are lower. Due to inflammatory bowel disease, oral iron is not being absorbed. Review of Systems:    Constitutional: negative  Eyes: negative  Ears, Nose, Mouth, Throat, and Face: negative  Respiratory: negative  Cardiovascular: negative  Gastrointestinal: negative  Genitourinary:negative  Integument/Breast: negative  Hematologic/Lymphatic: negative  Musculoskeletal:negative  Neurological: negative          Past Medical History:   Diagnosis Date    Adverse effect of anesthesia     \"takes me a while to go under\"    Anemia     Arrhythmia     Afib, sees Dr Luana Cole    Autoimmune disease ([facility]     Crohns julito    CAD (coronary artery disease)     occluded SVC r/t port-cath    Chronic pain     Lt and Rt back:  Dr Marlo Guerra;  Pain mgmt [facility]    Cough     evaluated 8/25/14 by Dr Wilner Albarran ([phone]) \". . possible drug related lung is due to Methitrexate or atypical or fungal infection\" per office note dated 14    Crohn's disease ([facility]     Dr Matilda Xiong    Ill-defined condition     lt lung higher than normal so puts pressure on diaphragm increasing SOB    Lupus ([facility]     Dr Charu Seay [phone]    Lupus [facility])     Migraines     Pain from implanted hardware 2016    Exploration of sternal incision with removal of protruding sternal wires    Stenosis of both carotid arteries without cerebral infarction     Stroke [facility]) 2016    TIA's     SVC obstruction 2016    resolved after surgery    Thromboembolus ([facility] 2016    PE right lower lung, spontaneous pulm dr christal Pennington     Past Surgical History:   Procedure Laterality Date    COLONOSCOPY N/A 10/05/2018    COLONOSCOPY performed by Jesus Camara.  Albert Quarles MD at [facility] ENDOSCOPY    [address]    HX BREAST BIOPSY Left     2019 negative    HX  SECTION  1984    x1    HX COLOSTOMY  2021    HX GI  2005    bowel adhesions removed    HX GI      Bowel resection    HX GI  2019    stent for anal fistula     HX HEART CATHETERIZATION      no stents, open heart surgery 2016    HX HEENT      sinus surgery for deviated septum    HX HYSTERECTOMY      partial    HX LEFT SALPINGO-OOPHORECTOMY  2005    ovary and fallopian tube removed    HX MOHS PROCEDURES Right approx     with bone spur repair in shoulder    HX OTHER SURGICAL  2016    femerol vein removed    HX OTHER SURGICAL  2016    open heart for repair of blocked SVC    HX PARTIAL COLECTOMY      's x 2, early 's &     HX TONSILLECTOMY      HX TOTAL COLECTOMY  2010    colon resection-18\" removed; Dr Christopher Krishnamurthy  2010    portacath insertion and removal x 3; Gets Remicaid q5 weeks    AL COLONOSCOPY W/BIOPSY SINGLE/MULTIPLE  2013         AL UNLISTED PROCEDURE CARDIAC SURGERY  , 2018, 10/18    ablation      Family History   Problem Relation Age of Onset    Cancer Father         stomach, lymphoma    Other Mother         bicycle accident Cancer Paternal Grandmother         breast    Breast Cancer Paternal Grandmother         not sure of age    Breast Cancer Maternal Aunt         not sure of age     Social History     Tobacco Use    Smoking status: Former     Packs/day: 0.50     Years: 2.50     Pack years: 1.25     Types: Cigarettes     Quit date:      Years since quittin.1    Smokeless tobacco: Never    Tobacco comments:     social   Substance Use Topics    Alcohol use: Yes     Comment: 1-2 glasses of wine, 1-2 times per week      Prior to Admission medications    Medication Sig Start Date End Date Taking? Authorizing Provider   iron polysaccharide-C-B complx 100 mg iron-250 mg/5 mL liqd Take 5 mL by mouth daily. 23  Yes Liana Espino MD   cyanocobalamin (VITAMIN B12) 1,000 mcg/mL injection 1 mL by SubCUTAneous route every twenty-eight (28) days. 22  Yes Liana Espino MD   Insulin Syringes, Disposable, 1 mL syrg 1 mL by Does Not Apply route every seven (7) days. 10/25/22  Yes Liana Espino MD   Ajovy Autoinjector 225 mg/1.5 mL auto-injector INJECT 225MG UNDER THE SKIN ONCE EVERY 30 DAYS 22  Yes Claudia Lopez MD   Ubrelvy 50 mg tablet TAKE ONE TABLET BY MOUTH AT ONSET OF HEADACHE AND CAN REPEAT IN 2 HOURS IF NEEDED (MAX DOSE 2 PER DAY) 22  Yes Claudia Lopez MD   LORazepam (ATIVAN) 0.5 mg tablet Take 1 Tablet by mouth every six (6) hours as needed for Anxiety. Max Daily Amount: 2 mg. 10/1/21  Yes Stanley Weeks NP   divalproex ER (DEPAKOTE ER) 500 mg ER tablet TAKE ONE TABLET BY MOUTH ONE TIME DAILY WITH DINNER 21  Yes Claudia Lopez MD   butalbital-acetaminophen-caffeine (FIORICET, ESGIC) -40 mg per tablet Take 2 Tabs by mouth every eight (8) hours as needed for Migraine. MUST LAST 30 DAYS. Indications: a migraine headache, tension headache 20  Yes Claudia Lopez MD   apixaban (ELIQUIS) 2.5 mg tablet Take 1 Tab by mouth two (2) times a day.  Resume on Monday 10/21/19  Indications: PE 10/18/19  Yes Stanley Weeks NP   ustekinumab (STELARA) 90 mg/mL injection 90 mg by SubCUTAneous route every two (2) months. Yes Provider, Historical   tiZANidine (ZANAFLEX) 2 mg tablet 1 po qam and 2 po qhs 7/26/19  Yes Maria Guadalupe Ren MD   gabapentin (NEURONTIN) 300 mg capsule TAKE ONE CAPSULE BY MOUTH THREE TIMES A DAY 12/3/18  Yes Cluadia Lopez MD   aspirin 81 mg chewable tablet Take 81 mg by mouth daily. Yes Provider, Historical   hydroxychloroquine (PLAQUENIL) 200 mg tablet Take 400 mg by mouth daily (after dinner). For lupus   Yes Provider, Historical   ondansetron hcl (ZOFRAN) 4 mg tablet Take 4 mg by mouth every eight (8) hours as needed for Nausea. Yes Other, MD Melisa   drospirenone-ethinyl estradioL (MARIELOS) 3-0.02 mg tab Take 1 Tab by mouth nightly. Yes Provider, Historical   naloxone (Narcan) 4 mg/actuation nasal spray 1 [place] by IntraNASal route as needed (Respiratory depression). Use 1 spray intranasally into 1 nostril. Call 911. Use a new Narcan nasal spray for subsequent doses and administer into alternating nostrils. May repeat every 2 to 3 minutes as needed. Patient not taking: No sig reported 10/1/21   Stanley Weeks NP   metoclopramide HCl (REGLAN PO) Take  by mouth. Follow day prior to surgery instructions  Patient not taking: No sig reported 9/27/21   Provider, Historical   neomycin sulfate (NEOMYCIN PO) Take  by mouth. Follow day prior to surgery instructions  Patient not taking: No sig reported 9/27/21   Provider, Historical   metronidazole (FLAGYL PO) Take  by mouth.  Follow day prior to surgery instructions  Patient not taking: No sig reported 9/27/21   Provider, Historical              Allergies   Allergen Reactions    Chlorhexidine Towelette Rash     \"required IV Benadryl post\"    Codeine Hives     Difficulty breathing    Hydrocodone Hives     Difficulty breathing      Oxycontin [Oxycodone] Hives     Difficulty breathing    Percocet [Oxycodone-Acetaminophen] Hives Difficulty breathing; Can take tylenol    Prednisone Other (comments)     HX OF CROHN'S; not allergic, prefer not to use            Objective:     Visit Vitals  /61 (BP 1 Location: Left upper arm, BP Patient Position: Sitting)   Pulse 76   Temp 98.1 °F (36.7 °C) (Temporal)   Ht 5' 1\" (1.549 m)   Wt 201 lb 9.6 oz (91.4 kg)   SpO2 98%   BMI 38.09 kg/m²     Physical Examination:   General appearance - alert, well appearing, and in no distress  Eyes - pupils equal and reactive, extraocular eye movements intact  Mouth - mucous membranes moist, pharynx normal without lesions  Neck - supple, no significant adenopathy  Lymphatics - no palpable lymphadenopathy, no hepatosplenomegaly  Chest - clear to auscultation, no wheezes, rales or rhonchi, symmetric air entry  Heart - normal rate, regular rhythm, normal S1, S2, no murmurs, rubs, clicks or gallops  Abdomen - soft, nontender, nondistended, no masses or organomegaly  Neurological - alert, oriented, normal speech, no focal findings or movement disorder noted  Musculoskeletal - no joint tenderness, deformity or swelling  Extremities - no pedal edema, no clubbing or cyanosis  Skin - no rashes, no suspicious skin lesions noted       Physical exam and ROS has been modified from a prior visit to make it relevant and current      Lab Results   Component Value Date/Time    WBC 10.0 01/09/2023 03:33 PM    HGB 11.3 01/09/2023 03:33 PM    HCT 35.0 01/09/2023 03:33 PM    PLATELET 597 78/28/1136 03:33 PM    MCV 79 01/09/2023 03:33 PM       Lab Results   Component Value Date/Time    Iron 25 (L) 01/09/2023 03:33 PM    TIBC 620 (HH) 01/09/2023 03:33 PM    Iron % saturation 4 (LL) 01/09/2023 03:33 PM    Ferritin 10 (L) 01/09/2023 03:33 PM         Assessment:     1. Pulmonary embolism:    > 1st episode in 04/2016- provoked by indwelling catheter [facility] [place]) and SVC stenosis and thrombus    Currently on Apixaban  H/O TIA   Patient prefers to continue systemic anticoagulation.   Denies bleeding. Continue Eliquis 2.5 mg daily  Continue Aspirin 81 mg daily    Symptom management form reviewed with patient. 2. Anemia    She is suffering with fatigue. She has been taking oral iron and iron levels are lower. Due to inflammatory bowel disease, oral iron is not being absorbed. Thus she needs IV iron for replacement. Plan:       > Continue Apixaban.   > Continue ASA 81 daily  > IV iron  > Follow-up as needed      Signed by: Carla Dixon MD                     February 6, 2023      CC.  Pedro Gamble MD

## 2023-03-10 DIAGNOSIS — D50.0 IRON DEFICIENCY ANEMIA SECONDARY TO BLOOD LOSS (CHRONIC): Primary | ICD-10-CM

## 2023-03-10 DIAGNOSIS — D50.0 IRON DEFICIENCY ANEMIA SECONDARY TO BLOOD LOSS (CHRONIC): ICD-10-CM

## 2023-03-21 ENCOUNTER — OFFICE VISIT (OUTPATIENT)
Dept: NEUROLOGY | Age: 58
End: 2023-03-21
Payer: COMMERCIAL

## 2023-03-21 VITALS
RESPIRATION RATE: 18 BRPM | HEIGHT: 61 IN | DIASTOLIC BLOOD PRESSURE: 78 MMHG | WEIGHT: 197 LBS | BODY MASS INDEX: 37.19 KG/M2 | SYSTOLIC BLOOD PRESSURE: 112 MMHG | TEMPERATURE: 97.7 F | HEART RATE: 88 BPM | OXYGEN SATURATION: 98 %

## 2023-03-21 DIAGNOSIS — G45.1 TRANSIENT ISCHEMIC ATTACK INVOLVING RIGHT INTERNAL CAROTID ARTERY: ICD-10-CM

## 2023-03-21 DIAGNOSIS — G43.109 MIGRAINE WITH AURA AND WITHOUT STATUS MIGRAINOSUS, NOT INTRACTABLE: Primary | ICD-10-CM

## 2023-03-21 DIAGNOSIS — I63.9 ACUTE ISCHEMIC STROKE (HCC): ICD-10-CM

## 2023-03-21 DIAGNOSIS — M54.81 CERVICO-OCCIPITAL NEURALGIA OF RIGHT SIDE: ICD-10-CM

## 2023-03-21 DIAGNOSIS — I67.9 CEREBROVASCULAR DISEASE, UNSPECIFIED: ICD-10-CM

## 2023-03-21 DIAGNOSIS — G44.209 TENSION VASCULAR HEADACHE: ICD-10-CM

## 2023-03-21 DIAGNOSIS — I65.23 BILATERAL CAROTID ARTERY STENOSIS: ICD-10-CM

## 2023-03-21 DIAGNOSIS — I67.89 CEREBRAL MICROVASCULAR DISEASE: ICD-10-CM

## 2023-03-21 DIAGNOSIS — I65.23 STENOSIS OF BOTH CAROTID ARTERIES WITHOUT INFARCTION: ICD-10-CM

## 2023-03-21 DIAGNOSIS — R42 DIZZINESS AND GIDDINESS: ICD-10-CM

## 2023-03-21 DIAGNOSIS — M54.12 CERVICAL RADICULOPATHY: ICD-10-CM

## 2023-03-21 DIAGNOSIS — G56.21 ULNAR NEUROPATHY AT ELBOW OF RIGHT UPPER EXTREMITY: ICD-10-CM

## 2023-03-21 DIAGNOSIS — M54.16 LUMBAR RADICULOPATHY: ICD-10-CM

## 2023-03-21 PROCEDURE — 99213 OFFICE O/P EST LOW 20 MIN: CPT | Performed by: PSYCHIATRY & NEUROLOGY

## 2023-03-21 RX ORDER — FREMANEZUMAB-VFRM 225 MG/1.5ML
INJECTION SUBCUTANEOUS
Qty: 4.5 ML | Refills: 4 | Status: SHIPPED | OUTPATIENT
Start: 2023-03-21

## 2023-03-21 RX ORDER — DIPHENHYDRAMINE HCL 25 MG
25 TABLET ORAL
COMMUNITY

## 2023-03-21 RX ORDER — TRAMADOL HYDROCHLORIDE 50 MG/1
50 TABLET ORAL
COMMUNITY
Start: 2023-03-13

## 2023-03-21 NOTE — PROGRESS NOTES
Consult    Subjective:     Daisy Nagel is a 62 y.o. right-handed  female seen today Dr. Daljit Farah because of new problem of needing her ilelostomy reversed that because of bleeding complications, and her GI surgeon is going to do that in the near future, because her Crohn's disease was so severe she had to get an ileostomy. Left-sided weakness and numbness that she got admitted to the hospital for at the end of August, and was seen by Dr. Misty Ahn, and had a normal MRI of the brain showing no new lesions, normal MRA of the brain, unremarkable Dopplers, and a CTA that did not show much disease. She was diagnosed with a complicated migraine and discharged home on Depakote  mg a day. She complains of left-sided numbness and weakness, and had a midline sensory loss on exam that seems to suggest functional overlay. She just has headaches about once a month, but the [place] usually will get greater than, and the Ajovy is markedly decreased the frequency of these. She had both these medications renewed for her today. Because of her intermittent spells, we will do a carotid Doppler study and schedule that for April 11. Is the first time she had available. She had a previous 24-hour EEG that was normal.  She has had 3 cardiac ablations, and has chronic A. fib, and persistent tachycardia, and is currently also on anticoagulation with Eliquis 2.5 mg twice a day, and wants to be seen for medication adjustment. The headaches really sound like muscle tension headaches, but she denies any neck pain, TMJ problems, but does admit to some stress. She states she cannot take triptan's with her cardiac condition. She is already on Neurontin 300 mg 3 times a day for a lot of her musculoskeletal and other type pain, and that. She has Crohn's disease and carries a diagnosis of lupus.  We looked at her scans on the computer today, and reviewed them personally on the PACS system with the patient, and I assured her that the and spine and all the paraspinal muscles look normal.  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 Carotid Dopplers were normal. Patient seen last year for progressive headaches for the last 6 months, becoming more frequent and more severe and more incapacitating. She does have lupus, and she also has Crohn's disease and this a chronic problem. She has a family history of migraines. She has a lot of muscle pain mainly in her legs, and significant back pain and is under pain management at [facility] with Dr. Kriss Villagran. Her recent MRI scan of the lumbar spine was reviewed on the computer personally, and does show spondylolisthesis at L4-5 and mild degenerative disease without spinal stenosis or disc herniation. She feels as though her legs will give way at times, but her bowel and bladder function remain stable. She works daily as a . She's had no unusual fever, meningismus, denies bruxism, denies any increased stress or tension, or other causes for her headaches. Past Medical History:   Diagnosis Date    Adverse effect of anesthesia     \"takes me a while to go under\"    Anemia     Arrhythmia     Afib, sees Dr Cathy Rodriguez    Autoimmune disease ([facility]     Crohns julito    CAD (coronary artery disease)     occluded SVC r/t port-cath    Chronic pain     Lt and Rt back:  Dr Merlin Haley;  Pain mgmt [facility]    Cough     evaluated 8/25/14 by Dr Letitia Hopper ([phone]) \". . possible drug related lung is due to Methitrexate or atypical or fungal infection\" per office note dated 8/25/14    Crohn's disease ([facility] [address]     Dr Raina Owusu    Ill-defined condition     lt lung higher than normal so puts pressure on diaphragm increasing SOB    Lupus (HCC)     Dr Sue Momin [phone]    Lupus [facility])     Migraine     Migraines     Neuropathy     Pain from implanted hardware 12/14/2016    Exploration of sternal incision with removal of protruding sternal wires    Stenosis of both carotid arteries without cerebral infarction     Stroke [facility]) 2016    TIA's     SVC obstruction 2016    resolved after surgery    Thromboembolus ([address] 2016    PE right lower lung, spontaneous pulm dr siegel [facility] Shoulder      Past Surgical History:   Procedure Laterality Date    COLONOSCOPY N/A 10/05/2018    COLONOSCOPY performed by [facility].  Nisreen Levin MD at [facility] ENDOSCOPY    [address]    HX BREAST BIOPSY Left     2019 negative    HX  SECTION  1984    x1    HX COLOSTOMY  2021    HX GI  2005    bowel adhesions removed    HX GI      Bowel resection    HX GI  2019    stent for anal fistula     HX HEART CATHETERIZATION      no stents, open heart surgery 2016    HX HEENT      sinus surgery for deviated septum    HX HYSTERECTOMY      partial    HX LEFT SALPINGO-OOPHORECTOMY      ovary and fallopian tube removed    HX MOHS PROCEDURES Right approx     with bone spur repair in shoulder    HX OTHER SURGICAL  2016    femerol vein removed    HX OTHER SURGICAL  2016    open heart for repair of blocked SVC    HX PARTIAL COLECTOMY       x 2, early s &     HX TONSILLECTOMY      HX TOTAL COLECTOMY  2010    colon resection-18\" removed; Dr Fco Hill  2010    portacath insertion and removal x 3; Gets Remicaid [place]5 weeks    [place] COL[place]/BIOPSY SINGLE[place]MULTIPLE  2013         [place] UNLISTED PROCEDURE CARDIAC SURGERY  , 2018, 10/18    ablation     Family History   Problem Relation Age of Onset    Cancer Father         stomach, lymphoma    Other Mother         bicycle accident    Cancer Paternal Grandmother         breast    Breast Cancer Paternal Grandmother         not sure of age    Breast Cancer Maternal Aunt         not sure of age      Social History     Tobacco Use    Smoking status: Former     Packs/day: 0.50     Years: 2.50     Pack years: 1.25     Types: Cigarettes     Quit date:      Years since quittin.2    Smokeless tobacco: Never    Tobacco comments:     social   Substance Use Topics    Alcohol use: Not Currently     Comment: 1-2 glasses of wine, 1-2 times per week         Current Outpatient Medications:     diphenhydrAMINE (BENADRYL) 25 mg tablet, Take 25 mg by mouth every six (6) hours as needed. , Disp: , Rfl:     traMADoL (ULTRAM) 50 mg tablet, Take 50 mg by mouth., Disp: , Rfl:     fremanezumab-vfrm (Ajovy Autoinjector) 225 mg/1.5 mL auto-injector, INJECT 225MG UNDER THE SKIN ONCE EVERY 30 DAYS, Disp: 4.5 mL, Rfl: 4    ubrogepant (Ubrelvy) 50 mg tablet, TAKE ONE TABLET BY MOUTH AT ONSET OF HEADACHE AND CAN REPEAT IN 2 HOURS IF NEEDED (MAX DOSE 2 PER DAY), Disp: 48 Tablet, Rfl: 4    cyanocobalamin (VITAMIN B12) 1,000 mcg/mL injection, 1 mL by SubCUTAneous route every twenty-eight (28) days. , Disp: 6 mL, Rfl: 2    Insulin Syringes, Disposable, 1 mL syrg, 1 mL by Does Not Apply route every seven (7) days. , Disp: 6 Each, Rfl: 2    LORazepam (ATIVAN) 0.5 mg tablet, Take 1 Tablet by mouth every six (6) hours as needed for Anxiety. Max Daily Amount: 2 mg., Disp: 20 Tablet, Rfl: 0    divalproex ER (DEPAKOTE ER) 500 mg ER tablet, TAKE ONE TABLET BY MOUTH ONE TIME DAILY WITH DINNER, Disp: 100 Tablet, Rfl: 5    butalbital-acetaminophen-caffeine (FIORICET, ESGIC) -40 mg per tablet, Take 2 Tabs by mouth every eight (8) hours as needed for Migraine. MUST LAST 30 DAYS. Indications: a migraine headache, tension headache, Disp: 50 Tab, Rfl: 5    apixaban (ELIQUIS) 2.5 mg tablet, Take 1 Tab by mouth two (2) times a day.  Resume on Monday 10/21/19  Indications: PE, Disp: , Rfl:     ustekinumab (STELARA) 90 mg/mL injection, 90 mg by SubCUTAneous route every two (2) months., Disp: , Rfl:     tiZANidine (ZANAFLEX) 2 mg tablet, 1 po qam and 2 po qhs, Disp: 100 Tab, Rfl: 0    gabapentin (NEURONTIN) 300 mg capsule, TAKE ONE CAPSULE BY MOUTH THREE TIMES A DAY, Disp: 100 Cap, Rfl: 5    aspirin 81 mg chewable tablet, Take 81 mg by mouth daily. , Disp: , Rfl:     hydroxychloroquine (PLAQUENIL) 200 mg tablet, Take 400 mg by mouth daily (after dinner). For lupus, Disp: , Rfl:     ondansetron hcl (ZOFRAN) 4 mg tablet, Take 4 mg by mouth every eight (8) hours as needed for Nausea., Disp: , Rfl:     drospirenone-ethinyl estradioL (MARIELOS) 3-0.02 mg tab, Take 1 Tab by mouth nightly., Disp: , Rfl:         Allergies   Allergen Reactions    Chlorhexidine Towelette Rash     \"required IV Benadryl post\"    Codeine Hives     Difficulty breathing    Hydrocodone Hives     Difficulty breathing      Oxycontin [Oxycodone] Hives     Difficulty breathing    Percocet [Oxycodone-Acetaminophen] Hives     Difficulty breathing; Can take tylenol    Prednisone Other (comments)     HX OF CROHN'S; not allergic, prefer not to use         Review of Systems:  A comprehensive review of systems was negative except for: Constitutional: positive for fatigue and malaise  Eyes: positive for visual disturbance  Gastrointestinal: positive for dyspepsia, reflux symptoms, change in bowel habits, diarrhea and abdominal pain  Musculoskeletal: positive for myalgias, arthralgias, stiff joints, back pain and muscle weakness  Neurological: positive for headaches, gait problems and weakness   Vitals:    03/21/23 1049   BP: 112/78   Pulse: 88   Resp: 18   Temp: 97.7 °F (36.5 °C)   SpO2: 98%   Weight: 197 lb (89.4 kg)   Height: 5' 1\" (1.549 m)     Objective:     I      NEUROLOGICAL EXAM:    Appearance: The patient is well developed, well nourished, provides a coherent history and is in acute distress because of headache. Mental Status: Oriented to time, place and person, and the president, cognitive function is normal, speech is fluent. Mood and affect appropriate, but a little depressed and anxious. Cranial Nerves:   Intact visual fields. Fundi are benign, discs are flat but poorly seen. PATTI, EOM's full, no nystagmus, no ptosis.  Facial sensation is normal. Corneal reflexes are not tested. Facial movement is symmetric. Hearing is abnormal bilaterally. Palate is midline with normal sternocleidomastoid and trapezius muscles are normal. Tongue is midline. Neck without meningismus or bruits  Temporal arteries not tender or enlarged  TMJ areas are not tender  Patient very tender on palpation over the left craniocervical junction   Motor:  4/5 strength in upper and lower proximal and distal muscles. Normal bulk and tone. No fasciculations. Patient has prominent tenderness in her lumbar spine  Straight leg raising test negative in the sitting position bilateral   Reflexes:   Deep tendon reflexes 1+/4 and symmetrical.  No babinski or clonus present   Sensory:   Abnormal to touch, pinprick and vibration and DSS and temperature on the whole left side in a midline fashion suggesting functional overlay, but normal on the right. Gait:  Abnormal gait as she walks slowly because of her back pain. Tremor:   No tremor noted. Cerebellar:  No cerebellar signs present. Neurovascular:  Normal heart sounds and regular rhythm, peripheral pulses decreased, and no carotid bruits. Assessment:       ICD-10-CM ICD-9-CM    1. Migraine with aura and without status migrainosus, not intractable  G43.109 346.00 fremanezumab-vfrm (Ajovy Autoinjector) 225 mg/1.5 mL auto-injector      ubrogepant (Ubrelvy) 50 mg tablet      2. Stenosis of both carotid arteries without infarction  I65.23 433.10 fremanezumab-vfrm (Ajovy Autoinjector) 225 mg/1.5 mL auto-injector     433.30 ubrogepant (Ubrelvy) 50 mg tablet      3. Cervical radiculopathy  M54.12 723.4 fremanezumab-vfrm (Ajovy Autoinjector) 225 mg/1.5 mL auto-injector      ubrogepant (Ubrelvy) 50 mg tablet      4. Tension vascular headache  G44.209 307.81 fremanezumab-vfrm (Ajovy Autoinjector) 225 mg/1.5 mL auto-injector      ubrogepant (Ubrelvy) 50 mg tablet      5.  Acute ischemic stroke (HCC)  I63.9 434.91 fremanezumab-vfrm (Ajovy Autoinjector) 225 mg/1.5 mL auto-injector      ubrogepant (Ubrelvy) 50 mg tablet      6. Transient ischemic attack involving right internal carotid artery  G45.1 435.8 fremanezumab-vfrm (Ajovy Autoinjector) 225 mg/1.5 mL auto-injector      ubrogepant (Ubrelvy) 50 mg tablet      7. Lumbar radiculopathy  M54.16 724.4 fremanezumab-vfrm (Ajovy Autoinjector) 225 mg/1.5 mL auto-injector      ubrogepant (Ubrelvy) 50 mg tablet      8. Ulnar neuropathy at elbow of right upper extremity  G56.21 354.2 fremanezumab-vfrm (Ajovy Autoinjector) 225 mg/1.5 mL auto-injector      ubrogepant (Ubrelvy) 50 mg tablet      9. Cervico-occipital neuralgia of right side  M54.81 723.8 fremanezumab-vfrm (Ajovy Autoinjector) 225 mg/1.5 mL auto-injector      ubrogepant (Ubrelvy) 50 mg tablet      10. Cerebrovascular disease, unspecified  I67.9 437.9 fremanezumab-vfrm (Ajovy Autoinjector) 225 mg/1.5 mL auto-injector      ubrogepant (Ubrelvy) 50 mg tablet      11. Cerebral microvascular disease  I67.89 437.8 fremanezumab-vfrm (Ajovy Autoinjector) 225 mg/1.5 mL auto-injector      ubrogepant (Ubrelvy) 50 mg tablet      DUPLEX CAROTID BILATERAL      12. Dizziness and giddiness  R42 780.4 fremanezumab-vfrm (Ajovy Autoinjector) 225 mg/1.5 mL auto-injector      ubrogepant (Ubrelvy) 50 mg tablet      DUPLEX CAROTID BILATERAL      13. Bilateral carotid artery stenosis  I65.23 433.10 DUPLEX CAROTID BILATERAL     433.30             Plan:     Patient with persistent problems of complicated migraines, doing much better on Ajovy and Ubrelvy, and medications were renewed for her today. Because of her symptoms once a month, we will check a carotid Doppler study just to make sure there is no stenosis or cause for her symptoms.   Patient with new problem of left-sided numbness and weakness, with a midline sensory deficit suggesting functional overlay on the left side, and I told the patient this was her nerves and stress and tension with the headaches, but we will try her on the new oral CGRP inhibitors and subcutaneous injections monthly as preventive agents just to see if that can help her. She is to continue her aspirin and statin in the interim. She just was in the hospital had a normal MRI of the brain and CTA ruling out any organic disease. Progressive headaches of the daily type described as pressure sensation on the top of her head, and left craniocervical junction, but not associated nausea vomiting,  She does not clench her teeth, and denies any new stress or tension. She will continue her Neurontin 3 times a day  She had a previous 24-hour amatory EEG that was normal.    MRI scan and MRA showed no clear structural lesions in the past  Cervical spine x-rays and CT scans of head and neck reviewed personally on the PACS system  Patient to continue pain management for occipital nerve block, and may need physical therapy  We will continue her on Fioricet for the headaches  28 minutes spent with the patient today going over her medications, renewed her medications, discussing her diagnosis prognosis and treatment options.   She will call for results of her tests,, and further treatment and evaluation will depend on the results of her tests and her clinical course  Difficult case    Signed By: Mohamud Kong MD     March 21, 2023

## 2023-03-22 ENCOUNTER — TELEPHONE (OUTPATIENT)
Dept: NEUROLOGY | Age: 58
End: 2023-03-22

## 2023-04-10 ENCOUNTER — TELEPHONE (OUTPATIENT)
Dept: NEUROLOGY | Age: 58
End: 2023-04-10

## 2023-05-31 ENCOUNTER — TELEPHONE (OUTPATIENT)
Age: 58
End: 2023-05-31

## 2023-05-31 DIAGNOSIS — G43.109 MIGRAINE WITH AURA, NOT INTRACTABLE, WITHOUT STATUS MIGRAINOSUS: Primary | ICD-10-CM

## 2023-05-31 NOTE — TELEPHONE ENCOUNTER
Ian TORRES sent to [facility]    (Plan asked if Nurtec had been tried - no)     Status pending - Marie TORRES Case ID: [id number]      Previously:     She had Diego approved  Case: [id number], in September and that is valid to 9-19-23

## 2023-06-06 ENCOUNTER — TELEPHONE (OUTPATIENT)
Age: 58
End: 2023-06-06

## 2023-06-06 ENCOUNTER — PROCEDURE VISIT (OUTPATIENT)
Age: 58
End: 2023-06-06
Payer: COMMERCIAL

## 2023-06-06 DIAGNOSIS — I67.9 CEREBROVASCULAR DISEASE, UNSPECIFIED: ICD-10-CM

## 2023-06-06 DIAGNOSIS — I65.23 OCCLUSION AND STENOSIS OF BILATERAL CAROTID ARTERIES: Primary | ICD-10-CM

## 2023-06-06 DIAGNOSIS — I65.23 OCCLUSION AND STENOSIS OF BILATERAL CAROTID ARTERIES: ICD-10-CM

## 2023-06-06 PROCEDURE — 93880 EXTRACRANIAL BILAT STUDY: CPT | Performed by: PSYCHIATRY & NEUROLOGY

## 2023-06-06 NOTE — TELEPHONE ENCOUNTER
Spoke with pt after her doppler and she wanted to know what the next steps where since her Leonila Cords was denied. She would like a call back.

## 2023-06-08 ENCOUNTER — TELEPHONE (OUTPATIENT)
Age: 58
End: 2023-06-08

## 2023-06-08 NOTE — TELEPHONE ENCOUNTER
Nurtec approved  24 per 90 days      PA Case: [id number],   Status: Approved,   Coverage Starts on: 6/8/2023 - 6/7/2024     Emailed Lilly Krishnamurthy/Katrin to notify of approval     Pt has a commercial plan and should be eligible for savings card.

## 2023-06-08 NOTE — TELEPHONE ENCOUNTER
Nurtec 24 per 90 days    8 per 30 for abortive      Key: [id number] - PA Case ID: [id number]    Status pending

## 2023-06-21 ENCOUNTER — TELEPHONE (OUTPATIENT)
Age: 58
End: 2023-06-21

## 2023-06-21 NOTE — TELEPHONE ENCOUNTER
Re: Kevin Cisneros - they have been unable to reach pt to schedule shipment. I called pt, she has already picked up Rx from [facility]. Has zero copay as she has met her deductible. She asked about carotid doppler results. I advised will forward to Dr. Carmella Byrd and his nurse for follow up.      I reached her on her cell [phone]

## 2023-06-22 NOTE — Clinical Note
3/21/2023 11:10 AM    Ms. Janell Mckeon  [address] [facility] [address]              Sincerely,      Savita Modi MD The patient is a 44y Female complaining of palpitations.

## 2023-06-26 ENCOUNTER — TELEPHONE (OUTPATIENT)
Age: 58
End: 2023-06-26

## 2023-08-23 DIAGNOSIS — E53.8 B12 DEFICIENCY: ICD-10-CM

## 2023-08-23 DIAGNOSIS — D50.0 IRON DEFICIENCY ANEMIA SECONDARY TO BLOOD LOSS (CHRONIC): ICD-10-CM

## 2023-08-23 DIAGNOSIS — D50.0 IRON DEFICIENCY ANEMIA SECONDARY TO BLOOD LOSS (CHRONIC): Primary | ICD-10-CM

## 2023-08-23 NOTE — PROGRESS NOTES
Pt on b12 injections. Hx iron infusions. Upcoming appt. VORB FROM DR Prachi Corrales CBC WITH DIFF CMP FERRITIN IRON PROFILE B12 AND FOLATE.

## 2023-09-07 NOTE — PROGRESS NOTES
Marysol So is a 62 y.o. female here for follow up for PE and now anemia. She is taking Apixaban. Pt doing well. No concerns brought up. No recent lab work. 1. Have you been to the ER, urgent care clinic since your last visit? Hospitalized since your last visit? no    2. Have you seen or consulted any other health care providers outside of the [address] [place] [address] since your last visit? Include any pap smears or colon screening.  Dr Delana Schwab, Dr Jody Genao

## 2023-09-13 ENCOUNTER — OFFICE VISIT (OUTPATIENT)
Age: 58
End: 2023-09-13
Payer: COMMERCIAL

## 2023-09-13 VITALS
DIASTOLIC BLOOD PRESSURE: 62 MMHG | WEIGHT: 192.6 LBS | HEART RATE: 86 BPM | HEIGHT: 61 IN | BODY MASS INDEX: 36.36 KG/M2 | TEMPERATURE: 97.9 F | SYSTOLIC BLOOD PRESSURE: 118 MMHG | OXYGEN SATURATION: 97 %

## 2023-09-13 DIAGNOSIS — I27.82 OTHER CHRONIC PULMONARY EMBOLISM WITHOUT ACUTE COR PULMONALE (HCC): ICD-10-CM

## 2023-09-13 DIAGNOSIS — D50.0 IRON DEFICIENCY ANEMIA SECONDARY TO BLOOD LOSS (CHRONIC): Primary | ICD-10-CM

## 2023-09-13 PROCEDURE — 99213 OFFICE O/P EST LOW 20 MIN: CPT | Performed by: INTERNAL MEDICINE

## 2023-09-13 NOTE — PROGRESS NOTES
[facility]   at [address] [place], [address] [facility], [address] [place], [place] [address]   [phone]      Follow-up Note          Patient: Van Nguyễn  MRN: [id number]   SSN: [id number]    YOB: 1965            Subjective:        Van Nguyễn is a 62 y.o. female with a history of PE on systemic anticoagulation. 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 She was diagnosed with PE in 04/2016. 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 She underwent a resection and bypass graft of the SVC. The SVC stenosis was felt to be related to a chronic indwelling port. She has been on Apixaban and prefers to remain on long-term systemic anticoagulation. Denies any new symptoms. She is suffering with fatigue. Due to inflammatory bowel disease, oral iron is not being absorbed. Review of Systems:      Constitutional: negative   Eyes: negative   Ears, Nose, Mouth, Throat, and Face: negative   Respiratory: negative   Cardiovascular: negative   Gastrointestinal: negative   Genitourinary:negative   Integument/Breast: negative   Hematologic/Lymphatic: negative   Musculoskeletal:negative   Neurological: negative           Past Medical History:   Diagnosis Date    Adverse effect of anesthesia     \"takes me a while to go under\"    Anemia     Arrhythmia     Afib, sees Dr Joce Lester    Autoimmune disease ([facility])     Crohns cyndi    CAD (coronary artery disease)     occluded SVC r/t port-cath    Chronic pain     Lt and Rt back:  Dr Eagle Hallman;  Pain mgmt [facility] TOR[facility]    Cough     evaluated 8/25/14 by Dr Ashlie Cardoza ([phone]) \". . possible drug related lung is due to Methitrexate or atypical or fungal infection\" per office note dated 8/25/14    Crohn's disease [facility])

## 2023-09-16 LAB
ALBUMIN SERPL-MCNC: 3.9 G/DL (ref 3.8–4.9)
ALBUMIN/GLOB SERPL: 1.2 {RATIO} (ref 1.2–2.2)
ALP SERPL-CCNC: 106 IU/L (ref 44–121)
ALT SERPL-CCNC: 21 IU/L (ref 0–32)
AST SERPL-CCNC: 19 IU/L (ref 0–40)
BASOPHILS # BLD AUTO: 0 X10E3/UL (ref 0–0.2)
BASOPHILS NFR BLD AUTO: 0 %
BILIRUB SERPL-MCNC: 0.2 MG/DL (ref 0–1.2)
BUN SERPL-MCNC: 15 MG/DL (ref 6–24)
BUN/CREAT SERPL: 15 (ref 9–23)
CALCIUM SERPL-MCNC: 9.3 MG/DL (ref 8.7–10.2)
CHLORIDE SERPL-SCNC: 103 MMOL/L (ref 96–106)
CO2 SERPL-SCNC: 21 MMOL/L (ref 20–29)
CREAT SERPL-MCNC: 0.97 MG/DL (ref 0.57–1)
EGFRCR SERPLBLD CKD-EPI 2021: 68 ML/MIN/1.73
EOSINOPHIL # BLD AUTO: 0.2 X10E3/UL (ref 0–0.4)
EOSINOPHIL NFR BLD AUTO: 2 %
ERYTHROCYTE [DISTWIDTH] IN BLOOD BY AUTOMATED COUNT: 12 % (ref 11.7–15.4)
FERRITIN SERPL-MCNC: 195 NG/ML (ref 15–150)
FOLATE SERPL-MCNC: 3.2 NG/ML
GLOBULIN SER CALC-MCNC: 3.3 G/DL (ref 1.5–4.5)
GLUCOSE SERPL-MCNC: 104 MG/DL (ref 70–99)
HCT VFR BLD AUTO: 36.7 % (ref 34–46.6)
HGB BLD-MCNC: 12.4 G/DL (ref 11.1–15.9)
IMM GRANULOCYTES # BLD AUTO: 0 X10E3/UL (ref 0–0.1)
IMM GRANULOCYTES NFR BLD AUTO: 0 %
IRON SATN MFR SERPL: 10 % (ref 15–55)
IRON SERPL-MCNC: 47 UG/DL (ref 27–159)
LYMPHOCYTES # BLD AUTO: 2.4 X10E3/UL (ref 0.7–3.1)
LYMPHOCYTES NFR BLD AUTO: 25 %
MCH RBC QN AUTO: 29.9 PG (ref 26.6–33)
MCHC RBC AUTO-ENTMCNC: 33.8 G/DL (ref 31.5–35.7)
MCV RBC AUTO: 88 FL (ref 79–97)
MONOCYTES # BLD AUTO: 0.6 X10E3/UL (ref 0.1–0.9)
MONOCYTES NFR BLD AUTO: 7 %
NEUTROPHILS # BLD AUTO: 6.3 X10E3/UL (ref 1.4–7)
NEUTROPHILS NFR BLD AUTO: 66 %
PLATELET # BLD AUTO: 235 X10E3/UL (ref 150–450)
POTASSIUM SERPL-SCNC: 4.1 MMOL/L (ref 3.5–5.2)
PROT SERPL-MCNC: 7.2 G/DL (ref 6–8.5)
RBC # BLD AUTO: 4.15 X10E6/UL (ref 3.77–5.28)
SODIUM SERPL-SCNC: 138 MMOL/L (ref 134–144)
TIBC SERPL-MCNC: 474 UG/DL (ref 250–450)
UIBC SERPL-MCNC: 427 UG/DL (ref 131–425)
VIT B12 SERPL-MCNC: 327 PG/ML (ref 232–1245)
WBC # BLD AUTO: 9.5 X10E3/UL (ref 3.4–10.8)

## 2023-11-01 NOTE — PERIOP NOTE
/hand surgery. Wear your hair loose or down, no ponytails, buns, tammy pins or clips. All body piercings must be removed. Please shower with antibacterial soap for three consecutive days before and on the morning of surgery, but do not apply any lotions, powders or deodorants after the shower on the day of surgery. Please use a fresh towels after each shower. Please sleep in clean clothes and change bed linens the night before surgery. Please do not shave for 48 hours prior to surgery. Shaving of the face is acceptable. 7. You should understand that if you do not follow these instructions your surgery may be cancelled. If your physical condition changes (I.e. fever, cold or flu) please contact your surgeon as soon as possible. 8. It is important that you be on time. If a situation occurs where you may be late, please call [phone] (OR Holding Area). 9. If you have any questions and or problems, please call [phone] (Pre-admission Testing). 10. Your surgery time may be subject to change. You will receive a phone call the evening prior if your time changes. 11.  If having outpatient surgery, you must have someone to drive you here, stay with you during the duration of your stay, and to drive you home at time of discharge. SPECIAL INSTRUCTIONS: Stop Eliquis 3 days prior to surgery per physician/surgeon instructions      TAKE ALL MEDICATIONS DAY OF SURGERY EXCEPT: Follow your physician/surgeon instructions for holding all non-steroidal anti-inflammatory drugs/NSAIDs, blood-thinning agents (Eliquis, aspirin), vitamins & supplements prior to surgery. I understand a pre-operative phone call will be made to verify my surgery time. In the event that I am not available, I give permission for a message to be left on my answering service and/or with another person?   yes         ___________________      __________   _________    (Signature of Patient)             (Witness)

## 2023-11-03 ENCOUNTER — ANESTHESIA (OUTPATIENT)
Facility: HOSPITAL | Age: 58
End: 2023-11-03
Payer: COMMERCIAL

## 2023-11-03 ENCOUNTER — HOSPITAL ENCOUNTER (OUTPATIENT)
Facility: HOSPITAL | Age: 58
Setting detail: OUTPATIENT SURGERY
Discharge: HOME | End: 2023-11-03
Attending: SURGERY | Admitting: SURGERY
Payer: COMMERCIAL

## 2023-11-03 ENCOUNTER — ANESTHESIA EVENT (OUTPATIENT)
Facility: HOSPITAL | Age: 58
End: 2023-11-03
Payer: COMMERCIAL

## 2023-11-03 VITALS
RESPIRATION RATE: 14 BRPM | SYSTOLIC BLOOD PRESSURE: 110 MMHG | TEMPERATURE: 97.9 F | BODY MASS INDEX: 35.63 KG/M2 | WEIGHT: 188.71 LBS | OXYGEN SATURATION: 100 % | DIASTOLIC BLOOD PRESSURE: 70 MMHG | HEART RATE: 63 BPM | HEIGHT: 61 IN

## 2023-11-03 DIAGNOSIS — G89.18 POST-OP PAIN: Primary | ICD-10-CM

## 2023-11-03 PROCEDURE — 2709999900 HC NON-CHARGEABLE SUPPLY: Performed by: SURGERY

## 2023-11-03 PROCEDURE — 3600000011 HC SURGERY LEVEL 1  ADDTL 15MIN: Performed by: SURGERY

## 2023-11-03 PROCEDURE — 2580000003 HC RX 258: Performed by: ANESTHESIOLOGY

## 2023-11-03 PROCEDURE — 3700000000 HC ANESTHESIA ATTENDED CARE: Performed by: SURGERY

## 2023-11-03 PROCEDURE — 7100000001 HC PACU RECOVERY - ADDTL 15 MIN: Performed by: SURGERY

## 2023-11-03 PROCEDURE — 7100000000 HC PACU RECOVERY - FIRST 15 MIN: Performed by: SURGERY

## 2023-11-03 PROCEDURE — 7100000011 HC PHASE II RECOVERY - ADDTL 15 MIN: Performed by: SURGERY

## 2023-11-03 PROCEDURE — 2580000003 HC RX 258: Performed by: NURSE ANESTHETIST, CERTIFIED REGISTERED

## 2023-11-03 PROCEDURE — 6360000002 HC RX W HCPCS: Performed by: SURGERY

## 2023-11-03 PROCEDURE — 3700000001 HC ADD 15 MINUTES (ANESTHESIA): Performed by: SURGERY

## 2023-11-03 PROCEDURE — 6360000002 HC RX W HCPCS: Performed by: NURSE ANESTHETIST, CERTIFIED REGISTERED

## 2023-11-03 PROCEDURE — 3600000001 HC SURGERY LEVEL 1  BASE: Performed by: SURGERY

## 2023-11-03 PROCEDURE — 7100000010 HC PHASE II RECOVERY - FIRST 15 MIN: Performed by: SURGERY

## 2023-11-03 PROCEDURE — 2580000003 HC RX 258: Performed by: SURGERY

## 2023-11-03 RX ORDER — TRAMADOL HYDROCHLORIDE 50 MG/1
50 TABLET ORAL EVERY 6 HOURS PRN
Qty: 12 TABLET | Refills: 0 | Status: SHIPPED | OUTPATIENT
Start: 2023-11-03 | End: 2023-11-06

## 2023-11-03 RX ORDER — SODIUM CHLORIDE 0.9 % (FLUSH) 0.9 %
5-40 SYRINGE (ML) INJECTION PRN
Status: CANCELLED | OUTPATIENT
Start: 2023-11-03

## 2023-11-03 RX ORDER — DROPERIDOL 2.5 MG/ML
0.62 INJECTION, SOLUTION INTRAMUSCULAR; INTRAVENOUS
Status: DISCONTINUED | OUTPATIENT
Start: 2023-11-03 | End: 2023-11-03 | Stop reason: HOSPADM

## 2023-11-03 RX ORDER — SODIUM CHLORIDE 0.9 % (FLUSH) 0.9 %
5-40 SYRINGE (ML) INJECTION PRN
Status: DISCONTINUED | OUTPATIENT
Start: 2023-11-03 | End: 2023-11-03 | Stop reason: HOSPADM

## 2023-11-03 RX ORDER — SODIUM CHLORIDE 9 MG/ML
INJECTION, SOLUTION INTRAVENOUS PRN
Status: CANCELLED | OUTPATIENT
Start: 2023-11-03

## 2023-11-03 RX ORDER — SODIUM CHLORIDE, SODIUM LACTATE, POTASSIUM CHLORIDE, CALCIUM CHLORIDE 600; 310; 30; 20 MG/100ML; MG/100ML; MG/100ML; MG/100ML
INJECTION, SOLUTION INTRAVENOUS CONTINUOUS
Status: CANCELLED | OUTPATIENT
Start: 2023-11-03

## 2023-11-03 RX ORDER — PROPOFOL 10 MG/ML
INJECTION, EMULSION INTRAVENOUS CONTINUOUS PRN
Status: DISCONTINUED | OUTPATIENT
Start: 2023-11-03 | End: 2023-11-03 | Stop reason: SDUPTHER

## 2023-11-03 RX ORDER — MORPHINE SULFATE 2 MG/ML
2 INJECTION, SOLUTION INTRAMUSCULAR; INTRAVENOUS EVERY 5 MIN PRN
Status: DISCONTINUED | OUTPATIENT
Start: 2023-11-03 | End: 2023-11-03 | Stop reason: HOSPADM

## 2023-11-03 RX ORDER — SODIUM CHLORIDE 0.9 % (FLUSH) 0.9 %
5-40 SYRINGE (ML) INJECTION EVERY 12 HOURS SCHEDULED
Status: CANCELLED | OUTPATIENT
Start: 2023-11-03

## 2023-11-03 RX ORDER — MIDAZOLAM HYDROCHLORIDE 1 MG/ML
INJECTION INTRAMUSCULAR; INTRAVENOUS PRN
Status: DISCONTINUED | OUTPATIENT
Start: 2023-11-03 | End: 2023-11-03 | Stop reason: SDUPTHER

## 2023-11-03 RX ORDER — LIDOCAINE HYDROCHLORIDE 10 MG/ML
1 INJECTION, SOLUTION EPIDURAL; INFILTRATION; INTRACAUDAL; PERINEURAL
Status: CANCELLED | OUTPATIENT
Start: 2023-11-03 | End: 2023-11-04

## 2023-11-03 RX ORDER — SODIUM CHLORIDE 0.9 % (FLUSH) 0.9 %
5-40 SYRINGE (ML) INJECTION EVERY 12 HOURS SCHEDULED
Status: DISCONTINUED | OUTPATIENT
Start: 2023-11-03 | End: 2023-11-03 | Stop reason: HOSPADM

## 2023-11-03 RX ORDER — MEPERIDINE HYDROCHLORIDE 25 MG/ML
12.5 INJECTION INTRAMUSCULAR; INTRAVENOUS; SUBCUTANEOUS EVERY 5 MIN PRN
Status: DISCONTINUED | OUTPATIENT
Start: 2023-11-03 | End: 2023-11-03 | Stop reason: HOSPADM

## 2023-11-03 RX ORDER — ONDANSETRON 2 MG/ML
4 INJECTION INTRAMUSCULAR; INTRAVENOUS
Status: DISCONTINUED | OUTPATIENT
Start: 2023-11-03 | End: 2023-11-03 | Stop reason: HOSPADM

## 2023-11-03 RX ORDER — SODIUM CHLORIDE, SODIUM LACTATE, POTASSIUM CHLORIDE, CALCIUM CHLORIDE 600; 310; 30; 20 MG/100ML; MG/100ML; MG/100ML; MG/100ML
INJECTION, SOLUTION INTRAVENOUS CONTINUOUS PRN
Status: DISCONTINUED | OUTPATIENT
Start: 2023-11-03 | End: 2023-11-03 | Stop reason: SDUPTHER

## 2023-11-03 RX ORDER — ONDANSETRON 2 MG/ML
INJECTION INTRAMUSCULAR; INTRAVENOUS PRN
Status: DISCONTINUED | OUTPATIENT
Start: 2023-11-03 | End: 2023-11-03 | Stop reason: SDUPTHER

## 2023-11-03 RX ORDER — FENTANYL CITRATE 50 UG/ML
25 INJECTION, SOLUTION INTRAMUSCULAR; INTRAVENOUS EVERY 5 MIN PRN
Status: DISCONTINUED | OUTPATIENT
Start: 2023-11-03 | End: 2023-11-03 | Stop reason: HOSPADM

## 2023-11-03 RX ORDER — SODIUM CHLORIDE, SODIUM LACTATE, POTASSIUM CHLORIDE, CALCIUM CHLORIDE 600; 310; 30; 20 MG/100ML; MG/100ML; MG/100ML; MG/100ML
INJECTION, SOLUTION INTRAVENOUS CONTINUOUS
Status: DISCONTINUED | OUTPATIENT
Start: 2023-11-03 | End: 2023-11-03 | Stop reason: HOSPADM

## 2023-11-03 RX ORDER — FENTANYL CITRATE 50 UG/ML
INJECTION, SOLUTION INTRAMUSCULAR; INTRAVENOUS PRN
Status: DISCONTINUED | OUTPATIENT
Start: 2023-11-03 | End: 2023-11-03 | Stop reason: SDUPTHER

## 2023-11-03 RX ORDER — SODIUM CHLORIDE 9 MG/ML
INJECTION, SOLUTION INTRAVENOUS PRN
Status: DISCONTINUED | OUTPATIENT
Start: 2023-11-03 | End: 2023-11-03 | Stop reason: HOSPADM

## 2023-11-03 RX ADMIN — ONDANSETRON HYDROCHLORIDE 4 MG: 2 INJECTION, SOLUTION INTRAMUSCULAR; INTRAVENOUS at 07:57

## 2023-11-03 RX ADMIN — SODIUM CHLORIDE, POTASSIUM CHLORIDE, SODIUM LACTATE AND CALCIUM CHLORIDE: 600; 310; 30; 20 INJECTION, SOLUTION INTRAVENOUS at 07:14

## 2023-11-03 RX ADMIN — FENTANYL CITRATE 50 MCG: 50 INJECTION, SOLUTION INTRAMUSCULAR; INTRAVENOUS at 07:33

## 2023-11-03 RX ADMIN — SODIUM CHLORIDE, POTASSIUM CHLORIDE, SODIUM LACTATE AND CALCIUM CHLORIDE: 600; 310; 30; 20 INJECTION, SOLUTION INTRAVENOUS at 07:21

## 2023-11-03 RX ADMIN — MIDAZOLAM HYDROCHLORIDE 2 MG: 1 INJECTION, SOLUTION INTRAMUSCULAR; INTRAVENOUS at 07:29

## 2023-11-03 RX ADMIN — PROPOFOL 50 MCG/KG/MIN: 10 INJECTION, EMULSION INTRAVENOUS at 07:32

## 2023-11-03 ASSESSMENT — ENCOUNTER SYMPTOMS: SHORTNESS OF BREATH: 1

## 2023-11-03 ASSESSMENT — PAIN DESCRIPTION - DESCRIPTORS: DESCRIPTORS: ACHING

## 2023-11-03 ASSESSMENT — PAIN - FUNCTIONAL ASSESSMENT: PAIN_FUNCTIONAL_ASSESSMENT: 0-10

## 2023-11-03 NOTE — OP NOTE
[facility]  OPERATIVE REPORT    Name:  Caleb Becker  MR#:  [id number]  :  1965  ACCOUNT #:  [de-identified]  DATE OF SERVICE:  2023      PREOPERATIVE DIAGNOSIS:  Stoma complications. POSTOPERATIVE DIAGNOSIS:  Stoma complications. PROCEDURE PERFORMED:  Intralesional injection of triamcinolone. SURGEON:  Shiv Spencer MD    ASSISTANT:  None. ANESTHESIA:  MAC.    COMPLICATIONS:  None. SPECIMENS REMOVED:  ***. IMPLANTS:  ***. ESTIMATED BLOOD LOSS:  None. FINDINGS:  Inflamed skin with overgrowth onto the mucocutaneous junction. DESCRIPTION OF PROCEDURE:  The patient was brought to the operating suite, placed in the supine position. MAC anesthesia was induced. The stoma appliance was removed. The peristomal skin was prepped and draped in the usual sterile fashion. Time-out was performed indicating the correct patient and procedure to be performed as per hospital protocol. The patient was noted to have inflamed skin with overgrowth of skin on top of the mucocutaneous junction. A 200 mg of triamcinolone mixed in 25 mL of saline was injected around the stoma in the dermal layer. The patient tolerated the procedure well. She was awakened and taken to the recovery room in stable condition.         Shiv Spencer MD      [id number]  D:  2023 8:10  T:  2023 10:39  JOB #:  [id number]

## 2023-11-03 NOTE — ANESTHESIA POSTPROCEDURE EVALUATION
Department of Anesthesiology  Postprocedure Note    Patient: Dara Solorzano  MRN: [id number]  YOB: 1965  Date of evaluation: 11/3/2023      Procedure Summary     Date: 11/03/23 Room / Location: [facility] MAIN OR M7 / [facility] MAIN OR    Anesthesia Start: 0730 Anesthesia Stop: 8477    Procedure: INTRA-DERMAL STEROID INJECTION (Abdomen) Diagnosis:       Other complications of colostomy ([address])      (Other complications of colostomy ([address]) [K94.09])    Providers: Lida López MD Responsible Provider: Malia Sterling MD    Anesthesia Type: MAC ASA Status: 3          Anesthesia Type: MAC    Michelle Phase I: Michelle Score: 10    Michelle Phase II: Michelle Score: 10      Anesthesia Post Evaluation    Patient location during evaluation: bedside  Patient participation: complete - patient participated  Level of consciousness: awake and alert  Airway patency: patent  Nausea & Vomiting: no nausea and no vomiting  Complications: no  Cardiovascular status: hemodynamically stable  Respiratory status: acceptable  Hydration status: stable  Multimodal analgesia pain management approach  Pain management: adequate

## 2023-11-03 NOTE — ANESTHESIA PRE PROCEDURE
Department of Anesthesiology  Preprocedure Note       Name:  Dara Solorzano   Age:  62 y.o.  :  1965                                          MRN:  [id number]         Date:  11/3/2023      Surgeon: Kervin Lopez):  Lida López MD    Procedure: Procedure(s):  INTRA-DERMAL STEROID INJECTION    Medications prior to admission:   Prior to Admission medications    Medication Sig Start Date End Date Taking? Authorizing Provider   Rimegepant Sulfate (NURTEC) 75 MG TBDP Take 75 mg by mouth daily as needed (migraine) 23   Yuliya Islas MD   Rimegepant Sulfate 75 MG TBDP Take 75 mg by mouth daily as needed (headache) 23   Yuliya Islas MD   apixaban Bingham Moraes) 2.5 MG TABS tablet Take 1 tablet by mouth 2 times daily 10/18/19   Automatic Reconciliation, Ar   aspirin 81 MG chewable tablet Take 1 tablet by mouth daily    Automatic Reconciliation, Ar   butalbital-acetaminophen-caffeine (FIORICET, ESGIC) -40 MG per tablet Take 2 tablets by mouth every 8 hours as needed 20   Automatic Reconciliation, Ar   cyanocobalamin 1000 MCG/ML injection Inject 1 mL into the skin 22   Automatic Reconciliation, Ar   drospirenone-ethinyl estradiol (DONIS) 3-0.02 MG per tablet Take 1 tablet by mouth    Automatic Reconciliation, Ar   Fremanezumab-vfrm (AJOVY) 225 MG/1.5ML SOAJ INJECT 225MG UNDER THE SKIN ONCE EVERY 30 DAYS 22   Automatic Reconciliation, Ar   gabapentin (NEURONTIN) 300 MG capsule TAKE ONE CAPSULE BY MOUTH THREE TIMES A DAY 12/3/18   Automatic Reconciliation, Ar   hydroxychloroquine (PLAQUENIL) 200 MG tablet Take 2 tablets by mouth    Automatic Reconciliation, Ar   LORazepam (ATIVAN) 0.5 MG tablet Take 1 tablet by mouth every 6 hours as needed.  10/1/21   Automatic Reconciliation, Ar   naloxone (NARCAN) 4 MG/0.1ML LIQD nasal spray 1 spray by Nasal route as needed 10/1/21   Automatic Reconciliation, Ar   ondansetron (ZOFRAN) 4 MG tablet Take 1 tablet by mouth every 8 hours as needed    Automatic

## 2024-02-09 NOTE — PERIOP NOTE
Spoke with Will in Dr. Schaffer's office.  Patient to hold Aspirin starting on 2/9/24 and patient to hold Eliquis 2 days prior to procedure on 2/13/24.

## 2024-02-12 RX ORDER — PROMETHAZINE HYDROCHLORIDE 25 MG/1
25 TABLET ORAL EVERY 6 HOURS PRN
Status: ON HOLD | COMMUNITY

## 2024-02-12 RX ORDER — CYANOCOBALAMIN 1000 UG/ML
1000 INJECTION, SOLUTION INTRAMUSCULAR; SUBCUTANEOUS
Status: ON HOLD | COMMUNITY

## 2024-02-12 NOTE — PERIOP NOTE
[facility]  Preoperative Instructions        Surgery Date 2/13/24  Time of Arrival:  To Be Called  Contact # [phone]      1. On the day of your surgery, please report to the Surgical Services Registration Desk and sign in at your designated time. The Surgery Center is located to the right of the Emergency Room.     2. You must have someone with you to drive you home. You should not drive a car for 24 hours following surgery. Please make arrangements for a friend or family member to stay with you for the first 24 hours after your surgery.    3. Do not have anything to eat or drink (including water, gum, mints, coffee, juice) after midnight ?2/12/24?      .?This may not apply to medications prescribed by your physician. ?(Please note below the special instructions with medications to take the morning of your procedure.)    4. We recommend you do not drink any alcoholic beverages for 24 hours before and after your surgery.    5. Contact your surgeon’s office for instructions on the following medications: non-steroidal anti-inflammatory drugs (i.e. Advil, Aleve), vitamins, and supplements. (Some surgeon’s will want you to stop these medications prior to surgery and others may allow you to take them)  **If you are currently taking Plavix, Coumadin, Aspirin and/or other blood-thinning agents, contact your surgeon for instructions.** Your surgeon will partner with the physician prescribing these medications to determine if it is safe to stop or if you need to continue taking.  Please do not stop taking these medications without instructions from your surgeon    6. Wear comfortable clothes.  Wear glasses instead of contacts.  Do not bring any money or jewelry. Please bring picture ID, insurance card, and any prearranged co-payment or hospital payment.  Do not wear make-up, particularly mascara the morning of your surgery.  Do not wear nail polish, particularly if you are having foot /hand

## 2024-02-13 ENCOUNTER — ANESTHESIA EVENT (OUTPATIENT)
Facility: HOSPITAL | Age: 59
End: 2024-02-13
Payer: COMMERCIAL

## 2024-02-13 ENCOUNTER — ANESTHESIA (OUTPATIENT)
Facility: HOSPITAL | Age: 59
End: 2024-02-13
Payer: COMMERCIAL

## 2024-02-13 ENCOUNTER — HOSPITAL ENCOUNTER (OUTPATIENT)
Facility: HOSPITAL | Age: 59
Setting detail: OUTPATIENT SURGERY
Discharge: HOME | End: 2024-02-13
Attending: SURGERY | Admitting: SURGERY
Payer: COMMERCIAL

## 2024-02-13 VITALS
OXYGEN SATURATION: 96 % | DIASTOLIC BLOOD PRESSURE: 70 MMHG | WEIGHT: 188.05 LBS | TEMPERATURE: 98.1 F | HEART RATE: 68 BPM | RESPIRATION RATE: 16 BRPM | BODY MASS INDEX: 35.5 KG/M2 | SYSTOLIC BLOOD PRESSURE: 110 MMHG | HEIGHT: 61 IN

## 2024-02-13 DIAGNOSIS — G89.18 POST-OP PAIN: Primary | ICD-10-CM

## 2024-02-13 PROCEDURE — 6360000002 HC RX W HCPCS

## 2024-02-13 PROCEDURE — 3600000014 HC SURGERY LEVEL 4 ADDTL 15MIN: Performed by: SURGERY

## 2024-02-13 PROCEDURE — 7100000001 HC PACU RECOVERY - ADDTL 15 MIN: Performed by: SURGERY

## 2024-02-13 PROCEDURE — 6370000000 HC RX 637 (ALT 250 FOR IP): Performed by: ANESTHESIOLOGY

## 2024-02-13 PROCEDURE — 3700000001 HC ADD 15 MINUTES (ANESTHESIA): Performed by: SURGERY

## 2024-02-13 PROCEDURE — 2580000003 HC RX 258: Performed by: STUDENT IN AN ORGANIZED HEALTH CARE EDUCATION/TRAINING PROGRAM

## 2024-02-13 PROCEDURE — 2580000003 HC RX 258: Performed by: ANESTHESIOLOGY

## 2024-02-13 PROCEDURE — 6360000002 HC RX W HCPCS: Performed by: SURGERY

## 2024-02-13 PROCEDURE — 2720000010 HC SURG SUPPLY STERILE: Performed by: SURGERY

## 2024-02-13 PROCEDURE — 7100000000 HC PACU RECOVERY - FIRST 15 MIN: Performed by: SURGERY

## 2024-02-13 PROCEDURE — 3600000004 HC SURGERY LEVEL 4 BASE: Performed by: SURGERY

## 2024-02-13 PROCEDURE — 2500000003 HC RX 250 WO HCPCS: Performed by: ANESTHESIOLOGY

## 2024-02-13 PROCEDURE — 7100000010 HC PHASE II RECOVERY - FIRST 15 MIN: Performed by: SURGERY

## 2024-02-13 PROCEDURE — 2709999900 HC NON-CHARGEABLE SUPPLY: Performed by: SURGERY

## 2024-02-13 PROCEDURE — 3700000000 HC ANESTHESIA ATTENDED CARE: Performed by: SURGERY

## 2024-02-13 PROCEDURE — 7100000011 HC PHASE II RECOVERY - ADDTL 15 MIN: Performed by: SURGERY

## 2024-02-13 PROCEDURE — 6360000002 HC RX W HCPCS: Performed by: ANESTHESIOLOGY

## 2024-02-13 RX ORDER — SODIUM CHLORIDE 0.9 % (FLUSH) 0.9 %
5-40 SYRINGE (ML) INJECTION PRN
Status: DISCONTINUED | OUTPATIENT
Start: 2024-02-13 | End: 2024-02-13 | Stop reason: HOSPADM

## 2024-02-13 RX ORDER — SODIUM CHLORIDE 0.9 % (FLUSH) 0.9 %
5-40 SYRINGE (ML) INJECTION EVERY 12 HOURS SCHEDULED
Status: DISCONTINUED | OUTPATIENT
Start: 2024-02-13 | End: 2024-02-13 | Stop reason: HOSPADM

## 2024-02-13 RX ORDER — SODIUM CHLORIDE, SODIUM LACTATE, POTASSIUM CHLORIDE, CALCIUM CHLORIDE 600; 310; 30; 20 MG/100ML; MG/100ML; MG/100ML; MG/100ML
INJECTION, SOLUTION INTRAVENOUS ONCE
Status: COMPLETED | OUTPATIENT
Start: 2024-02-13 | End: 2024-02-13

## 2024-02-13 RX ORDER — DROPERIDOL 2.5 MG/ML
0.62 INJECTION, SOLUTION INTRAMUSCULAR; INTRAVENOUS
Status: COMPLETED | OUTPATIENT
Start: 2024-02-13 | End: 2024-02-13

## 2024-02-13 RX ORDER — SODIUM CHLORIDE 9 MG/ML
INJECTION, SOLUTION INTRAVENOUS PRN
Status: DISCONTINUED | OUTPATIENT
Start: 2024-02-13 | End: 2024-02-13 | Stop reason: HOSPADM

## 2024-02-13 RX ORDER — LIDOCAINE HYDROCHLORIDE 20 MG/ML
INJECTION, SOLUTION EPIDURAL; INFILTRATION; INTRACAUDAL; PERINEURAL PRN
Status: DISCONTINUED | OUTPATIENT
Start: 2024-02-13 | End: 2024-02-13 | Stop reason: SDUPTHER

## 2024-02-13 RX ORDER — SODIUM CHLORIDE, SODIUM LACTATE, POTASSIUM CHLORIDE, CALCIUM CHLORIDE 600; 310; 30; 20 MG/100ML; MG/100ML; MG/100ML; MG/100ML
INJECTION, SOLUTION INTRAVENOUS CONTINUOUS
Status: DISCONTINUED | OUTPATIENT
Start: 2024-02-13 | End: 2024-02-13 | Stop reason: HOSPADM

## 2024-02-13 RX ORDER — FENTANYL CITRATE 50 UG/ML
INJECTION, SOLUTION INTRAMUSCULAR; INTRAVENOUS PRN
Status: DISCONTINUED | OUTPATIENT
Start: 2024-02-13 | End: 2024-02-13 | Stop reason: SDUPTHER

## 2024-02-13 RX ORDER — LIDOCAINE HYDROCHLORIDE 10 MG/ML
1 INJECTION, SOLUTION EPIDURAL; INFILTRATION; INTRACAUDAL; PERINEURAL
Status: DISCONTINUED | OUTPATIENT
Start: 2024-02-13 | End: 2024-02-13 | Stop reason: HOSPADM

## 2024-02-13 RX ORDER — ONDANSETRON 2 MG/ML
4 INJECTION INTRAMUSCULAR; INTRAVENOUS
Status: DISCONTINUED | OUTPATIENT
Start: 2024-02-13 | End: 2024-02-13 | Stop reason: HOSPADM

## 2024-02-13 RX ORDER — MEPERIDINE HYDROCHLORIDE 25 MG/ML
12.5 INJECTION INTRAMUSCULAR; INTRAVENOUS; SUBCUTANEOUS EVERY 5 MIN PRN
Status: DISCONTINUED | OUTPATIENT
Start: 2024-02-13 | End: 2024-02-13 | Stop reason: HOSPADM

## 2024-02-13 RX ORDER — PROPOFOL 10 MG/ML
INJECTION, EMULSION INTRAVENOUS PRN
Status: DISCONTINUED | OUTPATIENT
Start: 2024-02-13 | End: 2024-02-13 | Stop reason: SDUPTHER

## 2024-02-13 RX ORDER — ONDANSETRON 2 MG/ML
INJECTION INTRAMUSCULAR; INTRAVENOUS PRN
Status: DISCONTINUED | OUTPATIENT
Start: 2024-02-13 | End: 2024-02-13 | Stop reason: SDUPTHER

## 2024-02-13 RX ORDER — BUPIVACAINE HYDROCHLORIDE 2.5 MG/ML
INJECTION, SOLUTION EPIDURAL; INFILTRATION; INTRACAUDAL PRN
Status: DISCONTINUED | OUTPATIENT
Start: 2024-02-13 | End: 2024-02-13 | Stop reason: ALTCHOICE

## 2024-02-13 RX ORDER — FENTANYL CITRATE 50 UG/ML
25 INJECTION, SOLUTION INTRAMUSCULAR; INTRAVENOUS EVERY 5 MIN PRN
Status: DISCONTINUED | OUTPATIENT
Start: 2024-02-13 | End: 2024-02-13 | Stop reason: HOSPADM

## 2024-02-13 RX ORDER — TRAMADOL HYDROCHLORIDE 50 MG/1
100 TABLET ORAL ONCE
Status: COMPLETED | OUTPATIENT
Start: 2024-02-13 | End: 2024-02-13

## 2024-02-13 RX ORDER — MIDAZOLAM HYDROCHLORIDE 1 MG/ML
INJECTION INTRAMUSCULAR; INTRAVENOUS PRN
Status: DISCONTINUED | OUTPATIENT
Start: 2024-02-13 | End: 2024-02-13 | Stop reason: SDUPTHER

## 2024-02-13 RX ORDER — TRAMADOL HYDROCHLORIDE 50 MG/1
50 TABLET ORAL EVERY 6 HOURS PRN
Qty: 20 TABLET | Refills: 0 | Status: SHIPPED | OUTPATIENT
Start: 2024-02-13 | End: 2024-02-18

## 2024-02-13 RX ORDER — FENTANYL CITRATE 50 UG/ML
INJECTION, SOLUTION INTRAMUSCULAR; INTRAVENOUS
Status: COMPLETED
Start: 2024-02-13 | End: 2024-02-13

## 2024-02-13 RX ORDER — DEXAMETHASONE SODIUM PHOSPHATE 4 MG/ML
INJECTION, SOLUTION INTRA-ARTICULAR; INTRALESIONAL; INTRAMUSCULAR; INTRAVENOUS; SOFT TISSUE PRN
Status: DISCONTINUED | OUTPATIENT
Start: 2024-02-13 | End: 2024-02-13 | Stop reason: SDUPTHER

## 2024-02-13 RX ORDER — FENTANYL CITRATE 50 UG/ML
50 INJECTION, SOLUTION INTRAMUSCULAR; INTRAVENOUS EVERY 5 MIN PRN
Status: DISCONTINUED | OUTPATIENT
Start: 2024-02-13 | End: 2024-02-13 | Stop reason: HOSPADM

## 2024-02-13 RX ORDER — SUCCINYLCHOLINE/SOD CL,ISO/PF 200MG/10ML
SYRINGE (ML) INTRAVENOUS PRN
Status: DISCONTINUED | OUTPATIENT
Start: 2024-02-13 | End: 2024-02-13 | Stop reason: SDUPTHER

## 2024-02-13 RX ADMIN — FENTANYL CITRATE 50 MCG: 50 INJECTION, SOLUTION INTRAMUSCULAR; INTRAVENOUS at 11:06

## 2024-02-13 RX ADMIN — FENTANYL CITRATE 25 MCG: 50 INJECTION INTRAMUSCULAR; INTRAVENOUS at 12:03

## 2024-02-13 RX ADMIN — SODIUM CHLORIDE, POTASSIUM CHLORIDE, SODIUM LACTATE AND CALCIUM CHLORIDE: 600; 310; 30; 20 INJECTION, SOLUTION INTRAVENOUS at 11:07

## 2024-02-13 RX ADMIN — FENTANYL CITRATE 25 MCG: 50 INJECTION INTRAMUSCULAR; INTRAVENOUS at 12:08

## 2024-02-13 RX ADMIN — MIDAZOLAM HYDROCHLORIDE 2 MG: 1 INJECTION, SOLUTION INTRAMUSCULAR; INTRAVENOUS at 11:06

## 2024-02-13 RX ADMIN — TRAMADOL HYDROCHLORIDE 100 MG: 50 TABLET ORAL at 12:36

## 2024-02-13 RX ADMIN — FENTANYL CITRATE 50 MCG: 50 INJECTION, SOLUTION INTRAMUSCULAR; INTRAVENOUS at 11:10

## 2024-02-13 RX ADMIN — SODIUM CHLORIDE, POTASSIUM CHLORIDE, SODIUM LACTATE AND CALCIUM CHLORIDE 25 ML: 600; 310; 30; 20 INJECTION, SOLUTION INTRAVENOUS at 10:54

## 2024-02-13 RX ADMIN — PROPOFOL 200 MG: 10 INJECTION, EMULSION INTRAVENOUS at 11:10

## 2024-02-13 RX ADMIN — ONDANSETRON 4 MG: 2 INJECTION INTRAMUSCULAR; INTRAVENOUS at 11:16

## 2024-02-13 RX ADMIN — DEXAMETHASONE SODIUM PHOSPHATE 4 MG: 4 INJECTION INTRA-ARTICULAR; INTRALESIONAL; INTRAMUSCULAR; INTRAVENOUS; SOFT TISSUE at 11:16

## 2024-02-13 RX ADMIN — DROPERIDOL 0.62 MG: 2.5 INJECTION, SOLUTION INTRAMUSCULAR; INTRAVENOUS at 12:10

## 2024-02-13 RX ADMIN — LIDOCAINE HYDROCHLORIDE 100 MG: 20 INJECTION, SOLUTION EPIDURAL; INFILTRATION; INTRACAUDAL; PERINEURAL at 11:10

## 2024-02-13 RX ADMIN — FENTANYL CITRATE 25 MCG: 50 INJECTION INTRAMUSCULAR; INTRAVENOUS at 12:20

## 2024-02-13 RX ADMIN — Medication 140 MG: at 11:16

## 2024-02-13 RX ADMIN — FENTANYL CITRATE 25 MCG: 50 INJECTION INTRAMUSCULAR; INTRAVENOUS at 12:15

## 2024-02-13 ASSESSMENT — PAIN DESCRIPTION - ORIENTATION
ORIENTATION: LEFT

## 2024-02-13 ASSESSMENT — PAIN DESCRIPTION - LOCATION
LOCATION: ABDOMEN

## 2024-02-13 ASSESSMENT — PAIN SCALES - GENERAL
PAINLEVEL_OUTOF10: 10
PAINLEVEL_OUTOF10: 10
PAINLEVEL_OUTOF10: 8
PAINLEVEL_OUTOF10: 8
PAINLEVEL_OUTOF10: 5

## 2024-02-13 ASSESSMENT — PAIN - FUNCTIONAL ASSESSMENT
PAIN_FUNCTIONAL_ASSESSMENT: ACTIVITIES ARE NOT PREVENTED
PAIN_FUNCTIONAL_ASSESSMENT: PREVENTS OR INTERFERES SOME ACTIVE ACTIVITIES AND ADLS
PAIN_FUNCTIONAL_ASSESSMENT: 0-10
PAIN_FUNCTIONAL_ASSESSMENT: 0-10

## 2024-02-13 ASSESSMENT — PAIN DESCRIPTION - DESCRIPTORS
DESCRIPTORS: GNAWING
DESCRIPTORS: SORE

## 2024-02-13 NOTE — PERIOP NOTE
No  recent  covid test  no s/s/    mepilex  sacrum border preventatively applied    ski intact.   Pt  has    colostomy      but   the      skin   has   grown  over  most of  it     very   tiny opening    has     solid    ostomy  bag  covering   the    minute   opening.   So    no     color  of   the    stoma  can  be   given.    Pt   took  picture of  the   ostomy    and     sent  to dr. Schaffer  she   reports.  Pt   awaiting    surgery   call belll   with in  reach

## 2024-02-13 NOTE — DISCHARGE INSTRUCTIONS
Annel Carroll MD, FACS  MD Ramon Livingston MD Peter Miller, MD Joseph Coury, MD Kathryn Chuquin, MD    [facility]         Discharge Instructions for Ambulatory 23-Hour Surgery Patients    Advance to high fiber diet as tolerated.  Take Metamucil/Citrucel 1 teaspoon mixed in a glass of water in AM and PM.  Take pain medication as prescribed. (NO DRIVING WHILE ON PAIN MEDICATION).  Resume Eliquis in 3 days on Friday 2/16/24  No lifting any objects weighing more than 15 pounds.  No sitting more than 45 minutes without standing, walking, or lying down.  You may walk as desired.   Please schedule an appointment in the office within 4 weeks. Call ahead to schedule your appointment [phone].   Call [facility] [phone] if you have any problems or questions after hours.   Expect slight bright red blood up to four (4) weeks from surgery.   Stitches are the dissolving type - they do not need removal in the office.   If ostomy output by tomorrow morning, take 1 capful of Miralax or 1 tablespoon of Milk of Magnesia. Repeat if no results. If still no bowel movement the next day, then call the office.                [address], [address]  [place], [place] [address] [phone] · Fax [phone]    [facility] [place], [place] [address]  [phone] · Fax [phone]    [address] [place] [address], [address]  [place], [place] [address] [phone] · Fax [phone]    DISCHARGE SUMMARY from Nurse    PATIENT INSTRUCTIONS:    After general anesthesia or intravenous sedation, for 24 hours or while taking prescription narcotics:    Have someone responsible help you with your care  Limit your activities  Do not drive and operate hazardous machinery  Do not make important personal, legal or business decisions  Do not drink alcoholic beverages  If you have not urinated within 8 hours after discharge, please contact your surgeon on call  Resume  your medications unless otherwise instructed    From general anesthesia, intravenous sedation, or while taking prescription narcotics, you may experience:    Drowsiness, dizziness, sleepiness, or confusion  Difficulty remembering or delayed reaction times  Difficulty with your balance, especially while walking, move slowly and carefully, do not make sudden position changes  Difficulty focusing or blurred vision    You may not be aware of slight changes in your behavior and/or your reaction time because of the medication used during and after your procedure.    Report the following to your surgeon:  Excessive pain, swelling, redness or odor of or around the surgical area  Temperature over 100.5  Nausea and vomiting lasting longer than 4 hours or if unable to take medications  Any signs of decreased circulation or nerve impairment to extremity: change in color, persistent numbness, tingling, coldness or increase pain  Any questions or concerns         IF YOU REPORT TO AN EMERGENCY ROOM, DOCTOR'S OFFICE OR HOSPITAL WITHIN 24 HOURS AFTER YOUR PROCEDURE, BRING THIS SHEET AND YOUR AFTER VISIT SUMMARY WITH YOU AND GIVE IT TO THE PHYSICIAN OR NURSE ATTENDING YOU.    These are general instructions for a healthy lifestyle (if applicable):    No smoking/ No tobacco products/ Avoid exposure to secondhand smoke  Surgeon General's Warning:  Quitting smoking now greatly reduces serious risk to your health.    Obesity, smoking, and sedentary lifestyle greatly increases your risk for illness    A healthy diet, regular physical exercise & weight monitoring are important for maintaining a healthy lifestyle    You may be retaining fluid if you have a history of heart failure or if you experience any of the following symptoms:  Weight gain of 3 pounds or more overnight or 5 pounds in a week, increased swelling in our hands or feet or shortness of breath while lying flat in bed.  Please call your doctor as soon as you notice any of these

## 2024-02-13 NOTE — BRIEF OP NOTE
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

## 2024-02-13 NOTE — ANESTHESIA PRE PROCEDURE
Department of Anesthesiology  Preprocedure Note       Name:  Valentina Maynard   Age:  58 y.o.  :  1965                                          MRN:  [id number]         Date:  2024      Surgeon: Surgeon(s):  Endy Schaffer II, MD    Procedure: Procedure(s):  COLOSTOMY DILATION    Medications prior to admission:   Prior to Admission medications    Medication Sig Start Date End Date Taking? Authorizing Provider   cyanocobalamin 1000 MCG/ML injection Inject 1 mL into the muscle every 30 days   Yes Provider, MD Khoi   Buprenorphine (BUTRANS TD) Place onto the skin every 7 days   Yes Provider, MD Khoi   promethazine (PHENERGAN) 25 MG tablet Take 1 tablet by mouth every 6 hours as needed for Nausea   Yes ProviderKhoi MD   Rimegepant Sulfate (NURTEC) 75 MG TBDP Take 75 mg by mouth daily as needed (migraine) 23   Faisal Meza MD   Rimegepant Sulfate 75 MG TBDP Take 75 mg by mouth daily as needed (headache) 23   Faisal Meza MD   apixaban (ELIQUIS) 2.5 MG TABS tablet Take 1 tablet by mouth 2 times daily 10/18/19   Automatic Reconciliation, Ar   aspirin 81 MG chewable tablet Take 1 tablet by mouth daily    Automatic Reconciliation, Ar   butalbital-acetaminophen-caffeine (FIORICET, ESGIC) -40 MG per tablet Take 2 tablets by mouth every 8 hours as needed 20   Automatic Reconciliation, Ar   drospirenone-ethinyl estradiol (DONIS) 3-0.02 MG per tablet Take 1 tablet by mouth    Automatic Reconciliation, Ar   Fremanezumab-vfrm (AJOVY) 225 MG/1.5ML SOAJ For migraines 22   Automatic Reconciliation, Ar   gabapentin (NEURONTIN) 300 MG capsule TAKE ONE CAPSULE BY MOUTH THREE TIMES A DAY 12/3/18   Automatic Reconciliation, Ar   hydroxychloroquine (PLAQUENIL) 200 MG tablet Take 2 tablets by mouth    Automatic Reconciliation, Ar   LORazepam (ATIVAN) 0.5 MG tablet Take 1 tablet by mouth every 6 hours as needed. 10/1/21   Automatic Reconciliation, Ar   naloxone (NARCAN) 4

## 2024-02-13 NOTE — ANESTHESIA POSTPROCEDURE EVALUATION
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  Airway patency: patent  Nausea & Vomiting: no nausea and no vomiting  Cardiovascular status: hemodynamically stable  Respiratory status: acceptable  Hydration status: stable  Multimodal analgesia pain management approach  Pain management: adequate    No notable events documented.

## 2024-02-19 ENCOUNTER — APPOINTMENT (OUTPATIENT)
Facility: HOSPITAL | Age: 59
DRG: 394 | End: 2024-02-19
Payer: COMMERCIAL

## 2024-02-19 ENCOUNTER — HOSPITAL ENCOUNTER (INPATIENT)
Facility: HOSPITAL | Age: 59
LOS: 7 days | Discharge: HOME | DRG: 394 | End: 2024-02-26
Attending: STUDENT IN AN ORGANIZED HEALTH CARE EDUCATION/TRAINING PROGRAM | Admitting: INTERNAL MEDICINE
Payer: COMMERCIAL

## 2024-02-19 DIAGNOSIS — K92.2 LOWER GI BLEED: ICD-10-CM

## 2024-02-19 DIAGNOSIS — L03.90 CELLULITIS, UNSPECIFIED CELLULITIS SITE: ICD-10-CM

## 2024-02-19 DIAGNOSIS — I95.1 ORTHOSTATIC HYPOTENSION: Primary | ICD-10-CM

## 2024-02-19 DIAGNOSIS — K52.9 COLITIS: ICD-10-CM

## 2024-02-19 LAB
ANION GAP SERPL CALC-SCNC: 6 MMOL/L (ref 5–15)
APPEARANCE UR: CLEAR
APTT PPP: 24.1 SEC (ref 22.1–31)
BACTERIA URNS QL MICRO: ABNORMAL /HPF
BASOPHILS # BLD: 0 K/UL (ref 0–0.1)
BASOPHILS NFR BLD: 0 % (ref 0–1)
BILIRUB UR QL: NEGATIVE
BUN SERPL-MCNC: 16 MG/DL (ref 6–20)
BUN/CREAT SERPL: 15 (ref 12–20)
CALCIUM SERPL-MCNC: 9.3 MG/DL (ref 8.5–10.1)
CHLORIDE SERPL-SCNC: 108 MMOL/L (ref 97–108)
CO2 SERPL-SCNC: 26 MMOL/L (ref 21–32)
COLOR UR: ABNORMAL
CREAT SERPL-MCNC: 1.04 MG/DL (ref 0.55–1.02)
DIFFERENTIAL METHOD BLD: ABNORMAL
EOSINOPHIL # BLD: 0.1 K/UL (ref 0–0.4)
EOSINOPHIL NFR BLD: 1 % (ref 0–7)
EPITH CASTS URNS QL MICRO: ABNORMAL /LPF
ERYTHROCYTE [DISTWIDTH] IN BLOOD BY AUTOMATED COUNT: 12.3 % (ref 11.5–14.5)
GLUCOSE SERPL-MCNC: 94 MG/DL (ref 65–100)
GLUCOSE UR STRIP.AUTO-MCNC: NEGATIVE MG/DL
HCT VFR BLD AUTO: 34.2 % (ref 35–47)
HCT VFR BLD AUTO: 39.2 % (ref 35–47)
HGB BLD-MCNC: 11.3 G/DL (ref 11.5–16)
HGB BLD-MCNC: 12.6 G/DL (ref 11.5–16)
HGB UR QL STRIP: ABNORMAL
IMM GRANULOCYTES # BLD AUTO: 0.1 K/UL (ref 0–0.04)
IMM GRANULOCYTES NFR BLD AUTO: 0 % (ref 0–0.5)
INR PPP: 1.1 (ref 0.9–1.1)
KETONES UR QL STRIP.AUTO: ABNORMAL MG/DL
LEUKOCYTE ESTERASE UR QL STRIP.AUTO: NEGATIVE
LYMPHOCYTES # BLD: 2.8 K/UL (ref 0.8–3.5)
LYMPHOCYTES NFR BLD: 14 % (ref 12–49)
MCH RBC QN AUTO: 29.2 PG (ref 26–34)
MCHC RBC AUTO-ENTMCNC: 32.1 G/DL (ref 30–36.5)
MCV RBC AUTO: 91 FL (ref 80–99)
MONOCYTES # BLD: 0.8 K/UL (ref 0–1)
MONOCYTES NFR BLD: 4 % (ref 5–13)
NEUTS SEG # BLD: 15.7 K/UL (ref 1.8–8)
NEUTS SEG NFR BLD: 81 % (ref 32–75)
NITRITE UR QL STRIP.AUTO: NEGATIVE
NRBC # BLD: 0 K/UL (ref 0–0.01)
NRBC BLD-RTO: 0 PER 100 WBC
PH UR STRIP: 5 (ref 5–8)
PLATELET # BLD AUTO: 367 K/UL (ref 150–400)
PMV BLD AUTO: 10.1 FL (ref 8.9–12.9)
POTASSIUM SERPL-SCNC: 4.3 MMOL/L (ref 3.5–5.1)
PROT UR STRIP-MCNC: NEGATIVE MG/DL
PROTHROMBIN TIME: 11 SEC (ref 9–11.1)
RBC # BLD AUTO: 4.31 M/UL (ref 3.8–5.2)
RBC #/AREA URNS HPF: ABNORMAL /HPF (ref 0–5)
SODIUM SERPL-SCNC: 140 MMOL/L (ref 136–145)
SP GR UR REFRACTOMETRY: 1.02 (ref 1–1.03)
THERAPEUTIC RANGE: NORMAL SECS (ref 58–77)
URINE CULTURE IF INDICATED: ABNORMAL
UROBILINOGEN UR QL STRIP.AUTO: 0.2 EU/DL (ref 0.2–1)
WBC # BLD AUTO: 19.5 K/UL (ref 3.6–11)
WBC URNS QL MICRO: ABNORMAL /HPF (ref 0–4)

## 2024-02-19 PROCEDURE — 6360000004 HC RX CONTRAST MEDICATION: Performed by: STUDENT IN AN ORGANIZED HEALTH CARE EDUCATION/TRAINING PROGRAM

## 2024-02-19 PROCEDURE — A4216 STERILE WATER/SALINE, 10 ML: HCPCS | Performed by: INTERNAL MEDICINE

## 2024-02-19 PROCEDURE — 81001 URINALYSIS AUTO W/SCOPE: CPT

## 2024-02-19 PROCEDURE — 6370000000 HC RX 637 (ALT 250 FOR IP): Performed by: INTERNAL MEDICINE

## 2024-02-19 PROCEDURE — 85014 HEMATOCRIT: CPT

## 2024-02-19 PROCEDURE — 80048 BASIC METABOLIC PNL TOTAL CA: CPT

## 2024-02-19 PROCEDURE — 2060000000 HC ICU INTERMEDIATE R&B

## 2024-02-19 PROCEDURE — 85018 HEMOGLOBIN: CPT

## 2024-02-19 PROCEDURE — 6360000002 HC RX W HCPCS: Performed by: INTERNAL MEDICINE

## 2024-02-19 PROCEDURE — C9113 INJ PANTOPRAZOLE SODIUM, VIA: HCPCS | Performed by: INTERNAL MEDICINE

## 2024-02-19 PROCEDURE — 71045 X-RAY EXAM CHEST 1 VIEW: CPT

## 2024-02-19 PROCEDURE — 85730 THROMBOPLASTIN TIME PARTIAL: CPT

## 2024-02-19 PROCEDURE — 2580000003 HC RX 258: Performed by: INTERNAL MEDICINE

## 2024-02-19 PROCEDURE — 6360000002 HC RX W HCPCS: Performed by: EMERGENCY MEDICINE

## 2024-02-19 PROCEDURE — 85610 PROTHROMBIN TIME: CPT

## 2024-02-19 PROCEDURE — 2580000003 HC RX 258: Performed by: STUDENT IN AN ORGANIZED HEALTH CARE EDUCATION/TRAINING PROGRAM

## 2024-02-19 PROCEDURE — 36415 COLL VENOUS BLD VENIPUNCTURE: CPT

## 2024-02-19 PROCEDURE — 99285 EMERGENCY DEPT VISIT HI MDM: CPT

## 2024-02-19 PROCEDURE — 74177 CT ABD & PELVIS W/CONTRAST: CPT

## 2024-02-19 PROCEDURE — 6360000002 HC RX W HCPCS: Performed by: STUDENT IN AN ORGANIZED HEALTH CARE EDUCATION/TRAINING PROGRAM

## 2024-02-19 PROCEDURE — 96365 THER/PROPH/DIAG IV INF INIT: CPT

## 2024-02-19 PROCEDURE — 85025 COMPLETE CBC W/AUTO DIFF WBC: CPT

## 2024-02-19 RX ORDER — TIZANIDINE 4 MG/1
2 TABLET ORAL NIGHTLY PRN
Status: DISCONTINUED | OUTPATIENT
Start: 2024-02-19 | End: 2024-02-26 | Stop reason: HOSPADM

## 2024-02-19 RX ORDER — AMITRIPTYLINE HYDROCHLORIDE 25 MG/1
25 TABLET, FILM COATED ORAL NIGHTLY PRN
COMMUNITY

## 2024-02-19 RX ORDER — TRAMADOL HYDROCHLORIDE 50 MG/1
50 TABLET ORAL EVERY 6 HOURS PRN
COMMUNITY

## 2024-02-19 RX ORDER — GABAPENTIN 400 MG/1
400 CAPSULE ORAL 3 TIMES DAILY
COMMUNITY

## 2024-02-19 RX ORDER — ACETAMINOPHEN 325 MG/1
650 TABLET ORAL EVERY 6 HOURS PRN
Status: DISCONTINUED | OUTPATIENT
Start: 2024-02-19 | End: 2024-02-26 | Stop reason: HOSPADM

## 2024-02-19 RX ORDER — ONDANSETRON 4 MG/1
4 TABLET, ORALLY DISINTEGRATING ORAL EVERY 8 HOURS PRN
Status: DISCONTINUED | OUTPATIENT
Start: 2024-02-19 | End: 2024-02-26 | Stop reason: HOSPADM

## 2024-02-19 RX ORDER — DROSPIRENONE AND ETHINYL ESTRADIOL 0.02-3(28)
1 KIT ORAL DAILY
Status: DISCONTINUED | OUTPATIENT
Start: 2024-02-19 | End: 2024-02-26 | Stop reason: HOSPADM

## 2024-02-19 RX ORDER — GABAPENTIN 300 MG/1
300 CAPSULE ORAL 3 TIMES DAILY
Status: DISCONTINUED | OUTPATIENT
Start: 2024-02-19 | End: 2024-02-26 | Stop reason: HOSPADM

## 2024-02-19 RX ORDER — ACETAMINOPHEN 650 MG/1
650 SUPPOSITORY RECTAL EVERY 6 HOURS PRN
Status: DISCONTINUED | OUTPATIENT
Start: 2024-02-19 | End: 2024-02-26 | Stop reason: HOSPADM

## 2024-02-19 RX ORDER — SODIUM CHLORIDE, SODIUM LACTATE, POTASSIUM CHLORIDE, CALCIUM CHLORIDE 600; 310; 30; 20 MG/100ML; MG/100ML; MG/100ML; MG/100ML
INJECTION, SOLUTION INTRAVENOUS CONTINUOUS
Status: DISCONTINUED | OUTPATIENT
Start: 2024-02-19 | End: 2024-02-25

## 2024-02-19 RX ORDER — HYDROXYCHLOROQUINE SULFATE 200 MG/1
400 TABLET, FILM COATED ORAL 2 TIMES DAILY
Status: DISCONTINUED | OUTPATIENT
Start: 2024-02-19 | End: 2024-02-26 | Stop reason: HOSPADM

## 2024-02-19 RX ORDER — ONDANSETRON 2 MG/ML
4 INJECTION INTRAMUSCULAR; INTRAVENOUS
Status: COMPLETED | OUTPATIENT
Start: 2024-02-19 | End: 2024-02-19

## 2024-02-19 RX ORDER — ONDANSETRON 2 MG/ML
4 INJECTION INTRAMUSCULAR; INTRAVENOUS EVERY 6 HOURS PRN
Status: DISCONTINUED | OUTPATIENT
Start: 2024-02-19 | End: 2024-02-26 | Stop reason: HOSPADM

## 2024-02-19 RX ORDER — DULOXETIN HYDROCHLORIDE 60 MG/1
60 CAPSULE, DELAYED RELEASE ORAL DAILY
COMMUNITY

## 2024-02-19 RX ADMIN — ONDANSETRON 4 MG: 2 INJECTION INTRAMUSCULAR; INTRAVENOUS at 15:55

## 2024-02-19 RX ADMIN — SODIUM CHLORIDE, PRESERVATIVE FREE 40 MG: 5 INJECTION INTRAVENOUS at 17:09

## 2024-02-19 RX ADMIN — SODIUM CHLORIDE, POTASSIUM CHLORIDE, SODIUM LACTATE AND CALCIUM CHLORIDE: 600; 310; 30; 20 INJECTION, SOLUTION INTRAVENOUS at 17:09

## 2024-02-19 RX ADMIN — GABAPENTIN 300 MG: 300 CAPSULE ORAL at 21:39

## 2024-02-19 RX ADMIN — PIPERACILLIN AND TAZOBACTAM 3375 MG: 3; .375 INJECTION, POWDER, LYOPHILIZED, FOR SOLUTION INTRAVENOUS at 23:10

## 2024-02-19 RX ADMIN — PIPERACILLIN AND TAZOBACTAM 4500 MG: 4; .5 INJECTION, POWDER, LYOPHILIZED, FOR SOLUTION INTRAVENOUS; PARENTERAL at 15:08

## 2024-02-19 RX ADMIN — IOPAMIDOL 100 ML: 755 INJECTION, SOLUTION INTRAVENOUS at 14:31

## 2024-02-19 RX ADMIN — HYDROXYCHLOROQUINE SULFATE 400 MG: 200 TABLET ORAL at 23:10

## 2024-02-19 RX ADMIN — GABAPENTIN 300 MG: 300 CAPSULE ORAL at 17:16

## 2024-02-19 ASSESSMENT — LIFESTYLE VARIABLES
HOW MANY STANDARD DRINKS CONTAINING ALCOHOL DO YOU HAVE ON A TYPICAL DAY: 1 OR 2
HOW OFTEN DO YOU HAVE A DRINK CONTAINING ALCOHOL: MONTHLY OR LESS

## 2024-02-19 ASSESSMENT — PAIN SCALES - GENERAL
PAINLEVEL_OUTOF10: 0
PAINLEVEL_OUTOF10: 7

## 2024-02-19 NOTE — CONSULTS
Khoi Juarez MD   amitriptyline (ELAVIL) 25 MG tablet Take 1 tablet by mouth nightly as needed for Sleep   Yes Khoi Juarez MD   DULoxetine (CYMBALTA) 60 MG extended release capsule Take 1 capsule by mouth daily   Yes Khoi Juarez MD   cyanocobalamin 1000 MCG/ML injection Inject 1 mL into the muscle every 30 days    Khoi Juarez MD   Buprenorphine (BUTRANS TD) Place 1 patch onto the skin every 7 days Friday    Khoi Juarez MD   promethazine (PHENERGAN) 25 MG tablet Take 1 tablet by mouth every 6 hours as needed for Nausea    Khoi Juarez MD   Rimegepant Sulfate (NURTEC) 75 MG TBDP Take 75 mg by mouth daily as needed (migraine) 6/12/23   Faisal Meza MD   apixaban (ELIQUIS) 2.5 MG TABS tablet Take 1 tablet by mouth 2 times daily 10/18/19   Automatic Reconciliation, Ar   aspirin 81 MG chewable tablet Take 1 tablet by mouth daily    Automatic Reconciliation, Ar   butalbital-acetaminophen-caffeine (FIORICET, ESGIC) -40 MG per tablet Take 2 tablets by mouth every 8 hours as needed for Migraine 12/11/20   Automatic Reconciliation, Ar   drospirenone-ethinyl estradiol (DONIS) 3-0.02 MG per tablet Take 1 tablet by mouth    Automatic Reconciliation, Ar   Fremanezumab-vfrm (AJOVY) 225 MG/1.5ML SOAJ Inject 225 mg into the skin as needed (monthy prn for migraine) For migraines 2/6/22   Automatic Reconciliation, Ar   hydroxychloroquine (PLAQUENIL) 200 MG tablet Take 1 tablet by mouth 2 times daily    Automatic Reconciliation, Ar   LORazepam (ATIVAN) 0.5 MG tablet Take 1 tablet by mouth every 6 hours as needed for Anxiety. 10/1/21   Automatic Reconciliation, Ar   ondansetron (ZOFRAN) 4 MG tablet Take 1 tablet by mouth daily    Automatic Reconciliation, Ar   tiZANidine (ZANAFLEX) 2 MG tablet Take 1 tablet by mouth nightly as needed 7/26/19   Automatic Reconciliation, Ar   ustekinumab (STELARA) 90 MG/ML SOSY prefilled syringe Inject 1 mL into the skin Every 2 months     Automatic Reconciliation, Ar        Allergies   Allergen Reactions    Chlorhexidine Rash     \"required IV Benadryl post\"    Codeine Hives     Difficulty breathing    Hydrocodone Hives     Difficulty breathing    Oxycodone Hives     Difficulty breathing    Prednisone Other (See Comments)     HX OF CROHN'S; not allergic, prefer not to use        Review of Systems:  A comprehensive review of systems was negative except for that written in the History of Present Illness.    Objective:     Vitals:    02/19/24 1218 02/19/24 1443 02/19/24 1507 02/19/24 1630   BP: (!) 144/72 (!) 141/78 (!) 140/83 124/69   Pulse: (!) 102 95 98 92   Resp: 16 18 23    Temp:       TempSrc:       SpO2: 98% 98%  95%   Weight:       Height:            Physical Exam:  Physical Exam  Constitutional:       General: She is not in acute distress.     Appearance: Normal appearance. She is not ill-appearing.   HENT:      Head: Normocephalic and atraumatic.      Nose: Nose normal.      Mouth/Throat:      Mouth: Mucous membranes are moist.      Pharynx: Oropharynx is clear.   Eyes:      Extraocular Movements: Extraocular movements intact.      Pupils: Pupils are equal, round, and reactive to light.   Cardiovascular:      Rate and Rhythm: Normal rate.   Pulmonary:      Effort: Pulmonary effort is normal. No respiratory distress.   Abdominal:      General: Abdomen is flat.      Palpations: Abdomen is soft.      Comments: Blood in stoma appliance   Musculoskeletal:      Cervical back: Normal range of motion.   Skin:     General: Skin is warm and dry.   Neurological:      Mental Status: She is alert and oriented to person, place, and time.           Assessment:         Plan:     Monitor Hgb BID  Transfuse if Hgb < 7  Agree with empiric abx.  Monitor WBC  Okay to have sips, ice chips, popsicles.      Signed By: Endy Schaffer MD     February 19, 2024

## 2024-02-19 NOTE — PROGRESS NOTES
Pharmacy Medication Reconciliation     The patient was interviewed regarding current PTA medication list, use and drug allergies;  patient present in room and obtained permission from patient to discuss drug regimen with visitor(s) present. The patient was questioned regarding use of any other inhalers, topical products, over the counter medications, herbal medications, vitamin products or ophthalmic/nasal/otic medication use.     Allergy Update: Chlorhexidine, Codeine, Hydrocodone, Oxycodone, and Prednisone    Recommendations/Findings:   The following amendments were made to the patient's active medication list on file at [facility]:   1) Additions:   +amitriptyline prn  +duloxetine  2) Deletions:   -naloxine  -duplicates  3) Changes:   Hydroxychloroquine is 200mg bid  Pertinent Findings:   -patient to bring in her home amanda and butrans patch (if still here on Friday)    Clarified PTA med list with patient interview, rx query. PTA medication list was corrected to the following:     Prior to Admission Medications   Prescriptions Last Dose Informant   Buprenorphine (BUTRANS TD) 2/17/2024 at 0900 Self   Sig: Place 1 patch onto the skin every 7 days Friday   DULoxetine (CYMBALTA) 60 MG extended release capsule 2/18/2024 Self   Sig: Take 1 capsule by mouth daily   Fremanezumab-vfrm (AJOVY) 225 MG/1.5ML SOAJ Past Month Self   Sig: Inject 225 mg into the skin as needed (monthy prn for migraine) For migraines   LORazepam (ATIVAN) 0.5 MG tablet Past Month Self   Sig: Take 1 tablet by mouth every 6 hours as needed for Anxiety.   Rimegepant Sulfate (NURTEC) 75 MG TBDP Past Month Self   Sig: Take 75 mg by mouth daily as needed (migraine)   amitriptyline (ELAVIL) 25 MG tablet Past Week Self   Sig: Take 1 tablet by mouth nightly as needed for Sleep   apixaban (ELIQUIS) 2.5 MG TABS tablet 2/18/2024 at 2100 Self   Sig: Take 1 tablet by mouth 2 times daily   aspirin 81 MG chewable tablet 2/18/2024 at 0900 Self   Sig: Take 1 tablet by

## 2024-02-19 NOTE — ED PROVIDER NOTES
for infection at this point.  Denies any signs of pneumonia, UTI or skin infection.  Possibly the red to the ostomy site including cellulitis, abscess, perforation.  Will get CT of the abdomen pelvis to evaluate.  I did discuss the case with Dr. Schaffer prior to the patient's arrival.  Recommends admission if the bleeding has persisted so patient will need admission at this junction.          Electronically signed by John Paulson MD on 2/19/2024 at 1:18 PM     [JS]   1524 CT showing stranding around ostomy opening, concern for possible infection with elevated wbc. Zosyn given. Patient will be admitted to hospital.  [PELON]      ED Course User Index  [PELON] Alma Stephen MD  [JS] John Paulson MD          Critical Care Time:     30 minutes     Disposition:  Admission for further management       PLAN:  1.      Medication List        ASK your doctor about these medications      Ajovy 225 MG/1.5ML Soaj  Generic drug: Fremanezumab-vfrm     apixaban 2.5 MG Tabs tablet  Commonly known as: ELIQUIS     aspirin 81 MG chewable tablet     butalbital-acetaminophen-caffeine -40 MG per tablet  Commonly known as: FIORICET, ESGIC     BUTRANS TD     cyanocobalamin 1000 MCG/ML injection     drospirenone-ethinyl estradiol 3-0.02 MG per tablet  Commonly known as: DONIS     gabapentin 300 MG capsule  Commonly known as: NEURONTIN     hydroxychloroquine 200 MG tablet  Commonly known as: PLAQUENIL     LORazepam 0.5 MG tablet  Commonly known as: ATIVAN     Narcan 4 MG/0.1ML Liqd nasal spray  Generic drug: naloxone     ondansetron 4 MG tablet  Commonly known as: ZOFRAN     promethazine 25 MG tablet  Commonly known as: PHENERGAN     * Rimegepant Sulfate 75 MG Tbdp  Take 75 mg by mouth daily as needed (headache)     * Nurtec 75 MG Tbdp  Generic drug: Rimegepant Sulfate  Take 75 mg by mouth daily as needed (migraine)     tiZANidine 2 MG tablet  Commonly known as: ZANAFLEX     ustekinumab 90 MG/ML Sosy prefilled syringe  Commonly

## 2024-02-19 NOTE — H&P
Hospitalist Admission Note    NAME:   Valentina Maynard   : 1965   MRN: [id number]     Date/Time: 2024 4:11 PM    Patient PCP: Itzel, Pcp    ______________________________________________________________________  Given the patient's current clinical presentation, I have a high level of concern for decompensation if discharged from the emergency department.  Complex decision making was performed, which includes reviewing the patient's available past medical records, laboratory results, and x-ray films.       My assessment of this patient's clinical condition and my plan of care is as follows.    Assessment / Plan:  Lower GI bleed in setting of Crohn disease status post colostomy bag placement  Recent dilation of the colostomy site  Leukocytosis most likely reactive versus intra-abdominal infection  We will admit to stepdown unit as patient still having bright red blood in her colostomy bag, CTA of the abdomen was negative for active bleeding, questionable infection around the ostomy site  Continue Zosyn started by the ED physician, check chest x-ray and UA.  Will check procalcitonin  Will hold Eliquis/aspirin.  H&H every 6 hours.  Hemoglobin around 12.6  Will transfuse if hemoglobin below 7  Repeat CBC.  Consult colorectal surgery  IV Protonix 40 mg twice daily, start IV fluid, keep n.p.o. until seen by colorectal surgery    CAD status post open heart surgery  History of SVC occlusion  History of CVA  Will hold Eliquis and aspirin, resume as soon as possible.  Patient did not take her Eliquis today    History of lupus  Continue Plaquenil    Medical Decision Making:   I personally reviewed labs: CBC BMP  I personally reviewed imaging: CT abdomen pelvis  I personally reviewed EKG:  Toxic drug monitoring:   Discussed case with: ED provider. After discussion I am in agreement that acuity of patient's medical condition necessitates hospital stay.      Code Status: Full code  DVT Prophylaxis: SCDs  Baseline:   No edema  Abdomen:   Soft, NT. ND  BS+  Extremities: No cyanosis.  No clubbing,      Skin turgor normal, Radial dial pulse 2+. Capillary refill normal  Neurologic: No facial asymmetry. No aphasia or slurred speech. Symmetrical strength, Sensation grossly intact. AAOx4.         LAB DATA REVIEWED:    Recent Results (from the past 12 hour(s))   CBC with Auto Differential    Collection Time: 02/19/24 11:08 AM   Result Value Ref Range    WBC 19.5 (H) 3.6 - 11.0 K/uL    RBC 4.31 3.80 - 5.20 M/uL    Hemoglobin 12.6 11.5 - 16.0 g/dL    Hematocrit 39.2 35.0 - 47.0 %    MCV 91.0 80.0 - 99.0 FL    MCH 29.2 26.0 - 34.0 PG    MCHC 32.1 30.0 - 36.5 g/dL    RDW 12.3 11.5 - 14.5 %    Platelets 367 150 - 400 K/uL    MPV 10.1 8.9 - 12.9 FL    Nucleated RBCs 0.0 0  WBC    nRBC 0.00 0.00 - 0.01 K/uL    Neutrophils % 81 (H) 32 - 75 %    Lymphocytes % 14 12 - 49 %    Monocytes % 4 (L) 5 - 13 %    Eosinophils % 1 0 - 7 %    Basophils % 0 0 - 1 %    Immature Granulocytes 0 0.0 - 0.5 %    Neutrophils Absolute 15.7 (H) 1.8 - 8.0 K/UL    Lymphocytes Absolute 2.8 0.8 - 3.5 K/UL    Monocytes Absolute 0.8 0.0 - 1.0 K/UL    Eosinophils Absolute 0.1 0.0 - 0.4 K/UL    Basophils Absolute 0.0 0.0 - 0.1 K/UL    Absolute Immature Granulocyte 0.1 (H) 0.00 - 0.04 K/UL    Differential Type AUTOMATED     Basic Metabolic Panel    Collection Time: 02/19/24 11:08 AM   Result Value Ref Range    Sodium 140 136 - 145 mmol/L    Potassium 4.3 3.5 - 5.1 mmol/L    Chloride 108 97 - 108 mmol/L    CO2 26 21 - 32 mmol/L    Anion Gap 6 5 - 15 mmol/L    Glucose 94 65 - 100 mg/dL    BUN 16 6 - 20 MG/DL    Creatinine 1.04 (H) 0.55 - 1.02 MG/DL    Bun/Cre Ratio 15 12 - 20      Est, Glom Filt Rate >60 >60 ml/min/1.73m2    Calcium 9.3 8.5 - 10.1 MG/DL   Protime-INR    Collection Time: 02/19/24 11:08 AM   Result Value Ref Range    INR 1.1 0.9 - 1.1      Protime 11.0 9.0 - 11.1 sec   APTT    Collection Time: 02/19/24 11:08 AM   Result Value Ref Range    APTT 24.1 22.1 -

## 2024-02-20 LAB
ANION GAP SERPL CALC-SCNC: 7 MMOL/L (ref 5–15)
APTT PPP: 23.9 SEC (ref 22.1–31)
BASOPHILS # BLD: 0 K/UL (ref 0–0.1)
BASOPHILS NFR BLD: 0 % (ref 0–1)
BUN SERPL-MCNC: 14 MG/DL (ref 6–20)
BUN/CREAT SERPL: 13 (ref 12–20)
CALCIUM SERPL-MCNC: 8.6 MG/DL (ref 8.5–10.1)
CHLORIDE SERPL-SCNC: 110 MMOL/L (ref 97–108)
CO2 SERPL-SCNC: 23 MMOL/L (ref 21–32)
CREAT SERPL-MCNC: 1.06 MG/DL (ref 0.55–1.02)
DIFFERENTIAL METHOD BLD: ABNORMAL
EOSINOPHIL # BLD: 0.1 K/UL (ref 0–0.4)
EOSINOPHIL NFR BLD: 1 % (ref 0–7)
ERYTHROCYTE [DISTWIDTH] IN BLOOD BY AUTOMATED COUNT: 12.7 % (ref 11.5–14.5)
FIBRINOGEN PPP-MCNC: 427 MG/DL (ref 200–475)
GLUCOSE SERPL-MCNC: 86 MG/DL (ref 65–100)
HCT VFR BLD AUTO: 27.2 % (ref 35–47)
HCT VFR BLD AUTO: 30.6 % (ref 35–47)
HCT VFR BLD AUTO: 31.2 % (ref 35–47)
HGB BLD-MCNC: 10.1 G/DL (ref 11.5–16)
HGB BLD-MCNC: 8.6 G/DL (ref 11.5–16)
HGB BLD-MCNC: 9.7 G/DL (ref 11.5–16)
IMM GRANULOCYTES # BLD AUTO: 0.1 K/UL (ref 0–0.04)
IMM GRANULOCYTES NFR BLD AUTO: 0 % (ref 0–0.5)
IRON SATN MFR SERPL: 10 % (ref 20–50)
IRON SERPL-MCNC: 35 UG/DL (ref 35–150)
LYMPHOCYTES # BLD: 3.2 K/UL (ref 0.8–3.5)
LYMPHOCYTES NFR BLD: 22 % (ref 12–49)
MCH RBC QN AUTO: 28.9 PG (ref 26–34)
MCHC RBC AUTO-ENTMCNC: 32.4 G/DL (ref 30–36.5)
MCV RBC AUTO: 89.1 FL (ref 80–99)
MONOCYTES # BLD: 1.1 K/UL (ref 0–1)
MONOCYTES NFR BLD: 8 % (ref 5–13)
NEUTS SEG # BLD: 10.2 K/UL (ref 1.8–8)
NEUTS SEG NFR BLD: 69 % (ref 32–75)
NRBC # BLD: 0 K/UL (ref 0–0.01)
NRBC BLD-RTO: 0 PER 100 WBC
PLATELET # BLD AUTO: 328 K/UL (ref 150–400)
PMV BLD AUTO: 10.3 FL (ref 8.9–12.9)
POTASSIUM SERPL-SCNC: 3.8 MMOL/L (ref 3.5–5.1)
RBC # BLD AUTO: 3.5 M/UL (ref 3.8–5.2)
SODIUM SERPL-SCNC: 140 MMOL/L (ref 136–145)
THERAPEUTIC RANGE: NORMAL SECS (ref 58–77)
TIBC SERPL-MCNC: 353 UG/DL (ref 250–450)
WBC # BLD AUTO: 14.8 K/UL (ref 3.6–11)

## 2024-02-20 PROCEDURE — 85730 THROMBOPLASTIN TIME PARTIAL: CPT

## 2024-02-20 PROCEDURE — 6370000000 HC RX 637 (ALT 250 FOR IP): Performed by: INTERNAL MEDICINE

## 2024-02-20 PROCEDURE — 85384 FIBRINOGEN ACTIVITY: CPT

## 2024-02-20 PROCEDURE — 85025 COMPLETE CBC W/AUTO DIFF WBC: CPT

## 2024-02-20 PROCEDURE — C9113 INJ PANTOPRAZOLE SODIUM, VIA: HCPCS | Performed by: INTERNAL MEDICINE

## 2024-02-20 PROCEDURE — 85014 HEMATOCRIT: CPT

## 2024-02-20 PROCEDURE — A4216 STERILE WATER/SALINE, 10 ML: HCPCS | Performed by: INTERNAL MEDICINE

## 2024-02-20 PROCEDURE — 2580000003 HC RX 258: Performed by: INTERNAL MEDICINE

## 2024-02-20 PROCEDURE — 85018 HEMOGLOBIN: CPT

## 2024-02-20 PROCEDURE — 83540 ASSAY OF IRON: CPT

## 2024-02-20 PROCEDURE — 36415 COLL VENOUS BLD VENIPUNCTURE: CPT

## 2024-02-20 PROCEDURE — 99231 SBSQ HOSP IP/OBS SF/LOW 25: CPT | Performed by: NURSE PRACTITIONER

## 2024-02-20 PROCEDURE — 6360000002 HC RX W HCPCS: Performed by: INTERNAL MEDICINE

## 2024-02-20 PROCEDURE — 83550 IRON BINDING TEST: CPT

## 2024-02-20 PROCEDURE — 80048 BASIC METABOLIC PNL TOTAL CA: CPT

## 2024-02-20 PROCEDURE — 2060000000 HC ICU INTERMEDIATE R&B

## 2024-02-20 RX ORDER — BUTALBITAL, ACETAMINOPHEN AND CAFFEINE 50; 325; 40 MG/1; MG/1; MG/1
2 TABLET ORAL EVERY 8 HOURS PRN
Status: DISCONTINUED | OUTPATIENT
Start: 2024-02-20 | End: 2024-02-26 | Stop reason: HOSPADM

## 2024-02-20 RX ADMIN — BUTALBITAL, ACETAMINOPHEN, AND CAFFEINE 2 TABLET: 50; 325; 40 TABLET ORAL at 16:00

## 2024-02-20 RX ADMIN — SODIUM CHLORIDE, POTASSIUM CHLORIDE, SODIUM LACTATE AND CALCIUM CHLORIDE: 600; 310; 30; 20 INJECTION, SOLUTION INTRAVENOUS at 21:50

## 2024-02-20 RX ADMIN — PIPERACILLIN AND TAZOBACTAM 3375 MG: 3; .375 INJECTION, POWDER, LYOPHILIZED, FOR SOLUTION INTRAVENOUS at 06:36

## 2024-02-20 RX ADMIN — GABAPENTIN 300 MG: 300 CAPSULE ORAL at 15:49

## 2024-02-20 RX ADMIN — PIPERACILLIN AND TAZOBACTAM 3375 MG: 3; .375 INJECTION, POWDER, LYOPHILIZED, FOR SOLUTION INTRAVENOUS at 15:49

## 2024-02-20 RX ADMIN — GABAPENTIN 300 MG: 300 CAPSULE ORAL at 08:28

## 2024-02-20 RX ADMIN — GABAPENTIN 300 MG: 300 CAPSULE ORAL at 21:47

## 2024-02-20 RX ADMIN — SODIUM CHLORIDE, PRESERVATIVE FREE 40 MG: 5 INJECTION INTRAVENOUS at 05:03

## 2024-02-20 RX ADMIN — HYDROXYCHLOROQUINE SULFATE 400 MG: 200 TABLET ORAL at 21:47

## 2024-02-20 RX ADMIN — SODIUM CHLORIDE, PRESERVATIVE FREE 40 MG: 5 INJECTION INTRAVENOUS at 15:49

## 2024-02-20 RX ADMIN — PIPERACILLIN AND TAZOBACTAM 3375 MG: 3; .375 INJECTION, POWDER, LYOPHILIZED, FOR SOLUTION INTRAVENOUS at 23:26

## 2024-02-20 RX ADMIN — HYDROXYCHLOROQUINE SULFATE 400 MG: 200 TABLET ORAL at 08:28

## 2024-02-20 ASSESSMENT — PAIN SCALES - GENERAL
PAINLEVEL_OUTOF10: 0
PAINLEVEL_OUTOF10: 10

## 2024-02-20 ASSESSMENT — PAIN DESCRIPTION - LOCATION: LOCATION: HEAD

## 2024-02-20 NOTE — CARE COORDINATION
Care Management Initial Assessment       RUR: 8%  Readmission? 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)   PCP Verified by CM   (patient declined having a PCP and declined offer to arrange PCP, reports she is followed by many specialist and if she has a cold she goes to urgent care.)   Prior Functional Level Independent in ADLs/IADLs   Current Functional Level Independent in ADLs/IADLs   Can patient return to prior living arrangement Yes   Family able to assist with home care needs: Yes   Social/Functional History   Lives With Spouse   Type of Home House   Home Layout Two level   Home Access   (6 rey)   Home Equipment None   Active  Yes   Discharge Planning   Patient expects to be discharged to: House   Condition of Participation: Discharge Planning   The Plan for Transition of Care is related to the following treatment goals: home       Advance Care Planning     General Advance Care Planning (ACP) Conversation    Date of Conversation: 2/20/2024  Conducted with: Patient with Decision Making Capacity    Content/Action Overview:  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         GLENROY Chance, CM  [phone]

## 2024-02-20 NOTE — CONSENT
[facility]  Chief Complaint   Patient presents with     Annual Comprehensive Nursing Home       HPI:    Riaz Sim is a 89 year old  (3/11/1925), who is being seen today for an annual comprehensive visit at [facility] .  HPI information obtained from: facility chart records, facility staff, [facility] chart review and family/first contact daughter in law May report.      Today's concerns are:  Visit for annual health examination 2/2/18  Completed 2/2/18    Hemiplegia, late effect of cerebrovascular disease (H)  Dysphagia, unspecified type  Dementia, vascular, with delusions (H)  Patient with hx CVA. Currently bed and wheelchair bound and tube fed. On 2/1 report of two small emesis and roommate reports patient coughed at night. Today lungs are clear, no cough. Afebrile on last check. Requires assist with all ADLs and for staff to anticipate needs. She is cantonese speaking at baseline. Continues on scheduled Seroquel per family request due to hx hallucinations and delusions. No side effects noted with use.   Depression screen done: PHQ-9 Given screen score and clinical assessment patient is stable without any signs of depression and no futher interventions warranted at this time. PHQ 9 score 0    Seizure disorder (H)  No longer on medications to manage and no recent events. Monitoring.    Insomnia, unspecified type  Doing well off medications.     Essential hypertension, benign  Chronic issue. Weight: 1/3/18: 93 lbs - 1/31/18: 97.3 lbs and BP: 115-130/70-85 mmHg. Takes ASA daily.   Based on JNC-8 goals,  patients age of 89 year old, no presence of diabetes or CKD, and goals of care goal BP is <150/90 mm Hg. patient is stable and continue without pharmacological invention with routine assessment.    Pernicious anemia  On daily supplement. Stable Hgb last check.   Lab Results   Component Value Date    HGB 13.0 08/02/2017    HGB 13.1 02/08/2017       Slow transit constipation, attempts to  Informed Consent for Blood Component Transfusion Note    I have discussed with the patient the rationale for blood component transfusion; its benefits in treating or preventing fatigue, organ damage, or death; and its risk which includes mild transfusion reactions, rare risk of blood borne infection, or more serious but rare reactions. I have discussed the alternatives to transfusion, including the risk and consequences of not receiving transfusion. The patient had an opportunity to ask questions and had agreed to proceed with transfusion of blood components.    Electronically signed by Kelley Bishop MD on 2/20/24 at 3:28 PM EST   dig our BM, better with supps  Regular movements with Senna.     Arthritis of knee  Managed with steroid injections and also scheduled Tylenol and PRN ultram. No s/s discomfort today.     Advanced directives, counseling/discussion  Reviewed POLST with family via phone. No changes.       ALLERGIES: Review of patient's allergies indicates no known allergies.  PROBLEM LIST:  Patient Active Problem List   Diagnosis     Essential hypertension, benign     Hemiplegia, late effect of cerebrovascular disease (H)     Dysphagia     Insomnia     Senile osteoporosis     Pernicious anemia     Encounter for counseling     Dementia, vascular, with delusions (H)     Advance Care Planning     Slow transit constipation, attempts to dig our BM, better with supps     Visit for annual health examination 2/2/18     Seizure disorder (H)     PAST MEDICAL HISTORY:  has a past medical history of Cerebral embolism with cerebral infarction (H) (7/7/2011); CVA (cerebral infarction); Dementia; Diabetes mellitus (H); Dysphagia; Encephalopathy; Essential hypertension, benign; G tube feedings (H); Hemiplegia (H); Insomnia; Nosebleeds (7/25/2011); Osteoporosis, senile; Pernicious anemia; Seizures (H); and Unspecified cerebral artery occlusion with cerebral infarction.  PAST SURGICAL HISTORY:  has a past surgical history that includes NONSPECIFIC PROCEDURE.  FAMILY HISTORY: family history is not on file.  SOCIAL HISTORY:  reports that she has never smoked. She does not have any smokeless tobacco history on file. She reports that she does not drink alcohol or use illicit drugs.  IMMUNIZATIONS:  Most Recent Immunizations   Administered Date(s) Administered     Influenza (High Dose) 3 valent vaccine 10/07/2016     Influenza (IIV3) PF 11/05/2015     Pneumo Conj 13-V (2010&after) 11/10/2015     Pneumococcal 23 valent 06/09/2011     Tdap (Adacel,Boostrix) 06/27/2014     Above immunizations pulled from [facility]. [facility] and facility records also  reconciled. Outstanding information sent to  to update [facility] .  Future immunizations are not needed at this point as all recommended immunizations are up to date.   MEDICATIONS:  Current Outpatient Prescriptions   Medication Sig Dispense Refill     CYANOCOBALAMIN PO Take 1,000 mcg by mouth daily       acetaminophen (TYLENOL) 32 mg/mL solution 31.25 mLs (1,000 mg) by Per G Tube route 3 times daily Give 20.31 ml (650mg) 300 mL 0     traMADol (ULTRAM) 50 MG tablet Take 1 tablet (50 mg) by mouth every 6 hours as needed for pain maximum 4 tablet(s) per day 20 tablet 0     sennosides (SENOKOT) 8.6 MG tablet 2 tablets by Per G Tube route At Bedtime       Zinc oxide 10 % CREA Apply topically 2 times daily And PRN       polyvinyl alcohol (ARTIFICIAL TEARS) 1.4 % ophthalmic solution Place 2 drops into the right eye as needed for dry eyes       guaiFENesin (ROBITUSSIN) 100 MG/5ML SOLN 10 mLs by Per G Tube route every 4 hours as needed for cough       QUEtiapine Fumarate (SEROQUEL PO) 12.5 mg by Per G Tube route daily        ASPIRIN PO 81 mg by Per G Tube route daily       SALINE NASAL SPRAY NA [place] 2 sprays into both nostrils as needed        Medications reviewed:  Medications reconciled to facility chart and changes were made to reflect current medications as identified as above med list. Below are the changes that were made:   Medications stopped since last EPIC medication reconciliation:   Medications Discontinued During This Encounter   Medication Reason     cyanocobalamin 1000 MCG/ML injection Therapy completed       Medications started since last [facility] medication reconciliation:  Orders Placed This Encounter   Medications     CYANOCOBALAMIN PO     Sig: Take 1,000 mcg by mouth daily     Case Management:  I have reviewed the facility/SNF care plan/MDS which was done January 2018, including the falls risk, nutrition and pain screening. I also reviewed the current immunizations, and preventive  care..Future cancer screening is not clinically indicated secondary to age/goals of care Patient's desire to return to the community is not assessible due to cognitive impairment. Current Level of Care is appropriate.      Advance Directive Discussion:    I reviewed the current advanced directives as reflected in EPIC, the POLST and the facility chart, and verified the congruency of orders. I contacted the first party daughter in law May and left a message regarding the plan of Care.  I did not due to cognitive impairment review the advance directives with the resident.     Team Discussion:  I communicated with the appropriate disciplines involved with the Plan of Care:   Nursing      Patient Goal:  Patient's goal is unobtainable secondary to cognitive impairment.    Information reviewed:  Medications, vital signs, orders, and nursing notes.    ROS:  Unobtainable secondary to cognitive impairment or aphasia.    Exam:  /70  Pulse 70  Temp 98.4  F (36.9  C)  Resp 18  Wt 97 lb 4.8 oz (44.1 kg)  SpO2 96%  BMI 18.69 kg/m2  GENERAL APPEARANCE: in no distress, cooperative. Laying comfortably in bed. Follows examiner with her eyes and nods head during our visit  EYES: Symmetric, no drainage.  EOM appears intact, no mattering lids or lashes.   ENT: nose, ears normal. Poor dentition with missing teeth  NECK: Supple, trachea midline. No asymmetry.  RESP: Respirations non-labored, no respiratory distress. Lungs clear to auscultation bilaterally. On room air, no cough  CV: regular rate and rhythm, no murmur, rub, or gallop, no edema to bilateral LE, +2 pedal pulses  ABDOMEN: bowel sounds present x4. Abdomen soft and nontender, no guarding or rebound. G-tube in place.   M/S: generalized weakness. Bed and wheelchair bound. Able to move upper extremities  SKIN: Inspection of skin and subcutaneous tissue: intact, no rashes identified. Skin warm dry and intact  NEURO: No facial asymmetry. Left hemiplegia at baseline. Flat  facial expression.     Lab/Diagnostic data:   CBC RESULTS:   Recent Labs   Lab Test 08/02/17 02/08/17  07/18/14  10/25/12   0745   WBC   --    --    --   5.1  6.3   RBC   --    --    --   4.19  4.16   HGB  13.0  13.1   < >  13.3  13.3   HCT   --    --    --   40.5  39.6   MCV   --    --    --   97  95   MCH   --    --    --   32  32.0   MCHC   --    --    --   33  33.6   RDW   --    --    --   12.6  12.7   PLT   --    --    --   237  205    < > = values in this interval not displayed.       Last Basic Metabolic Panel:  Recent Labs   Lab Test 08/02/17 02/22/17 02/08/17  07/18/14  10/25/12   0745  06/10/11   0915   NA  141   --   143   < >  143  143  140   POTASSIUM  3.7  3.8  4.1   < >  3.5  4.2  3.3*   CHLORIDE   --    --    --    --   107  104  103   CAROLYN   --    --    --    --   9.1  9.3  8.5   CO2   --    --    --    --    --   25  25   BUN  16   --   21   < >  22  23  14   CR  0.60   --   0.70   < >  0.74  0.68  0.66   GLC   --    --    --    --   87  97  96    < > = values in this interval not displayed.       ASSESSMENT/PLAN  Visit for annual health examination 2/2/18  Completed 2/2/18    Hemiplegia, late effect of cerebrovascular disease (H)  Chronic issue. Supportive care. Monitor.     Dysphagia, unspecified type  Chronic issue. TF as ordered. Feeding tube site care. Monitor for any respiratory symptoms r/t emesis on 2/1/18.     Seizure disorder (H)  By history - no longer on meds, monitor.     Dementia, vascular, with delusions (H)  Chronic and significant. Requires LTC. Continue scheduled Seroquel to assist with managing delusions.     Insomnia, unspecified type  Resolved. Doing well off meds. Monitor.     Essential hypertension, benign  By history, no longer on meds. Monitor.     Pernicious anemia  By history. Vit B supplements as ordered. F/U Hgb per routine.     Slow transit constipation, attempts to dig our BM, better with supps  Chronic and managed well with current meds. Monitor.     Arthritis of  knee  Chronic, managed well with current Tylenol and Ultram PRN. F/U if not controlled.     Advanced directives, counseling/discussion  Reviewed with family via VM: DNR/DNI, comfort care.     Orders:  No changes today.     Electronically signed by:  RADHA Marquez CNP

## 2024-02-20 NOTE — PROGRESS NOTES
Hospitalist Progress Note    NAME:   Valentina Maynard   : 1965   MRN: [id number]     Date/Time: 2024 3:29 PM  Patient PCP: [facility]    Estimated discharge date: 14  Barriers: Hb stability, monitoring bleeding      Assessment / Plan:    Acute lower GI bleeding  Crohn disease s/p colostomy bag placement  Recent dilation of the colostomy site  Holding eliquis and aspirin  Monitor H/H  Continue iv protonix  Continue iv fluids  Appreciate colo rectal surgery consult      Leukocytosis:  Likely reactive  Continue zosyn    CAD status post open heart surgery  History of SVC occlusion  History of CVA  Will hold Eliquis and aspirin, resume as soon as possible.       History of lupus  Continue Plaquenil        Medical Decision Making:   I personally reviewed labs: cbc, BMP  I personally reviewed imaging:  I personally reviewed EKG:  Toxic drug monitoring:   Discussed case with: patient, RN        Code Status: full  DVT Prophylaxis:scds   GI Prophylaxis:    Subjective:     Chief Complaint / Reason for Physician Visit  Patient reports that she continues to bleed from colostomy site. Denies chest pain, shortness of breath. Reports headache.       Objective:     VITALS:   Last 24hrs VS reviewed since prior progress note. Most recent are:  Patient Vitals for the past 24 hrs:   BP Temp Temp src Pulse Resp SpO2   24 1108 111/70 98.2 °F (36.8 °C) Oral (!) 101 18 --   24 0810 123/67 98.2 °F (36.8 °C) Oral (!) 107 17 --   24 0809 -- -- -- -- -- 96 %   24 0355 (!) 118/51 98 °F (36.7 °C) Oral 86 -- 98 %   24 2318 114/85 98.3 °F (36.8 °C) Oral 98 12 --   24 2215 114/81 -- -- (!) 105 10 96 %   24 2200 (!) 135/48 -- -- (!) 106 10 97 %   24 2145 139/75 -- -- (!) 107 -- 96 %   24 1945 109/81 -- -- 100 -- 96 %   24 1900 127/84 -- -- 97 -- 95 %   24 1817 123/73 -- -- 94 -- 97 %   24 1630 124/69 -- -- 92 -- 95 %         Intake/Output Summary (Last 24 hours)

## 2024-02-20 NOTE — OP NOTE
Operative Note      Patient: Valentina Maynard  YOB: 1965  MRN: [id number]    Date of Procedure: 2/13/2024    Pre-Op Diagnosis Codes:     * Colostomy complication (HCC) [K94.00]    Post-Op Diagnosis:  Colostomy stricture       Procedure(s):  COLOSTOMY DILATION    Surgeon(s):  Endy Schaffer II, MD    Assistant:   First Assistant: Cheryl Mattson RN    Anesthesia: General    Estimated Blood Loss (mL): Minimal    Complications: None    Specimens:   * No specimens in log *    Implants:  * No implants in log *      Drains:   Colostomy LUQ (Active)   Stomal Appliance 2 piece;Clean, dry & intact 02/20/24 0810   Peristomal Assessment Clean, dry & intact 02/20/24 0810   Stool Appearance Bloody 02/20/24 0810   Stool Color Red 02/20/24 0810   Output (mL) 200 ml 02/20/24 1809       Findings: Stricture of colostomy        Detailed Description of Procedure:   Patient was taken to the operating suite and placed in the supine position. General anesthesia was induced.  Abdomen was prepped and draped in the usual sterile fashion.  A timeout was performed indicating the correct patient and procedure.    Ema dilators were used to dilate the stoma from #8 to #16 dilator.  Bleeding was controlled with electrocautery and 3-0 Vicryl sutures.  Excess skin was excised with electrocautery.  3-0 Vicryl sutures were also used to draw down the skin to the mucosal edge thereby preventing skin overgrowth.     The patient tolerated the dilation well.  She was awakened, extubated and taken to the recovery area in stable condition.       Electronically signed by Endy Schaffer MD on 2/20/2024 at 6:38 PM

## 2024-02-20 NOTE — PROGRESS NOTES
Surgery NP Progress Note    Valentina Maynard  [id number]  female  58 y.o.  1965    Admitted for Principal Problem:    GI bleed  Resolved Problems:    * No resolved hospital problems. *    Pt seen with Dr. Schaffer    Assessment:     Patient with stomal bleeding s/p dilation and resumption of eliquis.     Plan/Recommendations/Medical Decision Making:     - Mobilize with nursing and OOB to chair for meals  - Advance diet  - Pain management- Continue current pain control methods.   - VTE Prophylaxis: Contraindicated due to acute blood loss.   - Ostomy consult  - D/C planning in 1-2 days if bleeding controlled.     Subjective:     Patient without complaint. She notes ongoing blood passing the stoma,     Objective:     Blood pressure (!) 118/51, pulse 86, temperature 98 °F (36.7 °C), temperature source Oral, resp. rate 12, height 1.549 m (5' 1\"), weight 84 kg (185 lb 3 oz), SpO2 98 %.    Temp (24hrs), Av.2 °F (36.8 °C), Min:98 °F (36.7 °C), Max:98.3 °F (36.8 °C)      Pt resting in bed NAD   SCDs for mechanical DVT proph while in bed   Ostomy with blood clot present.     Body mass index is 34.99 kg/m².     Reference: BMI greater than 30 is classified as obesity and greater than 40 is classified as morbid obesity.       Pat Turpin, APRN - NP   MSN, APRN, FNP-C, CWOCN-AP, RNAS-C    24

## 2024-02-20 NOTE — PROGRESS NOTES
Spiritual Care Assessment/Progress Note  [facility]    Name: Valentina Maynard MRN: [id number]    Age: 58 y.o.     Sex: female   Language: English     Date: 2/20/2024            Total Time Calculated: 77 min              Spiritual Assessment begun in [facility] 2 CARDIOPULMONARY CARE  Service Provided For:: Patient  Referral/Consult From:: Family  Encounter Overview/Reason : Initial Encounter    Spiritual beliefs:      [x] Involved in a eryn tradition/spiritual practice: Baptist     [x] Supported by a eryn community: [employer] [facility]     [] Claims no spiritual orientation:      [] Seeking spiritual identity:           [] Adheres to an individual form of spirituality:      [] Not able to assess:                Identified resources for coping and support system:   Support System: Spouse, Children, Family members, Judaism/eryn community       [x] Prayer                  [] Devotional reading               [] Music                  [] Guided Imagery     [] Pet visits                                        [] Other: (COMMENT)     Specific area/focus of visit   Encounter:    Crisis:    Spiritual/Emotional needs: Type: Spiritual Support, Emotional Distress  Ritual, Rites and Sacraments: Type: Anointing  Grief, Loss, and Adjustments:    Ethics/Mediation:    Behavioral Health:    Palliative Care:    Advance Care Planning:      Plan/Referrals: Coordinate Rites and/or Rituals, Continue Support (comment)    Narrative:    Responded to a page to call patient's sister, Nenita.  Nenita requested a  visit patient and offer Anointing of the Sick.  Reviewed patient chart and visited with her prior to contacting .  Father Nicola Palma will visit her this afternoon after he celebrates Mass in the chapel.  Provided supportive listening presence as Valentina shared an awakening she has experienced with this latest hospitalization.  She shared she was raised in a deeply rooted [employer] home,

## 2024-02-20 NOTE — ED NOTES
Bedside shift change report given to PHYLLIS Dallas (oncoming nurse) by PHYLLIS Neri (offgoing nurse). Report included the following information Nurse Handoff Report, ED Encounter Summary, ED SBAR, and MAR.

## 2024-02-20 NOTE — PROGRESS NOTES
End of Shift Note    Bedside shift change report given to PHYLLIS Cai (oncoming nurse) by Sebastian Michael RN (offgoing nurse).  Report included the following information SBAR, Kardex, and Cardiac Rhythm      Shift worked:  [id number]     Shift summary and any significant changes:     Monitoring H&H, decreasing but stable currently. Evaluated by WOCN. No acute changes.     Concerns for physician to address:  None     Zone phone for oncoming shift:   [phone]

## 2024-02-20 NOTE — WOUND CARE
Wound and Ostomy care:  Consult received to assist this patient with her colostomy bag s/p dilation of the colostomy on 2-13-24.  Pt. Did well and then was re-started on her Eliquis and she bled a large amount.  Her Hgb is dropping slowly over the last 24 hours.   She is admitted to monitor the bleeding and intervene as needed with transfusion when / if needed.  Pt. Is taking ice chips, water only for now due to possibility of surgical intervention. The stoma is retracted and functional active bleeding noted at the os of the stoma area.   Pt. With hx of Crohn's disease and several colectomies - now with a short colon. Hx of Heart surgery and a stroke due to CAD. Also hx of lupus.   Pt. Is quite proficient with her ostomy care and she has a system that works for her.   Today I just stood by to help as needed and hand her things. I gave her some new bags to try from the same company she uses ([employer]): the Convex flip that is used specifically for Hernias or when convexity is needed and the stoma size Is large.   Pt. Has an inverted stoma with a large clot I place. Only part of it was removed  (enough to allow the new pouch to fit. The skin was prepped per patient's usual routine.   Plan:  Will check on her a few times while she is here and be available if she needs anything. Call Pretty Garcia at [phone] (zone phone)   Pretty Garcia, RN, BSN, CWON

## 2024-02-21 LAB
ANION GAP SERPL CALC-SCNC: 7 MMOL/L (ref 5–15)
BASOPHILS # BLD: 0 K/UL (ref 0–0.1)
BASOPHILS NFR BLD: 0 % (ref 0–1)
BUN SERPL-MCNC: 9 MG/DL (ref 6–20)
BUN/CREAT SERPL: 9 (ref 12–20)
CALCIUM SERPL-MCNC: 8.5 MG/DL (ref 8.5–10.1)
CHLORIDE SERPL-SCNC: 109 MMOL/L (ref 97–108)
CO2 SERPL-SCNC: 24 MMOL/L (ref 21–32)
CREAT SERPL-MCNC: 1.04 MG/DL (ref 0.55–1.02)
DIFFERENTIAL METHOD BLD: ABNORMAL
EOSINOPHIL # BLD: 0.3 K/UL (ref 0–0.4)
EOSINOPHIL NFR BLD: 3 % (ref 0–7)
ERYTHROCYTE [DISTWIDTH] IN BLOOD BY AUTOMATED COUNT: 12.6 % (ref 11.5–14.5)
GLUCOSE SERPL-MCNC: 96 MG/DL (ref 65–100)
HCT VFR BLD AUTO: 24.6 % (ref 35–47)
HCT VFR BLD AUTO: 26.6 % (ref 35–47)
HCT VFR BLD AUTO: 26.9 % (ref 35–47)
HCT VFR BLD AUTO: 27.4 % (ref 35–47)
HGB BLD-MCNC: 8 G/DL (ref 11.5–16)
HGB BLD-MCNC: 8.5 G/DL (ref 11.5–16)
HGB BLD-MCNC: 8.5 G/DL (ref 11.5–16)
HGB BLD-MCNC: 8.8 G/DL (ref 11.5–16)
IMM GRANULOCYTES # BLD AUTO: 0 K/UL (ref 0–0.04)
IMM GRANULOCYTES NFR BLD AUTO: 0 % (ref 0–0.5)
LYMPHOCYTES # BLD: 2.8 K/UL (ref 0.8–3.5)
LYMPHOCYTES NFR BLD: 33 % (ref 12–49)
MCH RBC QN AUTO: 28.5 PG (ref 26–34)
MCHC RBC AUTO-ENTMCNC: 31.6 G/DL (ref 30–36.5)
MCV RBC AUTO: 90.3 FL (ref 80–99)
MONOCYTES # BLD: 0.7 K/UL (ref 0–1)
MONOCYTES NFR BLD: 8 % (ref 5–13)
NEUTS SEG # BLD: 4.8 K/UL (ref 1.8–8)
NEUTS SEG NFR BLD: 56 % (ref 32–75)
NRBC # BLD: 0 K/UL (ref 0–0.01)
NRBC BLD-RTO: 0 PER 100 WBC
PLATELET # BLD AUTO: 286 K/UL (ref 150–400)
PMV BLD AUTO: 10.3 FL (ref 8.9–12.9)
POTASSIUM SERPL-SCNC: 3.6 MMOL/L (ref 3.5–5.1)
RBC # BLD AUTO: 2.98 M/UL (ref 3.8–5.2)
SODIUM SERPL-SCNC: 140 MMOL/L (ref 136–145)
WBC # BLD AUTO: 8.6 K/UL (ref 3.6–11)

## 2024-02-21 PROCEDURE — 2060000000 HC ICU INTERMEDIATE R&B

## 2024-02-21 PROCEDURE — 2580000003 HC RX 258: Performed by: INTERNAL MEDICINE

## 2024-02-21 PROCEDURE — 85025 COMPLETE CBC W/AUTO DIFF WBC: CPT

## 2024-02-21 PROCEDURE — 6360000002 HC RX W HCPCS: Performed by: INTERNAL MEDICINE

## 2024-02-21 PROCEDURE — 6370000000 HC RX 637 (ALT 250 FOR IP): Performed by: INTERNAL MEDICINE

## 2024-02-21 PROCEDURE — 80048 BASIC METABOLIC PNL TOTAL CA: CPT

## 2024-02-21 PROCEDURE — C9113 INJ PANTOPRAZOLE SODIUM, VIA: HCPCS | Performed by: INTERNAL MEDICINE

## 2024-02-21 PROCEDURE — A4216 STERILE WATER/SALINE, 10 ML: HCPCS | Performed by: INTERNAL MEDICINE

## 2024-02-21 RX ORDER — LANOLIN ALCOHOL/MO/W.PET/CERES
3 CREAM (GRAM) TOPICAL NIGHTLY PRN
Status: DISCONTINUED | OUTPATIENT
Start: 2024-02-21 | End: 2024-02-26 | Stop reason: HOSPADM

## 2024-02-21 RX ADMIN — ONDANSETRON 4 MG: 2 INJECTION INTRAMUSCULAR; INTRAVENOUS at 15:18

## 2024-02-21 RX ADMIN — PIPERACILLIN AND TAZOBACTAM 3375 MG: 3; .375 INJECTION, POWDER, LYOPHILIZED, FOR SOLUTION INTRAVENOUS at 15:27

## 2024-02-21 RX ADMIN — PIPERACILLIN AND TAZOBACTAM 3375 MG: 3; .375 INJECTION, POWDER, LYOPHILIZED, FOR SOLUTION INTRAVENOUS at 06:18

## 2024-02-21 RX ADMIN — SODIUM CHLORIDE, PRESERVATIVE FREE 40 MG: 5 INJECTION INTRAVENOUS at 15:18

## 2024-02-21 RX ADMIN — GABAPENTIN 300 MG: 300 CAPSULE ORAL at 21:10

## 2024-02-21 RX ADMIN — MELATONIN 3 MG: at 22:32

## 2024-02-21 RX ADMIN — PIPERACILLIN AND TAZOBACTAM 3375 MG: 3; .375 INJECTION, POWDER, LYOPHILIZED, FOR SOLUTION INTRAVENOUS at 22:41

## 2024-02-21 RX ADMIN — HYDROXYCHLOROQUINE SULFATE 400 MG: 200 TABLET ORAL at 21:10

## 2024-02-21 RX ADMIN — HYDROXYCHLOROQUINE SULFATE 400 MG: 200 TABLET ORAL at 09:01

## 2024-02-21 RX ADMIN — GABAPENTIN 300 MG: 300 CAPSULE ORAL at 09:02

## 2024-02-21 RX ADMIN — SODIUM CHLORIDE, PRESERVATIVE FREE 40 MG: 5 INJECTION INTRAVENOUS at 05:06

## 2024-02-21 RX ADMIN — GABAPENTIN 300 MG: 300 CAPSULE ORAL at 15:18

## 2024-02-21 ASSESSMENT — PAIN SCALES - GENERAL
PAINLEVEL_OUTOF10: 2
PAINLEVEL_OUTOF10: 2

## 2024-02-21 NOTE — PROGRESS NOTES
Hospitalist Progress Note    NAME:   Valentina Maynard   : 1965   MRN: [id number]     Date/Time: 2024 2:43 PM  Patient PCP: [facility]    Estimated discharge date: 14  Barriers: Hb stability, monitoring bleeding, colorectal surgery clearance      Assessment / Plan:    Acute lower GI bleeding  Crohn disease s/p colostomy bag placement  Recent dilation of the colostomy site  Holding eliquis and aspirin  Monitor H/H  Continue iv protonix  Continue iv fluids  Appreciate colo rectal surgery consult      Leukocytosis:  Likely reactive  resolved  Continue zosyn    CAD status post open heart surgery  History of SVC occlusion  History of CVA  Will hold Eliquis and aspirin, resume as soon as possible.       History of lupus  Continue Plaquenil        Medical Decision Making:   I personally reviewed labs: cbc, BMP  I personally reviewed imaging:  I personally reviewed EKG:  Toxic drug monitoring:   Discussed case with: patient, RN        Code Status: full  DVT Prophylaxis:scds   GI Prophylaxis:    Subjective:     Chief Complaint / Reason for Physician Visit  Patient reports that her brain is foggy. States that bleeding is better- now has brown stool in osteomy bag      Objective:     VITALS:   Last 24hrs VS reviewed since prior progress note. Most recent are:  Patient Vitals for the past 24 hrs:   BP Temp Temp src Pulse Resp SpO2   24 1125 112/70 97.8 °F (36.6 °C) Oral 93 -- 95 %   24 0722 (!) 105/52 98.2 °F (36.8 °C) Oral 84 -- 97 %   24 0419 (!) 99/53 98.7 °F (37.1 °C) Oral 79 -- --   24 0157 (!) 103/53 98.8 °F (37.1 °C) Oral 85 -- --   24 2000 (!) 131/55 98.8 °F (37.1 °C) Oral 81 -- (!) 85 %   24 1643 (!) 118/57 98 °F (36.7 °C) Oral 99 18 --           Intake/Output Summary (Last 24 hours) at 2024 1443  Last data filed at 2024 1108  Gross per 24 hour   Intake --   Output 600 ml   Net -600 ml          I had a face to face encounter and independently examined this

## 2024-02-21 NOTE — PROGRESS NOTES
CRS Progress Note    Bleeding has slowed down after 48 hrs off Eliquis.  Still has a blood clot around the stoma.  Stool output starting from the stoma.  Reports \"brain fog\" (likely relate to anemia).    BP (!) 99/53   Pulse 79   Temp 98.7 °F (37.1 °C) (Oral)   Resp 18   Ht 1.549 m (5' 1\")   Wt 84 kg (185 lb 3 oz)   SpO2 (!) 85%   BMI 34.99 kg/m²     NAD, AAOx3  Abd, soft, minimally tender  Stool in ostomy appliance    Hgb 8.5 (From 8.0)    Plan  Adv to low fiber diet  No surgical intervention planned.  Possible discharge tomorrow if Hgb stable

## 2024-02-22 LAB
HCT VFR BLD AUTO: 23.4 % (ref 35–47)
HCT VFR BLD AUTO: 28.5 % (ref 35–47)
HCT VFR BLD AUTO: 28.7 % (ref 35–47)
HGB BLD-MCNC: 7.3 G/DL (ref 11.5–16)
HGB BLD-MCNC: 8.8 G/DL (ref 11.5–16)
HGB BLD-MCNC: 9 G/DL (ref 11.5–16)
HISTORY CHECK: NORMAL

## 2024-02-22 PROCEDURE — 6370000000 HC RX 637 (ALT 250 FOR IP): Performed by: INTERNAL MEDICINE

## 2024-02-22 PROCEDURE — 6360000002 HC RX W HCPCS: Performed by: INTERNAL MEDICINE

## 2024-02-22 PROCEDURE — C9113 INJ PANTOPRAZOLE SODIUM, VIA: HCPCS | Performed by: INTERNAL MEDICINE

## 2024-02-22 PROCEDURE — 85014 HEMATOCRIT: CPT

## 2024-02-22 PROCEDURE — 2580000003 HC RX 258: Performed by: INTERNAL MEDICINE

## 2024-02-22 PROCEDURE — 86900 BLOOD TYPING SEROLOGIC ABO: CPT

## 2024-02-22 PROCEDURE — 36415 COLL VENOUS BLD VENIPUNCTURE: CPT

## 2024-02-22 PROCEDURE — 86850 RBC ANTIBODY SCREEN: CPT

## 2024-02-22 PROCEDURE — 86901 BLOOD TYPING SEROLOGIC RH(D): CPT

## 2024-02-22 PROCEDURE — 85018 HEMOGLOBIN: CPT

## 2024-02-22 PROCEDURE — 86923 COMPATIBILITY TEST ELECTRIC: CPT

## 2024-02-22 PROCEDURE — A4216 STERILE WATER/SALINE, 10 ML: HCPCS | Performed by: INTERNAL MEDICINE

## 2024-02-22 PROCEDURE — 2060000000 HC ICU INTERMEDIATE R&B

## 2024-02-22 RX ORDER — SODIUM CHLORIDE 9 MG/ML
INJECTION, SOLUTION INTRAVENOUS PRN
Status: DISCONTINUED | OUTPATIENT
Start: 2024-02-22 | End: 2024-02-22

## 2024-02-22 RX ADMIN — HYDROXYCHLOROQUINE SULFATE 400 MG: 200 TABLET ORAL at 09:38

## 2024-02-22 RX ADMIN — BUTALBITAL, ACETAMINOPHEN, AND CAFFEINE 2 TABLET: 50; 325; 40 TABLET ORAL at 15:23

## 2024-02-22 RX ADMIN — GABAPENTIN 300 MG: 300 CAPSULE ORAL at 09:37

## 2024-02-22 RX ADMIN — PIPERACILLIN AND TAZOBACTAM 3375 MG: 3; .375 INJECTION, POWDER, LYOPHILIZED, FOR SOLUTION INTRAVENOUS at 06:10

## 2024-02-22 RX ADMIN — HYDROXYCHLOROQUINE SULFATE 400 MG: 200 TABLET ORAL at 21:44

## 2024-02-22 RX ADMIN — SODIUM CHLORIDE, PRESERVATIVE FREE 40 MG: 5 INJECTION INTRAVENOUS at 06:02

## 2024-02-22 RX ADMIN — SODIUM CHLORIDE, PRESERVATIVE FREE 40 MG: 5 INJECTION INTRAVENOUS at 15:23

## 2024-02-22 RX ADMIN — GABAPENTIN 300 MG: 300 CAPSULE ORAL at 15:23

## 2024-02-22 RX ADMIN — PIPERACILLIN AND TAZOBACTAM 3375 MG: 3; .375 INJECTION, POWDER, LYOPHILIZED, FOR SOLUTION INTRAVENOUS at 15:26

## 2024-02-22 RX ADMIN — MELATONIN 3 MG: at 21:44

## 2024-02-22 RX ADMIN — PIPERACILLIN AND TAZOBACTAM 3375 MG: 3; .375 INJECTION, POWDER, LYOPHILIZED, FOR SOLUTION INTRAVENOUS at 21:37

## 2024-02-22 RX ADMIN — GABAPENTIN 300 MG: 300 CAPSULE ORAL at 21:44

## 2024-02-22 ASSESSMENT — PAIN SCALES - GENERAL
PAINLEVEL_OUTOF10: 6
PAINLEVEL_OUTOF10: 6

## 2024-02-22 ASSESSMENT — PAIN - FUNCTIONAL ASSESSMENT
PAIN_FUNCTIONAL_ASSESSMENT: ACTIVITIES ARE NOT PREVENTED
PAIN_FUNCTIONAL_ASSESSMENT: ACTIVITIES ARE NOT PREVENTED

## 2024-02-22 ASSESSMENT — PAIN DESCRIPTION - LOCATION
LOCATION: HEAD
LOCATION: HEAD

## 2024-02-22 ASSESSMENT — PAIN DESCRIPTION - DESCRIPTORS
DESCRIPTORS: ACHING
DESCRIPTORS: ACHING

## 2024-02-22 ASSESSMENT — PAIN DESCRIPTION - ORIENTATION
ORIENTATION: POSTERIOR
ORIENTATION: POSTERIOR

## 2024-02-22 NOTE — PROGRESS NOTES
CRS Progress note    Patient is very concerned about the 3am lab draw Hgb 7.3.  I explained that this is just one lab value and her body will equalize all of her blood losses.  I also explained that she will have fluid distributing back into the blood stream which can dilute the blood stream.  She is very concerned about her brain fog as well..    /63   Pulse 95   Temp 98.4 °F (36.9 °C) (Oral)   Resp 16   Ht 1.549 m (5' 1\")   Wt 84 kg (185 lb 3 oz)   SpO2 96%   BMI 34.99 kg/m²     NAD, AAOx3  Abd soft  No blood from stoma  Serous fluid from inflammation under the stoma    Plan  Continue diet as tolerate  Monitor Hgb  Consult Hematology.  Patient sees Dr. Pavon in the past

## 2024-02-22 NOTE — CARE COORDINATION
Transition of Care Plan:    RUR: 8%  Prior Level of Functioning: Independent  Disposition: Home with outpatient services  If SNF or IPR: Date FOC offered: N/A  Follow up appointments: PCP (Patient would like to schedule follow up appointments herself)  DME needed: None  Transportation at discharge: TBD  IM/IMM Medicare/ letter given: N/A  Is patient a [place] and connected with [facility]? No   If yes, was [place] transfer form completed and [facility] notified? N/A  Caregiver Contact: Self  Discharge Caregiver contacted prior to discharge? N/A  Care Conference needed? Not at this time  Barriers to discharge: Medical stability     CM discussed pt's discharge plan with interdisciplinary team in IDR at 10:00 AM. Pt to potentially receive blood transfusion tomorrow.    CM to follow for discharge planning.    Adele Valenzuela LMSW,   [phone]

## 2024-02-22 NOTE — PROGRESS NOTES
Consent for blood transfusion:    I have discussed with the patient the rationale for blood component transfusion; its benefits in treating or preventing fatigue, organ damage, or death; and its risk which includes mild transfusion reactions, rare risk of blood borne infection, or more serious but rare reactions. I have discussed the alternatives to transfusion, including the risk and consequences of not receiving transfusion. The patient had an opportunity to ask questions and had agreed to proceed with transfusion of blood components.

## 2024-02-22 NOTE — PROGRESS NOTES
Hospitalist Progress Note    NAME:   Valentina Maynard   : 1965   MRN: [id number]     Date/Time: 2024 7:56 AM  Patient PCP: Itzel, Pcp    Estimated discharge date: 24 to 48 hours  Barriers: Hemodynamic stability, hemoglobin stabilization      Assessment / Plan:  Acute lower GI bleeding  Crohn disease s/p colostomy bag placement  Recent dilation of the colostomy site  Holding eliquis and aspirin  Monitor H/H  Continue iv protonix  Continue iv fluids  Appreciate colo rectal surgery consult  : H&H of 7.3/23.4.  Patient has been hypotensive this morning.  Repeat hemoglobin of 9.0.  Monitor CBC for now.  Patient is okay with blood transfusion if need be.    Hypotension  : The patient has been hypotensive this morning with blood pressure as low as 64/46.  Last 1 was 96/57.     Leukocytosis  Likely reactive  resolved  Continue zosyn     CAD status post open heart surgery  History of SVC occlusion  History of CVA  Will hold Eliquis and aspirin, resume as soon as possible.       History of lupus  Continue Plaquenil      Medical Decision Making:   I personally reviewed labs: CBC, BMP  I personally reviewed imaging:  I personally reviewed EKG:  Toxic drug monitoring:   Discussed case with: Patient, RN        Code Status: Full code  DVT Prophylaxis: SCDs  GI Prophylaxis: Protonix    Subjective:     Chief Complaint / Reason for Physician Visit  \" Follow-up for acute lower GI bleed, Crohn's disease status post colostomy, recent dilation of colostomy site, leukocytosis, CAD with CABG, history of SVC occlusion, history of CVA, lupus\".  Discussed with RN events overnight.   : Patient denies any blood in the stool or black stool.    Objective:     VITALS:   Last 24hrs VS reviewed since prior progress note. Most recent are:  Patient Vitals for the past 24 hrs:   BP Temp Temp src Pulse Resp SpO2   24 2320 122/63 98.4 °F (36.9 °C) Oral 95 -- 96 %   24 1923 (!) 108/49 98 °F (36.7 °C) Oral 92 16 --

## 2024-02-22 NOTE — PROGRESS NOTES
Physician Progress Note      PATIENT:               KENYON SPIVEY  CSN #:                  [id number]  :                       1965  ADMIT DATE:       2024 10:35 AM  DISCH DATE:  RESPONDING  PROVIDER #:        Moni Aleman MD          QUERY TEXT:    Pt admitted with blood in ostomy bag and underwent \"dilation at colostomy   site\" a week ago, noted documentation of Lower GI bleed in setting of Crohn   disease status post colostomy bag placement per H&P dated .? If possible,   please document in progress notes and discharge summary the relationship if   any between the Lower GIB and the surgery:    The medical record reflects the following:  Risk Factors: Hx: Crohn's Dz. S/p Colostomy Bag; A-Fib w/ Chronic Eliquis use  Clinical Indicators: H/H: 12.6/39.2; 11.3/34.2; 10.1/31.2; 9.7/30.6     AP: Lower GI bleed in setting of Crohn disease status post colostomy bag   placement  Recent dilation of the colostomy site     CRS PN: \"female who presents with severe bleeding from colostomy after   dilation last week.  She had stomal stenosis which was dilated last week.    Stopped Eliquis for 5 days and then restarted.  Bleeding started last night   and progressed to large clots\".     CRS PN: \"Patient with stomal bleeding s/p dilation and resumption of   eliquis\".     CRS: \"Bleeding has slowed down after 48 hrs off Eliquis.  Still has a   blood clot around the stoma.  Stool output starting from the stoma.  Reports   \"brain fog\" (likely relate to anemia)\".    Treatment:  H&H every 6 hours; IP Admit; CT Abd/Pelvis; IV Abx; Hold   Eliquis/ASA; IV Protonix 40 mg twice daily, IVF; CRS consult;      Thank you,    [email or web address]  Options provided:  -- Acute Lower GIB is due to the procedure  -- Acute Lower GIB is not due to the procedure, but is due to chronic Eliquis   use  -- Other - I will add my own diagnosis  -- Disagree - Not applicable / Not valid  -- Disagree -  Clinically unable to determine / Unknown  -- Refer to Clinical Documentation Reviewer    PROVIDER RESPONSE TEXT:    Patient has Acute lower GIB due to the procedure.    Query created by: Jovita Pearce on 2/21/2024 2:15 PM      Electronically signed by:  Moni Aleman MD 2/22/2024 12:59 PM

## 2024-02-23 ENCOUNTER — APPOINTMENT (OUTPATIENT)
Facility: HOSPITAL | Age: 59
DRG: 394 | End: 2024-02-23
Payer: COMMERCIAL

## 2024-02-23 ENCOUNTER — APPOINTMENT (OUTPATIENT)
Facility: HOSPITAL | Age: 59
DRG: 394 | End: 2024-02-23
Attending: INTERNAL MEDICINE
Payer: COMMERCIAL

## 2024-02-23 LAB
ANION GAP SERPL CALC-SCNC: 6 MMOL/L (ref 5–15)
BUN SERPL-MCNC: 10 MG/DL (ref 6–20)
BUN/CREAT SERPL: 9 (ref 12–20)
CALCIUM SERPL-MCNC: 7.7 MG/DL (ref 8.5–10.1)
CHLORIDE SERPL-SCNC: 114 MMOL/L (ref 97–108)
CO2 SERPL-SCNC: 22 MMOL/L (ref 21–32)
CREAT SERPL-MCNC: 1.07 MG/DL (ref 0.55–1.02)
ECHO BSA: 1.9 M2
ERYTHROCYTE [DISTWIDTH] IN BLOOD BY AUTOMATED COUNT: 12.8 % (ref 11.5–14.5)
GLUCOSE SERPL-MCNC: 93 MG/DL (ref 65–100)
HCT VFR BLD AUTO: 25.5 % (ref 35–47)
HGB BLD-MCNC: 8.2 G/DL (ref 11.5–16)
MAGNESIUM SERPL-MCNC: 1.5 MG/DL (ref 1.6–2.4)
MCH RBC QN AUTO: 29.8 PG (ref 26–34)
MCHC RBC AUTO-ENTMCNC: 32.2 G/DL (ref 30–36.5)
MCV RBC AUTO: 92.7 FL (ref 80–99)
NRBC # BLD: 0 K/UL (ref 0–0.01)
NRBC BLD-RTO: 0 PER 100 WBC
PHOSPHATE SERPL-MCNC: 2.3 MG/DL (ref 2.6–4.7)
PLATELET # BLD AUTO: 299 K/UL (ref 150–400)
PMV BLD AUTO: 11 FL (ref 8.9–12.9)
POTASSIUM SERPL-SCNC: 3.6 MMOL/L (ref 3.5–5.1)
RBC # BLD AUTO: 2.75 M/UL (ref 3.8–5.2)
SODIUM SERPL-SCNC: 142 MMOL/L (ref 136–145)
WBC # BLD AUTO: 10.6 K/UL (ref 3.6–11)

## 2024-02-23 PROCEDURE — 6360000004 HC RX CONTRAST MEDICATION: Performed by: RADIOLOGY

## 2024-02-23 PROCEDURE — 84100 ASSAY OF PHOSPHORUS: CPT

## 2024-02-23 PROCEDURE — A4216 STERILE WATER/SALINE, 10 ML: HCPCS | Performed by: INTERNAL MEDICINE

## 2024-02-23 PROCEDURE — 83735 ASSAY OF MAGNESIUM: CPT

## 2024-02-23 PROCEDURE — 2580000003 HC RX 258: Performed by: INTERNAL MEDICINE

## 2024-02-23 PROCEDURE — 36415 COLL VENOUS BLD VENIPUNCTURE: CPT

## 2024-02-23 PROCEDURE — 2060000000 HC ICU INTERMEDIATE R&B

## 2024-02-23 PROCEDURE — 6370000000 HC RX 637 (ALT 250 FOR IP): Performed by: INTERNAL MEDICINE

## 2024-02-23 PROCEDURE — 80048 BASIC METABOLIC PNL TOTAL CA: CPT

## 2024-02-23 PROCEDURE — 6360000002 HC RX W HCPCS: Performed by: INTERNAL MEDICINE

## 2024-02-23 PROCEDURE — C9113 INJ PANTOPRAZOLE SODIUM, VIA: HCPCS | Performed by: INTERNAL MEDICINE

## 2024-02-23 PROCEDURE — 85027 COMPLETE CBC AUTOMATED: CPT

## 2024-02-23 PROCEDURE — 74177 CT ABD & PELVIS W/CONTRAST: CPT

## 2024-02-23 PROCEDURE — 93321 DOPPLER ECHO F-UP/LMTD STD: CPT

## 2024-02-23 RX ORDER — BUPRENORPHINE 10 UG/H
1 PATCH TRANSDERMAL WEEKLY
Status: DISCONTINUED | OUTPATIENT
Start: 2024-02-23 | End: 2024-02-26 | Stop reason: HOSPADM

## 2024-02-23 RX ORDER — SODIUM CHLORIDE 0.9 % (FLUSH) 0.9 %
5-40 SYRINGE (ML) INJECTION PRN
Status: DISCONTINUED | OUTPATIENT
Start: 2024-02-23 | End: 2024-02-26 | Stop reason: HOSPADM

## 2024-02-23 RX ORDER — SODIUM CHLORIDE 9 MG/ML
INJECTION, SOLUTION INTRAVENOUS PRN
Status: DISCONTINUED | OUTPATIENT
Start: 2024-02-23 | End: 2024-02-26 | Stop reason: HOSPADM

## 2024-02-23 RX ORDER — SODIUM CHLORIDE 9 MG/ML
INJECTION, SOLUTION INTRAVENOUS PRN
Status: DISCONTINUED | OUTPATIENT
Start: 2024-02-23 | End: 2024-02-23

## 2024-02-23 RX ORDER — POTASSIUM CHLORIDE 20 MEQ/1
40 TABLET, EXTENDED RELEASE ORAL ONCE
Status: COMPLETED | OUTPATIENT
Start: 2024-02-23 | End: 2024-02-23

## 2024-02-23 RX ORDER — MAGNESIUM SULFATE 1 G/100ML
1000 INJECTION INTRAVENOUS
Status: DISPENSED | OUTPATIENT
Start: 2024-02-23 | End: 2024-02-23

## 2024-02-23 RX ORDER — SODIUM CHLORIDE 0.9 % (FLUSH) 0.9 %
5-40 SYRINGE (ML) INJECTION EVERY 12 HOURS SCHEDULED
Status: DISCONTINUED | OUTPATIENT
Start: 2024-02-23 | End: 2024-02-26 | Stop reason: HOSPADM

## 2024-02-23 RX ORDER — TRAMADOL HYDROCHLORIDE 50 MG/1
50 TABLET ORAL EVERY 6 HOURS PRN
Status: DISCONTINUED | OUTPATIENT
Start: 2024-02-23 | End: 2024-02-26 | Stop reason: HOSPADM

## 2024-02-23 RX ADMIN — IRON SUCROSE 200 MG: 20 INJECTION, SOLUTION INTRAVENOUS at 16:36

## 2024-02-23 RX ADMIN — SODIUM CHLORIDE, PRESERVATIVE FREE 10 ML: 5 INJECTION INTRAVENOUS at 23:51

## 2024-02-23 RX ADMIN — GABAPENTIN 300 MG: 300 CAPSULE ORAL at 21:11

## 2024-02-23 RX ADMIN — HYDROXYCHLOROQUINE SULFATE 400 MG: 200 TABLET ORAL at 21:11

## 2024-02-23 RX ADMIN — SODIUM CHLORIDE, PRESERVATIVE FREE 40 MG: 5 INJECTION INTRAVENOUS at 05:52

## 2024-02-23 RX ADMIN — POTASSIUM CHLORIDE 40 MEQ: 1500 TABLET, EXTENDED RELEASE ORAL at 10:23

## 2024-02-23 RX ADMIN — IOPAMIDOL 100 ML: 755 INJECTION, SOLUTION INTRAVENOUS at 17:57

## 2024-02-23 RX ADMIN — SODIUM CHLORIDE, PRESERVATIVE FREE 10 ML: 5 INJECTION INTRAVENOUS at 12:48

## 2024-02-23 RX ADMIN — GABAPENTIN 300 MG: 300 CAPSULE ORAL at 09:38

## 2024-02-23 RX ADMIN — GABAPENTIN 300 MG: 300 CAPSULE ORAL at 15:34

## 2024-02-23 RX ADMIN — MELATONIN 3 MG: at 21:16

## 2024-02-23 RX ADMIN — SODIUM CHLORIDE, PRESERVATIVE FREE 40 MG: 5 INJECTION INTRAVENOUS at 16:36

## 2024-02-23 RX ADMIN — BUTALBITAL, ACETAMINOPHEN, AND CAFFEINE 2 TABLET: 50; 325; 40 TABLET ORAL at 15:33

## 2024-02-23 RX ADMIN — ONDANSETRON 4 MG: 4 TABLET, ORALLY DISINTEGRATING ORAL at 21:31

## 2024-02-23 RX ADMIN — SODIUM CHLORIDE 50 ML: 9 INJECTION, SOLUTION INTRAVENOUS at 21:24

## 2024-02-23 RX ADMIN — HYDROXYCHLOROQUINE SULFATE 400 MG: 200 TABLET ORAL at 09:38

## 2024-02-23 RX ADMIN — SODIUM CHLORIDE: 9 INJECTION, SOLUTION INTRAVENOUS at 12:45

## 2024-02-23 RX ADMIN — DIATRIZOATE MEGLUMINE AND DIATRIZOATE SODIUM 30 ML: 600; 100 SOLUTION ORAL; RECTAL at 16:28

## 2024-02-23 RX ADMIN — PIPERACILLIN AND TAZOBACTAM 3375 MG: 3; .375 INJECTION, POWDER, LYOPHILIZED, FOR SOLUTION INTRAVENOUS at 21:25

## 2024-02-23 RX ADMIN — MAGNESIUM SULFATE HEPTAHYDRATE 1000 MG: 1 INJECTION, SOLUTION INTRAVENOUS at 10:41

## 2024-02-23 RX ADMIN — PIPERACILLIN AND TAZOBACTAM 3375 MG: 3; .375 INJECTION, POWDER, LYOPHILIZED, FOR SOLUTION INTRAVENOUS at 12:47

## 2024-02-23 ASSESSMENT — PAIN SCALES - GENERAL
PAINLEVEL_OUTOF10: 2
PAINLEVEL_OUTOF10: 0
PAINLEVEL_OUTOF10: 6
PAINLEVEL_OUTOF10: 0
PAINLEVEL_OUTOF10: 0

## 2024-02-23 ASSESSMENT — PAIN DESCRIPTION - DESCRIPTORS: DESCRIPTORS: ACHING

## 2024-02-23 ASSESSMENT — PAIN DESCRIPTION - LOCATION: LOCATION: HEAD

## 2024-02-23 ASSESSMENT — PAIN DESCRIPTION - ORIENTATION: ORIENTATION: MID

## 2024-02-23 NOTE — WOUND CARE
Wound Care follow up for this patient with anemia secondary to bleeding at the ostomy site.  The bleeding has slowed some but her Hemoglobin is getting low in the trending.   Pt. Is still alert and oriented and can do her ostomy care well.   Today assisted with the ostomy care - mostly the skin care at the 7 o'clock position of the peristomal skin where there is a small skin tear. Treated with Marathon skin protectant today.   Pt. Did her own ostomy care and we used a large Convex light pouch cut to 50 mm round. Pt. Applied it with an ostomy ring and the \"half moon stickies\" as she likes to call them.   Photo of the stoma today: mild local bleeding  But with a surge of stool there is some blood-  Tinged stool. Direct pressure treated the local  Bleeding.       Plan: Pt. To have her next pouch changed on Monday and she will allow students to see the ostomy care if they want to.   Pt. To CT scan now with Transport.  Pretty Garcia, RN, BSN, CWON

## 2024-02-23 NOTE — CONSULTS
[facility]   at [facility]   [address], [address]   [place] [address]   [phone]     CONSULT NOTE           Patient: Valentina Maynard  MRN: [id number]   SSN: [id number]    YOB: 1965            Subjective:        Valentina Maynard is a 57 y.o. female with a history of PE on systemic anticoagulation. 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 She was diagnosed with PE in 04/2016.      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 She underwent a resection and bypass graft of the SVC. The SVC stenosis was felt to be related to a chronic indwelling port.  She has been on Apixaban and prefers to remain on long-term systemic anticoagulation.     Currently admitted with иван bleeding and large clots via ostomy.  Reports some brain fog and lethargy which she attributes to low iron and blood counts.  No additional иван bleeding since admission on 2/19. Anticoagulation on hold since admission.  Denies pain.             Review of Systems:      Constitutional: negative   Eyes: negative   Ears, Nose, Mouth, Throat, and Face: negative   Respiratory: negative   Cardiovascular: negative   Gastrointestinal: GI bleeding   Genitourinary:negative   Integument/Breast: negative   Hematologic/Lymphatic: negative   Musculoskeletal:negative   Neurological: negative           Past Medical History:   Diagnosis Date    Adverse effect of anesthesia     \"takes me a while to go under\"    Anemia     weekly iron infusions    Atrial fibrillation (HCC)     Dr. Woodson    CAD (coronary artery disease) 07/2016    SVC occlusion open heart by Dr.Mark Jose    Chronic pain     Dr Guillen pain management    Chronic pain     abdominal - [facility]    Crohn's disease (HCC)     Dr Pool &

## 2024-02-23 NOTE — PROGRESS NOTES
CRS Progress Note    Reports severe lethargy and brain fog.  Denies abdominal pain.  Very concerned about Hgb and blood pressure fluctuation.    /67   Pulse 93   Temp 98.1 °F (36.7 °C) (Oral)   Resp 18   Ht 1.549 m (5' 1\")   Wt 84 kg (185 lb 3 oz)   SpO2 98%   BMI 34.99 kg/m²     NAD, AAOx3  Abd soft, nontender    Plan  -CT abd/pelvis to assess for intra-abdominal source  -Defer to primary team regarding BP and intermittent hypotension  -Consult hematology.  Patient concerned about Fe saturation and would like to know when to restart Eliquis before surgery.  Surgery planned for the end of April

## 2024-02-23 NOTE — PLAN OF CARE
Problem: Pain  Goal: Verbalizes/displays adequate comfort level or baseline comfort level  Outcome: Progressing  Flowsheets (Taken 2/23/2024 0805 by Salena Snyder RN)  Verbalizes/displays adequate comfort level or baseline comfort level:   Encourage patient to monitor pain and request assistance   Assess pain using appropriate pain scale     Problem: Safety - Adult  Goal: Free from fall injury  Outcome: Progressing     Problem: ABCDS Injury Assessment  Goal: Absence of physical injury  Outcome: Progressing     Problem: Chronic Conditions and Co-morbidities  Goal: Patient's chronic conditions and co-morbidity symptoms are monitored and maintained or improved  Outcome: Progressing     Problem: Neurosensory - Adult  Goal: Achieves stable or improved neurological status  Outcome: Progressing  Goal: Absence of seizures  Outcome: Progressing  Goal: Remains free of injury related to seizures activity  Outcome: Progressing

## 2024-02-23 NOTE — PROGRESS NOTES
Hospitalist Progress Note    NAME:   Valentina Maynard   : 1965   MRN: [id number]     Date/Time: 2024 8:58 AM  Patient PCP: Itzel, Pcp    Estimated discharge date: 24 to 48 hours  Barriers: Hemodynamic stability, hemoglobin stabilization, echocardiogram, heme-onc consult      Assessment / Plan:  Acute lower GI bleeding  Crohn disease s/p colostomy bag placement  Recent dilation of the colostomy site  Holding eliquis and aspirin  Monitor H/H  Continue iv protonix  Continue iv fluids  Appreciate colo rectal surgery consult  : H&H of 7.3/23.4.  Patient has been hypotensive this morning.  Repeat hemoglobin of 9.0.  Monitor CBC for now.  Patient is okay with blood transfusion if need be.  : Hemoglobin staying at 8.2.  No home blood or black stool from the colostomy site.  Monitor CBC for now.    Hypotension  : The patient has been hypotensive this morning with blood pressure as low as 64/46.  Last 1 was 96/57.  : Patient still orthostatic.  Will get echocardiogram to assess heart function.    Hypomagnesemia  : Magnesium of 1.5 and will give 2 g of IV mag sulfate.  Potassium low normal at 3.6 and will give 40 KCl.     Leukocytosis  Likely reactive  resolved  Continue zosyn     CAD status post open heart surgery  History of SVC occlusion  History of CVA  Will hold Eliquis and aspirin, resume as soon as possible.  : Patient would like to talk to heme-onc about her anemia and when to resume Eliquis.     History of lupus  Continue Plaquenil      Medical Decision Making:   I personally reviewed labs: CBC, BMP, magnesium, phosphorus  I personally reviewed imaging:  I personally reviewed EKG:  Toxic drug monitoring:   Discussed case with: Patient, RN        Code Status: Full code  DVT Prophylaxis: SCDs  GI Prophylaxis: Protonix    Subjective:     Chief Complaint / Reason for Physician Visit  \" Follow-up for acute lower GI bleed, Crohn's disease status post colostomy, recent dilation of colostomy

## 2024-02-23 NOTE — PLAN OF CARE
Problem: Discharge Planning  Goal: Discharge to home or other facility with appropriate resources  Outcome: Progressing  Flowsheets (Taken 2/22/2024 0805)  Discharge to home or other facility with appropriate resources: Identify barriers to discharge with patient and caregiver     Problem: Pain  Goal: Verbalizes/displays adequate comfort level or baseline comfort level  Outcome: Progressing  Flowsheets  Taken 2/22/2024 1523  Verbalizes/displays adequate comfort level or baseline comfort level:   Encourage patient to monitor pain and request assistance   Assess pain using appropriate pain scale  Taken 2/22/2024 0805  Verbalizes/displays adequate comfort level or baseline comfort level:   Encourage patient to monitor pain and request assistance   Assess pain using appropriate pain scale     Problem: Safety - Adult  Goal: Free from fall injury  Outcome: Progressing     Problem: ABCDS Injury Assessment  Goal: Absence of physical injury  Outcome: Progressing

## 2024-02-24 LAB
ANION GAP SERPL CALC-SCNC: 7 MMOL/L (ref 5–15)
BUN SERPL-MCNC: 10 MG/DL (ref 6–20)
BUN/CREAT SERPL: 9 (ref 12–20)
CALCIUM SERPL-MCNC: 7.9 MG/DL (ref 8.5–10.1)
CHLORIDE SERPL-SCNC: 114 MMOL/L (ref 97–108)
CO2 SERPL-SCNC: 21 MMOL/L (ref 21–32)
CREAT SERPL-MCNC: 1.08 MG/DL (ref 0.55–1.02)
ERYTHROCYTE [DISTWIDTH] IN BLOOD BY AUTOMATED COUNT: 12.8 % (ref 11.5–14.5)
GLUCOSE SERPL-MCNC: 97 MG/DL (ref 65–100)
HCT VFR BLD AUTO: 24.6 % (ref 35–47)
HGB BLD-MCNC: 8 G/DL (ref 11.5–16)
MAGNESIUM SERPL-MCNC: 1.8 MG/DL (ref 1.6–2.4)
MCH RBC QN AUTO: 29.5 PG (ref 26–34)
MCHC RBC AUTO-ENTMCNC: 32.5 G/DL (ref 30–36.5)
MCV RBC AUTO: 90.8 FL (ref 80–99)
NRBC # BLD: 0 K/UL (ref 0–0.01)
NRBC BLD-RTO: 0 PER 100 WBC
PHOSPHATE SERPL-MCNC: 2.8 MG/DL (ref 2.6–4.7)
PLATELET # BLD AUTO: 290 K/UL (ref 150–400)
PMV BLD AUTO: 10.7 FL (ref 8.9–12.9)
POTASSIUM SERPL-SCNC: 4 MMOL/L (ref 3.5–5.1)
RBC # BLD AUTO: 2.71 M/UL (ref 3.8–5.2)
SODIUM SERPL-SCNC: 142 MMOL/L (ref 136–145)
WBC # BLD AUTO: 8.7 K/UL (ref 3.6–11)

## 2024-02-24 PROCEDURE — 85027 COMPLETE CBC AUTOMATED: CPT

## 2024-02-24 PROCEDURE — 6360000002 HC RX W HCPCS: Performed by: INTERNAL MEDICINE

## 2024-02-24 PROCEDURE — 2580000003 HC RX 258: Performed by: INTERNAL MEDICINE

## 2024-02-24 PROCEDURE — 84100 ASSAY OF PHOSPHORUS: CPT

## 2024-02-24 PROCEDURE — A4216 STERILE WATER/SALINE, 10 ML: HCPCS | Performed by: INTERNAL MEDICINE

## 2024-02-24 PROCEDURE — 2060000000 HC ICU INTERMEDIATE R&B

## 2024-02-24 PROCEDURE — 80048 BASIC METABOLIC PNL TOTAL CA: CPT

## 2024-02-24 PROCEDURE — C9113 INJ PANTOPRAZOLE SODIUM, VIA: HCPCS | Performed by: INTERNAL MEDICINE

## 2024-02-24 PROCEDURE — 36415 COLL VENOUS BLD VENIPUNCTURE: CPT

## 2024-02-24 PROCEDURE — 83735 ASSAY OF MAGNESIUM: CPT

## 2024-02-24 PROCEDURE — 6370000000 HC RX 637 (ALT 250 FOR IP): Performed by: INTERNAL MEDICINE

## 2024-02-24 RX ORDER — MAGNESIUM SULFATE 1 G/100ML
1000 INJECTION INTRAVENOUS ONCE
Status: COMPLETED | OUTPATIENT
Start: 2024-02-24 | End: 2024-02-24

## 2024-02-24 RX ADMIN — IRON SUCROSE 200 MG: 20 INJECTION, SOLUTION INTRAVENOUS at 16:06

## 2024-02-24 RX ADMIN — PIPERACILLIN AND TAZOBACTAM 3375 MG: 3; .375 INJECTION, POWDER, LYOPHILIZED, FOR SOLUTION INTRAVENOUS at 04:45

## 2024-02-24 RX ADMIN — SODIUM CHLORIDE, PRESERVATIVE FREE 10 ML: 5 INJECTION INTRAVENOUS at 21:30

## 2024-02-24 RX ADMIN — GABAPENTIN 300 MG: 300 CAPSULE ORAL at 08:44

## 2024-02-24 RX ADMIN — GABAPENTIN 300 MG: 300 CAPSULE ORAL at 15:02

## 2024-02-24 RX ADMIN — HYDROXYCHLOROQUINE SULFATE 400 MG: 200 TABLET ORAL at 08:44

## 2024-02-24 RX ADMIN — MAGNESIUM SULFATE HEPTAHYDRATE 1000 MG: 1 INJECTION, SOLUTION INTRAVENOUS at 09:48

## 2024-02-24 RX ADMIN — PIPERACILLIN AND TAZOBACTAM 3375 MG: 3; .375 INJECTION, POWDER, LYOPHILIZED, FOR SOLUTION INTRAVENOUS at 13:30

## 2024-02-24 RX ADMIN — GABAPENTIN 300 MG: 300 CAPSULE ORAL at 21:26

## 2024-02-24 RX ADMIN — HYDROXYCHLOROQUINE SULFATE 400 MG: 200 TABLET ORAL at 21:26

## 2024-02-24 RX ADMIN — SODIUM CHLORIDE: 9 INJECTION, SOLUTION INTRAVENOUS at 21:25

## 2024-02-24 RX ADMIN — SODIUM CHLORIDE: 9 INJECTION, SOLUTION INTRAVENOUS at 09:47

## 2024-02-24 RX ADMIN — SODIUM CHLORIDE, PRESERVATIVE FREE 40 MG: 5 INJECTION INTRAVENOUS at 04:37

## 2024-02-24 RX ADMIN — SODIUM CHLORIDE: 9 INJECTION, SOLUTION INTRAVENOUS at 13:28

## 2024-02-24 RX ADMIN — SODIUM CHLORIDE, PRESERVATIVE FREE 40 MG: 5 INJECTION INTRAVENOUS at 16:01

## 2024-02-24 RX ADMIN — MELATONIN 3 MG: at 21:26

## 2024-02-24 RX ADMIN — SODIUM CHLORIDE, PRESERVATIVE FREE 10 ML: 5 INJECTION INTRAVENOUS at 08:45

## 2024-02-24 RX ADMIN — PIPERACILLIN AND TAZOBACTAM 3375 MG: 3; .375 INJECTION, POWDER, LYOPHILIZED, FOR SOLUTION INTRAVENOUS at 21:27

## 2024-02-24 ASSESSMENT — PAIN DESCRIPTION - PAIN TYPE: TYPE: SURGICAL PAIN

## 2024-02-24 ASSESSMENT — PAIN SCALES - GENERAL: PAINLEVEL_OUTOF10: 5

## 2024-02-24 NOTE — PROGRESS NOTES
Hospitalist Progress Note    NAME:   Valentina Maynard   : 1965   MRN: [id number]     Date/Time: 2024 8:51 AM  Patient PCP: Itzel, Pcp    Estimated discharge date: 24 to 48 hours  Barriers: IV iron infusion, hemoglobin stabilization      Assessment / Plan:  Acute lower GI bleeding  Crohn disease s/p colostomy bag placement  Recent dilation of the colostomy site  Holding eliquis and aspirin  Monitor H/H  Continue iv protonix  Continue iv fluids  Appreciate colo rectal surgery consult  : H&H of 7.3/23.4.  Patient has been hypotensive this morning.  Repeat hemoglobin of 9.0.  Monitor CBC for now.  Patient is okay with blood transfusion if need be.  : Hemoglobin staying at 8.2.  No home blood or black stool from the colostomy site.  Monitor CBC for now.  : Hemoglobin of 8.0.  Appreciate input from heme-onc and patient started on IV iron infusion from yesterday for total of 5 days.  Repeat CT abdomen showed resolved Parastomal abscess and mild inflammation.    Hypotension  : The patient has been hypotensive this morning with blood pressure as low as 64/46.  Last 1 was 96/57.  : Patient still orthostatic.  Will get echocardiogram to assess heart function.  : Echocardiogram shows EF of 60%.    Hypomagnesemia  : Magnesium of 1.5 and will give 2 g of IV mag sulfate.  Potassium low normal at 3.6 and will give 40 KCl.     Leukocytosis  Likely reactive  resolved  Continue zosyn     CAD status post open heart surgery  History of SVC occlusion  History of CVA  Will hold Eliquis and aspirin, resume as soon as possible.  : Patient would like to talk to heme-onc about her anemia and when to resume Eliquis.     History of lupus  Continue Plaquenil      Medical Decision Making:   I personally reviewed labs: CBC, BMP, magnesium, phosphorus  I personally reviewed imaging: Echocardiogram, CT abdomen pelvis  I personally reviewed EKG:  Toxic drug monitoring:   Discussed case with: Patient,  RN        Code Status: Full code  DVT Prophylaxis: SCDs  GI Prophylaxis: Protonix    Subjective:     Chief Complaint / Reason for Physician Visit  \" Follow-up for acute lower GI bleed, Crohn's disease status post colostomy, recent dilation of colostomy site, leukocytosis, CAD with CABG, history of SVC occlusion, history of CVA, lupus\".  Discussed with RN events overnight.   2/22: Patient denies any blood in the stool or black stool.    Objective:     VITALS:   Last 24hrs VS reviewed since prior progress note. Most recent are:  Patient Vitals for the past 24 hrs:   BP Temp Temp src Pulse Resp SpO2 Height Weight   02/24/24 0802 119/70 98.2 °F (36.8 °C) Oral -- 16 96 % -- --   02/24/24 0322 100/85 97.7 °F (36.5 °C) Oral 88 18 97 % -- --   02/23/24 2210 100/73 98.1 °F (36.7 °C) Oral 83 18 96 % -- --   02/23/24 1945 126/72 98.4 °F (36.9 °C) -- 100 16 -- -- --   02/23/24 1448 132/78 98.4 °F (36.9 °C) Oral 92 16 96 % -- --   02/23/24 1143 115/69 98 °F (36.7 °C) Oral 91 19 -- -- --   02/23/24 1059 107/64 -- -- -- -- -- 1.549 m (5' 0.98\") 83.9 kg (185 lb)   02/23/24 0950 107/64 -- -- (!) 112 -- -- -- --   02/23/24 0949 121/76 -- -- (!) 106 -- -- -- --   02/23/24 0945 121/68 -- -- 98 -- -- -- --         Intake/Output Summary (Last 24 hours) at 2/24/2024 0851  Last data filed at 2/24/2024 0845  Gross per 24 hour   Intake 897.52 ml   Output 850 ml   Net 47.52 ml        I had a face to face encounter and independently examined this patient on 2/24/2024, as outlined below:  PHYSICAL EXAM:  General: Alert, cooperative  EENT:  EOMI. Anicteric sclerae, pallor positive.  Resp:  CTA bilaterally, no wheezing or rales.  No accessory muscle use  CV:  regular  rhythm,  No edema  GI:  Soft, Non distended, Non tender.  +Bowel sounds, colostomy in place  Neurologic:  Alert and oriented X 3, normal speech,   Psych:   Good insight. Not anxious nor agitated  Skin:  No rashes.  No jaundice    Reviewed most current lab test results and cultures

## 2024-02-24 NOTE — PROGRESS NOTES
CRS Progress Note    Hgb stable.  No visible active bleeding.  CT unremarkable from my read.  Await radiology read.  Feels like brain fog has improved with IV iron    /85   Pulse 88   Temp 97.7 °F (36.5 °C) (Oral)   Resp 18   Ht 1.549 m (5' 0.98\")   Wt 83.9 kg (185 lb)   SpO2 97%   BMI 34.97 kg/m²     NAD, AAOx3  Abd soft, nontender    Plan  Continue IV iron as recommended  Partner to see as needed tomorrow.  I'll be back Monday

## 2024-02-24 NOTE — PROGRESS NOTES
End of Shift Note    Bedside shift change report given to RN (oncoming nurse) by JOANN OLVERA RN (offgoing nurse).  Report included the following information SBAR, Kardex, Procedure Summary, Intake/Output, MAR, and Recent Results    Shift worked:    7-7pm     Shift summary and any significant changes:     No significant changes. VS are stable, orthostatic BP is negative.Pt on room air.   See MD and wound care nurse notes.     Concerns for physician to address:       Zone phone for oncoming shift:              JOANN OLVERA RN

## 2024-02-25 LAB
ANION GAP SERPL CALC-SCNC: 8 MMOL/L (ref 5–15)
BUN SERPL-MCNC: 12 MG/DL (ref 6–20)
BUN/CREAT SERPL: 11 (ref 12–20)
CALCIUM SERPL-MCNC: 7.7 MG/DL (ref 8.5–10.1)
CHLORIDE SERPL-SCNC: 113 MMOL/L (ref 97–108)
CO2 SERPL-SCNC: 20 MMOL/L (ref 21–32)
CREAT SERPL-MCNC: 1.11 MG/DL (ref 0.55–1.02)
ERYTHROCYTE [DISTWIDTH] IN BLOOD BY AUTOMATED COUNT: 13.1 % (ref 11.5–14.5)
GLUCOSE SERPL-MCNC: 72 MG/DL (ref 65–100)
HCT VFR BLD AUTO: 25.8 % (ref 35–47)
HGB BLD-MCNC: 8.1 G/DL (ref 11.5–16)
MAGNESIUM SERPL-MCNC: 2 MG/DL (ref 1.6–2.4)
MCH RBC QN AUTO: 29.3 PG (ref 26–34)
MCHC RBC AUTO-ENTMCNC: 31.4 G/DL (ref 30–36.5)
MCV RBC AUTO: 93.5 FL (ref 80–99)
NRBC # BLD: 0 K/UL (ref 0–0.01)
NRBC BLD-RTO: 0 PER 100 WBC
PHOSPHATE SERPL-MCNC: 2.7 MG/DL (ref 2.6–4.7)
PLATELET # BLD AUTO: 277 K/UL (ref 150–400)
PMV BLD AUTO: 10.9 FL (ref 8.9–12.9)
POTASSIUM SERPL-SCNC: 4.1 MMOL/L (ref 3.5–5.1)
RBC # BLD AUTO: 2.76 M/UL (ref 3.8–5.2)
SODIUM SERPL-SCNC: 141 MMOL/L (ref 136–145)
WBC # BLD AUTO: 10 K/UL (ref 3.6–11)

## 2024-02-25 PROCEDURE — 83735 ASSAY OF MAGNESIUM: CPT

## 2024-02-25 PROCEDURE — 36415 COLL VENOUS BLD VENIPUNCTURE: CPT

## 2024-02-25 PROCEDURE — 2580000003 HC RX 258: Performed by: INTERNAL MEDICINE

## 2024-02-25 PROCEDURE — 2060000000 HC ICU INTERMEDIATE R&B

## 2024-02-25 PROCEDURE — 85027 COMPLETE CBC AUTOMATED: CPT

## 2024-02-25 PROCEDURE — 6370000000 HC RX 637 (ALT 250 FOR IP): Performed by: INTERNAL MEDICINE

## 2024-02-25 PROCEDURE — C9113 INJ PANTOPRAZOLE SODIUM, VIA: HCPCS | Performed by: INTERNAL MEDICINE

## 2024-02-25 PROCEDURE — 6360000002 HC RX W HCPCS: Performed by: INTERNAL MEDICINE

## 2024-02-25 PROCEDURE — A4216 STERILE WATER/SALINE, 10 ML: HCPCS | Performed by: INTERNAL MEDICINE

## 2024-02-25 PROCEDURE — 84100 ASSAY OF PHOSPHORUS: CPT

## 2024-02-25 PROCEDURE — 80048 BASIC METABOLIC PNL TOTAL CA: CPT

## 2024-02-25 RX ADMIN — SODIUM CHLORIDE, PRESERVATIVE FREE 10 ML: 5 INJECTION INTRAVENOUS at 21:47

## 2024-02-25 RX ADMIN — GABAPENTIN 300 MG: 300 CAPSULE ORAL at 14:08

## 2024-02-25 RX ADMIN — PIPERACILLIN AND TAZOBACTAM 3375 MG: 3; .375 INJECTION, POWDER, LYOPHILIZED, FOR SOLUTION INTRAVENOUS at 21:40

## 2024-02-25 RX ADMIN — GABAPENTIN 300 MG: 300 CAPSULE ORAL at 08:11

## 2024-02-25 RX ADMIN — HYDROXYCHLOROQUINE SULFATE 400 MG: 200 TABLET ORAL at 08:11

## 2024-02-25 RX ADMIN — POTASSIUM & SODIUM PHOSPHATES POWDER PACK 280-160-250 MG 500 MG: 280-160-250 PACK at 11:36

## 2024-02-25 RX ADMIN — SODIUM CHLORIDE, POTASSIUM CHLORIDE, SODIUM LACTATE AND CALCIUM CHLORIDE: 600; 310; 30; 20 INJECTION, SOLUTION INTRAVENOUS at 17:54

## 2024-02-25 RX ADMIN — SODIUM CHLORIDE, PRESERVATIVE FREE 10 ML: 5 INJECTION INTRAVENOUS at 08:12

## 2024-02-25 RX ADMIN — MELATONIN 3 MG: at 21:39

## 2024-02-25 RX ADMIN — BUTALBITAL, ACETAMINOPHEN, AND CAFFEINE 2 TABLET: 50; 325; 40 TABLET ORAL at 08:11

## 2024-02-25 RX ADMIN — HYDROXYCHLOROQUINE SULFATE 400 MG: 200 TABLET ORAL at 21:34

## 2024-02-25 RX ADMIN — SODIUM CHLORIDE, PRESERVATIVE FREE 40 MG: 5 INJECTION INTRAVENOUS at 16:30

## 2024-02-25 RX ADMIN — PIPERACILLIN AND TAZOBACTAM 3375 MG: 3; .375 INJECTION, POWDER, LYOPHILIZED, FOR SOLUTION INTRAVENOUS at 14:08

## 2024-02-25 RX ADMIN — GABAPENTIN 300 MG: 300 CAPSULE ORAL at 21:34

## 2024-02-25 RX ADMIN — PIPERACILLIN AND TAZOBACTAM 3375 MG: 3; .375 INJECTION, POWDER, LYOPHILIZED, FOR SOLUTION INTRAVENOUS at 05:26

## 2024-02-25 RX ADMIN — SODIUM CHLORIDE, PRESERVATIVE FREE 40 MG: 5 INJECTION INTRAVENOUS at 05:24

## 2024-02-25 RX ADMIN — IRON SUCROSE 200 MG: 20 INJECTION, SOLUTION INTRAVENOUS at 16:44

## 2024-02-25 ASSESSMENT — PAIN SCALES - GENERAL
PAINLEVEL_OUTOF10: 2
PAINLEVEL_OUTOF10: 4

## 2024-02-25 ASSESSMENT — PAIN DESCRIPTION - ORIENTATION: ORIENTATION: MID

## 2024-02-25 ASSESSMENT — PAIN DESCRIPTION - LOCATION: LOCATION: HEAD

## 2024-02-25 NOTE — PROGRESS NOTES
Hospitalist Progress Note    NAME:   Valentina Maynard   : 1965   MRN: [id number]     Date/Time: 2024 8:26 AM  Patient PCP: Itzel, Pcp    Estimated discharge date: 24 to 48 hours  Barriers: IV iron infusion, hemoglobin stabilization      Assessment / Plan:  Acute lower GI bleeding  Crohn disease s/p colostomy bag placement  Recent dilation of the colostomy site  Holding eliquis and aspirin  Monitor H/H  Continue iv protonix  Continue iv fluids  Appreciate colo rectal surgery consult  : H&H of 7.3/23.4.  Patient has been hypotensive this morning.  Repeat hemoglobin of 9.0.  Monitor CBC for now.  Patient is okay with blood transfusion if need be.  : Hemoglobin staying at 8.2.  No home blood or black stool from the colostomy site.  Monitor CBC for now.  : Hemoglobin of 8.0.  Appreciate input from heme-onc and patient started on IV iron infusion from yesterday for total of 5 days.  Repeat CT abdomen showed resolved Parastomal abscess and mild inflammation.  : Hemoglobin of 8.1.    Hypotension  : The patient has been hypotensive this morning with blood pressure as low as 64/46.  Last 1 was 96/57.  : Patient still orthostatic.  Will get echocardiogram to assess heart function.  : Echocardiogram shows EF of 60%.    Hypomagnesemia  : Magnesium of 1.5 and will give 2 g of IV mag sulfate.  Potassium low normal at 3.6 and will give 40 KCl.     Leukocytosis  Likely reactive  resolved  Continue zosyn     CAD status post open heart surgery  History of SVC occlusion  History of CVA  Will hold Eliquis and aspirin, resume as soon as possible.  : Patient would like to talk to heme-onc about her anemia and when to resume Eliquis.     History of lupus  Continue Plaquenil      Medical Decision Making:   I personally reviewed labs: CBC, BMP, magnesium, phosphorus  I personally reviewed imaging:   I personally reviewed EKG:  Toxic drug monitoring:   Discussed case with: Patient, RN        Code

## 2024-02-25 NOTE — PROGRESS NOTES
End of Shift Note    Bedside shift change report given to RN (oncoming nurse) by Kristina Fuentes RN (offgoing nurse).  Report included the following information SBAR, Intake/Output, MAR, Recent Results, and Cardiac Rhythm NSR    Shift worked:  7-7:30PM     Shift summary and any significant changes:     VSS, Continue iron infusion     Concerns for physician to address:      Zone phone for oncoming shift:            Kristina Fuentes RN

## 2024-02-26 VITALS
WEIGHT: 185 LBS | BODY MASS INDEX: 34.93 KG/M2 | OXYGEN SATURATION: 98 % | HEIGHT: 61 IN | HEART RATE: 99 BPM | DIASTOLIC BLOOD PRESSURE: 67 MMHG | SYSTOLIC BLOOD PRESSURE: 96 MMHG | TEMPERATURE: 98.4 F | RESPIRATION RATE: 16 BRPM

## 2024-02-26 DIAGNOSIS — K92.2 ACUTE GI BLEEDING: Primary | ICD-10-CM

## 2024-02-26 LAB
ABO + RH BLD: NORMAL
ANION GAP SERPL CALC-SCNC: 4 MMOL/L (ref 5–15)
BLD PROD TYP BPU: NORMAL
BLOOD BANK DISPENSE STATUS: NORMAL
BLOOD GROUP ANTIBODIES SERPL: NORMAL
BPU ID: NORMAL
BUN SERPL-MCNC: 12 MG/DL (ref 6–20)
BUN/CREAT SERPL: 11 (ref 12–20)
CALCIUM SERPL-MCNC: 8.2 MG/DL (ref 8.5–10.1)
CHLORIDE SERPL-SCNC: 111 MMOL/L (ref 97–108)
CO2 SERPL-SCNC: 23 MMOL/L (ref 21–32)
CREAT SERPL-MCNC: 1.07 MG/DL (ref 0.55–1.02)
CROSSMATCH RESULT: NORMAL
ERYTHROCYTE [DISTWIDTH] IN BLOOD BY AUTOMATED COUNT: 13.2 % (ref 11.5–14.5)
GLUCOSE SERPL-MCNC: 90 MG/DL (ref 65–100)
HCT VFR BLD AUTO: 28.5 % (ref 35–47)
HGB BLD-MCNC: 9.1 G/DL (ref 11.5–16)
MAGNESIUM SERPL-MCNC: 2 MG/DL (ref 1.6–2.4)
MCH RBC QN AUTO: 29.9 PG (ref 26–34)
MCHC RBC AUTO-ENTMCNC: 31.9 G/DL (ref 30–36.5)
MCV RBC AUTO: 93.8 FL (ref 80–99)
NRBC # BLD: 0 K/UL (ref 0–0.01)
NRBC BLD-RTO: 0 PER 100 WBC
PHOSPHATE SERPL-MCNC: 2.6 MG/DL (ref 2.6–4.7)
PLATELET # BLD AUTO: 372 K/UL (ref 150–400)
PMV BLD AUTO: 10.9 FL (ref 8.9–12.9)
POTASSIUM SERPL-SCNC: 4.1 MMOL/L (ref 3.5–5.1)
RBC # BLD AUTO: 3.04 M/UL (ref 3.8–5.2)
SODIUM SERPL-SCNC: 138 MMOL/L (ref 136–145)
SPECIMEN EXP DATE BLD: NORMAL
UNIT DIVISION: 0
WBC # BLD AUTO: 11 K/UL (ref 3.6–11)

## 2024-02-26 PROCEDURE — A4216 STERILE WATER/SALINE, 10 ML: HCPCS | Performed by: INTERNAL MEDICINE

## 2024-02-26 PROCEDURE — 2580000003 HC RX 258: Performed by: INTERNAL MEDICINE

## 2024-02-26 PROCEDURE — C9113 INJ PANTOPRAZOLE SODIUM, VIA: HCPCS | Performed by: INTERNAL MEDICINE

## 2024-02-26 PROCEDURE — 99232 SBSQ HOSP IP/OBS MODERATE 35: CPT | Performed by: INTERNAL MEDICINE

## 2024-02-26 PROCEDURE — 6360000002 HC RX W HCPCS: Performed by: INTERNAL MEDICINE

## 2024-02-26 PROCEDURE — 83735 ASSAY OF MAGNESIUM: CPT

## 2024-02-26 PROCEDURE — 80048 BASIC METABOLIC PNL TOTAL CA: CPT

## 2024-02-26 PROCEDURE — 6370000000 HC RX 637 (ALT 250 FOR IP): Performed by: INTERNAL MEDICINE

## 2024-02-26 PROCEDURE — 36415 COLL VENOUS BLD VENIPUNCTURE: CPT

## 2024-02-26 PROCEDURE — 84100 ASSAY OF PHOSPHORUS: CPT

## 2024-02-26 PROCEDURE — 85027 COMPLETE CBC AUTOMATED: CPT

## 2024-02-26 RX ORDER — PANTOPRAZOLE SODIUM 40 MG/1
40 TABLET, DELAYED RELEASE ORAL DAILY
Qty: 60 TABLET | Refills: 0 | Status: SHIPPED | OUTPATIENT
Start: 2024-02-26

## 2024-02-26 RX ADMIN — SODIUM CHLORIDE: 9 INJECTION, SOLUTION INTRAVENOUS at 12:13

## 2024-02-26 RX ADMIN — PIPERACILLIN AND TAZOBACTAM 3375 MG: 3; .375 INJECTION, POWDER, LYOPHILIZED, FOR SOLUTION INTRAVENOUS at 12:14

## 2024-02-26 RX ADMIN — SODIUM CHLORIDE, PRESERVATIVE FREE 40 MG: 5 INJECTION INTRAVENOUS at 15:42

## 2024-02-26 RX ADMIN — ONDANSETRON 4 MG: 2 INJECTION INTRAMUSCULAR; INTRAVENOUS at 02:30

## 2024-02-26 RX ADMIN — IRON SUCROSE 200 MG: 20 INJECTION, SOLUTION INTRAVENOUS at 15:46

## 2024-02-26 RX ADMIN — HYDROXYCHLOROQUINE SULFATE 400 MG: 200 TABLET ORAL at 08:54

## 2024-02-26 RX ADMIN — GABAPENTIN 300 MG: 300 CAPSULE ORAL at 08:54

## 2024-02-26 RX ADMIN — SODIUM CHLORIDE, PRESERVATIVE FREE 40 MG: 5 INJECTION INTRAVENOUS at 02:32

## 2024-02-26 RX ADMIN — GABAPENTIN 300 MG: 300 CAPSULE ORAL at 15:42

## 2024-02-26 RX ADMIN — PIPERACILLIN AND TAZOBACTAM 3375 MG: 3; .375 INJECTION, POWDER, LYOPHILIZED, FOR SOLUTION INTRAVENOUS at 05:47

## 2024-02-26 RX ADMIN — SODIUM CHLORIDE, PRESERVATIVE FREE 10 ML: 5 INJECTION INTRAVENOUS at 08:55

## 2024-02-26 NOTE — PROGRESS NOTES
0700: Bedside and Verbal shift change report given to PHYLLIS Ruiz (oncoming nurse) by PHYLLIS Marquez (offgoing nurse). Report included the following information Nurse Handoff Report, Intake/Output, MAR, and Recent Results.      1507: IV Zosyn increased to 50ml an hour after being verified with pharmacistJim.

## 2024-02-26 NOTE — PLAN OF CARE
Problem: Discharge Planning  Goal: Discharge to home or other facility with appropriate resources  Outcome: Adequate for Discharge     Problem: Pain  Goal: Verbalizes/displays adequate comfort level or baseline comfort level  Outcome: Adequate for Discharge     Problem: Safety - Adult  Goal: Free from fall injury  Outcome: Adequate for Discharge     Problem: ABCDS Injury Assessment  Goal: Absence of physical injury  Outcome: Adequate for Discharge     Problem: Chronic Conditions and Co-morbidities  Goal: Patient's chronic conditions and co-morbidity symptoms are monitored and maintained or improved  Outcome: Adequate for Discharge     Problem: Neurosensory - Adult  Goal: Achieves stable or improved neurological status  Outcome: Adequate for Discharge  Goal: Absence of seizures  Outcome: Adequate for Discharge  Goal: Remains free of injury related to seizures activity  Outcome: Adequate for Discharge  Goal: Achieves maximal functionality and self care  Outcome: Adequate for Discharge     Problem: Respiratory - Adult  Goal: Achieves optimal ventilation and oxygenation  Outcome: Adequate for Discharge     Problem: Cardiovascular - Adult  Goal: Maintains optimal cardiac output and hemodynamic stability  Outcome: Adequate for Discharge  Goal: Absence of cardiac dysrhythmias or at baseline  Outcome: Adequate for Discharge     Problem: Skin/Tissue Integrity - Adult  Goal: Skin integrity remains intact  Outcome: Adequate for Discharge  Goal: Incisions, wounds, or drain sites healing without S/S of infection  Outcome: Adequate for Discharge  Goal: Oral mucous membranes remain intact  Outcome: Adequate for Discharge     Problem: Musculoskeletal - Adult  Goal: Return mobility to safest level of function  Outcome: Adequate for Discharge  Goal: Maintain proper alignment of affected body part  Outcome: Adequate for Discharge  Goal: Return ADL status to a safe level of function  Outcome: Adequate for Discharge     Problem:

## 2024-02-26 NOTE — PROGRESS NOTES
2/26/2024        RE: Valentina Maynard         [address] [place] [address]          To Whom It May Concern,      Due to medical reasons, Valentina Maynard   may return to work after clear by PCP next week     Sincerely,          Sherwin Kan MD

## 2024-02-26 NOTE — PROGRESS NOTES
Physician Progress Note      PATIENT:               KENYON SPIVEY  CSN #:                  [id number]  :                       1965  ADMIT DATE:       2024 10:35 AM  DISCH DATE:  RESPONDING  PROVIDER #:        Pat Turpin NP          QUERY TEXT:    Pt admitted with blood in ostomy bag and has anemia documented per CRS   progress note dated . If possible, please document in progress notes and   discharge summary further specificity regarding the acuity and type of anemia:    The medical record reflects the following:  Risk Factors: Hx; Crohn's Dz. S/p dilation at colostomy site a week ago  Clinical Indicators: H/H: 12.6/39.2; 11.3/34.2; 10.1/31.2 ;   9.7/30.6....8.0/24.6...8.8/27.4     CRS PN: \"VTE Prophylaxis: Contraindicated due to acute blood loss\".     CRS PN: \"Bleeding has slowed down after 48 hrs off Eliquis.  Still has a   blood clot around the stoma.  Stool output starting from the stoma.  Reports   \"brain fog\" (likely relate to anemia)\"    --Noted: \"Hgb 8.5 (From 8.0)\".    Treatment: H&H every 6 hours; Will transfuse if hemoglobin below 7; CRS   consult; IVF; IV Protonix; Hold Eliquis/ASA    Thank you,    [email or web address]  Options provided:  -- Anemia due to acute blood loss  -- Other - I will add my own diagnosis  -- Disagree - Not applicable / Not valid  -- Disagree - Clinically unable to determine / Unknown  -- Refer to Clinical Documentation Reviewer    PROVIDER RESPONSE TEXT:    This patient has acute blood loss anemia.    Query created by: Jovita Paerce on 2024 2:41 PM      Electronically signed by:  Pat Turpin NP 2024 12:32 PM

## 2024-02-26 NOTE — PLAN OF CARE
Problem: Discharge Planning  Goal: Discharge to home or other facility with appropriate resources  2/25/2024 1200 by Kristina Fuentes RN  Outcome: Progressing     Problem: Pain  Goal: Verbalizes/displays adequate comfort level or baseline comfort level  2/25/2024 1200 by Kristina Fuentes RN  Outcome: Progressing     Problem: Safety - Adult  Goal: Free from fall injury  2/25/2024 2328 by Alma Chaudhary RN  Outcome: Progressing  2/25/2024 1200 by Kristina Fuentes RN  Outcome: Progressing     Problem: ABCDS Injury Assessment  Goal: Absence of physical injury  2/25/2024 1200 by Kristina Fuentes RN  Outcome: Progressing     Problem: Chronic Conditions and Co-morbidities  Goal: Patient's chronic conditions and co-morbidity symptoms are monitored and maintained or improved  2/25/2024 1200 by Kristina Fuentes RN  Outcome: Progressing     Problem: Neurosensory - Adult  Goal: Achieves stable or improved neurological status  2/25/2024 1200 by Kristina Fuentes RN  Outcome: Progressing  Goal: Absence of seizures  2/25/2024 1200 by Kristina Fuentes RN  Outcome: Progressing  Goal: Remains free of injury related to seizures activity  2/25/2024 1200 by Kristina Fuentes RN  Outcome: Progressing  Goal: Achieves maximal functionality and self care  2/25/2024 1200 by Kristina Fuentes RN  Outcome: Progressing     Problem: Respiratory - Adult  Goal: Achieves optimal ventilation and oxygenation  2/25/2024 1200 by rKistina Fuentes RN  Outcome: Progressing     Problem: Cardiovascular - Adult  Goal: Maintains optimal cardiac output and hemodynamic stability  2/25/2024 1200 by Kristina Fuentes RN  Outcome: Progressing  Goal: Absence of cardiac dysrhythmias or at baseline  2/25/2024 1200 by Kristina Fuentes RN  Outcome: Progressing     Problem: Skin/Tissue Integrity - Adult  Goal: Skin integrity remains intact  2/25/2024 1200 by Kristina Fuentes RN  Outcome: Progressing  Goal: Incisions, wounds, or drain sites  healing without S/S of infection  2/25/2024 1200 by Kristina Fuentes RN  Outcome: Progressing  Goal: Oral mucous membranes remain intact  2/25/2024 1200 by Kristina Fuentes RN  Outcome: Progressing     Problem: Musculoskeletal - Adult  Goal: Return mobility to safest level of function  2/25/2024 1200 by Kristina Fuentes RN  Outcome: Progressing  Goal: Maintain proper alignment of affected body part  2/25/2024 1200 by Kristina Fuentes RN  Outcome: Progressing  Goal: Return ADL status to a safe level of function  2/25/2024 1200 by Kristina Fuentes RN  Outcome: Progressing     Problem: Gastrointestinal - Adult  Goal: Minimal or absence of nausea and vomiting  2/25/2024 1200 by Kristina Fuentes RN  Outcome: Progressing  Goal: Maintains or returns to baseline bowel function  2/25/2024 1200 by Kristina Fuentes RN  Outcome: Progressing  Goal: Maintains adequate nutritional intake  2/25/2024 1200 by Kristina Fuentes RN  Outcome: Progressing  Goal: Establish and maintain optimal ostomy function  2/25/2024 1200 by Kristina Fuentes RN  Outcome: Progressing     Problem: Genitourinary - Adult  Goal: Absence of urinary retention  2/25/2024 1200 by Kristina Fuentes RN  Outcome: Progressing  Goal: Urinary catheter remains patent  2/25/2024 1200 by Kristina Fuentes RN  Outcome: Progressing     Problem: Infection - Adult  Goal: Absence of infection at discharge  2/25/2024 1200 by Kristina Fuentes RN  Outcome: Progressing  Goal: Absence of infection during hospitalization  2/25/2024 1200 by Kristina Fuentes RN  Outcome: Progressing  Goal: Absence of fever/infection during anticipated neutropenic period  2/25/2024 1200 by Kristina Fuentes RN  Outcome: Progressing

## 2024-02-26 NOTE — WOUND CARE
Wound / ostomy care visit:  Pt. Is discharging home today and she is ready to change her own bag tomorrow to wait an extra day and save her skin. The skin is still somewhat raw on the bottom of the peristomal skin but patient knows how to use the [place] skin protectant and she has two boxes of it at home.   Pt. Is interested in being a mentor or starting an ostomy association group in the [facility]. She will look into it further through the association.   Pt. Has supplies at home and her ride is here.   Plan: She will return in about 45 days to have an ostomy revision.   Pretty Garcia RN, BSN, CWON

## 2024-02-26 NOTE — DISCHARGE SUMMARY
Rimegepant Sulfate  Take 75 mg by mouth daily as needed (migraine)  What changed: Another medication with the same name was removed. Continue taking this medication, and follow the directions you see here.            CONTINUE taking these medications      amitriptyline 25 MG tablet  Commonly known as: ELAVIL     apixaban 2.5 MG Tabs tablet  Commonly known as: ELIQUIS     butalbital-acetaminophen-caffeine -40 MG per tablet  Commonly known as: FIORICET, ESGIC     BUTRANS TD     drospirenone-ethinyl estradiol 3-0.02 MG per tablet  Commonly known as: DONIS     DULoxetine 60 MG extended release capsule  Commonly known as: CYMBALTA     hydroxychloroquine 200 MG tablet  Commonly known as: PLAQUENIL     ondansetron 4 MG tablet  Commonly known as: ZOFRAN     tiZANidine 2 MG tablet  Commonly known as: ZANAFLEX     traMADol 50 MG tablet  Commonly known as: ULTRAM            STOP taking these medications      Ajovy 225 MG/1.5ML Soaj  Generic drug: Fremanezumab-vfrm     aspirin 81 MG chewable tablet     cyanocobalamin 1000 MCG/ML injection     LORazepam 0.5 MG tablet  Commonly known as: ATIVAN     Narcan 4 MG/0.1ML Liqd nasal spray  Generic drug: naloxone     promethazine 25 MG tablet  Commonly known as: PHENERGAN     ustekinumab 90 MG/ML Sosy prefilled syringe  Commonly known as: STELARA               Where to Get Your Medications        These medications were sent to [id number] [place]  [facility] [place] [facility] [place] [address] -  [phone] - F [phone]  [facility] [address]      Phone: [phone]   pantoprazole 40 MG tablet             DISPOSITION:    Home with Family:       Home with HH/PT/OT/RN:    SNF/LTC:    PALLAVI:    OTHER:            Code status:   Recommended diet: {diet:91051}  Recommended activity: {discharge activity:36040}  Wound care: {WOUND CARE:01014523}      Follow up with:   PCP : No, Pcp    No, Pcp    Follow up      Endy Schaffer II,

## 2024-02-26 NOTE — PROGRESS NOTES
[facility] [place]   at [facility]   [address], [address] [facility] [address], [address]   [place] [address]   [phone]     Hematology Note          Patient: Valentina Maynard  MRN: [id number]   SSN: [id number]    YOB: 1965            Subjective:        Valentina Maynard is a 57 y.o. female with a history of PE on systemic anticoagulation. 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 She was diagnosed with PE in 04/2016.      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 She underwent a resection and bypass graft of the SVC. The SVC stenosis was felt to be related to a chronic indwelling port.  She has been on Apixaban and prefers to remain on long-term systemic anticoagulation.     Currently admitted with иван bleeding and large clots via ostomy.  Reports some brain fog and lethargy which she attributes to low iron and blood counts.  No additional иван bleeding since admission on 2/19. Anticoagulation on hold since admission.  Denies pain.      Interval History:     2/26/2024  Patient seen at bedside, no reports of bleeding. Has been receiving IV iron, requesting to finish venofer as OP. Discussed OK to resume eliquis at discharge. Will plan for OP follow up and IV venofer infusions as OP.       Review of Systems:      Constitutional: negative   Eyes: negative   Ears, Nose, Mouth, Throat, and Face: negative   Respiratory: negative   Cardiovascular: negative   Gastrointestinal: GI bleeding   Genitourinary:negative   Integument/Breast: negative   Hematologic/Lymphatic: negative   Musculoskeletal:negative   Neurological: negative           Past Medical History:   Diagnosis Date    Adverse effect of anesthesia     \"takes me a while to go under\"    Anemia     weekly iron infusions    Atrial fibrillation (HCC)   Plan:           > OK to resume eliquis    > Will resume IV venofer as OP    > Follow up in OP setting, OK for discharge from Hematology standpoint         Signed by: Charlie Arshad, APRN - NP                     February 26, 2024

## 2024-02-26 NOTE — DISCHARGE INSTRUCTIONS
Gastrointestinal Bleeding: Care Instructions  Your Care Instructions     The digestive or gastrointestinal tract goes from the mouth to the anus. It is often called the GI tract.  Bleeding can happen anywhere in the GI tract. It may be caused by an ulcer, an infection, or cancer. It may also be caused by medicines such as aspirin or ibuprofen.  Light bleeding may not cause any symptoms at first. But if you continue to bleed for a while, you may feel very weak or tired.  Sudden, heavy bleeding means you need to see a doctor right away. This kind of bleeding can be very dangerous. But it can usually be cured or controlled. The doctor may do some tests to find the cause of your bleeding.  Follow-up care is a key part of your treatment and safety. Be sure to make and go to all appointments, and call your doctor if you are having problems. It's also a good idea to know your test results and keep a list of the medicines you take.  How can you care for yourself at home?  Be safe with medicines. Take your medicines exactly as prescribed. Call your doctor if you think you are having a problem with your medicine. You will get more details on the specific medicines your doctor prescribes.  Do not take aspirin or other anti-inflammatory medicines, such as naproxen (Aleve) or ibuprofen (Advil, Motrin), without talking to your doctor first. Ask your doctor if it is okay to use acetaminophen (Tylenol).  Do not drink alcohol.  The bleeding may make you lose iron. So it's important to eat foods that have a lot of iron. These include red meat, shellfish, poultry, and eggs. They also include beans, raisins, whole-grain breads, and leafy green vegetables. If you want help planning meals, you can make an appointment with a dietitian.  When should you call for help?   Call 911 anytime you think you may need emergency care. For example, call if:    You have sudden, severe belly pain.     You vomit blood or what looks like coffee

## 2024-03-01 ENCOUNTER — HOSPITAL ENCOUNTER (OUTPATIENT)
Facility: HOSPITAL | Age: 59
Setting detail: INFUSION SERIES
Discharge: HOME | End: 2024-03-01
Payer: COMMERCIAL

## 2024-03-01 DIAGNOSIS — K92.2 ACUTE GI BLEEDING: ICD-10-CM

## 2024-03-01 LAB
BASOPHILS # BLD: 0 K/UL (ref 0–0.1)
BASOPHILS NFR BLD: 0 % (ref 0–1)
DIFFERENTIAL METHOD BLD: ABNORMAL
EOSINOPHIL # BLD: 0.3 K/UL (ref 0–0.4)
EOSINOPHIL NFR BLD: 3 % (ref 0–7)
ERYTHROCYTE [DISTWIDTH] IN BLOOD BY AUTOMATED COUNT: 14.5 % (ref 11.5–14.5)
HCT VFR BLD AUTO: 31.1 % (ref 35–47)
HGB BLD-MCNC: 9.7 G/DL (ref 11.5–16)
IMM GRANULOCYTES # BLD AUTO: 0.1 K/UL (ref 0–0.04)
IMM GRANULOCYTES NFR BLD AUTO: 1 % (ref 0–0.5)
LYMPHOCYTES # BLD: 2.2 K/UL (ref 0.8–3.5)
LYMPHOCYTES NFR BLD: 24 % (ref 12–49)
MCH RBC QN AUTO: 29.2 PG (ref 26–34)
MCHC RBC AUTO-ENTMCNC: 31.2 G/DL (ref 30–36.5)
MCV RBC AUTO: 93.7 FL (ref 80–99)
MONOCYTES # BLD: 0.7 K/UL (ref 0–1)
MONOCYTES NFR BLD: 8 % (ref 5–13)
NEUTS SEG # BLD: 5.9 K/UL (ref 1.8–8)
NEUTS SEG NFR BLD: 64 % (ref 32–75)
NRBC # BLD: 0 K/UL (ref 0–0.01)
NRBC BLD-RTO: 0 PER 100 WBC
PLATELET # BLD AUTO: 343 K/UL (ref 150–400)
PMV BLD AUTO: 10.1 FL (ref 8.9–12.9)
RBC # BLD AUTO: 3.32 M/UL (ref 3.8–5.2)
WBC # BLD AUTO: 9.2 K/UL (ref 3.6–11)

## 2024-03-01 PROCEDURE — 85025 COMPLETE CBC W/AUTO DIFF WBC: CPT

## 2024-03-01 PROCEDURE — 36415 COLL VENOUS BLD VENIPUNCTURE: CPT

## 2024-03-01 NOTE — PROGRESS NOTES
[facility] Lab Draw Note:    Arrived - 1555    Labs drawn peripherally from right hand. See EPIC for results.    1605 - Tolerated well.  Pt denies any acute problems/changes. Discharged from [facility] ambulatory. No distress. Treatment completed. No further appointments scheduled at this time.

## 2024-03-04 ENCOUNTER — TELEPHONE (OUTPATIENT)
Age: 59
End: 2024-03-04

## 2024-03-05 PROBLEM — D50.0 IRON DEFICIENCY ANEMIA SECONDARY TO BLOOD LOSS (CHRONIC): Status: ACTIVE | Noted: 2024-03-05

## 2024-03-21 NOTE — PROGRESS NOTES
Valentina Maynard is a 57 y.o. female here for follow up for PE and anemia.  She is taking Apixaban.  April 30 will be surgery with Dr Schaffer. Her stoma is closing up and getting hard on side and is painful.    1. Have you been to the ER, urgent care clinic since your last visit?  Hospitalized since your last visit? no  2/19/24 - 2/26/24 GI bleed    2. Have you seen or consulted any other health care providers outside of the [facility] since your last visit?  Include any pap smears or colon screening.  no

## 2024-03-25 ENCOUNTER — OFFICE VISIT (OUTPATIENT)
Age: 59
End: 2024-03-25
Payer: COMMERCIAL

## 2024-03-25 VITALS
OXYGEN SATURATION: 97 % | TEMPERATURE: 98.2 F | SYSTOLIC BLOOD PRESSURE: 101 MMHG | WEIGHT: 189.6 LBS | BODY MASS INDEX: 35.8 KG/M2 | DIASTOLIC BLOOD PRESSURE: 66 MMHG | HEART RATE: 93 BPM | HEIGHT: 61 IN

## 2024-03-25 DIAGNOSIS — D50.0 IRON DEFICIENCY ANEMIA SECONDARY TO BLOOD LOSS (CHRONIC): Primary | ICD-10-CM

## 2024-03-25 DIAGNOSIS — I27.82 OTHER CHRONIC PULMONARY EMBOLISM WITHOUT ACUTE COR PULMONALE (HCC): ICD-10-CM

## 2024-03-25 DIAGNOSIS — K92.2 ACUTE GI BLEEDING: Primary | ICD-10-CM

## 2024-03-25 PROCEDURE — 99214 OFFICE O/P EST MOD 30 MIN: CPT | Performed by: INTERNAL MEDICINE

## 2024-03-25 NOTE — PROGRESS NOTES
[facility] [place]   at [facility]   [address], [address], [address]   [place] [address] [phone]      Follow-up Note          Patient: Valentina Maynard  MRN: [id number]   SSN: [id number]    YOB: 1965            Subjective:        Valentina Maynard is a 57 y.o. female with a history of PE on systemic anticoagulation. 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 She was diagnosed with PE in 04/2016.      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 She underwent a resection and bypass graft of the SVC. The SVC stenosis was felt to be related to a chronic indwelling port.  She has been on Apixaban and prefers to remain on long-term systemic anticoagulation. Denies any new symptoms.     She is suffering with fatigue. Due to inflammatory bowel disease, oral iron is not being absorbed. She was in the hospital with GI bleeding. She underwent a partial colectomy and she is due for a colostomy redo in April 2024.          Review of Systems:      Constitutional: negative   Eyes: negative   Ears, Nose, Mouth, Throat, and Face: negative   Respiratory: negative   Cardiovascular: negative   Gastrointestinal: negative   Genitourinary:negative   Integument/Breast: negative   Hematologic/Lymphatic: negative   Musculoskeletal:negative   Neurological: negative           Past Medical History:   Diagnosis Date    Adverse effect of anesthesia     \"takes me a while to go under\"    Anemia     weekly iron infusions    Atrial fibrillation ([facility])     Dr. Woodson    CAD (coronary artery disease) 07/2016    SVC occlusion open heart by Dr.Mark Jose    Chronic pain     Dr Guillen pain management    Chronic pain     abdominal - [facility]    Crohn's disease ([facility])     Dr Pool & Dr Scahffer    Lupus ([facility])

## 2024-04-04 ENCOUNTER — HOSPITAL ENCOUNTER (OUTPATIENT)
Age: 59
Discharge: HOME | End: 2024-04-04
Payer: COMMERCIAL

## 2024-04-04 DIAGNOSIS — R10.9 ABDOMINAL PAIN, UNSPECIFIED ABDOMINAL LOCATION: ICD-10-CM

## 2024-04-04 PROCEDURE — 74177 CT ABD & PELVIS W/CONTRAST: CPT

## 2024-04-04 PROCEDURE — 74176 CT ABD & PELVIS W/O CONTRAST: CPT

## 2024-04-18 ENCOUNTER — HOSPITAL ENCOUNTER (OUTPATIENT)
Facility: HOSPITAL | Age: 59
End: 2024-04-18
Payer: COMMERCIAL

## 2024-04-18 ENCOUNTER — HOSPITAL ENCOUNTER (OUTPATIENT)
Facility: HOSPITAL | Age: 59
Discharge: HOME | End: 2024-04-18
Payer: COMMERCIAL

## 2024-04-18 VITALS
RESPIRATION RATE: 18 BRPM | TEMPERATURE: 98.2 F | HEART RATE: 90 BPM | HEIGHT: 60 IN | OXYGEN SATURATION: 99 % | DIASTOLIC BLOOD PRESSURE: 67 MMHG | BODY MASS INDEX: 37.05 KG/M2 | WEIGHT: 188.71 LBS | SYSTOLIC BLOOD PRESSURE: 114 MMHG

## 2024-04-18 LAB
ABO + RH BLD: NORMAL
ALBUMIN SERPL-MCNC: 3.3 G/DL (ref 3.5–5)
ALBUMIN/GLOB SERPL: 0.7 (ref 1.1–2.2)
ALP SERPL-CCNC: 138 U/L (ref 45–117)
ALT SERPL-CCNC: 29 U/L (ref 12–78)
ANION GAP SERPL CALC-SCNC: 7 MMOL/L (ref 5–15)
APPEARANCE UR: CLEAR
APTT PPP: 23.9 SEC (ref 22.1–31)
AST SERPL-CCNC: 23 U/L (ref 15–37)
BACTERIA URNS QL MICRO: ABNORMAL /HPF
BILIRUB SERPL-MCNC: 0.4 MG/DL (ref 0.2–1)
BILIRUB UR QL: NEGATIVE
BLOOD GROUP ANTIBODIES SERPL: NORMAL
BUN SERPL-MCNC: 17 MG/DL (ref 6–20)
BUN/CREAT SERPL: 15 (ref 12–20)
CALCIUM SERPL-MCNC: 9 MG/DL (ref 8.5–10.1)
CEA SERPL-MCNC: 2.7 NG/ML
CHLORIDE SERPL-SCNC: 110 MMOL/L (ref 97–108)
CO2 SERPL-SCNC: 22 MMOL/L (ref 21–32)
COLOR UR: ABNORMAL
CREAT SERPL-MCNC: 1.1 MG/DL (ref 0.55–1.02)
EPITH CASTS URNS QL MICRO: ABNORMAL /LPF
ERYTHROCYTE [DISTWIDTH] IN BLOOD BY AUTOMATED COUNT: 12.9 % (ref 11.5–14.5)
EST. AVERAGE GLUCOSE BLD GHB EST-MCNC: 94 MG/DL
GLOBULIN SER CALC-MCNC: 4.9 G/DL (ref 2–4)
GLUCOSE SERPL-MCNC: 83 MG/DL (ref 65–100)
GLUCOSE UR STRIP.AUTO-MCNC: NEGATIVE MG/DL
HBA1C MFR BLD: 4.9 % (ref 4–5.6)
HCT VFR BLD AUTO: 41 % (ref 35–47)
HGB BLD-MCNC: 13.2 G/DL (ref 11.5–16)
HGB UR QL STRIP: NEGATIVE
INR PPP: 1 (ref 0.9–1.1)
KETONES UR QL STRIP.AUTO: ABNORMAL MG/DL
LEUKOCYTE ESTERASE UR QL STRIP.AUTO: ABNORMAL
MAGNESIUM SERPL-MCNC: 1.6 MG/DL (ref 1.6–2.4)
MCH RBC QN AUTO: 30 PG (ref 26–34)
MCHC RBC AUTO-ENTMCNC: 32.2 G/DL (ref 30–36.5)
MCV RBC AUTO: 93.2 FL (ref 80–99)
NITRITE UR QL STRIP.AUTO: NEGATIVE
NRBC # BLD: 0 K/UL (ref 0–0.01)
NRBC BLD-RTO: 0 PER 100 WBC
PH UR STRIP: 5.5 (ref 5–8)
PHOSPHATE SERPL-MCNC: 2.3 MG/DL (ref 2.6–4.7)
PLATELET # BLD AUTO: 322 K/UL (ref 150–400)
PMV BLD AUTO: 11 FL (ref 8.9–12.9)
POTASSIUM SERPL-SCNC: 3.6 MMOL/L (ref 3.5–5.1)
PROT SERPL-MCNC: 8.2 G/DL (ref 6.4–8.2)
PROT UR STRIP-MCNC: ABNORMAL MG/DL
PROTHROMBIN TIME: 10.4 SEC (ref 9–11.1)
RBC # BLD AUTO: 4.4 M/UL (ref 3.8–5.2)
RBC #/AREA URNS HPF: ABNORMAL /HPF (ref 0–5)
SODIUM SERPL-SCNC: 139 MMOL/L (ref 136–145)
SP GR UR REFRACTOMETRY: 1.02
SPECIMEN EXP DATE BLD: NORMAL
THERAPEUTIC RANGE: NORMAL SECS (ref 58–77)
URINE CULTURE IF INDICATED: ABNORMAL
UROBILINOGEN UR QL STRIP.AUTO: 0.2 EU/DL (ref 0.2–1)
WBC # BLD AUTO: 10.5 K/UL (ref 3.6–11)
WBC URNS QL MICRO: ABNORMAL /HPF (ref 0–4)

## 2024-04-18 PROCEDURE — 84100 ASSAY OF PHOSPHORUS: CPT

## 2024-04-18 PROCEDURE — 86901 BLOOD TYPING SEROLOGIC RH(D): CPT

## 2024-04-18 PROCEDURE — 85027 COMPLETE CBC AUTOMATED: CPT

## 2024-04-18 PROCEDURE — 83036 HEMOGLOBIN GLYCOSYLATED A1C: CPT

## 2024-04-18 PROCEDURE — 81001 URINALYSIS AUTO W/SCOPE: CPT

## 2024-04-18 PROCEDURE — 71046 X-RAY EXAM CHEST 2 VIEWS: CPT

## 2024-04-18 PROCEDURE — 86850 RBC ANTIBODY SCREEN: CPT

## 2024-04-18 PROCEDURE — 86900 BLOOD TYPING SEROLOGIC ABO: CPT

## 2024-04-18 PROCEDURE — 85610 PROTHROMBIN TIME: CPT

## 2024-04-18 PROCEDURE — 83735 ASSAY OF MAGNESIUM: CPT

## 2024-04-18 PROCEDURE — 80053 COMPREHEN METABOLIC PANEL: CPT

## 2024-04-18 PROCEDURE — APPNB30 APP NON BILLABLE TIME 0-30 MINS: Performed by: NURSE PRACTITIONER

## 2024-04-18 PROCEDURE — 36415 COLL VENOUS BLD VENIPUNCTURE: CPT

## 2024-04-18 PROCEDURE — 85730 THROMBOPLASTIN TIME PARTIAL: CPT

## 2024-04-18 PROCEDURE — 82378 CARCINOEMBRYONIC ANTIGEN: CPT

## 2024-04-18 RX ORDER — ACETAMINOPHEN 500 MG
1000 TABLET ORAL ONCE
OUTPATIENT
Start: 2024-04-30

## 2024-04-18 RX ORDER — CELECOXIB 200 MG/1
200 CAPSULE ORAL ONCE
OUTPATIENT
Start: 2024-04-30

## 2024-04-18 RX ORDER — ASPIRIN 81 MG/1
81 TABLET ORAL DAILY
COMMUNITY

## 2024-04-18 RX ORDER — SODIUM CHLORIDE, SODIUM LACTATE, POTASSIUM CHLORIDE, CALCIUM CHLORIDE 600; 310; 30; 20 MG/100ML; MG/100ML; MG/100ML; MG/100ML
INJECTION, SOLUTION INTRAVENOUS CONTINUOUS
OUTPATIENT
Start: 2024-04-30

## 2024-04-18 ASSESSMENT — PAIN DESCRIPTION - LOCATION: LOCATION: ABDOMEN

## 2024-04-18 ASSESSMENT — PAIN SCALES - GENERAL: PAINLEVEL_OUTOF10: 6

## 2024-04-18 NOTE — PERIOP NOTE

## 2024-04-18 NOTE — WOUND CARE
Ostomy Care and Teaching - Pre-Op Marking: Chart reviewed.  Pt. Is known to this Wound / Ostomy nurse.  She will be an ERAS patient on 4-30-24 with Dr. Schaffer to relocate the colostomy.  She has been having issues with the current colostomy for a while now.   Pt. Knows colostomy care and she is aware that her current regimen may change.  I will be providing some post op Teaching / guidance with her.   Nurse assessed patient's abdomen in standing and sitting position.    Nurse marked with an \"X\" an appropriate site for a colostomy in the Left upper quadrant as the primary site away from the umbilicus and any creases or folds, on a flat pouching surface, within the rectus abdominal muscle and within patient's visual field above the current colostomy. Pt. Was given the marker pen to re-jessy the X if it starts to fade.    Plan: Will follow up post op day ONE for teaching and continued care.   Pretty Garcia, RN, BSN, CWON (Zone Phone: [phone])

## 2024-04-18 NOTE — PERIOP NOTE
Hibiclens/Chlorhexidine    Preventing Infections Before and After - Your Surgery    IMPORTANT INSTRUCTIONS    Please read and follow these instructions carefully. If you are unable to comply with the below instructions your procedure will be cancelled.       Every night from Friday, 4/26 through Monday, 4/29:  Shower with an antibacterial soap, such as Dial, or the soap provided at your preassessment appointment. A shower is better than a bath for cleaning your skin.  If needed, ask someone to help you reach all areas of your body. Don’t forget to clean your belly button with every shower.    Monday, 4/29, the night before your surgery:   On the night before your surgery, shower with an antibacterial soap, such as Dial, or the soap provided at your preassessment appointment.   Turn the water back on and rinse.  Dry gently with a clean, freshly washed towel.  After your shower, do not use any powder, deodorant, perfumes or lotion.  Use clean, freshly washed towels and washcloths every time you shower.  Wear clean, freshly washed pajamas to bed the night before surgery.  Sleep on clean, freshly washed sheets.  Do not allow pets to sleep in your bed with you.    Tuesday, 4/30, the morning of your surgery:  Shower again thoroughly with an antibacterial soap, such as Dial or the soap provided at your preassessment appointment. If needed, ask someone for help to reach all areas of your body. Don’t forget to clean your belly button! Rinse.  Dry gently with a clean, freshly washed towel.  After your shower, do not use any powder, deodorant, perfumes or lotion prior to surgery.  Put on clean, freshly washed clothing.    Tips to help prevent infections after your surgery:  Protect your surgical wound from germs:  Hand washing is the most important thing you and your caregivers can do to prevent infections.  Keep your bandage clean and dry!  Do not touch your surgical wound.  Use clean, freshly washed towels and washcloths

## 2024-04-18 NOTE — PERIOP NOTE
[facility]  Preoperative Instructions        Surgery Date Tuesday, 4/30          Time of Arrival TBD/TBC @ [phone]    1. On the day of your surgery, please report to the Surgical Services Registration Desk and sign in at your designated time. The Surgery Center is located to the right of the Emergency Room.     2. You must have someone with you to drive you home. You should not drive a car for 24 hours following surgery. Please make arrangements for a friend or family member to stay with you for the first 24 hours after your surgery.    3. Refer to your handbook for instructions on drinking liquids prior to surgery.    4. We recommend you do not drink any alcoholic beverages for 24 hours before and after your surgery.    5. Contact your surgeon’s office for instructions on the following medications: non-steroidal anti-inflammatory drugs (i.e. Advil, Aleve), vitamins, and supplements. (Some surgeon’s will want you to stop these medications prior to surgery and others may allow you to take them)  **If you are currently taking Plavix, Coumadin, Aspirin and/or other blood-thinning agents, contact your surgeon for instructions.** Your surgeon will partner with the physician prescribing these medications to determine if it is safe to stop or if you need to continue taking.  Please do not stop taking these medications without instructions from your surgeon    6. Wear comfortable clothes.  Wear glasses instead of contacts.  Do not bring any money or jewelry. Please bring picture ID, insurance card, and any prearranged co-payment or hospital payment.  Do not wear make-up, particularly mascara the morning of your surgery.  Do not wear nail polish, particularly if you are having foot /hand surgery.  Wear your hair loose or down, no ponytails, buns, tammy pins or clips.  All body piercings must be removed.  Please shower with antibacterial soap for three consecutive days before and on the  morning of surgery, but do not apply any lotions, powders or deodorants after the shower on the day of surgery. Please use a fresh towels after each shower. Please sleep in clean clothes and change bed linens the night before surgery.  Please do not shave for 48 hours prior to surgery. Shaving of the face is acceptable.    7. You should understand that if you do not follow these instructions your surgery may be cancelled.  If your physical condition changes (I.e. fever, cold or flu) please contact your surgeon as soon as possible.    8. It is important that you be on time.  If a situation occurs where you may be late, please call [phone] (OR Holding Area).    9. If you have any questions and or problems, please call [phone] (Pre-admission Testing).    10. Your surgery time may be subject to change.  You will receive a phone call the evening prior with your arrival time.    11.  If having outpatient surgery, you must have someone to drive you here, stay with you during the duration of your stay, and to drive you home at time of discharge.      Special Instructions:   Start using the incentive spirometer twice daily, ten breaths each time   Bring incentive spirometer with you the day of surgery   Drink the Ensure Surgery immuno-nutrition shakes, once in the morning and once in the evening, on Wednesday, 4/24 through Sunday, 4/28  Every night for four (4) nights before your surgery, shower using the antibacterial soap   On the day before surgery:  Eat and drink normally until 12 noon; start a clear liquid diet thereafter  Begin bowel prep at 12 noon per surgeon instructions  Take metoclopramide, (aka Reglan) three times per surgeon instructions  Take metronidazole (aka Flagyl) three times per surgeon instructions  Take neomycin three times per surgeon instructions  Drink one Ensure Pre-Surgery clear carbohydrate drink before bed  On the day of surgery:  Shower using the antibacterial soap and follow the

## 2024-04-18 NOTE — PERIOP NOTE
The [facility] Enhanced Recovery Program (ERAS) book was reviewed with the patient during the pre-admission testing (PAT) appointment. An opportunity for questions was provided, patient verbalized understanding.

## 2024-04-18 NOTE — PERIOP NOTE
Instructions on When You Can Eat or Drink Before Surgery      You have been provided 2 pre-surgery drinks received at your pre-admission testing appointment.    Night before surgery:  You should drink one bottle of the  pre-surgery drink at bedtime. No food after midnight!    Day of Surgery:  Complete 2nd bottle of the pre-surgery drink 1 hour prior to arrival at hospital.    For questions call Pre-Admission Testing at [phone].  They are available from 8:00am-5:00pm, Monday through Friday.

## 2024-04-19 LAB
BACTERIA SPEC CULT: NORMAL
BACTERIA SPEC CULT: NORMAL
SERVICE CMNT-IMP: NORMAL

## 2024-04-22 LAB
EKG ATRIAL RATE: 91 BPM
EKG DIAGNOSIS: NORMAL
EKG P AXIS: 53 DEGREES
EKG P-R INTERVAL: 140 MS
EKG Q-T INTERVAL: 362 MS
EKG QRS DURATION: 82 MS
EKG QTC CALCULATION (BAZETT): 445 MS
EKG R AXIS: 24 DEGREES
EKG T AXIS: 65 DEGREES
EKG VENTRICULAR RATE: 91 BPM

## 2024-04-22 NOTE — PROGRESS NOTES
Results (most recent):    XR CHEST (2 VW)  Order: [id number]  Status: Final result       Visible to patient: Yes (seen)       Next appt: 09/30/2024 at 10:00 AM in Oncology (Andrzej Pavon MD)    0 Result Notes  Details    Reading Physician Reading Date Result Priority   Valentin Rubin MD  [phone] 4/18/2024      Narrative & Impression  EXAM: XR CHEST (2 VW)     INDICATION:  pre-op     COMPARISON: 2/19/2024 and 9/15/2021     TECHNIQUE: PA and lateral chest views     FINDINGS: The cardiac size is within normal limits. The pulmonary vasculature is  within normal limits.     There are surgical clips in the right side of the mediastinum in the  paratracheal region.     The lungs and pleural spaces are clear. The visualized bones and upper abdomen  are age-appropriate.     IMPRESSION:        1. No acute process           Specimen Collected: 04/18/24 18:31 EDT Last Resulted: 04/18/24 18:33 EDT                 EHSAN Welch - NP

## 2024-04-29 ENCOUNTER — ANESTHESIA EVENT (OUTPATIENT)
Facility: HOSPITAL | Age: 59
End: 2024-04-29
Payer: COMMERCIAL

## 2024-04-30 ENCOUNTER — ANESTHESIA (OUTPATIENT)
Facility: HOSPITAL | Age: 59
End: 2024-04-30
Payer: COMMERCIAL

## 2024-04-30 ENCOUNTER — HOSPITAL ENCOUNTER (INPATIENT)
Facility: HOSPITAL | Age: 59
LOS: 9 days | Discharge: HOME HEALTH | DRG: 330 | End: 2024-05-09
Attending: SURGERY | Admitting: SURGERY
Payer: COMMERCIAL

## 2024-04-30 DIAGNOSIS — K50.10 CROHN'S DISEASE OF COLON WITHOUT COMPLICATION (HCC): Primary | ICD-10-CM

## 2024-04-30 PROCEDURE — 3600000003 HC SURGERY LEVEL 3 BASE: Performed by: SURGERY

## 2024-04-30 PROCEDURE — 2580000003 HC RX 258: Performed by: ANESTHESIOLOGY

## 2024-04-30 PROCEDURE — 7100000001 HC PACU RECOVERY - ADDTL 15 MIN: Performed by: SURGERY

## 2024-04-30 PROCEDURE — 0DQE0ZZ REPAIR LARGE INTESTINE, OPEN APPROACH: ICD-10-PCS | Performed by: SURGERY

## 2024-04-30 PROCEDURE — 2580000003 HC RX 258

## 2024-04-30 PROCEDURE — 6360000002 HC RX W HCPCS: Performed by: STUDENT IN AN ORGANIZED HEALTH CARE EDUCATION/TRAINING PROGRAM

## 2024-04-30 PROCEDURE — 6360000002 HC RX W HCPCS

## 2024-04-30 PROCEDURE — 2709999900 HC NON-CHARGEABLE SUPPLY: Performed by: SURGERY

## 2024-04-30 PROCEDURE — 2580000003 HC RX 258: Performed by: SURGERY

## 2024-04-30 PROCEDURE — 2500000003 HC RX 250 WO HCPCS

## 2024-04-30 PROCEDURE — 64488 TAP BLOCK BI INJECTION: CPT | Performed by: STUDENT IN AN ORGANIZED HEALTH CARE EDUCATION/TRAINING PROGRAM

## 2024-04-30 PROCEDURE — 88307 TISSUE EXAM BY PATHOLOGIST: CPT

## 2024-04-30 PROCEDURE — 7100000000 HC PACU RECOVERY - FIRST 15 MIN: Performed by: SURGERY

## 2024-04-30 PROCEDURE — 0D1M0Z4 BYPASS DESCENDING COLON TO CUTANEOUS, OPEN APPROACH: ICD-10-PCS | Performed by: SURGERY

## 2024-04-30 PROCEDURE — P9045 ALBUMIN (HUMAN), 5%, 250 ML: HCPCS

## 2024-04-30 PROCEDURE — 3600000013 HC SURGERY LEVEL 3 ADDTL 15MIN: Performed by: SURGERY

## 2024-04-30 PROCEDURE — 0DNM0ZZ RELEASE DESCENDING COLON, OPEN APPROACH: ICD-10-PCS | Performed by: SURGERY

## 2024-04-30 PROCEDURE — C1758 CATHETER, URETERAL: HCPCS | Performed by: SURGERY

## 2024-04-30 PROCEDURE — 6370000000 HC RX 637 (ALT 250 FOR IP): Performed by: SURGERY

## 2024-04-30 PROCEDURE — 0DBM0ZZ EXCISION OF DESCENDING COLON, OPEN APPROACH: ICD-10-PCS | Performed by: SURGERY

## 2024-04-30 PROCEDURE — 88304 TISSUE EXAM BY PATHOLOGIST: CPT

## 2024-04-30 PROCEDURE — 3700000001 HC ADD 15 MINUTES (ANESTHESIA): Performed by: SURGERY

## 2024-04-30 PROCEDURE — 0T788DZ DILATION OF BILATERAL URETERS WITH INTRALUMINAL DEVICE, VIA NATURAL OR ARTIFICIAL OPENING ENDOSCOPIC: ICD-10-PCS | Performed by: UROLOGY

## 2024-04-30 PROCEDURE — 2720000010 HC SURG SUPPLY STERILE: Performed by: SURGERY

## 2024-04-30 PROCEDURE — 6360000002 HC RX W HCPCS: Performed by: SURGERY

## 2024-04-30 PROCEDURE — 1100000000 HC RM PRIVATE

## 2024-04-30 PROCEDURE — 3700000000 HC ANESTHESIA ATTENDED CARE: Performed by: SURGERY

## 2024-04-30 RX ORDER — PROPOFOL 10 MG/ML
INJECTION, EMULSION INTRAVENOUS PRN
Status: DISCONTINUED | OUTPATIENT
Start: 2024-04-30 | End: 2024-04-30 | Stop reason: SDUPTHER

## 2024-04-30 RX ORDER — HYDROMORPHONE HYDROCHLORIDE 1 MG/ML
0.5 INJECTION, SOLUTION INTRAMUSCULAR; INTRAVENOUS; SUBCUTANEOUS EVERY 5 MIN PRN
Status: DISCONTINUED | OUTPATIENT
Start: 2024-04-30 | End: 2024-04-30

## 2024-04-30 RX ORDER — FENTANYL CITRATE 50 UG/ML
INJECTION, SOLUTION INTRAMUSCULAR; INTRAVENOUS PRN
Status: DISCONTINUED | OUTPATIENT
Start: 2024-04-30 | End: 2024-04-30 | Stop reason: SDUPTHER

## 2024-04-30 RX ORDER — TRAMADOL HYDROCHLORIDE 50 MG/1
50 TABLET ORAL EVERY 6 HOURS PRN
Status: DISCONTINUED | OUTPATIENT
Start: 2024-04-30 | End: 2024-05-06

## 2024-04-30 RX ORDER — PROCHLORPERAZINE EDISYLATE 5 MG/ML
5 INJECTION INTRAMUSCULAR; INTRAVENOUS
Status: DISCONTINUED | OUTPATIENT
Start: 2024-04-30 | End: 2024-04-30 | Stop reason: HOSPADM

## 2024-04-30 RX ORDER — DEXTROSE MONOHYDRATE 100 MG/ML
INJECTION, SOLUTION INTRAVENOUS CONTINUOUS PRN
Status: DISCONTINUED | OUTPATIENT
Start: 2024-04-30 | End: 2024-04-30 | Stop reason: HOSPADM

## 2024-04-30 RX ORDER — IPRATROPIUM BROMIDE AND ALBUTEROL SULFATE 2.5; .5 MG/3ML; MG/3ML
1 SOLUTION RESPIRATORY (INHALATION)
Status: DISCONTINUED | OUTPATIENT
Start: 2024-04-30 | End: 2024-04-30 | Stop reason: HOSPADM

## 2024-04-30 RX ORDER — KETAMINE HCL IN NACL, ISO-OSM 100MG/10ML
SYRINGE (ML) INJECTION PRN
Status: DISCONTINUED | OUTPATIENT
Start: 2024-04-30 | End: 2024-04-30 | Stop reason: SDUPTHER

## 2024-04-30 RX ORDER — GABAPENTIN 300 MG/1
1200 CAPSULE ORAL NIGHTLY
Status: DISCONTINUED | OUTPATIENT
Start: 2024-04-30 | End: 2024-05-09 | Stop reason: HOSPADM

## 2024-04-30 RX ORDER — SODIUM CHLORIDE 0.9 % (FLUSH) 0.9 %
5-40 SYRINGE (ML) INJECTION EVERY 12 HOURS SCHEDULED
Status: DISCONTINUED | OUTPATIENT
Start: 2024-04-30 | End: 2024-05-09 | Stop reason: HOSPADM

## 2024-04-30 RX ORDER — SODIUM CHLORIDE, SODIUM LACTATE, POTASSIUM CHLORIDE, CALCIUM CHLORIDE 600; 310; 30; 20 MG/100ML; MG/100ML; MG/100ML; MG/100ML
INJECTION, SOLUTION INTRAVENOUS CONTINUOUS
Status: DISCONTINUED | OUTPATIENT
Start: 2024-04-30 | End: 2024-05-09 | Stop reason: HOSPADM

## 2024-04-30 RX ORDER — TIZANIDINE 4 MG/1
2 TABLET ORAL NIGHTLY PRN
Status: DISCONTINUED | OUTPATIENT
Start: 2024-04-30 | End: 2024-05-05

## 2024-04-30 RX ORDER — AMITRIPTYLINE HYDROCHLORIDE 25 MG/1
25 TABLET, FILM COATED ORAL NIGHTLY PRN
Status: DISCONTINUED | OUTPATIENT
Start: 2024-04-30 | End: 2024-05-09 | Stop reason: HOSPADM

## 2024-04-30 RX ORDER — ACETAMINOPHEN 500 MG
1000 TABLET ORAL ONCE
Status: COMPLETED | OUTPATIENT
Start: 2024-04-30 | End: 2024-04-30

## 2024-04-30 RX ORDER — ALBUMIN, HUMAN INJ 5% 5 %
SOLUTION INTRAVENOUS PRN
Status: DISCONTINUED | OUTPATIENT
Start: 2024-04-30 | End: 2024-04-30 | Stop reason: SDUPTHER

## 2024-04-30 RX ORDER — SODIUM CHLORIDE 9 MG/ML
INJECTION, SOLUTION INTRAVENOUS PRN
Status: DISCONTINUED | OUTPATIENT
Start: 2024-04-30 | End: 2024-05-09 | Stop reason: HOSPADM

## 2024-04-30 RX ORDER — SODIUM CHLORIDE, SODIUM LACTATE, POTASSIUM CHLORIDE, CALCIUM CHLORIDE 600; 310; 30; 20 MG/100ML; MG/100ML; MG/100ML; MG/100ML
INJECTION, SOLUTION INTRAVENOUS CONTINUOUS PRN
Status: DISCONTINUED | OUTPATIENT
Start: 2024-04-30 | End: 2024-04-30 | Stop reason: SDUPTHER

## 2024-04-30 RX ORDER — HYDROXYCHLOROQUINE SULFATE 200 MG/1
400 TABLET, FILM COATED ORAL NIGHTLY
Status: DISCONTINUED | OUTPATIENT
Start: 2024-04-30 | End: 2024-05-09 | Stop reason: HOSPADM

## 2024-04-30 RX ORDER — ONDANSETRON 2 MG/ML
4 INJECTION INTRAMUSCULAR; INTRAVENOUS EVERY 6 HOURS PRN
Status: DISCONTINUED | OUTPATIENT
Start: 2024-04-30 | End: 2024-05-09 | Stop reason: HOSPADM

## 2024-04-30 RX ORDER — ASPIRIN 81 MG/1
81 TABLET ORAL DAILY
Status: DISCONTINUED | OUTPATIENT
Start: 2024-04-30 | End: 2024-05-09 | Stop reason: HOSPADM

## 2024-04-30 RX ORDER — DEXAMETHASONE SODIUM PHOSPHATE 4 MG/ML
INJECTION, SOLUTION INTRA-ARTICULAR; INTRALESIONAL; INTRAMUSCULAR; INTRAVENOUS; SOFT TISSUE PRN
Status: DISCONTINUED | OUTPATIENT
Start: 2024-04-30 | End: 2024-04-30 | Stop reason: SDUPTHER

## 2024-04-30 RX ORDER — ONDANSETRON 4 MG/1
4 TABLET, ORALLY DISINTEGRATING ORAL EVERY 8 HOURS PRN
Status: DISCONTINUED | OUTPATIENT
Start: 2024-04-30 | End: 2024-05-09 | Stop reason: HOSPADM

## 2024-04-30 RX ORDER — CELECOXIB 200 MG/1
200 CAPSULE ORAL ONCE
Status: COMPLETED | OUTPATIENT
Start: 2024-04-30 | End: 2024-04-30

## 2024-04-30 RX ORDER — SODIUM CHLORIDE 0.9 % (FLUSH) 0.9 %
5-40 SYRINGE (ML) INJECTION PRN
Status: DISCONTINUED | OUTPATIENT
Start: 2024-04-30 | End: 2024-04-30 | Stop reason: HOSPADM

## 2024-04-30 RX ORDER — MIDAZOLAM HYDROCHLORIDE 1 MG/ML
INJECTION INTRAMUSCULAR; INTRAVENOUS PRN
Status: DISCONTINUED | OUTPATIENT
Start: 2024-04-30 | End: 2024-04-30 | Stop reason: SDUPTHER

## 2024-04-30 RX ORDER — SODIUM CHLORIDE, SODIUM LACTATE, POTASSIUM CHLORIDE, CALCIUM CHLORIDE 600; 310; 30; 20 MG/100ML; MG/100ML; MG/100ML; MG/100ML
INJECTION, SOLUTION INTRAVENOUS CONTINUOUS
Status: DISCONTINUED | OUTPATIENT
Start: 2024-04-30 | End: 2024-04-30 | Stop reason: HOSPADM

## 2024-04-30 RX ORDER — GLUCAGON 1 MG/ML
1 KIT INJECTION PRN
Status: DISCONTINUED | OUTPATIENT
Start: 2024-04-30 | End: 2024-04-30 | Stop reason: HOSPADM

## 2024-04-30 RX ORDER — DEXMEDETOMIDINE HYDROCHLORIDE 100 UG/ML
INJECTION, SOLUTION INTRAVENOUS PRN
Status: DISCONTINUED | OUTPATIENT
Start: 2024-04-30 | End: 2024-04-30 | Stop reason: SDUPTHER

## 2024-04-30 RX ORDER — MAGNESIUM SULFATE 1 G/100ML
1000 INJECTION INTRAVENOUS PRN
Status: DISCONTINUED | OUTPATIENT
Start: 2024-04-30 | End: 2024-05-09 | Stop reason: HOSPADM

## 2024-04-30 RX ORDER — MAGNESIUM SULFATE HEPTAHYDRATE 40 MG/ML
INJECTION, SOLUTION INTRAVENOUS PRN
Status: DISCONTINUED | OUTPATIENT
Start: 2024-04-30 | End: 2024-04-30 | Stop reason: SDUPTHER

## 2024-04-30 RX ORDER — LIDOCAINE HYDROCHLORIDE 20 MG/ML
INJECTION, SOLUTION EPIDURAL; INFILTRATION; INTRACAUDAL; PERINEURAL PRN
Status: DISCONTINUED | OUTPATIENT
Start: 2024-04-30 | End: 2024-04-30 | Stop reason: SDUPTHER

## 2024-04-30 RX ORDER — SODIUM CHLORIDE 0.9 % (FLUSH) 0.9 %
5-40 SYRINGE (ML) INJECTION PRN
Status: DISCONTINUED | OUTPATIENT
Start: 2024-04-30 | End: 2024-05-09 | Stop reason: HOSPADM

## 2024-04-30 RX ORDER — DROSPIRENONE AND ETHINYL ESTRADIOL 0.02-3(28)
1 KIT ORAL DAILY
Status: DISCONTINUED | OUTPATIENT
Start: 2024-05-01 | End: 2024-05-09 | Stop reason: HOSPADM

## 2024-04-30 RX ORDER — PHENYLEPHRINE HCL IN 0.9% NACL 0.4MG/10ML
SYRINGE (ML) INTRAVENOUS PRN
Status: DISCONTINUED | OUTPATIENT
Start: 2024-04-30 | End: 2024-04-30 | Stop reason: SDUPTHER

## 2024-04-30 RX ORDER — POTASSIUM CHLORIDE 7.45 MG/ML
10 INJECTION INTRAVENOUS PRN
Status: DISCONTINUED | OUTPATIENT
Start: 2024-04-30 | End: 2024-05-09 | Stop reason: HOSPADM

## 2024-04-30 RX ORDER — ACETAMINOPHEN 325 MG/1
650 TABLET ORAL EVERY 6 HOURS
Status: DISCONTINUED | OUTPATIENT
Start: 2024-04-30 | End: 2024-05-09 | Stop reason: HOSPADM

## 2024-04-30 RX ORDER — BUPIVACAINE HYDROCHLORIDE 2.5 MG/ML
INJECTION, SOLUTION EPIDURAL; INFILTRATION; INTRACAUDAL PRN
Status: DISCONTINUED | OUTPATIENT
Start: 2024-04-30 | End: 2024-04-30 | Stop reason: SDUPTHER

## 2024-04-30 RX ORDER — FENTANYL CITRATE 50 UG/ML
25 INJECTION, SOLUTION INTRAMUSCULAR; INTRAVENOUS EVERY 5 MIN PRN
Status: DISCONTINUED | OUTPATIENT
Start: 2024-04-30 | End: 2024-04-30 | Stop reason: HOSPADM

## 2024-04-30 RX ORDER — ENOXAPARIN SODIUM 100 MG/ML
40 INJECTION SUBCUTANEOUS DAILY
Status: DISCONTINUED | OUTPATIENT
Start: 2024-05-01 | End: 2024-05-06

## 2024-04-30 RX ORDER — SODIUM CHLORIDE 9 MG/ML
INJECTION, SOLUTION INTRAVENOUS PRN
Status: DISCONTINUED | OUTPATIENT
Start: 2024-04-30 | End: 2024-04-30 | Stop reason: HOSPADM

## 2024-04-30 RX ORDER — LIDOCAINE HYDROCHLORIDE ANHYDROUS AND DEXTROSE MONOHYDRATE 5; 400 G/100ML; MG/100ML
INJECTION, SOLUTION INTRAVENOUS CONTINUOUS PRN
Status: DISCONTINUED | OUTPATIENT
Start: 2024-04-30 | End: 2024-04-30 | Stop reason: SDUPTHER

## 2024-04-30 RX ORDER — NALOXONE HYDROCHLORIDE 0.4 MG/ML
INJECTION, SOLUTION INTRAMUSCULAR; INTRAVENOUS; SUBCUTANEOUS PRN
Status: DISCONTINUED | OUTPATIENT
Start: 2024-04-30 | End: 2024-04-30 | Stop reason: HOSPADM

## 2024-04-30 RX ORDER — PANTOPRAZOLE SODIUM 40 MG/1
40 TABLET, DELAYED RELEASE ORAL DAILY
Status: DISCONTINUED | OUTPATIENT
Start: 2024-04-30 | End: 2024-05-03

## 2024-04-30 RX ORDER — ONDANSETRON 2 MG/ML
4 INJECTION INTRAMUSCULAR; INTRAVENOUS
Status: DISCONTINUED | OUTPATIENT
Start: 2024-04-30 | End: 2024-04-30 | Stop reason: HOSPADM

## 2024-04-30 RX ORDER — ROCURONIUM BROMIDE 10 MG/ML
INJECTION, SOLUTION INTRAVENOUS PRN
Status: DISCONTINUED | OUTPATIENT
Start: 2024-04-30 | End: 2024-04-30 | Stop reason: SDUPTHER

## 2024-04-30 RX ORDER — ONDANSETRON 2 MG/ML
INJECTION INTRAMUSCULAR; INTRAVENOUS PRN
Status: DISCONTINUED | OUTPATIENT
Start: 2024-04-30 | End: 2024-04-30 | Stop reason: SDUPTHER

## 2024-04-30 RX ORDER — SODIUM CHLORIDE 0.9 % (FLUSH) 0.9 %
5-40 SYRINGE (ML) INJECTION EVERY 12 HOURS SCHEDULED
Status: DISCONTINUED | OUTPATIENT
Start: 2024-04-30 | End: 2024-04-30 | Stop reason: HOSPADM

## 2024-04-30 RX ORDER — TRAMADOL HYDROCHLORIDE 50 MG/1
100 TABLET ORAL EVERY 6 HOURS PRN
Status: DISCONTINUED | OUTPATIENT
Start: 2024-04-30 | End: 2024-05-06

## 2024-04-30 RX ORDER — KETOROLAC TROMETHAMINE 30 MG/ML
15 INJECTION, SOLUTION INTRAMUSCULAR; INTRAVENOUS EVERY 6 HOURS PRN
Status: DISCONTINUED | OUTPATIENT
Start: 2024-04-30 | End: 2024-05-02

## 2024-04-30 RX ORDER — LIDOCAINE HYDROCHLORIDE ANHYDROUS AND DEXTROSE MONOHYDRATE 5; 400 G/100ML; MG/100ML
INJECTION, SOLUTION INTRAVENOUS CONTINUOUS PRN
Status: DISCONTINUED | OUTPATIENT
Start: 2024-04-30 | End: 2024-04-30

## 2024-04-30 RX ADMIN — ALBUMIN (HUMAN) 250 ML: 12.5 INJECTION, SOLUTION INTRAVENOUS at 09:20

## 2024-04-30 RX ADMIN — MIDAZOLAM HYDROCHLORIDE 2 MG: 1 INJECTION, SOLUTION INTRAMUSCULAR; INTRAVENOUS at 07:30

## 2024-04-30 RX ADMIN — PHENYLEPHRINE HYDROCHLORIDE 30 MCG/MIN: 10 INJECTION INTRAVENOUS at 08:37

## 2024-04-30 RX ADMIN — Medication: at 13:28

## 2024-04-30 RX ADMIN — GABAPENTIN 1200 MG: 300 CAPSULE ORAL at 20:41

## 2024-04-30 RX ADMIN — DEXMEDETOMIDINE HYDROCHLORIDE 10 MCG: 100 INJECTION, SOLUTION, CONCENTRATE INTRAVENOUS at 10:35

## 2024-04-30 RX ADMIN — Medication 100 MCG: at 10:03

## 2024-04-30 RX ADMIN — FENTANYL CITRATE 50 MCG: 50 INJECTION, SOLUTION INTRAMUSCULAR; INTRAVENOUS at 07:39

## 2024-04-30 RX ADMIN — MAGNESIUM SULFATE IN WATER 2000 MG: 40 INJECTION, SOLUTION INTRAVENOUS at 08:02

## 2024-04-30 RX ADMIN — CEFOXITIN 2000 MG: 2 INJECTION, POWDER, FOR SOLUTION INTRAVENOUS at 07:50

## 2024-04-30 RX ADMIN — ROCURONIUM BROMIDE 20 MG: 10 INJECTION INTRAVENOUS at 08:16

## 2024-04-30 RX ADMIN — LIDOCAINE HYDROCHLORIDE 100 MG: 20 INJECTION, SOLUTION EPIDURAL; INFILTRATION; INTRACAUDAL; PERINEURAL at 07:39

## 2024-04-30 RX ADMIN — PROPOFOL 150 MG: 10 INJECTION, EMULSION INTRAVENOUS at 07:39

## 2024-04-30 RX ADMIN — HYDROXYCHLOROQUINE SULFATE 400 MG: 200 TABLET ORAL at 20:41

## 2024-04-30 RX ADMIN — SODIUM CHLORIDE, POTASSIUM CHLORIDE, SODIUM LACTATE AND CALCIUM CHLORIDE: 600; 310; 30; 20 INJECTION, SOLUTION INTRAVENOUS at 07:01

## 2024-04-30 RX ADMIN — FENTANYL CITRATE 50 MCG: 50 INJECTION, SOLUTION INTRAMUSCULAR; INTRAVENOUS at 08:21

## 2024-04-30 RX ADMIN — ROCURONIUM BROMIDE 30 MG: 10 INJECTION INTRAVENOUS at 07:39

## 2024-04-30 RX ADMIN — SODIUM CHLORIDE, POTASSIUM CHLORIDE, SODIUM LACTATE AND CALCIUM CHLORIDE: 600; 310; 30; 20 INJECTION, SOLUTION INTRAVENOUS at 07:30

## 2024-04-30 RX ADMIN — Medication 30 MG: at 08:17

## 2024-04-30 RX ADMIN — SUGAMMADEX 200 MG: 100 INJECTION, SOLUTION INTRAVENOUS at 10:26

## 2024-04-30 RX ADMIN — SODIUM CHLORIDE, PRESERVATIVE FREE 10 ML: 5 INJECTION INTRAVENOUS at 20:37

## 2024-04-30 RX ADMIN — Medication 20 MG: at 09:25

## 2024-04-30 RX ADMIN — ACETAMINOPHEN 1000 MG: 500 TABLET ORAL at 06:34

## 2024-04-30 RX ADMIN — BUPIVACAINE HYDROCHLORIDE 30 ML: 2.5 INJECTION, SOLUTION EPIDURAL; INFILTRATION; INTRACAUDAL at 10:17

## 2024-04-30 RX ADMIN — CELECOXIB 200 MG: 200 CAPSULE ORAL at 06:34

## 2024-04-30 RX ADMIN — SODIUM CHLORIDE, POTASSIUM CHLORIDE, SODIUM LACTATE AND CALCIUM CHLORIDE: 600; 310; 30; 20 INJECTION, SOLUTION INTRAVENOUS at 13:28

## 2024-04-30 RX ADMIN — BUPIVACAINE HYDROCHLORIDE 30 ML: 2.5 INJECTION, SOLUTION EPIDURAL; INFILTRATION; INTRACAUDAL at 10:20

## 2024-04-30 RX ADMIN — DEXAMETHASONE SODIUM PHOSPHATE 8 MG: 4 INJECTION, SOLUTION INTRAMUSCULAR; INTRAVENOUS at 08:06

## 2024-04-30 RX ADMIN — LIDOCAINE HYDROCHLORIDE 2 MG/KG/HR: 4 INJECTION, SOLUTION INTRAVENOUS at 07:55

## 2024-04-30 RX ADMIN — ROCURONIUM BROMIDE 15 MG: 10 INJECTION INTRAVENOUS at 09:25

## 2024-04-30 RX ADMIN — CEFOXITIN 2000 MG: 2 INJECTION, POWDER, FOR SOLUTION INTRAVENOUS at 09:50

## 2024-04-30 RX ADMIN — ONDANSETRON HYDROCHLORIDE 4 MG: 2 INJECTION, SOLUTION INTRAMUSCULAR; INTRAVENOUS at 10:16

## 2024-04-30 RX ADMIN — Medication 100 MCG: at 08:32

## 2024-04-30 RX ADMIN — SODIUM CHLORIDE, SODIUM LACTATE, POTASSIUM CHLORIDE, CALCIUM CHLORIDE: 600; 310; 30; 20 INJECTION, SOLUTION INTRAVENOUS at 07:48

## 2024-04-30 ASSESSMENT — PAIN SCALES - GENERAL
PAINLEVEL_OUTOF10: 10
PAINLEVEL_OUTOF10: 0

## 2024-04-30 ASSESSMENT — ENCOUNTER SYMPTOMS: SHORTNESS OF BREATH: 0

## 2024-04-30 ASSESSMENT — PAIN DESCRIPTION - DESCRIPTORS: DESCRIPTORS: STABBING

## 2024-04-30 ASSESSMENT — PAIN - FUNCTIONAL ASSESSMENT: PAIN_FUNCTIONAL_ASSESSMENT: 0-10

## 2024-04-30 NOTE — BRIEF OP NOTE
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    Surgeon(s):  Endy Schaffer II, MD Rhamy, Scott, MD    Assistant:  Surgical Assistant: Cindy Puckett    Anesthesia: General    Estimated Blood Loss (mL): less than 100     Complications: None    Specimens:   ID Type Source Tests Collected by Time Destination   1 : PERISTOMAL SINUS TRACT Tissue Colon SURGICAL PATHOLOGY Endy Schaffer II, MD 4/30/2024 0848    2 : STOMA AND DESCENDING COLON Tissue Colon SURGICAL PATHOLOGY Endy Schaffer II, MD 4/30/2024 0913        Implants:  * No implants in log *      Drains:   Open Drain 04/30/24 LLQ (Active)       Colostomy LUQ (Active)       Urinary Catheter 04/30/24 Chinchilla (Active)       [REMOVED] Colostomy LUQ (Removed)   Stomal Appliance 2 piece;Clean, dry & intact 04/30/24 0612   Stool Appearance Loose 04/30/24 0612   Stool Color Yellow 04/30/24 0612       Findings:  Infection Present At Time Of Surgery (PATOS) (choose all levels that have infection present):  - Deep Infection (muscle/fascia) present as evidenced by abscess  Other Findings:   Electronically signed by Endy Schaffer MD on 4/30/2024 at 10:13 AM

## 2024-04-30 NOTE — PERIOP NOTE
0612 - PT DENIES FEVER, COLD, COUGH, N/V......  PT STATES HAS SOB ON EXERTION SINCE HRT SURGERY (2016) - ALSO DIARRHEA SECONDARY TO BOWEL PREP.  PRE-OP TCHING DONE - PT VERBALIZES UNDERSTANDING.  STRETCHER IN LOWEST POSITION, CB IN PLACE AND SR UP X2.

## 2024-04-30 NOTE — FLOWSHEET NOTE
04/30/24 1040   Handoff   Communication Given Periop Handoff/Relief   Handoff phase Phase I receiving   Handoff Given To [facility] PACU RN   Handoff Received From [facility] OR RN/ Freida CALDWELL   Handoff Communication Face to Face;At bedside   Time Handoff Given 1040        04/30/24 1110   Family/Significant Other Communication   Reason Update   Name Amilcar   Relationship Spouse/Significant Other   Method of Communication Phone       1115: Patient resting comfortably, easily arousable, FLACC score 0      1130: Sign out received from Dr. Sierra      1135: TRANSFER - OUT REPORT:    Verbal report given to PHYLLIS Torres on Valentina Maynard being transferred to  for routine progression of care       Report consisted of patient’s Situation, Background, Assessment and   Recommendations(SBAR).     Opportunity for questions and clarification was provided.        1143: Notified Amilcar - spouse of patient's room #

## 2024-04-30 NOTE — OP NOTE
[address]  [address]                            OPERATIVE REPORT      PATIENT NAME: KENYON SPIVEY                 : 1965  MED REC NO: [id number]                       ROOM: OR  ACCOUNT NO: [id number]                       ADMIT DATE: 2024  PROVIDER: Pako Jones MD    DATE OF SERVICE:  2024    PREOPERATIVE DIAGNOSES:  Colostomy malfunction.    POSTOPERATIVE DIAGNOSES:  Colostomy malfunction.    PROCEDURES PERFORMED:  Cystoscopy, placement of bilateral ureteral catheters for localization.    SURGEON:  Pako Jones MD    ANESTHESIA:  GETA.    ESTIMATED BLOOD LOSS:  None.    SPECIMENS REMOVED:  None.    DESCRIPTION OF PROCEDURE:  After the patient was correctly identified, preoperative antibiotics had been given by the primary surgeon.  Anesthesia was undertaken.  She was repositioned in lithotomy.  The perineum and genitalia were prepped and draped in routine sterile fashion.    Cystoscope inserted per urethra.  The bladder was normal.  Whistle-tip catheters were twisted into each ureteral orifice and advanced to the kidney without resistance.  I offloaded from the catheters, placed a 16 Chinchilla and fashioned catheters to drain through the tubing using a Y-connector.  They were also secured to the catheter using clips.    IV FLUIDS:  Per Anesthesia.    DISPOSITION:  Catheters to be removed at the end of the case.        PAKO JONES MD      SR/AQS  D:  2024 07:59:47  T:  2024 09:02:08  JOB #:  [id number]     
with quarter-inch iodoform.  A new quarter-inch Penrose drain was encircled through her prior abscess site and secured in place with a 2-0 silk suture after thoroughly washing out this cavity.  The midline fascia was closed using two 0 looped PDS sutures.  This subcutaneous fat was once again thoroughly washed out and irrigated with saline.  The skin was closed with skin staples.  The colostomy was then matured in a Lisa fashion achieving a very nice protuberant blood.  The descending colon was sent to pathology for further examination.  The colostomy was matured in a Lisa fashion using interrupted 3-0 Vicryl sutures.  The stoma appliance was placed.  A dry dressing was applied.  The patient was awakened, extubated, and taken to recovery room in stable condition.    DRAINS:  A quarter-inch Penrose drain.        MD JANICE LUNA II/MARINA  D:  04/30/2024 10:32:11  T:  04/30/2024 13:38:45  JOB #:  [id number]

## 2024-04-30 NOTE — ANESTHESIA PROCEDURE NOTES
Peripheral Block    Patient location during procedure: holding area  Reason for block: procedure for pain, post-op pain management and at surgeon's request  Start time: 4/30/2024 10:10 AM  End time: 4/30/2024 10:25 AM  Staffing  Performed: anesthesiologist   Anesthesiologist: Jim Sierra MD  Performed by: Jim Sierra MD  Authorized by: Jim Sierra MD    Preanesthetic Checklist  Completed: patient identified, IV checked, site marked, risks and benefits discussed, surgical/procedural consents, equipment checked, pre-op evaluation, timeout performed, anesthesia consent given, oxygen available, monitors applied/VS acknowledged, fire risk safety assessment completed and verbalized and blood product R/B/A discussed and consented  Peripheral Block   Patient position: supine  Prep: DuraPrep  Provider prep: mask and sterile gloves  Patient monitoring: continuous pulse ox, continuous capnometry, frequent blood pressure checks, IV access, oxygen and cardiac monitor  Block type: Rectus sheath  Laterality: bilateral  Injection technique: single-shot  Guidance: ultrasound guided    Needle   Needle type: insulated echogenic nerve stimulator needle   Needle gauge: 20 G  Needle localization: ultrasound guidance  Needle length: 10 cm  Assessment   Injection assessment: negative aspiration for heme, no paresthesia on injection, local visualized surrounding nerve on ultrasound and no intravascular symptoms  Slow fractionated injection: yes  Hemodynamics: stable  Outcomes: uncomplicated and patient tolerated procedure well

## 2024-04-30 NOTE — ANESTHESIA PRE PROCEDURE
1145                        Date of last liquid consumption: 04/30/24                        Date of last solid food consumption: 04/29/24    BMI:   Wt Readings from Last 3 Encounters:   04/30/24 84 kg (185 lb 3 oz)   04/18/24 85.6 kg (188 lb 11.4 oz)   03/25/24 86 kg (189 lb 9.6 oz)     Body mass index is 36.17 kg/m².    CBC:   Lab Results   Component Value Date/Time    WBC 10.5 04/18/2024 01:34 PM    RBC 4.40 04/18/2024 01:34 PM    HGB 13.2 04/18/2024 01:34 PM    HCT 41.0 04/18/2024 01:34 PM    MCV 93.2 04/18/2024 01:34 PM    RDW 12.9 04/18/2024 01:34 PM     04/18/2024 01:34 PM       CMP:   Lab Results   Component Value Date/Time     04/18/2024 01:34 PM    K 3.6 04/18/2024 01:34 PM     04/18/2024 01:34 PM    CO2 22 04/18/2024 01:34 PM    BUN 17 04/18/2024 01:34 PM    CREATININE 1.10 04/18/2024 01:34 PM    GFRAA >60 10/01/2021 04:34 AM    AGRATIO 0.7 04/18/2024 01:34 PM    AGRATIO 1.2 09/15/2023 12:00 AM    LABGLOM 58 04/18/2024 01:34 PM    LABGLOM 68 09/15/2023 12:00 AM    GLUCOSE 83 04/18/2024 01:34 PM    GLUCOSE 104 09/15/2023 12:00 AM    PROT 8.2 04/18/2024 01:34 PM    CALCIUM 9.0 04/18/2024 01:34 PM    BILITOT 0.4 04/18/2024 01:34 PM    ALKPHOS 138 04/18/2024 01:34 PM    ALKPHOS 106 09/15/2023 12:00 AM    AST 23 04/18/2024 01:34 PM    ALT 29 04/18/2024 01:34 PM       POC Tests: No results for input(s): \"POCGLU\", \"POCNA\", \"POCK\", \"POCCL\", \"POCBUN\", \"POCHEMO\", \"POCHCT\" in the last 72 hours.    Coags:   Lab Results   Component Value Date/Time    PROTIME 10.4 04/18/2024 01:34 PM    INR 1.0 04/18/2024 01:34 PM    APTT 23.9 04/18/2024 01:34 PM    APTT 25.1 09/15/2021 08:46 AM       HCG (If Applicable): No results found for: \"PREGTESTUR\", \"PREGSERUM\", \"HCG\", \"HCGQUANT\"     ABGs: No results found for: \"PHART\", \"PO2ART\", \"ZQR3LIN\", \"BZY9HZW\", \"BEART\", \"X8QGJYTZ\"     Type & Screen (If Applicable):  No results found for: \"LABABO\", \"LABRH\"    Drug/Infectious Status (If Applicable):  Lab Results

## 2024-04-30 NOTE — ANESTHESIA POSTPROCEDURE EVALUATION
Department of Anesthesiology  Postprocedure Note    Patient: Valentina Maynard  MRN: [id number]  YOB: 1965  Date of evaluation: 4/30/2024    Procedure Summary       Date: 04/30/24 Room / Location: \[facility]\"" MAIN OR M4 / [facility] MAIN OR    Anesthesia Start: 0730 Anesthesia Stop: 1044    Procedures:       EXPLORATORY LAPAROTOMY WITH EXCISION OF COLOSTOMY AND CREATION OF NEW END COLOSTOMY, CYSTOSCOPY INSERTION BILATERAL  URETERAL CATHETER/STENTS (E R A S) (Abdomen)      . 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    Michelle Phase I: Michelle Score: 8    Michelle Phase II:      Anesthesia Post Evaluation    Patient location during evaluation: PACU  Patient participation: complete - patient participated  Level of consciousness: awake and alert  Airway patency: patent  Nausea & Vomiting: no nausea  Cardiovascular status: hemodynamically stable  Respiratory status: acceptable  Hydration status: euvolemic  Multimodal analgesia pain management approach  Pain management: adequate    No notable events documented.

## 2024-05-01 LAB
ANION GAP SERPL CALC-SCNC: 6 MMOL/L (ref 5–15)
BASOPHILS # BLD: 0.1 K/UL (ref 0–0.1)
BASOPHILS NFR BLD: 0 % (ref 0–1)
BUN SERPL-MCNC: 13 MG/DL (ref 6–20)
BUN/CREAT SERPL: 12 (ref 12–20)
CALCIUM SERPL-MCNC: 8.5 MG/DL (ref 8.5–10.1)
CHLORIDE SERPL-SCNC: 110 MMOL/L (ref 97–108)
CO2 SERPL-SCNC: 18 MMOL/L (ref 21–32)
CREAT SERPL-MCNC: 1.13 MG/DL (ref 0.55–1.02)
DIFFERENTIAL METHOD BLD: ABNORMAL
EOSINOPHIL # BLD: 0 K/UL (ref 0–0.4)
EOSINOPHIL NFR BLD: 0 % (ref 0–7)
ERYTHROCYTE [DISTWIDTH] IN BLOOD BY AUTOMATED COUNT: 12.9 % (ref 11.5–14.5)
GLUCOSE SERPL-MCNC: 141 MG/DL (ref 65–100)
HCT VFR BLD AUTO: 36.2 % (ref 35–47)
HGB BLD-MCNC: 11.6 G/DL (ref 11.5–16)
IMM GRANULOCYTES # BLD AUTO: 0.1 K/UL (ref 0–0.04)
IMM GRANULOCYTES NFR BLD AUTO: 1 % (ref 0–0.5)
LYMPHOCYTES # BLD: 1.2 K/UL (ref 0.8–3.5)
LYMPHOCYTES NFR BLD: 6 % (ref 12–49)
MCH RBC QN AUTO: 28.7 PG (ref 26–34)
MCHC RBC AUTO-ENTMCNC: 32 G/DL (ref 30–36.5)
MCV RBC AUTO: 89.6 FL (ref 80–99)
MONOCYTES # BLD: 1.4 K/UL (ref 0–1)
MONOCYTES NFR BLD: 8 % (ref 5–13)
NEUTS SEG # BLD: 15.8 K/UL (ref 1.8–8)
NEUTS SEG NFR BLD: 85 % (ref 32–75)
NRBC # BLD: 0 K/UL (ref 0–0.01)
NRBC BLD-RTO: 0 PER 100 WBC
PLATELET # BLD AUTO: 292 K/UL (ref 150–400)
PMV BLD AUTO: 10.5 FL (ref 8.9–12.9)
POTASSIUM SERPL-SCNC: 4.9 MMOL/L (ref 3.5–5.1)
RBC # BLD AUTO: 4.04 M/UL (ref 3.8–5.2)
SODIUM SERPL-SCNC: 134 MMOL/L (ref 136–145)
WBC # BLD AUTO: 18.5 K/UL (ref 3.6–11)

## 2024-05-01 PROCEDURE — 36415 COLL VENOUS BLD VENIPUNCTURE: CPT

## 2024-05-01 PROCEDURE — 80048 BASIC METABOLIC PNL TOTAL CA: CPT

## 2024-05-01 PROCEDURE — 2580000003 HC RX 258: Performed by: SURGERY

## 2024-05-01 PROCEDURE — 6360000002 HC RX W HCPCS: Performed by: SURGERY

## 2024-05-01 PROCEDURE — 6370000000 HC RX 637 (ALT 250 FOR IP): Performed by: SURGERY

## 2024-05-01 PROCEDURE — 1100000000 HC RM PRIVATE

## 2024-05-01 PROCEDURE — 85025 COMPLETE CBC W/AUTO DIFF WBC: CPT

## 2024-05-01 PROCEDURE — 2580000003 HC RX 258: Performed by: NURSE PRACTITIONER

## 2024-05-01 PROCEDURE — 99024 POSTOP FOLLOW-UP VISIT: CPT | Performed by: NURSE PRACTITIONER

## 2024-05-01 RX ORDER — 0.9 % SODIUM CHLORIDE 0.9 %
500 INTRAVENOUS SOLUTION INTRAVENOUS ONCE
Status: COMPLETED | OUTPATIENT
Start: 2024-05-01 | End: 2024-05-01

## 2024-05-01 RX ORDER — 0.9 % SODIUM CHLORIDE 0.9 %
1000 INTRAVENOUS SOLUTION INTRAVENOUS ONCE
Status: COMPLETED | OUTPATIENT
Start: 2024-05-01 | End: 2024-05-01

## 2024-05-01 RX ADMIN — ACETAMINOPHEN 650 MG: 325 TABLET ORAL at 18:02

## 2024-05-01 RX ADMIN — SODIUM CHLORIDE, POTASSIUM CHLORIDE, SODIUM LACTATE AND CALCIUM CHLORIDE: 600; 310; 30; 20 INJECTION, SOLUTION INTRAVENOUS at 01:32

## 2024-05-01 RX ADMIN — DROSPIRENONE AND ETHINYL ESTRADIOL 1 TABLET: KIT at 20:49

## 2024-05-01 RX ADMIN — ENOXAPARIN SODIUM 40 MG: 100 INJECTION SUBCUTANEOUS at 09:45

## 2024-05-01 RX ADMIN — ACETAMINOPHEN 650 MG: 325 TABLET ORAL at 06:00

## 2024-05-01 RX ADMIN — SODIUM CHLORIDE, PRESERVATIVE FREE 20 ML: 5 INJECTION INTRAVENOUS at 20:50

## 2024-05-01 RX ADMIN — GABAPENTIN 1200 MG: 300 CAPSULE ORAL at 20:49

## 2024-05-01 RX ADMIN — PANTOPRAZOLE SODIUM 40 MG: 40 TABLET, DELAYED RELEASE ORAL at 09:46

## 2024-05-01 RX ADMIN — HYDROXYCHLOROQUINE SULFATE 400 MG: 200 TABLET ORAL at 20:49

## 2024-05-01 RX ADMIN — ACETAMINOPHEN 650 MG: 325 TABLET ORAL at 11:58

## 2024-05-01 RX ADMIN — SODIUM CHLORIDE, PRESERVATIVE FREE 10 ML: 5 INJECTION INTRAVENOUS at 09:46

## 2024-05-01 RX ADMIN — SODIUM CHLORIDE 1000 ML: 9 INJECTION, SOLUTION INTRAVENOUS at 13:18

## 2024-05-01 RX ADMIN — ONDANSETRON 4 MG: 2 INJECTION INTRAMUSCULAR; INTRAVENOUS at 22:11

## 2024-05-01 RX ADMIN — SODIUM CHLORIDE, POTASSIUM CHLORIDE, SODIUM LACTATE AND CALCIUM CHLORIDE: 600; 310; 30; 20 INJECTION, SOLUTION INTRAVENOUS at 14:59

## 2024-05-01 RX ADMIN — ASPIRIN 81 MG: 81 TABLET, COATED ORAL at 09:46

## 2024-05-01 RX ADMIN — ACETAMINOPHEN 650 MG: 325 TABLET ORAL at 00:06

## 2024-05-01 RX ADMIN — SODIUM CHLORIDE 500 ML: 9 INJECTION, SOLUTION INTRAVENOUS at 06:40

## 2024-05-01 ASSESSMENT — PAIN DESCRIPTION - DESCRIPTORS
DESCRIPTORS: STABBING
DESCRIPTORS: ACHING

## 2024-05-01 ASSESSMENT — PAIN DESCRIPTION - ORIENTATION
ORIENTATION: LOWER;LEFT
ORIENTATION: LOWER;LEFT

## 2024-05-01 ASSESSMENT — PAIN DESCRIPTION - LOCATION
LOCATION: ABDOMEN

## 2024-05-01 ASSESSMENT — PAIN SCALES - GENERAL
PAINLEVEL_OUTOF10: 7
PAINLEVEL_OUTOF10: 9
PAINLEVEL_OUTOF10: 4
PAINLEVEL_OUTOF10: 6

## 2024-05-01 NOTE — WOUND CARE
Wound Care consult for the newly revised / re-sited colostomy site:  Pt. Has long standing crohn's disease and she is well known to this [facility] nurse from past care provided. This is post op day # 1 and she is eating her breakfast, sitting up in the chair and states that she is feeling \"pretty good\" under the circumstances. Pt.'s  is at the bedside and supportive.   Pt. Being treated for infection / abscess tract as well with IV antibiotics.  Pt. Is allergic to Chlorhexidine (the orange wipes).    Assessment: mid line surgical site with drainage on the dressing.  There is abscess dressing in place with drainage and patient is concerned about. The new colostomy stoma is pink and above the skin. Pt. Seems please at the placement. Current bag intact with little stool in the bag.    Plan: Pt. To have her first post op dressing change by Dr. Schaffer today / nursing.  I left some Vashe solution for a wet to dry packing if needed. Vashe, Kerlix and ABD left at the bedside.   Pt. Does know ostomy care as she has had a colostomy for about 3 years. We will change the pouch tomorrow and let her rest today (except for the Abscess wound care).   Pretty Garcia, RN, BSN, CWON

## 2024-05-01 NOTE — PLAN OF CARE
Problem: Chronic Conditions and Co-morbidities  Goal: Patient's chronic conditions and co-morbidity symptoms are monitored and maintained or improved  Outcome: Progressing     Problem: Pain  Goal: Verbalizes/displays adequate comfort level or baseline comfort level  5/1/2024 1049 by Alma Rao RN  Outcome: Progressing  4/30/2024 2216 by Heena Robbins RN  Outcome: Progressing     Problem: ABCDS Injury Assessment  Goal: Absence of physical injury  5/1/2024 1049 by Alma Rao RN  Outcome: Progressing  4/30/2024 2216 by Heena Robbins RN  Outcome: Progressing     Problem: Discharge Planning  Goal: Discharge to home or other facility with appropriate resources  Outcome: Progressing     Problem: Skin/Tissue Integrity  Goal: Absence of new skin breakdown  Description: 1.  Monitor for areas of redness and/or skin breakdown  2.  Assess vascular access sites hourly  3.  Every 4-6 hours minimum:  Change oxygen saturation probe site  4.  Every 4-6 hours:  If on nasal continuous positive airway pressure, respiratory therapy assess nares and determine need for appliance change or resting period.  Outcome: Progressing     Problem: Safety - Adult  Goal: Free from fall injury  Outcome: Progressing

## 2024-05-02 LAB
ANION GAP SERPL CALC-SCNC: 5 MMOL/L (ref 5–15)
BASOPHILS # BLD: 0 K/UL (ref 0–0.1)
BASOPHILS NFR BLD: 0 % (ref 0–1)
BUN SERPL-MCNC: 11 MG/DL (ref 6–20)
BUN/CREAT SERPL: 12 (ref 12–20)
CALCIUM SERPL-MCNC: 8.5 MG/DL (ref 8.5–10.1)
CHLORIDE SERPL-SCNC: 112 MMOL/L (ref 97–108)
CO2 SERPL-SCNC: 21 MMOL/L (ref 21–32)
CREAT SERPL-MCNC: 0.91 MG/DL (ref 0.55–1.02)
DIFFERENTIAL METHOD BLD: ABNORMAL
EOSINOPHIL # BLD: 0.3 K/UL (ref 0–0.4)
EOSINOPHIL NFR BLD: 2 % (ref 0–7)
ERYTHROCYTE [DISTWIDTH] IN BLOOD BY AUTOMATED COUNT: 13.2 % (ref 11.5–14.5)
GLUCOSE SERPL-MCNC: 99 MG/DL (ref 65–100)
HCT VFR BLD AUTO: 30.3 % (ref 35–47)
HCT VFR BLD AUTO: 33.2 % (ref 35–47)
HGB BLD-MCNC: 10.3 G/DL (ref 11.5–16)
HGB BLD-MCNC: 9.5 G/DL (ref 11.5–16)
IMM GRANULOCYTES # BLD AUTO: 0.1 K/UL (ref 0–0.04)
IMM GRANULOCYTES NFR BLD AUTO: 1 % (ref 0–0.5)
LYMPHOCYTES # BLD: 1.8 K/UL (ref 0.8–3.5)
LYMPHOCYTES NFR BLD: 14 % (ref 12–49)
MCH RBC QN AUTO: 28.7 PG (ref 26–34)
MCHC RBC AUTO-ENTMCNC: 31 G/DL (ref 30–36.5)
MCV RBC AUTO: 92.5 FL (ref 80–99)
MONOCYTES # BLD: 1 K/UL (ref 0–1)
MONOCYTES NFR BLD: 7 % (ref 5–13)
NEUTS SEG # BLD: 10 K/UL (ref 1.8–8)
NEUTS SEG NFR BLD: 76 % (ref 32–75)
NRBC # BLD: 0 K/UL (ref 0–0.01)
NRBC BLD-RTO: 0 PER 100 WBC
PLATELET # BLD AUTO: 248 K/UL (ref 150–400)
PMV BLD AUTO: 10.4 FL (ref 8.9–12.9)
POTASSIUM SERPL-SCNC: 4 MMOL/L (ref 3.5–5.1)
RBC # BLD AUTO: 3.59 M/UL (ref 3.8–5.2)
SODIUM SERPL-SCNC: 138 MMOL/L (ref 136–145)
WBC # BLD AUTO: 13.1 K/UL (ref 3.6–11)

## 2024-05-02 PROCEDURE — 85025 COMPLETE CBC W/AUTO DIFF WBC: CPT

## 2024-05-02 PROCEDURE — 2580000003 HC RX 258: Performed by: SURGERY

## 2024-05-02 PROCEDURE — 85018 HEMOGLOBIN: CPT

## 2024-05-02 PROCEDURE — 99024 POSTOP FOLLOW-UP VISIT: CPT | Performed by: NURSE PRACTITIONER

## 2024-05-02 PROCEDURE — 80048 BASIC METABOLIC PNL TOTAL CA: CPT

## 2024-05-02 PROCEDURE — 6360000002 HC RX W HCPCS: Performed by: SURGERY

## 2024-05-02 PROCEDURE — 85014 HEMATOCRIT: CPT

## 2024-05-02 PROCEDURE — 1100000000 HC RM PRIVATE

## 2024-05-02 PROCEDURE — 36415 COLL VENOUS BLD VENIPUNCTURE: CPT

## 2024-05-02 PROCEDURE — 6370000000 HC RX 637 (ALT 250 FOR IP): Performed by: SURGERY

## 2024-05-02 RX ADMIN — TIZANIDINE 2 MG: 4 TABLET ORAL at 01:19

## 2024-05-02 RX ADMIN — SODIUM CHLORIDE, POTASSIUM CHLORIDE, SODIUM LACTATE AND CALCIUM CHLORIDE: 600; 310; 30; 20 INJECTION, SOLUTION INTRAVENOUS at 17:27

## 2024-05-02 RX ADMIN — SODIUM CHLORIDE, POTASSIUM CHLORIDE, SODIUM LACTATE AND CALCIUM CHLORIDE: 600; 310; 30; 20 INJECTION, SOLUTION INTRAVENOUS at 03:50

## 2024-05-02 RX ADMIN — GABAPENTIN 1200 MG: 300 CAPSULE ORAL at 21:23

## 2024-05-02 RX ADMIN — PANTOPRAZOLE SODIUM 40 MG: 40 TABLET, DELAYED RELEASE ORAL at 08:57

## 2024-05-02 RX ADMIN — TRAMADOL HYDROCHLORIDE 100 MG: 50 TABLET, FILM COATED ORAL at 22:39

## 2024-05-02 RX ADMIN — ACETAMINOPHEN 650 MG: 325 TABLET ORAL at 13:22

## 2024-05-02 RX ADMIN — TIZANIDINE 2 MG: 4 TABLET ORAL at 21:23

## 2024-05-02 RX ADMIN — ACETAMINOPHEN 650 MG: 325 TABLET ORAL at 06:43

## 2024-05-02 RX ADMIN — TRAMADOL HYDROCHLORIDE 100 MG: 50 TABLET, FILM COATED ORAL at 10:21

## 2024-05-02 RX ADMIN — TRAMADOL HYDROCHLORIDE 100 MG: 50 TABLET, FILM COATED ORAL at 16:36

## 2024-05-02 RX ADMIN — ONDANSETRON 4 MG: 2 INJECTION INTRAMUSCULAR; INTRAVENOUS at 16:37

## 2024-05-02 RX ADMIN — ACETAMINOPHEN 650 MG: 325 TABLET ORAL at 18:53

## 2024-05-02 RX ADMIN — ONDANSETRON 4 MG: 2 INJECTION INTRAMUSCULAR; INTRAVENOUS at 04:50

## 2024-05-02 RX ADMIN — HYDROXYCHLOROQUINE SULFATE 400 MG: 200 TABLET ORAL at 21:23

## 2024-05-02 RX ADMIN — ACETAMINOPHEN 650 MG: 325 TABLET ORAL at 01:12

## 2024-05-02 ASSESSMENT — PAIN DESCRIPTION - PAIN TYPE
TYPE: SURGICAL PAIN

## 2024-05-02 ASSESSMENT — PAIN SCALES - GENERAL
PAINLEVEL_OUTOF10: 6
PAINLEVEL_OUTOF10: 0
PAINLEVEL_OUTOF10: 5
PAINLEVEL_OUTOF10: 3
PAINLEVEL_OUTOF10: 7
PAINLEVEL_OUTOF10: 7
PAINLEVEL_OUTOF10: 8
PAINLEVEL_OUTOF10: 8
PAINLEVEL_OUTOF10: 7
PAINLEVEL_OUTOF10: 6
PAINLEVEL_OUTOF10: 8
PAINLEVEL_OUTOF10: 7
PAINLEVEL_OUTOF10: 9

## 2024-05-02 ASSESSMENT — PAIN DESCRIPTION - DESCRIPTORS
DESCRIPTORS: STABBING
DESCRIPTORS: SPASM;ACHING
DESCRIPTORS: ACHING
DESCRIPTORS: SPASM;STABBING;ACHING
DESCRIPTORS: ACHING;SHARP
DESCRIPTORS: ACHING;SHARP
DESCRIPTORS: ACHING

## 2024-05-02 ASSESSMENT — PAIN DESCRIPTION - ORIENTATION
ORIENTATION: ANTERIOR
ORIENTATION: ANTERIOR
ORIENTATION: RIGHT
ORIENTATION: ANTERIOR

## 2024-05-02 ASSESSMENT — PAIN DESCRIPTION - LOCATION
LOCATION: ABDOMEN

## 2024-05-02 NOTE — WOUND CARE
Wound Care consult / Reassessment for the colostomy and the wound care.   Surgical team saw the patient yesterday and packed the abscess site under the ostomy area. There is a penrose in place to keep it draining.    The other open wound under the ostomy area is the old colostomy site. The wound bed is healthy, red and moist. Collagen was used to fill it in today and then a moist Hydrofera Blue dressing and then covered with a foam dressing.   Pt. Walked the [place] this afternoon with me and she did well.     Post op day 1:        Today - Stoma site perfectly lofted and round.  Sutures intact. Functional with full diet. The stoma measured 35 mm roundish today.   New flat pouch applied today with an ostomy ring to cover the sutures. . In about a week she can continue her convex pouches as needed.      Surgical staple site: intact with minimal   drainage:       Old colostomy site:  partially open.  Cleansed  Today and Collagen matrix applied to the  Wound bed. Covered with a foam dressing.   The Penrose drain site was just covered with a dry gauze dressing for absorption.   Plan: Adult wound healing nutrition was ordered for this patient today.  She gets nauseated with the Ensure immunonutrition shakes.   Walk patient 3 times or more per day.   Monitor the ostomy output and record the amount.   [facility] nurse to see patient tomorrow afternoon.   Pretty Garcia RN, BSN, CWON

## 2024-05-02 NOTE — CARE COORDINATION
Care Management Initial Assessment       RUR: 14%  Readmission? No  1st IM letter given? No  1st  letter given: No    CM completed assessment w/pt via room phone.  Prior HH w/[facility].  No DME use.  Patient is independent w/ADL to include driving & working f/t.  Patient reports a strong support system, to include, , sisters, brother, step father, friends, co-workers.  Patient uses [facility] at [facility].  No needs or concerns at this time.   will transport       05/02/24 1207   Service Assessment   Patient Orientation Alert and Oriented   Cognition Alert   History Provided By Patient   Primary Caregiver Self   Support Systems Spouse/Significant Other;Family Members;Friends/Neighbors;Other (Comment)  (, sisters, brother, step father, co-workers)   PCP Verified by CM Yes   Last Visit to PCP   (no PCP.  would like PCP set up)   Prior Functional Level Independent in ADLs/IADLs   Current Functional Level Independent in ADLs/IADLs   Can patient return to prior living arrangement Yes   Family able to assist with home care needs: Yes   Would you like for me to discuss the discharge plan with any other family members/significant others, and if so, who? No   Financial Resources Other (Comment)  (Melissa)   Community Resources None   Social/Functional History   Lives With Spouse   Type of Home House  (two story home w/5-6 ROSI)   Home Equipment None   Active  Yes     Nenita Levine  Ext [phone]

## 2024-05-03 LAB
ANION GAP SERPL CALC-SCNC: 5 MMOL/L (ref 5–15)
BASOPHILS # BLD: 0 K/UL (ref 0–0.1)
BASOPHILS NFR BLD: 0 % (ref 0–1)
BUN SERPL-MCNC: 8 MG/DL (ref 6–20)
BUN/CREAT SERPL: 10 (ref 12–20)
CALCIUM SERPL-MCNC: 8.2 MG/DL (ref 8.5–10.1)
CHLORIDE SERPL-SCNC: 109 MMOL/L (ref 97–108)
CO2 SERPL-SCNC: 23 MMOL/L (ref 21–32)
CREAT SERPL-MCNC: 0.84 MG/DL (ref 0.55–1.02)
DIFFERENTIAL METHOD BLD: ABNORMAL
EOSINOPHIL # BLD: 0.3 K/UL (ref 0–0.4)
EOSINOPHIL NFR BLD: 4 % (ref 0–7)
ERYTHROCYTE [DISTWIDTH] IN BLOOD BY AUTOMATED COUNT: 13.1 % (ref 11.5–14.5)
GLUCOSE SERPL-MCNC: 91 MG/DL (ref 65–100)
HCT VFR BLD AUTO: 29.5 % (ref 35–47)
HGB BLD-MCNC: 9.5 G/DL (ref 11.5–16)
IMM GRANULOCYTES # BLD AUTO: 0 K/UL (ref 0–0.04)
IMM GRANULOCYTES NFR BLD AUTO: 0 % (ref 0–0.5)
LYMPHOCYTES # BLD: 1.2 K/UL (ref 0.8–3.5)
LYMPHOCYTES NFR BLD: 13 % (ref 12–49)
MCH RBC QN AUTO: 29 PG (ref 26–34)
MCHC RBC AUTO-ENTMCNC: 32.2 G/DL (ref 30–36.5)
MCV RBC AUTO: 89.9 FL (ref 80–99)
MONOCYTES # BLD: 0.7 K/UL (ref 0–1)
MONOCYTES NFR BLD: 7 % (ref 5–13)
NEUTS SEG # BLD: 6.8 K/UL (ref 1.8–8)
NEUTS SEG NFR BLD: 76 % (ref 32–75)
NRBC # BLD: 0 K/UL (ref 0–0.01)
NRBC BLD-RTO: 0 PER 100 WBC
PLATELET # BLD AUTO: 193 K/UL (ref 150–400)
PMV BLD AUTO: 10.5 FL (ref 8.9–12.9)
POTASSIUM SERPL-SCNC: 3.9 MMOL/L (ref 3.5–5.1)
RBC # BLD AUTO: 3.28 M/UL (ref 3.8–5.2)
SODIUM SERPL-SCNC: 137 MMOL/L (ref 136–145)
WBC # BLD AUTO: 9 K/UL (ref 3.6–11)

## 2024-05-03 PROCEDURE — 6370000000 HC RX 637 (ALT 250 FOR IP): Performed by: SURGERY

## 2024-05-03 PROCEDURE — 2580000003 HC RX 258: Performed by: SURGERY

## 2024-05-03 PROCEDURE — 80048 BASIC METABOLIC PNL TOTAL CA: CPT

## 2024-05-03 PROCEDURE — 36415 COLL VENOUS BLD VENIPUNCTURE: CPT

## 2024-05-03 PROCEDURE — 99024 POSTOP FOLLOW-UP VISIT: CPT | Performed by: NURSE PRACTITIONER

## 2024-05-03 PROCEDURE — 6360000002 HC RX W HCPCS: Performed by: SURGERY

## 2024-05-03 PROCEDURE — 85025 COMPLETE CBC W/AUTO DIFF WBC: CPT

## 2024-05-03 PROCEDURE — 1100000000 HC RM PRIVATE

## 2024-05-03 PROCEDURE — 6370000000 HC RX 637 (ALT 250 FOR IP): Performed by: NURSE PRACTITIONER

## 2024-05-03 RX ORDER — PANTOPRAZOLE SODIUM 40 MG/1
40 TABLET, DELAYED RELEASE ORAL
Status: DISCONTINUED | OUTPATIENT
Start: 2024-05-03 | End: 2024-05-09 | Stop reason: HOSPADM

## 2024-05-03 RX ADMIN — HYDROXYCHLOROQUINE SULFATE 400 MG: 200 TABLET ORAL at 21:14

## 2024-05-03 RX ADMIN — TIZANIDINE 2 MG: 4 TABLET ORAL at 10:42

## 2024-05-03 RX ADMIN — TRAMADOL HYDROCHLORIDE 100 MG: 50 TABLET, FILM COATED ORAL at 16:45

## 2024-05-03 RX ADMIN — TRAMADOL HYDROCHLORIDE 100 MG: 50 TABLET, FILM COATED ORAL at 10:43

## 2024-05-03 RX ADMIN — ONDANSETRON 4 MG: 4 TABLET, ORALLY DISINTEGRATING ORAL at 04:45

## 2024-05-03 RX ADMIN — ONDANSETRON 4 MG: 2 INJECTION INTRAMUSCULAR; INTRAVENOUS at 16:23

## 2024-05-03 RX ADMIN — DROSPIRENONE AND ETHINYL ESTRADIOL 1 TABLET: KIT at 21:14

## 2024-05-03 RX ADMIN — PANTOPRAZOLE SODIUM 40 MG: 40 TABLET, DELAYED RELEASE ORAL at 16:44

## 2024-05-03 RX ADMIN — SODIUM CHLORIDE, PRESERVATIVE FREE 10 ML: 5 INJECTION INTRAVENOUS at 21:16

## 2024-05-03 RX ADMIN — ACETAMINOPHEN 650 MG: 325 TABLET ORAL at 18:15

## 2024-05-03 RX ADMIN — TRAMADOL HYDROCHLORIDE 100 MG: 50 TABLET, FILM COATED ORAL at 04:42

## 2024-05-03 RX ADMIN — GABAPENTIN 1200 MG: 300 CAPSULE ORAL at 21:13

## 2024-05-03 RX ADMIN — SODIUM CHLORIDE, PRESERVATIVE FREE 10 ML: 5 INJECTION INTRAVENOUS at 09:53

## 2024-05-03 RX ADMIN — Medication: at 10:59

## 2024-05-03 RX ADMIN — ACETAMINOPHEN 650 MG: 325 TABLET ORAL at 06:36

## 2024-05-03 RX ADMIN — ACETAMINOPHEN 650 MG: 325 TABLET ORAL at 00:39

## 2024-05-03 RX ADMIN — SODIUM CHLORIDE, POTASSIUM CHLORIDE, SODIUM LACTATE AND CALCIUM CHLORIDE: 600; 310; 30; 20 INJECTION, SOLUTION INTRAVENOUS at 06:35

## 2024-05-03 RX ADMIN — ACETAMINOPHEN 650 MG: 325 TABLET ORAL at 12:27

## 2024-05-03 RX ADMIN — TRAMADOL HYDROCHLORIDE 100 MG: 50 TABLET, FILM COATED ORAL at 22:46

## 2024-05-03 ASSESSMENT — PAIN SCALES - GENERAL
PAINLEVEL_OUTOF10: 9
PAINLEVEL_OUTOF10: 7
PAINLEVEL_OUTOF10: 9
PAINLEVEL_OUTOF10: 8
PAINLEVEL_OUTOF10: 7
PAINLEVEL_OUTOF10: 8
PAINLEVEL_OUTOF10: 8
PAINLEVEL_OUTOF10: 9
PAINLEVEL_OUTOF10: 8
PAINLEVEL_OUTOF10: 8

## 2024-05-03 ASSESSMENT — PAIN DESCRIPTION - LOCATION
LOCATION: ABDOMEN

## 2024-05-03 ASSESSMENT — PAIN DESCRIPTION - ORIENTATION
ORIENTATION: MID
ORIENTATION: MID

## 2024-05-03 ASSESSMENT — PAIN DESCRIPTION - DESCRIPTORS: DESCRIPTORS: STABBING

## 2024-05-03 NOTE — PLAN OF CARE
Problem: Chronic Conditions and Co-morbidities  Goal: Patient's chronic conditions and co-morbidity symptoms are monitored and maintained or improved  Outcome: Progressing     Problem: Pain  Goal: Verbalizes/displays adequate comfort level or baseline comfort level  Outcome: Progressing     Problem: ABCDS Injury Assessment  Goal: Absence of physical injury  Outcome: Progressing     Problem: Discharge Planning  Goal: Discharge to home or other facility with appropriate resources  Outcome: Progressing     Problem: Skin/Tissue Integrity  Goal: Absence of new skin breakdown  Description: 1.  Monitor for areas of redness and/or skin breakdown  2.  Assess vascular access sites hourly  3.  Every 4-6 hours minimum:  Change oxygen saturation probe site  4.  Every 4-6 hours:  If on nasal continuous positive airway pressure, respiratory therapy assess nares and determine need for appliance change or resting period.  Outcome: Progressing     Problem: Safety - Adult  Goal: Free from fall injury  Outcome: Progressing

## 2024-05-03 NOTE — WOUND CARE
Wound / Ostomy nurse reassessment of this patient with a revised new colostomy.  Pt. Is doing well and continues with appropriate post op pain that is being managed with Tramadol as well as the PCA as needed.   The colostomy is working well and was just emptied this afternoon. The stoma is red, moist. The bag was changed yesterday.   Patient is tolerating long hallway walks and she stays in the chair / recliner otherwise.  She does not like the food here but her  is bringing her some stuff from home.  She is also taking the Gwyn wound healing supplement, which she does like.   Dr. Schaffer discussed the pathology report with the patient.   She should be discharged on Monday.   Plan: continue to walk patient three times per day. She uses the walker and does well.   Encourage good nutrition and the supplements for healing.   Will see her on Monday for the last in-hospital ostomy bag change.   Pretty Garcia RN, BSN, CWON

## 2024-05-04 PROCEDURE — 6370000000 HC RX 637 (ALT 250 FOR IP): Performed by: SURGERY

## 2024-05-04 PROCEDURE — 1100000000 HC RM PRIVATE

## 2024-05-04 PROCEDURE — 6370000000 HC RX 637 (ALT 250 FOR IP): Performed by: NURSE PRACTITIONER

## 2024-05-04 PROCEDURE — 2580000003 HC RX 258: Performed by: SURGERY

## 2024-05-04 RX ORDER — HYDROMORPHONE HYDROCHLORIDE 1 MG/ML
0.5 INJECTION, SOLUTION INTRAMUSCULAR; INTRAVENOUS; SUBCUTANEOUS EVERY 4 HOURS PRN
Status: DISCONTINUED | OUTPATIENT
Start: 2024-05-04 | End: 2024-05-09 | Stop reason: HOSPADM

## 2024-05-04 RX ADMIN — SODIUM CHLORIDE, PRESERVATIVE FREE 10 ML: 5 INJECTION INTRAVENOUS at 09:18

## 2024-05-04 RX ADMIN — ACETAMINOPHEN 650 MG: 325 TABLET ORAL at 00:49

## 2024-05-04 RX ADMIN — ACETAMINOPHEN 650 MG: 325 TABLET ORAL at 12:14

## 2024-05-04 RX ADMIN — TIZANIDINE 2 MG: 4 TABLET ORAL at 14:48

## 2024-05-04 RX ADMIN — ONDANSETRON 4 MG: 4 TABLET, ORALLY DISINTEGRATING ORAL at 10:11

## 2024-05-04 RX ADMIN — TRAMADOL HYDROCHLORIDE 100 MG: 50 TABLET, FILM COATED ORAL at 18:25

## 2024-05-04 RX ADMIN — HYDROXYCHLOROQUINE SULFATE 400 MG: 200 TABLET ORAL at 21:53

## 2024-05-04 RX ADMIN — GABAPENTIN 1200 MG: 300 CAPSULE ORAL at 21:53

## 2024-05-04 RX ADMIN — TIZANIDINE 2 MG: 4 TABLET ORAL at 21:53

## 2024-05-04 RX ADMIN — PANTOPRAZOLE SODIUM 40 MG: 40 TABLET, DELAYED RELEASE ORAL at 17:19

## 2024-05-04 RX ADMIN — ACETAMINOPHEN 650 MG: 325 TABLET ORAL at 18:25

## 2024-05-04 RX ADMIN — ACETAMINOPHEN 650 MG: 325 TABLET ORAL at 23:51

## 2024-05-04 RX ADMIN — TRAMADOL HYDROCHLORIDE 100 MG: 50 TABLET, FILM COATED ORAL at 06:10

## 2024-05-04 RX ADMIN — ACETAMINOPHEN 650 MG: 325 TABLET ORAL at 06:10

## 2024-05-04 RX ADMIN — PANTOPRAZOLE SODIUM 40 MG: 40 TABLET, DELAYED RELEASE ORAL at 06:10

## 2024-05-04 RX ADMIN — DROSPIRENONE AND ETHINYL ESTRADIOL 1 TABLET: KIT at 21:56

## 2024-05-04 RX ADMIN — TRAMADOL HYDROCHLORIDE 100 MG: 50 TABLET, FILM COATED ORAL at 12:14

## 2024-05-04 ASSESSMENT — PAIN DESCRIPTION - DESCRIPTORS
DESCRIPTORS: ACHING
DESCRIPTORS: ACHING
DESCRIPTORS: ACHING;CRAMPING;DISCOMFORT;SORE
DESCRIPTORS: STABBING
DESCRIPTORS: ACHING
DESCRIPTORS: ACHING
DESCRIPTORS: ACHING;DISCOMFORT;CRAMPING

## 2024-05-04 ASSESSMENT — PAIN DESCRIPTION - ORIENTATION
ORIENTATION: ANTERIOR;MID
ORIENTATION: MID
ORIENTATION: RIGHT
ORIENTATION: MID
ORIENTATION: UPPER;RIGHT
ORIENTATION: MID

## 2024-05-04 ASSESSMENT — PAIN DESCRIPTION - LOCATION
LOCATION: ABDOMEN
LOCATION: ABDOMEN;BACK
LOCATION: ABDOMEN

## 2024-05-04 ASSESSMENT — PAIN SCALES - GENERAL
PAINLEVEL_OUTOF10: 6
PAINLEVEL_OUTOF10: 4
PAINLEVEL_OUTOF10: 6
PAINLEVEL_OUTOF10: 7
PAINLEVEL_OUTOF10: 7
PAINLEVEL_OUTOF10: 6

## 2024-05-04 ASSESSMENT — PAIN - FUNCTIONAL ASSESSMENT
PAIN_FUNCTIONAL_ASSESSMENT: PREVENTS OR INTERFERES SOME ACTIVE ACTIVITIES AND ADLS
PAIN_FUNCTIONAL_ASSESSMENT: ACTIVITIES ARE NOT PREVENTED

## 2024-05-05 PROCEDURE — 6370000000 HC RX 637 (ALT 250 FOR IP): Performed by: NURSE PRACTITIONER

## 2024-05-05 PROCEDURE — 2580000003 HC RX 258: Performed by: SURGERY

## 2024-05-05 PROCEDURE — 1100000000 HC RM PRIVATE

## 2024-05-05 PROCEDURE — 2500000003 HC RX 250 WO HCPCS: Performed by: SURGERY

## 2024-05-05 PROCEDURE — 6370000000 HC RX 637 (ALT 250 FOR IP): Performed by: SURGERY

## 2024-05-05 RX ORDER — TIZANIDINE 4 MG/1
2 TABLET ORAL 3 TIMES DAILY PRN
Status: DISCONTINUED | OUTPATIENT
Start: 2024-05-05 | End: 2024-05-09 | Stop reason: HOSPADM

## 2024-05-05 RX ADMIN — PANTOPRAZOLE SODIUM 40 MG: 40 TABLET, DELAYED RELEASE ORAL at 15:34

## 2024-05-05 RX ADMIN — DROSPIRENONE AND ETHINYL ESTRADIOL 1 TABLET: KIT at 21:12

## 2024-05-05 RX ADMIN — ACETAMINOPHEN 650 MG: 325 TABLET ORAL at 06:28

## 2024-05-05 RX ADMIN — ACETAMINOPHEN 650 MG: 325 TABLET ORAL at 19:02

## 2024-05-05 RX ADMIN — TIZANIDINE 2 MG: 4 TABLET ORAL at 21:08

## 2024-05-05 RX ADMIN — HYDROMORPHONE HYDROCHLORIDE 0.5 MG: 1 INJECTION, SOLUTION INTRAMUSCULAR; INTRAVENOUS; SUBCUTANEOUS at 20:21

## 2024-05-05 RX ADMIN — HYDROMORPHONE HYDROCHLORIDE 0.5 MG: 1 INJECTION, SOLUTION INTRAMUSCULAR; INTRAVENOUS; SUBCUTANEOUS at 15:34

## 2024-05-05 RX ADMIN — GABAPENTIN 1200 MG: 300 CAPSULE ORAL at 21:08

## 2024-05-05 RX ADMIN — TRAMADOL HYDROCHLORIDE 100 MG: 50 TABLET, FILM COATED ORAL at 06:38

## 2024-05-05 RX ADMIN — TRAMADOL HYDROCHLORIDE 100 MG: 50 TABLET, FILM COATED ORAL at 12:22

## 2024-05-05 RX ADMIN — SODIUM CHLORIDE, PRESERVATIVE FREE 10 ML: 5 INJECTION INTRAVENOUS at 21:11

## 2024-05-05 RX ADMIN — HYDROMORPHONE HYDROCHLORIDE 0.5 MG: 1 INJECTION, SOLUTION INTRAMUSCULAR; INTRAVENOUS; SUBCUTANEOUS at 10:06

## 2024-05-05 RX ADMIN — PANTOPRAZOLE SODIUM 40 MG: 40 TABLET, DELAYED RELEASE ORAL at 07:43

## 2024-05-05 RX ADMIN — SODIUM CHLORIDE, POTASSIUM CHLORIDE, SODIUM LACTATE AND CALCIUM CHLORIDE: 600; 310; 30; 20 INJECTION, SOLUTION INTRAVENOUS at 13:24

## 2024-05-05 RX ADMIN — TIZANIDINE 2 MG: 4 TABLET ORAL at 12:47

## 2024-05-05 RX ADMIN — ACETAMINOPHEN 650 MG: 325 TABLET ORAL at 12:22

## 2024-05-05 RX ADMIN — SODIUM CHLORIDE, PRESERVATIVE FREE 10 ML: 5 INJECTION INTRAVENOUS at 21:09

## 2024-05-05 RX ADMIN — HYDROXYCHLOROQUINE SULFATE 400 MG: 200 TABLET ORAL at 21:08

## 2024-05-05 RX ADMIN — TRAMADOL HYDROCHLORIDE 100 MG: 50 TABLET, FILM COATED ORAL at 19:02

## 2024-05-05 RX ADMIN — SODIUM CHLORIDE, PRESERVATIVE FREE 10 ML: 5 INJECTION INTRAVENOUS at 12:22

## 2024-05-05 ASSESSMENT — PAIN - FUNCTIONAL ASSESSMENT
PAIN_FUNCTIONAL_ASSESSMENT: PREVENTS OR INTERFERES SOME ACTIVE ACTIVITIES AND ADLS
PAIN_FUNCTIONAL_ASSESSMENT: ACTIVITIES ARE NOT PREVENTED
PAIN_FUNCTIONAL_ASSESSMENT: PREVENTS OR INTERFERES SOME ACTIVE ACTIVITIES AND ADLS

## 2024-05-05 ASSESSMENT — PAIN SCALES - GENERAL
PAINLEVEL_OUTOF10: 6
PAINLEVEL_OUTOF10: 5
PAINLEVEL_OUTOF10: 7
PAINLEVEL_OUTOF10: 7
PAINLEVEL_OUTOF10: 0
PAINLEVEL_OUTOF10: 6
PAINLEVEL_OUTOF10: 0
PAINLEVEL_OUTOF10: 7
PAINLEVEL_OUTOF10: 8
PAINLEVEL_OUTOF10: 7

## 2024-05-05 ASSESSMENT — PAIN DESCRIPTION - ORIENTATION
ORIENTATION: ANTERIOR;MID
ORIENTATION: RIGHT
ORIENTATION: RIGHT

## 2024-05-05 ASSESSMENT — PAIN DESCRIPTION - LOCATION
LOCATION: ABDOMEN

## 2024-05-05 ASSESSMENT — PAIN DESCRIPTION - DESCRIPTORS
DESCRIPTORS: ACHING;SHARP
DESCRIPTORS: ACHING;DISCOMFORT;DULL;THROBBING
DESCRIPTORS: ACHING;SHARP

## 2024-05-06 PROCEDURE — 6370000000 HC RX 637 (ALT 250 FOR IP): Performed by: SURGERY

## 2024-05-06 PROCEDURE — 6360000002 HC RX W HCPCS: Performed by: SURGERY

## 2024-05-06 PROCEDURE — 6370000000 HC RX 637 (ALT 250 FOR IP): Performed by: NURSE PRACTITIONER

## 2024-05-06 PROCEDURE — 2500000003 HC RX 250 WO HCPCS: Performed by: SURGERY

## 2024-05-06 PROCEDURE — 1100000000 HC RM PRIVATE

## 2024-05-06 PROCEDURE — 2580000003 HC RX 258: Performed by: SURGERY

## 2024-05-06 RX ORDER — DULOXETIN HYDROCHLORIDE 30 MG/1
60 CAPSULE, DELAYED RELEASE ORAL DAILY
Status: DISCONTINUED | OUTPATIENT
Start: 2024-05-06 | End: 2024-05-09 | Stop reason: HOSPADM

## 2024-05-06 RX ORDER — TRAMADOL HYDROCHLORIDE 50 MG/1
100 TABLET ORAL EVERY 6 HOURS PRN
Status: DISCONTINUED | OUTPATIENT
Start: 2024-05-06 | End: 2024-05-09 | Stop reason: HOSPADM

## 2024-05-06 RX ORDER — TRAMADOL HYDROCHLORIDE 50 MG/1
50 TABLET ORAL EVERY 6 HOURS PRN
Status: DISCONTINUED | OUTPATIENT
Start: 2024-05-06 | End: 2024-05-09 | Stop reason: HOSPADM

## 2024-05-06 RX ADMIN — TRAMADOL HYDROCHLORIDE 100 MG: 50 TABLET ORAL at 16:56

## 2024-05-06 RX ADMIN — ONDANSETRON 4 MG: 4 TABLET, ORALLY DISINTEGRATING ORAL at 15:40

## 2024-05-06 RX ADMIN — ACETAMINOPHEN 650 MG: 325 TABLET ORAL at 18:18

## 2024-05-06 RX ADMIN — SODIUM CHLORIDE, POTASSIUM CHLORIDE, SODIUM LACTATE AND CALCIUM CHLORIDE: 600; 310; 30; 20 INJECTION, SOLUTION INTRAVENOUS at 02:16

## 2024-05-06 RX ADMIN — ACETAMINOPHEN 650 MG: 325 TABLET ORAL at 00:23

## 2024-05-06 RX ADMIN — TRAMADOL HYDROCHLORIDE 50 MG: 50 TABLET ORAL at 07:10

## 2024-05-06 RX ADMIN — SODIUM CHLORIDE, PRESERVATIVE FREE 10 ML: 5 INJECTION INTRAVENOUS at 21:23

## 2024-05-06 RX ADMIN — GABAPENTIN 1200 MG: 300 CAPSULE ORAL at 21:20

## 2024-05-06 RX ADMIN — HYDROMORPHONE HYDROCHLORIDE 0.5 MG: 1 INJECTION, SOLUTION INTRAMUSCULAR; INTRAVENOUS; SUBCUTANEOUS at 00:23

## 2024-05-06 RX ADMIN — SODIUM CHLORIDE, POTASSIUM CHLORIDE, SODIUM LACTATE AND CALCIUM CHLORIDE: 600; 310; 30; 20 INJECTION, SOLUTION INTRAVENOUS at 18:10

## 2024-05-06 RX ADMIN — SODIUM CHLORIDE, PRESERVATIVE FREE 10 ML: 5 INJECTION INTRAVENOUS at 09:00

## 2024-05-06 RX ADMIN — HYDROMORPHONE HYDROCHLORIDE 0.5 MG: 1 INJECTION, SOLUTION INTRAMUSCULAR; INTRAVENOUS; SUBCUTANEOUS at 18:17

## 2024-05-06 RX ADMIN — HYDROMORPHONE HYDROCHLORIDE 0.5 MG: 1 INJECTION, SOLUTION INTRAMUSCULAR; INTRAVENOUS; SUBCUTANEOUS at 13:56

## 2024-05-06 RX ADMIN — TIZANIDINE 2 MG: 4 TABLET ORAL at 11:33

## 2024-05-06 RX ADMIN — PANTOPRAZOLE SODIUM 40 MG: 40 TABLET, DELAYED RELEASE ORAL at 06:56

## 2024-05-06 RX ADMIN — ONDANSETRON 4 MG: 2 INJECTION INTRAMUSCULAR; INTRAVENOUS at 02:16

## 2024-05-06 RX ADMIN — DULOXETINE HYDROCHLORIDE 60 MG: 30 CAPSULE, DELAYED RELEASE ORAL at 11:49

## 2024-05-06 RX ADMIN — TRAMADOL HYDROCHLORIDE 100 MG: 50 TABLET ORAL at 23:32

## 2024-05-06 RX ADMIN — ACETAMINOPHEN 650 MG: 325 TABLET ORAL at 06:56

## 2024-05-06 RX ADMIN — TIZANIDINE 2 MG: 4 TABLET ORAL at 21:21

## 2024-05-06 RX ADMIN — PANTOPRAZOLE SODIUM 40 MG: 40 TABLET, DELAYED RELEASE ORAL at 16:56

## 2024-05-06 RX ADMIN — DROSPIRENONE AND ETHINYL ESTRADIOL 1 TABLET: KIT at 21:20

## 2024-05-06 RX ADMIN — TRAMADOL HYDROCHLORIDE 50 MG: 50 TABLET ORAL at 08:20

## 2024-05-06 RX ADMIN — ACETAMINOPHEN 650 MG: 325 TABLET ORAL at 13:38

## 2024-05-06 RX ADMIN — HYDROXYCHLOROQUINE SULFATE 400 MG: 200 TABLET ORAL at 21:21

## 2024-05-06 RX ADMIN — HYDROMORPHONE HYDROCHLORIDE 0.5 MG: 1 INJECTION, SOLUTION INTRAMUSCULAR; INTRAVENOUS; SUBCUTANEOUS at 09:48

## 2024-05-06 ASSESSMENT — PAIN - FUNCTIONAL ASSESSMENT
PAIN_FUNCTIONAL_ASSESSMENT: PREVENTS OR INTERFERES SOME ACTIVE ACTIVITIES AND ADLS

## 2024-05-06 ASSESSMENT — PAIN DESCRIPTION - ORIENTATION
ORIENTATION: LEFT
ORIENTATION: RIGHT;MID;LEFT
ORIENTATION: RIGHT;LEFT;MID
ORIENTATION: RIGHT
ORIENTATION: RIGHT;LEFT;MID
ORIENTATION: MID;LEFT;RIGHT
ORIENTATION: RIGHT;LEFT;MID

## 2024-05-06 ASSESSMENT — PAIN DESCRIPTION - DESCRIPTORS
DESCRIPTORS: STABBING
DESCRIPTORS: ACHING;SHARP
DESCRIPTORS: STABBING
DESCRIPTORS: ACHING;SHARP
DESCRIPTORS: STABBING
DESCRIPTORS: ACHING;SHARP

## 2024-05-06 ASSESSMENT — PAIN DESCRIPTION - LOCATION
LOCATION: ABDOMEN

## 2024-05-06 ASSESSMENT — PAIN SCALES - GENERAL
PAINLEVEL_OUTOF10: 8
PAINLEVEL_OUTOF10: 6
PAINLEVEL_OUTOF10: 6
PAINLEVEL_OUTOF10: 7
PAINLEVEL_OUTOF10: 6
PAINLEVEL_OUTOF10: 7
PAINLEVEL_OUTOF10: 6
PAINLEVEL_OUTOF10: 8
PAINLEVEL_OUTOF10: 5
PAINLEVEL_OUTOF10: 6

## 2024-05-06 ASSESSMENT — PAIN DESCRIPTION - FREQUENCY: FREQUENCY: CONTINUOUS

## 2024-05-06 ASSESSMENT — PAIN DESCRIPTION - PAIN TYPE: TYPE: SURGICAL PAIN

## 2024-05-06 ASSESSMENT — PAIN DESCRIPTION - ONSET: ONSET: GRADUAL

## 2024-05-06 NOTE — PLAN OF CARE
Problem: Chronic Conditions and Co-morbidities  Goal: Patient's chronic conditions and co-morbidity symptoms are monitored and maintained or improved  Outcome: Progressing     Problem: Pain  Goal: Verbalizes/displays adequate comfort level or baseline comfort level  Outcome: Progressing     Problem: ABCDS Injury Assessment  Goal: Absence of physical injury  Outcome: Progressing     Problem: Discharge Planning  Goal: Discharge to home or other facility with appropriate resources  Outcome: Progressing     Problem: Skin/Tissue Integrity  Goal: Absence of new skin breakdown  Description: 1.  Monitor for areas of redness and/or skin breakdown  2.  Assess vascular access sites hourly  3.  Every 4-6 hours minimum:  Change oxygen saturation probe site  4.  Every 4-6 hours:  If on nasal continuous positive airway pressure, respiratory therapy assess nares and determine need for appliance change or resting period.  Outcome: Progressing     Problem: Safety - Adult  Goal: Free from fall injury  Outcome: Progressing     Problem: Gastrointestinal - Adult  Goal: Establish and maintain optimal ostomy function  Outcome: Progressing     Problem: Infection - Adult  Goal: Absence of infection at discharge  Outcome: Progressing     Problem: Metabolic/Fluid and Electrolytes - Adult  Goal: Electrolytes maintained within normal limits  Outcome: Progressing  Goal: Hemodynamic stability and optimal renal function maintained  Outcome: Progressing

## 2024-05-07 ENCOUNTER — APPOINTMENT (OUTPATIENT)
Facility: HOSPITAL | Age: 59
DRG: 330 | End: 2024-05-07
Attending: SURGERY
Payer: COMMERCIAL

## 2024-05-07 LAB
ANION GAP SERPL CALC-SCNC: 6 MMOL/L (ref 5–15)
BUN SERPL-MCNC: 12 MG/DL (ref 6–20)
BUN/CREAT SERPL: 14 (ref 12–20)
CALCIUM SERPL-MCNC: 9.4 MG/DL (ref 8.5–10.1)
CHLORIDE SERPL-SCNC: 102 MMOL/L (ref 97–108)
CO2 SERPL-SCNC: 28 MMOL/L (ref 21–32)
CREAT SERPL-MCNC: 0.87 MG/DL (ref 0.55–1.02)
ERYTHROCYTE [DISTWIDTH] IN BLOOD BY AUTOMATED COUNT: 12.6 % (ref 11.5–14.5)
GLUCOSE SERPL-MCNC: 90 MG/DL (ref 65–100)
HCT VFR BLD AUTO: 32.3 % (ref 35–47)
HGB BLD-MCNC: 10.5 G/DL (ref 11.5–16)
MCH RBC QN AUTO: 28.6 PG (ref 26–34)
MCHC RBC AUTO-ENTMCNC: 32.5 G/DL (ref 30–36.5)
MCV RBC AUTO: 88 FL (ref 80–99)
NRBC # BLD: 0 K/UL (ref 0–0.01)
NRBC BLD-RTO: 0 PER 100 WBC
PLATELET # BLD AUTO: 272 K/UL (ref 150–400)
PMV BLD AUTO: 10.1 FL (ref 8.9–12.9)
POTASSIUM SERPL-SCNC: 3.4 MMOL/L (ref 3.5–5.1)
RBC # BLD AUTO: 3.67 M/UL (ref 3.8–5.2)
SODIUM SERPL-SCNC: 136 MMOL/L (ref 136–145)
WBC # BLD AUTO: 11.9 K/UL (ref 3.6–11)

## 2024-05-07 PROCEDURE — 6370000000 HC RX 637 (ALT 250 FOR IP): Performed by: SURGERY

## 2024-05-07 PROCEDURE — 36415 COLL VENOUS BLD VENIPUNCTURE: CPT

## 2024-05-07 PROCEDURE — 6360000002 HC RX W HCPCS: Performed by: SURGERY

## 2024-05-07 PROCEDURE — 6360000004 HC RX CONTRAST MEDICATION: Performed by: NURSE PRACTITIONER

## 2024-05-07 PROCEDURE — 6370000000 HC RX 637 (ALT 250 FOR IP): Performed by: NURSE PRACTITIONER

## 2024-05-07 PROCEDURE — 2500000003 HC RX 250 WO HCPCS: Performed by: SURGERY

## 2024-05-07 PROCEDURE — 80048 BASIC METABOLIC PNL TOTAL CA: CPT

## 2024-05-07 PROCEDURE — 74177 CT ABD & PELVIS W/CONTRAST: CPT

## 2024-05-07 PROCEDURE — 2580000003 HC RX 258: Performed by: SURGERY

## 2024-05-07 PROCEDURE — 1100000000 HC RM PRIVATE

## 2024-05-07 PROCEDURE — 85027 COMPLETE CBC AUTOMATED: CPT

## 2024-05-07 RX ADMIN — ACETAMINOPHEN 650 MG: 325 TABLET ORAL at 00:25

## 2024-05-07 RX ADMIN — HYDROMORPHONE HYDROCHLORIDE 0.5 MG: 1 INJECTION, SOLUTION INTRAMUSCULAR; INTRAVENOUS; SUBCUTANEOUS at 21:49

## 2024-05-07 RX ADMIN — HYDROMORPHONE HYDROCHLORIDE 0.5 MG: 1 INJECTION, SOLUTION INTRAMUSCULAR; INTRAVENOUS; SUBCUTANEOUS at 06:48

## 2024-05-07 RX ADMIN — TRAMADOL HYDROCHLORIDE 100 MG: 50 TABLET ORAL at 19:40

## 2024-05-07 RX ADMIN — TIZANIDINE 2 MG: 4 TABLET ORAL at 19:38

## 2024-05-07 RX ADMIN — ACETAMINOPHEN 650 MG: 325 TABLET ORAL at 18:48

## 2024-05-07 RX ADMIN — GABAPENTIN 1200 MG: 300 CAPSULE ORAL at 21:19

## 2024-05-07 RX ADMIN — ACETAMINOPHEN 650 MG: 325 TABLET ORAL at 12:32

## 2024-05-07 RX ADMIN — SODIUM CHLORIDE, POTASSIUM CHLORIDE, SODIUM LACTATE AND CALCIUM CHLORIDE 75 ML/HR: 600; 310; 30; 20 INJECTION, SOLUTION INTRAVENOUS at 22:01

## 2024-05-07 RX ADMIN — IOPAMIDOL 100 ML: 755 INJECTION, SOLUTION INTRAVENOUS at 11:33

## 2024-05-07 RX ADMIN — PANTOPRAZOLE SODIUM 40 MG: 40 TABLET, DELAYED RELEASE ORAL at 06:47

## 2024-05-07 RX ADMIN — ACETAMINOPHEN 650 MG: 325 TABLET ORAL at 22:57

## 2024-05-07 RX ADMIN — HYDROMORPHONE HYDROCHLORIDE 0.5 MG: 1 INJECTION, SOLUTION INTRAMUSCULAR; INTRAVENOUS; SUBCUTANEOUS at 17:04

## 2024-05-07 RX ADMIN — DULOXETINE HYDROCHLORIDE 60 MG: 30 CAPSULE, DELAYED RELEASE ORAL at 08:39

## 2024-05-07 RX ADMIN — SODIUM CHLORIDE, POTASSIUM CHLORIDE, SODIUM LACTATE AND CALCIUM CHLORIDE 75 ML/HR: 600; 310; 30; 20 INJECTION, SOLUTION INTRAVENOUS at 06:07

## 2024-05-07 RX ADMIN — PANTOPRAZOLE SODIUM 40 MG: 40 TABLET, DELAYED RELEASE ORAL at 15:56

## 2024-05-07 RX ADMIN — ONDANSETRON 4 MG: 2 INJECTION INTRAMUSCULAR; INTRAVENOUS at 04:09

## 2024-05-07 RX ADMIN — ONDANSETRON 4 MG: 2 INJECTION INTRAMUSCULAR; INTRAVENOUS at 15:56

## 2024-05-07 RX ADMIN — ACETAMINOPHEN 650 MG: 325 TABLET ORAL at 06:47

## 2024-05-07 RX ADMIN — DIATRIZOATE MEGLUMINE AND DIATRIZOATE SODIUM 30 ML: 600; 100 SOLUTION ORAL; RECTAL at 11:33

## 2024-05-07 RX ADMIN — HYDROXYCHLOROQUINE SULFATE 400 MG: 200 TABLET ORAL at 21:19

## 2024-05-07 RX ADMIN — TRAMADOL HYDROCHLORIDE 100 MG: 50 TABLET ORAL at 06:04

## 2024-05-07 RX ADMIN — DIATRIZOATE MEGLUMINE AND DIATRIZOATE SODIUM 30 ML: 600; 100 SOLUTION ORAL; RECTAL at 08:42

## 2024-05-07 RX ADMIN — DROSPIRENONE AND ETHINYL ESTRADIOL 1 TABLET: KIT at 21:19

## 2024-05-07 RX ADMIN — TRAMADOL HYDROCHLORIDE 100 MG: 50 TABLET ORAL at 13:31

## 2024-05-07 RX ADMIN — HYDROMORPHONE HYDROCHLORIDE 0.5 MG: 1 INJECTION, SOLUTION INTRAMUSCULAR; INTRAVENOUS; SUBCUTANEOUS at 12:13

## 2024-05-07 ASSESSMENT — PAIN DESCRIPTION - LOCATION
LOCATION: ABDOMEN

## 2024-05-07 ASSESSMENT — PAIN SCALES - GENERAL
PAINLEVEL_OUTOF10: 9
PAINLEVEL_OUTOF10: 3
PAINLEVEL_OUTOF10: 8
PAINLEVEL_OUTOF10: 7
PAINLEVEL_OUTOF10: 7
PAINLEVEL_OUTOF10: 3
PAINLEVEL_OUTOF10: 7
PAINLEVEL_OUTOF10: 2

## 2024-05-07 ASSESSMENT — PAIN DESCRIPTION - ORIENTATION
ORIENTATION: RIGHT;LEFT;MID
ORIENTATION: RIGHT;LEFT;MID
ORIENTATION: RIGHT;MID;LEFT
ORIENTATION: RIGHT;LEFT;MID

## 2024-05-07 ASSESSMENT — PAIN DESCRIPTION - DESCRIPTORS
DESCRIPTORS: ACHING
DESCRIPTORS: STABBING
DESCRIPTORS: ACHING
DESCRIPTORS: STABBING

## 2024-05-07 ASSESSMENT — PAIN DESCRIPTION - PAIN TYPE
TYPE: SURGICAL PAIN

## 2024-05-07 ASSESSMENT — PAIN DESCRIPTION - FREQUENCY
FREQUENCY: INTERMITTENT
FREQUENCY: INTERMITTENT
FREQUENCY: CONTINUOUS
FREQUENCY: INTERMITTENT

## 2024-05-07 ASSESSMENT — PAIN - FUNCTIONAL ASSESSMENT
PAIN_FUNCTIONAL_ASSESSMENT: ACTIVITIES ARE NOT PREVENTED
PAIN_FUNCTIONAL_ASSESSMENT: PREVENTS OR INTERFERES SOME ACTIVE ACTIVITIES AND ADLS
PAIN_FUNCTIONAL_ASSESSMENT: ACTIVITIES ARE NOT PREVENTED
PAIN_FUNCTIONAL_ASSESSMENT: PREVENTS OR INTERFERES SOME ACTIVE ACTIVITIES AND ADLS

## 2024-05-07 ASSESSMENT — PAIN DESCRIPTION - ONSET
ONSET: GRADUAL

## 2024-05-07 NOTE — WOUND CARE
Wound Care reassessment for the Abdominal wounds.  It appears that most of the drainage that is saturating the wound dressings is a mix of the old colostomy site, which is healing by secondary intention and the surgical incision at the distal end. The drainage is a tan slimy drainage consistent with some fat necrosis but no odor.   The the distal staples from the umbilicus to the last two staples (which are intact) were opened today and the wound tissue is granulating some with pink, moist wound bed.  The wound was cleansed with the Vashe solution and wiped with gauze.   The old colostomy site and the newly opened incision site were Packed with the collagen and Maxorb Ag and an Optilock pad (very absorbent). Taped to secure. Dated and timed the dressing.   Both wounds are now healing by secondary intention. The rest of the staples are dry and these were dressed with Povidine-iodine swab to keep it dry.   The abscess site where the Penrose drain is located has serosanguinous drainage from it just like the colostomy (old site).    The new colostomy is working well and the two piece colostomy bag is intact. Pt. Is emptying herself without problems.   Plan: will return to reassess the dressing later today (4:30 pm). Any questions or concerns, please call [phone].   Staples leaking tan & serosanguinous drainage.    Now open with pink wound bed.         Pretty Garcia, RN, BSN, CWON

## 2024-05-07 NOTE — WOUND CARE
Second Wound Care today for the Surgical wounds Occured at 4:25 pm:  Chart reviewed for the CT results.   Assessment: the dressings this afternoon were NOT saturated but the contact dressing in the surgical incision was quite thick with drainage.  Some of it also came from the umbilical \"puddle\" of fluid, which is still serosanguinous. No odor to any of the drainage.   The dressings were changed this afternoon. The old ostomy site was packed with NS moist Hydrofera Blue and the surgical incision was also packed with the H-Blue. The umbilicus was packed with the silver alginate. All covered with the Optilock pads and taped.   Plan: will see the patient again tomorrow.   Pretty Garcia RN, BSN, CWON

## 2024-05-08 ENCOUNTER — APPOINTMENT (OUTPATIENT)
Facility: HOSPITAL | Age: 59
DRG: 330 | End: 2024-05-08
Attending: SURGERY
Payer: COMMERCIAL

## 2024-05-08 LAB
HCT VFR BLD AUTO: 28.6 % (ref 35–47)
HGB BLD-MCNC: 9 G/DL (ref 11.5–16)

## 2024-05-08 PROCEDURE — 2500000003 HC RX 250 WO HCPCS: Performed by: SURGERY

## 2024-05-08 PROCEDURE — 36415 COLL VENOUS BLD VENIPUNCTURE: CPT

## 2024-05-08 PROCEDURE — 2580000003 HC RX 258: Performed by: SURGERY

## 2024-05-08 PROCEDURE — 6360000002 HC RX W HCPCS: Performed by: SURGERY

## 2024-05-08 PROCEDURE — 71045 X-RAY EXAM CHEST 1 VIEW: CPT

## 2024-05-08 PROCEDURE — 1100000000 HC RM PRIVATE

## 2024-05-08 PROCEDURE — P9041 ALBUMIN (HUMAN),5%, 50ML: HCPCS | Performed by: NURSE PRACTITIONER

## 2024-05-08 PROCEDURE — 85014 HEMATOCRIT: CPT

## 2024-05-08 PROCEDURE — 6370000000 HC RX 637 (ALT 250 FOR IP): Performed by: NURSE PRACTITIONER

## 2024-05-08 PROCEDURE — 85018 HEMOGLOBIN: CPT

## 2024-05-08 PROCEDURE — 99024 POSTOP FOLLOW-UP VISIT: CPT | Performed by: NURSE PRACTITIONER

## 2024-05-08 PROCEDURE — 6370000000 HC RX 637 (ALT 250 FOR IP): Performed by: SURGERY

## 2024-05-08 PROCEDURE — 6360000002 HC RX W HCPCS: Performed by: NURSE PRACTITIONER

## 2024-05-08 RX ORDER — ALBUMIN, HUMAN INJ 5% 5 %
25 SOLUTION INTRAVENOUS ONCE
Status: COMPLETED | OUTPATIENT
Start: 2024-05-08 | End: 2024-05-08

## 2024-05-08 RX ORDER — LIDOCAINE 4 G/G
1 PATCH TOPICAL DAILY
Status: DISCONTINUED | OUTPATIENT
Start: 2024-05-08 | End: 2024-05-09 | Stop reason: HOSPADM

## 2024-05-08 RX ADMIN — DROSPIRENONE AND ETHINYL ESTRADIOL 1 TABLET: KIT at 20:47

## 2024-05-08 RX ADMIN — TRAMADOL HYDROCHLORIDE 100 MG: 50 TABLET ORAL at 23:44

## 2024-05-08 RX ADMIN — HYDROXYCHLOROQUINE SULFATE 400 MG: 200 TABLET ORAL at 20:46

## 2024-05-08 RX ADMIN — TIZANIDINE 2 MG: 4 TABLET ORAL at 03:43

## 2024-05-08 RX ADMIN — DULOXETINE HYDROCHLORIDE 60 MG: 30 CAPSULE, DELAYED RELEASE ORAL at 10:47

## 2024-05-08 RX ADMIN — TIZANIDINE 2 MG: 4 TABLET ORAL at 11:24

## 2024-05-08 RX ADMIN — ACETAMINOPHEN 650 MG: 325 TABLET ORAL at 11:19

## 2024-05-08 RX ADMIN — ONDANSETRON 4 MG: 2 INJECTION INTRAMUSCULAR; INTRAVENOUS at 19:42

## 2024-05-08 RX ADMIN — PANTOPRAZOLE SODIUM 40 MG: 40 TABLET, DELAYED RELEASE ORAL at 16:11

## 2024-05-08 RX ADMIN — SODIUM CHLORIDE, PRESERVATIVE FREE 10 ML: 5 INJECTION INTRAVENOUS at 03:06

## 2024-05-08 RX ADMIN — ACETAMINOPHEN 650 MG: 325 TABLET ORAL at 22:45

## 2024-05-08 RX ADMIN — ALBUMIN (HUMAN) 25 G: 12.5 INJECTION, SOLUTION INTRAVENOUS at 10:45

## 2024-05-08 RX ADMIN — TRAMADOL HYDROCHLORIDE 100 MG: 50 TABLET ORAL at 03:03

## 2024-05-08 RX ADMIN — HYDROMORPHONE HYDROCHLORIDE 0.5 MG: 1 INJECTION, SOLUTION INTRAMUSCULAR; INTRAVENOUS; SUBCUTANEOUS at 04:38

## 2024-05-08 RX ADMIN — HYDROMORPHONE HYDROCHLORIDE 0.5 MG: 1 INJECTION, SOLUTION INTRAMUSCULAR; INTRAVENOUS; SUBCUTANEOUS at 12:51

## 2024-05-08 RX ADMIN — GABAPENTIN 1200 MG: 300 CAPSULE ORAL at 20:46

## 2024-05-08 RX ADMIN — HYDROMORPHONE HYDROCHLORIDE 0.5 MG: 1 INJECTION, SOLUTION INTRAMUSCULAR; INTRAVENOUS; SUBCUTANEOUS at 22:45

## 2024-05-08 RX ADMIN — TRAMADOL HYDROCHLORIDE 100 MG: 50 TABLET ORAL at 11:18

## 2024-05-08 RX ADMIN — ACETAMINOPHEN 650 MG: 325 TABLET ORAL at 18:36

## 2024-05-08 RX ADMIN — TRAMADOL HYDROCHLORIDE 100 MG: 50 TABLET ORAL at 17:30

## 2024-05-08 RX ADMIN — SODIUM CHLORIDE, POTASSIUM CHLORIDE, SODIUM LACTATE AND CALCIUM CHLORIDE: 600; 310; 30; 20 INJECTION, SOLUTION INTRAVENOUS at 13:05

## 2024-05-08 RX ADMIN — TIZANIDINE 2 MG: 4 TABLET ORAL at 19:44

## 2024-05-08 RX ADMIN — HYDROMORPHONE HYDROCHLORIDE 0.5 MG: 1 INJECTION, SOLUTION INTRAMUSCULAR; INTRAVENOUS; SUBCUTANEOUS at 18:36

## 2024-05-08 RX ADMIN — PANTOPRAZOLE SODIUM 40 MG: 40 TABLET, DELAYED RELEASE ORAL at 11:19

## 2024-05-08 ASSESSMENT — PAIN DESCRIPTION - LOCATION
LOCATION: ABDOMEN
LOCATION: ABDOMEN;RIB CAGE
LOCATION: ABDOMEN

## 2024-05-08 ASSESSMENT — PAIN DESCRIPTION - FREQUENCY
FREQUENCY: INTERMITTENT
FREQUENCY: CONTINUOUS
FREQUENCY: CONTINUOUS

## 2024-05-08 ASSESSMENT — PAIN SCALES - GENERAL
PAINLEVEL_OUTOF10: 5
PAINLEVEL_OUTOF10: 7
PAINLEVEL_OUTOF10: 8
PAINLEVEL_OUTOF10: 4
PAINLEVEL_OUTOF10: 8
PAINLEVEL_OUTOF10: 7
PAINLEVEL_OUTOF10: 8
PAINLEVEL_OUTOF10: 7
PAINLEVEL_OUTOF10: 4
PAINLEVEL_OUTOF10: 6
PAINLEVEL_OUTOF10: 7
PAINLEVEL_OUTOF10: 8
PAINLEVEL_OUTOF10: 4

## 2024-05-08 ASSESSMENT — PAIN DESCRIPTION - PAIN TYPE
TYPE: SURGICAL PAIN;CHRONIC PAIN;ACUTE PAIN
TYPE: ACUTE PAIN
TYPE: SURGICAL PAIN;CHRONIC PAIN
TYPE: ACUTE PAIN

## 2024-05-08 ASSESSMENT — PAIN DESCRIPTION - DESCRIPTORS
DESCRIPTORS: STABBING
DESCRIPTORS: ACHING
DESCRIPTORS: STABBING
DESCRIPTORS: ACHING

## 2024-05-08 ASSESSMENT — PAIN DESCRIPTION - ORIENTATION
ORIENTATION: RIGHT
ORIENTATION: RIGHT;UPPER
ORIENTATION: RIGHT
ORIENTATION: LEFT;LOWER

## 2024-05-08 ASSESSMENT — PAIN - FUNCTIONAL ASSESSMENT
PAIN_FUNCTIONAL_ASSESSMENT: PREVENTS OR INTERFERES SOME ACTIVE ACTIVITIES AND ADLS

## 2024-05-08 ASSESSMENT — PAIN SCALES - WONG BAKER: WONGBAKER_NUMERICALRESPONSE: HURTS A LITTLE BIT

## 2024-05-08 ASSESSMENT — PAIN DESCRIPTION - ONSET
ONSET: GRADUAL
ONSET: ON-GOING

## 2024-05-08 NOTE — WOUND CARE
Wound Care follow up - daily for the abdominal wounds:  Chart reviewed. Pt. Assessed. Wound and ostomy care today:  The stoma is pink, lofted above the skin and patient is pleased at how it looks and how it is functioning. She is doing her own emptying correctly.  The pouch system was changed today.   The wounds:  Pt.'s dressings are NOT saturated today but the drainage is still tan and patient reports that she \"has some stink\" to the wounds.  This wound care nurse did not appreciate any odor. The distal last two staples were removed today and the wound did not part open. If it does, it will happen naturally. Will watch it the rest of the week.   The proximal part of the surgical site is pink and filling in nicely. The depth is 1.2 cm with a solid base. Granulation tissue over 50% of the wound.   Cleansed with the Vashe solution and wiped well with the gauze. Packed the wound with Vashe moist hydrofera Blue dressing cut to fit the wound as a strip. Covered with the Optilock.   The fissure was treated again with zinc oxide cream after cleansing it.    The old colostomy site is deeper appearing today but we are packing it with the Hydrofera Blue (a piece about 7 inches long) to fill with some Silvasorb gel on it and covered with the Optilock.    The Penrose site has a creamy looking drainage and this was cleansed and dressed with a dry Optilock dressing.   All dressings taped and dated for today.   Plan: will check the dressings later today for any excess drainage. If possible will leave it on and do the wound care tomorrow and Friday morning.   Pt.'s  (and the patient) are comfortable doing whatever wound care is needed at home on the days that home care cannot be there. She will just need close follow up and preferably photo documentation of the wounds with each visit.   Pt. Continues to drink her Gwyn every day - twice per day.   Pretty Garcia RN, BSN, CWON    Addendum today at 5 pm:  Pt. Was weighed and

## 2024-05-09 VITALS
BODY MASS INDEX: 36.93 KG/M2 | WEIGHT: 208.4 LBS | OXYGEN SATURATION: 94 % | HEIGHT: 63 IN | SYSTOLIC BLOOD PRESSURE: 106 MMHG | TEMPERATURE: 97.9 F | HEART RATE: 73 BPM | RESPIRATION RATE: 17 BRPM | DIASTOLIC BLOOD PRESSURE: 55 MMHG

## 2024-05-09 LAB
ANION GAP SERPL CALC-SCNC: 4 MMOL/L (ref 5–15)
BASOPHILS # BLD: 0 K/UL (ref 0–0.1)
BASOPHILS NFR BLD: 0 % (ref 0–1)
BUN SERPL-MCNC: 19 MG/DL (ref 6–20)
BUN/CREAT SERPL: 20 (ref 12–20)
CALCIUM SERPL-MCNC: 8.6 MG/DL (ref 8.5–10.1)
CHLORIDE SERPL-SCNC: 104 MMOL/L (ref 97–108)
CO2 SERPL-SCNC: 29 MMOL/L (ref 21–32)
CREAT SERPL-MCNC: 0.97 MG/DL (ref 0.55–1.02)
DIFFERENTIAL METHOD BLD: ABNORMAL
EOSINOPHIL # BLD: 0.3 K/UL (ref 0–0.4)
EOSINOPHIL NFR BLD: 3 % (ref 0–7)
ERYTHROCYTE [DISTWIDTH] IN BLOOD BY AUTOMATED COUNT: 12.4 % (ref 11.5–14.5)
GLUCOSE SERPL-MCNC: 77 MG/DL (ref 65–100)
HCT VFR BLD AUTO: 31.1 % (ref 35–47)
HGB BLD-MCNC: 10 G/DL (ref 11.5–16)
IMM GRANULOCYTES # BLD AUTO: 0.1 K/UL (ref 0–0.04)
IMM GRANULOCYTES NFR BLD AUTO: 1 % (ref 0–0.5)
LYMPHOCYTES # BLD: 1.6 K/UL (ref 0.8–3.5)
LYMPHOCYTES NFR BLD: 14 % (ref 12–49)
MCH RBC QN AUTO: 28.7 PG (ref 26–34)
MCHC RBC AUTO-ENTMCNC: 32.2 G/DL (ref 30–36.5)
MCV RBC AUTO: 89.1 FL (ref 80–99)
MONOCYTES # BLD: 0.8 K/UL (ref 0–1)
MONOCYTES NFR BLD: 7 % (ref 5–13)
NEUTS SEG # BLD: 9.3 K/UL (ref 1.8–8)
NEUTS SEG NFR BLD: 75 % (ref 32–75)
NRBC # BLD: 0 K/UL (ref 0–0.01)
NRBC BLD-RTO: 0 PER 100 WBC
PLATELET # BLD AUTO: 253 K/UL (ref 150–400)
PMV BLD AUTO: 10.1 FL (ref 8.9–12.9)
POTASSIUM SERPL-SCNC: 3.8 MMOL/L (ref 3.5–5.1)
RBC # BLD AUTO: 3.49 M/UL (ref 3.8–5.2)
SODIUM SERPL-SCNC: 137 MMOL/L (ref 136–145)
WBC # BLD AUTO: 12.1 K/UL (ref 3.6–11)

## 2024-05-09 PROCEDURE — 2580000003 HC RX 258: Performed by: SURGERY

## 2024-05-09 PROCEDURE — 80048 BASIC METABOLIC PNL TOTAL CA: CPT

## 2024-05-09 PROCEDURE — 99024 POSTOP FOLLOW-UP VISIT: CPT | Performed by: NURSE PRACTITIONER

## 2024-05-09 PROCEDURE — 85025 COMPLETE CBC W/AUTO DIFF WBC: CPT

## 2024-05-09 PROCEDURE — 6370000000 HC RX 637 (ALT 250 FOR IP): Performed by: NURSE PRACTITIONER

## 2024-05-09 PROCEDURE — 2500000003 HC RX 250 WO HCPCS: Performed by: COLON & RECTAL SURGERY

## 2024-05-09 PROCEDURE — 2500000003 HC RX 250 WO HCPCS: Performed by: SURGERY

## 2024-05-09 PROCEDURE — 6370000000 HC RX 637 (ALT 250 FOR IP): Performed by: SURGERY

## 2024-05-09 PROCEDURE — 36415 COLL VENOUS BLD VENIPUNCTURE: CPT

## 2024-05-09 RX ORDER — HYDROMORPHONE HYDROCHLORIDE 1 MG/ML
0.5 INJECTION, SOLUTION INTRAMUSCULAR; INTRAVENOUS; SUBCUTANEOUS ONCE
Status: COMPLETED | OUTPATIENT
Start: 2024-05-09 | End: 2024-05-09

## 2024-05-09 RX ORDER — TRAMADOL HYDROCHLORIDE 50 MG/1
50-100 TABLET ORAL EVERY 6 HOURS PRN
Qty: 30 TABLET | Refills: 0 | Status: SHIPPED | OUTPATIENT
Start: 2024-05-09 | End: 2024-05-16

## 2024-05-09 RX ADMIN — SODIUM CHLORIDE, PRESERVATIVE FREE 10 ML: 5 INJECTION INTRAVENOUS at 09:52

## 2024-05-09 RX ADMIN — ACETAMINOPHEN 650 MG: 325 TABLET ORAL at 05:29

## 2024-05-09 RX ADMIN — HYDROMORPHONE HYDROCHLORIDE 0.5 MG: 1 INJECTION, SOLUTION INTRAMUSCULAR; INTRAVENOUS; SUBCUTANEOUS at 09:42

## 2024-05-09 RX ADMIN — TIZANIDINE 2 MG: 4 TABLET ORAL at 09:42

## 2024-05-09 RX ADMIN — PANTOPRAZOLE SODIUM 40 MG: 40 TABLET, DELAYED RELEASE ORAL at 05:29

## 2024-05-09 RX ADMIN — DULOXETINE HYDROCHLORIDE 60 MG: 30 CAPSULE, DELAYED RELEASE ORAL at 09:42

## 2024-05-09 RX ADMIN — HYDROMORPHONE HYDROCHLORIDE 0.5 MG: 1 INJECTION, SOLUTION INTRAMUSCULAR; INTRAVENOUS; SUBCUTANEOUS at 03:34

## 2024-05-09 RX ADMIN — HYDROMORPHONE HYDROCHLORIDE 0.5 MG: 1 INJECTION, SOLUTION INTRAMUSCULAR; INTRAVENOUS; SUBCUTANEOUS at 03:01

## 2024-05-09 RX ADMIN — SODIUM CHLORIDE, POTASSIUM CHLORIDE, SODIUM LACTATE AND CALCIUM CHLORIDE: 600; 310; 30; 20 INJECTION, SOLUTION INTRAVENOUS at 03:24

## 2024-05-09 RX ADMIN — TRAMADOL HYDROCHLORIDE 100 MG: 50 TABLET ORAL at 05:29

## 2024-05-09 RX ADMIN — TIZANIDINE 2 MG: 4 TABLET ORAL at 09:49

## 2024-05-09 ASSESSMENT — PAIN DESCRIPTION - LOCATION
LOCATION: ABDOMEN

## 2024-05-09 ASSESSMENT — PAIN DESCRIPTION - ORIENTATION
ORIENTATION: MID
ORIENTATION: MID

## 2024-05-09 ASSESSMENT — PAIN DESCRIPTION - DESCRIPTORS
DESCRIPTORS: ACHING
DESCRIPTORS: ACHING

## 2024-05-09 ASSESSMENT — PAIN SCALES - WONG BAKER
WONGBAKER_NUMERICALRESPONSE: HURTS A LITTLE BIT
WONGBAKER_NUMERICALRESPONSE: HURTS A LITTLE BIT

## 2024-05-09 ASSESSMENT — PAIN SCALES - GENERAL
PAINLEVEL_OUTOF10: 6
PAINLEVEL_OUTOF10: 4
PAINLEVEL_OUTOF10: 5
PAINLEVEL_OUTOF10: 6
PAINLEVEL_OUTOF10: 4
PAINLEVEL_OUTOF10: 4
PAINLEVEL_OUTOF10: 6
PAINLEVEL_OUTOF10: 2

## 2024-05-09 NOTE — WOUND CARE
Wound care was done by Dr. Schaffer and Pat Turpin this morning. The wounds were packed with the Moistened Hydrofera Blue and covered with absorbent dressings.   I checked the dressings prior to discharge and they had no excess drainage on them.  Now that the wound is open and healing by secondary intention, it will have less drainage. The penrose has the most drainage now.   Pt. To follow up with Dr. Schaffer in a few days at his office for incision and wound check.  Pt. Knows the ostomy care and is comfortable with the dressing changes but she needs the supplies and wound checks from Home Health care.    I gave her 3-4 days worth of wound care supplies today.  She has ostomy supplies at home.   Plan: Discharge with  who will also assist with the wound care.  Pt. Has my information to contact me for any important questions.   Pretty Garcia, RN, BSN, CWONB

## 2024-05-09 NOTE — PROGRESS NOTES
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 greater than 40 is classified as morbid obesity.       Pat Turpin, APRN - NP   MSN, APRN, FNP-C, CWOCN-AP, RNAS-C    24     
Surgery NP Progress Note    Valentina Maynard  [id number]  female  58 y.o.  1965    s/p EXPLORATORY LAPAROTOMY WITH EXCISION OF COLOSTOMY AND CREATION OF NEW END COLOSTOMY, CYSTOSCOPY INSERTION BILATERAL  URETERAL CATHETER/STENTS (E R A S) on 2024    Pt seen with Dr. Schaffer    Assessment:   Active Problems:    * No active hospital problems. *  Resolved Problems:    * No resolved hospital problems. *      Expected post-op progress.     Plan/Recommendations/Medical Decision Making:     - Mobilize with nursing and OOB to chair for meals  - Continue diet  - Pain management- Continue current pain control methods. Encouraged use of PO meds.   - VTE Prophylaxis: mechanical. Holding pharm agents due to decreasing Hgb and hypotension.   - D/C planning 1-2 days pending diet and pain control.     Subjective:     Patient tolerating diet this morning. Was nauseated last night, reports heaving. Pain control with PCA is ok.     Objective:     Blood pressure 106/63, pulse 82, temperature 97.5 °F (36.4 °C), temperature source Oral, resp. rate 16, height 1.524 m (5'), weight 84 kg (185 lb 3 oz), SpO2 100 %.    Temp (24hrs), Av.9 °F (36.6 °C), Min:97.5 °F (36.4 °C), Max:98.2 °F (36.8 °C)      Pt resting in bed NAD   Incisions CDI.   Drain  penrose in place.    SCDs for mechanical DVT proph while in bed     Body mass index is 36.17 kg/m².     Reference: BMI greater than 30 is classified as obesity and greater than 40 is classified as morbid obesity.       Pat Turpin, EHSAN - NP   MSN, APRN, FNP-C, CWOCN-AP, RNAS-C    24     
This morning the patient requested that her abdominal dressing be changed and I stated I would change it in a little while. I went back in and she politely insisted that she and her  would change her dressing because they have to do it at home any ways.   
None Identified  Food/Nutrient Intake Outcomes: Diet Advancement/Tolerance, Food and Nutrient Intake, Supplement Intake  Physical Signs/Symptoms Outcomes: Weight, GI Status, Biochemical Data    Discharge Planning:    Continue Oral Nutrition Supplement, Continue current diet     Yari Fontanez RD  Contact:  Ext [phone], or via [employer]

## 2024-05-09 NOTE — CARE COORDINATION
Transition of Care Plan:    RUR: 14%  Prior Level of Functioning:   Disposition: Home w/[facility] for wound care - SOC is within 48hrs post d/c  If SNF or IPR: Date FOC offered:   Date FOC received:   Accepting facility:   Date authorization started with reference number:   Date authorization received and expires:   Follow up appointments: on AVS  DME needed: n/a  Transportation at discharge:   IM/IMM Medicare/ letter given: n/a  Is patient a [place] and connected with [facility]?    If yes, was  transfer form completed and VA notified?   Caregiver Contact:   Discharge Caregiver contacted prior to discharge? At bedside  Care Conference needed?   Barriers to discharge:     Patient is d/c home today with HH.   is at bedside     Patient does not have a HH provider preference.  CM will arrange HH.    Nenita Levine  Ext [phone]

## 2024-05-09 NOTE — DISCHARGE INSTRUCTIONS
Annel Carroll MD, FACS  MD Ramon Livingston MD Peter Miller, MD Joseph Coury, MD Kathryn Chuquin, MD    [facility]       Discharge Instructions for Colon Surgery Patients    Diagnosis: Ostomy relocation  Low fiber diet.  Do not drive while taking narcotic pain medications.  Leave surgical glue on incision. It may fall off on it's own.  May take a shower.  No lifting any objects weighing more than 15 pounds. Do not do any housework, such as vacuuming, scrubbing, etc… for at least a month.  When you get tired during the day, take naps, as you need your rest.  Multiple bowel movements are normal each day for a while.  May walk as desired. May go up and down stairs.  Take pain medication as prescribed: (NO DRIVING WHILE ON PAIN MEDICATIONS).   Ultram EVERY 6 HOURS AS NEEDED.     See me in the office in 10-14 days. Call as soon as discharged for an appointment [phone]. IF SURGERY INVOLVED AN OSTOMY BAG, PLEASE BRING YOUR SUPPLIES TO YOUR 1ST VISIT!   Call the Exchange [phone], if you have any questions or problems after office hours.            [address], [address]  [place], [place] [address] [phone] · Fax [phone]    [address] [place] [id number] [phone] · Fax [phone]    [address] [place] [address], [address]  [place], [place] [address] [phone] · Fax [phone]

## 2024-05-09 NOTE — DISCHARGE SUMMARY
Post- Surgical Discharge Summary    Patient ID:  Valentina Maynard  [id number]  female  58 y.o.  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 female who has a history of Colostomy malfunction (HCC) [K94.03] who presents at this time for a colostomy relocation.    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    Myopathy    Phrenic nerve palsy    Migraine with aura and without status migrainosus, not intractable    Weakness    Back pain    Cervico-occipital neuralgia of right side    Abdominal pain, acute, right upper quadrant    Bronchitis, acute    Cerebral microvascular disease    Spondylolisthesis, grade 1    Dizziness and giddiness    Crohn's disease of colon (HCC)    Lumbar radiculopathy    SVT (supraventricular tachycardia) (HCC)    Acute ischemic stroke (HCC)    SVC obstruction    Left leg weakness    Crohn's disease of ileum (HCC)    Crohn disease (HCC)    Lupus (HCC)    Severe obesity (HCC)    Altered mental status, unspecified    SLE (systemic lupus erythematosus) (HCC)    Pulmonary embolism (HCC)    Cervical

## 2024-05-29 ENCOUNTER — TELEPHONE (OUTPATIENT)
Age: 59
End: 2024-05-29

## 2024-05-29 NOTE — TELEPHONE ENCOUNTER
Received fax from [facility] for provider to sign for orders. Per provider, patient has not been seen since 2021 and will need appt for orders to be signed. Per , has tried calling to make appointment, left detailed voicemail for patient to call and make appt for forms to be completed.

## 2024-06-10 DIAGNOSIS — R10.31 RLQ ABDOMINAL PAIN: Primary | ICD-10-CM

## 2024-06-13 ENCOUNTER — HOSPITAL ENCOUNTER (OUTPATIENT)
Facility: HOSPITAL | Age: 59
Discharge: HOME | End: 2024-06-13
Attending: SURGERY
Payer: COMMERCIAL

## 2024-06-13 DIAGNOSIS — R10.31 RLQ ABDOMINAL PAIN: ICD-10-CM

## 2024-06-13 PROCEDURE — 6360000004 HC RX CONTRAST MEDICATION: Performed by: SURGERY

## 2024-06-13 PROCEDURE — 74177 CT ABD & PELVIS W/CONTRAST: CPT

## 2024-06-13 RX ADMIN — IOPAMIDOL 100 ML: 755 INJECTION, SOLUTION INTRAVENOUS at 09:53

## 2024-06-25 ENCOUNTER — HOSPITAL ENCOUNTER (OUTPATIENT)
Age: 59
Discharge: HOME | End: 2024-06-28
Payer: COMMERCIAL

## 2024-06-25 VITALS — BODY MASS INDEX: 39.27 KG/M2 | WEIGHT: 208 LBS | HEIGHT: 61 IN

## 2024-06-25 DIAGNOSIS — Z12.31 VISIT FOR SCREENING MAMMOGRAM: ICD-10-CM

## 2024-06-25 PROCEDURE — 77063 BREAST TOMOSYNTHESIS BI: CPT

## 2024-06-28 RX ORDER — CYANOCOBALAMIN 1000 UG/ML
INJECTION, SOLUTION INTRAMUSCULAR; SUBCUTANEOUS
Qty: 3 ML | Refills: 6 | Status: SHIPPED | OUTPATIENT
Start: 2024-06-28

## 2024-07-03 ENCOUNTER — HOSPITAL ENCOUNTER (OUTPATIENT)
Facility: HOSPITAL | Age: 59
Discharge: HOME | End: 2024-07-06
Payer: COMMERCIAL

## 2024-07-03 ENCOUNTER — TRANSCRIBE ORDERS (OUTPATIENT)
Facility: HOSPITAL | Age: 59
End: 2024-07-03

## 2024-07-03 VITALS — WEIGHT: 208 LBS | HEIGHT: 61 IN | BODY MASS INDEX: 39.27 KG/M2

## 2024-07-03 DIAGNOSIS — R92.8 ABNORMAL MAMMOGRAM OF RIGHT BREAST: Primary | ICD-10-CM

## 2024-07-03 DIAGNOSIS — N63.0 BREAST NODULE: ICD-10-CM

## 2024-07-03 DIAGNOSIS — R92.8 ABNORMAL MAMMOGRAM: ICD-10-CM

## 2024-07-03 PROCEDURE — 76642 ULTRASOUND BREAST LIMITED: CPT

## 2024-07-03 PROCEDURE — G0279 TOMOSYNTHESIS, MAMMO: HCPCS

## 2024-08-16 ENCOUNTER — HOSPITAL ENCOUNTER (OUTPATIENT)
Facility: HOSPITAL | Age: 59
Discharge: HOME | End: 2024-08-16
Attending: SURGERY
Payer: COMMERCIAL

## 2024-08-16 VITALS
DIASTOLIC BLOOD PRESSURE: 59 MMHG | SYSTOLIC BLOOD PRESSURE: 129 MMHG | TEMPERATURE: 97.2 F | RESPIRATION RATE: 16 BRPM | HEART RATE: 86 BPM

## 2024-08-16 DIAGNOSIS — T81.89XA NON-HEALING SURGICAL WOUND, INITIAL ENCOUNTER: Primary | ICD-10-CM

## 2024-08-16 PROCEDURE — 99203 OFFICE O/P NEW LOW 30 MIN: CPT | Performed by: SURGERY

## 2024-08-16 RX ORDER — TRAMADOL HYDROCHLORIDE 50 MG/1
50 TABLET ORAL 2 TIMES DAILY
COMMUNITY

## 2024-08-16 RX ORDER — LIDOCAINE 40 MG/G
CREAM TOPICAL ONCE
Status: CANCELLED | OUTPATIENT
Start: 2024-08-16 | End: 2024-08-16

## 2024-08-16 RX ORDER — LIDOCAINE 50 MG/G
OINTMENT TOPICAL ONCE
Status: CANCELLED | OUTPATIENT
Start: 2024-08-16 | End: 2024-08-16

## 2024-08-16 RX ORDER — LIDOCAINE HYDROCHLORIDE 40 MG/ML
SOLUTION TOPICAL ONCE
Status: CANCELLED | OUTPATIENT
Start: 2024-08-16 | End: 2024-08-16

## 2024-08-16 RX ORDER — LIDOCAINE HYDROCHLORIDE 20 MG/ML
JELLY TOPICAL ONCE
Status: CANCELLED | OUTPATIENT
Start: 2024-08-16 | End: 2024-08-16

## 2024-08-16 RX ORDER — GENTAMICIN SULFATE 1 MG/G
OINTMENT TOPICAL ONCE
OUTPATIENT
Start: 2024-08-16 | End: 2024-08-16

## 2024-08-16 RX ORDER — IBUPROFEN 200 MG
TABLET ORAL ONCE
OUTPATIENT
Start: 2024-08-16 | End: 2024-08-16

## 2024-08-16 RX ORDER — BACITRACIN ZINC AND POLYMYXIN B SULFATE 500; 1000 [USP'U]/G; [USP'U]/G
OINTMENT TOPICAL ONCE
Status: CANCELLED | OUTPATIENT
Start: 2024-08-16 | End: 2024-08-16

## 2024-08-16 RX ORDER — SODIUM CHLOR/HYPOCHLOROUS ACID 0.033 %
SOLUTION, IRRIGATION IRRIGATION ONCE
OUTPATIENT
Start: 2024-08-16 | End: 2024-08-16

## 2024-08-16 RX ORDER — LIDOCAINE 40 MG/G
CREAM TOPICAL ONCE
OUTPATIENT
Start: 2024-08-16 | End: 2024-08-16

## 2024-08-16 RX ORDER — GINSENG 100 MG
CAPSULE ORAL ONCE
Status: CANCELLED | OUTPATIENT
Start: 2024-08-16 | End: 2024-08-16

## 2024-08-16 RX ORDER — GENTAMICIN SULFATE 1 MG/G
OINTMENT TOPICAL ONCE
Status: CANCELLED | OUTPATIENT
Start: 2024-08-16 | End: 2024-08-16

## 2024-08-16 RX ORDER — LIDOCAINE 50 MG/G
OINTMENT TOPICAL ONCE
OUTPATIENT
Start: 2024-08-16 | End: 2024-08-16

## 2024-08-16 RX ORDER — SODIUM CHLOR/HYPOCHLOROUS ACID 0.033 %
SOLUTION, IRRIGATION IRRIGATION ONCE
Status: CANCELLED | OUTPATIENT
Start: 2024-08-16 | End: 2024-08-16

## 2024-08-16 RX ORDER — GINSENG 100 MG
CAPSULE ORAL ONCE
OUTPATIENT
Start: 2024-08-16 | End: 2024-08-16

## 2024-08-16 RX ORDER — LIDOCAINE HYDROCHLORIDE 20 MG/ML
JELLY TOPICAL ONCE
OUTPATIENT
Start: 2024-08-16 | End: 2024-08-16

## 2024-08-16 RX ORDER — BACITRACIN ZINC AND POLYMYXIN B SULFATE 500; 1000 [USP'U]/G; [USP'U]/G
OINTMENT TOPICAL ONCE
OUTPATIENT
Start: 2024-08-16 | End: 2024-08-16

## 2024-08-16 RX ORDER — LIDOCAINE HYDROCHLORIDE 40 MG/ML
SOLUTION TOPICAL ONCE
OUTPATIENT
Start: 2024-08-16 | End: 2024-08-16

## 2024-08-16 RX ORDER — IBUPROFEN 200 MG
TABLET ORAL ONCE
Status: CANCELLED | OUTPATIENT
Start: 2024-08-16 | End: 2024-08-16

## 2024-08-16 ASSESSMENT — PAIN DESCRIPTION - LOCATION: LOCATION: ABDOMEN

## 2024-08-16 ASSESSMENT — PAIN DESCRIPTION - ORIENTATION: ORIENTATION: MID

## 2024-08-16 ASSESSMENT — PAIN SCALES - GENERAL: PAINLEVEL_OUTOF10: 6

## 2024-08-16 NOTE — DISCHARGE INSTRUCTIONS
Discharge Instructions/Wound Care Orders  [facility]   [address]   [address], [address]  [place] [address]   Telephone: [phone]     FAX [phone]    NAME:  Valentina Maynard  YOB: 1965  MEDICAL RECORD NUMBER:  [id number]  DATE:  8/16/2024     CPT code: E&M-Level 3 (51770)    Wound Care Orders:  Abdominal wounds :Cleanse with normal saline, swab wound tunnels with bedadine, using swab.   Pack left abdominal wound with HFB, Cover both with optilock or other dressing.. Care to be done 2 x day and as needed. Return to see MD in 2 weeks    Follow diet as prescribed:  [x] Diet as tolerated: [] Diabetic Diet: Low carb no sugar [] No Added Salt:  [x] Increase Protein: [] Other:Limit the amount of liquid you are drinking and avoid drinking in between meals             Follow-up:  [x] Return Appointment: With Dr. Dorado in  2 Week(s)  [] Ordered tests:    Electronically signed LIANNE TELLO RN on 8/16/2024 at 8:55 AM     Wound Care Center Information: Should you experience any significant changes in your wound(s) or have questions about your wound care, please contact the [facility] at MONDAY - FRIDAY 8:00 am - 4:30.  If you need help with your wound outside these hours and cannot wait until we are again available, contact your PCP or go to the hospital emergency room.     PLEASE NOTE: IF YOU ARE UNABLE TO OBTAIN WOUND SUPPLIES, CONTINUE TO USE THE SUPPLIES YOU HAVE AVAILABLE UNTIL YOU ARE ABLE TO REACH US. IT IS MOST IMPORTANT TO KEEP THE WOUND COVERED AT ALL TIMES.     Physician Signature:_______________________    Date: ___________ Time:  ____________

## 2024-08-16 NOTE — PROGRESS NOTES
Wound care    The patient is a 59-year-old woman who was referred to the wound care center by Dr. FERNIE Schaffer regarding nonhealing abdominal wounds.    The patient was first seen in the wound care center on 8/16/2024.    The patient has a long history of Crohn's disease and had had prior ileocolectomy and prior permanent end colostomy.  She developed stricture and contraction of the stoma and on 4/30/2024 underwent laparotomy and resection of the stoma and descending colon and creation of a new left upper quadrant colostomy.  Both the midline laparotomy incision and the left lower quadrant colostomy takedown incision were closed at the fascial levels with 0 PDS.  The patient has 2 persisting wounds, 1 just above the umbilicus overlying the midline incision and 1 at the left lower quadrant site of her prior colostomy.    The colostomy site wound is being treated with silver nitrate cautery every 2 weeks and with dressing changes utilizing Hydrofera Blue.  The smaller wound above the umbilicus is being treated with silver nitrate cautery every 2 weeks and a dry dressing.  Both dressings are changed 2 times per day and the patient reports significant drainage.    The patient is eating but does not have a good appetite.  She does take small amounts of liquid supplements.  She reports losing about 20 pounds since her 4/30/2024 surgery.  She is ambulatory.    The patient does not have history of diabetes mellitus.  She has history of atrial fibrillation and reportedly has had ablations.  She does take Eliquis.  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  The patient does not have history of MI or coronary intervention.  She does have chronic shortness of breath, even with walking in her house.    The patient has history of Crohn's disease and lupus.  She does take immunomodulators.    Past

## 2024-08-16 NOTE — FLOWSHEET NOTE
08/16/24 0833   Wound 08/16/24 Abdomen Left;Lower   Date First Assessed/Time First Assessed: 08/16/24 0832   Wound Approximate Age at First Assessment (Weeks): 16 weeks  Primary Wound Type: Surgical Type  Location: Abdomen  Wound Location Orientation: Left;Lower   Wound Image    Wound Etiology Non-Healing Surgical   Dressing Status Old drainage noted   Wound Cleansed Cleansed with saline   Wound Length (cm) 0.6 cm   Wound Width (cm) 0.9 cm   Wound Depth (cm) 1.2 cm   Wound Surface Area (cm^2) 0.54 cm^2   Wound Volume (cm^3) 0.648 cm^3   Distance Tunneling (cm) 3.2 cm   Tunneling Position ___ O'Clock 8   Wound Assessment Pink/red   Drainage Amount Moderate (25-50%)   Drainage Description Serosanguinous   Odor None   Yahaira-wound Assessment Intact   Margins Defined edges   Wound Thickness Description not for Pressure Injury Full thickness   Wound 08/16/24 Abdomen Mid;Lower   Date First Assessed/Time First Assessed: 08/16/24 0833   Wound Approximate Age at First Assessment (Weeks): 16 weeks  Primary Wound Type: Surgical Type  Location: Abdomen  Wound Location Orientation: Mid;Lower   Wound Image    Wound Etiology Non-Healing Surgical   Dressing Status Old drainage noted   Wound Cleansed Cleansed with saline   Wound Length (cm) 0.2 cm   Wound Width (cm) 0.2 cm   Wound Depth (cm) 1.8 cm   Wound Surface Area (cm^2) 0.04 cm^2   Wound Volume (cm^3) 0.072 cm^3   Distance Tunneling (cm) 2 cm   Tunneling Position ___ O'Clock 9   Wound Assessment   (UNABLE TO VISUALIZE WOUND BED)   Drainage Amount Small (< 25%)   Drainage Description Serosanguinous   Odor None   Yahaira-wound Assessment Intact   Margins Defined edges   Wound Thickness Description not for Pressure Injury Full thickness     BP (!) 129/59   Pulse 86   Temp 97.2 °F (36.2 °C) (Temporal)   Resp 16

## 2024-08-29 ENCOUNTER — TELEPHONE (OUTPATIENT)
Age: 59
End: 2024-08-29

## 2024-08-29 DIAGNOSIS — D50.0 IRON DEFICIENCY ANEMIA SECONDARY TO BLOOD LOSS (CHRONIC): ICD-10-CM

## 2024-08-29 DIAGNOSIS — D50.0 IRON DEFICIENCY ANEMIA SECONDARY TO BLOOD LOSS (CHRONIC): Primary | ICD-10-CM

## 2024-08-29 NOTE — TELEPHONE ENCOUNTER
Pt returned call and she will go get her labs drawn next week and will call us when she does this and we will then schedule an appt. It can be virtual or in the office.

## 2024-08-29 NOTE — TELEPHONE ENCOUNTER
received a message from  alerting us he has referred pt back to us 2 x without response.    This RN called pt at this time and apologized for lack of receiving the referral.    Per  we will order labs and see her virtually or in the clinic after to review results.  She can go to [facility] [address] [facility] for labs.    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    I will call her back shortly.

## 2024-08-30 ENCOUNTER — HOSPITAL ENCOUNTER (OUTPATIENT)
Facility: HOSPITAL | Age: 59
Discharge: HOME | End: 2024-08-30
Attending: SURGERY
Payer: COMMERCIAL

## 2024-08-30 VITALS
HEART RATE: 85 BPM | TEMPERATURE: 97.8 F | RESPIRATION RATE: 16 BRPM | SYSTOLIC BLOOD PRESSURE: 136 MMHG | DIASTOLIC BLOOD PRESSURE: 65 MMHG

## 2024-08-30 DIAGNOSIS — T81.89XD NON-HEALING SURGICAL WOUND, SUBSEQUENT ENCOUNTER: Primary | ICD-10-CM

## 2024-08-30 DIAGNOSIS — T81.89XA NON-HEALING SURGICAL WOUND, INITIAL ENCOUNTER: ICD-10-CM

## 2024-08-30 PROCEDURE — 17250 CHEM CAUT OF GRANLTJ TISSUE: CPT

## 2024-08-30 PROCEDURE — 99213 OFFICE O/P EST LOW 20 MIN: CPT | Performed by: SURGERY

## 2024-08-30 RX ORDER — LIDOCAINE HYDROCHLORIDE 20 MG/ML
JELLY TOPICAL ONCE
OUTPATIENT
Start: 2024-08-30 | End: 2024-08-30

## 2024-08-30 RX ORDER — SODIUM CHLOR/HYPOCHLOROUS ACID 0.033 %
SOLUTION, IRRIGATION IRRIGATION ONCE
OUTPATIENT
Start: 2024-08-30 | End: 2024-08-30

## 2024-08-30 RX ORDER — LIDOCAINE 50 MG/G
OINTMENT TOPICAL ONCE
OUTPATIENT
Start: 2024-08-30 | End: 2024-08-30

## 2024-08-30 RX ORDER — GENTAMICIN SULFATE 1 MG/G
OINTMENT TOPICAL ONCE
OUTPATIENT
Start: 2024-08-30 | End: 2024-08-30

## 2024-08-30 RX ORDER — NEOMYCIN/BACITRACIN/POLYMYXINB 3.5-400-5K
OINTMENT (GRAM) TOPICAL ONCE
OUTPATIENT
Start: 2024-08-30 | End: 2024-08-30

## 2024-08-30 RX ORDER — SILVER SULFADIAZINE 10 MG/G
CREAM TOPICAL ONCE
OUTPATIENT
Start: 2024-08-30 | End: 2024-08-30

## 2024-08-30 RX ORDER — LIDOCAINE HYDROCHLORIDE 40 MG/ML
SOLUTION TOPICAL ONCE
OUTPATIENT
Start: 2024-08-30 | End: 2024-08-30

## 2024-08-30 RX ORDER — MUPIROCIN 20 MG/G
OINTMENT TOPICAL ONCE
OUTPATIENT
Start: 2024-08-30 | End: 2024-08-30

## 2024-08-30 RX ORDER — BACITRACIN ZINC AND POLYMYXIN B SULFATE 500; 1000 [USP'U]/G; [USP'U]/G
OINTMENT TOPICAL ONCE
OUTPATIENT
Start: 2024-08-30 | End: 2024-08-30

## 2024-08-30 RX ORDER — GINSENG 100 MG
CAPSULE ORAL ONCE
OUTPATIENT
Start: 2024-08-30 | End: 2024-08-30

## 2024-08-30 RX ORDER — LIDOCAINE 40 MG/G
CREAM TOPICAL ONCE
OUTPATIENT
Start: 2024-08-30 | End: 2024-08-30

## 2024-08-30 ASSESSMENT — PAIN DESCRIPTION - ORIENTATION: ORIENTATION: MID

## 2024-08-30 ASSESSMENT — PAIN SCALES - GENERAL: PAINLEVEL_OUTOF10: 5

## 2024-08-30 ASSESSMENT — PAIN DESCRIPTION - LOCATION: LOCATION: ABDOMEN

## 2024-08-30 ASSESSMENT — PAIN DESCRIPTION - DESCRIPTORS: DESCRIPTORS: OTHER (COMMENT)

## 2024-08-30 NOTE — PROGRESS NOTES
Wound care     The patient is a 59-year-old woman who was referred to the wound care center by Dr. FERNIE Schaffer regarding nonhealing abdominal wounds.     The patient was first seen in the wound care center on 8/16/2024.     The patient has a long history of Crohn's disease and had had prior ileocolectomy and prior permanent end colostomy.  She developed stricture and contraction of the stoma and on 4/30/2024 underwent laparotomy and resection of the stoma and descending colon and creation of a new left upper quadrant colostomy.  Both the midline laparotomy incision and the left lower quadrant colostomy takedown incision were closed at the fascial levels with 0 PDS.  The patient has 2 persisting wounds, 1 just above the umbilicus overlying the midline incision and 1 at the left lower quadrant site of her prior colostomy.     The colostomy site wound has been treated with silver nitrate cautery every 2 weeks and with dressing changes utilizing Hydrofera Blue.  The smaller wound above the umbilicus has  been treated with silver nitrate cautery every 2 weeks and a dry dressing.  Both dressings are changed 2 times per day and the patient reports moderate drainage.     The patient is eating but does not have a good appetite.  She does take small amounts of liquid supplements.  She has added ice cream.  She reports losing about 20 pounds since her 4/30/2024 surgery.  She is ambulatory.     The patient does not have history of diabetes mellitus.  She has history of atrial fibrillation and reportedly has had ablations.  She does take Eliquis.  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  The patient does not have history of MI or coronary intervention.  She does have chronic shortness of breath, even with walking in her house.     The patient has history of Crohn's disease and lupus.  She does take

## 2024-08-30 NOTE — FLOWSHEET NOTE
08/30/24 0830   Wound 08/16/24 Abdomen Left;Lower   Date First Assessed/Time First Assessed: 08/16/24 0832   Wound Approximate Age at First Assessment (Weeks): 16 weeks  Primary Wound Type: Surgical Type  Location: Abdomen  Wound Location Orientation: Left;Lower   Wound Image    Wound Etiology Non-Healing Surgical   Dressing Status Old drainage noted   Wound Cleansed Cleansed with saline   Wound Length (cm) 0.7 cm   Wound Width (cm) 0.9 cm   Wound Depth (cm) 1.8 cm   Wound Surface Area (cm^2) 0.63 cm^2   Change in Wound Size % (l*w) -16.67   Wound Volume (cm^3) 1.134 cm^3   Wound Healing % -75   Distance Tunneling (cm) 2.7 cm   Tunneling Position ___ O'Clock 8   Wound Assessment Pink/red   Drainage Amount Moderate (25-50%)   Drainage Description Serosanguinous   Odor None   Yahaira-wound Assessment Intact   Margins Defined edges   Wound Thickness Description not for Pressure Injury Full thickness   Wound 08/16/24 Abdomen Mid;Lower   Date First Assessed/Time First Assessed: 08/16/24 0833   Wound Approximate Age at First Assessment (Weeks): 16 weeks  Primary Wound Type: Surgical Type  Location: Abdomen  Wound Location Orientation: Mid;Lower   Wound Image    Wound Etiology Non-Healing Surgical   Dressing Status Old drainage noted   Wound Cleansed Cleansed with saline   Wound Length (cm) 0.1 cm   Wound Width (cm) 0.2 cm   Wound Depth (cm) 1.8 cm   Wound Surface Area (cm^2) 0.02 cm^2   Change in Wound Size % (l*w) 50   Wound Volume (cm^3) 0.036 cm^3   Wound Healing % 50   Undermining Starts ___ O'Clock 9   Undermining Ends___ O'Clock 12   Undermining Maxium Distance (cm) 2@12   Drainage Amount Moderate (25-50%)   Drainage Description Serosanguinous   Odor None   Yahaira-wound Assessment Intact   Margins Defined edges   Wound Thickness Description not for Pressure Injury Full thickness         /65   Pulse 85   Temp 97.8 °F (36.6 °C) (Temporal)   Resp 16

## 2024-08-30 NOTE — PLAN OF CARE
Wound Care Supplies      Supply Company:     [employer] [address] [place] [address] f: [phone] f: [phone] p: [phone] [email or web address]      Ordering Center:     [facility]  [address] [address], [address]  [facility] [address] [phone]  WOUND CARE Dept: [phone]   FAX NUMBER [phone]    Patient Information:      Valentina Spivey  [address] [place] [id number] [phone]   : 1965  AGE: 59 y.o.     GENDER: female   EPISODE DATE: 2024    Insurance:      PRIMARY INSURANCE:  Plan: [place] [employer]  Coverage: BCBS  Effective Date: 2020  Group Number: [unfilled]  Subscriber Number: [id number] - ([place] [employer])    Payer/Plan Subscr  Sex Relation Sub. Ins. ID Effective Group Num   1. BCBS - GENA* VALENTINA SPIVEY 1965 Female Self [id number]                                   [address] [address]       Patient Wound Information:      Problem List Items Addressed This Visit          Other    Non-healing surgical wound - Primary    Relevant Orders    Initiate Outpatient Wound Care Protocol       ICD 10 Code:T81.89 XD      WOUNDS REQUIRING DRESSING SUPPLIES:     Wound 24 Abdomen Left;Lower (Active)   Wound Image   24 0830   Wound Etiology Non-Healing Surgical 24 0830   Dressing Status New dressing applied 24 0914   Wound Cleansed Cleansed with saline;Other (Comment) 24 0914   Dressing/Treatment Betadine swabs/povidone iodine;Hydrofera blue;Other (comment) 24 0914   Wound Length (cm) 0.7 cm 24 0830   Wound Width (cm) 0.9 cm 24 0830   Wound Depth (cm) 1.8 cm 24 0830   Wound Surface Area (cm^2) 0.63 cm^2 24 0830   Change in Wound Size % (l*w) -16.67 24 0830   Wound Volume (cm^3) 1.134 cm^3 24 0830   Wound Healing % -75 24 0830   Post-Procedure Length (cm) 0.7 cm 24 0859   Post-Procedure Width (cm) 0.9 cm 24  08/30/24 0830   Margins Defined edges 08/30/24 0830   Wound Thickness Description not for Pressure Injury Full thickness 08/30/24 0830   Number of days: 14          Supplies Requested :      WOUND #: 1   PRIMARY DRESSING:  Hydrofera Blue pad-Hydrofera Blue Ready   secondary dressing: Gauze pad (patient requests Non-Woven 4x4xs (Non sterile)  And Optilock (or comparable)     FREQUENCY OF DRESSING CHANGES:  Other - 2 x day       WOUND #: 2   PRIMARY DRESSING:  Other: Non sterile gauze 4x4s (Patient requests Non-Woven 4x4s)   secondary dressing: Other Optilock (or Comparable)     FREQUENCY OF DRESSING CHANGES:  Other 2 x a day         ADDITIONAL ITEMS:  [] Gloves Small  [x] Gloves Medium [] Gloves Large [] Gloves XLarge  [x] Tape 1\" [x] Tape 2\" [x] Tape 3\"  [] Medipore Tape  [x] Saline  [] Vashe  [] Skin Prep   [] Adhesive Remover   [] Cotton Tip Applicators   [] Other:    Patient Wound(s) Debrided: [] Yes if yes please add date    [x] No (Chemical Cautery 8/30/24)    Debribement Type: Other Chemical Cautery    Patient currently being seen by Home Health: [] Yes   [x] No    Duration for needed supplies:  []15  [x]30  []60  []90 Days    Electronically signed by TRACEY NEVES RN on 8/30/2024 at 10:35 AM     Provider Information:      PROVIDER'S NAME: Dr. Mane Dorado NPI # [id number]

## 2024-08-30 NOTE — DISCHARGE INSTRUCTIONS
Discharge Instructions   [facility]  [address], [address]  [place] [address]  Telephone: [phone]     FAX: [phone]    NAME:  Valentina Maynard  YOB: 1965  MEDICAL RECORD NUMBER:  [id number]  DATE:  08/30/24  CPT code: Chemical Cauterization (42894)    WOUND CARE ORDERS:  Cleanse with  normal saline & gauze or mild soap & water. Swab depth of both Mid and Left Abdominal wounds with Betadine moistened swabs.  Left Abdominal wound - Pack with Strip of Hydrofera Blue Ready & with gauze and gauze and/or Absorbant pad. Secure w/gentle tape.  Mid Abdominal wound  - Cover with gauze and/or Absorbant pad, secure w/gentle tape.  Dressing(s) to be changed 2 x day and as needed for compromise of dressing. Follow up in 2 week(s)  Home Health Care Provider:None    TREATMENT ORDERS:    Elevate leg(s) when sitting.  Elevate legs above level of heart, when possible to aid in controlling edema/leg swelling   Avoid prolonged standing in one place.  Do not get dressing/wrap wet.  Follow Diet as prescribed:   [] Diet as tolerated: [] Diabetic Diet: Low carb and no Sugar   [] No Added Salt  [x] Increase Protein   [] Limit the amount of liquid you are drinking and avoid drinking in between meals     Return Appointment:  [x] Return Appointment: With Dr.George Owen Dorado  in 2 Week(s)     Electronically signed TRACEY NEVES RN on 8/30/2024 at 9:03 AM     Wound Care Center Information: Should you experience any significant changes in your wound(s) or have questions about your wound care, please contact the [facility] at MONDAY - FRIDAY 8:00 am - 4:30.  If you need help with your wound outside these hours and cannot wait until we are again available, contact your PCP or go to the hospital emergency room.   PLEASE NOTE: IF YOU ARE UNABLE TO OBTAIN WOUND SUPPLIES, CONTINUE TO USE THE SUPPLIES YOU HAVE AVAILABLE UNTIL YOU ARE ABLE TO REACH US. IT IS  MOST IMPORTANT TO KEEP THE WOUND COVERED AT ALL TIMES.     Physician Signature:_______________________    Date: ___________ Time:  ____________

## 2024-09-03 DIAGNOSIS — K92.2 ACUTE GI BLEEDING: ICD-10-CM

## 2024-09-04 LAB
BASOPHILS # BLD AUTO: 0 X10E3/UL (ref 0–0.2)
BASOPHILS NFR BLD AUTO: 0 %
EOSINOPHIL # BLD AUTO: 0.3 X10E3/UL (ref 0–0.4)
EOSINOPHIL NFR BLD AUTO: 3 %
ERYTHROCYTE [DISTWIDTH] IN BLOOD BY AUTOMATED COUNT: 12.2 % (ref 11.7–15.4)
FERRITIN SERPL-MCNC: 1119 NG/ML (ref 15–150)
HCT VFR BLD AUTO: 41.2 % (ref 34–46.6)
HGB BLD-MCNC: 13.6 G/DL (ref 11.1–15.9)
IMM GRANULOCYTES # BLD AUTO: 0 X10E3/UL (ref 0–0.1)
IMM GRANULOCYTES NFR BLD AUTO: 0 %
IRON SATN MFR SERPL: 23 % (ref 15–55)
IRON SERPL-MCNC: 94 UG/DL (ref 27–159)
LYMPHOCYTES # BLD AUTO: 2.8 X10E3/UL (ref 0.7–3.1)
LYMPHOCYTES NFR BLD AUTO: 28 %
MCH RBC QN AUTO: 29.6 PG (ref 26.6–33)
MCHC RBC AUTO-ENTMCNC: 33 G/DL (ref 31.5–35.7)
MCV RBC AUTO: 90 FL (ref 79–97)
MONOCYTES # BLD AUTO: 0.9 X10E3/UL (ref 0.1–0.9)
MONOCYTES NFR BLD AUTO: 9 %
NEUTROPHILS # BLD AUTO: 6.1 X10E3/UL (ref 1.4–7)
NEUTROPHILS NFR BLD AUTO: 60 %
PLATELET # BLD AUTO: 321 X10E3/UL (ref 150–450)
RBC # BLD AUTO: 4.6 X10E6/UL (ref 3.77–5.28)
TIBC SERPL-MCNC: 403 UG/DL (ref 250–450)
UIBC SERPL-MCNC: 309 UG/DL (ref 131–425)
WBC # BLD AUTO: 10.1 X10E3/UL (ref 3.4–10.8)

## 2024-09-05 ENCOUNTER — TELEPHONE (OUTPATIENT)
Age: 59
End: 2024-09-05

## 2024-09-05 NOTE — TELEPHONE ENCOUNTER
Called pt.  HIPAA verified by two patient identifiers.   Pt is aware but has few questions about the results.  Pt reports that since 2003 her ferritin went from 197 and now 1119 and \"that is pretty critical from my understanding\"  Platelets went up from June to now and  RBC,HGB and HCT went down.   Pt is really concerned about the ferritin level.      Pt agrees to do a virtual tomorrow 9/6 at 3pm. Send link to mobile number.

## 2024-09-06 ENCOUNTER — TELEMEDICINE (OUTPATIENT)
Age: 59
End: 2024-09-06
Payer: COMMERCIAL

## 2024-09-06 DIAGNOSIS — D50.0 IRON DEFICIENCY ANEMIA SECONDARY TO BLOOD LOSS (CHRONIC): Primary | ICD-10-CM

## 2024-09-06 PROCEDURE — 99213 OFFICE O/P EST LOW 20 MIN: CPT | Performed by: INTERNAL MEDICINE

## 2024-09-06 NOTE — PROGRESS NOTES
Valentina Maynard is a 57 y.o. female here for virtual visit follow up for PE and anemia.  She is taking Apixaban.  Wants to discuss recent labs.  Concerned about her ferritin mostly.     1. Have you been to the ER, urgent care clinic since your last visit?  Hospitalized since your last visit?  no    2. Have you seen or consulted any other health care providers outside of the [facility] since your last visit?  Include any pap smears or colon screening.  Dr Schaffer, Dr Dorado, Dr Pool  
System    OASIS : Social Isolation     Frequency of experiencing loneliness or isolation: Never   Housing Stability: Low Risk  (5/1/2024)    Housing Stability Vital Sign     Unable to Pay for Housing in the Last Year: No     Number of Places Lived in the Last Year: 1     Unstable Housing in the Last Year: No       Current Outpatient Medications   Medication Sig Dispense Refill    traMADol (ULTRAM) 50 MG tablet Take 1 tablet by mouth in the morning and at bedtime.      cyanocobalamin 1000 MCG/ML injection INJECT 1 ML UNDER THE SKIN ONCE A MONTH 3 mL 6    aspirin 81 MG EC tablet Take 1 tablet by mouth daily      Promethazine HCl (PHENERGAN PO) Take by mouth as needed      Ustekinumab (STELARA SC) Inject into the skin Every 8 weeks      pantoprazole (PROTONIX) 40 MG tablet Take 1 tablet by mouth daily 60 tablet 0    gabapentin (NEURONTIN) 400 MG capsule Take 3 capsules by mouth nightly.      amitriptyline (ELAVIL) 25 MG tablet Take 1 tablet by mouth nightly as needed for Sleep      Buprenorphine (BUTRANS TD) Place 1 patch onto the skin every 7 days Friday      Rimegepant Sulfate (NURTEC) 75 MG TBDP Take 75 mg by mouth daily as needed (migraine) 24 tablet 3    apixaban (ELIQUIS) 2.5 MG TABS tablet Take 1 tablet by mouth 2 times daily      butalbital-acetaminophen-caffeine (FIORICET, ESGIC) -40 MG per tablet Take 2 tablets by mouth every 8 hours as needed for Migraine      drospirenone-ethinyl estradiol (DONIS) 3-0.02 MG per tablet Take 1 tablet by mouth      hydroxychloroquine (PLAQUENIL) 200 MG tablet Take 2 tablets by mouth nightly      ondansetron (ZOFRAN) 4 MG tablet Take 1 tablet by mouth nightly      tiZANidine (ZANAFLEX) 2 MG tablet Take 1 tablet by mouth nightly as needed       No current facility-administered medications for this visit.       Allergies   Allergen Reactions    Chlorhexidine Rash     \"required IV Benadryl post\"    Codeine Hives and Shortness Of Breath    Hydrocodone Hives and Shortness

## 2024-09-13 ENCOUNTER — HOSPITAL ENCOUNTER (OUTPATIENT)
Facility: HOSPITAL | Age: 59
Discharge: HOME | End: 2024-09-13
Attending: SURGERY
Payer: COMMERCIAL

## 2024-09-13 VITALS
RESPIRATION RATE: 16 BRPM | SYSTOLIC BLOOD PRESSURE: 121 MMHG | TEMPERATURE: 97.6 F | HEART RATE: 91 BPM | DIASTOLIC BLOOD PRESSURE: 70 MMHG

## 2024-09-13 DIAGNOSIS — T81.89XA NON-HEALING SURGICAL WOUND, INITIAL ENCOUNTER: Primary | ICD-10-CM

## 2024-09-13 PROCEDURE — 99213 OFFICE O/P EST LOW 20 MIN: CPT

## 2024-09-13 PROCEDURE — 99213 OFFICE O/P EST LOW 20 MIN: CPT | Performed by: SURGERY

## 2024-09-13 RX ORDER — GENTAMICIN SULFATE 1 MG/G
OINTMENT TOPICAL ONCE
OUTPATIENT
Start: 2024-09-13 | End: 2024-09-13

## 2024-09-13 RX ORDER — BACITRACIN ZINC AND POLYMYXIN B SULFATE 500; 1000 [USP'U]/G; [USP'U]/G
OINTMENT TOPICAL ONCE
OUTPATIENT
Start: 2024-09-13 | End: 2024-09-13

## 2024-09-13 RX ORDER — GINSENG 100 MG
CAPSULE ORAL ONCE
OUTPATIENT
Start: 2024-09-13 | End: 2024-09-13

## 2024-09-13 RX ORDER — SODIUM CHLOR/HYPOCHLOROUS ACID 0.033 %
SOLUTION, IRRIGATION IRRIGATION ONCE
OUTPATIENT
Start: 2024-09-13 | End: 2024-09-13

## 2024-09-13 RX ORDER — NEOMYCIN/BACITRACIN/POLYMYXINB 3.5-400-5K
OINTMENT (GRAM) TOPICAL ONCE
OUTPATIENT
Start: 2024-09-13 | End: 2024-09-13

## 2024-09-13 RX ORDER — LIDOCAINE HYDROCHLORIDE 20 MG/ML
JELLY TOPICAL ONCE
OUTPATIENT
Start: 2024-09-13 | End: 2024-09-13

## 2024-09-13 RX ORDER — LIDOCAINE 50 MG/G
OINTMENT TOPICAL ONCE
OUTPATIENT
Start: 2024-09-13 | End: 2024-09-13

## 2024-09-13 RX ORDER — MUPIROCIN 20 MG/G
OINTMENT TOPICAL ONCE
OUTPATIENT
Start: 2024-09-13 | End: 2024-09-13

## 2024-09-13 RX ORDER — SILVER SULFADIAZINE 10 MG/G
CREAM TOPICAL ONCE
OUTPATIENT
Start: 2024-09-13 | End: 2024-09-13

## 2024-09-13 RX ORDER — LIDOCAINE HYDROCHLORIDE 40 MG/ML
SOLUTION TOPICAL ONCE
OUTPATIENT
Start: 2024-09-13 | End: 2024-09-13

## 2024-09-13 RX ORDER — LIDOCAINE 40 MG/G
CREAM TOPICAL ONCE
OUTPATIENT
Start: 2024-09-13 | End: 2024-09-13

## 2024-09-13 ASSESSMENT — PAIN SCALES - GENERAL: PAINLEVEL_OUTOF10: 6

## 2024-09-13 ASSESSMENT — PAIN DESCRIPTION - LOCATION: LOCATION: ABDOMEN

## 2024-09-27 ENCOUNTER — HOSPITAL ENCOUNTER (OUTPATIENT)
Facility: HOSPITAL | Age: 59
Discharge: HOME | End: 2024-09-27
Attending: SURGERY
Payer: COMMERCIAL

## 2024-09-27 VITALS
RESPIRATION RATE: 18 BRPM | DIASTOLIC BLOOD PRESSURE: 72 MMHG | HEART RATE: 95 BPM | SYSTOLIC BLOOD PRESSURE: 141 MMHG | TEMPERATURE: 97.8 F

## 2024-09-27 DIAGNOSIS — T81.89XA NON-HEALING SURGICAL WOUND, INITIAL ENCOUNTER: Primary | ICD-10-CM

## 2024-09-27 PROCEDURE — 99213 OFFICE O/P EST LOW 20 MIN: CPT | Performed by: SURGERY

## 2024-09-27 PROCEDURE — 99213 OFFICE O/P EST LOW 20 MIN: CPT

## 2024-09-27 RX ORDER — GINSENG 100 MG
CAPSULE ORAL ONCE
OUTPATIENT
Start: 2024-09-27 | End: 2024-09-27

## 2024-09-27 RX ORDER — LIDOCAINE 50 MG/G
OINTMENT TOPICAL ONCE
OUTPATIENT
Start: 2024-09-27 | End: 2024-09-27

## 2024-09-27 RX ORDER — SODIUM CHLOR/HYPOCHLOROUS ACID 0.033 %
SOLUTION, IRRIGATION IRRIGATION ONCE
OUTPATIENT
Start: 2024-09-27 | End: 2024-09-27

## 2024-09-27 RX ORDER — LIDOCAINE 40 MG/G
CREAM TOPICAL ONCE
OUTPATIENT
Start: 2024-09-27 | End: 2024-09-27

## 2024-09-27 RX ORDER — NEOMYCIN/BACITRACIN/POLYMYXINB 3.5-400-5K
OINTMENT (GRAM) TOPICAL ONCE
OUTPATIENT
Start: 2024-09-27 | End: 2024-09-27

## 2024-09-27 RX ORDER — SILVER SULFADIAZINE 10 MG/G
CREAM TOPICAL ONCE
OUTPATIENT
Start: 2024-09-27 | End: 2024-09-27

## 2024-09-27 RX ORDER — LIDOCAINE HYDROCHLORIDE 20 MG/ML
JELLY TOPICAL ONCE
OUTPATIENT
Start: 2024-09-27 | End: 2024-09-27

## 2024-09-27 RX ORDER — LIDOCAINE HYDROCHLORIDE 40 MG/ML
SOLUTION TOPICAL ONCE
OUTPATIENT
Start: 2024-09-27 | End: 2024-09-27

## 2024-09-27 RX ORDER — GENTAMICIN SULFATE 1 MG/G
OINTMENT TOPICAL ONCE
OUTPATIENT
Start: 2024-09-27 | End: 2024-09-27

## 2024-09-27 RX ORDER — MUPIROCIN 20 MG/G
OINTMENT TOPICAL ONCE
OUTPATIENT
Start: 2024-09-27 | End: 2024-09-27

## 2024-09-27 RX ORDER — BACITRACIN ZINC AND POLYMYXIN B SULFATE 500; 1000 [USP'U]/G; [USP'U]/G
OINTMENT TOPICAL ONCE
OUTPATIENT
Start: 2024-09-27 | End: 2024-09-27

## 2024-09-27 ASSESSMENT — PAIN SCALES - GENERAL: PAINLEVEL_OUTOF10: 5

## 2024-10-02 ENCOUNTER — HOSPITAL ENCOUNTER (OUTPATIENT)
Facility: HOSPITAL | Age: 59
Discharge: HOME | End: 2024-10-02
Attending: SURGERY
Payer: COMMERCIAL

## 2024-10-02 VITALS
HEART RATE: 86 BPM | TEMPERATURE: 97.6 F | SYSTOLIC BLOOD PRESSURE: 117 MMHG | RESPIRATION RATE: 16 BRPM | DIASTOLIC BLOOD PRESSURE: 60 MMHG

## 2024-10-02 DIAGNOSIS — T81.89XA NON-HEALING SURGICAL WOUND, INITIAL ENCOUNTER: Primary | ICD-10-CM

## 2024-10-02 PROCEDURE — 99213 OFFICE O/P EST LOW 20 MIN: CPT | Performed by: SURGERY

## 2024-10-02 PROCEDURE — 99213 OFFICE O/P EST LOW 20 MIN: CPT

## 2024-10-02 RX ORDER — LIDOCAINE HYDROCHLORIDE 20 MG/ML
JELLY TOPICAL ONCE
OUTPATIENT
Start: 2024-10-02 | End: 2024-10-02

## 2024-10-02 RX ORDER — LIDOCAINE 50 MG/G
OINTMENT TOPICAL ONCE
OUTPATIENT
Start: 2024-10-02 | End: 2024-10-02

## 2024-10-02 RX ORDER — GINSENG 100 MG
CAPSULE ORAL ONCE
OUTPATIENT
Start: 2024-10-02 | End: 2024-10-02

## 2024-10-02 RX ORDER — LIDOCAINE HYDROCHLORIDE 40 MG/ML
SOLUTION TOPICAL ONCE
OUTPATIENT
Start: 2024-10-02 | End: 2024-10-02

## 2024-10-02 RX ORDER — BACITRACIN ZINC AND POLYMYXIN B SULFATE 500; 1000 [USP'U]/G; [USP'U]/G
OINTMENT TOPICAL ONCE
OUTPATIENT
Start: 2024-10-02 | End: 2024-10-02

## 2024-10-02 RX ORDER — SODIUM CHLOR/HYPOCHLOROUS ACID 0.033 %
SOLUTION, IRRIGATION IRRIGATION ONCE
OUTPATIENT
Start: 2024-10-02 | End: 2024-10-02

## 2024-10-02 RX ORDER — SILVER SULFADIAZINE 10 MG/G
CREAM TOPICAL ONCE
OUTPATIENT
Start: 2024-10-02 | End: 2024-10-02

## 2024-10-02 RX ORDER — LIDOCAINE 40 MG/G
CREAM TOPICAL ONCE
OUTPATIENT
Start: 2024-10-02 | End: 2024-10-02

## 2024-10-02 RX ORDER — NEOMYCIN/BACITRACIN/POLYMYXINB 3.5-400-5K
OINTMENT (GRAM) TOPICAL ONCE
OUTPATIENT
Start: 2024-10-02 | End: 2024-10-02

## 2024-10-02 RX ORDER — MUPIROCIN 20 MG/G
OINTMENT TOPICAL ONCE
OUTPATIENT
Start: 2024-10-02 | End: 2024-10-02

## 2024-10-02 RX ORDER — GENTAMICIN SULFATE 1 MG/G
OINTMENT TOPICAL ONCE
OUTPATIENT
Start: 2024-10-02 | End: 2024-10-02

## 2024-10-02 ASSESSMENT — PAIN SCALES - GENERAL: PAINLEVEL_OUTOF10: 5

## 2024-10-02 ASSESSMENT — PAIN DESCRIPTION - LOCATION: LOCATION: ABDOMEN

## 2024-10-02 NOTE — DISCHARGE INSTRUCTIONS
Discharge Instructions [facility]  [address]   [address], [address]  [place] [address]   Telephone: [phone]     FAX [phone]    NAME:  Valentina Maynard  YOB: 1965  MEDICAL RECORD NUMBER:  [id number]  DATE:  10/2/2024    CPT code:E&M-Level 3 (81847)    WOUND CARE ORDERS:  Abdomen :Cleanse with normal saline ,Apply betadine with swab to bed of wound, pack primary dressing Silver Alginate  covered with secondary dressing Border Foam and Gauze. Pt./pcg/HH nurse to change (freq) Daily and as needed for compromise.Follow up with provider in 2 Week(s). (On the 10/18)    Home Health Agency: None  TREATMENT ORDERS:    Elevate leg(s) above the level of the heart when sitting.   Avoid prolonged standing in one place.  Do no get dressing/wrap wet.  Follow Diet as prescribed:   [x] Diet as tolerated: [] Calorie Diabetic Diet: Low carb and no Sugar [] No Added Salt:no more then 2,000 mg daily  [] Increase Protein: [] Limit the amount of liquid you are drinking and avoid drinking in between meals (limit to 2 quarts daily)     Return Appointment:  [x] Return Appointment: With Dr. Dorado in  2 Week(s)  [] Nurse Visit :   [] Ordered tests:    Electronically signed Alyssa Jean Baptiste RN on 10/2/2024 at 2:49 PM     Wound Care Center Information: Should you experience any significant changes in your wound(s) or have questions about your wound care, please contact the [facility] at MONDAY - FRIDAY 8:00 am - 4:30.  If you need help with your wound outside these hours and cannot wait until we are again available, contact your PCP or go to the hospital emergency room.   PLEASE NOTE: IF YOU ARE UNABLE TO OBTAIN WOUND SUPPLIES, CONTINUE TO USE THE SUPPLIES YOU HAVE AVAILABLE UNTIL YOU ARE ABLE TO REACH US. IT IS MOST IMPORTANT TO KEEP THE WOUND COVERED AT ALL TIMES.     Physician Signature:_______________________    Date: ___________ Time:  ____________

## 2024-10-02 NOTE — FLOWSHEET NOTE
10/02/24 1413   Wound 08/16/24 Abdomen Left;Lower   Date First Assessed/Time First Assessed: 08/16/24 0832   Wound Approximate Age at First Assessment (Weeks): 16 weeks  Primary Wound Type: Surgical Type  Location: Abdomen  Wound Location Orientation: Left;Lower   Wound Image    Wound Etiology Non-Healing Surgical   Dressing Status Old drainage noted   Wound Cleansed Cleansed with saline   Wound Length (cm) 0.3 cm   Wound Width (cm) 0.5 cm   Wound Depth (cm) 2.6 cm   Wound Surface Area (cm^2) 0.15 cm^2   Change in Wound Size % (l*w) 72.22   Wound Volume (cm^3) 0.39 cm^3   Wound Healing % 40   Wound Assessment Other (Comment)  (unable to visualize wound bed)   Drainage Amount Moderate (25-50%)   Drainage Description Serosanguinous   Odor None   Yahaira-wound Assessment Intact   Margins Defined edges   Wound Thickness Description not for Pressure Injury Full thickness   Wound 08/16/24 Abdomen Mid;Lower   Date First Assessed/Time First Assessed: 08/16/24 0833   Wound Approximate Age at First Assessment (Weeks): 16 weeks  Primary Wound Type: Surgical Type  Location: Abdomen  Wound Location Orientation: Mid;Lower   Wound Image    Wound Etiology Non-Healing Surgical   Dressing Status Old drainage noted   Wound Cleansed Cleansed with saline   Wound Length (cm) 0.3 cm   Wound Width (cm) 0.2 cm   Wound Depth (cm) 2.2 cm   Wound Surface Area (cm^2) 0.06 cm^2   Change in Wound Size % (l*w) -50   Wound Volume (cm^3) 0.132 cm^3   Wound Healing % -83   Wound Assessment Other (Comment)  (unable to visualize wound bed)   Drainage Amount Moderate (25-50%)   Drainage Description Serosanguinous   Odor None   Yahaira-wound Assessment Intact;Fragile   Margins Defined edges   Wound Thickness Description not for Pressure Injury Full thickness   Wound 10/02/24 Abdomen Distal   Date First Assessed/Time First Assessed: 10/02/24 1442   Present on Original Admission: Yes  Wound Approximate Age at First Assessment (Weeks): 1 weeks  Primary Wound

## 2024-10-25 ENCOUNTER — TELEPHONE (OUTPATIENT)
Age: 59
End: 2024-10-25

## 2024-10-25 NOTE — TELEPHONE ENCOUNTER
Attempted to contact patient to reschedule 12/3/24 appointment. Provider out of office. Patient needs to reschedule for next available.

## 2024-11-01 ENCOUNTER — HOSPITAL ENCOUNTER (OUTPATIENT)
Age: 59
Discharge: HOME | End: 2024-11-04
Payer: COMMERCIAL

## 2024-11-01 DIAGNOSIS — R10.9 ABDOMINAL PAIN, UNSPECIFIED ABDOMINAL LOCATION: ICD-10-CM

## 2024-11-01 DIAGNOSIS — T14.8XXA NON-HEALING NON-SURGICAL WOUND: ICD-10-CM

## 2024-11-01 DIAGNOSIS — K50.919 CROHN'S DISEASE WITH COMPLICATION, UNSPECIFIED GASTROINTESTINAL TRACT LOCATION (HCC): ICD-10-CM

## 2024-11-01 PROCEDURE — 74177 CT ABD & PELVIS W/CONTRAST: CPT

## 2024-11-01 PROCEDURE — 6360000004 HC RX CONTRAST MEDICATION: Performed by: SURGERY

## 2024-11-01 RX ORDER — IOPAMIDOL 755 MG/ML
100 INJECTION, SOLUTION INTRAVASCULAR
Status: COMPLETED | OUTPATIENT
Start: 2024-11-01 | End: 2024-11-01

## 2024-11-01 RX ADMIN — IOPAMIDOL 100 ML: 755 INJECTION, SOLUTION INTRAVENOUS at 13:40

## 2024-11-07 NOTE — PERIOP NOTE
Problem: At Risk for Falls  Goal: # Patient does not fall  Outcome: Outcome Met, Continue evaluating goal progress toward completion     Problem: At Risk for Injury Due to Fall  Goal: # Patient does not fall  Outcome: Outcome Met, Continue evaluating goal progress toward completion     Problem: Diabetes  Goal: Achieves glycemic balance  Description: Goal is to maintain blood sugar within range with no episodes of hypoglycemia  Outcome: Outcome Met, Continue evaluating goal progress toward completion      [facility] Pre-Operative Instructions      Surgery Date: Thursday, November 14th          Time of Arrival: TBD/TBC @ [phone]     On the day of your surgery, please report to surgical services registration desk and sign in at your designated time.  The Surgery Center is located to the right of the Emergency Room.     2. You must have someone with you to drive you home. You should not drive a car for 24 hours following surgery. Please make arrangements for a friend or family member to stay with you for the first 24 hours after your surgery.    3. Do not have anything to eat or drink (including water, gum, mints, coffee, juice, etc.) after midnight the night prior to surgery. This may not apply to medications prescribed by your physician. (Please note below the special instructions with medications to take the morning of your procedure.)    4. We recommend you do not drink any alcoholic beverages for 24 hours before and after your surgery.    5. Contact your surgeon's office for instructions on the following medications: non-steroidal anti-inflammatory drugs (Advil, Ibuprofen, Motrin, Aleve, etc.) vitamins and supplements. (Some surgeon's will want you to stop these medications prior to surgery and others may allow you to take them) **If you are currently taking a blood-thinning agent (Plavix, Coumadin, Eliquis, Aspirin, etc.), contact your surgeon for instructions.** Your surgeon will partner with the physician prescribing these medications to determine if it is safe to stop or if you need to continue taking.  Please do not stop taking these medications without instructions from your surgeon    6. Wear comfortable clothes.  Wear glasses instead of contacts.  Do not bring any money or jewelry. Please bring picture ID, insurance card, and any prearranged co-payment or hospital payment.  Do not wear make-up, particularly mascara the morning of your surgery.  Do not wear nail polish,  with you.  The Morning of your surgery:  Shower again thoroughly with an antibacterial soap, such as Dial.   If needed, ask someone for help to reach all areas of your body. Don’t forget to clean your belly button! Rinse.  Dry gently with a clean, freshly washed towel.  After your shower, do not use any powder, deodorant, perfumes or lotion prior to surgery.  Put on clean, freshly washed clothing.  Tips to help prevent infections after your surgery:  Protect your surgical wound from germs:  Hand washing is the most important thing you and your caregivers can do to prevent infections.  Keep your bandage clean and dry!  Do not touch your surgical wound.  Use clean, freshly washed towels and washcloths every time you shower; do not share bath linens with others.  Until your surgical wound is healed, wear clothing and sleep on bed linens each day that are clean and freshly washed.  Do not allow pets to sleep in your bed with you or touch your surgical wound.  Do not smoke - smoking delays wound healing. This may be a good time to stop smoking.  If you have diabetes, it is important for you to manage your blood sugar levels properly before your surgery as well as after your surgery. Poorly managed blood sugar levels slow down wound healing and prevent you from healing completely.      SPECIAL INSTRUCTIONS: Follow your physician/surgeon instructions for holding all non-steroidal anti-inflammatory drugs/NSAIDs, blood-thinning agents (Eliquis, aspirin), vitamins & supplements prior to surgery.        TAKE ALL MEDICATIONS DAY OF SURGERY EXCEPT: Follow your physician/surgeon instructions for holding all non-steroidal anti-inflammatory drugs/NSAIDs, blood-thinning agents (Eliquis, aspirin), vitamins & supplements prior to surgery.      I understand a pre-operative phone call will be made to verify my surgery time.  In the event that I am not available, I give permission for a message to be left on my answering service and/or

## 2024-11-14 ENCOUNTER — ANESTHESIA EVENT (OUTPATIENT)
Facility: HOSPITAL | Age: 59
End: 2024-11-14
Payer: COMMERCIAL

## 2024-11-14 ENCOUNTER — HOSPITAL ENCOUNTER (OUTPATIENT)
Facility: HOSPITAL | Age: 59
Setting detail: OUTPATIENT SURGERY
Discharge: HOME | End: 2024-11-14
Attending: SURGERY | Admitting: SURGERY
Payer: COMMERCIAL

## 2024-11-14 ENCOUNTER — ANESTHESIA (OUTPATIENT)
Facility: HOSPITAL | Age: 59
End: 2024-11-14
Payer: COMMERCIAL

## 2024-11-14 VITALS
OXYGEN SATURATION: 99 % | HEART RATE: 81 BPM | BODY MASS INDEX: 36.17 KG/M2 | SYSTOLIC BLOOD PRESSURE: 129 MMHG | RESPIRATION RATE: 11 BRPM | DIASTOLIC BLOOD PRESSURE: 77 MMHG | HEIGHT: 61 IN | WEIGHT: 191.58 LBS | TEMPERATURE: 97.5 F

## 2024-11-14 DIAGNOSIS — G89.18 POST-OP PAIN: Primary | ICD-10-CM

## 2024-11-14 PROCEDURE — 2500000003 HC RX 250 WO HCPCS: Performed by: NURSE ANESTHETIST, CERTIFIED REGISTERED

## 2024-11-14 PROCEDURE — 7100000000 HC PACU RECOVERY - FIRST 15 MIN: Performed by: SURGERY

## 2024-11-14 PROCEDURE — 2580000003 HC RX 258: Performed by: STUDENT IN AN ORGANIZED HEALTH CARE EDUCATION/TRAINING PROGRAM

## 2024-11-14 PROCEDURE — 7100000011 HC PHASE II RECOVERY - ADDTL 15 MIN: Performed by: SURGERY

## 2024-11-14 PROCEDURE — 6360000002 HC RX W HCPCS: Performed by: SURGERY

## 2024-11-14 PROCEDURE — 6360000002 HC RX W HCPCS: Performed by: ANESTHESIOLOGY

## 2024-11-14 PROCEDURE — 3700000001 HC ADD 15 MINUTES (ANESTHESIA): Performed by: SURGERY

## 2024-11-14 PROCEDURE — 3600000012 HC SURGERY LEVEL 2 ADDTL 15MIN: Performed by: SURGERY

## 2024-11-14 PROCEDURE — 3700000000 HC ANESTHESIA ATTENDED CARE: Performed by: SURGERY

## 2024-11-14 PROCEDURE — 6360000002 HC RX W HCPCS: Performed by: NURSE ANESTHETIST, CERTIFIED REGISTERED

## 2024-11-14 PROCEDURE — 3600000002 HC SURGERY LEVEL 2 BASE: Performed by: SURGERY

## 2024-11-14 PROCEDURE — 2709999900 HC NON-CHARGEABLE SUPPLY: Performed by: SURGERY

## 2024-11-14 PROCEDURE — 6360000002 HC RX W HCPCS

## 2024-11-14 PROCEDURE — 7100000010 HC PHASE II RECOVERY - FIRST 15 MIN: Performed by: SURGERY

## 2024-11-14 PROCEDURE — 7100000001 HC PACU RECOVERY - ADDTL 15 MIN: Performed by: SURGERY

## 2024-11-14 PROCEDURE — 88304 TISSUE EXAM BY PATHOLOGIST: CPT

## 2024-11-14 RX ORDER — CEFAZOLIN SODIUM/WATER 2 G/20 ML
SYRINGE (ML) INTRAVENOUS
Status: DISCONTINUED | OUTPATIENT
Start: 2024-11-14 | End: 2024-11-14 | Stop reason: SDUPTHER

## 2024-11-14 RX ORDER — SODIUM CHLORIDE, SODIUM LACTATE, POTASSIUM CHLORIDE, CALCIUM CHLORIDE 600; 310; 30; 20 MG/100ML; MG/100ML; MG/100ML; MG/100ML
INJECTION, SOLUTION INTRAVENOUS CONTINUOUS
Status: DISCONTINUED | OUTPATIENT
Start: 2024-11-14 | End: 2024-11-14 | Stop reason: HOSPADM

## 2024-11-14 RX ORDER — HYDROMORPHONE HYDROCHLORIDE 1 MG/ML
0.5 INJECTION, SOLUTION INTRAMUSCULAR; INTRAVENOUS; SUBCUTANEOUS EVERY 5 MIN PRN
Status: DISCONTINUED | OUTPATIENT
Start: 2024-11-14 | End: 2024-11-14 | Stop reason: HOSPADM

## 2024-11-14 RX ORDER — NALOXONE HYDROCHLORIDE 0.4 MG/ML
INJECTION, SOLUTION INTRAMUSCULAR; INTRAVENOUS; SUBCUTANEOUS PRN
Status: DISCONTINUED | OUTPATIENT
Start: 2024-11-14 | End: 2024-11-14 | Stop reason: HOSPADM

## 2024-11-14 RX ORDER — FENTANYL CITRATE 50 UG/ML
25 INJECTION, SOLUTION INTRAMUSCULAR; INTRAVENOUS EVERY 5 MIN PRN
Status: COMPLETED | OUTPATIENT
Start: 2024-11-14 | End: 2024-11-14

## 2024-11-14 RX ORDER — SODIUM CHLORIDE 0.9 % (FLUSH) 0.9 %
5-40 SYRINGE (ML) INJECTION EVERY 12 HOURS SCHEDULED
Status: DISCONTINUED | OUTPATIENT
Start: 2024-11-14 | End: 2024-11-14 | Stop reason: HOSPADM

## 2024-11-14 RX ORDER — PROCHLORPERAZINE EDISYLATE 5 MG/ML
5 INJECTION INTRAMUSCULAR; INTRAVENOUS
Status: DISCONTINUED | OUTPATIENT
Start: 2024-11-14 | End: 2024-11-14 | Stop reason: HOSPADM

## 2024-11-14 RX ORDER — ONDANSETRON 2 MG/ML
4 INJECTION INTRAMUSCULAR; INTRAVENOUS
Status: DISCONTINUED | OUTPATIENT
Start: 2024-11-14 | End: 2024-11-14 | Stop reason: HOSPADM

## 2024-11-14 RX ORDER — ONDANSETRON 2 MG/ML
INJECTION INTRAMUSCULAR; INTRAVENOUS
Status: DISCONTINUED | OUTPATIENT
Start: 2024-11-14 | End: 2024-11-14 | Stop reason: SDUPTHER

## 2024-11-14 RX ORDER — LIDOCAINE HYDROCHLORIDE 20 MG/ML
INJECTION, SOLUTION EPIDURAL; INFILTRATION; INTRACAUDAL; PERINEURAL
Status: DISCONTINUED | OUTPATIENT
Start: 2024-11-14 | End: 2024-11-14 | Stop reason: SDUPTHER

## 2024-11-14 RX ORDER — HYDROMORPHONE HYDROCHLORIDE 2 MG/ML
INJECTION, SOLUTION INTRAMUSCULAR; INTRAVENOUS; SUBCUTANEOUS
Status: DISCONTINUED | OUTPATIENT
Start: 2024-11-14 | End: 2024-11-14 | Stop reason: SDUPTHER

## 2024-11-14 RX ORDER — PROPOFOL 10 MG/ML
INJECTION, EMULSION INTRAVENOUS
Status: DISCONTINUED | OUTPATIENT
Start: 2024-11-14 | End: 2024-11-14 | Stop reason: SDUPTHER

## 2024-11-14 RX ORDER — HYDRALAZINE HYDROCHLORIDE 20 MG/ML
10 INJECTION INTRAMUSCULAR; INTRAVENOUS ONCE
Status: DISCONTINUED | OUTPATIENT
Start: 2024-11-14 | End: 2024-11-14 | Stop reason: HOSPADM

## 2024-11-14 RX ORDER — TRAMADOL HYDROCHLORIDE 50 MG/1
50 TABLET ORAL EVERY 4 HOURS PRN
Qty: 30 TABLET | Refills: 0 | Status: SHIPPED | OUTPATIENT
Start: 2024-11-14 | End: 2024-11-19

## 2024-11-14 RX ORDER — BUPIVACAINE HYDROCHLORIDE 2.5 MG/ML
INJECTION, SOLUTION EPIDURAL; INFILTRATION; INTRACAUDAL PRN
Status: DISCONTINUED | OUTPATIENT
Start: 2024-11-14 | End: 2024-11-14 | Stop reason: ALTCHOICE

## 2024-11-14 RX ORDER — SODIUM CHLORIDE 9 MG/ML
INJECTION, SOLUTION INTRAVENOUS PRN
Status: DISCONTINUED | OUTPATIENT
Start: 2024-11-14 | End: 2024-11-14 | Stop reason: HOSPADM

## 2024-11-14 RX ORDER — OXYCODONE HYDROCHLORIDE 5 MG/1
5 TABLET ORAL
Status: DISCONTINUED | OUTPATIENT
Start: 2024-11-14 | End: 2024-11-14 | Stop reason: HOSPADM

## 2024-11-14 RX ORDER — FENTANYL CITRATE 50 UG/ML
INJECTION, SOLUTION INTRAMUSCULAR; INTRAVENOUS
Status: COMPLETED
Start: 2024-11-14 | End: 2024-11-14

## 2024-11-14 RX ORDER — MIDAZOLAM HYDROCHLORIDE 1 MG/ML
INJECTION, SOLUTION INTRAMUSCULAR; INTRAVENOUS
Status: DISCONTINUED | OUTPATIENT
Start: 2024-11-14 | End: 2024-11-14 | Stop reason: SDUPTHER

## 2024-11-14 RX ORDER — SODIUM CHLORIDE 0.9 % (FLUSH) 0.9 %
5-40 SYRINGE (ML) INJECTION PRN
Status: DISCONTINUED | OUTPATIENT
Start: 2024-11-14 | End: 2024-11-14 | Stop reason: HOSPADM

## 2024-11-14 RX ORDER — FENTANYL CITRATE 50 UG/ML
INJECTION, SOLUTION INTRAMUSCULAR; INTRAVENOUS
Status: DISCONTINUED | OUTPATIENT
Start: 2024-11-14 | End: 2024-11-14 | Stop reason: SDUPTHER

## 2024-11-14 RX ADMIN — PROPOFOL 20 MG: 10 INJECTION, EMULSION INTRAVENOUS at 11:04

## 2024-11-14 RX ADMIN — PROPOFOL 150 MG: 10 INJECTION, EMULSION INTRAVENOUS at 10:26

## 2024-11-14 RX ADMIN — FENTANYL CITRATE 50 MCG: 50 INJECTION, SOLUTION INTRAMUSCULAR; INTRAVENOUS at 10:44

## 2024-11-14 RX ADMIN — HYDROMORPHONE HYDROCHLORIDE 0.5 MG: 2 INJECTION, SOLUTION INTRAMUSCULAR; INTRAVENOUS; SUBCUTANEOUS at 11:04

## 2024-11-14 RX ADMIN — FENTANYL CITRATE 25 MCG: 50 INJECTION INTRAMUSCULAR; INTRAVENOUS at 12:16

## 2024-11-14 RX ADMIN — FENTANYL CITRATE 25 MCG: 50 INJECTION, SOLUTION INTRAMUSCULAR; INTRAVENOUS at 10:22

## 2024-11-14 RX ADMIN — MIDAZOLAM HYDROCHLORIDE 2 MG: 1 INJECTION, SOLUTION INTRAMUSCULAR; INTRAVENOUS at 10:22

## 2024-11-14 RX ADMIN — FENTANYL CITRATE 25 MCG: 50 INJECTION INTRAMUSCULAR; INTRAVENOUS at 12:11

## 2024-11-14 RX ADMIN — ONDANSETRON 4 MG: 2 INJECTION INTRAMUSCULAR; INTRAVENOUS at 10:41

## 2024-11-14 RX ADMIN — FENTANYL CITRATE 25 MCG: 50 INJECTION, SOLUTION INTRAMUSCULAR; INTRAVENOUS at 10:34

## 2024-11-14 RX ADMIN — FENTANYL CITRATE 25 MCG: 50 INJECTION INTRAMUSCULAR; INTRAVENOUS at 12:21

## 2024-11-14 RX ADMIN — Medication 2 G: at 10:40

## 2024-11-14 RX ADMIN — PROPOFOL 125 MCG/KG/MIN: 10 INJECTION, EMULSION INTRAVENOUS at 10:25

## 2024-11-14 RX ADMIN — LIDOCAINE HYDROCHLORIDE 100 MG: 20 INJECTION, SOLUTION EPIDURAL; INFILTRATION; INTRACAUDAL; PERINEURAL at 10:26

## 2024-11-14 RX ADMIN — FENTANYL CITRATE 25 MCG: 50 INJECTION INTRAMUSCULAR; INTRAVENOUS at 12:26

## 2024-11-14 RX ADMIN — SODIUM CHLORIDE, POTASSIUM CHLORIDE, SODIUM LACTATE AND CALCIUM CHLORIDE: 600; 310; 30; 20 INJECTION, SOLUTION INTRAVENOUS at 10:02

## 2024-11-14 ASSESSMENT — PAIN DESCRIPTION - ORIENTATION
ORIENTATION: LOWER
ORIENTATION: LOWER

## 2024-11-14 ASSESSMENT — PAIN SCALES - GENERAL
PAINLEVEL_OUTOF10: 3
PAINLEVEL_OUTOF10: 5

## 2024-11-14 ASSESSMENT — PAIN DESCRIPTION - DESCRIPTORS
DESCRIPTORS: ACHING
DESCRIPTORS: SORE
DESCRIPTORS: SORE;ACHING

## 2024-11-14 ASSESSMENT — PAIN DESCRIPTION - LOCATION
LOCATION: ABDOMEN
LOCATION: ABDOMEN

## 2024-11-14 ASSESSMENT — ENCOUNTER SYMPTOMS: SHORTNESS OF BREATH: 1

## 2024-11-14 ASSESSMENT — PAIN - FUNCTIONAL ASSESSMENT: PAIN_FUNCTIONAL_ASSESSMENT: 0-10

## 2024-11-14 NOTE — DISCHARGE INSTRUCTIONS
Annel Carroll MD, FACS  MD Ramon Livingston MD Peter Miller, MD Joseph Coury, MD Kathryn Chuquin, MD    [facility]         Discharge Instructions for Ambulatory 23-Hour Surgery Patients    Advance to regular diet as tolerated.  Remove dressing at 8pm.  Change outer dressing once or twice a day as it becomes saturated. You will only need ABD pads and tape  Take pain medication as prescribed. (NO DRIVING WHILE ON PAIN MEDICATION).  No lifting any objects weighing more than 15 pounds.  You may walk as desired.   Please schedule an appointment in the office within 4 weeks. Call ahead to schedule your appointment [phone].   Call Exchange [phone] if you have any problems or questions after hours.   Expect slight bright red blood up to four (4) weeks from surgery.   If no bowel movement by tomorrow morning, take 1 capful of Miralax or 1 tablespoon of Milk of Magnesia. Repeat if no results. If still no bowel movement the next day, then call the office.  DISCHARGE SUMMARY from Nurse    PATIENT INSTRUCTIONS:    After general anesthesia or intravenous sedation, for 24 hours or while taking prescription narcotics:    Have someone responsible help you with your care  Limit your activities  Do not drive and operate hazardous machinery  Do not make important personal, legal or business decisions  Do not drink alcoholic beverages  If you have not urinated within 8 hours after discharge, please contact your surgeon on call  Resume your medications unless otherwise instructed    From general anesthesia, intravenous sedation, or while taking prescription narcotics, you may experience:    Drowsiness, dizziness, sleepiness, or confusion  Difficulty remembering or delayed reaction times  Difficulty with your balance, especially while walking, move slowly and carefully, do not make sudden position changes  Difficulty focusing or blurred vision    You may not be aware of slight  diabetic, control your blood sugar levels before and after your surgery.    Please take time to review all of your Home Care Instructions and Medication Information sheets provided in your discharge packet. If you have any questions, please contact your surgeon's office. Thank you.    The discharge information has been reviewed with the patient and instruction recipient.  The patient and instruction recipient verbalized understanding.  Discharge medications reviewed with the patient and instruction recipient and appropriate educational materials and side effects teaching were provided.      Please provide this summary of care documentation to your next provider.                                            [address] [facility] [address]  [place], [place] [address] [phone] · Fax [phone]    [address] [place] [address] [phone] · Fax [phone]    [address] [place] [address] [address]  [place], [place] [address] [phone] · Fax [phone]

## 2024-11-14 NOTE — BRIEF OP NOTE
Brief Postoperative Note      Patient: Valentina Maynadr  YOB: 1965  MRN: [id number]    Date of Procedure: 11/14/2024    Pre-Op Diagnosis Codes:      * Non-healing surgical wound, initial encounter [T81.89XA]    Post-Op Diagnosis: Same       Procedure(s):  WOUND EXPLORATION AND DEBRIDEMENT ABDOMEN    Surgeon(s):  Endy Schaffer II, MD    Assistant:  First Assistant: Cheryl Mattson RN    Anesthesia: General    Estimated Blood Loss (mL): less than 50     Complications: None    Specimens:   * No specimens in log *    Implants:  * No implants in log *      Drains:   Colostomy LUQ (Active)       Findings:  Infection Present At Time Of Surgery (PATOS) (choose all levels that have infection present):  - Deep Infection (muscle/fascia) present as evidenced by pus  Other Findings: Firm scar tissue along the fascia.  No evidence of foreign body    Electronically signed by Endy Schaffer MD on 11/14/2024 at 11:21 AM

## 2024-11-14 NOTE — ANESTHESIA PRE PROCEDURE
from Last 3 Encounters:   11/14/24 (!) 127/93   10/02/24 117/60   09/27/24 (!) 141/72       NPO Status: Time of last liquid consumption: 2330                        Time of last solid consumption: 1930                        Date of last liquid consumption: 11/13/24                        Date of last solid food consumption: 11/13/24    BMI:   Wt Readings from Last 3 Encounters:   11/14/24 86.9 kg (191 lb 9.3 oz)   07/03/24 94.3 kg (208 lb)   06/25/24 94.3 kg (208 lb)     Body mass index is 36.2 kg/m².    CBC:   Lab Results   Component Value Date/Time    WBC 10.1 09/03/2024 12:00 AM    RBC 4.60 09/03/2024 12:00 AM    HGB 13.6 09/03/2024 12:00 AM    HCT 41.2 09/03/2024 12:00 AM    MCV 90 09/03/2024 12:00 AM    RDW 12.2 09/03/2024 12:00 AM     09/03/2024 12:00 AM       CMP:   Lab Results   Component Value Date/Time     05/09/2024 02:58 AM    K 3.8 05/09/2024 02:58 AM     05/09/2024 02:58 AM    CO2 29 05/09/2024 02:58 AM    BUN 19 05/09/2024 02:58 AM    CREATININE 0.97 05/09/2024 02:58 AM    GFRAA >60 10/01/2021 04:34 AM    AGRATIO 1.2 09/15/2023 12:00 AM    LABGLOM 68 05/09/2024 02:58 AM    LABGLOM 58 04/18/2024 01:34 PM    LABGLOM 68 09/15/2023 12:00 AM    GLUCOSE 77 05/09/2024 02:58 AM    GLUCOSE 104 09/15/2023 12:00 AM    CALCIUM 8.6 05/09/2024 02:58 AM    BILITOT 0.4 04/18/2024 01:34 PM    ALKPHOS 138 04/18/2024 01:34 PM    ALKPHOS 106 09/15/2023 12:00 AM    AST 23 04/18/2024 01:34 PM    ALT 29 04/18/2024 01:34 PM       POC Tests: No results for input(s): \"POCGLU\", \"POCNA\", \"POCK\", \"POCCL\", \"POCBUN\", \"POCHEMO\", \"POCHCT\" in the last 72 hours.    Coags:   Lab Results   Component Value Date/Time    PROTIME 10.4 04/18/2024 01:34 PM    INR 1.0 04/18/2024 01:34 PM    APTT 23.9 04/18/2024 01:34 PM    APTT 25.1 09/15/2021 08:46 AM       HCG (If Applicable): No results found for: \"PREGTESTUR\", \"PREGSERUM\", \"HCG\", \"HCGQUANT\"     ABGs: No results found for: \"PHART\", \"PO2ART\", \"ECU6XVP\", \"SVC5QZR\", \"BEART\",

## 2024-11-14 NOTE — ANESTHESIA POSTPROCEDURE EVALUATION
Department of Anesthesiology  Postprocedure Note    Patient: Valentina Maynard  MRN: [id number]  YOB: 1965  Date of evaluation: 11/14/2024    Procedure Summary       Date: 11/14/24 Room / Location: [facility] MAIN OR  / [facility] MAIN OR    Anesthesia Start: 1022 Anesthesia Stop: 1135    Procedure: WOUND EXPLORATION AND DEBRIDEMENT ABDOMEN Diagnosis:       Non-healing surgical wound, initial encounter      (Non-healing surgical wound, initial encounter [T81.89XA])    Providers: Endy Schaffer II, MD Responsible Provider: Kraig Cueto MD    Anesthesia Type: General, TIVA ASA Status: 3            Anesthesia Type: General, TIVA    Michelle Phase I:      Michelle Phase II:      Anesthesia Post Evaluation    Patient location during evaluation: PACU  Patient participation: complete - patient participated  Level of consciousness: responsive to verbal stimuli and sleepy but conscious  Pain score: 2  Airway patency: patent  Cardiovascular status: blood pressure returned to baseline  Respiratory status: acceptable  Hydration status: stable  Comments: +Post-Anesthesia Evaluation and Assessment    Patient: Valentina Maynard MRN: [id number]  SSN: [id number]   YOB: 1965  Age: 59 y.o.  Sex: female          Cardiovascular Function/Vital Signs    /76   Pulse 82   Temp 97.5 °F (36.4 °C) (Oral)   Resp 11   Ht 1.549 m (5' 1\")   Wt 86.9 kg (191 lb 9.3 oz)   SpO2 100%   BMI 36.20 kg/m²     Patient is status post Procedure(s):  WOUND EXPLORATION AND DEBRIDEMENT ABDOMEN.    Nausea/Vomiting: Controlled.    Postoperative hydration reviewed and adequate.    Pain:      Managed.    Neurological Status:       At baseline.    Mental Status and Level of Consciousness: Arousable.    Pulmonary Status:       Adequate oxygenation and airway patent.    Complications related to anesthesia: None    Post-anesthesia assessment completed. No concerns.    I have evaluated the patient and the patient is stable and ready to be discharged

## 2024-12-02 NOTE — OP NOTE
Operative Note      Patient: Valentina Maynard  YOB: 1965  MRN: [id number]    Date of Procedure: 11/14/2024    Pre-Op Diagnosis Codes:      * Non-healing surgical wound, initial encounter [T81.89XA]    Post-Op Diagnosis: Same       Procedure(s):  WOUND EXPLORATION AND DEBRIDEMENT ABDOMEN    Surgeon(s):  Endy Schaffer II, MD    Assistant:   First Assistant: Cheryl Mattson RN    Anesthesia: General    Estimated Blood Loss (mL): Minimal    Complications: None    Specimens:   ID Type Source Tests Collected by Time Destination   1 : wound debridement tissue Tissue Abdomen SURGICAL PATHOLOGY Endy Schaffer II, MD 11/14/2024 1127        Implants:  * No implants in log *      Drains:   Colostomy LUQ (Active)       Findings:  Inflamed fibrotic tissue in the wound bed.  No evidence of foreign body    Detailed Description of Procedure:   Patient was taken to the operating suite and placed in the supine position.  General endotracheal anesthesia was induced.  The abdomen was prepped and draped in the usual sterile fashion.  A timeout was performed indicating the correct patient and procedure to be performed as per protocol.    There were two skin openings in the left lower quadrant and midline.  Some foul smelling drainage was noted along the lateral wound.  This was enlarged to help better explore the wound bed.  There was firm, fibrotic scar tissue along the wound bed but no evidence of foreign body.  Given the CT findings, extensive exploration was performed to identify a foreign body or retained packing.  None could be identified.  A sample of the fibrotic tissue was excised and sent for pathology for further examination.  The wound was debrided and hemostasis was achieved.    The two skin openings clearly communicated with one another, so I decided to encircle a penrose drain to promote healing from the base of the wound rather than packing.  A 1/4\" penrose was encircled through the tract and secured to

## 2024-12-06 ENCOUNTER — HOSPITAL ENCOUNTER (OUTPATIENT)
Age: 59
Discharge: HOME | End: 2024-12-09
Payer: COMMERCIAL

## 2024-12-06 DIAGNOSIS — R10.9 ABDOMINAL PAIN, UNSPECIFIED ABDOMINAL LOCATION: ICD-10-CM

## 2024-12-06 PROCEDURE — 6360000004 HC RX CONTRAST MEDICATION: Performed by: RADIOLOGY

## 2024-12-06 PROCEDURE — 74160 CT ABDOMEN W/CONTRAST: CPT

## 2024-12-06 RX ORDER — IOPAMIDOL 755 MG/ML
100 INJECTION, SOLUTION INTRAVASCULAR
Status: COMPLETED | OUTPATIENT
Start: 2024-12-06 | End: 2024-12-06

## 2024-12-06 RX ADMIN — IOPAMIDOL 100 ML: 755 INJECTION, SOLUTION INTRAVENOUS at 08:54

## 2024-12-13 ENCOUNTER — OFFICE VISIT (OUTPATIENT)
Age: 59
End: 2024-12-13
Payer: COMMERCIAL

## 2024-12-13 VITALS
HEIGHT: 61 IN | BODY MASS INDEX: 35.83 KG/M2 | HEART RATE: 94 BPM | DIASTOLIC BLOOD PRESSURE: 70 MMHG | OXYGEN SATURATION: 98 % | WEIGHT: 189.8 LBS | SYSTOLIC BLOOD PRESSURE: 108 MMHG | RESPIRATION RATE: 18 BRPM

## 2024-12-13 DIAGNOSIS — M54.16 RADICULOPATHY, LUMBAR REGION: ICD-10-CM

## 2024-12-13 DIAGNOSIS — R20.0 NUMBNESS AND TINGLING IN RIGHT HAND: ICD-10-CM

## 2024-12-13 DIAGNOSIS — G43.019 INTRACTABLE MIGRAINE WITHOUT AURA AND WITHOUT STATUS MIGRAINOSUS: Primary | ICD-10-CM

## 2024-12-13 DIAGNOSIS — R20.2 NUMBNESS AND TINGLING IN RIGHT HAND: ICD-10-CM

## 2024-12-13 DIAGNOSIS — M65.4 DE QUERVAIN'S TENOSYNOVITIS, RIGHT: ICD-10-CM

## 2024-12-13 PROCEDURE — 99214 OFFICE O/P EST MOD 30 MIN: CPT | Performed by: NURSE PRACTITIONER

## 2024-12-13 RX ORDER — RIMEGEPANT SULFATE 75 MG/75MG
TABLET, ORALLY DISINTEGRATING ORAL
Qty: 8 TABLET | Refills: 3 | Status: SHIPPED | OUTPATIENT
Start: 2024-12-13

## 2024-12-13 ASSESSMENT — PATIENT HEALTH QUESTIONNAIRE - PHQ9
SUM OF ALL RESPONSES TO PHQ QUESTIONS 1-9: 0
1. LITTLE INTEREST OR PLEASURE IN DOING THINGS: NOT AT ALL
SUM OF ALL RESPONSES TO PHQ QUESTIONS 1-9: 0
2. FEELING DOWN, DEPRESSED OR HOPELESS: NOT AT ALL
SUM OF ALL RESPONSES TO PHQ9 QUESTIONS 1 & 2: 0
SUM OF ALL RESPONSES TO PHQ QUESTIONS 1-9: 0
SUM OF ALL RESPONSES TO PHQ QUESTIONS 1-9: 0

## 2024-12-13 ASSESSMENT — ENCOUNTER SYMPTOMS
PHOTOPHOBIA: 1
ABDOMINAL PAIN: 1
SHORTNESS OF BREATH: 1
VOMITING: 0
NAUSEA: 1

## 2024-12-13 NOTE — PROGRESS NOTES
Chief Complaint   Patient presents with    Migraine     Follow up - doing ok - nurtec and ajovy helps      1. Have you been to the ER, urgent care clinic since your last visit?  Hospitalized since your last visit? Yes [facility] 4/30-5/9/24 for ostomy revision     2. Have you seen or consulted any other health care providers outside of the [facility] since your last visit?  Include any pap smears or colon screening.  Yes Dr Schaffer GI specialists     
was normal.  No resting or intention tremor.  Negative Romberg, negative pronator drift.      Gait and Station:  Steady while walking on toes, heels, slight difficulty with tandem walking.  Normal arm swing.      Reflexes:  DTRs 1+ in bilateral biceps, brachioradialis, patella.    ASSESSMENT AND PLAN     Diagnosis Orders   1. Intractable migraine without aura and without status migrainosus  rimegepant sulfate (NURTEC) 75 MG TBDP    Fremanezumab-vfrm 225 MG/1.5ML SOAJ      2. De Quervain's tenosynovitis, right  Star Soto MD, Orthopaedic Surgery (elbow, hand, wrist), [place]      3. Numbness and tingling in right hand  Star Soto MD, Orthopaedic Surgery (elbow, hand, wrist), [place]      4. Radiculopathy, lumbar region          1.  Intractable migraine without aura without status: Patient notes she is doing well with current regimen which includes Ajovy 225 mg injectable monthly as well as Nurtec as needed.  Patient is to try and maintain healthy lifestyle, defer any supplemental medication changes at this time.  Patient is advised to contact us if her symptoms do worsen.  2.  De Quervain's tenosynovitis on the right: Referral has been placed to Dr. Star Pedersen for further evaluation.  3.  Numbness tingling right hand: Advised patient would benefit from wearing a right carpal tunnel splint. Referral has been placed to Dr. Pedersen for further evaluation and treatment.  If needed EMG can be completed, we declined a referral today.  4.  Lumbar radiculopathy: Patient notes she is doing fairly well with current regimen of tramadol as well as gabapentin, this is prescribed to her by [facility] spine and pain specialist, she is to follow-up with them as indicated.    Patient and/or family verbalized understand of all instructions and all questions/concerns were addressed.  Safety/side effects of medications discussed.    Patient remains a complex patient secondary to

## 2024-12-27 ENCOUNTER — TELEPHONE (OUTPATIENT)
Age: 59
End: 2024-12-27

## 2024-12-27 NOTE — TELEPHONE ENCOUNTER
Patient calling to say mela is requiring a prior auth on her nurtec and ajovy. Please begin this prior auth so patient may receive her medications. Thank you.

## 2024-12-28 NOTE — TELEPHONE ENCOUNTER
RE:Ajovy 225 mg auto injector [facility]    (Key: [id number])    PA Case ID #:     [id number]    Status approved     Coverage Starts on: 12/28/2024     Coverage Ends on: 12/28/2025     Approval letter uploaded to media    Nurtec 75 mg tablet     (Key:[id number])    PA Case ID #:     [id number]    Status pending     Nurtec approved     Coverage Starts on:   12/28/2024     Coverage Ends on:   12/28/2025     Approval letter uploaded to media     FYI to nurse

## 2025-01-02 NOTE — TELEPHONE ENCOUNTER
Left message on Identified VM advised of PA approval for Ajovy. Advised to call office at [phone] with any issues picking up med

## 2025-01-10 ENCOUNTER — HOSPITAL ENCOUNTER (OUTPATIENT)
Facility: HOSPITAL | Age: 60
Discharge: HOME | End: 2025-01-13
Attending: OBSTETRICS & GYNECOLOGY
Payer: COMMERCIAL

## 2025-01-10 ENCOUNTER — TRANSCRIBE ORDERS (OUTPATIENT)
Facility: HOSPITAL | Age: 60
End: 2025-01-10

## 2025-01-10 VITALS — BODY MASS INDEX: 35.68 KG/M2 | WEIGHT: 189 LBS | HEIGHT: 61 IN

## 2025-01-10 DIAGNOSIS — N63.0 BREAST NODULE: ICD-10-CM

## 2025-01-10 DIAGNOSIS — R92.8 ABNORMAL MAMMOGRAM OF RIGHT BREAST: ICD-10-CM

## 2025-01-10 DIAGNOSIS — N63.0 BREAST NODULE: Primary | ICD-10-CM

## 2025-01-10 DIAGNOSIS — R92.8 ABNORMAL MAMMOGRAM OF RIGHT BREAST: Primary | ICD-10-CM

## 2025-01-10 PROCEDURE — 76642 ULTRASOUND BREAST LIMITED: CPT

## 2025-01-24 ENCOUNTER — CLINICAL DOCUMENTATION (OUTPATIENT)
Age: 60
End: 2025-01-24

## 2025-01-24 DIAGNOSIS — D50.0 IRON DEFICIENCY ANEMIA SECONDARY TO BLOOD LOSS (CHRONIC): Primary | ICD-10-CM

## 2025-01-24 DIAGNOSIS — D50.0 IRON DEFICIENCY ANEMIA SECONDARY TO BLOOD LOSS (CHRONIC): ICD-10-CM

## 2025-02-07 LAB
BASOPHILS # BLD AUTO: 0 X10E3/UL (ref 0–0.2)
BASOPHILS NFR BLD AUTO: 0 %
EOSINOPHIL # BLD AUTO: 0.2 X10E3/UL (ref 0–0.4)
EOSINOPHIL NFR BLD AUTO: 2 %
ERYTHROCYTE [DISTWIDTH] IN BLOOD BY AUTOMATED COUNT: 12.4 % (ref 11.7–15.4)
FERRITIN SERPL-MCNC: 840 NG/ML (ref 15–150)
HCT VFR BLD AUTO: 39.7 % (ref 34–46.6)
HGB BLD-MCNC: 13.2 G/DL (ref 11.1–15.9)
IMM GRANULOCYTES # BLD AUTO: 0 X10E3/UL (ref 0–0.1)
IMM GRANULOCYTES NFR BLD AUTO: 0 %
IRON SATN MFR SERPL: 17 % (ref 15–55)
IRON SERPL-MCNC: 67 UG/DL (ref 27–159)
LYMPHOCYTES # BLD AUTO: 2.8 X10E3/UL (ref 0.7–3.1)
LYMPHOCYTES NFR BLD AUTO: 25 %
MCH RBC QN AUTO: 29.5 PG (ref 26.6–33)
MCHC RBC AUTO-ENTMCNC: 33.2 G/DL (ref 31.5–35.7)
MCV RBC AUTO: 89 FL (ref 79–97)
MONOCYTES # BLD AUTO: 1 X10E3/UL (ref 0.1–0.9)
MONOCYTES NFR BLD AUTO: 8 %
NEUTROPHILS # BLD AUTO: 7.5 X10E3/UL (ref 1.4–7)
NEUTROPHILS NFR BLD AUTO: 65 %
PLATELET # BLD AUTO: 255 X10E3/UL (ref 150–450)
RBC # BLD AUTO: 4.47 X10E6/UL (ref 3.77–5.28)
TIBC SERPL-MCNC: 384 UG/DL (ref 250–450)
UIBC SERPL-MCNC: 317 UG/DL (ref 131–425)
WBC # BLD AUTO: 11.5 X10E3/UL (ref 3.4–10.8)

## 2025-03-11 ENCOUNTER — HOSPITAL ENCOUNTER (OUTPATIENT)
Age: 60
Discharge: HOME | End: 2025-03-14
Payer: COMMERCIAL

## 2025-03-11 DIAGNOSIS — R19.00 ABDOMINAL WALL BULGE: ICD-10-CM

## 2025-03-11 PROCEDURE — 74177 CT ABD & PELVIS W/CONTRAST: CPT

## 2025-03-11 PROCEDURE — 6360000004 HC RX CONTRAST MEDICATION: Performed by: RADIOLOGY

## 2025-03-11 PROCEDURE — 2500000003 HC RX 250 WO HCPCS: Performed by: RADIOLOGY

## 2025-03-11 RX ORDER — IOPAMIDOL 755 MG/ML
100 INJECTION, SOLUTION INTRAVASCULAR
Status: COMPLETED | OUTPATIENT
Start: 2025-03-11 | End: 2025-03-11

## 2025-03-11 RX ADMIN — IOPAMIDOL 100 ML: 755 INJECTION, SOLUTION INTRAVENOUS at 09:14

## 2025-03-11 RX ADMIN — BARIUM SULFATE 450 ML: 20 SUSPENSION ORAL at 09:14

## 2025-03-20 ENCOUNTER — PREP FOR PROCEDURE (OUTPATIENT)
Age: 60
End: 2025-03-20

## 2025-03-20 ENCOUNTER — OFFICE VISIT (OUTPATIENT)
Age: 60
End: 2025-03-20
Payer: COMMERCIAL

## 2025-03-20 VITALS
DIASTOLIC BLOOD PRESSURE: 84 MMHG | SYSTOLIC BLOOD PRESSURE: 131 MMHG | TEMPERATURE: 98 F | BODY MASS INDEX: 36.85 KG/M2 | HEART RATE: 76 BPM | OXYGEN SATURATION: 96 % | RESPIRATION RATE: 16 BRPM | HEIGHT: 61 IN | WEIGHT: 195.2 LBS

## 2025-03-20 DIAGNOSIS — K43.9 ABDOMINAL WALL HERNIA: Primary | ICD-10-CM

## 2025-03-20 DIAGNOSIS — K43.9 ABDOMINAL WALL HERNIA: ICD-10-CM

## 2025-03-20 DIAGNOSIS — E66.812 OBESITY, CLASS II, BMI 35-39.9: ICD-10-CM

## 2025-03-20 PROCEDURE — 99204 OFFICE O/P NEW MOD 45 MIN: CPT | Performed by: SURGERY

## 2025-03-20 ASSESSMENT — ENCOUNTER SYMPTOMS
EYE PAIN: 0
SHORTNESS OF BREATH: 0
BLOOD IN STOOL: 0

## 2025-03-20 ASSESSMENT — PATIENT HEALTH QUESTIONNAIRE - PHQ9
1. LITTLE INTEREST OR PLEASURE IN DOING THINGS: NOT AT ALL
SUM OF ALL RESPONSES TO PHQ QUESTIONS 1-9: 0
2. FEELING DOWN, DEPRESSED OR HOPELESS: NOT AT ALL

## 2025-03-20 NOTE — PROGRESS NOTES
Identified pt with two pt identifiers (name and ). Reviewed chart in preparation for visit and have obtained necessary documentation.    Valentina Maynard is a 59 y.o. female Hernia (Seen at the request of Dr. Endy Schaffer for evaluation of possible incisional hernia.)  .    Vitals:    25 1107   BP: 131/84   BP Site: Left Upper Arm   Patient Position: Sitting   BP Cuff Size: Small Adult   Pulse: 76   Resp: 16   Temp: 98 °F (36.7 °C)   TempSrc: Oral   SpO2: 96%   Weight: 88.5 kg (195 lb 3.2 oz)   Height: 1.549 m (5' 1\")          1. Have you been to the ER, urgent care clinic since your last visit?  Hospitalized since your last visit?  no     2. Have you seen or consulted any other health care providers outside of the [facility] since your last visit?  Include any pap smears or colon screening.  no   
normal. There is no distension.      Palpations: Abdomen is soft. There is no mass.      Tenderness: There is abdominal tenderness (mild). There is no guarding or rebound.      Hernia: A hernia is present.          Comments: At least 2 hernias along her midline associated with an 8 cm wide diastases   Musculoskeletal:         General: No tenderness. Normal range of motion.      Cervical back: Normal range of motion and neck supple.   Lymphadenopathy:      Cervical: No cervical adenopathy.   Skin:     General: Skin is warm.      Findings: No erythema or rash.   Neurological:      Mental Status: She is alert and oriented to person, place, and time.   Psychiatric:         Behavior: Behavior normal.                      --Christopher Valero MD

## 2025-03-21 ENCOUNTER — TELEPHONE (OUTPATIENT)
Age: 60
End: 2025-03-21

## 2025-03-21 NOTE — TELEPHONE ENCOUNTER
Office note and date of surgery faxed to Dr. Schaffer's office as requested under continuing care.   Fax confirmation received.

## 2025-03-21 NOTE — TELEPHONE ENCOUNTER
Colon and rectal dr mariana sanders office called asking for last office note to be faxed     Fax   Cb  ext [phone]

## 2025-04-16 ENCOUNTER — HOSPITAL ENCOUNTER (OUTPATIENT)
Facility: HOSPITAL | Age: 60
Discharge: HOME | End: 2025-04-19
Payer: COMMERCIAL

## 2025-04-16 VITALS
HEIGHT: 61 IN | WEIGHT: 199.08 LBS | BODY MASS INDEX: 37.59 KG/M2 | SYSTOLIC BLOOD PRESSURE: 126 MMHG | OXYGEN SATURATION: 100 % | RESPIRATION RATE: 18 BRPM | DIASTOLIC BLOOD PRESSURE: 52 MMHG | HEART RATE: 78 BPM | TEMPERATURE: 97.7 F

## 2025-04-16 LAB
ANION GAP SERPL CALC-SCNC: 4 MMOL/L (ref 2–12)
BUN SERPL-MCNC: 17 MG/DL (ref 6–20)
BUN/CREAT SERPL: 15 (ref 12–20)
CALCIUM SERPL-MCNC: 9 MG/DL (ref 8.5–10.1)
CHLORIDE SERPL-SCNC: 108 MMOL/L (ref 97–108)
CO2 SERPL-SCNC: 25 MMOL/L (ref 21–32)
CREAT SERPL-MCNC: 1.14 MG/DL (ref 0.55–1.02)
ERYTHROCYTE [DISTWIDTH] IN BLOOD BY AUTOMATED COUNT: 12.2 % (ref 11.5–14.5)
GLUCOSE SERPL-MCNC: 79 MG/DL (ref 65–100)
HCT VFR BLD AUTO: 41.3 % (ref 35–47)
HGB BLD-MCNC: 13.3 G/DL (ref 11.5–16)
MCH RBC QN AUTO: 29.3 PG (ref 26–34)
MCHC RBC AUTO-ENTMCNC: 32.2 G/DL (ref 30–36.5)
MCV RBC AUTO: 91 FL (ref 80–99)
NRBC # BLD: 0 K/UL (ref 0–0.01)
NRBC BLD-RTO: 0 PER 100 WBC
PLATELET # BLD AUTO: 236 K/UL (ref 150–400)
PMV BLD AUTO: 10.1 FL (ref 8.9–12.9)
POTASSIUM SERPL-SCNC: 4.1 MMOL/L (ref 3.5–5.1)
RBC # BLD AUTO: 4.54 M/UL (ref 3.8–5.2)
SODIUM SERPL-SCNC: 137 MMOL/L (ref 136–145)
WBC # BLD AUTO: 7.4 K/UL (ref 3.6–11)

## 2025-04-16 PROCEDURE — 80048 BASIC METABOLIC PNL TOTAL CA: CPT

## 2025-04-16 PROCEDURE — 36415 COLL VENOUS BLD VENIPUNCTURE: CPT

## 2025-04-16 PROCEDURE — 85027 COMPLETE CBC AUTOMATED: CPT

## 2025-04-16 RX ORDER — CEFAZOLIN SODIUM/WATER 2 G/20 ML
2000 SYRINGE (ML) INTRAVENOUS ONCE
OUTPATIENT
Start: 2025-04-22

## 2025-04-16 RX ORDER — SODIUM CHLORIDE, SODIUM LACTATE, POTASSIUM CHLORIDE, CALCIUM CHLORIDE 600; 310; 30; 20 MG/100ML; MG/100ML; MG/100ML; MG/100ML
INJECTION, SOLUTION INTRAVENOUS CONTINUOUS
OUTPATIENT
Start: 2025-04-22

## 2025-04-16 ASSESSMENT — PAIN DESCRIPTION - DESCRIPTORS: DESCRIPTORS: ACHING

## 2025-04-16 ASSESSMENT — PAIN DESCRIPTION - LOCATION: LOCATION: ABDOMEN

## 2025-04-16 ASSESSMENT — PAIN SCALES - GENERAL: PAINLEVEL_OUTOF10: 6

## 2025-04-16 ASSESSMENT — PAIN DESCRIPTION - ORIENTATION: ORIENTATION: LOWER

## 2025-04-16 NOTE — PROGRESS NOTES
Shower Instructions     Preventing Infections Before and After - Your Surgery    IMPORTANT INSTRUCTIONS    Please read and follow these instructions carefully. If you are unable to comply with the below instructions your procedure will be cancelled.     Every Night for Three (3) nights before your surgery:  Shower with an antibacterial soap, such as Dial, or the soap provided at your preassessment appointment. A shower is better than a bath for cleaning your skin.  If needed, ask someone to help you reach all areas of your body. Don’t forget to clean your belly button with every shower.    The night before your surgery:   On the night before your surgery, shower with an antibacterial soap, such as Dial, or the soap provided at your preassessment appointment.   Dry gently with a clean, freshly washed towel.  After your shower, do not use any powder, deodorant, perfumes or lotion.  Use clean, freshly washed towels and washcloths every time you shower.  Wear clean, freshly washed pajamas to bed the night before surgery.  Sleep on clean, freshly washed sheets.  Do not allow pets to sleep in your bed with you.    The Morning of your surgery:  Shower again thoroughly with an antibacterial soap, such as Dial or the soap provided at your preassessment appointment. If needed, ask someone for help to reach all areas of your body. Don’t forget to clean your belly button! Rinse.  Dry gently with a clean, freshly washed towel.  After your shower, do not use any powder, deodorant, perfumes or lotion prior to surgery.  Put on clean, freshly washed clothing.    Tips to help prevent infections after your surgery:  Protect your surgical wound from germs:  Hand washing is the most important thing you and your caregivers can do to prevent infections.  Keep your bandage clean and dry!  Do not touch your surgical wound.  Use clean, freshly washed towels and washcloths every time you shower; do not share bath linens with others.  Until 
hair loose or down, no ponytails, buns, tammy pins or clips.  All body piercings must be removed. Please do not shave for 48 hours prior to surgery. Shaving of the face is acceptable. Please see the attached Shower instructions.    7. You should understand that if you do not follow these instructions your surgery may be cancelled.  If your physical condition changes (I.e. fever, cold or flu) please contact your surgeon as soon as possible.    8. It is important that you be on time.  If a situation occurs where you may be late, please call [phone] (OR Holding Area).    9. If you have any questions and or problems, please call [phone] (Pre-admission Testing).    10. Your surgery time may be subject to change.  You will receive a phone call the evening prior if your time changes.    11.  If having outpatient surgery, you must have someone to drive you here, stay with you during the duration of your stay, and to drive you home at time of discharge.       SPECIAL INSTRUCTIONS:   Stop Aspirin and Eliquis 2 days prior to surgery per Dr Valero- last dose 4/19/2025    TAKE ALL MEDICATIONS (protonix if needed) DAY OF SURGERY EXCEPT: no exceptions       I understand a pre-operative phone call will be made to verify my surgery time.  In the event that I am not available, I give permission for a message to be left on my answering service and/or with another person?  yes         ___________________      __________   _________    (Signature of Patient)             (Witness)                (Date and Time)

## 2025-04-21 ENCOUNTER — ANESTHESIA EVENT (OUTPATIENT)
Facility: HOSPITAL | Age: 60
End: 2025-04-21
Payer: COMMERCIAL

## 2025-04-21 ASSESSMENT — ENCOUNTER SYMPTOMS: SHORTNESS OF BREATH: 1

## 2025-04-21 NOTE — ANESTHESIA PRE PROCEDURE
Department of Anesthesiology  Preprocedure Note       Name:  Valentina Maynard   Age:  59 y.o.  :  1965                                          MRN:  [id number]         Date:  2025      Surgeon: Surgeon(s):  Christopher Valero MD    Procedure: Procedure(s):  ROBOT ASSISTED LAPAROSCOPIC ANTERIOR ABDOMINAL HERNIA REPAIR WITH MESH WITH SEPARATION OF COMPONENTS    Medications prior to admission:   Prior to Admission medications    Medication Sig Start Date End Date Taking? Authorizing Provider   rimegepant sulfate (NURTEC) 75 MG TBDP 1 po at onset of migraine headache 24   Eula Camara APRN - NP   Fremanezumab-vfrm 225 MG/1.5ML SOAJ Inject 225 mg into the skin every 30 days 24   Eula Camara APRN - NP   traMADol (ULTRAM) 50 MG tablet Take 1 tablet by mouth in the morning and at bedtime.    Provider, MD Khoi   cyanocobalamin 1000 MCG/ML injection INJECT 1 ML UNDER THE SKIN ONCE A MONTH  Patient taking differently: Inject 1 mL into the muscle every 30 days 24   Rosibel Jenkins APRN - CNP   aspirin 81 MG EC tablet Take 1 tablet by mouth daily    ProviderKhoi MD   Promethazine HCl (PHENERGAN PO) Take 50 mg by mouth as needed    ProviderKhoi MD   pantoprazole (PROTONIX) 40 MG tablet Take 1 tablet by mouth daily  Patient taking differently: Take 1 tablet by mouth daily as needed 24   Sherwin Kan MD   gabapentin (NEURONTIN) 400 MG capsule Take 3 capsules by mouth nightly.    Provider, MD Khoi   apixaban (ELIQUIS) 2.5 MG TABS tablet Take 1 tablet by mouth 2 times daily 10/18/19   Automatic Reconciliation, Ar   butalbital-acetaminophen-caffeine (FIORICET, ESGIC) -40 MG per tablet Take 2 tablets by mouth every 8 hours as needed for Migraine 20   Automatic Reconciliation, Ar   drospirenone-ethinyl estradiol (DONIS) 3-0.02 MG per tablet Take 1 tablet by mouth daily    Automatic Reconciliation, Ar   hydroxychloroquine (PLAQUENIL) 200 MG tablet Take 2

## 2025-04-22 ENCOUNTER — ANESTHESIA (OUTPATIENT)
Facility: HOSPITAL | Age: 60
End: 2025-04-22
Payer: COMMERCIAL

## 2025-04-22 ENCOUNTER — HOSPITAL ENCOUNTER (OUTPATIENT)
Facility: HOSPITAL | Age: 60
Setting detail: OUTPATIENT SURGERY
Discharge: HOME | End: 2025-04-22
Attending: SURGERY | Admitting: SURGERY
Payer: COMMERCIAL

## 2025-04-22 VITALS
SYSTOLIC BLOOD PRESSURE: 133 MMHG | HEIGHT: 61 IN | OXYGEN SATURATION: 96 % | HEART RATE: 81 BPM | WEIGHT: 194.45 LBS | DIASTOLIC BLOOD PRESSURE: 69 MMHG | TEMPERATURE: 98.3 F | BODY MASS INDEX: 36.71 KG/M2 | RESPIRATION RATE: 16 BRPM

## 2025-04-22 DIAGNOSIS — R52 PAIN: Primary | ICD-10-CM

## 2025-04-22 PROCEDURE — 3700000001 HC ADD 15 MINUTES (ANESTHESIA): Performed by: SURGERY

## 2025-04-22 PROCEDURE — 7100000001 HC PACU RECOVERY - ADDTL 15 MIN: Performed by: SURGERY

## 2025-04-22 PROCEDURE — 2709999900 HC NON-CHARGEABLE SUPPLY: Performed by: SURGERY

## 2025-04-22 PROCEDURE — 7100000011 HC PHASE II RECOVERY - ADDTL 15 MIN: Performed by: SURGERY

## 2025-04-22 PROCEDURE — 49596 RPR AA HRN 1ST > 10 NCR/STRN: CPT | Performed by: SURGERY

## 2025-04-22 PROCEDURE — 6370000000 HC RX 637 (ALT 250 FOR IP): Performed by: SURGERY

## 2025-04-22 PROCEDURE — 2500000003 HC RX 250 WO HCPCS: Performed by: SURGERY

## 2025-04-22 PROCEDURE — 49329 UNLSTD LAPS PX ABD PERTM&OMN: CPT | Performed by: SURGERY

## 2025-04-22 PROCEDURE — 7100000010 HC PHASE II RECOVERY - FIRST 15 MIN: Performed by: SURGERY

## 2025-04-22 PROCEDURE — C1781 MESH (IMPLANTABLE): HCPCS | Performed by: SURGERY

## 2025-04-22 PROCEDURE — 6360000002 HC RX W HCPCS: Performed by: NURSE ANESTHETIST, CERTIFIED REGISTERED

## 2025-04-22 PROCEDURE — 3600000019 HC SURGERY ROBOT ADDTL 15MIN: Performed by: SURGERY

## 2025-04-22 PROCEDURE — 6360000002 HC RX W HCPCS: Performed by: SURGERY

## 2025-04-22 PROCEDURE — 3600000009 HC SURGERY ROBOT BASE: Performed by: SURGERY

## 2025-04-22 PROCEDURE — S2900 ROBOTIC SURGICAL SYSTEM: HCPCS | Performed by: SURGERY

## 2025-04-22 PROCEDURE — 2580000003 HC RX 258: Performed by: ANESTHESIOLOGY

## 2025-04-22 PROCEDURE — 2720000010 HC SURG SUPPLY STERILE: Performed by: SURGERY

## 2025-04-22 PROCEDURE — C1713 ANCHOR/SCREW BN/BN,TIS/BN: HCPCS | Performed by: SURGERY

## 2025-04-22 PROCEDURE — 6360000002 HC RX W HCPCS: Performed by: STUDENT IN AN ORGANIZED HEALTH CARE EDUCATION/TRAINING PROGRAM

## 2025-04-22 PROCEDURE — 88302 TISSUE EXAM BY PATHOLOGIST: CPT

## 2025-04-22 PROCEDURE — 2500000003 HC RX 250 WO HCPCS: Performed by: NURSE ANESTHETIST, CERTIFIED REGISTERED

## 2025-04-22 PROCEDURE — 7100000000 HC PACU RECOVERY - FIRST 15 MIN: Performed by: SURGERY

## 2025-04-22 PROCEDURE — 3700000000 HC ANESTHESIA ATTENDED CARE: Performed by: SURGERY

## 2025-04-22 PROCEDURE — 2580000003 HC RX 258: Performed by: NURSE ANESTHETIST, CERTIFIED REGISTERED

## 2025-04-22 PROCEDURE — 15734 MUSCLE-SKIN GRAFT TRUNK: CPT | Performed by: SURGERY

## 2025-04-22 DEVICE — MESH HERN W20XL30CM RECT PREPERI BIOMATERIAL COMP POLYPR: Type: IMPLANTABLE DEVICE | Site: ABDOMEN | Status: FUNCTIONAL

## 2025-04-22 RX ORDER — SODIUM CHLORIDE 0.9 % (FLUSH) 0.9 %
5-40 SYRINGE (ML) INJECTION EVERY 12 HOURS SCHEDULED
Status: DISCONTINUED | OUTPATIENT
Start: 2025-04-22 | End: 2025-04-22 | Stop reason: HOSPADM

## 2025-04-22 RX ORDER — FENTANYL CITRATE 50 UG/ML
INJECTION, SOLUTION INTRAMUSCULAR; INTRAVENOUS
Status: DISCONTINUED | OUTPATIENT
Start: 2025-04-22 | End: 2025-04-22 | Stop reason: SDUPTHER

## 2025-04-22 RX ORDER — TRAMADOL HYDROCHLORIDE 50 MG/1
50 TABLET ORAL EVERY 6 HOURS PRN
Qty: 28 TABLET | Refills: 0 | Status: SHIPPED | OUTPATIENT
Start: 2025-04-22 | End: 2025-04-29

## 2025-04-22 RX ORDER — SODIUM CHLORIDE 0.9 % (FLUSH) 0.9 %
5-40 SYRINGE (ML) INJECTION PRN
Status: DISCONTINUED | OUTPATIENT
Start: 2025-04-22 | End: 2025-04-22 | Stop reason: HOSPADM

## 2025-04-22 RX ORDER — ACETAMINOPHEN 325 MG/1
650 TABLET ORAL 4 TIMES DAILY PRN
COMMUNITY
Start: 2025-04-22

## 2025-04-22 RX ORDER — OXYCODONE HYDROCHLORIDE 5 MG/1
5 TABLET ORAL
Refills: 0 | Status: CANCELLED | OUTPATIENT
Start: 2025-04-22

## 2025-04-22 RX ORDER — LIDOCAINE HYDROCHLORIDE 20 MG/ML
INJECTION, SOLUTION EPIDURAL; INFILTRATION; INTRACAUDAL; PERINEURAL
Status: DISCONTINUED | OUTPATIENT
Start: 2025-04-22 | End: 2025-04-22 | Stop reason: SDUPTHER

## 2025-04-22 RX ORDER — ONDANSETRON 2 MG/ML
4 INJECTION INTRAMUSCULAR; INTRAVENOUS
Status: COMPLETED | OUTPATIENT
Start: 2025-04-22 | End: 2025-04-22

## 2025-04-22 RX ORDER — ROCURONIUM BROMIDE 10 MG/ML
INJECTION, SOLUTION INTRAVENOUS
Status: DISCONTINUED | OUTPATIENT
Start: 2025-04-22 | End: 2025-04-22 | Stop reason: SDUPTHER

## 2025-04-22 RX ORDER — NALOXONE HYDROCHLORIDE 0.4 MG/ML
INJECTION, SOLUTION INTRAMUSCULAR; INTRAVENOUS; SUBCUTANEOUS PRN
Status: DISCONTINUED | OUTPATIENT
Start: 2025-04-22 | End: 2025-04-22 | Stop reason: HOSPADM

## 2025-04-22 RX ORDER — SODIUM CHLORIDE, SODIUM LACTATE, POTASSIUM CHLORIDE, CALCIUM CHLORIDE 600; 310; 30; 20 MG/100ML; MG/100ML; MG/100ML; MG/100ML
INJECTION, SOLUTION INTRAVENOUS CONTINUOUS
Status: DISCONTINUED | OUTPATIENT
Start: 2025-04-22 | End: 2025-04-22 | Stop reason: HOSPADM

## 2025-04-22 RX ORDER — ONDANSETRON 4 MG/1
4 TABLET, FILM COATED ORAL EVERY 8 HOURS PRN
Qty: 8 TABLET | Refills: 1 | Status: SHIPPED | OUTPATIENT
Start: 2025-04-22

## 2025-04-22 RX ORDER — PHENYLEPHRINE HYDROCHLORIDE 10 MG/ML
INJECTION INTRAVENOUS
Status: DISCONTINUED | OUTPATIENT
Start: 2025-04-22 | End: 2025-04-22 | Stop reason: SDUPTHER

## 2025-04-22 RX ORDER — BETHANECHOL CHLORIDE 10 MG/1
10 TABLET ORAL
Status: DISCONTINUED | OUTPATIENT
Start: 2025-04-22 | End: 2025-04-22 | Stop reason: HOSPADM

## 2025-04-22 RX ORDER — DEXAMETHASONE SODIUM PHOSPHATE 4 MG/ML
INJECTION, SOLUTION INTRA-ARTICULAR; INTRALESIONAL; INTRAMUSCULAR; INTRAVENOUS; SOFT TISSUE
Status: DISCONTINUED | OUTPATIENT
Start: 2025-04-22 | End: 2025-04-22 | Stop reason: SDUPTHER

## 2025-04-22 RX ORDER — TRAMADOL HYDROCHLORIDE 50 MG/1
50 TABLET ORAL ONCE
Status: COMPLETED | OUTPATIENT
Start: 2025-04-22 | End: 2025-04-22

## 2025-04-22 RX ORDER — DEXMEDETOMIDINE HYDROCHLORIDE 100 UG/ML
INJECTION, SOLUTION INTRAVENOUS
Status: DISCONTINUED | OUTPATIENT
Start: 2025-04-22 | End: 2025-04-22 | Stop reason: SDUPTHER

## 2025-04-22 RX ORDER — FENTANYL CITRATE 50 UG/ML
25 INJECTION, SOLUTION INTRAMUSCULAR; INTRAVENOUS EVERY 5 MIN PRN
Status: DISCONTINUED | OUTPATIENT
Start: 2025-04-22 | End: 2025-04-22 | Stop reason: HOSPADM

## 2025-04-22 RX ORDER — POLYETHYLENE GLYCOL 3350 17 G/17G
17 POWDER, FOR SOLUTION ORAL DAILY
COMMUNITY
Start: 2025-04-22 | End: 2025-04-29

## 2025-04-22 RX ORDER — HYDROMORPHONE HYDROCHLORIDE 1 MG/ML
0.5 INJECTION, SOLUTION INTRAMUSCULAR; INTRAVENOUS; SUBCUTANEOUS EVERY 5 MIN PRN
Status: DISCONTINUED | OUTPATIENT
Start: 2025-04-22 | End: 2025-04-22 | Stop reason: HOSPADM

## 2025-04-22 RX ORDER — ONDANSETRON 2 MG/ML
INJECTION INTRAMUSCULAR; INTRAVENOUS
Status: DISCONTINUED | OUTPATIENT
Start: 2025-04-22 | End: 2025-04-22 | Stop reason: SDUPTHER

## 2025-04-22 RX ORDER — MIDAZOLAM HYDROCHLORIDE 1 MG/ML
INJECTION, SOLUTION INTRAMUSCULAR; INTRAVENOUS
Status: DISCONTINUED | OUTPATIENT
Start: 2025-04-22 | End: 2025-04-22 | Stop reason: SDUPTHER

## 2025-04-22 RX ADMIN — FENTANYL CITRATE 50 MCG: 50 INJECTION, SOLUTION INTRAMUSCULAR; INTRAVENOUS at 07:49

## 2025-04-22 RX ADMIN — SUGAMMADEX 200 MG: 100 INJECTION, SOLUTION INTRAVENOUS at 11:02

## 2025-04-22 RX ADMIN — ONDANSETRON 4 MG: 2 INJECTION, SOLUTION INTRAMUSCULAR; INTRAVENOUS at 10:50

## 2025-04-22 RX ADMIN — DEXMEDETOMIDINE 10 MCG: 100 INJECTION, SOLUTION INTRAVENOUS at 08:22

## 2025-04-22 RX ADMIN — PHENYLEPHRINE HYDROCHLORIDE 40 MCG: 10 INJECTION INTRAVENOUS at 09:38

## 2025-04-22 RX ADMIN — PHENYLEPHRINE HYDROCHLORIDE 80 MCG: 10 INJECTION INTRAVENOUS at 07:59

## 2025-04-22 RX ADMIN — BETHANECHOL CHLORIDE 10 MG: 10 TABLET ORAL at 11:43

## 2025-04-22 RX ADMIN — TRAMADOL HYDROCHLORIDE 50 MG: 50 TABLET, COATED ORAL at 12:09

## 2025-04-22 RX ADMIN — HYDROMORPHONE HYDROCHLORIDE 0.6 MG: 1 INJECTION, SOLUTION INTRAMUSCULAR; INTRAVENOUS; SUBCUTANEOUS at 08:10

## 2025-04-22 RX ADMIN — ROCURONIUM BROMIDE 10 MG: 10 INJECTION INTRAVENOUS at 09:35

## 2025-04-22 RX ADMIN — HYDROMORPHONE HYDROCHLORIDE 0.5 MG: 1 INJECTION, SOLUTION INTRAMUSCULAR; INTRAVENOUS; SUBCUTANEOUS at 10:41

## 2025-04-22 RX ADMIN — PROPOFOL 30 MCG/KG/MIN: 10 INJECTION, EMULSION INTRAVENOUS at 07:55

## 2025-04-22 RX ADMIN — PROPOFOL 150 MG: 10 INJECTION, EMULSION INTRAVENOUS at 07:41

## 2025-04-22 RX ADMIN — WATER 2000 MG: 1 INJECTION INTRAMUSCULAR; INTRAVENOUS; SUBCUTANEOUS at 07:48

## 2025-04-22 RX ADMIN — ROCURONIUM BROMIDE 20 MG: 10 INJECTION INTRAVENOUS at 08:48

## 2025-04-22 RX ADMIN — ROCURONIUM BROMIDE 30 MG: 10 INJECTION INTRAVENOUS at 07:42

## 2025-04-22 RX ADMIN — HYDROMORPHONE HYDROCHLORIDE 0.4 MG: 1 INJECTION, SOLUTION INTRAMUSCULAR; INTRAVENOUS; SUBCUTANEOUS at 08:06

## 2025-04-22 RX ADMIN — DEXAMETHASONE SODIUM PHOSPHATE 4 MG: 4 INJECTION INTRA-ARTICULAR; INTRALESIONAL; INTRAMUSCULAR; INTRAVENOUS; SOFT TISSUE at 07:45

## 2025-04-22 RX ADMIN — PHENYLEPHRINE HYDROCHLORIDE 80 MCG: 10 INJECTION INTRAVENOUS at 07:54

## 2025-04-22 RX ADMIN — LIDOCAINE HYDROCHLORIDE 80 MG: 20 INJECTION, SOLUTION EPIDURAL; INFILTRATION; INTRACAUDAL; PERINEURAL at 07:41

## 2025-04-22 RX ADMIN — ROCURONIUM BROMIDE 20 MG: 10 INJECTION INTRAVENOUS at 07:56

## 2025-04-22 RX ADMIN — SODIUM CHLORIDE, POTASSIUM CHLORIDE, SODIUM LACTATE AND CALCIUM CHLORIDE: 600; 310; 30; 20 INJECTION, SOLUTION INTRAVENOUS at 07:36

## 2025-04-22 RX ADMIN — DEXMEDETOMIDINE 4 MCG: 100 INJECTION, SOLUTION INTRAVENOUS at 10:48

## 2025-04-22 RX ADMIN — PHENYLEPHRINE HYDROCHLORIDE 40 MCG/MIN: 10 INJECTION INTRAVENOUS at 07:54

## 2025-04-22 RX ADMIN — ROCURONIUM BROMIDE 10 MG: 10 INJECTION INTRAVENOUS at 10:02

## 2025-04-22 RX ADMIN — SODIUM CHLORIDE, POTASSIUM CHLORIDE, SODIUM LACTATE AND CALCIUM CHLORIDE: 600; 310; 30; 20 INJECTION, SOLUTION INTRAVENOUS at 07:15

## 2025-04-22 RX ADMIN — MIDAZOLAM HYDROCHLORIDE 2 MG: 1 INJECTION, SOLUTION INTRAMUSCULAR; INTRAVENOUS at 07:33

## 2025-04-22 RX ADMIN — BETHANECHOL CHLORIDE 10 MG: 10 TABLET ORAL at 13:40

## 2025-04-22 RX ADMIN — FENTANYL CITRATE 25 MCG: 50 INJECTION INTRAMUSCULAR; INTRAVENOUS at 11:42

## 2025-04-22 RX ADMIN — ONDANSETRON 4 MG: 2 INJECTION, SOLUTION INTRAMUSCULAR; INTRAVENOUS at 12:24

## 2025-04-22 RX ADMIN — FENTANYL CITRATE 50 MCG: 50 INJECTION, SOLUTION INTRAMUSCULAR; INTRAVENOUS at 07:41

## 2025-04-22 RX ADMIN — PHENYLEPHRINE HYDROCHLORIDE 40 MCG: 10 INJECTION INTRAVENOUS at 09:43

## 2025-04-22 ASSESSMENT — PAIN - FUNCTIONAL ASSESSMENT: PAIN_FUNCTIONAL_ASSESSMENT: 0-10

## 2025-04-22 ASSESSMENT — PAIN SCALES - GENERAL
PAINLEVEL_OUTOF10: 7
PAINLEVEL_OUTOF10: 6

## 2025-04-22 ASSESSMENT — PAIN DESCRIPTION - DESCRIPTORS: DESCRIPTORS: ACHING;DISCOMFORT

## 2025-04-22 ASSESSMENT — PAIN DESCRIPTION - ORIENTATION: ORIENTATION: MID

## 2025-04-22 ASSESSMENT — PAIN DESCRIPTION - LOCATION: LOCATION: ABDOMEN

## 2025-04-22 NOTE — DISCHARGE INSTRUCTIONS
** restart your Aspirin and Eliquis on Saturday 4/26/2025 **      What to do at Home      Recommended diet: resume your normal diet    Recommended activity: Lift less that 10 pounds for 6 weeks.  Wear Abdominal Binder for support    Follow-up with Dr. Valero in 2-3 weeks.  Call [phone] for an appointment.        Hernia Repair: What to Expect at Home     Your Recovery    You are likely to have pain for the next few days. You may also feel like you have the flu, and you may have a low fever and feel tired and nauseated. This is common.    You should feel better after a few days and will probably feel much better in 7 days. For several weeks you may feel twinges or pulling in the hernia repair when you move. You may have some bruising and swelling. This is normal.    This care sheet gives you a general idea about how long it will take for you to recover. But each person recovers at a different pace. Follow the steps below to get better as quickly as possible.    How can you care for yourself at home?    Activity  Rest when you feel tired. Getting enough sleep will help you recover. You can sleep with your head up by using three or four pillows. You can also try to sleep with your head up in a recliner chair.  Try to walk each day. Start by walking a little more than you did the day before. Bit by bit, increase the amount you walk. Walking boosts blood flow and helps prevent pneumonia and constipation.  Put ice or a cold pack on the area of your hernia repair for 10 to 15 minutes at a time. Try to do this every 1 to 2 hours for the first 24 hours (when you are awake) or until the swelling goes down. Put a thin cloth between the ice and your skin.  Avoid strenuous activities, such as biking, jogging, weight lifting, or aerobic exercise, until your doctor says it is okay.  You are encouraged to cough and deep breath or use your incentive spirometer if you were given one, every 15-30 minutes when awake.  This will

## 2025-04-22 NOTE — OP NOTE
OPERATIVE NOTE  4/22/2025    Preperative Diagnosis: Abdominal wall hernia [K43.9]  Post-operative Diagnosis: same      Surgeon: Christopher Valero MD FACS  Assistant: Circulator: Rosalva Woodson RN  First Assistant: Liddle, Gerald, RN  Scrub Person First: Allison Hunt    Procedures:    1. Right retrorectus release (myocutaneous flap of the trunk)  2. Left retrorectus release (myocutaneous flap of the trunk)  3. Laparoscopic incarcerated anterior abdominal hernia repair 18 CM with placement of mesh.  4. Robot assisted.    Anesthesia: Gen + Local  Estimated Blood Loss: Minimal  Specimens:   ID Type Source Tests Collected by Time Destination   1 : ABDOMINAL WALL NODULE Tissue Abdomen SURGICAL PATHOLOGY Christopher Valero MD 4/22/2025 1033       Complications: None    Implants:   Implant Name Type Inv. Item Serial No.  Lot No. LRB No. Used Action   MESH BARRY [id number] RECT PREPERI BIOMATERIAL COMP POLYPR - [id number] Mesh MESH BARRY [id number] RECT PREPERI BIOMATERIAL COMP POLYPR [id number]  [employer] N/A N/A 1 Implanted       FINDINGS: The patient is noted to have a 12 CM defect along the midline as well as several other smaller defects with an associated 10 CM diastasis.  The patient was also noted to have a small parastomal hernia.  The largest hernia was noted to contain incarcerated small bowel and omentum. The fascia was able to be approximated only after recreation of the anterior abdominal wall with a myocutaneous advancement flap of the right and left rectus muscle.  10 mL of Tisseel was used.    INDICATION: The patient's w/o is constant with a symptomatic hernia.  After  discussing risks, benefits and alteratives, she has agreed to undergo  surgery as above. Consent form was placed on the chart      DESCRIPTION OF PROCEDURE: Valentina Bae brought to the operating room and after satisfactory induction of general anesthesia, the patient was kept in the supine position in a

## 2025-04-22 NOTE — FLOWSHEET NOTE
04/22/25 1205   Discharge Checklist   Ride and Caregiver Arranged Yes   Ride Caregiver Provider Michael (spouse)   Phone Number for Ride/Caregiver [phone]   Responsible party will drive the patient home Yes   Special Appliances/Equipment Abdominal binder;Ice pack/cuff (comment)   Mobility at Departure Ambulatory   Discharged with Documented Belongings Yes   Departure Mode By self;Other (comment)  (spouse)   AVS Reviewed   AVS & discharge instructions reviewed with patient and/or representative? Yes   Reviewed instructions with Patient   Level of Understanding Questions answered;Verbalized understanding     1430 Patient voided at this time.  Ambulated to BR without difficulty.

## 2025-04-22 NOTE — ANESTHESIA POSTPROCEDURE EVALUATION
Department of Anesthesiology  Postprocedure Note    Patient: Valentina Maynard  MRN: [id number]  YOB: 1965  Date of evaluation: 4/22/2025    Procedure Summary       Date: 04/22/25 Room / Location: [facility] M1 / [facility]    Anesthesia Start: 0735 Anesthesia Stop: 1115    Procedure: ROBOT ASSISTED LAPAROSCOPIC ANTERIOR ABDOMINAL HERNIA REPAIR WITH MESH WITH SEPARATION OF COMPONENTS (Abdomen) Diagnosis:       Abdominal wall hernia      (Abdominal wall hernia [K43.9])    Providers: Christopher Valero MD Responsible Provider: Ti Desai MD    Anesthesia Type: General ASA Status: 3            Anesthesia Type: General    Michelle Phase I: Michelle Score: 9    Michelle Phase II:      Anesthesia Post Evaluation    Patient location during evaluation: PACU  Patient participation: complete - patient participated  Level of consciousness: awake and alert  Airway patency: patent  Nausea & Vomiting: no nausea and no vomiting  Cardiovascular status: hemodynamically stable  Respiratory status: acceptable  Hydration status: stable    No notable events documented.

## 2025-04-22 NOTE — FLOWSHEET NOTE
04/22/25 1115   Handoff   Communication Given Transfer Handoff   Handoff phase Phase I receiving   Handoff Given To Marium FERGUSON   Handoff Received From EDGAR Randle RN and KATIE Meek CRNA/Yousuf JOYCE   Handoff Communication Face to Face;At bedside

## 2025-04-22 NOTE — FLOWSHEET NOTE
04/22/25 0806   Family Communication    Relationship to Patient Spouse    Phone Number rick, [phone]   Family/Significant Other Update Updated   Delivery Origin Nurse   Family Communication   Family Update Message Procedure started;Patient stable        04/22/25 1025   Family Communication    Relationship to Patient Spouse    Phone Number Amilcar, [phone]   Family/Significant Other Update Updated   Delivery Origin Nurse   Family Communication   Family Update Message Surgeon working;Patient stable

## 2025-04-30 ENCOUNTER — TELEPHONE (OUTPATIENT)
Age: 60
End: 2025-04-30

## 2025-04-30 NOTE — TELEPHONE ENCOUNTER
I agree, it looks like a contact dermatitis to the glue. Please have her rub cortisone cream over the incisions to work the glue off and she can also take benadryl for the itching. Should improve a few days after the glue is completely off.     Thanks.

## 2025-05-05 ENCOUNTER — OFFICE VISIT (OUTPATIENT)
Age: 60
End: 2025-05-05

## 2025-05-05 VITALS
SYSTOLIC BLOOD PRESSURE: 124 MMHG | OXYGEN SATURATION: 97 % | BODY MASS INDEX: 41.57 KG/M2 | DIASTOLIC BLOOD PRESSURE: 84 MMHG | TEMPERATURE: 97.5 F | WEIGHT: 220.2 LBS | RESPIRATION RATE: 17 BRPM | HEART RATE: 96 BPM | HEIGHT: 61 IN

## 2025-05-05 DIAGNOSIS — Z09 POSTOPERATIVE EXAMINATION: Primary | ICD-10-CM

## 2025-05-05 PROCEDURE — 99024 POSTOP FOLLOW-UP VISIT: CPT | Performed by: SURGERY

## 2025-05-05 ASSESSMENT — PATIENT HEALTH QUESTIONNAIRE - PHQ9
SUM OF ALL RESPONSES TO PHQ QUESTIONS 1-9: 0
1. LITTLE INTEREST OR PLEASURE IN DOING THINGS: NOT AT ALL
SUM OF ALL RESPONSES TO PHQ QUESTIONS 1-9: 0
2. FEELING DOWN, DEPRESSED OR HOPELESS: NOT AT ALL

## 2025-05-05 NOTE — PROGRESS NOTES
Chief Complaint   Patient presents with    Post-Op Check      po 2wk robot assisted lap AA hernia repair with mesh, comp sep 4/22     Reviewed pathology and provided a copy: Benign hernia contents    Tolerating PO  Ostomy working normally    Pain controlled with Tylenol      Physical Exam:   Abdominal exam: Soft, non-distended, appropriatly tender.    Wound: clean, dry, no drainage contact dermatitis from the Dermabond is resolving.    Doing well  Encourage ambulation and stretches  Continue restricted activity for a total of 4 weeks    Follow-up: liat Valero MD FACS

## 2025-05-05 NOTE — PROGRESS NOTES
Identified pt with two pt identifiers (name and ). Reviewed chart in preparation for visit and have obtained necessary documentation.    Valentina Maynard is a 59 y.o. female Post-Op Check ( po 2wk robot assisted lap AA hernia repair with mesh, comp sep )  .    Vitals:    25 1249   BP: 124/84   BP Site: Left Upper Arm   Patient Position: Sitting   BP Cuff Size: Medium Adult   Pulse: 96   Resp: 17   Temp: 97.5 °F (36.4 °C)   TempSrc: Oral   SpO2: 97%   Weight: 99.9 kg (220 lb 3.2 oz)   Height: 1.549 m (5' 0.98\")          1. Have you been to the ER, urgent care clinic since your last visit?  Hospitalized since your last visit?  no     2. Have you seen or consulted any other health care providers outside of the [facility] since your last visit?  Include any pap smears or colon screening.  no

## 2025-05-08 ENCOUNTER — TELEPHONE (OUTPATIENT)
Age: 60
End: 2025-05-08

## 2025-05-08 NOTE — TELEPHONE ENCOUNTER
I cannot see anything abnormal in the pictures.  It is normal to have lumps where the hernias used to be for few months after surgery.    We can see her back in the office to reassess if she is concerned.

## 2025-07-01 ENCOUNTER — HOSPITAL ENCOUNTER (EMERGENCY)
Facility: HOSPITAL | Age: 60
Discharge: HOME | End: 2025-07-01
Attending: EMERGENCY MEDICINE
Payer: COMMERCIAL

## 2025-07-01 ENCOUNTER — APPOINTMENT (OUTPATIENT)
Facility: HOSPITAL | Age: 60
End: 2025-07-01
Payer: COMMERCIAL

## 2025-07-01 VITALS
WEIGHT: 201.94 LBS | RESPIRATION RATE: 17 BRPM | SYSTOLIC BLOOD PRESSURE: 122 MMHG | OXYGEN SATURATION: 100 % | DIASTOLIC BLOOD PRESSURE: 69 MMHG | TEMPERATURE: 97.7 F | HEART RATE: 75 BPM | BODY MASS INDEX: 38.18 KG/M2
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
## 2025-07-01 DIAGNOSIS — S09.90XA CLOSED HEAD INJURY, INITIAL ENCOUNTER: Primary | ICD-10-CM

## 2025-07-01 PROCEDURE — 6370000000 HC RX 637 (ALT 250 FOR IP)

## 2025-07-01 PROCEDURE — 99284 EMERGENCY DEPT VISIT MOD MDM: CPT

## 2025-07-01 PROCEDURE — 72125 CT NECK SPINE W/O DYE: CPT

## 2025-07-01 PROCEDURE — 70450 CT HEAD/BRAIN W/O DYE: CPT

## 2025-07-01 RX ORDER — IBUPROFEN 400 MG/1
600 TABLET, FILM COATED ORAL
Status: COMPLETED | OUTPATIENT
Start: 2025-07-01 | End: 2025-07-01

## 2025-07-01 RX ORDER — ACETAMINOPHEN 500 MG
1000 TABLET ORAL
Status: COMPLETED | OUTPATIENT
Start: 2025-07-01 | End: 2025-07-01

## 2025-07-01 RX ORDER — METHOCARBAMOL 750 MG/1
750 TABLET, FILM COATED ORAL 4 TIMES DAILY
Qty: 40 TABLET | Refills: 0 | Status: SHIPPED | OUTPATIENT
Start: 2025-07-01 | End: 2025-07-11

## 2025-07-01 RX ADMIN — ACETAMINOPHEN 1000 MG: 500 TABLET ORAL at 15:38

## 2025-07-01 RX ADMIN — IBUPROFEN 600 MG: 400 TABLET, FILM COATED ORAL at 15:37

## 2025-07-01 ASSESSMENT — PAIN DESCRIPTION - LOCATION
LOCATION: NECK;HEAD
LOCATION: HEAD;NECK

## 2025-07-01 ASSESSMENT — PAIN SCALES - GENERAL
PAINLEVEL_OUTOF10: 7
PAINLEVEL_OUTOF10: 7

## 2025-07-01 ASSESSMENT — PAIN DESCRIPTION - DESCRIPTORS
DESCRIPTORS: THROBBING
DESCRIPTORS: THROBBING

## 2025-07-01 ASSESSMENT — PAIN - FUNCTIONAL ASSESSMENT: PAIN_FUNCTIONAL_ASSESSMENT: NONE - DENIES PAIN

## 2025-07-01 ASSESSMENT — PAIN DESCRIPTION - ORIENTATION
ORIENTATION: POSTERIOR
ORIENTATION: POSTERIOR

## 2025-07-01 NOTE — ED TRIAGE NOTES
Pt tripped and fell backward and hit her head on concrete pta , +loc , +blood thinners , vision is cloudy , denies neck or back pain , did not break the skin , feels like she is walking to the right

## 2025-07-01 NOTE — ED PROVIDER NOTES
[facility] EMERGENCY DEPARTMENT  EMERGENCY DEPARTMENT ENCOUNTER      Pt Name: Valentina Maynard  MRN: [id number]  Birthdate 1965  Date of evaluation: 7/1/2025  Provider: Rakel Fox PA-C    CHIEF COMPLAINT       Chief Complaint   Patient presents with    Fall    Head Injury         HISTORY OF PRESENT ILLNESS   (Location/Symptom, Timing/Onset, Context/Setting, Quality, Duration, Modifying Factors, Severity)  Note limiting factors.   Patient is a 59-year-old female presenting for concerns of a head injury after she tripped over her feet and fell backwards and hit her head on the concrete just prior to arrival.  She is on a blood thinning medication.  She said that she feels like she has been lightheaded since this happened and does not know if she lost consciousness but said that she was out of it for maybe a second.  Said that her vision feels a little bit cloudy since this has happened but no vision changes specifically.  She also says that she feels like she is not walking like her normal self and may be leaning more towards the right since this happened.  Denies any other injuries.  Denies any bleeding from the head.              Review of External Medical Records:     Nursing Notes were reviewed.    REVIEW OF SYSTEMS    (2-9 systems for level 4, 10 or more for level 5)     Review of Systems    Except as noted above the remainder of the review of systems was reviewed and negative.       PAST MEDICAL HISTORY     Past Medical History:   Diagnosis Date    Adverse effect of anesthesia     \"takes me a while to go under\"    Anemia     CAD (coronary artery disease)     [facility]/Dr. Woodson    Chronic pain     Dr Guillen pain management    Crohn's disease (HCC)     Dr Pool & Dr Schaffer    History of atrial fibrillation     Dr. Woodson    History of cervical cancer     Hx of supraventricular tachycardia     hx of SVT- dr lucila woodson-[facility]    Lupus     Dr Aneudy Malloy    Migraines     Dr. Faisal Meza

## 2025-07-01 NOTE — DISCHARGE INSTRUCTIONS
You are seen today for concerns of a head injury after a fall where you hit your head.  CT imaging today is overall reassuring.  No identified intracranial process such as a bleed and no fractures appreciated.  There are some findings of chronic changes as we discussed together.  It is likely that you have an acute concussion from this fall so I would avoid things like bright lights and loud noises as well as physical activity and rest for the next couple of days.  I will send you in a muscle relaxer that can make you sleepy but will help with relaxing the muscle tightness you can experience in the next couple of days from the fall.  In the meantime also take Tylenol.  It is normal to be more sore tomorrow than today, but please come back to us with worsening symptoms or concerns overall.  I also recommend follow-up with a primary care provider in the next couple of days to week to ensure that you are doing well after these potential concussive symptoms and having a concussion.  Please come back to us with worsening concerns or symptoms.    Thank you for allowing us to provide you with medical care today.  We realize that you have many choices for your emergency care needs.  We thank you for choosing [facility].  Please choose us in the future for any continued health care needs.     The exam and treatment you received in the Emergency Department were for an emergent problem and are not intended as complete care. It is important that you follow up with a doctor, nurse practitioner, or physician assistant for ongoing care. If your symptoms worsen or you do not improve as expected and you are unable to reach your usual health care provider, you should return to the Emergency Department. We are available 24 hours a day.     Please make an appointment with your healthcare provider(s) for follow up of your Emergency Department visit.  Take this sheet with you when you go to your follow-up visit.

## 2025-08-27 ENCOUNTER — HOSPITAL ENCOUNTER (OUTPATIENT)
Facility: HOSPITAL | Age: 60
Discharge: HOME | End: 2025-08-30
Attending: OBSTETRICS & GYNECOLOGY
Payer: COMMERCIAL

## 2025-08-27 VITALS — BODY MASS INDEX: 36.25 KG/M2 | HEIGHT: 61 IN | WEIGHT: 192 LBS

## 2025-08-27 DIAGNOSIS — R92.8 ABNORMAL MAMMOGRAM OF RIGHT BREAST: ICD-10-CM

## 2025-08-27 DIAGNOSIS — N63.0 BREAST NODULE: ICD-10-CM

## 2025-08-27 PROCEDURE — 76642 ULTRASOUND BREAST LIMITED: CPT

## 2025-08-27 PROCEDURE — G0279 TOMOSYNTHESIS, MAMMO: HCPCS

## (undated) DEVICE — ABDOMINAL PAD: Brand: DERMACEA

## (undated) DEVICE — SUTURE 1 STRATAFIX SYMMETRIC PDS + 30CM CT-1 SXPP1A435

## (undated) DEVICE — PAD,ABDOMINAL,5"X9",ST,LF,25/BX: Brand: MEDLINE INDUSTRIES, INC.

## (undated) DEVICE — NEEDLE HYPO 25GA L1.5IN BVL ORIENTED ECLIPSE

## (undated) DEVICE — HANDLE LT SNAP ON ULT DURABLE LENS FOR TRUMPF ALC DISPOSABLE

## (undated) DEVICE — INFECTION CONTROL KIT SYS

## (undated) DEVICE — STERILE POLYISOPRENE POWDER-FREE SURGICAL GLOVES WITH EMOLLIENT COATING: Brand: PROTEXIS

## (undated) DEVICE — KIT,1200CC CANISTER,3/16"X6' TUBING: Brand: MEDLINE INDUSTRIES, INC.

## (undated) DEVICE — 40580 - THE PINK PAD - ADVANCED TRENDELENBURG POSITIONING KIT: Brand: 40580 - THE PINK PAD - ADVANCED TRENDELENBURG POSITIONING KIT

## (undated) DEVICE — SYRINGE MED 10ML LUERLOCK TIP W/O SFTY DISP

## (undated) DEVICE — 1200 GUARD II KIT W/5MM TUBE W/O VAC TUBE: Brand: GUARDIAN

## (undated) DEVICE — SPONGE GZ W4XL4IN COT 12 PLY TYP VII WVN C FLD DSGN STERILE

## (undated) DEVICE — HYPODERMIC SAFETY NEEDLE: Brand: MONOJECT

## (undated) DEVICE — SYR 10ML LUER LOK 1/5ML GRAD --

## (undated) DEVICE — SPONGE LAP 18X18IN STRL -- 5/PK

## (undated) DEVICE — STERILE POLYISOPRENE POWDER-FREE SURGICAL GLOVES: Brand: PROTEXIS

## (undated) DEVICE — KENDALL RADIOLUCENT FOAM MONITORING ELECTRODE -RECTANGULAR SHAPE: Brand: KENDALL

## (undated) DEVICE — SURGICAL PROCEDURE PACK BASIN MAJ SET CUST NO CAUT

## (undated) DEVICE — HOOK RETRCT L5MM E SHRP SELF RET SYS LONE STAR

## (undated) DEVICE — SUTURE ETHILON SZ 2-0 L18IN NONABSORBABLE BLK L19MM PS-2 PRIM 593H

## (undated) DEVICE — MASTISOL ADHESIVE LIQ 2/3ML

## (undated) DEVICE — INTEGRA® MILTEX® DISPOSABLE BIOPSY PUNCH 4.0MM: Brand: INTEGRA® MILTEX®

## (undated) DEVICE — SUTURE VICRYL + SZ 3-0 L27IN ABSRB VLT SH 1/2 CIR TAPR PNT VCP784D

## (undated) DEVICE — Device

## (undated) DEVICE — ROCKER SWITCH PENCIL BLADE ELECTRODE, HOLSTER: Brand: EDGE

## (undated) DEVICE — SUTURE CHROMIC GUT SZ 2-0 L27IN ABSRB BRN L26MM SH 1/2 CIR G123H

## (undated) DEVICE — TOWEL,OR,DSP,ST,BLUE,STD,4/PK,20PK/CS: Brand: MEDLINE

## (undated) DEVICE — 3M™ IOBAN™ 2 ANTIMICROBIAL INCISE DRAPE 6650EZ: Brand: IOBAN™ 2

## (undated) DEVICE — BW-412T DISP COMBO CLEANING BRUSH: Brand: SINGLE USE COMBINATION CLEANING BRUSH

## (undated) DEVICE — CATH IV AUTOGRD BC BLU 22GA 25 -- INSYTE

## (undated) DEVICE — DRAPE,ROBOTICS,STERILE: Brand: MEDLINE

## (undated) DEVICE — AIRLIFE™ U/CONNECT-IT OXYGEN TUBING 7 FEET (2.1 M) CRUSH-RESISTANT OXYGEN TUBING, VINYL TIPPED: Brand: AIRLIFE™

## (undated) DEVICE — JELLY,LUBE,STERILE,FLIP TOP,TUBE,4-OZ: Brand: MEDLINE

## (undated) DEVICE — YANKAUER,POOLE TIP,STERILE,50/CS: Brand: MEDLINE

## (undated) DEVICE — SPONGE GZ W4XL4IN COT 12 PLY TYP VII WVN C FLD DSGN

## (undated) DEVICE — SURGIFOAM SPNG SZ 100

## (undated) DEVICE — TOTAL TRAY, 16FR 10ML SIL FOLEY, URN: Brand: MEDLINE

## (undated) DEVICE — INSUFFLATION NEEDLE: Brand: SURGINEEDLE

## (undated) DEVICE — BAG BELONG PT PERS CLEAR HANDL

## (undated) DEVICE — ELECTRODE PT RET AD L9FT HI MOIST COND ADH HYDRGEL CORDED

## (undated) DEVICE — KENDALL SCD EXPRESS SLEEVES, KNEE LENGTH, MEDIUM: Brand: KENDALL SCD

## (undated) DEVICE — SOLUTION IV 1000ML 0.9% SOD CHL

## (undated) DEVICE — VESSEL SEALER: Brand: ENDOWRIST

## (undated) DEVICE — ELECTRODE NDL 2.8IN COAT VALLEYLAB

## (undated) DEVICE — MAX-CORE® DISPOSABLE CORE BIOPSY INSTRUMENT, 18G X 25CM: Brand: MAX-CORE

## (undated) DEVICE — TROCAR: Brand: KII FIOS FIRST ENTRY

## (undated) DEVICE — INTENDED FOR TISSUE SEPARATION, AND OTHER PROCEDURES THAT REQUIRE A SHARP SURGICAL BLADE TO PUNCTURE OR CUT.: Brand: BARD-PARKER ® CARBON RIB-BACK BLADES

## (undated) DEVICE — GLOVE SURG SZ 75 L12IN FNGR THK79MIL GRN LTX FREE

## (undated) DEVICE — CANISTER, RIGID, 3000CC: Brand: MEDLINE INDUSTRIES, INC.

## (undated) DEVICE — SUTURE VICRYL + SZ 3-0 L27IN ABSRB UD L26MM SH 1/2 CIR VCP416H

## (undated) DEVICE — GENERAL LAPAROSCOPY-MRMC: Brand: MEDLINE INDUSTRIES, INC.

## (undated) DEVICE — GLOVE ORANGE PI 7   MSG9070

## (undated) DEVICE — SOLUTION IRRIGATION STRL H2O 1000 ML UROMATIC CONTAINER

## (undated) DEVICE — SUTURE MCRYL SZ 4-0 L27IN ABSRB UD L19MM PS-2 1/2 CIR PRIM Y426H

## (undated) DEVICE — STAPLER INT L34CM 60MM LNG ENDOSCP ARTC PWR + ECHELON FLX

## (undated) DEVICE — SUTURE PERMA-HAND SZ 2-0 L30IN NONABSORBABLE BLK L26MM SH K833H

## (undated) DEVICE — DBD-PACK,LAPAROTOMY,2 REINFORCED GOWNS: Brand: MEDLINE

## (undated) DEVICE — SOLUTION IRRIG 1000ML 09% SOD CHL USP PIC PLAS CONTAINER

## (undated) DEVICE — GOWN,SIRUS,FABRNF,XL,20/CS: Brand: MEDLINE

## (undated) DEVICE — YANKAUER,BULB TIP,W/O VENT,RIGID,STERILE: Brand: MEDLINE

## (undated) DEVICE — SUTURE PROL SZ 2-0 L36IN NONABSORBABLE BLU SH L26MM 1/2 CIR 8523H

## (undated) DEVICE — PACK,LAPAROTOMY,2 REINFORCED GOWNS: Brand: MEDLINE

## (undated) DEVICE — SUTURE VCRL SZ 3-0 L27IN ABSRB UD L26MM SH 1/2 CIR J416H

## (undated) DEVICE — DEVON™ KNEE AND BODY STRAP 60" X 3" (1.5 M X 7.6 CM): Brand: DEVON

## (undated) DEVICE — CONNECTOR TBNG AUX H2O JET DISP FOR OLY 160/180 SER

## (undated) DEVICE — SUPER SPONGES,MEDIUM: Brand: DERMACEA

## (undated) DEVICE — SUTURE PDS II SZ 0 L60IN ABSRB VLT L48MM CTX 1/2 CIR Z990G

## (undated) DEVICE — SUTURE STRATAFIX SPRL PDS + VLT 3-0 15CM DISPOSABLE/SNGLE SXPP1B420

## (undated) DEVICE — TUBING, SUCTION, 1/4" X 10', STRAIGHT: Brand: MEDLINE

## (undated) DEVICE — SPONGE LAPAROTOMY W18XL18IN WHITE STRUNG RADIOPAQUE STERILE

## (undated) DEVICE — STAPLER INT L28CM DIA29MM CLS STPL H10-2.5MM OPN LEG L5.5MM

## (undated) DEVICE — STRAP,POSITIONING,KNEE/BODY,FOAM,4X60": Brand: MEDLINE

## (undated) DEVICE — REM POLYHESIVE ADULT PATIENT RETURN ELECTRODE: Brand: VALLEYLAB

## (undated) DEVICE — GLOVE ORANGE PI 7 1/2   MSG9075

## (undated) DEVICE — STAPLER 60: Brand: SUREFORM

## (undated) DEVICE — BLADE,CARBON-STEEL,11,STRL,DISPOSABLE,TB: Brand: MEDLINE

## (undated) DEVICE — BLADELESS OBTURATOR: Brand: WECK VISTA

## (undated) DEVICE — GLOVE BIOGEL PI ULTRA TOUCH M SZ 7.5

## (undated) DEVICE — NEONATAL-ADULT SPO2 SENSOR: Brand: NELLCOR

## (undated) DEVICE — SOLUTION IRRIG 1000ML 0.9% SOD CHL USP POUR PLAS BTL

## (undated) DEVICE — 1LYRTR 16FR10ML100%SIL UMS SNP: Brand: MEDLINE INDUSTRIES, INC.

## (undated) DEVICE — PAD,ABDOMINAL,8"X10",ST,LF: Brand: MEDLINE

## (undated) DEVICE — APPLICATOR TISS 20 GAX32 CM W/ SNAP LCK SS DUPLOTIP DISP

## (undated) DEVICE — 3M™ DURAPORE™ SURGICAL TAPE 1538-3, 3 INCH X 10 YARD (7,5CM X 9,1M), 4 ROLLS/BOX: Brand: 3M™ DURAPORE™

## (undated) DEVICE — BLADE CLIPPER GEN PURP NS

## (undated) DEVICE — CONTAINER SPEC 20 ML LID NEUT BUFF FORMALIN 10 % POLYPR STS

## (undated) DEVICE — GLOVE SURG SZ 8 L12IN FNGR THK79MIL GRN LTX FREE

## (undated) DEVICE — Z DISCONTINUEDSOLUTION PREP 2OZ 10% POVIDONE IOD SCR CAP BTL

## (undated) DEVICE — COVER,MAYO STAND,STERILE: Brand: MEDLINE

## (undated) DEVICE — Z DISCONTINUED NO SUB IDED SET EXTN W/ 4 W STPCOCK M SPIN LOK 36IN

## (undated) DEVICE — INSULATED NEEDLE ELECTRODE: Brand: EDGE

## (undated) DEVICE — GARMENT,MEDLINE,DVT,INT,CALF,MED, GEN2: Brand: MEDLINE

## (undated) DEVICE — SOLUTION ANTIFOG VIS SYS CLEARIFY LAPSCP

## (undated) DEVICE — ACCESS PLATFORM FOR MINIMALLY INVASIVE SURGERY: Brand: GELPOINT®  MINI ADVANCED ACCESS PLATFORM

## (undated) DEVICE — Z DISCONTINUED USE 2751540 TUBING IRRIG L10IN DISP PMP ENDOGATOR

## (undated) DEVICE — MAJOR LAPAROTOMY-MRMC: Brand: MEDLINE INDUSTRIES, INC.

## (undated) DEVICE — DEVICE SEAL L23CM NANO COAT MARYLAND JAW OPN DIV LIGASURE

## (undated) DEVICE — BAG SPEC BIOHZD LF 2MIL 6X10IN -- CONVERT TO ITEM 357326

## (undated) DEVICE — CANN NASAL O2 CAPNOGRAPHY AD -- FILTERLINE

## (undated) DEVICE — APPLICATOR MEDICATED 26 CC SOLUTION HI LT ORNG CHLORAPREP

## (undated) DEVICE — TOWEL SURG W17XL27IN STD BLU COT NONFENESTRATED PREWASHED

## (undated) DEVICE — GOWN,SIRUS,NONRNF,SETINSLV,XL,20/CS: Brand: MEDLINE

## (undated) DEVICE — GAUZE,SPONGE,FLUFF,6"X6.75",STRL,5/TRAY: Brand: MEDLINE

## (undated) DEVICE — SYSTEM EVAC SMOKE LAPARSCOPIC

## (undated) DEVICE — SUT CHRMC 3-0 27IN SH BRN --

## (undated) DEVICE — SPONGE HEMOSTAT CELLULS 4X8IN -- SURGICEL

## (undated) DEVICE — KIT IV STRT W CHLORAPREP PD 1ML

## (undated) DEVICE — NEEDLE HYPO 22GA L1.5IN BLK S STL HUB POLYPR SHLD REG BVL

## (undated) DEVICE — BLADE ELECTRODE: Brand: EDGE

## (undated) DEVICE — TIP COVER ACCESSORY

## (undated) DEVICE — STERILE-Z MAYO STAND COVERS CLEAR POLYETHYLENE STERILE UNIVERSAL FIT 20 PER CASE: Brand: STERILE-Z

## (undated) DEVICE — SYSTEM FASCIAL CLSR UNIQUE SHLDED WNG SAFE UNIF CONSISTENT

## (undated) DEVICE — TIP SUCT POOLE RIG 2-PART L LUMN BVL SL OPN AND SNAP FIT

## (undated) DEVICE — TRAY PREP DRY W/ PREM GLV 2 APPL 6 SPNG 2 UNDPD 1 OVERWRAP

## (undated) DEVICE — SUT STRATA PDS+ 15CM SZ 3-0 SH -- STRATAFIX

## (undated) DEVICE — SUTURE VCRL SZ 2-0 L27IN ABSRB UD L26MM SH 1/2 CIR J417H

## (undated) DEVICE — SET SPR SNAP LOK 40CM 360DEG ENDOSCP APPL FLX W DUPLOSPR

## (undated) DEVICE — SUTURE SZ 0 27IN 5/8 CIR UR-6  TAPER PT VIOLET ABSRB VICRYL J603H

## (undated) DEVICE — SOLIDIFIER FLUID 3000 CC ABSORB

## (undated) DEVICE — SOLUTION IRRIG 1000ML STRL H2O USP PLAS POUR BTL

## (undated) DEVICE — DRAPE,LAPAROTOMY,PCH,STERILE: Brand: MEDLINE

## (undated) DEVICE — OPEN-END URETERAL CATHETER: Brand: COOK

## (undated) DEVICE — Z DISCONTINUED SPONGE LAPAROTOMY W18XL18IN WHITE STRUNG RADIOPAQUE STERILE

## (undated) DEVICE — SUTURE PDS II SZ 2-0 L27IN ABSRB VLT SH L26MM 1/2 CIR Z317H

## (undated) DEVICE — SEALER ENDOSCP NANO COAT OPN DIV CRV L JAW LIGASURE IMPACT

## (undated) DEVICE — STRIP WND PK W0.25INXL5YD IODO GZ TIGHTLY WVN CURAD

## (undated) DEVICE — COLON CLOSING PACK: Brand: MEDLINE INDUSTRIES, INC.

## (undated) DEVICE — SUTURE VICRYL SZ 2-0 L36IN ABSRB UD L36MM CT-1 1/2 CIR J945H

## (undated) DEVICE — HYPODERMIC SAFETY NEEDLE: Brand: MAGELLAN

## (undated) DEVICE — SUTURE MONOCRYL SZ 4-0 L27IN ABSRB UD L19MM PS-2 1/2 CIR PRIM Y426H

## (undated) DEVICE — SET ADMIN 16ML TBNG L100IN 2 Y INJ SITE IV PIGGY BK DISP

## (undated) DEVICE — SEAL

## (undated) DEVICE — DRAPE FLD WRM W44XL66IN C6L FOR INTRATEMP SYS THERMABASIN

## (undated) DEVICE — DRAIN SURG L18IN DIA025IN 100% SIL RADPQ FOR CLS WND DRNAGE

## (undated) DEVICE — TAPE,CLOTH/SILK,CURAD,3"X10YD,LF,40/CS: Brand: CURAD

## (undated) DEVICE — SUTURE STRATAFIX SYMMETRIC SZ 1 L18IN ABSRB VLT CT1 L36CM SXPP1A404

## (undated) DEVICE — 3M™ IOBAN™ 2 ANTIMICROBIAL INCISE DRAPE 6651EZ: Brand: IOBAN™ 2

## (undated) DEVICE — CYSTO MRMC: Brand: MEDLINE INDUSTRIES, INC.

## (undated) DEVICE — Z DUPLICATE USE 2885634 SPONGE GZ W4XL4IN COT RADPQ HIGHLY ABSRB

## (undated) DEVICE — SUTURE VCRL SZ 3-0 L18IN ABSRB UD L26MM SH 1/2 CIR J864D

## (undated) DEVICE — NEEDLE HYPO 18GA L1.5IN PNK S STL HUB POLYPR SHLD REG BVL

## (undated) DEVICE — 3M™ MEDIPORE™ H SOFT CLOTH SURGICAL TAPE 2864, 4 INCH X 10 YARD (10CM X 9,14M), 12 ROLLS/CASE: Brand: 3M™ MEDIPORE™

## (undated) DEVICE — SEAL UNIV 5-8MM DISP BX/10 -- DA VINCI XI - SNGL USE

## (undated) DEVICE — FORCEPS BX L240CM JAW DIA2.8MM L CAP W/ NDL MIC MESH TOOTH

## (undated) DEVICE — LIQUIBAND RAPID ADHESIVE 36/CS 0.8ML: Brand: MEDLINE

## (undated) DEVICE — SEALANT FIBRIN 10 CC FRZN PRE FILLED SYR TISSEEL

## (undated) DEVICE — POOLE SUCTION INSTRUMENT WITH REMOVABLE SHEATH: Brand: POOLE

## (undated) DEVICE — BINDER ABD M/L H12IN FOR 46-62IN WHT 4 SLD PNL DSGN HOOP

## (undated) DEVICE — QUILTED PREMIUM COMFORT UNDERPAD,EXTRA HEAVY: Brand: WINGS

## (undated) DEVICE — ARM DRAPE

## (undated) DEVICE — PREMIUM WET SKIN PREP TRAY: Brand: MEDLINE INDUSTRIES, INC.

## (undated) DEVICE — CLICKLINE SCISSORS INSERT: Brand: CLICKLINE

## (undated) DEVICE — EXTRA LARGE, DISPOSABLE C-SECTION PROTECTOR - RETRACTOR: Brand: ALEXIS ® O C-SECTION PROTECTOR - RETRACTOR

## (undated) DEVICE — ENDO CARRY-ON PROCEDURE KIT INCLUDES ENZYMATIC SPONGE, GAUZE, BIOHAZARD LABEL, TRAY, LUBRICANT, DIRTY SCOPE LABEL, WATER LABEL, TRAY, DRAWSTRING PAD, AND DEFENDO 4-PIECE KIT.: Brand: ENDO CARRY-ON PROCEDURE KIT

## (undated) DEVICE — SINGLE USE SPLINTING TUBE: Brand: SINGLE USE SPLINTING TUBE

## (undated) DEVICE — SYRINGE MED 20ML STD CLR PLAS LUERLOCK TIP N CTRL DISP

## (undated) DEVICE — GAUZE SPONGES,12 PLY: Brand: CURITY